# Patient Record
Sex: FEMALE | Race: BLACK OR AFRICAN AMERICAN | Employment: OTHER | URBAN - METROPOLITAN AREA
[De-identification: names, ages, dates, MRNs, and addresses within clinical notes are randomized per-mention and may not be internally consistent; named-entity substitution may affect disease eponyms.]

---

## 2017-11-02 LAB — HBA1C MFR BLD HPLC: 6 %

## 2018-07-31 ENCOUNTER — OFFICE VISIT (OUTPATIENT)
Dept: ORTHOPEDIC SURGERY | Age: 63
End: 2018-07-31

## 2018-07-31 VITALS
WEIGHT: 168.2 LBS | HEIGHT: 66 IN | OXYGEN SATURATION: 96 % | DIASTOLIC BLOOD PRESSURE: 72 MMHG | BODY MASS INDEX: 27.03 KG/M2 | SYSTOLIC BLOOD PRESSURE: 141 MMHG | HEART RATE: 107 BPM

## 2018-07-31 DIAGNOSIS — M53.86 SCIATICA ASSOCIATED WITH DISORDER OF LUMBAR SPINE: ICD-10-CM

## 2018-07-31 DIAGNOSIS — S86.911A KNEE STRAIN, RIGHT, INITIAL ENCOUNTER: ICD-10-CM

## 2018-07-31 DIAGNOSIS — M51.16 LUMBAR DISC DISEASE WITH RADICULOPATHY: ICD-10-CM

## 2018-07-31 DIAGNOSIS — M25.551 PAIN OF RIGHT HIP JOINT: ICD-10-CM

## 2018-07-31 DIAGNOSIS — M16.11 ARTHRITIS OF RIGHT HIP: ICD-10-CM

## 2018-07-31 DIAGNOSIS — M25.561 RIGHT KNEE PAIN, UNSPECIFIED CHRONICITY: Primary | ICD-10-CM

## 2018-07-31 RX ORDER — LOSARTAN POTASSIUM AND HYDROCHLOROTHIAZIDE 25; 100 MG/1; MG/1
TABLET ORAL
Refills: 5 | Status: ON HOLD | COMMUNITY
Start: 2018-07-06 | End: 2020-05-23

## 2018-07-31 RX ORDER — AMLODIPINE BESYLATE 10 MG/1
10 TABLET ORAL DAILY
Refills: 3 | COMMUNITY
Start: 2018-07-06 | End: 2020-09-17

## 2018-07-31 RX ORDER — AZELASTINE 1 MG/ML
2 SPRAY, METERED NASAL
Refills: 5 | COMMUNITY
Start: 2018-07-16 | End: 2021-03-30 | Stop reason: SDUPTHER

## 2018-07-31 RX ORDER — MONTELUKAST SODIUM 10 MG/1
TABLET ORAL
Refills: 5 | COMMUNITY
Start: 2018-06-25 | End: 2021-08-27 | Stop reason: SDUPTHER

## 2018-07-31 RX ORDER — ERGOCALCIFEROL 1.25 MG/1
50000 CAPSULE ORAL
Refills: 5 | COMMUNITY
Start: 2018-07-07 | End: 2020-09-02

## 2018-07-31 RX ORDER — GLYCOPYRROLATE AND FORMOTEROL FUMARATE 9; 4.8 UG/1; UG/1
AEROSOL, METERED RESPIRATORY (INHALATION)
Refills: 4 | Status: ON HOLD | COMMUNITY
Start: 2018-07-14 | End: 2020-05-23

## 2018-07-31 RX ORDER — PREDNISONE 10 MG/1
10 TABLET ORAL DAILY
Refills: 12 | Status: ON HOLD | COMMUNITY
Start: 2018-07-10 | End: 2020-05-29 | Stop reason: SDUPTHER

## 2018-07-31 RX ORDER — ALBUTEROL SULFATE 0.83 MG/ML
SOLUTION RESPIRATORY (INHALATION)
Refills: 4 | Status: ON HOLD | COMMUNITY
Start: 2018-07-07 | End: 2020-05-23

## 2018-07-31 RX ORDER — OMEPRAZOLE 40 MG/1
CAPSULE, DELAYED RELEASE ORAL
Refills: 4 | Status: ON HOLD | COMMUNITY
Start: 2018-07-06 | End: 2020-05-23

## 2018-07-31 RX ORDER — ALBUTEROL SULFATE 90 UG/1
AEROSOL, METERED RESPIRATORY (INHALATION)
Refills: 5 | Status: ON HOLD | COMMUNITY
Start: 2018-07-17 | End: 2020-05-23

## 2018-07-31 RX ORDER — ATORVASTATIN CALCIUM 10 MG/1
TABLET, FILM COATED ORAL
Refills: 4 | COMMUNITY
Start: 2018-07-09 | End: 2020-09-02

## 2018-07-31 NOTE — PROGRESS NOTES
HISTORY OF PRESENT ILLNESS:  Erica Rader is a 72-year-old female who is here for consultation regarding right hip and knee pain. She is a patient of Dr. Yary Cano, and he did give her a cortisone injection to the lateral aspect of her right hip. This helped her only for a couple of days. She points to pain in the right groin. She does not notice a leg length discrepancy. She has had pain for a few years. She does rely on a cane for ambulatory assistance. She also has a history of back problems, but she feels her worse pain is now in the groin and into the right knee. She does not notice swelling of the right knee. Her pain is aggravated by weightbearing activities. She also has some pain at rest.  Her pain is definitely interfering with her daily activities. She has a hard time getting in and out of cars. She has a hard time putting her shoes and socks on now. She does not take any arthritis medicine but is on Prednisone because of severe COPD from a smoking history. She is no longer smoking. PHYSICAL EXAMINATION:   Clinical examination today reveals an overweight, 72-year-old,  female in obvious discomfort. She ambulates with a marked antalgic gait with decreased stance phase on the right leg. She relies on a cane for ambulation assistance. With reference to her left hip, she is able to straight leg raise about 80° today without discomfort. She has good passive rotation of the left hip without pain. Left hip roll test is negative. Delgado's test on the left side is negative. With reference to her left knee, she has no effusion of the left knee, and she has good functional range of motion of her left knee. With reference to her right hip, she has difficulty even initiating a straight leg raise on the right side. With help, she is able to flex to about 60°, and she flexes into external rotation.   She has internal rotation to only about 5° of external rotation and has further external rotation of about 5°. She has passive abduction of about 5° and abduction beyond this results in pain, as well as pelvic tilt. Right hip roll test is markedly positive. Delgado's test on the right side is difficult to assess due to the stiffness of her right hip. She appears to have a leg length discrepancy with the right leg being slightly shorter than the left. She does not feel this, however. With reference to her right knee, she has no effusion of the right knee. She has good functional range of motion of the right knee, and she does not have a flexion contracture of her right knee. She has flexion of about 130°. She has no palpable medial or lateral joint line tenderness. She has mild crepitance to the patellofemoral area of the right knee with flexion and extension. She has good collateral, as well as cruciate ligament stability of the right knee. Lachman's test is negative. Anterior and posterior drawer tests are negative. Neurovascular testing is intact to the lower extremities proximal and distal to motor strength and sensation. RADIOGRAPHS:  X-rays of her pelvis and right knee are reviewed. She has severe arthritis of the right hip. She has complete bone-on-bone-opposition in the articular surface of the right hip with cyst formation on both sides of the joint. She has increased sclerosis of the sourcil. She has osteophytes along the superolateral, as well as inferior medial aspect of the right hip. X-rays of the right knee reveal minimal arthritic changes. She has good joint space remaining in the weightbearing portion of the right knee. She does not have radiographic deformity. Bone quality is pretty good considering she is on 10 mg of Prednisone a day. IMPRESSION:      1. Severe osteoarthritis, right hip. 2. Right knee pain secondary to arthritis of the right hip. 3. 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 RECOMMENDATIONS:  I discussed with the patient I think the majority of her symptoms are originating from her right hip. I do not feel that further conservative treatment will help her with her symptoms of hip pain or knee pain. Clinically and radiographically, she is a candidate for hip arthroplasty surgery. Risks and benefits of the surgery were discussed with the patient that include but are not limited to infection, bleeding, nerve damage, deep venous thrombosis, pulmonary emboli, failure of the prosthesis, as well failure of the procedure. We discussed hospitalization and rehabilitation postoperatively, leg length discrepancy, and dislocation precautions. I also discussed with the patient alternate bearing surfaces and alternate approaches. The patient understands. All questions were answered. She would like to proceed with surgery pending appropriate preoperative clearances. We will definitely need to get pulmonary clearance prior to proceeding with surgery and manage her steroids perioperatively. All her questions were answered today. Vitals:    07/31/18 1319   BP: 141/72   Pulse: (!) 107   SpO2: 96%   Weight: 168 lb 3.2 oz (76.3 kg)   Height: 5' 6\" (1.676 m)   PainSc:   9   PainLoc: Hip       There is no problem list on file for this patient. There are no active problems to display for this patient.     Current Outpatient Prescriptions   Medication Sig Dispense Refill    VENTOLIN HFA 90 mcg/actuation inhaler INHALE 2 PUFFS BY MOUTH EVERY 4 HOURS AS NEEDED FOR WHEEZING  5    albuterol (PROVENTIL VENTOLIN) 2.5 mg /3 mL (0.083 %) nebulizer solution USE 1 UNIT DOSE VIAL IN NEBULIZER EVERY 4 TO 6 HOURS AS NEEDED  4    amLODIPine (NORVASC) 10 mg tablet TAKE 1 TABLET EVERY DAY  3    atorvastatin (LIPITOR) 10 mg tablet TAKE 1 TABLET BY MOUTH EVERY DAY  4    azelastine (ASTELIN) 137 mcg (0.1 %) nasal spray INSTILL 2 SPRAYS TWICE DAILY INTO EACH NOSTRIL  5    ergocalciferol (ERGOCALCIFEROL) 50,000 unit capsule TAKE 1 CAPSULE BY MOUTH EVERY WEEK. 5    BEVESPI AEROSPHERE 9-4.8 mcg HFAA TAKE 2 PUFFS BY MOUTH TWICE A DAY  4    losartan-hydroCHLOROthiazide (HYZAAR) 100-25 mg per tablet TAKE 1 TABLET BY MOUTH EVERY DAY  5    montelukast (SINGULAIR) 10 mg tablet TAKE 1 TABLET BY MOUTH EVERY DAY FOR ALLERGY SYMPTOMS  5    omeprazole (PRILOSEC) 40 mg capsule TAKE ONE CAPSULE BY MOUTH EVERY DAY BEFORE A MEAL  4    predniSONE (DELTASONE) 10 mg tablet TAKE 1 TABLET BY MOUTH EVERY DAY  12     No Known Allergies  History reviewed. No pertinent past medical history. History reviewed. No pertinent surgical history.   Family History   Problem Relation Age of Onset    Hypertension Mother     Cancer Sister      Social History   Substance Use Topics    Smoking status: Never Smoker    Smokeless tobacco: Never Used    Alcohol use 1.8 oz/week     3 Cans of beer per week

## 2018-08-28 DIAGNOSIS — Z01.818 PREOP EXAMINATION: Primary | ICD-10-CM

## 2018-08-31 ENCOUNTER — HOSPITAL ENCOUNTER (OUTPATIENT)
Dept: PREADMISSION TESTING | Age: 63
Discharge: HOME | End: 2018-08-31
Payer: MEDICAID

## 2018-08-31 ENCOUNTER — HOSPITAL ENCOUNTER (OUTPATIENT)
Dept: GENERAL RADIOLOGY | Age: 63
Discharge: HOME | End: 2018-08-31
Payer: MEDICAID

## 2018-08-31 DIAGNOSIS — Z01.818 PREOP EXAMINATION: ICD-10-CM

## 2018-08-31 LAB
ABO + RH BLD: NORMAL
ALBUMIN SERPL-MCNC: 4.7 G/DL (ref 3.4–5)
ALBUMIN/GLOB SERPL: 1.5 {RATIO} (ref 0.8–1.7)
ALP SERPL-CCNC: 69 U/L (ref 45–117)
ALT SERPL-CCNC: 55 U/L (ref 13–56)
ANION GAP SERPL CALC-SCNC: 14 MMOL/L (ref 3–18)
APPEARANCE UR: CLEAR
APTT PPP: 30.7 SEC (ref 23–36.4)
AST SERPL-CCNC: 37 U/L (ref 15–37)
BACTERIA URNS QL MICRO: ABNORMAL /HPF
BASOPHILS # BLD: 0 K/UL (ref 0–0.1)
BASOPHILS NFR BLD: 0 % (ref 0–2)
BILIRUB SERPL-MCNC: 0.8 MG/DL (ref 0.2–1)
BILIRUB UR QL: NEGATIVE
BLOOD GROUP ANTIBODIES SERPL: NORMAL
BUN SERPL-MCNC: 13 MG/DL (ref 7–18)
BUN/CREAT SERPL: 15 (ref 12–20)
CALCIUM SERPL-MCNC: 10.2 MG/DL (ref 8.5–10.1)
CHLORIDE SERPL-SCNC: 91 MMOL/L (ref 100–108)
CO2 SERPL-SCNC: 29 MMOL/L (ref 21–32)
COLOR UR: YELLOW
CREAT SERPL-MCNC: 0.87 MG/DL (ref 0.6–1.3)
DIFFERENTIAL METHOD BLD: ABNORMAL
EOSINOPHIL # BLD: 0 K/UL (ref 0–0.4)
EOSINOPHIL NFR BLD: 0 % (ref 0–5)
EPITH CASTS URNS QL MICRO: ABNORMAL /LPF (ref 0–5)
ERYTHROCYTE [DISTWIDTH] IN BLOOD BY AUTOMATED COUNT: 13.4 % (ref 11.6–14.5)
GLOBULIN SER CALC-MCNC: 3.1 G/DL (ref 2–4)
GLUCOSE SERPL-MCNC: 130 MG/DL (ref 74–99)
GLUCOSE UR STRIP.AUTO-MCNC: NEGATIVE MG/DL
HBA1C MFR BLD: 5 % (ref 4.2–5.6)
HCT VFR BLD AUTO: 37.2 % (ref 35–45)
HGB BLD-MCNC: 12.5 G/DL (ref 12–16)
HGB UR QL STRIP: NEGATIVE
HYALINE CASTS URNS QL MICRO: ABNORMAL /LPF (ref 0–2)
INR PPP: 0.9 (ref 0.8–1.2)
KETONES UR QL STRIP.AUTO: NEGATIVE MG/DL
LEUKOCYTE ESTERASE UR QL STRIP.AUTO: ABNORMAL
LYMPHOCYTES # BLD: 0.8 K/UL (ref 0.9–3.6)
LYMPHOCYTES NFR BLD: 10 % (ref 21–52)
MCH RBC QN AUTO: 31.7 PG (ref 24–34)
MCHC RBC AUTO-ENTMCNC: 33.6 G/DL (ref 31–37)
MCV RBC AUTO: 94.4 FL (ref 74–97)
MONOCYTES # BLD: 0.3 K/UL (ref 0.05–1.2)
MONOCYTES NFR BLD: 5 % (ref 3–10)
NEUTS SEG # BLD: 6.2 K/UL (ref 1.8–8)
NEUTS SEG NFR BLD: 85 % (ref 40–73)
NITRITE UR QL STRIP.AUTO: NEGATIVE
PH UR STRIP: 5.5 [PH] (ref 5–8)
PLATELET # BLD AUTO: 310 K/UL (ref 135–420)
PMV BLD AUTO: 10 FL (ref 9.2–11.8)
POTASSIUM SERPL-SCNC: 4.1 MMOL/L (ref 3.5–5.5)
PROT SERPL-MCNC: 7.8 G/DL (ref 6.4–8.2)
PROT UR STRIP-MCNC: NEGATIVE MG/DL
PROTHROMBIN TIME: 11.8 SEC (ref 11.5–15.2)
RBC # BLD AUTO: 3.94 M/UL (ref 4.2–5.3)
RBC #/AREA URNS HPF: NEGATIVE /HPF (ref 0–5)
SODIUM SERPL-SCNC: 134 MMOL/L (ref 136–145)
SP GR UR REFRACTOMETRY: 1.01 (ref 1–1.03)
SPECIMEN EXP DATE BLD: NORMAL
UROBILINOGEN UR QL STRIP.AUTO: 0.2 EU/DL (ref 0.2–1)
WBC # BLD AUTO: 7.4 K/UL (ref 4.6–13.2)
WBC URNS QL MICRO: ABNORMAL /HPF (ref 0–4)

## 2018-08-31 PROCEDURE — 87077 CULTURE AEROBIC IDENTIFY: CPT | Performed by: ORTHOPAEDIC SURGERY

## 2018-08-31 PROCEDURE — 87186 SC STD MICRODIL/AGAR DIL: CPT | Performed by: ORTHOPAEDIC SURGERY

## 2018-08-31 PROCEDURE — 85610 PROTHROMBIN TIME: CPT | Performed by: ORTHOPAEDIC SURGERY

## 2018-08-31 PROCEDURE — 81001 URINALYSIS AUTO W/SCOPE: CPT | Performed by: ORTHOPAEDIC SURGERY

## 2018-08-31 PROCEDURE — 85730 THROMBOPLASTIN TIME PARTIAL: CPT | Performed by: ORTHOPAEDIC SURGERY

## 2018-08-31 PROCEDURE — 86900 BLOOD TYPING SEROLOGIC ABO: CPT | Performed by: ORTHOPAEDIC SURGERY

## 2018-08-31 PROCEDURE — 36415 COLL VENOUS BLD VENIPUNCTURE: CPT | Performed by: ORTHOPAEDIC SURGERY

## 2018-08-31 PROCEDURE — 71046 X-RAY EXAM CHEST 2 VIEWS: CPT

## 2018-08-31 PROCEDURE — 80053 COMPREHEN METABOLIC PANEL: CPT | Performed by: ORTHOPAEDIC SURGERY

## 2018-08-31 PROCEDURE — 87086 URINE CULTURE/COLONY COUNT: CPT | Performed by: ORTHOPAEDIC SURGERY

## 2018-08-31 PROCEDURE — 93005 ELECTROCARDIOGRAM TRACING: CPT

## 2018-08-31 PROCEDURE — 83036 HEMOGLOBIN GLYCOSYLATED A1C: CPT | Performed by: ORTHOPAEDIC SURGERY

## 2018-08-31 PROCEDURE — 85025 COMPLETE CBC W/AUTO DIFF WBC: CPT | Performed by: ORTHOPAEDIC SURGERY

## 2018-09-01 LAB
ATRIAL RATE: 102 BPM
CALCULATED P AXIS, ECG09: 81 DEGREES
CALCULATED R AXIS, ECG10: 82 DEGREES
CALCULATED T AXIS, ECG11: 77 DEGREES
DIAGNOSIS, 93000: NORMAL
P-R INTERVAL, ECG05: 150 MS
Q-T INTERVAL, ECG07: 330 MS
QRS DURATION, ECG06: 74 MS
QTC CALCULATION (BEZET), ECG08: 430 MS
VENTRICULAR RATE, ECG03: 102 BPM

## 2018-09-02 LAB
BACTERIA SPEC CULT: ABNORMAL
BACTERIA SPEC CULT: ABNORMAL
SERVICE CMNT-IMP: ABNORMAL

## 2018-09-04 RX ORDER — LEVOFLOXACIN 750 MG/1
750 TABLET ORAL DAILY
Qty: 5 TAB | Refills: 0 | Status: SHIPPED | OUTPATIENT
Start: 2018-09-04 | End: 2018-10-15

## 2018-09-07 RX ORDER — SULFAMETHOXAZOLE AND TRIMETHOPRIM 800; 160 MG/1; MG/1
TABLET ORAL
Qty: 20 TAB | Refills: 0 | Status: SHIPPED | OUTPATIENT
Start: 2018-09-07 | End: 2018-10-15

## 2018-10-12 DIAGNOSIS — Z01.818 PREOP EXAMINATION: Primary | ICD-10-CM

## 2018-10-12 DIAGNOSIS — M16.11 PRIMARY OSTEOARTHRITIS OF RIGHT HIP: ICD-10-CM

## 2018-10-15 ENCOUNTER — OFFICE VISIT (OUTPATIENT)
Dept: ORTHOPEDIC SURGERY | Age: 63
End: 2018-10-15

## 2018-10-15 ENCOUNTER — HOSPITAL ENCOUNTER (OUTPATIENT)
Dept: LAB | Age: 63
Discharge: HOME | End: 2018-10-15
Payer: MEDICAID

## 2018-10-15 VITALS
HEART RATE: 104 BPM | DIASTOLIC BLOOD PRESSURE: 78 MMHG | WEIGHT: 186.6 LBS | SYSTOLIC BLOOD PRESSURE: 152 MMHG | RESPIRATION RATE: 16 BRPM | TEMPERATURE: 97.1 F | HEIGHT: 66 IN | OXYGEN SATURATION: 92 % | BODY MASS INDEX: 29.99 KG/M2

## 2018-10-15 DIAGNOSIS — Z01.818 PRE-OP EXAM: Primary | ICD-10-CM

## 2018-10-15 DIAGNOSIS — M16.11 ARTHRITIS OF RIGHT HIP: ICD-10-CM

## 2018-10-15 LAB
ABO + RH BLD: NORMAL
ALBUMIN SERPL-MCNC: 3.9 G/DL (ref 3.4–5)
ALBUMIN/GLOB SERPL: 1.3 {RATIO} (ref 0.8–1.7)
ALP SERPL-CCNC: 71 U/L (ref 45–117)
ALT SERPL-CCNC: 36 U/L (ref 13–56)
ANION GAP SERPL CALC-SCNC: 9 MMOL/L (ref 3–18)
APPEARANCE UR: CLEAR
APTT PPP: 29.6 SEC (ref 23–36.4)
AST SERPL-CCNC: 20 U/L (ref 15–37)
BACTERIA URNS QL MICRO: NEGATIVE /HPF
BASOPHILS # BLD: 0 K/UL (ref 0–0.1)
BASOPHILS NFR BLD: 0 % (ref 0–2)
BILIRUB SERPL-MCNC: 0.2 MG/DL (ref 0.2–1)
BILIRUB UR QL: NEGATIVE
BLOOD GROUP ANTIBODIES SERPL: NORMAL
BUN SERPL-MCNC: 16 MG/DL (ref 7–18)
BUN/CREAT SERPL: 18 (ref 12–20)
CALCIUM SERPL-MCNC: 9.6 MG/DL (ref 8.5–10.1)
CHLORIDE SERPL-SCNC: 99 MMOL/L (ref 100–108)
CO2 SERPL-SCNC: 31 MMOL/L (ref 21–32)
COLOR UR: YELLOW
CREAT SERPL-MCNC: 0.89 MG/DL (ref 0.6–1.3)
DIFFERENTIAL METHOD BLD: ABNORMAL
EOSINOPHIL # BLD: 0 K/UL (ref 0–0.4)
EOSINOPHIL NFR BLD: 0 % (ref 0–5)
EPITH CASTS URNS QL MICRO: NORMAL /LPF (ref 0–5)
ERYTHROCYTE [DISTWIDTH] IN BLOOD BY AUTOMATED COUNT: 13.2 % (ref 11.6–14.5)
EST. AVERAGE GLUCOSE BLD GHB EST-MCNC: 123 MG/DL
GLOBULIN SER CALC-MCNC: 3 G/DL (ref 2–4)
GLUCOSE SERPL-MCNC: 170 MG/DL (ref 74–99)
GLUCOSE UR STRIP.AUTO-MCNC: 250 MG/DL
HBA1C MFR BLD: 5.9 % (ref 4.2–5.6)
HCT VFR BLD AUTO: 38 % (ref 35–45)
HGB BLD-MCNC: 12.6 G/DL (ref 12–16)
HGB UR QL STRIP: NEGATIVE
INR PPP: 0.9 (ref 0.8–1.2)
KETONES UR QL STRIP.AUTO: NEGATIVE MG/DL
LEUKOCYTE ESTERASE UR QL STRIP.AUTO: ABNORMAL
LYMPHOCYTES # BLD: 0.7 K/UL (ref 0.9–3.6)
LYMPHOCYTES NFR BLD: 7 % (ref 21–52)
MCH RBC QN AUTO: 31.7 PG (ref 24–34)
MCHC RBC AUTO-ENTMCNC: 33.2 G/DL (ref 31–37)
MCV RBC AUTO: 95.5 FL (ref 74–97)
MONOCYTES # BLD: 0.1 K/UL (ref 0.05–1.2)
MONOCYTES NFR BLD: 1 % (ref 3–10)
NEUTS SEG # BLD: 8.5 K/UL (ref 1.8–8)
NEUTS SEG NFR BLD: 92 % (ref 40–73)
NITRITE UR QL STRIP.AUTO: NEGATIVE
PH UR STRIP: 6.5 [PH] (ref 5–8)
PLATELET # BLD AUTO: 325 K/UL (ref 135–420)
PMV BLD AUTO: 10.5 FL (ref 9.2–11.8)
POTASSIUM SERPL-SCNC: 3.9 MMOL/L (ref 3.5–5.5)
PROT SERPL-MCNC: 6.9 G/DL (ref 6.4–8.2)
PROT UR STRIP-MCNC: NEGATIVE MG/DL
PROTHROMBIN TIME: 12.1 SEC (ref 11.5–15.2)
RBC # BLD AUTO: 3.98 M/UL (ref 4.2–5.3)
RBC #/AREA URNS HPF: NEGATIVE /HPF (ref 0–5)
SODIUM SERPL-SCNC: 139 MMOL/L (ref 136–145)
SP GR UR REFRACTOMETRY: 1.01 (ref 1–1.03)
SPECIMEN EXP DATE BLD: NORMAL
UROBILINOGEN UR QL STRIP.AUTO: 0.2 EU/DL (ref 0.2–1)
WBC # BLD AUTO: 9.3 K/UL (ref 4.6–13.2)
WBC URNS QL MICRO: NEGATIVE /HPF (ref 0–4)

## 2018-10-15 PROCEDURE — 80053 COMPREHEN METABOLIC PANEL: CPT | Performed by: ORTHOPAEDIC SURGERY

## 2018-10-15 PROCEDURE — 85610 PROTHROMBIN TIME: CPT | Performed by: ORTHOPAEDIC SURGERY

## 2018-10-15 PROCEDURE — 36415 COLL VENOUS BLD VENIPUNCTURE: CPT | Performed by: ORTHOPAEDIC SURGERY

## 2018-10-15 PROCEDURE — 83036 HEMOGLOBIN GLYCOSYLATED A1C: CPT | Performed by: ORTHOPAEDIC SURGERY

## 2018-10-15 PROCEDURE — 81001 URINALYSIS AUTO W/SCOPE: CPT | Performed by: ORTHOPAEDIC SURGERY

## 2018-10-15 PROCEDURE — 85730 THROMBOPLASTIN TIME PARTIAL: CPT | Performed by: ORTHOPAEDIC SURGERY

## 2018-10-15 PROCEDURE — 85025 COMPLETE CBC W/AUTO DIFF WBC: CPT | Performed by: ORTHOPAEDIC SURGERY

## 2018-10-15 PROCEDURE — 87086 URINE CULTURE/COLONY COUNT: CPT | Performed by: ORTHOPAEDIC SURGERY

## 2018-10-15 PROCEDURE — 86900 BLOOD TYPING SEROLOGIC ABO: CPT | Performed by: ORTHOPAEDIC SURGERY

## 2018-10-15 RX ORDER — OXYCODONE AND ACETAMINOPHEN 5; 325 MG/1; MG/1
1-2 TABLET ORAL ONCE
Status: CANCELLED | OUTPATIENT
Start: 2018-10-15 | End: 2018-10-15

## 2018-10-15 RX ORDER — FLUTICASONE PROPIONATE 50 MCG
2 SPRAY, SUSPENSION (ML) NASAL DAILY
COMMUNITY
End: 2020-09-02

## 2018-10-15 RX ORDER — SUCRALFATE 1 G/1
1 TABLET ORAL 4 TIMES DAILY
Status: ON HOLD | COMMUNITY
End: 2020-05-23

## 2018-10-15 NOTE — PROGRESS NOTES
History and Physical Performed today and documented in chart    SADIA Randhawa  10/15/2018.  11:13 AM

## 2018-10-15 NOTE — H&P (VIEW-ONLY)
[address] [place] [address], [address] [facility] [address] [phone] HISTORY & PHYSICAL Patient: Lela Stanton                MRN: [id number]       SSN: [id number] YOB: 1955        AGE: 59 y.o. SEX: female Body mass index is 30.12 kg/(m^2). PCP: Ndia Combs MD 
10/15/18 CC: Right  hip end stage OA Problem List Items Addressed This Visit None Visit Diagnoses Pre-op exam    -  Primary Relevant Orders AMB POC X-RAY RADEX HIP UNI WITH PELVIS 2-3 VIEWS (Completed) Arthritis of right hip HPI:  The patient is a pleasant 59 y.o. whom has end stage OA of their Right hip and has failed conservative treatment including but not limited to NSAIDS, cortisone injections, viscosupplementation, PT, and pain medicine. Due to the current findings and affected activities of daily living, surgical intervention is indicated. The alternatives, risks, complications, as well as expected outcome were discussed. These include but are not limited to infection, blood loss, need for blood transfusion, neurovascular damage, DVT, PE,  post-op stiffness and pain, leg length discrepancy, dislocation, anesthetic complications, prothesis longevity, need for more surgery, MI, stroke, and even death. The patient understands and wishes to proceed with surgery. Past Medical History:  
Diagnosis Date  Arthritis  Chronic obstructive pulmonary disease ([address]  Chronic pain  GERD (gastroesophageal reflux disease)  Hypertension Current Outpatient Prescriptions:  
  fluticasone (FLONASE) 50 mcg/actuation nasal spray, 2 Sprays by Both Nostrils route daily. , Disp: , Rfl:  
  tiotropium (SPIRIVA WITH HANDIHALER) 18 mcg inhalation capsule, Take 1 Cap by inhalation daily. , Disp: , Rfl:  
  sucralfate (CARAFATE) 1 gram tablet, Take 1 g by mouth four (4) times daily. , Disp: , Rfl:  
   VENTOLIN HFA 90 mcg/actuation inhaler, INHALE 2 PUFFS BY MOUTH EVERY 4 HOURS AS NEEDED FOR WHEEZING, Disp: , Rfl: 5 
  albuterol (PROVENTIL VENTOLIN) 2.5 mg /3 mL (0.083 %) nebulizer solution, USE 1 UNIT DOSE VIAL IN NEBULIZER EVERY 4 TO 6 HOURS AS NEEDED, Disp: , Rfl: 4 
  amLODIPine (NORVASC) 10 mg tablet, TAKE 1 TABLET EVERY DAY, Disp: , Rfl: 3 
  atorvastatin (LIPITOR) 10 mg tablet, TAKE 1 TABLET BY MOUTH EVERY DAY, Disp: , Rfl: 4 
  azelastine (ASTELIN) 137 mcg (0.1 %) nasal spray, INSTILL 2 SPRAYS TWICE DAILY INTO EACH NOSTRIL, Disp: , Rfl: 5 
  ergocalciferol (ERGOCALCIFEROL) 50,000 unit capsule, TAKE 1 CAPSULE BY MOUTH EVERY WEEK., Disp: , Rfl: 5 
  losartan-hydroCHLOROthiazide (HYZAAR) 100-25 mg per tablet, TAKE 1 TABLET BY MOUTH EVERY DAY, Disp: , Rfl: 5 
  montelukast (SINGULAIR) 10 mg tablet, TAKE 1 TABLET BY MOUTH EVERY DAY FOR ALLERGY SYMPTOMS, Disp: , Rfl: 5 
  omeprazole (PRILOSEC) 40 mg capsule, TAKE ONE CAPSULE BY MOUTH EVERY DAY BEFORE A MEAL, Disp: , Rfl: 4 
  predniSONE (DELTASONE) 10 mg tablet, TAKE 1 TABLET BY MOUTH EVERY DAY, Disp: , Rfl: 12 
  trimethoprim-sulfamethoxazole (BACTRIM DS) 160-800 mg per tablet, 1 po bid x 10 days, Disp: 20 Tab, Rfl: 0 
  levoFLOXacin (LEVAQUIN) 750 mg tablet, Take 1 Tab by mouth daily. 1 po q day, Disp: 5 Tab, Rfl: 0 
  BEVESPI AEROSPHERE 9-4.8 mcg HFAA, TAKE 2 PUFFS BY MOUTH TWICE A DAY, Disp: , Rfl: 4 No Known Allergies Social History Social History  Marital status: SINGLE Spouse name: N/A  
 Number of children: N/A  
 Years of education: N/A Occupational History  Not on file. Social History Main Topics  Smoking status: Never Smoker  Smokeless tobacco: Never Used  Alcohol use 1.8 oz/week 3 Cans of beer per week  Drug use: No  
 Sexual activity: Not on file Other Topics Concern  Not on file Social History Narrative Past Surgical History:  
Procedure Laterality Date  HX GI    
 colonscopy Family History:  Non-contributory. PE: 
Visit Vitals  /78  Pulse (!) 104  Temp 97.1 °F (36.2 °C) (Oral)  Resp 16  
 Ht 5' 6\" (1.676 m)  Wt 186 lb 9.6 oz (84.6 kg)  SpO2 92%  BMI 30.12 kg/m2 A&O X3, NAD, well develop, well nourished Heart: S1-S2, rrr 
Lungs: CTA bilat Abd: soft, nt, nt, + bs in all quadrants Ext:  Pos distal pulses to DP, PT 
 
X-ray: right hip shows end stage OA Labs: labs pending, PCP clearance pending, pulmonary clearance recieved A:  Right  hip end stage OA 
 
P:  At this point we will move forward with surgery. Again, the alternatives, risks, complications, as well as expected outcome were discussed and the patient wishes to proceed with surgery. Pt has been instructed to stop aspirin, nsaids, rheumatologic medications and blood thinners. They have also been instructed to continue on any heart and bp meds and to take them the morning of surgery with sips of water. Direct Anterior The patient will require in patient admission due to above stated medical conditions as well the the surgical challenges given the anatomy and bone quality. Swapnil Norman

## 2018-10-15 NOTE — H&P
[address] [facility], [address] [facility] [address]           HISTORY & PHYSICAL      Patient: Enoc Harper                MRN: [id number]       SSN: [id number]  YOB: 1955        AGE: 59 y.o. SEX: female  Body mass index is 30.12 kg/(m^2). PCP: Dorothy King MD  10/15/18      CC: Right  hip end stage OA  Problem List Items Addressed This Visit     None      Visit Diagnoses     Pre-op exam    -  Primary    Relevant Orders    AMB POC X-RAY RADEX HIP UNI WITH PELVIS 2-3 VIEWS (Completed)    Arthritis of right hip                HPI:  The patient is a pleasant 59 y.o. whom has end stage OA of their Right hip and has failed conservative treatment including but not limited to NSAIDS, cortisone injections, viscosupplementation, PT, and pain medicine. Due to the current findings and affected activities of daily living, surgical intervention is indicated. The alternatives, risks, complications, as well as expected outcome were discussed. These include but are not limited to infection, blood loss, need for blood transfusion, neurovascular damage, DVT, PE,  post-op stiffness and pain, leg length discrepancy, dislocation, anesthetic complications, prothesis longevity, need for more surgery, MI, stroke, and even death. The patient understands and wishes to proceed with surgery. Past Medical History:   Diagnosis Date    Arthritis     Chronic obstructive pulmonary disease (HCC)     Chronic pain     GERD (gastroesophageal reflux disease)     Hypertension          Current Outpatient Prescriptions:     fluticasone (FLONASE) 50 mcg/actuation nasal spray, 2 Sprays by Both Nostrils route daily. , Disp: , Rfl:     tiotropium (SPIRIVA WITH HANDIHALER) 18 mcg inhalation capsule, Take 1 Cap by inhalation daily. , Disp: , Rfl:     sucralfate (CARAFATE) 1 gram tablet, Take 1 g by mouth four (4) times daily. , Disp: , Rfl:    VENTOLIN HFA 90 mcg/actuation inhaler, INHALE 2 PUFFS BY MOUTH EVERY 4 HOURS AS NEEDED FOR WHEEZING, Disp: , Rfl: 5    albuterol (PROVENTIL VENTOLIN) 2.5 mg /3 mL (0.083 %) nebulizer solution, USE 1 UNIT DOSE VIAL IN NEBULIZER EVERY 4 TO 6 HOURS AS NEEDED, Disp: , Rfl: 4    amLODIPine (NORVASC) 10 mg tablet, TAKE 1 TABLET EVERY DAY, Disp: , Rfl: 3    atorvastatin (LIPITOR) 10 mg tablet, TAKE 1 TABLET BY MOUTH EVERY DAY, Disp: , Rfl: 4    azelastine (ASTELIN) 137 mcg (0.1 %) nasal spray, INSTILL 2 SPRAYS TWICE DAILY INTO EACH NOSTRIL, Disp: , Rfl: 5    ergocalciferol (ERGOCALCIFEROL) 50,000 unit capsule, TAKE 1 CAPSULE BY MOUTH EVERY WEEK., Disp: , Rfl: 5    losartan-hydroCHLOROthiazide (HYZAAR) 100-25 mg per tablet, TAKE 1 TABLET BY MOUTH EVERY DAY, Disp: , Rfl: 5    montelukast (SINGULAIR) 10 mg tablet, TAKE 1 TABLET BY MOUTH EVERY DAY FOR ALLERGY SYMPTOMS, Disp: , Rfl: 5    omeprazole (PRILOSEC) 40 mg capsule, TAKE ONE CAPSULE BY MOUTH EVERY DAY BEFORE A MEAL, Disp: , Rfl: 4    predniSONE (DELTASONE) 10 mg tablet, TAKE 1 TABLET BY MOUTH EVERY DAY, Disp: , Rfl: 12    trimethoprim-sulfamethoxazole (BACTRIM DS) 160-800 mg per tablet, 1 po bid x 10 days, Disp: 20 Tab, Rfl: 0    levoFLOXacin (LEVAQUIN) 750 mg tablet, Take 1 Tab by mouth daily. 1 po q day, Disp: 5 Tab, Rfl: 0    BEVESPI AEROSPHERE 9-4.8 mcg HFAA, TAKE 2 PUFFS BY MOUTH TWICE A DAY, Disp: , Rfl: 4    No Known Allergies    Social History     Social History    Marital status: SINGLE     Spouse name: N/A    Number of children: N/A    Years of education: N/A     Occupational History    Not on file.      Social History Main Topics    Smoking status: Never Smoker    Smokeless tobacco: Never Used    Alcohol use 1.8 oz/week     3 Cans of beer per week    Drug use: No    Sexual activity: Not on file     Other Topics Concern    Not on file     Social History Narrative       Past Surgical History:   Procedure Laterality Date    HX GI colonscopy       Family History:  Non-contributory. PE:  Visit Vitals    /78    Pulse (!) 104    Temp 97.1 °F (36.2 °C) (Oral)    Resp 16    Ht 5' 6\" (1.676 m)    Wt 186 lb 9.6 oz (84.6 kg)    SpO2 92%    BMI 30.12 kg/m2     A&O X3, NAD, well develop, well nourished  Heart: S1-S2, rrr  Lungs: CTA bilat  Abd: soft, nt, nt, + bs in all quadrants  Ext:  Pos distal pulses to DP, PT    X-ray: right hip shows end stage OA    Labs: labs pending, PCP clearance pending, pulmonary clearance recieved    A:  Right  hip end stage OA    P:  At this point we will move forward with surgery. Again, the alternatives, risks, complications, as well as expected outcome were discussed and the patient wishes to proceed with surgery. Pt has been instructed to stop aspirin, nsaids, rheumatologic medications and blood thinners. They have also been instructed to continue on any heart and bp meds and to take them the morning of surgery with sips of water. Direct Anterior  The patient will require in patient admission due to above stated medical conditions as well the the surgical challenges given the anatomy and bone quality.          Helene Weinstein

## 2018-10-16 DIAGNOSIS — Z96.641 STATUS POST RIGHT HIP REPLACEMENT: Primary | ICD-10-CM

## 2018-10-16 LAB
BACTERIA SPEC CULT: NORMAL
SERVICE CMNT-IMP: NORMAL

## 2018-10-23 ENCOUNTER — ANESTHESIA EVENT (OUTPATIENT)
Dept: SURGERY | Age: 63
DRG: 301 | End: 2018-10-23
Payer: MEDICAID

## 2018-10-24 ENCOUNTER — ANESTHESIA (OUTPATIENT)
Dept: SURGERY | Age: 63
DRG: 301 | End: 2018-10-24
Payer: MEDICAID

## 2018-10-24 ENCOUNTER — APPOINTMENT (OUTPATIENT)
Dept: GENERAL RADIOLOGY | Age: 63
DRG: 301 | End: 2018-10-24
Attending: ORTHOPAEDIC SURGERY
Payer: MEDICAID

## 2018-10-24 ENCOUNTER — HOSPITAL ENCOUNTER (INPATIENT)
Age: 63
LOS: 1 days | Discharge: HOME HEALTH | DRG: 301 | End: 2018-10-25
Attending: ORTHOPAEDIC SURGERY | Admitting: ORTHOPAEDIC SURGERY
Payer: MEDICAID

## 2018-10-24 DIAGNOSIS — M16.11 ARTHRITIS OF RIGHT HIP: Primary | ICD-10-CM

## 2018-10-24 PROCEDURE — 77030020782 HC GWN BAIR PAWS FLX 3M -B: Performed by: ORTHOPAEDIC SURGERY

## 2018-10-24 PROCEDURE — 74011250636 HC RX REV CODE- 250/636

## 2018-10-24 PROCEDURE — 77030038020 HC MANFLD NEPTUNE STRY -B: Performed by: ORTHOPAEDIC SURGERY

## 2018-10-24 PROCEDURE — 77030012890: Performed by: ORTHOPAEDIC SURGERY

## 2018-10-24 PROCEDURE — 74011000258 HC RX REV CODE- 258: Performed by: ORTHOPAEDIC SURGERY

## 2018-10-24 PROCEDURE — 74011250636 HC RX REV CODE- 250/636: Performed by: NURSE ANESTHETIST, CERTIFIED REGISTERED

## 2018-10-24 PROCEDURE — 77030013708 HC HNDPC SUC IRR PULS STRY –B: Performed by: ORTHOPAEDIC SURGERY

## 2018-10-24 PROCEDURE — 77030006835 HC BLD SAW SAG STRY -B: Performed by: ORTHOPAEDIC SURGERY

## 2018-10-24 PROCEDURE — 74011250637 HC RX REV CODE- 250/637: Performed by: NURSE ANESTHETIST, CERTIFIED REGISTERED

## 2018-10-24 PROCEDURE — 77030031139 HC SUT VCRL2 J&J -A: Performed by: ORTHOPAEDIC SURGERY

## 2018-10-24 PROCEDURE — 76210000006 HC OR PH I REC 0.5 TO 1 HR: Performed by: ORTHOPAEDIC SURGERY

## 2018-10-24 PROCEDURE — 97161 PT EVAL LOW COMPLEX 20 MIN: CPT

## 2018-10-24 PROCEDURE — 74011250637 HC RX REV CODE- 250/637: Performed by: ORTHOPAEDIC SURGERY

## 2018-10-24 PROCEDURE — 74011000250 HC RX REV CODE- 250: Performed by: ORTHOPAEDIC SURGERY

## 2018-10-24 PROCEDURE — 74011250636 HC RX REV CODE- 250/636: Performed by: ORTHOPAEDIC SURGERY

## 2018-10-24 PROCEDURE — 77030036660: Performed by: ORTHOPAEDIC SURGERY

## 2018-10-24 PROCEDURE — 0SR904A REPLACEMENT OF RIGHT HIP JOINT WITH CERAMIC ON POLYETHYLENE SYNTHETIC SUBSTITUTE, UNCEMENTED, OPEN APPROACH: ICD-10-PCS | Performed by: ORTHOPAEDIC SURGERY

## 2018-10-24 PROCEDURE — 73502 X-RAY EXAM HIP UNI 2-3 VIEWS: CPT

## 2018-10-24 PROCEDURE — C1776 JOINT DEVICE (IMPLANTABLE): HCPCS | Performed by: ORTHOPAEDIC SURGERY

## 2018-10-24 PROCEDURE — 77030018835 HC SOL IRR LR ICUM -A: Performed by: ORTHOPAEDIC SURGERY

## 2018-10-24 PROCEDURE — 76010000131 HC OR TIME 2 TO 2.5 HR: Performed by: ORTHOPAEDIC SURGERY

## 2018-10-24 PROCEDURE — 77030008467 HC STPLR SKN COVD -B: Performed by: ORTHOPAEDIC SURGERY

## 2018-10-24 PROCEDURE — 77030018836 HC SOL IRR NACL ICUM -A: Performed by: ORTHOPAEDIC SURGERY

## 2018-10-24 PROCEDURE — 97530 THERAPEUTIC ACTIVITIES: CPT

## 2018-10-24 PROCEDURE — 76060000035 HC ANESTHESIA 2 TO 2.5 HR: Performed by: ORTHOPAEDIC SURGERY

## 2018-10-24 PROCEDURE — 77030036660

## 2018-10-24 PROCEDURE — 77030012894: Performed by: ORTHOPAEDIC SURGERY

## 2018-10-24 PROCEDURE — 74011000250 HC RX REV CODE- 250

## 2018-10-24 PROCEDURE — 65270000029 HC RM PRIVATE

## 2018-10-24 PROCEDURE — 77030011265 HC ELECTRD BLD HEX COVD -A: Performed by: ORTHOPAEDIC SURGERY

## 2018-10-24 PROCEDURE — 77030027138 HC INCENT SPIROMETER -A

## 2018-10-24 PROCEDURE — 77030014144 HC TY SPN ANES BBMI -B: Performed by: ANESTHESIOLOGY

## 2018-10-24 DEVICE — LINER ACET OD52MM ID36MM HIP ALTRX PINN: Type: IMPLANTABLE DEVICE | Site: HIP | Status: FUNCTIONAL

## 2018-10-24 DEVICE — SCREW BNE L30MM DIA6.5MM CANC HIP S STL GRIPTION FULL THRD: Type: IMPLANTABLE DEVICE | Site: HIP | Status: FUNCTIONAL

## 2018-10-24 DEVICE — SHELL ACET DIA52MM HIP PORCOAT LOK PRI SECT PRESSFIT PINN: Type: IMPLANTABLE DEVICE | Site: HIP | Status: FUNCTIONAL

## 2018-10-24 DEVICE — HEAD FEM DIA36MM +1.5MM OFFSET 12/14 TAPR HIP CERAMIC: Type: IMPLANTABLE DEVICE | Site: HIP | Status: FUNCTIONAL

## 2018-10-24 DEVICE — COMPONENT TOT HIP PRIMARY CERM ALTRX: Type: IMPLANTABLE DEVICE | Site: HIP | Status: FUNCTIONAL

## 2018-10-24 RX ORDER — LIDOCAINE HYDROCHLORIDE 20 MG/ML
INJECTION, SOLUTION EPIDURAL; INFILTRATION; INTRACAUDAL; PERINEURAL AS NEEDED
Status: DISCONTINUED | OUTPATIENT
Start: 2018-10-24 | End: 2018-10-24 | Stop reason: HOSPADM

## 2018-10-24 RX ORDER — SODIUM CHLORIDE 0.9 % (FLUSH) 0.9 %
5-10 SYRINGE (ML) INJECTION AS NEEDED
Status: DISCONTINUED | OUTPATIENT
Start: 2018-10-24 | End: 2018-10-24 | Stop reason: HOSPADM

## 2018-10-24 RX ORDER — FAMOTIDINE 20 MG/1
20 TABLET, FILM COATED ORAL ONCE
Status: COMPLETED | OUTPATIENT
Start: 2018-10-24 | End: 2018-10-24

## 2018-10-24 RX ORDER — ONDANSETRON 2 MG/ML
4 INJECTION INTRAMUSCULAR; INTRAVENOUS ONCE
Status: DISCONTINUED | OUTPATIENT
Start: 2018-10-24 | End: 2018-10-24 | Stop reason: HOSPADM

## 2018-10-24 RX ORDER — CEFAZOLIN SODIUM 2 G/50ML
2 SOLUTION INTRAVENOUS EVERY 8 HOURS
Status: COMPLETED | OUTPATIENT
Start: 2018-10-24 | End: 2018-10-25

## 2018-10-24 RX ORDER — LIDOCAINE HYDROCHLORIDE 10 MG/ML
INJECTION, SOLUTION EPIDURAL; INFILTRATION; INTRACAUDAL; PERINEURAL
Status: COMPLETED | OUTPATIENT
Start: 2018-10-24 | End: 2018-10-24

## 2018-10-24 RX ORDER — SODIUM CHLORIDE 0.9 % (FLUSH) 0.9 %
5-10 SYRINGE (ML) INJECTION EVERY 8 HOURS
Status: DISCONTINUED | OUTPATIENT
Start: 2018-10-24 | End: 2018-10-24 | Stop reason: HOSPADM

## 2018-10-24 RX ORDER — BACITRACIN ZINC 500 UNIT/G
OINTMENT (GRAM) TOPICAL AS NEEDED
Status: DISCONTINUED | OUTPATIENT
Start: 2018-10-24 | End: 2018-10-24 | Stop reason: HOSPADM

## 2018-10-24 RX ORDER — NALOXONE HYDROCHLORIDE 0.4 MG/ML
0.4 INJECTION, SOLUTION INTRAMUSCULAR; INTRAVENOUS; SUBCUTANEOUS AS NEEDED
Status: DISCONTINUED | OUTPATIENT
Start: 2018-10-24 | End: 2018-10-25 | Stop reason: HOSPADM

## 2018-10-24 RX ORDER — OXYCODONE AND ACETAMINOPHEN 5; 325 MG/1; MG/1
1 TABLET ORAL
Qty: 40 TAB | Refills: 0 | Status: ON HOLD | OUTPATIENT
Start: 2018-10-24 | End: 2020-05-23

## 2018-10-24 RX ORDER — ASPIRIN 325 MG
325 TABLET ORAL 2 TIMES DAILY
Qty: 40 TAB | Refills: 0 | Status: ON HOLD | OUTPATIENT
Start: 2018-10-24 | End: 2020-05-23

## 2018-10-24 RX ORDER — ACETAMINOPHEN 325 MG/1
650 TABLET ORAL
Status: DISCONTINUED | OUTPATIENT
Start: 2018-10-24 | End: 2018-10-25 | Stop reason: HOSPADM

## 2018-10-24 RX ORDER — INSULIN LISPRO 100 [IU]/ML
INJECTION, SOLUTION INTRAVENOUS; SUBCUTANEOUS ONCE
Status: DISCONTINUED | OUTPATIENT
Start: 2018-10-24 | End: 2018-10-24 | Stop reason: HOSPADM

## 2018-10-24 RX ORDER — SODIUM CHLORIDE 0.9 % (FLUSH) 0.9 %
5-10 SYRINGE (ML) INJECTION EVERY 8 HOURS
Status: DISCONTINUED | OUTPATIENT
Start: 2018-10-24 | End: 2018-10-25 | Stop reason: HOSPADM

## 2018-10-24 RX ORDER — LIDOCAINE HYDROCHLORIDE 10 MG/ML
0.1 INJECTION, SOLUTION EPIDURAL; INFILTRATION; INTRACAUDAL; PERINEURAL AS NEEDED
Status: DISCONTINUED | OUTPATIENT
Start: 2018-10-24 | End: 2018-10-24 | Stop reason: HOSPADM

## 2018-10-24 RX ORDER — AMLODIPINE BESYLATE 10 MG/1
10 TABLET ORAL DAILY
Status: DISCONTINUED | OUTPATIENT
Start: 2018-10-25 | End: 2018-10-25 | Stop reason: HOSPADM

## 2018-10-24 RX ORDER — SODIUM CHLORIDE, SODIUM LACTATE, POTASSIUM CHLORIDE, CALCIUM CHLORIDE 600; 310; 30; 20 MG/100ML; MG/100ML; MG/100ML; MG/100ML
75 INJECTION, SOLUTION INTRAVENOUS CONTINUOUS
Status: DISCONTINUED | OUTPATIENT
Start: 2018-10-24 | End: 2018-10-24 | Stop reason: HOSPADM

## 2018-10-24 RX ORDER — ONDANSETRON 2 MG/ML
INJECTION INTRAMUSCULAR; INTRAVENOUS AS NEEDED
Status: DISCONTINUED | OUTPATIENT
Start: 2018-10-24 | End: 2018-10-24 | Stop reason: HOSPADM

## 2018-10-24 RX ORDER — ONDANSETRON 2 MG/ML
4 INJECTION INTRAMUSCULAR; INTRAVENOUS
Status: DISCONTINUED | OUTPATIENT
Start: 2018-10-24 | End: 2018-10-25 | Stop reason: HOSPADM

## 2018-10-24 RX ORDER — METHOCARBAMOL 500 MG/1
500 TABLET, FILM COATED ORAL
Status: DISCONTINUED | OUTPATIENT
Start: 2018-10-24 | End: 2018-10-25 | Stop reason: HOSPADM

## 2018-10-24 RX ORDER — CEFAZOLIN SODIUM 2 G/50ML
2 SOLUTION INTRAVENOUS
Status: COMPLETED | OUTPATIENT
Start: 2018-10-24 | End: 2018-10-24

## 2018-10-24 RX ORDER — SODIUM CHLORIDE 9 MG/ML
1000 INJECTION, SOLUTION INTRAVENOUS ONCE
Status: COMPLETED | OUTPATIENT
Start: 2018-10-24 | End: 2018-10-24

## 2018-10-24 RX ORDER — DOCUSATE SODIUM 100 MG/1
100 CAPSULE, LIQUID FILLED ORAL 2 TIMES DAILY
Status: DISCONTINUED | OUTPATIENT
Start: 2018-10-24 | End: 2018-10-25 | Stop reason: HOSPADM

## 2018-10-24 RX ORDER — METHOCARBAMOL 500 MG/1
500 TABLET, FILM COATED ORAL
Qty: 40 TAB | Refills: 0 | Status: ON HOLD | OUTPATIENT
Start: 2018-10-24 | End: 2020-05-23

## 2018-10-24 RX ORDER — PREDNISONE 10 MG/1
10 TABLET ORAL
Status: DISCONTINUED | OUTPATIENT
Start: 2018-10-25 | End: 2018-10-25 | Stop reason: HOSPADM

## 2018-10-24 RX ORDER — SODIUM CHLORIDE 0.9 % (FLUSH) 0.9 %
5-10 SYRINGE (ML) INJECTION AS NEEDED
Status: DISCONTINUED | OUTPATIENT
Start: 2018-10-24 | End: 2018-10-25 | Stop reason: HOSPADM

## 2018-10-24 RX ORDER — ASPIRIN 325 MG/1
325 TABLET, FILM COATED ORAL 2 TIMES DAILY
Status: DISCONTINUED | OUTPATIENT
Start: 2018-10-24 | End: 2018-10-25 | Stop reason: HOSPADM

## 2018-10-24 RX ORDER — BUPIVACAINE HYDROCHLORIDE 7.5 MG/ML
INJECTION, SOLUTION EPIDURAL; RETROBULBAR AS NEEDED
Status: DISCONTINUED | OUTPATIENT
Start: 2018-10-24 | End: 2018-10-24 | Stop reason: HOSPADM

## 2018-10-24 RX ORDER — OXYCODONE AND ACETAMINOPHEN 5; 325 MG/1; MG/1
1-2 TABLET ORAL ONCE
Status: COMPLETED | OUTPATIENT
Start: 2018-10-24 | End: 2018-10-24

## 2018-10-24 RX ORDER — NEOMYCIN AND POLYMYXIN B SULFATES 40; 200000 MG/ML; [USP'U]/ML
SOLUTION IRRIGATION AS NEEDED
Status: DISCONTINUED | OUTPATIENT
Start: 2018-10-24 | End: 2018-10-24 | Stop reason: HOSPADM

## 2018-10-24 RX ORDER — HYDROMORPHONE HYDROCHLORIDE 2 MG/ML
0.5 INJECTION, SOLUTION INTRAMUSCULAR; INTRAVENOUS; SUBCUTANEOUS
Status: DISCONTINUED | OUTPATIENT
Start: 2018-10-24 | End: 2018-10-24 | Stop reason: HOSPADM

## 2018-10-24 RX ORDER — DIPHENHYDRAMINE HYDROCHLORIDE 50 MG/ML
12.5 INJECTION, SOLUTION INTRAMUSCULAR; INTRAVENOUS
Status: DISCONTINUED | OUTPATIENT
Start: 2018-10-24 | End: 2018-10-24 | Stop reason: HOSPADM

## 2018-10-24 RX ORDER — PROPOFOL 10 MG/ML
INJECTION, EMULSION INTRAVENOUS
Status: DISCONTINUED | OUTPATIENT
Start: 2018-10-24 | End: 2018-10-24 | Stop reason: HOSPADM

## 2018-10-24 RX ORDER — BUPIVACAINE HYDROCHLORIDE 5 MG/ML
INJECTION, SOLUTION EPIDURAL; INTRACAUDAL AS NEEDED
Status: DISCONTINUED | OUTPATIENT
Start: 2018-10-24 | End: 2018-10-24 | Stop reason: HOSPADM

## 2018-10-24 RX ORDER — PROPOFOL 10 MG/ML
INJECTION, EMULSION INTRAVENOUS AS NEEDED
Status: DISCONTINUED | OUTPATIENT
Start: 2018-10-24 | End: 2018-10-24 | Stop reason: HOSPADM

## 2018-10-24 RX ORDER — SODIUM CHLORIDE 9 MG/ML
125 INJECTION, SOLUTION INTRAVENOUS CONTINUOUS
Status: DISCONTINUED | OUTPATIENT
Start: 2018-10-24 | End: 2018-10-25 | Stop reason: HOSPADM

## 2018-10-24 RX ORDER — MIDAZOLAM HYDROCHLORIDE 1 MG/ML
INJECTION, SOLUTION INTRAMUSCULAR; INTRAVENOUS AS NEEDED
Status: DISCONTINUED | OUTPATIENT
Start: 2018-10-24 | End: 2018-10-24 | Stop reason: HOSPADM

## 2018-10-24 RX ORDER — ATORVASTATIN CALCIUM 10 MG/1
10 TABLET, FILM COATED ORAL DAILY
Status: DISCONTINUED | OUTPATIENT
Start: 2018-10-25 | End: 2018-10-25 | Stop reason: HOSPADM

## 2018-10-24 RX ORDER — CEPHALEXIN 500 MG/1
500 CAPSULE ORAL 4 TIMES DAILY
Qty: 4 CAP | Refills: 0 | Status: SHIPPED | OUTPATIENT
Start: 2018-10-24 | End: 2018-10-25

## 2018-10-24 RX ORDER — NALBUPHINE HYDROCHLORIDE 10 MG/ML
5 INJECTION, SOLUTION INTRAMUSCULAR; INTRAVENOUS; SUBCUTANEOUS
Status: DISCONTINUED | OUTPATIENT
Start: 2018-10-24 | End: 2018-10-24 | Stop reason: HOSPADM

## 2018-10-24 RX ORDER — PANTOPRAZOLE SODIUM 40 MG/1
40 TABLET, DELAYED RELEASE ORAL
Status: DISCONTINUED | OUTPATIENT
Start: 2018-10-25 | End: 2018-10-25 | Stop reason: HOSPADM

## 2018-10-24 RX ORDER — OXYCODONE AND ACETAMINOPHEN 5; 325 MG/1; MG/1
1 TABLET ORAL
Status: DISCONTINUED | OUTPATIENT
Start: 2018-10-24 | End: 2018-10-25 | Stop reason: HOSPADM

## 2018-10-24 RX ORDER — ALBUTEROL SULFATE 0.83 MG/ML
2.5 SOLUTION RESPIRATORY (INHALATION)
Status: DISCONTINUED | OUTPATIENT
Start: 2018-10-24 | End: 2018-10-25 | Stop reason: HOSPADM

## 2018-10-24 RX ORDER — SUCRALFATE 1 G/1
1 TABLET ORAL 4 TIMES DAILY
Status: DISCONTINUED | OUTPATIENT
Start: 2018-10-24 | End: 2018-10-25 | Stop reason: HOSPADM

## 2018-10-24 RX ADMIN — FAMOTIDINE 20 MG: 20 TABLET, FILM COATED ORAL at 08:53

## 2018-10-24 RX ADMIN — CEFAZOLIN SODIUM 2 G: 2 SOLUTION INTRAVENOUS at 11:06

## 2018-10-24 RX ADMIN — LIDOCAINE HYDROCHLORIDE 100 MG: 20 INJECTION, SOLUTION EPIDURAL; INFILTRATION; INTRACAUDAL; PERINEURAL at 11:30

## 2018-10-24 RX ADMIN — DOCUSATE SODIUM 100 MG: 100 CAPSULE, LIQUID FILLED ORAL at 18:12

## 2018-10-24 RX ADMIN — SUCRALFATE 1 G: 1 TABLET ORAL at 18:12

## 2018-10-24 RX ADMIN — ONDANSETRON 4 MG: 2 INJECTION INTRAMUSCULAR; INTRAVENOUS at 13:07

## 2018-10-24 RX ADMIN — SODIUM CHLORIDE 125 ML/HR: 900 INJECTION, SOLUTION INTRAVENOUS at 18:13

## 2018-10-24 RX ADMIN — METHOCARBAMOL 1000 MG: 100 INJECTION, SOLUTION INTRAMUSCULAR; INTRAVENOUS at 14:08

## 2018-10-24 RX ADMIN — PROPOFOL 50 MG: 10 INJECTION, EMULSION INTRAVENOUS at 11:30

## 2018-10-24 RX ADMIN — OXYCODONE HYDROCHLORIDE AND ACETAMINOPHEN 1 TABLET: 5; 325 TABLET ORAL at 08:54

## 2018-10-24 RX ADMIN — ASPIRIN 325 MG: 325 TABLET, FILM COATED ORAL at 18:12

## 2018-10-24 RX ADMIN — SUCRALFATE 1 G: 1 TABLET ORAL at 21:40

## 2018-10-24 RX ADMIN — Medication 10 ML: at 21:48

## 2018-10-24 RX ADMIN — SODIUM CHLORIDE 1000 ML: 9 INJECTION, SOLUTION INTRAVENOUS at 14:00

## 2018-10-24 RX ADMIN — ACETAMINOPHEN 650 MG: 325 TABLET ORAL at 23:25

## 2018-10-24 RX ADMIN — OXYCODONE AND ACETAMINOPHEN 1 TABLET: 5; 325 TABLET ORAL at 16:58

## 2018-10-24 RX ADMIN — SODIUM CHLORIDE, SODIUM LACTATE, POTASSIUM CHLORIDE, AND CALCIUM CHLORIDE 75 ML/HR: 600; 310; 30; 20 INJECTION, SOLUTION INTRAVENOUS at 08:53

## 2018-10-24 RX ADMIN — LIDOCAINE HYDROCHLORIDE 5 ML: 10 INJECTION, SOLUTION EPIDURAL; INFILTRATION; INTRACAUDAL; PERINEURAL at 11:10

## 2018-10-24 RX ADMIN — OXYCODONE AND ACETAMINOPHEN 1 TABLET: 5; 325 TABLET ORAL at 20:31

## 2018-10-24 RX ADMIN — TRANEXAMIC ACID 1 G: 1 INJECTION, SOLUTION INTRAVENOUS at 11:30

## 2018-10-24 RX ADMIN — MIDAZOLAM HYDROCHLORIDE 2 MG: 1 INJECTION, SOLUTION INTRAMUSCULAR; INTRAVENOUS at 11:06

## 2018-10-24 RX ADMIN — CEFAZOLIN SODIUM 2 G: 2 SOLUTION INTRAVENOUS at 19:05

## 2018-10-24 RX ADMIN — METHOCARBAMOL 500 MG: 500 TABLET, FILM COATED ORAL at 21:40

## 2018-10-24 RX ADMIN — PROPOFOL 50 MG: 10 INJECTION, EMULSION INTRAVENOUS at 11:40

## 2018-10-24 RX ADMIN — VANCOMYCIN HYDROCHLORIDE 1000 MG: 1 INJECTION, POWDER, LYOPHILIZED, FOR SOLUTION INTRAVENOUS at 11:40

## 2018-10-24 RX ADMIN — PROPOFOL 50 MG: 10 INJECTION, EMULSION INTRAVENOUS at 11:50

## 2018-10-24 RX ADMIN — SODIUM CHLORIDE, SODIUM LACTATE, POTASSIUM CHLORIDE, AND CALCIUM CHLORIDE: 600; 310; 30; 20 INJECTION, SOLUTION INTRAVENOUS at 11:06

## 2018-10-24 RX ADMIN — PROPOFOL 75 MCG/KG/MIN: 10 INJECTION, EMULSION INTRAVENOUS at 11:30

## 2018-10-24 RX ADMIN — BUPIVACAINE HYDROCHLORIDE 2 ML: 7.5 INJECTION, SOLUTION EPIDURAL; RETROBULBAR at 11:25

## 2018-10-24 NOTE — PROGRESS NOTES
Problem: Mobility Impaired (Adult and Pediatric)  Goal: *Acute Goals and Plan of Care (Insert Text)  Physical Therapy Goals  Initiated 10/24/2018 and to be accomplished within 3 day(s)  1. Patient will move from supine to sit and sit to supine , scoot up and down and roll side to side in bed with supervision/set-up. 2.  Patient will transfer from bed to chair and chair to bed with supervision/set-up using the least restrictive device. 3.  Patient will perform sit to stand with supervision/set-up. 4.  Patient will ambulate with supervision/set-up for >150 feet with the least restrictive device. 5.  Patient will ascend/descend 4 stairs with 1-2 handrail(s) with contact guard. physical Therapy EVALUATION    Patient: Iggy Olmos (89 y.o. female)  Date: 10/24/2018  Primary Diagnosis: Osteoarthritis of right hip, unspecified osteoarthritis type [M16.11]  Procedure(s) (LRB):  RIGHT TOTAL HIP ARTHROPLASTY/DEPUY ACTIS/HANA TABLE/C-ARM (Right) Day of Surgery   Precautions: WBAT RLE   ASSESSMENT :  Patient is 58 yo F admitted to hospital for JINNY and presents today alert and agreeable to therapy. Patient was educated on weight bearing status and role of therapy. Patient transferred to sitting EOB with instructions to iván on how to assist with RLE when getting to EOB; they acknowledged understnaindg. Patient performed objective assessment & demos bilateral decreased sensation to LT and 4-/5 strength throughout. Patient required additional time EOB due to nausea and was instructed in deep breathing and visual fixation techniques. Patient was given demo with instruction on sit <> stand transfer and gait training. Patient transferred to standing with MaxAx2 and was able to stand apprx 15 sec for two separate trials safely. Patient BLE buckling and she reports she can't feel her feet. Patient assisted back to sitting on EOB and scooted towards HOB.  Patient assisted back to supine with ModA ten rolled in bed to change linens due to incontinence of bladder. At conclusion of session patient was left resting with call bell by the side and SCDs donned. Patient instructed to call for assistance if they needed to get up for any reason and denied need for further assistance. Patient demonstrates decreased strength, mobility, and endurance and will benefit from skilled intervention to address the above impairments. Patient not cleared for D/C home at this time and called to speak with Dr. Argelia De La Garza who is aware. RN also notified. Patients rehabilitation potential is considered to be Good  Factors which may influence rehabilitation potential include:   []         None noted  []         Mental ability/status  [x]         Medical condition  [x]         Home/family situation and support systems  [x]         Safety awareness  [x]         Pain tolerance/management  []         Other:      PLAN :  Recommendations and Planned Interventions:  [x]           Bed Mobility Training             [x]    Neuromuscular Re-Education  [x]           Transfer Training                   []    Orthotic/Prosthetic Training  [x]           Gait Training                          []    Modalities  [x]           Therapeutic Exercises          []    Edema Management/Control  [x]           Therapeutic Activities            [x]    Patient and Family Training/Education  []           Other (comment):    Frequency/Duration: Patient will be followed by physical therapy 1-2 times per day/4-7 days per week to address goals. Discharge Recommendations: Home Health  Further Equipment Recommendations for Discharge: bedside commode, transfer bench and rolling walker     G-CODES     Mobility  Current  CM= 80-99%   Goal  CI= 1-19%.   The severity rating is based on the Level of Assistance required for Functional Mobility and ADLs.        G-CODES     Eval Complexity: History: MEDIUM  Complexity : 1-2 comorbidities / personal factors will impact the outcome/ POC Exam:MEDIUM Complexity : 3 Standardized tests and measures addressing body structure, function, activity limitation and / or participation in recreation  Presentation: LOW Complexity : Stable, uncomplicated  Clinical Decision Making:Low Complexity   Overall Complexity:LOW     SUBJECTIVE:   Patient stated I got some bad news today.  Therapist inquired and patient visibly upset and tearful that she is on contact isolation due to MRSA; explained protocol to patient as did nursing when nurse was in room. Notified nurse that patient upset and may benefit from more education on protocol. OBJECTIVE DATA SUMMARY:     Past Medical History:   Diagnosis Date    Arthritis     Chronic obstructive pulmonary disease ([address]     Chronic pain     GERD (gastroesophageal reflux disease)     Hypertension      Past Surgical History:   Procedure Laterality Date    HX GI      colonscopy     Barriers to Learning/Limitations: None  Compensate with: N/A  Prior Level of Function/Home Situation: Patient lives with  in 1 story home with 3STE with HR and repors she was using cane for mobility PTA. Patient has walker at home and was supposed to have SC and BSC ordered. Home Situation  Home Environment: Private residence  # Steps to Enter: 3  One/Two Story Residence: One story  Living Alone: No  Support Systems: Family member(s)  Patient Expects to be Discharged to[de-identified] Private residence  Current DME Used/Available at Home: [facility], straight, Walker  Critical Behavior:    A&Ox4  Strength:    Strength: Generally decreased, functional(BLE 4-/5 throughout)   Tone & Sensation:   Tone: Normal(BLE)   Sensation: Impaired(BLE)   Range Of Motion:  AROM: Generally decreased, functional(BLE)   Functional Mobility:  Bed Mobility:  Rolling: Minimum assistance  Supine to Sit: Minimum assistance; Adaptive equipment; Additional time(HOB elevated)  Sit to Supine: Moderate assistance  Scooting: Contact guard assistance; Additional time  Transfers:  Sit to Stand: Maximum assistance;Assist x2(x2 trasnfers)  Stand to Sit: Maximum assistance;Assist x2    Balance:   Sitting: Intact; With support  Standing: Impaired; With support  Standing - Static: Poor  Standing - Dynamic : Poor  Ambulation/Gait Training:   Deferred as patient unsafe due to [facility] for standing   Pain:  Pt reports 7/10 pain or discomfort prior to treatment.    Pt reports 7/10 pain or discomfort post treatment. Activity Tolerance:   Patient demonstrated decreased tolerance to therapy and was tearful due to pain and due to increased required assist to stand from nerve block. Please refer to the flowsheet for vital signs taken during this treatment. After treatment:   []         Patient left in no apparent distress sitting up in chair  [x]         Patient left in no apparent distress in bed  [x]         Call bell left within reach  [x]         Nursing notified Estefania Christian)  [x]         Caregiver present  []         Bed alarm activated  []         SCDs in place to B LE     COMMUNICATION/EDUCATION:   [x]         Fall prevention education was provided and the patient/caregiver indicated understanding. [x]         Patient/family have participated as able in goal setting and plan of care. [x]         Patient/family agree to work toward stated goals and plan of care. []         Patient understands intent and goals of therapy, but is neutral about his/her participation. []         Patient is unable to participate in goal setting and plan of care.     Thank you for this referral.  Jori Davila, PT   Time Calculation: 44 mins

## 2018-10-24 NOTE — BRIEF OP NOTE
BRIEF OPERATIVE NOTE    Date of Procedure: 10/24/2018   Preoperative Diagnosis: Osteoarthritis of right hip, unspecified osteoarthritis type [M16.11]  Postoperative Diagnosis: Osteoarthritis of right hip, unspecified osteoarthritis type [M16.11]    Procedure(s):  RIGHT TOTAL HIP ARTHROPLASTY/DEPUY ACTIS/HANA TABLE/C-ARM  Surgeon(s) and Role:     * Amarilis Batres MD - Primary         Surgical Assistant: Cintia Finn    Surgical Staff:  Circ-1: Silva Hu RN; Daija Ingram RN  Radiology Technician: Reyes Garcia  Scrub Tech-1: Katherin Blancas  Scrub Tech-Relief: Nette Exon  Surg Asst-1: Carmencita Sans  Event Time In Time Out   Incision Start 1149    Incision Close       Anesthesia: Spinal   Estimated Blood Loss: 250 mL  Specimens: * No specimens in log *   Findings: Osteoarthritis right hip  Complications: none  Implants:   Implant Name Type Inv.  Item Serial No.  Lot No. LRB No. Used Action   CUP ACET PINN SECT II 52MM -- PINNACLE POROCOAT - [id number]  CUP ACET PINN SECT II 52MM -- PINNACLE POROCOAT  [facility] [employer] [id number] Right 1 Implanted   SCR BNE CANC PINN 6.5X30MM SS --  - [id number]  SCR BNE CANC PINN 6.5X30MM SS --   [facility] [employer] [id number] Right 1 Implanted   LINER ACET PINN NEUT [id number] -- ALTRX - [id number]  LINER ACET PINN NEUT [id number] -- ALTRX  [facility] [employer] [id number] Right 1 Implanted   Femoral Stem    DEPUY ORTHO [id number] Right 1 Implanted   HEAD FEM 36MM +1.5MM NK -- BIOLOX DELTA - [id number]  HEAD FEM 36MM +1.5MM NK -- BIOLOX DELTA  [facility] [employer] [id number] Right 1 Implanted

## 2018-10-24 NOTE — ANESTHESIA PROCEDURE NOTES
Spinal Block    Start time: 10/24/2018 11:09 AM  End time: 10/24/2018 11:25 AM  Performed by: Bethany Thompson CRNA  Authorized by: Osiel Vera MD     Pre-procedure:   Indications: at surgeon's request, post-op pain management, procedure for pain and primary anesthetic  Preanesthetic Checklist: patient identified, risks and benefits discussed, anesthesia consent, site marked, patient being monitored and timeout performed    Timeout Time: 11:08          Spinal Block:   Patient Position:  Seated  Prep Region:  Lumbar  Prep: chlorhexidine      Location:  L3-4  Technique:  Single shot    Local Dose (mL):  5    Needle:   Needle Type:  Pencil-tip (sprotte)  Needle Gauge:  25 G  Attempts:  1      Events: CSF confirmed, no blood with aspiration and no paresthesia        Assessment:  Insertion:  Uncomplicated  Patient tolerance:  Patient tolerated the procedure well with no immediate complications

## 2018-10-24 NOTE — OP NOTES
[facility] REPORT    Name:Yaya BENITEZ  MR#: [id number]  : 1955  ACCOUNT #: [de-identified]   DATE OF SERVICE: 10/24/2018    PREOPERATIVE DIAGNOSIS:  Severe osteoarthritis, right hip. POSTOPERATIVE DIAGNOSIS:  Severe osteoarthritis, right hip. PROCEDURE PERFORMED:  Right total hip arthroplasty with DePuy Actis system and direct anterior approach. SURGEON:  Da Delaney MD    ASSISTANT:  Popeye.    ANESTHESIA:  Spinal anesthesia. COMPLICATIONS:  None. ESTIMATED BLOOD LOSS:  Approximately 250 mL. INTRAOPERATIVE TRANSFUSIONS:  None. DRAINS:  None. SPECIMENS REMOVED:  Right femoral head    IMPLANTS:  DePuy  [place] cup size 52, size 4 Actis femoral stem with std. Neck, 36 ceramic head ball  +1.5 neck length    OPERATIVE COURSE:  Site identification as well as consent confirmation was performed in the preoperative holding area. Patient was given a weighted dose of Ancef IV piggyback in the preoperative holding area. She was taken to the operating room and placed under spinal anesthesia. Following adequate anesthesia as well as site identification and a timeout, leg length measurement was obtained with the patient in supine position. Following this, she was placed in [place] boots and carefully transferred from hospital bed to the [facility] table. She was appropriately positioned on the [place] table for a direct anterior approach to the right hip. The right hip, femur, and lower pelvis were prepped and draped in the usual sterile manner for a direct anterior approach to the right hip. A direct anterior approach to the right hip was made. Incision was made approximately 1 cm distal and 3 cm posterior to the anterior iliac spine. Incision was carried distally in a slightly lateral posterior direction. Incision was carried through skin and subcutaneous tissue. Bleeders were coagulated with electrocautery.   The tensor fascia was incised the length of the incision. The tensor muscle was retracted laterally. A blunt Cobra retractor was placed at the superior neck of the right femur. The lateral femoral circumflex vessels were carefully identified and coagulated. A second blunt Cobra retractor was placed at the inferior aspect of the femoral neck. Soft tissue was elevated from the anterior capsule. The anterior capsule now incised in T fashion with the base distally at the intertrochanteric ridge. The capsular flaps were elevated and the blunt Cobra retractor was placed on the inside of the capsular flaps on the superior as well as the inferior aspect of the femoral neck. Using radiographic templating and C-arm fluoroscopy, the level and direction of the femoral neck cut was determined. The femoral neck was cut at an approximately 45-degree angle and with the femoral shaft about 2 cm above the lesser trochanter. Gentle traction and approximately 60 degrees of external rotation were placed on the right femur. A second napkin ring femoral cut was made proximal to the first and the napkin ring portion of the femoral neck was removed. The femoral head was removed from the acetabulum using a corkscrew. She was found to have very severe arthritis of the right hip. It was actually hard to remove the head from the acetabulum initially. Retractor was placed on the right acetabulum. The remaining portion of labrum was removed. The acetabulum was now reamed with cheese grater-type reamers to a size 52 mm diameter. With this reamer in place, the x-ray revealed satisfactory placement as well as sizing and depth of the reamer. The reamer was removed. The acetabular area was thoroughly irrigated with copious amounts of saline solution with  irrigant. The final size 52 mm Sector DePuy [place] cup was then packed on the acetabulum. It was impacted in approximately 45 degrees of abduction and about 20 degrees of anteversion.   There was good concentric fit of this cup in the acetabulum. Good distal fixation was obtained with one 6.5 mm cancellous bone screw in the posterosuperior acetabular bone stock. The acetabular area was again thoroughly irrigated with copious amounts of saline solution with  irrigant. The final liner was inserted. This consisted of a 36 mm inner diameter with a neutral lip. The liner was impacted in place and checked for stability. Traction was now released on the right femur and the femur was placed in neutral rotation. The right arm was placed under the right proximal femur. The femur was now externally rotated about 110 degrees. The leg was extended and adducted, and the arm was elevated. Retractor was placed on the right proximal femur. The lateral portion of the proximal capsule was released. The conjoined tendon was released. There was very good visualization of the right proximal femur. The femoral canal was opened with initial box osteotome. Care was taken to maintain proper anteversion over the femoral canal.  The femoral canal was now broached with serial broaches to a size 4 Actis stem. With this broach in place, trial reduction was performed with various head and neck segments. She was found to have good reproduction of leg lengths with a +1.5 neck length. The x-ray revealed satisfactory placement as well as sizing of the components. The leg length was symmetrical on AP pelvic x-ray with a 1.5 neck length in place. The right hip was dislocated and trial components were removed. The final size 4 Actis stem, standard neck, was now impacted in the right proximal femur. Care was taken to maintain proper anteversion when impacting the femoral stem. There was good concentric fit of the stem in the right proximal femur. The final size 36 mm ceramic head ball, 1.5 neck length, was now impacted on the femoral stem. Final reduction performed. Traction released on the right femur.   Final x-rays revealed satisfactory placement as well as sizing of the components. The leg length was symmetrical on AP pelvic x-ray. The right hip wound was irrigated with copious amounts of saline solution via jet lavage  irrigant. The anterior capsule was now reapproximated using figure-of-eight #1 Vicryl sutures. Tensor fascia was reapproximated using running 3-0 locking Vicryl suture. Subcutaneous tissue was closed with 3-0 Vicryl suture. Skin was closed with staples. Dry sterile dressing was applied. She was carefully transferred from the [facility] table to the hospital bed. The [place] boots were removed and the leg lengths were symmetrical in the supine position. A small pillow was placed underneath her right knee. Ice packs were placed on the right hip. She was awakened in the operating room and taken to the recovery room in stable condition. Sponge and needle counts correct x2 following the procedure. Estimated blood loss approximately 250 mL. Intraoperative transfusions, none. Neurovascularly intact to the right foot following the procedure after spinal anesthetic had dissipated.       MD TAYO Padron / JOSELUIS  D: 10/24/2018 13:31     T: 10/24/2018 18:10  JOB #: [id number]

## 2018-10-24 NOTE — ROUTINE PROCESS
Patient is alert and oriented times three with no signs or symptoms of distress. Dressing is clean dry and intact and pulses palpable.  Patient is up in the chair

## 2018-10-24 NOTE — PROGRESS NOTES
Rounded on patient post total hip replacement. Activity: Reinforced importance of getting OOB for all meals, going to bathroom to help prevent blood clots. VTE prophylaxis: Instructed patient to use their SCD's when not up and walking. To use while in bed and in the chair. Educated re: ankle pumps to assist with circulation when in hospital and at home. Medications: Reviewed pain medications patient is taking and the importance of keeping pain under control to help with getting OOB and therapy. Reminded the patient to always eat a snack with their pain medication to help to prevent nausea. Encouraged patient to monitor for constipation and to take a stool softner/laxative while recovering and on pain medication. Discussed protein consumption to promote healing  Constipation: Educated patient to take a stool softener and/or mild laxative to prevent constipation while on a narcotic. Patient educated that narcotics and decrease mobility can increase constipation so patient educated to continue to take stool softener and or laxative while on narcotics along with drinking 8 glasses of water a day (unless on fluid restriction). Incentive Spirometry: Reinforced use of incentive spirometer with return demonstration by patient. 1500 ml x 3  Wound Care: Dressing to surgical site dry and intact. Patient instructed not to take dressing off at home. Patient educated about the importance of bathing daily. Suggested that the patient can use dial soap at home. Patient told they can take a shower at home as long as they feel safe and the dressing is intact. Stressed importance of using a clean towel and washcloth daily. Reminded to put on clean clothes and night clothes daily. Ice Protocol: Ice in place per protocol. Patient Safety: Call light and personal belongings in reach. in reach. Patient and spouse reminded to call for help toget OOB or when leaving bathroom for safety.  Phone and other items also within reach per patient's request.     Diet: Educated patient on the importance of eating three well balanced meals a day with protein to promote bone/muscle healing. Reminded patient to drink lots of fluids to protect kidneys from all the medications being taken currently with recovery. Patient given educational material to remind them to continue doing everything at home to prevent complications and have a successful recovery. Patient and spouse verbalized understand of all information and education discussed. Patient and spouse given the opportunity for asking questions.

## 2018-10-24 NOTE — PROGRESS NOTES
Reason for Admission:   Osteoarthritis of right hip,unspecified osteoarthritis type; arthritis of right hip                   RRAT Score:   8                  Plan for utilizing home health:      8                    Likelihood of Readmission:  mod                         Transition of Care Plan:  Discharge plan is for home with home health. Met with pt and friend present with consent. Prior to admission, pt was able to perform ADL's with minimal assistance from live in friend. Pt has rolling walker and will need a bedside commode 3-n-1. No previous use of [facility] services. Used outpatient rehab services some years ago but unable to recall name. Freedom of choice obtained and signed for [address]. Pt is requesting a personal care aide to assist with ADL's after discharged. Pt wants to use [facility] and friends in [facility]. Pt will need transportation home at discharge. Pt uses cab [employer] at [phone]. CM will continue to monitor for transitional needs. ALEIDA Fields, [phone]          Care Management Interventions  PCP Verified by CM: Yes  Last Visit to PCP: 10/22/18  Mode of Transport at Discharge: Other (see comment)(Per pt, uses cab services [employer].)  Transition of Care Consult (CM Consult): Discharge Planning  MyChart Signup: No  Discharge Durable Medical Equipment: Yes  Physical Therapy Consult: Yes  Occupational Therapy Consult: Yes  Speech Therapy Consult: No  Current Support Network:  Other(Friend lives with pt)  Confirm Follow Up Transport: Cab  Plan discussed with Pt/Family/Caregiver: Yes  Freedom of Choice Offered: Yes   Resource Information Provided?: No  Discharge Location  Discharge Placement: Home with home health

## 2018-10-24 NOTE — ROUTINE PROCESS
Received report from Barbara Dominguez. Patient arrived on unit via bed alert and oriented, no apparent distress.

## 2018-10-24 NOTE — ROUTINE PROCESS
Mobility Intervention:       [] Pt dangled at edge of bed    [] Pt assisted OOB to bedside commode    [x] Pt assisted OOB to chair    [] Pt ambulated to bathroom    [] Patient was ambulated in room/hallway    Assistive Device Utilized:       [x] Rolling walker   [] Crutches   [] Straight Cane   [] Knee immobilizer   [] IV pole    After Mobilization:     [x] Patient left in no apparent distress sitting up in chair  [] Patient left in no apparent distress in bed  [x] Call bell left within reach  [x] SCDs on & machine turned on  [] Ice applied  [] RN notified  [] Caregiver present  [] Bed alarm activated    Reason patient not mobilized:      [] Patient refused   [] Nausea/vomiting   [] Low blood pressure   [] Drowsy/lethargic    Pain Rating:     [] 0  [] 1  Assistive Device:        [] 2  [] 3  [] 4  [] 5  [] 6  Assistive Device:        [] 7  [] 8  [] 9  [] 10    Comments:

## 2018-10-24 NOTE — ANESTHESIA PREPROCEDURE EVALUATION
Anesthetic History   No history of anesthetic complications            Review of Systems / Medical History  Patient summary reviewed and pertinent labs reviewed    Pulmonary    COPD: moderate               Neuro/Psych   Within defined limits           Cardiovascular    Hypertension: well controlled                   GI/Hepatic/Renal     GERD: well controlled           Endo/Other        Arthritis     Other Findings              Physical Exam    Airway  Mallampati: II  TM Distance: 4 - 6 cm  Neck ROM: normal range of motion   Mouth opening: Normal     Cardiovascular               Dental  No notable dental hx       Pulmonary                 Abdominal  GI exam deferred       Other Findings            Anesthetic Plan    ASA: 3  Anesthesia type: MAC and spinal            Anesthetic plan and risks discussed with: Patient

## 2018-10-24 NOTE — INTERVAL H&P NOTE
H&P Update:  Valentino Rosier was seen and examined. History and physical has been reviewed. The patient has been examined.  There have been no significant clinical changes since the completion of the originally dated History and Physical.    Signed By: Blanche Smart MD     October 24, 2018 10:36 AM

## 2018-10-24 NOTE — ANESTHESIA POSTPROCEDURE EVALUATION
Procedure(s):  RIGHT TOTAL HIP ARTHROPLASTY/DEPUY ACTIS/HANA TABLE/C-ARM. <BSHSIANPOST>    Visit Vitals  /68 (BP 1 Location: Right arm, BP Patient Position: At rest)   Pulse 87   Temp 36.5 °C (97.7 °F)   Resp 18   Ht 5' 6\" (1.676 m)   Wt 78.5 kg (173 lb)   SpO2 97%   Breastfeeding?  No   BMI 27.92 kg/m²

## 2018-10-24 NOTE — PROGRESS NOTES
Pt reported having significant pain and pleasantly declined to participate in the OT eval process. She asked for OT to check back in the a.m.

## 2018-10-25 VITALS
TEMPERATURE: 97.6 F | BODY MASS INDEX: 27.8 KG/M2 | HEART RATE: 100 BPM | DIASTOLIC BLOOD PRESSURE: 85 MMHG | WEIGHT: 173 LBS | HEIGHT: 66 IN | SYSTOLIC BLOOD PRESSURE: 145 MMHG | OXYGEN SATURATION: 99 % | RESPIRATION RATE: 16 BRPM

## 2018-10-25 PROCEDURE — 97116 GAIT TRAINING THERAPY: CPT

## 2018-10-25 PROCEDURE — 74011636637 HC RX REV CODE- 636/637: Performed by: ORTHOPAEDIC SURGERY

## 2018-10-25 PROCEDURE — 74011250637 HC RX REV CODE- 250/637: Performed by: ORTHOPAEDIC SURGERY

## 2018-10-25 PROCEDURE — 97535 SELF CARE MNGMENT TRAINING: CPT

## 2018-10-25 PROCEDURE — 74011250636 HC RX REV CODE- 250/636: Performed by: ORTHOPAEDIC SURGERY

## 2018-10-25 PROCEDURE — 97530 THERAPEUTIC ACTIVITIES: CPT

## 2018-10-25 PROCEDURE — 97166 OT EVAL MOD COMPLEX 45 MIN: CPT

## 2018-10-25 RX ADMIN — Medication 10 ML: at 14:00

## 2018-10-25 RX ADMIN — METHOCARBAMOL 500 MG: 500 TABLET, FILM COATED ORAL at 05:16

## 2018-10-25 RX ADMIN — SUCRALFATE 1 G: 1 TABLET ORAL at 08:49

## 2018-10-25 RX ADMIN — OXYCODONE AND ACETAMINOPHEN 1 TABLET: 5; 325 TABLET ORAL at 04:40

## 2018-10-25 RX ADMIN — PREDNISONE 10 MG: 10 TABLET ORAL at 08:49

## 2018-10-25 RX ADMIN — PANTOPRAZOLE SODIUM 40 MG: 40 TABLET, DELAYED RELEASE ORAL at 06:39

## 2018-10-25 RX ADMIN — OXYCODONE AND ACETAMINOPHEN 1 TABLET: 5; 325 TABLET ORAL at 16:56

## 2018-10-25 RX ADMIN — OXYCODONE AND ACETAMINOPHEN 1 TABLET: 5; 325 TABLET ORAL at 08:47

## 2018-10-25 RX ADMIN — SUCRALFATE 1 G: 1 TABLET ORAL at 13:37

## 2018-10-25 RX ADMIN — AMLODIPINE BESYLATE 10 MG: 10 TABLET ORAL at 08:48

## 2018-10-25 RX ADMIN — OXYCODONE AND ACETAMINOPHEN 1 TABLET: 5; 325 TABLET ORAL at 13:37

## 2018-10-25 RX ADMIN — Medication 10 ML: at 05:24

## 2018-10-25 RX ADMIN — DOCUSATE SODIUM 100 MG: 100 CAPSULE, LIQUID FILLED ORAL at 08:49

## 2018-10-25 RX ADMIN — ASPIRIN 325 MG: 325 TABLET, FILM COATED ORAL at 08:49

## 2018-10-25 RX ADMIN — CEFAZOLIN SODIUM 2 G: 2 SOLUTION INTRAVENOUS at 03:27

## 2018-10-25 RX ADMIN — ATORVASTATIN CALCIUM 10 MG: 10 TABLET, FILM COATED ORAL at 08:49

## 2018-10-25 RX ADMIN — OXYCODONE AND ACETAMINOPHEN 1 TABLET: 5; 325 TABLET ORAL at 01:12

## 2018-10-25 NOTE — PROGRESS NOTES
Rounded on patient post total hip replacement. Activity: Reinforced importance of getting OOB for all meals, going to bathroom to help prevent blood clots. VTE prophylaxis: Instructed patient to use their SCD's when not up and walking. To use while in bed and in the chair. Educated re: ankle pumps to assist with circulation when in hospital and at home. Medications: Reviewed pain medications patient is taking and the importance of keeping pain under control to help with getting OOB and therapy. Reminded the patient to always eat a snack with their pain medication to help to prevent nausea. Encouraged patient to monitor for constipation and to take a stool softner/laxative while recovering and on pain medication. Discussed protein consumption to promote healing  Constipation: Educated patient to take a stool softener and/or mild laxative to prevent constipation while on a narcotic. Patient educated that narcotics and decrease mobility can increase constipation so patient educated to continue to take stool softener and or laxative while on narcotics along with drinking 8 glasses of water a day (unless on fluid restriction). Incentive Spirometry: Reinforced use of incentive spirometer with return demonstration by patient. 1500 ml x 3  Wound Care: Dressing to surgical site intact opsite. Patient instructed not to take dressing off at home. Patient educated about the importance of bathing daily. Suggested that the patient can use dial soap at home. Patient told they can take a shower at home as long as they feel safe and the dressing is intact. Stressed importance of using a clean towel and washcloth daily. Reminded to put on clean clothes and night clothes daily. Ice Protocol: Ice in place per protocol. Patient Safety: Call light and personal belongings in reach. in reach. Patient  reminded to call for help toget OOB or when leaving bathroom for safety.  Phone and other items also within reach per patient's request.     Diet: Educated patient on the importance of eating three well balanced meals a day with protein to promote bone/muscle healing. Reminded patient to drink lots of fluids to protect kidneys from all the medications being taken currently with recovery. Patient given educational material to remind them to continue doing everything at home to prevent complications and have a successful recovery. Patient  verbalized understand of all information and education discussed. Patient  given the opportunity for asking questions.

## 2018-10-25 NOTE — PROGRESS NOTES
Patient is not available to be assessed at this time. Patient was asleep.       [address] [facility] [address] [facility]   [phone]

## 2018-10-25 NOTE — PROGRESS NOTES
Pt has been accepted to [facility].    Face sheet, PT notes, notes and HH orders have been forwarded to [phone], [phone]

## 2018-10-25 NOTE — PROGRESS NOTES
Per Jonathan Roy (CM) called [facility] at [phone] to schedule stretcher transport and received confirmation number [id number] from [place]. Called [facility] scheduled 2:00 pm transport to patient's home ([address] [place], [address], [address]) with Ivett Age. Gave [facility] confirmation number [id number]. Per Ivett Age with [facility] trip is on hold until they receive a call from [facility] [place], but he stated we can have the patient ready for transport at 2 pm.  Informed Jonathan Roy of transportation arrangements.

## 2018-10-25 NOTE — PROGRESS NOTES
Discharge instructions and RX medications given to patients, patients verbalized understanding and patients stated that she is claustrophobic and unable to ride the [place] requested for anti anxiety, page Dr. Heath Baum no order given. Made the  aware of this problem. Dressings to [place] changed at this time.

## 2018-10-25 NOTE — PROGRESS NOTES
Spoke with Danilo Benavidez with DME. 3:1 and shower chair to be delivered to pt's home. Pt is aware and agreeable.  Gloria Barnett, [phone]

## 2018-10-25 NOTE — PROGRESS NOTES
Problem: Mobility Impaired (Adult and Pediatric)  Goal: *Acute Goals and Plan of Care (Insert Text)  Physical Therapy Goals  Initiated 10/24/2018 and to be accomplished within 3 day(s)  1. Patient will move from supine to sit and sit to supine , scoot up and down and roll side to side in bed with supervision/set-up. 2.  Patient will transfer from bed to chair and chair to bed with supervision/set-up using the least restrictive device. 3.  Patient will perform sit to stand with supervision/set-up. 4.  Patient will ambulate with supervision/set-up for >150 feet with the least restrictive device. 5.  Patient will ascend/descend 4 stairs with 1-2 handrail(s) with contact guard. Outcome: Progressing Towards Goal  physical Therapy TREATMENT    Patient: Aure Martínez (53 y.o. female)  Date: 10/25/2018  Diagnosis: Osteoarthritis of right hip, unspecified osteoarthritis type [M16.11] <principal problem not specified>  Procedure(s) (LRB):  RIGHT TOTAL HIP ARTHROPLASTY/DEPUY ACTIS/HANA TABLE/C-ARM (Right) 1 Day Post-Op  Precautions:     Chart, physical therapy assessment, plan of care and goals were reviewed. OBJECTIVE/ ASSESSMENT:  Patient found seated in recliner willing to work with PT. Upon arrival, pt voiced increased pain in right hip requesting pain medication, however cont to be agreeable to therapy. Pt able to improve greatly in functional mobility today compared to previous visit w/ full function of LLE voicing \"no problems with that leg today\". Pt req min A for scooting edge of chair initially 2/2 to elevated pain levels, however improved to CGA. Pt able to obtain proper alignment for sit to stand progressing from min A on 1st attempt to [facility] for safety for remainder of visit from mult surfaces. Prior to tx, pt gained permission via infection control to amb and perform stair training outside of room, pad placed in brief for safety 2/2 to possible urinary incontinence.  Pt able to amb w/ RW CGA initially displaying early heel rise displaying a TTWB on right LE, w/ cueing and WSing, pt able to obtain full weight through right LE and safely amb w/ RW for a total of 25' req mult standing rest breaks all 2/2 to elevated pain levels. Pt also displayed difficulty w/ active hip flexion during swing phase of gait, instead pt req to utilize hip hike and trunk rotation to advance LE. Pt able to perform stair training this morning safely req CGA through out req cueing for sequencing. Pt returned to room via [facility] as stairs utilized on other unit and returned to chair CGA req cont cueing for sequencing and walker negotiation. Pt safely and comfortably positioned in recliner w/ all needs in reach and SCDs donned. Pt would benefit from ice pack change soon as ice was beginning to warm. During tx, pt's main barrier to cont mobility was elevated pain levels w/ mobility, AROM at hip > WBing. Although pt complained on elevated pain levels t/o tx, pt was able to safely perform all tasks asked, including stairs. Per performance, pt is safe to return home w/ assistance from family and recommended cont therapy services. Dr. Filipe Mandujano notified via phone and nursing notified post tx. Will cont to follow up w/ pt if pt remains in hospital care. Education:safety, importance of alignment w/ transfers, importance of cont mobility   Progression toward goals:  [x]      Improving appropriately and progressing toward goals  []      Improving slowly and progressing toward goals  []      Not making progress toward goals and plan of care will be adjusted     PLAN:  Patient continues to benefit from skilled intervention to address the above impairments. Continue treatment per established plan of care. Discharge Recommendations:  Home Health  Further Equipment Recommendations for Discharge:  rolling walker     SUBJECTIVE:   Patient stated This pain is worse than labor pains.     OBJECTIVE DATA SUMMARY:   Functional Mobility Training:  Transfers:  Sit to Stand: Minimum assistance;Contact guard assistance  Stand to Sit: Contact guard assistance  Balance:  Sitting: Intact; With support  Standing: Impaired; With support  Standing - Static: Good  Standing - Dynamic : Fair  Ambulation/Gait Training:  Distance (ft): 25 Feet (ft)  Assistive Device: Walker, rolling  Ambulation - Level of Assistance: Contact guard assistance  Gait Abnormalities: Antalgic;Decreased step clearance; Step to gait; Early heel rise  Base of Support: Center of gravity altered;Shift to left  Speed/Nancy: Slow  Step Length: Right shortened;Left shortened  Stairs:  Number of Stairs Trained: 4  Stairs - Level of Assistance: Contact guard assistance  Rail Use: Both  Therapeutic Exercises:       EXERCISE   Sets   Reps   Active Active Assist   Passive Self- assited ROM   Comments   Ankle Pumps 1 10  [x] [] [] []    Quad Sets   [] [] [] []    Glut Sets   [] [] [] []    Short Arc Quads   [] [] [] []    Heel Slides   [] [] [] []    Straight Leg Raises   [] [] [] []    Hip Abd   [] [] [] []    Long Arc Quads   [] [] [] []    Seated Marching   [] [] [] []    Seated Knee Flexion   [] [] [] []    Standing Marching   [] [] [] []      Pain: increased w/ mobility, improved w/ rest, pain meds administered at start of tx   Pre tx pain: 10/10   During: 10/10   Post tx pain: 8/10   Pain Scale 1: Numeric (0 - 10)  Pain Intensity 1: 9  Pain Location 1: Hip  Pain Orientation 1: Anterior  Pain Description 1: Aching;Burning;Constant  Pain Intervention(s) 1: Medication (see MAR); Cold pack  Activity Tolerance: Tolerated tx well w/ elevated pain levels   Please refer to the flowsheet for vital signs taken during this treatment.   After treatment:   [x] Patient left in no apparent distress sitting up in chair  [] Patient left in no apparent distress in bed  [x] Call bell left within reach  [x] Nursing notified  [] Caregiver present  [] Bed alarm activated  [x] SCDs applied  [] Ice applied (needs new pack)      Hannah Mckeon, PTA   Time Calculation: 55 mins    Mobility  Current  CJ= 20-39%. The severity rating is based on the Level of Assistance required for Functional Mobility and ADLs. Mobility   Goal  CI= 1-19%. The severity rating is based on the Level of Assistance required for Functional Mobility and ADLs.

## 2018-10-25 NOTE — PROGRESS NOTES
Pt c/o claustrophobia and unable to ride in transport box [place] [place].  delayed. [facility] will  at 5pm with ambulance per [facility].

## 2018-10-25 NOTE — DISCHARGE INSTRUCTIONS
Truckily Activation    Thank you for requesting access to Truckily. Please follow the instructions below to securely access and download your online medical record. Truckily allows you to send messages to your doctor, view your test results, renew your prescriptions, schedule appointments, and more. How Do I Sign Up? 1. In your internet browser, go to [email or web address].[email or web address]  2. Click on the First Time User? Click Here link in the Sign In box. You will be redirect to the New Member Sign Up page. 3. Enter your Truckily Access Code exactly as it appears below. You will not need to use this code after youve completed the sign-up process. If you do not sign up before the expiration date, you must request a new code. Truckily Access Code: [email or web address]  Expires: 10/29/2018 12:58 PM (This is the date your Truckily access code will )    4. Enter the last four digits of your Social Security Number (xxxx) and Date of Birth (mm/dd/yyyy) as indicated and click Submit. You will be taken to the next sign-up page. 5. Create a Truckily ID. This will be your Truckily login ID and cannot be changed, so think of one that is secure and easy to remember. 6. Create a Truckily password. You can change your password at any time. 7. Enter your Password Reset Question and Answer. This can be used at a later time if you forget your password. 8. Enter your e-mail address. You will receive e-mail notification when new information is available in [address] 9. Click Sign Up. You can now view and download portions of your medical record. 10. Click the Download Summary menu link to download a portable copy of your medical information. Additional Information    If you have questions, please visit the Frequently Asked Questions section of the Truckily website at [email or web address]. [employer] Remember, Truckily is NOT to be used for urgent needs. For medical emergencies, dial 911.       Patient armband removed and shredded    DISCHARGE SUMMARY from Nurse    PATIENT INSTRUCTIONS:    After general anesthesia or intravenous sedation, for 24 hours or while taking prescription Narcotics:  · Limit your activities  · Do not drive and operate hazardous machinery  · Do not make important personal or business decisions  · Do  not drink alcoholic beverages  · If you have not urinated within 8 hours after discharge, please contact your surgeon on call. Report the following to your surgeon:  · Excessive pain, swelling, redness or odor of or around the surgical area  · Temperature over 100.5  · Nausea and vomiting lasting longer than 4 hours or if unable to take medications  · Any signs of decreased circulation or nerve impairment to extremity: change in color, persistent  numbness, tingling, coldness or increase pain  · Any questions    What to do at Home:  Recommended activity: Activity as tolerated, No heavy lifting, pushing, pulling with the surgical side and No driving while on analgesics. Please follow up with dr. Kayli Tovar as specify. *  Please give a list of your current medications to your Primary Care Provider. *  Please update this list whenever your medications are discontinued, doses are      changed, or new medications (including over-the-counter products) are added. *  Please carry medication information at all times in case of emergency situations. These are general instructions for a healthy lifestyle:    No smoking/ No tobacco products/ Avoid exposure to second hand smoke  Surgeon General's Warning:  Quitting smoking now greatly reduces serious risk to your health.     Obesity, smoking, and sedentary lifestyle greatly increases your risk for illness    A healthy diet, regular physical exercise & weight monitoring are important for maintaining a healthy lifestyle    You may be retaining fluid if you have a history of heart failure or if you experience any of the following symptoms:  Weight gain of 3 pounds or more overnight or 5 pounds in a week, increased swelling in our hands or feet or shortness of breath while lying flat in bed. Please call your doctor as soon as you notice any of these symptoms; do not wait until your next office visit. Recognize signs and symptoms of STROKE:    F-face looks uneven    A-arms unable to move or move unevenly    S-speech slurred or non-existent    T-time-call 911 as soon as signs and symptoms begin-DO NOT go       Back to bed or wait to see if you get better-TIME IS BRAIN. Warning Signs of HEART ATTACK     Call 911 if you have these symptoms:   Chest discomfort. Most heart attacks involve discomfort in the center of the chest that lasts more than a few minutes, or that goes away and comes back. It can feel like uncomfortable pressure, squeezing, fullness, or pain.  Discomfort in other areas of the upper body. Symptoms can include pain or discomfort in one or both arms, the back, neck, jaw, or stomach.  Shortness of breath with or without chest discomfort.  Other signs may include breaking out in a cold sweat, nausea, or lightheadedness. Don't wait more than five minutes to call 911 - MINUTES MATTER! Fast action can save your life. Calling 911 is almost always the fastest way to get lifesaving treatment. Emergency Medical Services staff can begin treatment when they arrive -- up to an hour sooner than if someone gets to the hospital by car. The discharge information has been reviewed with the patient. The patient verbalized understanding. Discharge medications reviewed with the patient and appropriate educational materials and side effects teaching were provided.   ___________________________________________________________________________________________________________________________________

## 2018-10-25 NOTE — PROGRESS NOTES
conducted an initial consultation and Spiritual Assessment for Rich Fregoso, who is a 59 y.o.,female. Patients Primary Language is: [place]. According to the patients EMR Voodoo Affiliation is: [facility].     The reason the Patient came to the hospital is:   Patient Active Problem List    Diagnosis Date Noted    Arthritis of right hip 10/24/2018        The  provided the following Interventions:  Initiated a relationship of care and support. Explored issues of hua, belief, spirituality and Advent/ritual needs while hospitalized. Listened empathically. Provided chaplaincy education. Provided information about Spiritual Care Services. Offered prayer and assurance of continued prayers on patient's behalf. Chart reviewed. The following outcomes where achieved:  Patient shared limited information about both their medical narrative and spiritual journey/beliefs.  confirmed Patient's Voodoo Affiliation. Patient processed feeling about current hospitalization. Patient expressed gratitude for 's visit. Assessment:  Patient does not have any Advent/cultural needs that will affect patients preferences in health care. There are no spiritual or Advent issues which require intervention at this time. Plan:  Chaplains will continue to follow and will provide pastoral care on an as needed/requested basis.  recommends bedside caregivers page  on duty if patient shows signs of acute spiritual or emotional distress.     [address]   [phone]

## 2018-10-25 NOTE — PROGRESS NOTES
Problem: Self Care Deficits Care Plan (Adult)  Goal: *Acute Goals and Plan of Care (Insert Text)  Outcome: Resolved/Met Date Met: 10/25/18  Occupational Therapy EVALUATION/discharge    Patient: Jose Hoover (82 y.o. female)  Date: 10/25/2018  Primary Diagnosis: Osteoarthritis of right hip, unspecified osteoarthritis type [M16.11]  Procedure(s) (LRB):  RIGHT TOTAL HIP ARTHROPLASTY/DEPUY ACTIS/HANA TABLE/C-ARM (Right) 1 Day Post-Op   Precautions:   (RLE WBAT)    ASSESSMENT AND RECOMMENDATIONS:  Ms. Dilcia Landeros is a pleasant 58 y/o female admitted to the hospital 10/24/18 with end stage OA of the R hip and is status post a R JINNY on 10/24/18. During the OT evaluation today, the pt completed sit-to-stand with a rolling walker, lower body dressing with adaptive equipment, toileting at raised toilet level, and grooming in standing at sink level with stand-by assist. OT educated the pt on the use of a reacher for doffing socks, sock aid for donning socks, reacher for donning lower body clothing, toilet transfer, and long shoe horn for donning shoes, as the pt presented with difficulty completing LB dressing without the use of lower body adaptive equipment, secondary to reports of significant R hip pain. The pt presented with good understanding, teach back abilities, and recall of education and skills taught. The pt does not require further OT services in the hospital setting, therefore OT will sign off and recommend the pt return home at discharge. Skilled occupational therapy is not indicated at this time. Discharge Recommendations: Home Health  Further Equipment Recommendations for Discharge: Reacher, sock aid, long shoe horn, and long handled sponge (issued to pt)     Barriers to Learning/Limitations: None  Compensate with: visual, verbal, tactile, kinesthetic cues/model     COMPLEXITY     Eval Complexity: History: LOW Complexity : Brief history review ;  Examination: LOW Complexity : 1-3 performance deficits relating to physical, cognitive , or psychosocial skils that result in activity limitations and / or participation restrictions ; Decision Making:LOW Complexity : No comorbidities that affect functional and no verbal or physical assistance needed to complete eval tasks  Assessment: LOW Complexity        G-CODES:     Self Care  Current  CJ= 20-39%   Goal  CJ= 20-39%   D/C  CJ= 20-39%. The severity rating is based on the Level of Assistance required for Functional Mobility and ADLs. SUBJECTIVE:   Patient stated This pain is unbearable!     OBJECTIVE DATA SUMMARY:     Past Medical History:   Diagnosis Date    Arthritis     Chronic obstructive pulmonary disease ([facility] [address]     Chronic pain     GERD (gastroesophageal reflux disease)     Hypertension      Past Surgical History:   Procedure Laterality Date    HX GI      colonscopy     Prior Level of Function/Home Situation: The pt was independent with ADLs and household IADLs. The pt does not drive, and she used a rollator vs cane for ambulation. Home Situation  Home Environment: Private residence  # Steps to Enter: 3  Rails to Enter: No(bilateral posts)  One/Two Story Residence: One story  Living Alone: No(Lives with her \"friend. \")  Support Systems: Family member(s)  Patient Expects to be Discharged to[de-identified] Private residence  Current DME Used/Available at Home: Corazon Dana, straight, Walker, rollator, Walker, rolling  Tub or Shower Type: Tub/Shower combination  [x]     Right hand dominant   []     Left hand dominant  Cognitive/Behavioral Status:  Neurologic State: Alert  Orientation Level: Oriented X4  Cognition: Appropriate decision making; Appropriate for age attention/concentration; Appropriate safety awareness; Follows commands       Skin: Visible skin appeared intact. Vision/Perceptual:  WFL        Coordination:  Coordination: Within functional limits(BUE)    Balance:  Sitting: Intact; With support  Sitting - Static: (good)  Sitting - Dynamic: (fair+)  Standing: Impaired; With support  Standing - Static: (good with RW)  Standing - Dynamic : (fair+ with RW)    Strength:  Strength: Within functional limits(BUE)    Tone & Sensation:  Tone: Normal(BUE and BLE)  Sensation: (Pt reported partial numbness near R hip incision site.)     Range of Motion:  AROM: (R hip generally decreased post-surgically, otherwise BUE and BLE AROM WFL)     Functional Mobility and Transfers for ADLs:  Transfers:  Sit to Stand: Stand-by assistance   Toilet Transfer : Stand-by assistance    ADL Assessment:  Feeding: Independent    Oral Facial Hygiene/Grooming: Stand-by assistance(standing at sink level)    Bathing: Stand-by assistance    Upper Body Dressing: Independent    Lower Body Dressing: Stand-by assistance; Adaptive equipment; Additional time    Toileting: Stand by assistance    Pain:  Pt reports 9/10 pain or discomfort prior to treatment.    Pt reports 9/10 pain or discomfort post treatment. Activity Tolerance:   Good    Please refer to the flowsheet for vital signs taken during this treatment. After treatment:   [x]  Patient left in no apparent distress sitting up in chair  []  Patient left in no apparent distress in bed  [x]  Call bell left within reach  []  Nursing notified  []  Caregiver present  []  Bed alarm activated    COMMUNICATION/EDUCATION:   Communication/Collaboration:  [x]      Home safety education was provided and the patient/caregiver indicated understanding. [x]      Patient participated as able and agree with findings and recommendations. []      Patient is unable to participate in plan of care at this time.     Alyx Josue OT  Time Calculation: 45 mins

## 2018-10-25 NOTE — ROUTINE PROCESS
Bedside and Verbal shift change report given to MAUDE RN (oncoming nurse) by Vitaliy Curry RN (offgoing nurse). Report included the following information SBAR, Kardex, MAR and Recent Results.     SITUATION:    Code Status: Full Code   Reason for Admission: Osteoarthritis of right hip, unspecified osteoarthritis type [address] [facility] day: 1   Problem List:       Hospital Problems  Date Reviewed: 10/24/2018          Codes Class Noted POA    Arthritis of right hip ICD-10-CM: M16.11  ICD-9-CM: 716.95  10/24/2018 Unknown              BACKGROUND:    Past Medical History:   Past Medical History:   Diagnosis Date    Arthritis     Chronic obstructive pulmonary disease ([facility] [address]     Chronic pain     GERD (gastroesophageal reflux disease)     Hypertension          Patient taking anticoagulants yes     ASSESSMENT:    Changes in Assessment Throughout Shift: none pain is better controlled this morning     Patient has Central Line: no Reasons if yes: none   Patient has Mendoza Cath: no Reasons if yes: none      Last Vitals:     Vitals:    10/24/18 1624 10/24/18 1928 10/24/18 2330 10/25/18 0331   BP: 104/68 128/87 129/76 133/75   Pulse: 87 81 100 99   Resp: 18 16 20 18   Temp: 97.7 °F (36.5 °C) 98.1 °F (36.7 °C) 100 °F (37.8 °C) 100.1 °F (37.8 °C)   SpO2: 97% 97% 94% 94%   Weight:       Height:            IV and DRAINS (will only show if present)   Peripheral IV 10/24/18 Right Hand-Site Assessment: Clean, dry, & intact     WOUND (if present)   Wound Type:  none, incision   Dressing present     Wound Concerns/Notes:  none     PAIN    Pain Assessment    Pain Intensity 1: 0 (10/25/18 0127)    Pain Location 1: Hip, Incisional    Pain Intervention(s) 1: Medication (see MAR), Ice    Patient Stated Pain Goal: 0  o Interventions for Pain:  none, medication and muscle relaxer  o Intervention effective: yes  o Time of last intervention: 0500   o Reassessment Completed: yes      Last 3 Weights:  Last 3 Recorded Weights in this Encounter    10/15/18 1440 10/24/18 0851   Weight: 84.4 kg (186 lb) 78.5 kg (173 lb)     Weight change:      INTAKE/OUPUT    Current Shift: 10/24 1901 - 10/25 0700  In: 2373.2 [P.O.:1419; I.V.:954.2]  Out: 700 [Urine:700]    Last three shifts: 10/23 0701 - 10/24 1900  In: 600 [I.V.:600]  Out: 250      LAB RESULTS   No results for input(s): WBC, HGB, HCT, PLT, HGBEXT, HCTEXT, PLTEXT in the last 72 hours. No results for input(s): NA, K, GLU, BUN, CREA, CA, MG, INR in the last 72 hours. No lab exists for component: PT, PTT, INREXT    RECOMMENDATIONS AND DISCHARGE PLANNING     1. Pending tests/procedures/ Plan of Care or Other Needs: none     2. Discharge plan for patient and Needs/Barriers: none    3. Estimated Discharge Date: 10/25/18 Posted on Whiteboard in [address] [place] [address] Room: yes      4. The patient's care plan was reviewed with the oncoming nurse. \"HEALS\" SAFETY CHECK      Fall Risk    Total Score: 2    Safety Measures: Safety Measures: Bed/Chair-Wheels locked, Bed in low position, Call light within reach    A safety check occurred in the patient's room between off going nurse and oncoming nurse listed above. The safety check included the below items  Area Items   H  High Alert Medications - Verify all high alert medication drips (heparin, PCA, etc.)   E  Equipment - Suction is set up for ALL patients (with yanker)  - Red plugs utilized for all equipment (IV pumps, etc.)  - WOWs wiped down at end of shift.  - Room stocked with oxygen, suction, and other unit-specific supplies   A  Alarms - Bed alarm is set for fall risk patients  - Ensure chair alarm is in place and activated if patient is up in a chair   L  Lines - Check IV for any infiltration  - Mendoza bag is empty if patient has a Mendoza   - Tubing and IV bags are labeled   S  Safety   - Room is clean, patient is clean, and equipment is clean. - Hallways are clear from equipment besides carts.    - Fall bracelet on for fall risk patients  - Ensure room is clear and free of clutter  - Suction is set up for ALL patients (with nyla)  - Hallways are clear from equipment besides carts.    - Isolation precautions followed, supplies available outside room, sign posted     Nikki Todd RN

## 2018-10-25 NOTE — PROGRESS NOTES
Patients is sitting up in chair alert awake and oriented, area to Rknee dry and intact with small dry blood noted on the dressings. CMS+ no s/s of distress or sob.

## 2018-10-25 NOTE — PROGRESS NOTES
[facility]    Progress Note      Patient: Jillian Denis               Sex: female          DOA: 10/24/2018         YOB: 1955      Age:  59 y.o.        LOS:  LOS: 1 day         S/P  Procedure(s):  RIGHT TOTAL HIP ARTHROPLASTY/DEPUY ACTIS/HANA TABLE/C-ARM               Subjective: Tolerating diet Ambulating Moderate pain Voiding q shift. She is reporting increased pain this morning but pain medication does help. She is participating in PT and has verbalized readiness if safe to go home today. She did not have questions today. Denies cp, sob, abd pain   Objective:      Blood pressure 133/75, pulse 99, temperature 100.1 °F (37.8 °C), resp. rate 18, height 5' 6\" (1.676 m), weight 173 lb (78.5 kg), SpO2 94 %, not currently breastfeeding. Well developed, Well Nourished alert, cooperative, no distress  Incision clean, dry, no drainage, dressing in place  swelling and tenderness noted in right hip  Sensory is intact   Motor is intact  nv intact  2+distal pulses  Neg calf tenderness  Neg for facial asymmetry       Medications Reviewed      Assessment/Plan     Active Problems:    Arthritis of right hip (10/24/2018)        Procedure(s):  RIGHT TOTAL HIP ARTHROPLASTY/DEPUY ACTIS/HANA TABLE/C-ARM :     1. PT and/or OT Consults: YES continue PT/OT: oob to chair, gentle rom, WBAT, total hip protocol                                                 2. Incision Care:  Routine Incision Care and Dressing Changes as necessary: dressing changes POD#2 and as needed  3. Pain control  4. DVT Prophylaxis - SCD in place, Aspirin 325 mg    DISCHARGE PLANNING     Intervention : Home with H/H today.         [facility]  [phone]

## 2018-10-25 NOTE — ROUTINE PROCESS
Patient remains alert and oriented times three with no signs or symptoms of distress.  Dressing is clean dry and intact and pulses palpable pain is more manageable now

## 2018-10-30 NOTE — DISCHARGE SUMMARY
[address] [facility] [address] Isaura Lyles  MR#: [id number]  : 1955  ACCOUNT #: [de-identified]   ADMIT DATE: 10/24/2018  DISCHARGE DATE: 10/25/2018    HISTORY OF PRESENT ILLNESS:  The patient is a 27-year-old female with a long history of progressive right hip pain. The patient had progressive pain despite conservative treatment. Her clinical examination and radiographs reveal severe osteoarthritis of her right hip. The patient was admitted at this time for elective right total hip arthroplasty. HOSPITAL COURSE:  [facility] She was taken to the operating room and underwent uncomplicated right total hip arthroplasty with DePuy Actis system and direct anterior approach. There were No perioperative complications. Perioperatively she maintained weight-adjusted Ancef IV piggyback q.8 hours. DVT prophylaxis, pain, bilateral thigh-high SANJUANA stockings, as well as sequential compression stockings and early mobilization. In addition, she was on aspirin therapy b.i.d. postoperatively. She had no evidence of deep vein thrombosis or hospitalization. She was started on a routine course of physical therapy, ambulation, weightbearing to the right leg. She did well with therapy and she independently ambulated with a walker prior to discharge home on the morning after surgery. She is discharged home in stable condition on 10/25/2018. She will receive home physical therapist, home health care nursing. She is on analgesics as well as aspirin for DVT prophylaxis. She will follow up in my office in approximately 2 weeks postoperatively, remove staples from the right hip. If problems in the meantime, she is to notify. DISPOSITION:  Discharge from [facility].       MD TAYO Gamino/RENZO  D: 10/29/2018 14:52     T: 10/30/2018 10:02  JOB #: [id number]

## 2018-11-08 ENCOUNTER — TELEPHONE (OUTPATIENT)
Dept: ORTHOPEDIC SURGERY | Age: 63
End: 2018-11-08

## 2018-11-08 ENCOUNTER — OFFICE VISIT (OUTPATIENT)
Dept: ORTHOPEDIC SURGERY | Age: 63
End: 2018-11-08

## 2018-11-08 VITALS
HEIGHT: 66 IN | OXYGEN SATURATION: 96 % | SYSTOLIC BLOOD PRESSURE: 131 MMHG | BODY MASS INDEX: 27.97 KG/M2 | DIASTOLIC BLOOD PRESSURE: 72 MMHG | WEIGHT: 174 LBS | HEART RATE: 114 BPM

## 2018-11-08 DIAGNOSIS — M16.11 ARTHRITIS OF RIGHT HIP: Primary | ICD-10-CM

## 2018-11-08 DIAGNOSIS — Z96.641 STATUS POST TOTAL HIP REPLACEMENT, RIGHT: ICD-10-CM

## 2018-11-08 RX ORDER — FLUTICASONE FUROATE AND VILANTEROL 100; 25 UG/1; UG/1
1 POWDER RESPIRATORY (INHALATION) DAILY
COMMUNITY
End: 2020-09-13

## 2018-11-08 NOTE — PROGRESS NOTES
HISTORY OF PRESENT ILLNESS:  Flip Campos is here for followup with reference to her right hip. She is status post right total hip arthroplasty via direct anterior approach a little over two weeks ago yesterday. She is doing very well. She does not notice significant pain around the right hip. She does complain of some right knee pain. This was present also before surgery. This pain, she states, is present all the time, day and night, and she has a history of lumbar disc disease. PHYSICAL EXAMINATION:   Clinical examination today reveals she is using a walker for ambulation assistance but ambulates without an antalgic gait. Leg lengths are symmetrical.  Her incision appears completely benign and the staples are removed, and the incision is Steri-Stripped. There is no evidence of infection. She has minimal soft tissue swelling present. Neurovascular testing is intact to the right lower extremity proximal and distal to motor strength and sensation. Leg lengths are symmetrical in the supine position. RECOMMENDATIONS:  She may now get the incision wet. I have recommended that she progress to outpatient physical therapy to work on her gait and strengthening of the lower extremities. I will keep an eye on this knee pain, but I think some of this may be coming from her back in addition, as she has pain that is constant. I did review her previous x-rays of her knee that were done before surgery, and she had minimal arthritic changes in the right knee radiographically. She is just on Tylenol for pain now. She will begin outpatient physical therapy. She will progress to the cane, and we have given her a cane today. She will return to see me in a few weeks for repeat clinical examination. All her questions were answered today.                     Vitals:    11/08/18 0916   BP: 131/72   Pulse: (!) 114   SpO2: 96%   Weight: 174 lb (78.9 kg)   Height: 5' 6\" (1.676 m)   PainSc:   8       Patient Active Problem List   Diagnosis Code    Arthritis of right hip M16.11     Patient Active Problem List    Diagnosis Date Noted    Arthritis of right hip 10/24/2018     Current Outpatient Medications   Medication Sig Dispense Refill    fluticasone-vilanterol (BREO ELLIPTA) 200-25 mcg/dose inhaler Take 1 Puff by inhalation daily.  oxyCODONE-acetaminophen (PERCOCET) 5-325 mg per tablet Take 1 Tab by mouth every four (4) hours as needed for Pain. Max Daily Amount: 6 Tabs. 40 Tab 0    docusate sodium (COLACE) 50 mg capsule Take 1 Cap by mouth two (2) times a day for 30 days. 60 Cap 0    methocarbamol (ROBAXIN) 500 mg tablet Take 1 Tab by mouth three (3) times daily as needed. 40 Tab 0    aspirin (ASPIRIN) 325 mg tablet Take 1 Tab by mouth two (2) times a day. 40 Tab 0    fluticasone (FLONASE) 50 mcg/actuation nasal spray 2 Sprays by Both Nostrils route daily.  tiotropium (SPIRIVA WITH HANDIHALER) 18 mcg inhalation capsule Take 1 Cap by inhalation daily.  sucralfate (CARAFATE) 1 gram tablet Take 1 g by mouth four (4) times daily.  VENTOLIN HFA 90 mcg/actuation inhaler INHALE 2 PUFFS BY MOUTH EVERY 4 HOURS AS NEEDED FOR WHEEZING  5    albuterol (PROVENTIL VENTOLIN) 2.5 mg /3 mL (0.083 %) nebulizer solution USE 1 UNIT DOSE VIAL IN NEBULIZER EVERY 4 TO 6 HOURS AS NEEDED  4    amLODIPine (NORVASC) 10 mg tablet TAKE 1 TABLET EVERY DAY  3    atorvastatin (LIPITOR) 10 mg tablet TAKE 1 TABLET BY MOUTH EVERY DAY  4    azelastine (ASTELIN) 137 mcg (0.1 %) nasal spray INSTILL 2 SPRAYS TWICE DAILY INTO EACH NOSTRIL  5    ergocalciferol (ERGOCALCIFEROL) 50,000 unit capsule TAKE 1 CAPSULE BY MOUTH EVERY WEEK.   5    BEVESPI AEROSPHERE 9-4.8 mcg HFAA TAKE 2 PUFFS BY MOUTH TWICE A DAY  4    losartan-hydroCHLOROthiazide (HYZAAR) 100-25 mg per tablet TAKE 1 TABLET BY MOUTH EVERY DAY  5    montelukast (SINGULAIR) 10 mg tablet TAKE 1 TABLET BY MOUTH EVERY DAY FOR ALLERGY SYMPTOMS  5    omeprazole (PRILOSEC) 40 mg capsule TAKE ONE CAPSULE BY MOUTH EVERY DAY BEFORE A MEAL  4    predniSONE (DELTASONE) 10 mg tablet TAKE 1 TABLET BY MOUTH EVERY DAY  12     No Known Allergies  Past Medical History:   Diagnosis Date    Arthritis     Chronic obstructive pulmonary disease (HCC)     Chronic pain     GERD (gastroesophageal reflux disease)     Hypertension      Past Surgical History:   Procedure Laterality Date    HX GI      colonscopy     Family History   Problem Relation Age of Onset    Hypertension Mother     Cancer Sister      Social History     Tobacco Use    Smoking status: Never Smoker    Smokeless tobacco: Never Used   Substance Use Topics    Alcohol use:  Yes     Alcohol/week: 1.8 oz     Types: 3 Cans of beer per week

## 2018-11-08 NOTE — TELEPHONE ENCOUNTER
Patient called to report that a staple was left after she was seen in office today for staple removal.  She's asking if her home health nurse can remove the staple when she comes in the morning. Patient says Richard Ceja visits her, their phone # is [phone]. She's hoping she doesn't have to come into office as transportation is difficult for her.   Please advise patient at [phone]

## 2018-11-09 NOTE — TELEPHONE ENCOUNTER
Jonathan Roy From [facility] called for [facility] nurse. Jonathan Roy said that she needs some questions answered. If the patient is to use a heating pad? If the patient needs to have her leg elevated? Jonathan Roy is requesting a call back at tel. [phone].

## 2018-11-09 NOTE — TELEPHONE ENCOUNTER
Patient called again regarding this. She states she spoke to [facility] and they told her they will be able to remove her staple, but they need an order from Dr. Magy Blank first. The home health nurse will be coming to her home today between 12 and 12:30 pm and won't be returning to her house again until 11/12/18, so she would like the order to be put in as soon as possible so she doesn't have to come from [facility] just to get one staple removed. Patient is requesting a call back when this is completed at [phone].

## 2018-12-04 ENCOUNTER — OFFICE VISIT (OUTPATIENT)
Dept: ORTHOPEDIC SURGERY | Age: 63
End: 2018-12-04

## 2018-12-04 VITALS
RESPIRATION RATE: 18 BRPM | WEIGHT: 174 LBS | BODY MASS INDEX: 27.97 KG/M2 | DIASTOLIC BLOOD PRESSURE: 75 MMHG | HEART RATE: 106 BPM | OXYGEN SATURATION: 94 % | TEMPERATURE: 98.2 F | SYSTOLIC BLOOD PRESSURE: 141 MMHG | HEIGHT: 66 IN

## 2018-12-04 DIAGNOSIS — M16.11 ARTHRITIS OF RIGHT HIP: Primary | ICD-10-CM

## 2018-12-04 DIAGNOSIS — Z96.641 STATUS POST TOTAL HIP REPLACEMENT, RIGHT: ICD-10-CM

## 2018-12-04 DIAGNOSIS — M16.12 ARTHRITIS OF LEFT HIP: ICD-10-CM

## 2018-12-04 DIAGNOSIS — M51.16 LUMBAR DISC DISEASE WITH RADICULOPATHY: ICD-10-CM

## 2018-12-04 RX ORDER — GABAPENTIN 300 MG/1
300 CAPSULE ORAL 2 TIMES DAILY
Qty: 60 CAP | Refills: 1 | Status: ON HOLD | OUTPATIENT
Start: 2018-12-04 | End: 2020-05-23

## 2018-12-04 NOTE — PROGRESS NOTES
HISTORY OF PRESENT ILLNESS:  Otelia Boeck is here for followup with reference to her right hip. She is status post right total hip arthroplasty surgery via direct anterior approach October 24, 2018. She is doing very well with reference to her right hip. She does note some pain radiating down below her knee down the posterior aspect of the right calf. She does have some history of degenerative disc disease of the lumbar spine. Her main complaint, though is of some progressive pain of her left hip and groin. She has some known arthritis of her left hip, but it is not nearly as severe as her right hip. PHYSICAL EXAMINATION:   Clinical examination reveals very good passive range of motion of the right hip without discomfort. Her right hip incision appears completely benign. Right hip roll test is negative. When she ambulates, she ambulates without an antalgic gait but with a slight limp on the right side, and she ambulates with forward flexion of the lumbar spine. Sitting straight leg test results in pain in the posterior aspect of the right calf down to the right foot. Sitting straight leg test on the left is negative. The left hip reveals slight pain with passive rotation of her left hip but not severe. IMPRESSION:      1. Patient doing fairly well approximately six weeks status post right total hip arthroplasty via direct anterior approach. 2. Osteoarthritis left hip. 3. Degenerative disc disease lumbar spine with radiculopathy by history. RECOMMENDATIONS:  I think a lot of her disability at this point is due to her lumbar spine more than her hips, although she does complain of significant left groin pain. I reviewed the x-rays that were done the week before her surgery on October 15, 2018, and she does have some progression of the arthritis of her left hip compared to x-rays taken July 30, 2018.   She still has joint space remaining in the weightbearing portion of her left hip, but her hip is getting arthritis more medially. At this point, I am going to start her on some Gabapentin, 300 mg po b.i.d.  I will have her follow up with The [address] [place] [address] for further workup with reference to her back as necessary. The importance of weight reduction was discussed with the patient. I think she is doing well with reference to her right hip. She needs to continue on her home exercise program.  I have discussed with her that ultimately, she may be a candidate for left hip arthroplasty surgery as her symptoms and radiographs progress. All her questions were answered today. Vitals:    12/04/18 1036   BP: 141/75   Pulse: (!) 106   Resp: 18   Temp: 98.2 °F (36.8 °C)   TempSrc: Oral   SpO2: 94%   Weight: 174 lb (78.9 kg)   Height: 5' 6\" (1.676 m)   PainSc:   9   PainLoc: Back       Patient Active Problem List   Diagnosis Code    Arthritis of right hip M16.11     Patient Active Problem List    Diagnosis Date Noted    Arthritis of right hip 10/24/2018     Current Outpatient Medications   Medication Sig Dispense Refill    fluticasone-vilanterol (BREO ELLIPTA) 200-25 mcg/dose inhaler Take 1 Puff by inhalation daily.  oxyCODONE-acetaminophen (PERCOCET) 5-325 mg per tablet Take 1 Tab by mouth every four (4) hours as needed for Pain. Max Daily Amount: 6 Tabs. 40 Tab 0    methocarbamol (ROBAXIN) 500 mg tablet Take 1 Tab by mouth three (3) times daily as needed. 40 Tab 0    aspirin (ASPIRIN) 325 mg tablet Take 1 Tab by mouth two (2) times a day. 40 Tab 0    fluticasone (FLONASE) 50 mcg/actuation nasal spray 2 Sprays by Both Nostrils route daily.  tiotropium (SPIRIVA WITH HANDIHALER) 18 mcg inhalation capsule Take 1 Cap by inhalation daily.  sucralfate (CARAFATE) 1 gram tablet Take 1 g by mouth four (4) times daily.       VENTOLIN HFA 90 mcg/actuation inhaler INHALE 2 PUFFS BY MOUTH EVERY 4 HOURS AS NEEDED FOR WHEEZING  5    albuterol (PROVENTIL VENTOLIN) 2.5 mg /3 mL (0.083 %) nebulizer solution USE 1 UNIT DOSE VIAL IN NEBULIZER EVERY 4 TO 6 HOURS AS NEEDED  4    amLODIPine (NORVASC) 10 mg tablet TAKE 1 TABLET EVERY DAY  3    atorvastatin (LIPITOR) 10 mg tablet TAKE 1 TABLET BY MOUTH EVERY DAY  4    azelastine (ASTELIN) 137 mcg (0.1 %) nasal spray INSTILL 2 SPRAYS TWICE DAILY INTO EACH NOSTRIL  5    ergocalciferol (ERGOCALCIFEROL) 50,000 unit capsule TAKE 1 CAPSULE BY MOUTH EVERY WEEK. 5    BEVESPI AEROSPHERE 9-4.8 mcg HFAA TAKE 2 PUFFS BY MOUTH TWICE A DAY  4    losartan-hydroCHLOROthiazide (HYZAAR) 100-25 mg per tablet TAKE 1 TABLET BY MOUTH EVERY DAY  5    montelukast (SINGULAIR) 10 mg tablet TAKE 1 TABLET BY MOUTH EVERY DAY FOR ALLERGY SYMPTOMS  5    omeprazole (PRILOSEC) 40 mg capsule TAKE ONE CAPSULE BY MOUTH EVERY DAY BEFORE A MEAL  4    predniSONE (DELTASONE) 10 mg tablet TAKE 1 TABLET BY MOUTH EVERY DAY  12     No Known Allergies  Past Medical History:   Diagnosis Date    Arthritis     Chronic obstructive pulmonary disease (HCC)     Chronic pain     GERD (gastroesophageal reflux disease)     Hypertension      Past Surgical History:   Procedure Laterality Date    HX GI      colonscopy     Family History   Problem Relation Age of Onset    Hypertension Mother     Cancer Sister      Social History     Tobacco Use    Smoking status: Never Smoker    Smokeless tobacco: Never Used   Substance Use Topics    Alcohol use:  Yes     Alcohol/week: 1.8 oz     Types: 3 Cans of beer per week

## 2018-12-31 DIAGNOSIS — M16.11 PRIMARY OSTEOARTHRITIS OF RIGHT HIP: Primary | ICD-10-CM

## 2019-09-26 ENCOUNTER — OP HISTORICAL/CONVERTED ENCOUNTER (OUTPATIENT)
Dept: OTHER | Age: 64
End: 2019-09-26

## 2019-09-26 LAB — MAMMOGRAPHY, EXTERNAL: NORMAL

## 2020-05-23 ENCOUNTER — HOSPITAL ENCOUNTER (INPATIENT)
Age: 65
LOS: 6 days | Discharge: HOME | DRG: 245 | End: 2020-05-29
Attending: INTERNAL MEDICINE | Admitting: INTERNAL MEDICINE
Payer: MEDICAID

## 2020-05-23 PROBLEM — K63.89 CECUM MASS: Status: ACTIVE | Noted: 2020-05-23

## 2020-05-23 PROBLEM — J44.1 COPD EXACERBATION (HCC): Status: ACTIVE | Noted: 2020-05-23

## 2020-05-23 LAB
ALBUMIN SERPL-MCNC: 3.9 G/DL (ref 3.5–5)
ALBUMIN/GLOB SERPL: 1.1 {RATIO} (ref 1.1–2.2)
ALP SERPL-CCNC: 84 U/L (ref 45–117)
ALT SERPL-CCNC: 86 U/L (ref 12–78)
ANION GAP SERPL CALC-SCNC: 6 MMOL/L (ref 5–15)
AST SERPL-CCNC: 32 U/L (ref 15–37)
B PERT DNA SPEC QL NAA+PROBE: NOT DETECTED
BASOPHILS # BLD: 0 K/UL (ref 0–0.1)
BASOPHILS NFR BLD: 0 % (ref 0–1)
BILIRUB SERPL-MCNC: 0.6 MG/DL (ref 0.2–1)
BORDETELLA PARAPERTUSSIS PCR, BORPAR: NOT DETECTED
BUN SERPL-MCNC: 13 MG/DL (ref 6–20)
BUN/CREAT SERPL: 12 (ref 12–20)
C PNEUM DNA SPEC QL NAA+PROBE: NOT DETECTED
CALCIUM SERPL-MCNC: 9.3 MG/DL (ref 8.5–10.1)
CHLORIDE SERPL-SCNC: 95 MMOL/L (ref 97–108)
CO2 SERPL-SCNC: 26 MMOL/L (ref 21–32)
CREAT SERPL-MCNC: 1.11 MG/DL (ref 0.55–1.02)
DIFFERENTIAL METHOD BLD: ABNORMAL
EOSINOPHIL # BLD: 0 K/UL (ref 0–0.4)
EOSINOPHIL NFR BLD: 0 % (ref 0–7)
ERYTHROCYTE [DISTWIDTH] IN BLOOD BY AUTOMATED COUNT: 13.2 % (ref 11.5–14.5)
EST. AVERAGE GLUCOSE BLD GHB EST-MCNC: 114 MG/DL
FLUAV H1 2009 PAND RNA SPEC QL NAA+PROBE: NOT DETECTED
FLUAV H1 RNA SPEC QL NAA+PROBE: NOT DETECTED
FLUAV H3 RNA SPEC QL NAA+PROBE: NOT DETECTED
FLUAV SUBTYP SPEC NAA+PROBE: NOT DETECTED
FLUBV RNA SPEC QL NAA+PROBE: NOT DETECTED
GLOBULIN SER CALC-MCNC: 3.5 G/DL (ref 2–4)
GLUCOSE SERPL-MCNC: 169 MG/DL (ref 65–100)
HADV DNA SPEC QL NAA+PROBE: NOT DETECTED
HBA1C MFR BLD: 5.6 % (ref 4–5.6)
HCOV 229E RNA SPEC QL NAA+PROBE: NOT DETECTED
HCOV HKU1 RNA SPEC QL NAA+PROBE: NOT DETECTED
HCOV NL63 RNA SPEC QL NAA+PROBE: NOT DETECTED
HCOV OC43 RNA SPEC QL NAA+PROBE: NOT DETECTED
HCT VFR BLD AUTO: 32.9 % (ref 35–47)
HGB BLD-MCNC: 11 G/DL (ref 11.5–16)
HMPV RNA SPEC QL NAA+PROBE: NOT DETECTED
HPIV1 RNA SPEC QL NAA+PROBE: NOT DETECTED
HPIV2 RNA SPEC QL NAA+PROBE: NOT DETECTED
HPIV3 RNA SPEC QL NAA+PROBE: NOT DETECTED
HPIV4 RNA SPEC QL NAA+PROBE: NOT DETECTED
IMM GRANULOCYTES # BLD AUTO: 0.1 K/UL (ref 0–0.04)
IMM GRANULOCYTES NFR BLD AUTO: 1 % (ref 0–0.5)
LYMPHOCYTES # BLD: 0.3 K/UL (ref 0.8–3.5)
LYMPHOCYTES NFR BLD: 2 % (ref 12–49)
M PNEUMO DNA SPEC QL NAA+PROBE: NOT DETECTED
MCH RBC QN AUTO: 31.3 PG (ref 26–34)
MCHC RBC AUTO-ENTMCNC: 33.4 G/DL (ref 30–36.5)
MCV RBC AUTO: 93.7 FL (ref 80–99)
MONOCYTES # BLD: 0.3 K/UL (ref 0–1)
MONOCYTES NFR BLD: 2 % (ref 5–13)
NEUTS SEG # BLD: 12.1 K/UL (ref 1.8–8)
NEUTS SEG NFR BLD: 95 % (ref 32–75)
NRBC # BLD: 0 K/UL (ref 0–0.01)
NRBC BLD-RTO: 0 PER 100 WBC
OSMOLALITY SERPL: 271 MOSM/KG H2O
OSMOLALITY UR: 398 MOSM/KG H2O
PLATELET # BLD AUTO: 233 K/UL (ref 150–400)
PMV BLD AUTO: 9.2 FL (ref 8.9–12.9)
POTASSIUM SERPL-SCNC: 4.3 MMOL/L (ref 3.5–5.1)
PROCALCITONIN SERPL-MCNC: 0.25 NG/ML
PROT SERPL-MCNC: 7.4 G/DL (ref 6.4–8.2)
RBC # BLD AUTO: 3.51 M/UL (ref 3.8–5.2)
RBC MORPH BLD: ABNORMAL
RSV RNA SPEC QL NAA+PROBE: NOT DETECTED
RV+EV RNA SPEC QL NAA+PROBE: NOT DETECTED
SARS-COV-2, COV2: NOT DETECTED
SODIUM SERPL-SCNC: 126 MMOL/L (ref 136–145)
SODIUM SERPL-SCNC: 127 MMOL/L (ref 136–145)
SODIUM UR-SCNC: 14 MMOL/L
SPECIMEN SOURCE, FCOV2M: NORMAL
WBC # BLD AUTO: 12.8 K/UL (ref 3.6–11)

## 2020-05-23 PROCEDURE — 94640 AIRWAY INHALATION TREATMENT: CPT

## 2020-05-23 PROCEDURE — 83036 HEMOGLOBIN GLYCOSYLATED A1C: CPT

## 2020-05-23 PROCEDURE — 84300 ASSAY OF URINE SODIUM: CPT

## 2020-05-23 PROCEDURE — 74011636637 HC RX REV CODE- 636/637: Performed by: HOSPITALIST

## 2020-05-23 PROCEDURE — 74011250637 HC RX REV CODE- 250/637: Performed by: INTERNAL MEDICINE

## 2020-05-23 PROCEDURE — 0100U RESPIRATORY PANEL,PCR,NASOPHARYNGEAL: CPT

## 2020-05-23 PROCEDURE — 74011250636 HC RX REV CODE- 250/636: Performed by: INTERNAL MEDICINE

## 2020-05-23 PROCEDURE — 65660000000 HC RM CCU STEPDOWN

## 2020-05-23 PROCEDURE — 83930 ASSAY OF BLOOD OSMOLALITY: CPT

## 2020-05-23 PROCEDURE — 85025 COMPLETE CBC W/AUTO DIFF WBC: CPT

## 2020-05-23 PROCEDURE — 74011250637 HC RX REV CODE- 250/637: Performed by: NURSE PRACTITIONER

## 2020-05-23 PROCEDURE — 87635 SARS-COV-2 COVID-19 AMP PRB: CPT

## 2020-05-23 PROCEDURE — 83935 ASSAY OF URINE OSMOLALITY: CPT

## 2020-05-23 PROCEDURE — 36415 COLL VENOUS BLD VENIPUNCTURE: CPT

## 2020-05-23 PROCEDURE — 74011250637 HC RX REV CODE- 250/637: Performed by: HOSPITALIST

## 2020-05-23 PROCEDURE — 80053 COMPREHEN METABOLIC PANEL: CPT

## 2020-05-23 PROCEDURE — 77030038269 HC DRN EXT URIN PURWCK BARD -A

## 2020-05-23 PROCEDURE — 74011000250 HC RX REV CODE- 250: Performed by: HOSPITALIST

## 2020-05-23 PROCEDURE — 84295 ASSAY OF SERUM SODIUM: CPT

## 2020-05-23 PROCEDURE — 84145 PROCALCITONIN (PCT): CPT

## 2020-05-23 PROCEDURE — 74011250636 HC RX REV CODE- 250/636: Performed by: HOSPITALIST

## 2020-05-23 RX ORDER — IPRATROPIUM BROMIDE AND ALBUTEROL SULFATE 2.5; .5 MG/3ML; MG/3ML
3 SOLUTION RESPIRATORY (INHALATION)
COMMUNITY
End: 2020-09-17

## 2020-05-23 RX ORDER — ALBUTEROL SULFATE 90 UG/1
2 AEROSOL, METERED RESPIRATORY (INHALATION)
Status: DISCONTINUED | OUTPATIENT
Start: 2020-05-23 | End: 2020-05-23

## 2020-05-23 RX ORDER — PREDNISONE 20 MG/1
20 TABLET ORAL
Status: DISCONTINUED | OUTPATIENT
Start: 2020-05-23 | End: 2020-05-23

## 2020-05-23 RX ORDER — PREDNISONE 20 MG/1
40 TABLET ORAL
Status: DISCONTINUED | OUTPATIENT
Start: 2020-05-23 | End: 2020-05-23

## 2020-05-23 RX ORDER — FLUTICASONE FUROATE AND VILANTEROL 100; 25 UG/1; UG/1
1 POWDER RESPIRATORY (INHALATION) DAILY
Status: DISCONTINUED | OUTPATIENT
Start: 2020-05-23 | End: 2020-05-29 | Stop reason: HOSPADM

## 2020-05-23 RX ORDER — PANTOPRAZOLE SODIUM 40 MG/1
40 TABLET, DELAYED RELEASE ORAL
Status: DISCONTINUED | OUTPATIENT
Start: 2020-05-23 | End: 2020-05-29 | Stop reason: HOSPADM

## 2020-05-23 RX ORDER — ONDANSETRON 4 MG/1
4 TABLET, ORALLY DISINTEGRATING ORAL
Status: DISCONTINUED | OUTPATIENT
Start: 2020-05-23 | End: 2020-05-29 | Stop reason: HOSPADM

## 2020-05-23 RX ORDER — LORATADINE 10 MG/1
10 TABLET ORAL DAILY
Status: DISCONTINUED | OUTPATIENT
Start: 2020-05-24 | End: 2020-05-29 | Stop reason: HOSPADM

## 2020-05-23 RX ORDER — ACETAMINOPHEN 325 MG/1
650 TABLET ORAL
Status: DISCONTINUED | OUTPATIENT
Start: 2020-05-23 | End: 2020-05-29 | Stop reason: HOSPADM

## 2020-05-23 RX ORDER — SODIUM CHLORIDE 0.9 % (FLUSH) 0.9 %
5-40 SYRINGE (ML) INJECTION EVERY 8 HOURS
Status: DISCONTINUED | OUTPATIENT
Start: 2020-05-23 | End: 2020-05-29 | Stop reason: HOSPADM

## 2020-05-23 RX ORDER — FLUTICASONE PROPIONATE 50 MCG
2 SPRAY, SUSPENSION (ML) NASAL DAILY
Status: DISCONTINUED | OUTPATIENT
Start: 2020-05-24 | End: 2020-05-29 | Stop reason: HOSPADM

## 2020-05-23 RX ORDER — ASPIRIN 325 MG
325 TABLET ORAL 2 TIMES DAILY
Status: DISCONTINUED | OUTPATIENT
Start: 2020-05-23 | End: 2020-05-23 | Stop reason: ALTCHOICE

## 2020-05-23 RX ORDER — LISINOPRIL AND HYDROCHLOROTHIAZIDE 12.5; 2 MG/1; MG/1
1 TABLET ORAL DAILY
COMMUNITY
End: 2020-05-29

## 2020-05-23 RX ORDER — AMLODIPINE BESYLATE 5 MG/1
10 TABLET ORAL DAILY
Status: DISCONTINUED | OUTPATIENT
Start: 2020-05-23 | End: 2020-05-29 | Stop reason: HOSPADM

## 2020-05-23 RX ORDER — PANTOPRAZOLE SODIUM 40 MG/1
40 TABLET, DELAYED RELEASE ORAL
COMMUNITY
End: 2021-09-17 | Stop reason: SDUPTHER

## 2020-05-23 RX ORDER — IPRATROPIUM BROMIDE AND ALBUTEROL SULFATE 2.5; .5 MG/3ML; MG/3ML
3 SOLUTION RESPIRATORY (INHALATION)
Status: DISCONTINUED | OUTPATIENT
Start: 2020-05-23 | End: 2020-05-26

## 2020-05-23 RX ORDER — ATORVASTATIN CALCIUM 10 MG/1
10 TABLET, FILM COATED ORAL DAILY
Status: DISCONTINUED | OUTPATIENT
Start: 2020-05-23 | End: 2020-05-29 | Stop reason: HOSPADM

## 2020-05-23 RX ORDER — SODIUM CHLORIDE 9 MG/ML
75 INJECTION, SOLUTION INTRAVENOUS ONCE
Status: COMPLETED | OUTPATIENT
Start: 2020-05-23 | End: 2020-05-23

## 2020-05-23 RX ORDER — OXYCODONE AND ACETAMINOPHEN 5; 325 MG/1; MG/1
1 TABLET ORAL
Status: DISCONTINUED | OUTPATIENT
Start: 2020-05-23 | End: 2020-05-29 | Stop reason: HOSPADM

## 2020-05-23 RX ORDER — LORATADINE 10 MG/1
10 TABLET ORAL DAILY
COMMUNITY
End: 2020-08-14

## 2020-05-23 RX ORDER — ALBUTEROL SULFATE 90 UG/1
2 AEROSOL, METERED RESPIRATORY (INHALATION)
COMMUNITY
End: 2021-06-25

## 2020-05-23 RX ORDER — MONTELUKAST SODIUM 10 MG/1
10 TABLET ORAL
Status: DISCONTINUED | OUTPATIENT
Start: 2020-05-23 | End: 2020-05-23 | Stop reason: ALTCHOICE

## 2020-05-23 RX ORDER — SODIUM CHLORIDE 9 MG/ML
100 INJECTION, SOLUTION INTRAVENOUS CONTINUOUS
Status: DISCONTINUED | OUTPATIENT
Start: 2020-05-23 | End: 2020-05-25

## 2020-05-23 RX ORDER — AZELASTINE 1 MG/ML
2 SPRAY, METERED NASAL
Status: DISCONTINUED | OUTPATIENT
Start: 2020-05-23 | End: 2020-05-29 | Stop reason: HOSPADM

## 2020-05-23 RX ORDER — HYDRALAZINE HYDROCHLORIDE 20 MG/ML
10 INJECTION INTRAMUSCULAR; INTRAVENOUS
Status: DISCONTINUED | OUTPATIENT
Start: 2020-05-23 | End: 2020-05-29 | Stop reason: HOSPADM

## 2020-05-23 RX ORDER — ACETAMINOPHEN 650 MG/1
650 SUPPOSITORY RECTAL
Status: DISCONTINUED | OUTPATIENT
Start: 2020-05-23 | End: 2020-05-29 | Stop reason: HOSPADM

## 2020-05-23 RX ORDER — ENOXAPARIN SODIUM 100 MG/ML
40 INJECTION SUBCUTANEOUS EVERY 12 HOURS
Status: DISCONTINUED | OUTPATIENT
Start: 2020-05-23 | End: 2020-05-26

## 2020-05-23 RX ORDER — SODIUM CHLORIDE 0.9 % (FLUSH) 0.9 %
5-40 SYRINGE (ML) INJECTION AS NEEDED
Status: DISCONTINUED | OUTPATIENT
Start: 2020-05-23 | End: 2020-05-29 | Stop reason: HOSPADM

## 2020-05-23 RX ORDER — PREDNISONE 20 MG/1
40 TABLET ORAL
Status: DISCONTINUED | OUTPATIENT
Start: 2020-05-23 | End: 2020-05-26

## 2020-05-23 RX ORDER — GABAPENTIN 300 MG/1
300 CAPSULE ORAL 2 TIMES DAILY
Status: DISCONTINUED | OUTPATIENT
Start: 2020-05-23 | End: 2020-05-23 | Stop reason: ALTCHOICE

## 2020-05-23 RX ADMIN — SODIUM CHLORIDE 75 ML/HR: 900 INJECTION, SOLUTION INTRAVENOUS at 04:24

## 2020-05-23 RX ADMIN — ENOXAPARIN SODIUM 40 MG: 40 INJECTION SUBCUTANEOUS at 17:27

## 2020-05-23 RX ADMIN — ACETAMINOPHEN 650 MG: 325 TABLET, FILM COATED ORAL at 13:53

## 2020-05-23 RX ADMIN — SODIUM CHLORIDE 100 ML/HR: 900 INJECTION, SOLUTION INTRAVENOUS at 16:05

## 2020-05-23 RX ADMIN — AMLODIPINE BESYLATE 10 MG: 5 TABLET ORAL at 12:27

## 2020-05-23 RX ADMIN — PANTOPRAZOLE SODIUM 40 MG: 40 TABLET, DELAYED RELEASE ORAL at 19:24

## 2020-05-23 RX ADMIN — ATORVASTATIN CALCIUM 10 MG: 10 TABLET, FILM COATED ORAL at 19:24

## 2020-05-23 RX ADMIN — ARFORMOTEROL TARTRATE: 15 SOLUTION RESPIRATORY (INHALATION) at 20:24

## 2020-05-23 RX ADMIN — SODIUM CHLORIDE 100 ML/HR: 900 INJECTION, SOLUTION INTRAVENOUS at 12:05

## 2020-05-23 RX ADMIN — AZELASTINE HYDROCHLORIDE 2 SPRAY: 137 SPRAY, METERED NASAL at 20:38

## 2020-05-23 RX ADMIN — Medication 10 ML: at 20:38

## 2020-05-23 RX ADMIN — Medication 10 ML: at 02:24

## 2020-05-23 RX ADMIN — OXYCODONE HYDROCHLORIDE AND ACETAMINOPHEN 1 TABLET: 5; 325 TABLET ORAL at 20:28

## 2020-05-23 RX ADMIN — PREDNISONE 40 MG: 20 TABLET ORAL at 12:27

## 2020-05-23 RX ADMIN — Medication 10 ML: at 07:14

## 2020-05-23 RX ADMIN — ENOXAPARIN SODIUM 40 MG: 40 INJECTION SUBCUTANEOUS at 04:24

## 2020-05-23 NOTE — H&P
[address] [facility]  History and Physical    Primary Care Provider: Sanya Treadwell MD  Date of Service:  5/23/2020    Subjective:     Yogi Cervantes is a 60 y.o. female past medical history significant for COPD, hypertension, GERD presented emergency room at [facility] complaining of no urine output for almost 24 hours. Also she has been complaining of abdominal pain and diarrhea. She states that she is suffering of constipation and has been having worsening constipation for the last several month days ago she took prune juice and after 1 she developed significant diarrhea associated with crampy abdominal pain. She denies nausea, vomiting, blood in stool, fever, decreased appetite, unintentional weight loss. In outside emergency room she had a CAT scan of the abdomen done that showed a cecal mass. She was transferred to [facility] for evaluation of cecum mass and possible copd exacerbation. While she was in the emergency room she got very anxious and started experiencing shortness of breath. She states that shortness of breath is worse because of her anxiety. She reports having baseline shortness of breath which is chronic for her and is caused by her COPD. Her shortness of breath is not worse. She denies any cough, chest pain or palpitations. She has gastroenterologist t Dr. Carli Cage at [facility]. Last colonoscopy was 2 years ago. She was told it was normal and was recommended to have another colonoscopy in 5 years. Review of Systems:    Review of Systems   Constitutional: Negative for chills, fever, malaise/fatigue and weight loss. HENT: Negative for congestion, ear discharge and hearing loss. Eyes: Negative for blurred vision and double vision. Respiratory: Negative for cough, sputum production, shortness of breath and wheezing. Cardiovascular: Negative for chest pain, palpitations, orthopnea, leg swelling and PND. Gastrointestinal: Positive for abdominal pain, diarrhea and nausea. Negative for constipation, melena and vomiting. Genitourinary: Positive for frequency (decreased UOP). Negative for dysuria and urgency. Musculoskeletal: Negative for back pain, joint pain and myalgias. Skin: Negative for itching and rash. Neurological: Negative for dizziness, sensory change, speech change, focal weakness, weakness and headaches. Endo/Heme/Allergies: Negative for polydipsia. Does not bruise/bleed easily. Past Medical History:   Diagnosis Date    Arthritis     Chronic obstructive pulmonary disease (HCC)     Chronic pain     GERD (gastroesophageal reflux disease)     Hypertension       Past Surgical History:   Procedure Laterality Date    HX GI      colonscopy     Prior to Admission medications    Medication Sig Start Date End Date Taking? Authorizing Provider   gabapentin (NEURONTIN) 300 mg capsule Take 1 Cap by mouth two (2) times a day. 12/4/18   Darra Meckel, MD   fluticasone-vilanterol (BREO ELLIPTA) 364-01 mcg/dose inhaler Take 1 Puff by inhalation daily. Provider, Historical   oxyCODONE-acetaminophen (PERCOCET) 5-325 mg per tablet Take 1 Tab by mouth every four (4) hours as needed for Pain. Max Daily Amount: 6 Tabs. 10/24/18   Darra Meckel, MD   methocarbamol (ROBAXIN) 500 mg tablet Take 1 Tab by mouth three (3) times daily as needed. 10/24/18   Darra Meckel, MD   aspirin (ASPIRIN) 325 mg tablet Take 1 Tab by mouth two (2) times a day. 10/24/18   Darra Meckel, MD   fluticasone (FLONASE) 50 mcg/actuation nasal spray 2 Sprays by Both Nostrils route daily. Provider, Historical   tiotropium (SPIRIVA WITH HANDIHALER) 18 mcg inhalation capsule Take 1 Cap by inhalation daily. Provider, Historical   sucralfate (CARAFATE) 1 gram tablet Take 1 g by mouth four (4) times daily.     Provider, Historical   VENTOLIN HFA 90 mcg/actuation inhaler INHALE 2 PUFFS BY MOUTH EVERY 4 HOURS AS NEEDED FOR WHEEZING 7/17/18   Provider, Historical   albuterol (PROVENTIL VENTOLIN) 2.5 mg /3 mL (0.083 %) nebulizer solution USE 1 UNIT DOSE VIAL IN NEBULIZER EVERY 4 TO 6 HOURS AS NEEDED 7/7/18   Provider, Historical   amLODIPine (NORVASC) 10 mg tablet TAKE 1 TABLET EVERY DAY 7/6/18   Provider, Historical   atorvastatin (LIPITOR) 10 mg tablet TAKE 1 TABLET BY MOUTH EVERY DAY 7/9/18   Provider, Historical   azelastine (ASTELIN) 137 mcg (0.1 %) nasal spray INSTILL 2 SPRAYS TWICE DAILY INTO EACH NOSTRIL 7/16/18   Provider, Historical   ergocalciferol (ERGOCALCIFEROL) 50,000 unit capsule TAKE 1 CAPSULE BY MOUTH EVERY WEEK. 7/7/18   Provider, Historical   BEVESPI AEROSPHERE 9-4.8 mcg HFAA TAKE 2 PUFFS BY MOUTH TWICE A DAY 7/14/18   Provider, Historical   losartan-hydroCHLOROthiazide (HYZAAR) 100-25 mg per tablet TAKE 1 TABLET BY MOUTH EVERY DAY 7/6/18   Provider, Historical   montelukast (SINGULAIR) 10 mg tablet TAKE 1 TABLET BY MOUTH EVERY DAY FOR ALLERGY SYMPTOMS 6/25/18   Provider, Historical   omeprazole (PRILOSEC) 40 mg capsule TAKE ONE CAPSULE BY MOUTH EVERY DAY BEFORE A MEAL 7/6/18   Provider, Historical   predniSONE (DELTASONE) 10 mg tablet TAKE 1 TABLET BY MOUTH EVERY DAY 7/10/18   Provider, Historical     No Known Allergies     Family History   Problem Relation Age of Onset    Hypertension Mother     Cancer Sister         SOCIAL HISTORY:  Patient resides at home  Patient ambulates independently   Smoking history: former smoker  Alcohol history: None        Objective:       Physical Exam:   Patient Vitals for the past 12 hrs:   Temp Pulse Resp BP SpO2   05/23/20 0039 97.7 °F (36.5 °C) (!) 110 20 148/83 99 %     GEN APPEARANCE: Patient resting in bed in NAD  HEENT: Conjunctiva Clear  CVS: RRR, S1, S2; No M/G/R  LUNGS: CTAB; No Wheezes;  No Rhonchi: No rales  ABD: Soft; No TTP; + Normoactive BS  EXT: no edema   Skin exam: No gross lesions noted on exposed skin surfaces  MENTAL STATUS: Answers questions appropriately, responds to commands. NEURO: No gross motor or sensory deficits      Data Review:   CBC: WBC 13.6 hemoglobin 12 hematocrit 35 platelet 486  CMP: Sodium 135, potassium 4.0, chloride 89, CO2 26, BUN 14, creatinine 1.3, glucose 163, calcium 10, AST 46, ALT 99, alkaline phosphatase 94, INR 0.9, CPK 89, troponin <0.017, proBNP 62    CT abd/pelv:    \"1. Thre is heterogeneous density to the liver with hepatic steatosis. 2. Thre is 2 smaller low density lesion within the left hepatic lobe that too small to  Fully characterize but statistically are most compatible with smaller pattern cysts  3. There is a region of concentric wall thickening involving the cecum just below the terminal ileum. The colon was not opacified with the oral contrast material. These findings are suspect for potential colonic neoplasm with infiltration disease of the cecal wall see imaging #58 from th coronal recustritive imagine sequence  4. there is diffuse calcified concentric plaque formation along the wall of th abdominal aorta and involving the common iliac artery. \"     Assessment:     Active Problems:    COPD exacerbation ([facility] [address] (5/23/2020)      Cecum mass (5/23/2020)        Plan:     1. Cecum mass- concerning for malignancy. - GI consult to assist with evaluation  - Clear liquid diet for now    2. Hyponatremia- likely triggered by diarrhea. Received one liter of IV fluid in outside hospital.  -Repeat sodium level now before restarting IV fluids.  -Check plasma osmolality, urine osmolality, urine sodium. 3. COPD - respiratory status at baseline. Does not appear to be on acute exacerbation at this time and she attributed he short episode of SOB in OSH to anxiety.  - On chronic steroids. Will continue with home dose steroid. - Placed on MDI prn  -   4. Hypertension- on Losartan/HCTZ at home. Will hold hctz now due to hyponatremia  - Start patient on losartan     5.  Leukocytosis- could be reactive vs infectious but pt currently afebrile and asymptomatic.  - Will hold antibiotic therapy for now. - Monitor CBC in am.     6.  Hyperglycemia: No history of diabetes. Will check A1c.     7.  Transaminitis: Mild. Could be related to fatty liver.  -Abnormal appearance on CT scan. Will check hepatitis  LFTs. With past medical history significant for COPD, hypertension,  DVT prophylaxis: SCDs.   -CODE STATUS: Full code    Surrogate decision maker: Son.    FUNCTIONAL STATUS PRIOR TO HOSPITALIZATION Ambulates Independently     Signed By: Jossie Elise MD     May 23, 2020

## 2020-05-23 NOTE — CONSULTS
Colon and Rectal Surgery History and Physical    Subjective:      Unknown Romy is a 60 y.o. female who was found to have a cecal mass on ct scan. She has been having abdominal pain and difficulty urinating. She took a laxative and started having black stools recently. She had a colonoscopy a couple of years ago which was negative. She has no family history of colorectal cancer. Patient Active Problem List    Diagnosis Date Noted    COPD exacerbation ([facility] 05/23/2020    Cecum mass 05/23/2020    Arthritis of right hip 10/24/2018     Past Medical History:   Diagnosis Date    Arthritis     Chronic obstructive pulmonary disease ([facility]     Chronic pain     GERD (gastroesophageal reflux disease)     Hypertension       Past Surgical History:   Procedure Laterality Date    HX GI      colonscopy      Social History     Tobacco Use    Smoking status: Never Smoker    Smokeless tobacco: Never Used   Substance Use Topics    Alcohol use: Yes     Alcohol/week: 3.0 standard drinks     Types: 3 Cans of beer per week      Family History   Problem Relation Age of Onset    Hypertension Mother     Cancer Sister       Prior to Admission medications    Medication Sig Start Date End Date Taking? Authorizing Provider   albuterol-ipratropium (DUO-NEB) 2.5 mg-0.5 mg/3 ml nebu 3 mL by Nebulization route every four (4) hours as needed for Wheezing. Yes Provider, Historical   albuterol (PROVENTIL HFA, VENTOLIN HFA, PROAIR HFA) 90 mcg/actuation inhaler Take 2 Puffs by inhalation every four (4) hours as needed for Wheezing. Yes Provider, Historical   lisinopril-hydroCHLOROthiazide (PRINZIDE, ZESTORETIC) 20-12.5 mg per tablet Take 1 Tab by mouth daily. Yes Provider, Historical   pantoprazole (PROTONIX) 40 mg tablet Take 40 mg by mouth Before breakfast and dinner. Yes Provider, Historical   loratadine (CLARITIN) 10 mg tablet Take 10 mg by mouth daily.    Yes Provider, Historical   fluticasone furoate-vilanteroL (Breo Ellipta) 100-25 mcg/dose inhaler Take 1 Puff by inhalation daily. Yes Provider, Historical   fluticasone (FLONASE) 50 mcg/actuation nasal spray 2 Sprays by Both Nostrils route daily. Yes Provider, Historical   amLODIPine (NORVASC) 10 mg tablet Take 10 mg by mouth daily. 7/6/18  Yes Provider, Historical   atorvastatin (LIPITOR) 10 mg tablet TAKE 1 TABLET BY MOUTH EVERY DAY 7/9/18  Yes Provider, Historical   azelastine (ASTELIN) 137 mcg (0.1 %) nasal spray 2 Sprays by Both Nostrils route nightly. 7/16/18  Yes Provider, Historical   ergocalciferol (ERGOCALCIFEROL) 50,000 unit capsule Take 50,000 Units by mouth every Monday. 7/7/18  Yes Provider, Historical   montelukast (SINGULAIR) 10 mg tablet TAKE 1 TABLET BY MOUTH EVERY DAY FOR ALLERGY SYMPTOMS 6/25/18  Yes Provider, Historical   predniSONE (DELTASONE) 10 mg tablet Take 10 mg by mouth daily. 7/10/18  Yes Provider, Historical     No Known Allergies     Review of Systems:    A comprehensive review of systems was negative except for that written in the History of Present Illness. Objective:     Visit Vitals  /82   Pulse (!) 109   Temp 99.2 °F (37.3 °C)   Resp 17   Ht 5' 6\" (1.676 m)   Wt 84.9 kg (187 lb 2.7 oz)   SpO2 95%   BMI 30.21 kg/m²        Physical Exam:   Limited by coronavirus outbreak.      Imaging:  images and reports reviewed    Lab Review:    Recent Results (from the past 24 hour(s))   CBC WITH AUTOMATED DIFF    Collection Time: 05/23/20  1:45 AM   Result Value Ref Range    WBC 12.8 (H) 3.6 - 11.0 K/uL    RBC 3.51 (L) 3.80 - 5.20 M/uL    HGB 11.0 (L) 11.5 - 16.0 g/dL    HCT 32.9 (L) 35.0 - 47.0 %    MCV 93.7 80.0 - 99.0 FL    MCH 31.3 26.0 - 34.0 PG    MCHC 33.4 30.0 - 36.5 g/dL    RDW 13.2 11.5 - 14.5 %    PLATELET 150 110 - 827 K/uL    MPV 9.2 8.9 - 12.9 FL    NRBC 0.0 0  WBC    ABSOLUTE NRBC 0.00 0.00 - 0.01 K/uL    NEUTROPHILS 95 (H) 32 - 75 %    LYMPHOCYTES 2 (L) 12 - 49 %    MONOCYTES 2 (L) 5 - 13 %    EOSINOPHILS 0 0 - 7 % BASOPHILS 0 0 - 1 %    IMMATURE GRANULOCYTES 1 (H) 0.0 - 0.5 %    ABS. NEUTROPHILS 12.1 (H) 1.8 - 8.0 K/UL    ABS. LYMPHOCYTES 0.3 (L) 0.8 - 3.5 K/UL    ABS. MONOCYTES 0.3 0.0 - 1.0 K/UL    ABS. EOSINOPHILS 0.0 0.0 - 0.4 K/UL    ABS. BASOPHILS 0.0 0.0 - 0.1 K/UL    ABS. IMM. GRANS. 0.1 (H) 0.00 - 0.04 K/UL    DF SMEAR SCANNED      RBC COMMENTS NORMOCYTIC, NORMOCHROMIC     METABOLIC PANEL, COMPREHENSIVE    Collection Time: 05/23/20  1:45 AM   Result Value Ref Range    Sodium 127 (L) 136 - 145 mmol/L    Potassium 4.3 3.5 - 5.1 mmol/L    Chloride 95 (L) 97 - 108 mmol/L    CO2 26 21 - 32 mmol/L    Anion gap 6 5 - 15 mmol/L    Glucose 169 (H) 65 - 100 mg/dL    BUN 13 6 - 20 MG/DL    Creatinine 1.11 (H) 0.55 - 1.02 MG/DL    BUN/Creatinine ratio 12 12 - 20      GFR est AA 60 (L) >60 ml/min/1.73m2    GFR est non-AA 49 (L) >60 ml/min/1.73m2    Calcium 9.3 8.5 - 10.1 MG/DL    Bilirubin, total 0.6 0.2 - 1.0 MG/DL    ALT (SGPT) 86 (H) 12 - 78 U/L    AST (SGOT) 32 15 - 37 U/L    Alk.  phosphatase 84 45 - 117 U/L    Protein, total 7.4 6.4 - 8.2 g/dL    Albumin 3.9 3.5 - 5.0 g/dL    Globulin 3.5 2.0 - 4.0 g/dL    A-G Ratio 1.1 1.1 - 2.2     PROCALCITONIN    Collection Time: 05/23/20  1:45 AM   Result Value Ref Range    Procalcitonin 0.25 ng/mL   SARS-COV-2    Collection Time: 05/23/20  1:45 AM   Result Value Ref Range    Specimen source Nasopharyngeal      SARS-CoV-2 Not detected NOTD     OSMOLALITY, SERUM/PLASMA    Collection Time: 05/23/20  1:45 AM   Result Value Ref Range    Osmolality, serum/plasma 271 mOsm/kg H2O   HEMOGLOBIN A1C WITH EAG    Collection Time: 05/23/20  1:45 AM   Result Value Ref Range    Hemoglobin A1c 5.6 4.0 - 5.6 %    Est. average glucose 114 mg/dL   RESPIRATORY PANEL,PCR,NASOPHARYNGEAL    Collection Time: 05/23/20  1:48 AM   Result Value Ref Range    Adenovirus Not detected NOTD      Coronavirus 229E Not detected NOTD      Coronavirus HKU1 Not detected NOTD      Coronavirus CVNL63 Not detected NOTD Coronavirus OC43 Not detected NOTD      Metapneumovirus Not detected NOTD      Rhinovirus and Enterovirus Not detected NOTD      Influenza A Not detected NOTD      Influenza A, subtype H1 Not detected NOTD      Influenza A, subtype H3 Not detected NOTD      INFLUENZA A H1N1 PCR Not detected NOTD      Influenza B Not detected NOTD      Parainfluenza 1 Not detected NOTD      Parainfluenza 2 Not detected NOTD      Parainfluenza 3 Not detected NOTD      Parainfluenza virus 4 Not detected NOTD      RSV by PCR Not detected NOTD      B. parapertussis, PCR Not detected NOTD      Bordetella pertussis - PCR Not detected NOTD      Chlamydophila pneumoniae DNA, QL, PCR Not detected NOTD      Mycoplasma pneumoniae DNA, QL, PCR Not detected NOTD         Labs and radiology: images and reports reviewed      Assessment:     Patient with a cecal mass on ct scan. Will defer to GI for colonoscopy and biopsy. It is reasonable to check a cea level at this time. Will follow along with you. She will ultimately need a right hemicolectomy based on pathology after colonoscopy. Discussed with Dr. Nils Perez.     Plan:     As above    Signed By: Nicolas Colvin MD     May 23, 2020

## 2020-05-23 NOTE — PROGRESS NOTES
TRANSFER - IN REPORT:    Verbal report received from [address] [place] [address] Road (name) on Nghia Still  being received from [facility] (unit) for routine progression of care      Report consisted of patients Situation, Background, Assessment and   Recommendations(SBAR). Information from the following report(s) SBAR, ED Summary, MAR, Recent Results and Cardiac Rhythm ST was reviewed with the receiving nurse. Opportunity for questions and clarification was provided. Assessment completed upon patients arrival to unit and care assumed.

## 2020-05-23 NOTE — PROGRESS NOTES
Bedside shift change report given to [facility] by Darlene Burgos. Report included the following information SBAR, Kardex, ED Summary, Intake/Output, MAR, Recent Results and Cardiac Rhythm ST.       TRANSFER - IN REPORT:    Verbal report received from [employer] RN on Precious Franco  being received from [facility] for routine progression of care      Report consisted of patients Situation, Background, Assessment and   Recommendations(SBAR). Information from the following report(s) ED Summary, Intake/Output, MAR, Recent Results and Cardiac Rhythm ST was reviewed with the receiving nurse. Opportunity for questions and clarification was provided. Assessment completed upon patients arrival to unit and care assumed. Primary Nurse Coretta Hill and Andie Mahmood RN performed a dual skin assessment on this patient No impairment noted  Gilmar score is 20. Problem: Chronic Obstructive Pulmonary Disease (COPD)  Goal: *Absence of hypoxia  Outcome: Progressing Towards Goal  Note: Pt O2 sats within defined limits on RA. Problem: Falls - Risk of  Goal: *Absence of Falls  Description: Document Vaughn Faye Fall Risk and appropriate interventions in the flowsheet. Outcome: Progressing Towards Goal  Note: Pt remains free of falls during admission. Call bell and frequently used items within reach. Bedside table within reach. Patient provided non skid socks and instructed to call out for nurse when in need of assistance.

## 2020-05-23 NOTE — PROGRESS NOTES
IAdmission Medication Reconciliation:    Information obtained from: This medication history was obtained from the patient by phone. She appears to be a reasonable historian and brought her medication bottles to the hospital.    RxQuery data available¹:  NO    Summary:     Medications added: pantoprazole, loratadine, lisinopril-HCTZ  Medications deleted: losartan-HCTZ, omeprazole, gabapentin, montelukast, Bevespi    Inpatient orders were reviewed and updated per Dr. Nasima Crocker. ¹RxQuery pharmacy benefit data reflects medications processed through the patient's insurance, however this data does NOT capture whether the medication is currently being taken by the patient. Allergies:  Patient has no known allergies. Significant PMH/Disease States:   Past Medical History:   Diagnosis Date    Arthritis     Chronic obstructive pulmonary disease ([address]     Chronic pain     GERD (gastroesophageal reflux disease)     Hypertension      Chief Complaint for this Admission:  No chief complaint on file. Prior to Admission Medications:   Prior to Admission Medications   Prescriptions Last Dose Informant Patient Reported? Taking? albuterol (PROVENTIL HFA, VENTOLIN HFA, PROAIR HFA) 90 mcg/actuation inhaler   Yes Yes   Sig: Take 2 Puffs by inhalation every four (4) hours as needed for Wheezing. albuterol-ipratropium (DUO-NEB) 2.5 mg-0.5 mg/3 ml nebu   Yes Yes   Sig: 3 mL by Nebulization route every four (4) hours as needed for Wheezing. amLODIPine (NORVASC) 10 mg tablet 2020 at 1000  Yes Yes   Sig: Take 10 mg by mouth daily. atorvastatin (LIPITOR) 10 mg tablet 2020 at 1000  Yes Yes   Sig: TAKE 1 TABLET BY MOUTH EVERY DAY   azelastine (ASTELIN) 137 mcg (0.1 %) nasal spray 2020 at pm  Yes Yes   Si Sprays by Both Nostrils route nightly.   ergocalciferol (ERGOCALCIFEROL) 50,000 unit capsule 2020  Yes Yes   Sig: Take 50,000 Units by mouth every Monday.    fluticasone (FLONASE) 50 mcg/actuation nasal spray 2020 at am  Yes Yes   Si Sprays by Both Nostrils route daily. fluticasone furoate-vilanteroL (Breo Ellipta) 100-25 mcg/dose inhaler 2020 at 1000am  Yes Yes   Sig: Take 1 Puff by inhalation daily. lisinopril-hydroCHLOROthiazide (PRINZIDE, ZESTORETIC) 20-12.5 mg per tablet 2020 at 1000  Yes Yes   Sig: Take 1 Tab by mouth daily. loratadine (CLARITIN) 10 mg tablet 2020 at am  Yes Yes   Sig: Take 10 mg by mouth daily. montelukast (SINGULAIR) 10 mg tablet 2020 at 100am  Yes Yes   Sig: TAKE 1 TABLET BY MOUTH EVERY DAY FOR ALLERGY SYMPTOMS   pantoprazole (PROTONIX) 40 mg tablet 2020 at pm  Yes Yes   Sig: Take 40 mg by mouth Before breakfast and dinner. predniSONE (DELTASONE) 10 mg tablet 2020 at am  Yes Yes   Sig: Take 10 mg by mouth daily. Facility-Administered Medications: None         Thank you for allowing me to participate in the care of this patient. Please contact the pharmacy () or the medication reconciliation pharmacist () with any questions. Stella Nicole, Pharm. D., BCPS, BCPPS

## 2020-05-23 NOTE — PROGRESS NOTES
Problem: Falls - Risk of  Goal: *Absence of Falls  Description: Document Justin Sol Fall Risk and appropriate interventions in the flowsheet. Outcome: Progressing Towards Goal  Note: Fall Risk Interventions:  Mobility Interventions: Bed/chair exit alarm, Patient to call before getting OOB         Medication Interventions: Bed/chair exit alarm, Patient to call before getting OOB, Teach patient to arise slowly    Elimination Interventions: Bed/chair exit alarm, Call light in reach, Patient to call for help with toileting needs       Problem: Patient Education: Go to Patient Education Activity  Goal: Patient/Family Education  Outcome: Progressing Towards Goal     Problem: Chronic Obstructive Pulmonary Disease (COPD)  Goal: *Oxygen saturation during activity within specified parameters  Outcome: Progressing Towards Goal  Goal: *Able to remain out of bed as prescribed  Outcome: Progressing Towards Goal  Goal: *Absence of hypoxia  Outcome: Progressing Towards Goal  Goal: *Optimize nutritional status  Outcome: Progressing Towards Goal     Problem: Patient Education: Go to Patient Education Activity  Goal: Patient/Family Education  Outcome: Progressing Towards Goal  1521: attempted to call report, RN busy, will call back. 1800: TRANSFER - OUT REPORT:    Verbal report given to Mikayla(name) on Nghia Still  being transferred to (unit) for routine progression of care       Report consisted of patients Situation, Background, Assessment and   Recommendations(SBAR). Information from the following report(s) SBAR, Kardex, ED Summary, Procedure Summary, MAR, Recent Results and Cardiac Rhythm ST was reviewed with the receiving nurse. Lines:   Peripheral IV 05/22/20 Anterior; Left Forearm (Active)   Site Assessment Clean, dry, & intact 5/23/2020  8:55 AM   Phlebitis Assessment 0 5/23/2020  8:55 AM   Infiltration Assessment 0 5/23/2020  8:55 AM   Dressing Status Clean, dry, & intact 5/23/2020  8:55 AM   Dressing Type Transparent 5/23/2020  8:55 AM   Hub Color/Line Status Pink; Infusing 5/23/2020  4:30 AM   Action Taken Open ports on tubing capped 5/23/2020  4:30 AM   Alcohol Cap Used Yes 5/23/2020  4:30 AM        Opportunity for questions and clarification was provided.       Patient transported with:   Monitor  Patient's medications from home  Registered Nurse  Tech

## 2020-05-24 LAB
ALBUMIN SERPL-MCNC: 3.5 G/DL (ref 3.5–5)
ALBUMIN/GLOB SERPL: 0.9 {RATIO} (ref 1.1–2.2)
ALP SERPL-CCNC: 71 U/L (ref 45–117)
ALT SERPL-CCNC: 65 U/L (ref 12–78)
ANION GAP SERPL CALC-SCNC: 6 MMOL/L (ref 5–15)
AST SERPL-CCNC: 22 U/L (ref 15–37)
BASOPHILS # BLD: 0 K/UL (ref 0–0.1)
BASOPHILS NFR BLD: 0 % (ref 0–1)
BILIRUB SERPL-MCNC: 0.4 MG/DL (ref 0.2–1)
BUN SERPL-MCNC: 12 MG/DL (ref 6–20)
BUN/CREAT SERPL: 16 (ref 12–20)
CALCIUM SERPL-MCNC: 9 MG/DL (ref 8.5–10.1)
CEA SERPL-MCNC: 1.5 NG/ML
CHLORIDE SERPL-SCNC: 97 MMOL/L (ref 97–108)
CO2 SERPL-SCNC: 25 MMOL/L (ref 21–32)
CREAT SERPL-MCNC: 0.75 MG/DL (ref 0.55–1.02)
DIFFERENTIAL METHOD BLD: ABNORMAL
EOSINOPHIL # BLD: 0 K/UL (ref 0–0.4)
EOSINOPHIL NFR BLD: 0 % (ref 0–7)
ERYTHROCYTE [DISTWIDTH] IN BLOOD BY AUTOMATED COUNT: 13.2 % (ref 11.5–14.5)
GLOBULIN SER CALC-MCNC: 3.7 G/DL (ref 2–4)
GLUCOSE BLD STRIP.AUTO-MCNC: 108 MG/DL (ref 65–100)
GLUCOSE BLD STRIP.AUTO-MCNC: 213 MG/DL (ref 65–100)
GLUCOSE SERPL-MCNC: 119 MG/DL (ref 65–100)
HCT VFR BLD AUTO: 31.8 % (ref 35–47)
HGB BLD-MCNC: 10.3 G/DL (ref 11.5–16)
IMM GRANULOCYTES # BLD AUTO: 0.1 K/UL (ref 0–0.04)
IMM GRANULOCYTES NFR BLD AUTO: 1 % (ref 0–0.5)
LYMPHOCYTES # BLD: 0.7 K/UL (ref 0.8–3.5)
LYMPHOCYTES NFR BLD: 6 % (ref 12–49)
MCH RBC QN AUTO: 31 PG (ref 26–34)
MCHC RBC AUTO-ENTMCNC: 32.4 G/DL (ref 30–36.5)
MCV RBC AUTO: 95.8 FL (ref 80–99)
MONOCYTES # BLD: 0.8 K/UL (ref 0–1)
MONOCYTES NFR BLD: 7 % (ref 5–13)
NEUTS SEG # BLD: 11 K/UL (ref 1.8–8)
NEUTS SEG NFR BLD: 87 % (ref 32–75)
NRBC # BLD: 0 K/UL (ref 0–0.01)
NRBC BLD-RTO: 0 PER 100 WBC
PLATELET # BLD AUTO: 242 K/UL (ref 150–400)
PMV BLD AUTO: 9.2 FL (ref 8.9–12.9)
POTASSIUM SERPL-SCNC: 4.4 MMOL/L (ref 3.5–5.1)
PROT SERPL-MCNC: 7.2 G/DL (ref 6.4–8.2)
RBC # BLD AUTO: 3.32 M/UL (ref 3.8–5.2)
SERVICE CMNT-IMP: ABNORMAL
SERVICE CMNT-IMP: ABNORMAL
SODIUM SERPL-SCNC: 128 MMOL/L (ref 136–145)
WBC # BLD AUTO: 12.7 K/UL (ref 3.6–11)

## 2020-05-24 PROCEDURE — 74011000250 HC RX REV CODE- 250: Performed by: NURSE PRACTITIONER

## 2020-05-24 PROCEDURE — 65660000000 HC RM CCU STEPDOWN

## 2020-05-24 PROCEDURE — 74011000250 HC RX REV CODE- 250: Performed by: SPECIALIST

## 2020-05-24 PROCEDURE — 74011636637 HC RX REV CODE- 636/637: Performed by: HOSPITALIST

## 2020-05-24 PROCEDURE — 94640 AIRWAY INHALATION TREATMENT: CPT

## 2020-05-24 PROCEDURE — 74011250637 HC RX REV CODE- 250/637: Performed by: NURSE PRACTITIONER

## 2020-05-24 PROCEDURE — 80053 COMPREHEN METABOLIC PANEL: CPT

## 2020-05-24 PROCEDURE — 36415 COLL VENOUS BLD VENIPUNCTURE: CPT

## 2020-05-24 PROCEDURE — 74011250636 HC RX REV CODE- 250/636: Performed by: INTERNAL MEDICINE

## 2020-05-24 PROCEDURE — 77030029684 HC NEB SM VOL KT MONA -A

## 2020-05-24 PROCEDURE — 82962 GLUCOSE BLOOD TEST: CPT

## 2020-05-24 PROCEDURE — 74011250636 HC RX REV CODE- 250/636: Performed by: HOSPITALIST

## 2020-05-24 PROCEDURE — 85025 COMPLETE CBC W/AUTO DIFF WBC: CPT

## 2020-05-24 PROCEDURE — 82378 CARCINOEMBRYONIC ANTIGEN: CPT

## 2020-05-24 PROCEDURE — 74011250637 HC RX REV CODE- 250/637: Performed by: HOSPITALIST

## 2020-05-24 PROCEDURE — 94760 N-INVAS EAR/PLS OXIMETRY 1: CPT

## 2020-05-24 RX ORDER — MORPHINE SULFATE 2 MG/ML
2 INJECTION, SOLUTION INTRAMUSCULAR; INTRAVENOUS
Status: DISCONTINUED | OUTPATIENT
Start: 2020-05-24 | End: 2020-05-28

## 2020-05-24 RX ADMIN — POLYETHYLENE GLYCOL-3350 AND ELECTROLYTES 4000 ML: 236; 6.74; 5.86; 2.97; 22.74 POWDER, FOR SOLUTION ORAL at 21:17

## 2020-05-24 RX ADMIN — SODIUM CHLORIDE 100 ML/HR: 900 INJECTION, SOLUTION INTRAVENOUS at 01:56

## 2020-05-24 RX ADMIN — IPRATROPIUM BROMIDE AND ALBUTEROL SULFATE 3 ML: .5; 3 SOLUTION RESPIRATORY (INHALATION) at 15:14

## 2020-05-24 RX ADMIN — AZELASTINE HYDROCHLORIDE 2 SPRAY: 137 SPRAY, METERED NASAL at 23:09

## 2020-05-24 RX ADMIN — Medication 10 ML: at 17:27

## 2020-05-24 RX ADMIN — FLUTICASONE FUROATE AND VILANTEROL 1 PUFF: 100; 25 POWDER RESPIRATORY (INHALATION) at 08:12

## 2020-05-24 RX ADMIN — PANTOPRAZOLE SODIUM 40 MG: 40 TABLET, DELAYED RELEASE ORAL at 09:01

## 2020-05-24 RX ADMIN — OXYCODONE HYDROCHLORIDE AND ACETAMINOPHEN 1 TABLET: 5; 325 TABLET ORAL at 05:36

## 2020-05-24 RX ADMIN — PANTOPRAZOLE SODIUM 40 MG: 40 TABLET, DELAYED RELEASE ORAL at 17:27

## 2020-05-24 RX ADMIN — PREDNISONE 40 MG: 20 TABLET ORAL at 09:01

## 2020-05-24 RX ADMIN — OXYCODONE HYDROCHLORIDE AND ACETAMINOPHEN 1 TABLET: 5; 325 TABLET ORAL at 01:51

## 2020-05-24 RX ADMIN — HYDRALAZINE HYDROCHLORIDE 10 MG: 20 INJECTION INTRAMUSCULAR; INTRAVENOUS at 20:28

## 2020-05-24 RX ADMIN — ENOXAPARIN SODIUM 40 MG: 40 INJECTION SUBCUTANEOUS at 17:27

## 2020-05-24 RX ADMIN — SODIUM CHLORIDE 100 ML/HR: 900 INJECTION, SOLUTION INTRAVENOUS at 23:08

## 2020-05-24 RX ADMIN — SODIUM CHLORIDE 100 ML/HR: 900 INJECTION, SOLUTION INTRAVENOUS at 12:20

## 2020-05-24 RX ADMIN — IPRATROPIUM BROMIDE AND ALBUTEROL SULFATE 3 ML: .5; 3 SOLUTION RESPIRATORY (INHALATION) at 05:39

## 2020-05-24 RX ADMIN — Medication 10 ML: at 05:24

## 2020-05-24 RX ADMIN — IPRATROPIUM BROMIDE AND ALBUTEROL SULFATE 3 ML: .5; 3 SOLUTION RESPIRATORY (INHALATION) at 23:34

## 2020-05-24 RX ADMIN — OXYCODONE HYDROCHLORIDE AND ACETAMINOPHEN 1 TABLET: 5; 325 TABLET ORAL at 12:20

## 2020-05-24 RX ADMIN — Medication 10 ML: at 23:09

## 2020-05-24 RX ADMIN — LORATADINE 10 MG: 10 TABLET ORAL at 09:01

## 2020-05-24 RX ADMIN — ATORVASTATIN CALCIUM 10 MG: 10 TABLET, FILM COATED ORAL at 09:01

## 2020-05-24 RX ADMIN — ENOXAPARIN SODIUM 40 MG: 40 INJECTION SUBCUTANEOUS at 05:24

## 2020-05-24 RX ADMIN — FLUTICASONE PROPIONATE 2 SPRAY: 50 SPRAY, METERED NASAL at 12:21

## 2020-05-24 RX ADMIN — AMLODIPINE BESYLATE 10 MG: 5 TABLET ORAL at 09:01

## 2020-05-24 NOTE — PROGRESS NOTES
GI PROGRESS NOTE    NAME:             Юлия Crystal   :              1955   MRN:              [id number]   Admit Date:     2020  Todays Date:  2020      Subjective:          No significant abdominal pain today no significant diarrhea    Review of Systems - Respiratory ROS: no cough, shortness of breath, or wheezing  Cardiovascular ROS: no chest pain or dyspnea on exertion  Medications-reviewed     Current Facility-Administered Medications   Medication Dose Route Frequency    arformoterol 15 mcg/budesonide 0.5 mg neb solution   Nebulization BID RT    morphine injection 2 mg  2 mg IntraVENous Q4H PRN    sodium chloride (NS) flush 5-40 mL  5-40 mL IntraVENous Q8H    sodium chloride (NS) flush 5-40 mL  5-40 mL IntraVENous PRN    enoxaparin (LOVENOX) injection 40 mg  40 mg SubCUTAneous Q12H    acetaminophen (TYLENOL) tablet 650 mg  650 mg Oral Q6H PRN    Or    acetaminophen (TYLENOL) suppository 650 mg  650 mg Rectal Q6H PRN    0.9% sodium chloride infusion  100 mL/hr IntraVENous CONTINUOUS    amLODIPine (NORVASC) tablet 10 mg  10 mg Oral DAILY    hydrALAZINE (APRESOLINE) 20 mg/mL injection 10 mg  10 mg IntraVENous Q6H PRN    predniSONE (DELTASONE) tablet 40 mg  40 mg Oral DAILY WITH BREAKFAST    azelastine (ASTELIN) 137mcg/spray nasal spray  2 Spray Both Nostrils QHS    atorvastatin (LIPITOR) tablet 10 mg  10 mg Oral DAILY    fluticasone propionate (FLONASE) 50 mcg/actuation nasal spray 2 Spray  2 Spray Both Nostrils DAILY    loratadine (CLARITIN) tablet 10 mg  10 mg Oral DAILY    pantoprazole (PROTONIX) tablet 40 mg  40 mg Oral ACB&D    fluticasone-vilanterol (BREO ELLIPTA) 100mcg-25mcg/puff (Patient Supplied)  1 Puff Inhalation DAILY    peg 3350-electrolytes (COLYTE) 4000 mL  4,000 mL Oral ONCE    oxyCODONE-acetaminophen (PERCOCET) 5-325 mg per tablet 1 Tab  1 Tab Oral Q4H PRN    ondansetron (ZOFRAN ODT) tablet 4 mg  4 mg Oral Q8H PRN    albuterol-ipratropium (DUO-NEB) 2.5 MG-0.5 MG/3 ML  3 mL Nebulization Q4H PRN        Objective:     Patient Vitals for the past 8 hrs:   BP Temp Pulse Resp SpO2   05/24/20 1533 146/89 97.5 °F (36.4 °C) (!) 108 18 100 %   05/24/20 1514 -- -- -- -- 98 %     No intake/output data recorded. 05/23 0701 - 05/24 1900  In: 1690.7 [I.V.:1690.7]  Out: 1300 [Urine:1300]    EXAM:    Visit Vitals  /89 (BP 1 Location: Left arm, BP Patient Position: Sitting)   Pulse (!) 108   Temp 97.5 °F (36.4 °C)   Resp 18   Ht 5' 6\" (1.676 m)   Wt 85 kg (187 lb 6.3 oz)   SpO2 100%   BMI 30.25 kg/m²     General appearance: alert, cooperative, no distress, appears stated age  Lungs: clear to auscultation bilaterally  Heart: regular rate and rhythm, S1, S2 normal, no murmur, click, rub or gallop  Abdomen: soft, non-tender. Bowel sounds normal. No masses,  no organomegaly      Data Review     Recent Labs     05/24/20  0536 05/23/20  0145   WBC 12.7* 12.8*   HGB 10.3* 11.0*   HCT 31.8* 32.9*    233     Recent Labs     05/24/20  0536 05/23/20  1243 05/23/20  0145   * 126* 127*   K 4.4  --  4.3   CL 97  --  95*   CO2 25  --  26   BUN 12  --  13   CREA 0.75  --  1.11*   *  --  169*   CA 9.0  --  9.3     Recent Labs     05/24/20  0536 05/23/20  0145   SGOT 22 32   AP 71 84   TP 7.2 7.4   ALB 3.5 3.9   GLOB 3.7 3.5                              Assessment:   1. Cecal mass. We will proceed with colonoscopy for further evaluation. 2.  Chronic obstructive pulmonary disease. 3.  Hypertension. 4.  Mild leukocytosis and anemia         Active Problems:    COPD exacerbation ([facility] [address] (5/23/2020)      Cecum mass (5/23/2020)        Plan:   1. Colonoscopy in chio Marx MD

## 2020-05-24 NOTE — PROGRESS NOTES
Bedside shift change report given to Johnathan Richter (oncoming nurse) by Didi Melendez (offgoing nurse).  Report included the following information SBAR, MAR, Recent Results and Cardiac Rhythm ST.

## 2020-05-24 NOTE — CONSULTS
[address]    Name:  Ember Estrada  MR#:  [id number]  :  1955  ACCOUNT #:  [de-identified]  DATE OF SERVICE:  2020    CHIEF COMPLAINT:  Cecal mass and abdominal pain. HISTORY OF PRESENT ILLNESS:  A 67-year-old woman complaining of abdominal pain and diarrhea, which is black, who was found to have heme-positive stools, associated with crampy abdominal pain. There was no nausea or vomiting, no blood in the stool or decreased appetite. She denied any weight loss. She had a CT scan done, which is reporting a cecal mass. The patient also has history of COPD and developed some shortness of breath. Recently, she had a COVID-19 test, which was negative. She denied any chest pain, cough, or palpitations. Last colonoscopy was two years ago, when it was normal.    REVIEW OF SYSTEMS:  As noted above, other than the described GI symptoms of abdominal pain, diarrhea, and nausea. PAST MEDICAL HISTORY:  Positive for,  1. Arthritis. 2.  COPD. 3.  Chronic pain. 4.  GERD. 5.  Hypertension. MEDICATIONS PRIOR TO ADMISSION:  Include,  1. Neurontin. 2.  Fluticasone. 3.  Oxycodone. 4.  Robaxin. 5.  Aspirin. 6.  Flonase. 7.  Spiriva. 8.  Carafate. 9.  Ventolin. 10.  Norvasc. 11.  Ergocalciferol. 12.  Prilosec. 13.  Deltasone. FAMILY HISTORY:  Positive for hypertension and cancer. SOCIAL HISTORY:  She is a former smoker. Denies any alcohol. PHYSICAL EXAMINATION:  GENERAL:  The patient is an obese woman, in no acute distress, oriented x3. VITAL SIGNS:  Temperature is 98.1, pulse is 108, blood pressure is 136/70. HEAD, EYES, EARS, NOSE, AND THROAT:  Sclerae are anicteric. Conjunctivae are somewhat pale. Moist mucous membranes. NECK:  Supple. CHEST:  Clear to auscultation and percussion. HEART:  Reveals a regular rate and rhythm. ABDOMEN:  Soft with active bowel sounds. There is no significant tenderness.   EXTREMITIES:  Without cyanosis, clubbing or edema. LABORATORY DATA:  A CT scan had demonstrated hepatic steatosis, two small low-density lesions of the liver, concentric wall thickening of the cecum and terminal ileum, but this was without contrast.  WBC is 12.8, hemoglobin is 11.0, MCV is 93.7, has a shift of differential to the left. Urinalysis:  Trace leukocyte esterase, no wbc's in the urine. PT/INR is normal.  The chemistry reveals a low sodium of 127, chloride is 95, BUN 13, creatinine 1. 11. Mild elevation of the ALT of 86, otherwise liver function tests are normal including alkaline phosphatase. IMPRESSION AND PLAN:  1. Cecal mass. We will proceed with colonoscopy for further evaluation. 2.  Chronic obstructive pulmonary disease. 3.  Hypertension.       Razia Rosa MD      [id number]  D:  05/23/2020 17:37  T:  05/23/2020 21:49  JOB #:  [id number]  CC:  MD Mary Marquez MD

## 2020-05-24 NOTE — PROGRESS NOTES
Hospitalist Progress Note  Tri Guthrie MD  Answering service: [phone] from in house phone      Date of Service:  2020  NAME:  Roma Harris  :  1955  MRN:  [id number]      Admission Summary:   Roma Harris is a 60 y.o. female past medical history significant for COPD, hypertension, GERD presented emergency room at [facility] complaining of no urine output for almost 24 hours. Also she has been complaining of abdominal pain and diarrhea. She states that she is suffering of constipation and has been having worsening constipation for the last several month days ago she took prune juice and after 1 she developed significant diarrhea associated with crampy abdominal pain    Interval history / Subjective:      Patient seen and examined this morning. Patient feeling better. She is urinating but no BM so far. 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 - GI and Colorectal surgery input appt   - Clear liquid diet for now  - plan for Colonoscopy and biopsy of the mass-- plan for , NPO tonight and bowel prep  - check CEA level   - pain control with IV pain meds( explained patient can cause constipation)      # Hyponatremia  - likely triggered by diarrhea. Received one liter of IV fluid in outside hospital.  - plasma osmolality, urine osmolality, urine sodium  - on IVF NS     # Mild renal insufficiency   -      # Hx of COPD   - respiratory status at baseline. Does not appear to be on acute exacerbation at this time and she attributed he short episode of SOB in OSH to anxiety.  - On chronic steroids: continue with home dose steroid.    - Nebs PRN       # Hypertension- on Losartan/HCTZ at home  - hold hctz now due to hyponatremia  - Start patient on losartan      # Leukocytosis- could be reactive vs underlying possible malignancy vs infectious but pt currently afebrile and asymptomatic.  - Will hold antibiotic therapy for now.       # Hyperglycemia: No history of diabetes  - A1c: 5.6     # Transaminitis: Mild  -Abnormal appearance on CT scan      DVT prophylaxis: SCDs. -ODE STATUS: Full code     Surrogate decision maker: Son.     FUNCTIONAL STATUS PRIOR TO HOSPITALIZATION Ambulates Independently      Hospital Problems  Date Reviewed: 12/4/2018          Codes Class Noted POA    COPD exacerbation ([facility] [address] ICD-10-CM: J44.1  ICD-9-CM: 491.21  5/23/2020 Unknown        Cecum mass ICD-10-CM: Z55.06  ICD-9-CM: 569.89  5/23/2020 Unknown                Review of Systems:   Pertinent positive mentioned in interval hx/HPI. Other systems reviewed and negative     Vital Signs:    Last 24hrs VS reviewed since prior progress note. Most recent are:  Visit Vitals  BP (!) 143/91 (BP 1 Location: Right arm, BP Patient Position: Sitting)   Pulse (!) 101   Temp 97.5 °F (36.4 °C)   Resp 24   Ht 5' 6\" (1.676 m)   Wt 85 kg (187 lb 6.3 oz)   SpO2 99%   BMI 30.25 kg/m²         Intake/Output Summary (Last 24 hours) at 5/24/2020 0835  Last data filed at 5/24/2020 0430  Gross per 24 hour   Intake 1690.66 ml   Output 1300 ml   Net 390.66 ml        Physical Examination:             Constitutional:  No acute distress, cooperative, pleasant    ENT:  Oral mucous moist, oropharynx benign. Neck supple,    Resp:  CTA bilaterally. No wheezing/rhonchi/rales. No accessory muscle use   CV:  Regular rhythm, normal rate, no murmurs, gallops, rubs    GI:  Soft, non distended, non tender. normoactive bowel sounds, no hepatosplenomegaly     Musculoskeletal:  No edema, warm, 2+ pulses throughout    Neurologic:  Moves all extremities.   AAOx3, CN II-XII reviewed            Data Review:   I personally reviewed labs and imaging     Labs:     Recent Labs     05/24/20  0536 05/23/20  0145   WBC 12.7* 12.8*   HGB 10.3* 11.0*   HCT 31.8* 32.9*    233     Recent Labs     05/24/20 0536 05/23/20  1243 05/23/20  0145   * 126* 127*   K 4.4  --  4.3   CL 97  --  95*   CO2 25  --  26   BUN 12  --  13   CREA 0.75  --  1.11*   *  --  169*   CA 9.0  --  9.3     Recent Labs     05/24/20  0536 05/23/20  0145   SGOT 22 32   ALT 65 86*   AP 71 84   TBILI 0.4 0.6   TP 7.2 7.4   ALB 3.5 3.9   GLOB 3.7 3.5     No results for input(s): INR, PTP, APTT, INREXT in the last 72 hours. No results for input(s): FE, TIBC, PSAT, FERR in the last 72 hours. No results found for: FOL, RBCF   No results for input(s): PH, PCO2, PO2 in the last 72 hours. No results for input(s): CPK, CKNDX, TROIQ in the last 72 hours.     No lab exists for component: CPKMB  No results found for: CHOL, CHOLX, CHLST, CHOLV, HDL, HDLP, LDL, LDLC, DLDLP, TGLX, TRIGL, TRIGP, CHHD, CHHDX  No results found for: [facility]  Lab Results   Component Value Date/Time    Color YELLOW 10/15/2018 11:44 AM    Appearance CLEAR 10/15/2018 11:44 AM    Specific gravity 1.009 10/15/2018 11:44 AM    pH (UA) 6.5 10/15/2018 11:44 AM    Protein NEGATIVE  10/15/2018 11:44 AM    Glucose 250 (A) 10/15/2018 11:44 AM    Ketone NEGATIVE  10/15/2018 11:44 AM    Bilirubin NEGATIVE  10/15/2018 11:44 AM    Urobilinogen 0.2 10/15/2018 11:44 AM    Nitrites NEGATIVE  10/15/2018 11:44 AM    Leukocyte Esterase TRACE (A) 10/15/2018 11:44 AM    Epithelial cells FEW 10/15/2018 11:44 AM    Bacteria NEGATIVE  10/15/2018 11:44 AM    WBC NEGATIVE  10/15/2018 11:44 AM    RBC NEGATIVE  10/15/2018 11:44 AM         Medications Reviewed:     Current Facility-Administered Medications   Medication Dose Route Frequency    arformoterol 15 mcg/budesonide 0.5 mg neb solution   Nebulization BID RT    sodium chloride (NS) flush 5-40 mL  5-40 mL IntraVENous Q8H    sodium chloride (NS) flush 5-40 mL  5-40 mL IntraVENous PRN    enoxaparin (LOVENOX) injection 40 mg  40 mg SubCUTAneous Q12H    acetaminophen (TYLENOL) tablet 650 mg  650 mg Oral Q6H PRN    Or    acetaminophen (TYLENOL) suppository 650 mg  650 mg Rectal Q6H PRN    0.9% sodium chloride infusion  100 mL/hr IntraVENous CONTINUOUS    amLODIPine (NORVASC) tablet 10 mg  10 mg Oral DAILY    hydrALAZINE (APRESOLINE) 20 mg/mL injection 10 mg  10 mg IntraVENous Q6H PRN    predniSONE (DELTASONE) tablet 40 mg  40 mg Oral DAILY WITH BREAKFAST    azelastine (ASTELIN) 137mcg/spray nasal spray  2 Spray Both Nostrils QHS    atorvastatin (LIPITOR) tablet 10 mg  10 mg Oral DAILY    fluticasone propionate (FLONASE) 50 mcg/actuation nasal spray 2 Spray  2 Spray Both Nostrils DAILY    loratadine (CLARITIN) tablet 10 mg  10 mg Oral DAILY    pantoprazole (PROTONIX) tablet 40 mg  40 mg Oral ACB&D    fluticasone-vilanterol (BREO ELLIPTA) 100mcg-25mcg/puff (Patient Supplied)  1 Puff Inhalation DAILY    peg 3350-electrolytes (COLYTE) 4000 mL  4,000 mL Oral ONCE    oxyCODONE-acetaminophen (PERCOCET) 5-325 mg per tablet 1 Tab  1 Tab Oral Q4H PRN    ondansetron (ZOFRAN ODT) tablet 4 mg  4 mg Oral Q8H PRN    albuterol-ipratropium (DUO-NEB) 2.5 MG-0.5 MG/3 ML  3 mL Nebulization Q4H PRN     ______________________________________________________________________  EXPECTED LENGTH OF STAY: - - -  ACTUAL LENGTH OF STAY:          1                 Susan Roy MD   Patient has given Verbal permission to discuss medical care with   persons present in the room and and also with contact as listed on face sheet.

## 2020-05-24 NOTE — PROGRESS NOTES
Doing well  Visit Vitals  BP (!) 154/98   Pulse (!) 101   Temp 97.6 °F (36.4 °C)   Resp 24   Ht 5' 6\" (1.676 m)   Wt 85 kg (187 lb 6.3 oz)   SpO2 97%   BMI 30.25 kg/m²     Date 05/23/20 0700 - 05/24/20 0659 05/24/20 0700 - 05/25/20 0659   Shift [id number] 1900-0659 24 Hour Total 7925-6563 0566-7261 24 Hour Total   INTAKE   I.V.(mL/kg/hr) 587.3(0.6) 1103.3(1.1) 1690.7(0.8)        Volume (0.9% sodium chloride infusion) 587.3 1103.3 1690.7      Shift Total(mL/kg) 587. 3(6.9) 1103.3(13) 1690.7(19.9)      OUTPUT   Urine(mL/kg/hr) 600(0.6) 700(0.7) 1300(0.6)        Urine Voided         Urine Output (mL) ([REMOVED] External Female Catheter 05/23/20) 300  300      Shift Total(mL/kg) 600(7.1) 700(8.2) 1300(15.3)      NET -12.7 403.3 390.7      Weight (kg) 84.9 85 85 85 85 85     Abd soft    A/P colonoscopy tomorrow  Plan pending those results

## 2020-05-25 ENCOUNTER — APPOINTMENT (OUTPATIENT)
Dept: CT IMAGING | Age: 65
DRG: 245 | End: 2020-05-25
Attending: SPECIALIST
Payer: MEDICAID

## 2020-05-25 LAB
ANION GAP SERPL CALC-SCNC: 9 MMOL/L (ref 5–15)
BUN SERPL-MCNC: 7 MG/DL (ref 6–20)
BUN/CREAT SERPL: 9 (ref 12–20)
CALCIUM SERPL-MCNC: 9.6 MG/DL (ref 8.5–10.1)
CHLORIDE SERPL-SCNC: 99 MMOL/L (ref 97–108)
CO2 SERPL-SCNC: 25 MMOL/L (ref 21–32)
CREAT SERPL-MCNC: 0.75 MG/DL (ref 0.55–1.02)
ERYTHROCYTE [DISTWIDTH] IN BLOOD BY AUTOMATED COUNT: 13.2 % (ref 11.5–14.5)
GLUCOSE BLD STRIP.AUTO-MCNC: 123 MG/DL (ref 65–100)
GLUCOSE SERPL-MCNC: 106 MG/DL (ref 65–100)
HCT VFR BLD AUTO: 33.4 % (ref 35–47)
HGB BLD-MCNC: 11 G/DL (ref 11.5–16)
MCH RBC QN AUTO: 31.9 PG (ref 26–34)
MCHC RBC AUTO-ENTMCNC: 32.9 G/DL (ref 30–36.5)
MCV RBC AUTO: 96.8 FL (ref 80–99)
NRBC # BLD: 0 K/UL (ref 0–0.01)
NRBC BLD-RTO: 0 PER 100 WBC
PLATELET # BLD AUTO: 258 K/UL (ref 150–400)
PMV BLD AUTO: 9.3 FL (ref 8.9–12.9)
POTASSIUM SERPL-SCNC: 3.8 MMOL/L (ref 3.5–5.1)
RBC # BLD AUTO: 3.45 M/UL (ref 3.8–5.2)
SERVICE CMNT-IMP: ABNORMAL
SODIUM SERPL-SCNC: 133 MMOL/L (ref 136–145)
WBC # BLD AUTO: 9.7 K/UL (ref 3.6–11)

## 2020-05-25 PROCEDURE — 74011000258 HC RX REV CODE- 258: Performed by: RADIOLOGY

## 2020-05-25 PROCEDURE — 74011636320 HC RX REV CODE- 636/320: Performed by: RADIOLOGY

## 2020-05-25 PROCEDURE — 0DBK8ZZ EXCISION OF ASCENDING COLON, VIA NATURAL OR ARTIFICIAL OPENING ENDOSCOPIC: ICD-10-PCS | Performed by: SPECIALIST

## 2020-05-25 PROCEDURE — 74011250637 HC RX REV CODE- 250/637: Performed by: HOSPITALIST

## 2020-05-25 PROCEDURE — 85027 COMPLETE CBC AUTOMATED: CPT

## 2020-05-25 PROCEDURE — 82962 GLUCOSE BLOOD TEST: CPT

## 2020-05-25 PROCEDURE — 94762 N-INVAS EAR/PLS OXIMTRY CONT: CPT

## 2020-05-25 PROCEDURE — 94640 AIRWAY INHALATION TREATMENT: CPT

## 2020-05-25 PROCEDURE — 76040000009: Performed by: SPECIALIST

## 2020-05-25 PROCEDURE — 99152 MOD SED SAME PHYS/QHP 5/>YRS: CPT

## 2020-05-25 PROCEDURE — 74011250637 HC RX REV CODE- 250/637: Performed by: NURSE PRACTITIONER

## 2020-05-25 PROCEDURE — 74011250636 HC RX REV CODE- 250/636: Performed by: NURSE PRACTITIONER

## 2020-05-25 PROCEDURE — 80048 BASIC METABOLIC PNL TOTAL CA: CPT

## 2020-05-25 PROCEDURE — 36415 COLL VENOUS BLD VENIPUNCTURE: CPT

## 2020-05-25 PROCEDURE — 74011250636 HC RX REV CODE- 250/636: Performed by: INTERNAL MEDICINE

## 2020-05-25 PROCEDURE — 88305 TISSUE EXAM BY PATHOLOGIST: CPT

## 2020-05-25 PROCEDURE — 94760 N-INVAS EAR/PLS OXIMETRY 1: CPT

## 2020-05-25 PROCEDURE — 77030029384 HC SNR POLYP CAPTVR II BSC -B: Performed by: SPECIALIST

## 2020-05-25 PROCEDURE — 74011250636 HC RX REV CODE- 250/636: Performed by: SPECIALIST

## 2020-05-25 PROCEDURE — 77030029684 HC NEB SM VOL KT MONA -A

## 2020-05-25 PROCEDURE — 74011250637 HC RX REV CODE- 250/637: Performed by: SPECIALIST

## 2020-05-25 PROCEDURE — 74011000250 HC RX REV CODE- 250: Performed by: NURSE PRACTITIONER

## 2020-05-25 PROCEDURE — 0DBL8ZZ EXCISION OF TRANSVERSE COLON, VIA NATURAL OR ARTIFICIAL OPENING ENDOSCOPIC: ICD-10-PCS | Performed by: SPECIALIST

## 2020-05-25 PROCEDURE — 65660000000 HC RM CCU STEPDOWN

## 2020-05-25 PROCEDURE — 0DBM8ZX EXCISION OF DESCENDING COLON, VIA NATURAL OR ARTIFICIAL OPENING ENDOSCOPIC, DIAGNOSTIC: ICD-10-PCS | Performed by: SPECIALIST

## 2020-05-25 PROCEDURE — 77030021593 HC FCPS BIOP ENDOSC BSC -A: Performed by: SPECIALIST

## 2020-05-25 PROCEDURE — 74011636637 HC RX REV CODE- 636/637: Performed by: HOSPITALIST

## 2020-05-25 PROCEDURE — 74011000250 HC RX REV CODE- 250: Performed by: INTERNAL MEDICINE

## 2020-05-25 PROCEDURE — 77030010936 HC CLP LIG BSC -C: Performed by: SPECIALIST

## 2020-05-25 PROCEDURE — 99153 MOD SED SAME PHYS/QHP EA: CPT

## 2020-05-25 RX ORDER — DEXTROMETHORPHAN/PSEUDOEPHED 2.5-7.5/.8
1.2 DROPS ORAL
Status: DISCONTINUED | OUTPATIENT
Start: 2020-05-25 | End: 2020-05-25 | Stop reason: SDUPTHER

## 2020-05-25 RX ORDER — METRONIDAZOLE 500 MG/100ML
500 INJECTION, SOLUTION INTRAVENOUS EVERY 12 HOURS
Status: DISCONTINUED | OUTPATIENT
Start: 2020-05-25 | End: 2020-05-29 | Stop reason: HOSPADM

## 2020-05-25 RX ORDER — LEVOFLOXACIN 5 MG/ML
500 INJECTION, SOLUTION INTRAVENOUS EVERY 24 HOURS
Status: DISCONTINUED | OUTPATIENT
Start: 2020-05-25 | End: 2020-05-29 | Stop reason: HOSPADM

## 2020-05-25 RX ORDER — SODIUM CHLORIDE 0.9 % (FLUSH) 0.9 %
5-40 SYRINGE (ML) INJECTION EVERY 8 HOURS
Status: ACTIVE | OUTPATIENT
Start: 2020-05-25 | End: 2020-05-25

## 2020-05-25 RX ORDER — MIDAZOLAM HYDROCHLORIDE 1 MG/ML
.25-5 INJECTION, SOLUTION INTRAMUSCULAR; INTRAVENOUS
Status: DISPENSED | OUTPATIENT
Start: 2020-05-25 | End: 2020-05-25

## 2020-05-25 RX ORDER — DEXTROMETHORPHAN/PSEUDOEPHED 2.5-7.5/.8
1.2 DROPS ORAL
Status: DISCONTINUED | OUTPATIENT
Start: 2020-05-25 | End: 2020-05-29 | Stop reason: HOSPADM

## 2020-05-25 RX ORDER — SODIUM CHLORIDE 0.9 % (FLUSH) 0.9 %
5-40 SYRINGE (ML) INJECTION AS NEEDED
Status: ACTIVE | OUTPATIENT
Start: 2020-05-25 | End: 2020-05-25

## 2020-05-25 RX ORDER — SODIUM CHLORIDE 0.9 % (FLUSH) 0.9 %
5-40 SYRINGE (ML) INJECTION AS NEEDED
Status: DISCONTINUED | OUTPATIENT
Start: 2020-05-25 | End: 2020-05-29 | Stop reason: HOSPADM

## 2020-05-25 RX ORDER — SODIUM CHLORIDE 9 MG/ML
50 INJECTION, SOLUTION INTRAVENOUS CONTINUOUS
Status: DISPENSED | OUTPATIENT
Start: 2020-05-25 | End: 2020-05-25

## 2020-05-25 RX ORDER — MIDAZOLAM HYDROCHLORIDE 1 MG/ML
.25-5 INJECTION, SOLUTION INTRAMUSCULAR; INTRAVENOUS
Status: DISCONTINUED | OUTPATIENT
Start: 2020-05-25 | End: 2020-05-25 | Stop reason: SDUPTHER

## 2020-05-25 RX ORDER — ATROPINE SULFATE 0.1 MG/ML
0.5 INJECTION INTRAVENOUS
Status: DISCONTINUED | OUTPATIENT
Start: 2020-05-25 | End: 2020-05-25 | Stop reason: SDUPTHER

## 2020-05-25 RX ORDER — EPINEPHRINE 0.1 MG/ML
1 INJECTION INTRACARDIAC; INTRAVENOUS
Status: DISCONTINUED | OUTPATIENT
Start: 2020-05-25 | End: 2020-05-25 | Stop reason: SDUPTHER

## 2020-05-25 RX ORDER — FLUMAZENIL 0.1 MG/ML
0.2 INJECTION INTRAVENOUS
Status: DISCONTINUED | OUTPATIENT
Start: 2020-05-25 | End: 2020-05-25 | Stop reason: SDUPTHER

## 2020-05-25 RX ORDER — LORAZEPAM 2 MG/ML
1 INJECTION INTRAMUSCULAR ONCE
Status: COMPLETED | OUTPATIENT
Start: 2020-05-25 | End: 2020-05-25

## 2020-05-25 RX ORDER — NALOXONE HYDROCHLORIDE 0.4 MG/ML
0.4 INJECTION, SOLUTION INTRAMUSCULAR; INTRAVENOUS; SUBCUTANEOUS
Status: DISCONTINUED | OUTPATIENT
Start: 2020-05-25 | End: 2020-05-25 | Stop reason: SDUPTHER

## 2020-05-25 RX ORDER — FENTANYL CITRATE 50 UG/ML
12.5-5 INJECTION, SOLUTION INTRAMUSCULAR; INTRAVENOUS
Status: DISPENSED | OUTPATIENT
Start: 2020-05-25 | End: 2020-05-25

## 2020-05-25 RX ORDER — SODIUM CHLORIDE 0.9 % (FLUSH) 0.9 %
10 SYRINGE (ML) INJECTION
Status: DISPENSED | OUTPATIENT
Start: 2020-05-25 | End: 2020-05-26

## 2020-05-25 RX ORDER — EPINEPHRINE 0.1 MG/ML
1 INJECTION INTRACARDIAC; INTRAVENOUS
Status: ACTIVE | OUTPATIENT
Start: 2020-05-25 | End: 2020-05-26

## 2020-05-25 RX ORDER — ATROPINE SULFATE 0.1 MG/ML
0.5 INJECTION INTRAVENOUS
Status: ACTIVE | OUTPATIENT
Start: 2020-05-25 | End: 2020-05-26

## 2020-05-25 RX ORDER — FLUMAZENIL 0.1 MG/ML
0.2 INJECTION INTRAVENOUS
Status: ACTIVE | OUTPATIENT
Start: 2020-05-25 | End: 2020-05-25

## 2020-05-25 RX ORDER — SODIUM CHLORIDE 0.9 % (FLUSH) 0.9 %
5-40 SYRINGE (ML) INJECTION EVERY 8 HOURS
Status: DISCONTINUED | OUTPATIENT
Start: 2020-05-25 | End: 2020-05-29 | Stop reason: HOSPADM

## 2020-05-25 RX ORDER — NALOXONE HYDROCHLORIDE 0.4 MG/ML
0.4 INJECTION, SOLUTION INTRAMUSCULAR; INTRAVENOUS; SUBCUTANEOUS
Status: ACTIVE | OUTPATIENT
Start: 2020-05-25 | End: 2020-05-25

## 2020-05-25 RX ORDER — FENTANYL CITRATE 50 UG/ML
12.5-5 INJECTION, SOLUTION INTRAMUSCULAR; INTRAVENOUS
Status: ACTIVE | OUTPATIENT
Start: 2020-05-25 | End: 2020-05-25

## 2020-05-25 RX ADMIN — Medication 80 MG: at 18:05

## 2020-05-25 RX ADMIN — SODIUM CHLORIDE 100 ML: 900 INJECTION, SOLUTION INTRAVENOUS at 22:18

## 2020-05-25 RX ADMIN — Medication 10 ML: at 23:08

## 2020-05-25 RX ADMIN — AZELASTINE HYDROCHLORIDE 2 SPRAY: 137 SPRAY, METERED NASAL at 23:08

## 2020-05-25 RX ADMIN — MIDAZOLAM HYDROCHLORIDE 0.5 MG: 1 INJECTION, SOLUTION INTRAMUSCULAR; INTRAVENOUS at 17:52

## 2020-05-25 RX ADMIN — IPRATROPIUM BROMIDE AND ALBUTEROL SULFATE 3 ML: .5; 3 SOLUTION RESPIRATORY (INHALATION) at 23:05

## 2020-05-25 RX ADMIN — FLUTICASONE PROPIONATE 2 SPRAY: 50 SPRAY, METERED NASAL at 08:20

## 2020-05-25 RX ADMIN — IPRATROPIUM BROMIDE AND ALBUTEROL SULFATE 3 ML: .5; 3 SOLUTION RESPIRATORY (INHALATION) at 08:45

## 2020-05-25 RX ADMIN — IPRATROPIUM BROMIDE AND ALBUTEROL SULFATE 3 ML: .5; 3 SOLUTION RESPIRATORY (INHALATION) at 15:25

## 2020-05-25 RX ADMIN — METRONIDAZOLE 500 MG: 500 INJECTION, SOLUTION INTRAVENOUS at 20:24

## 2020-05-25 RX ADMIN — MIDAZOLAM HYDROCHLORIDE 0.5 MG: 1 INJECTION, SOLUTION INTRAMUSCULAR; INTRAVENOUS at 17:36

## 2020-05-25 RX ADMIN — FENTANYL CITRATE 50 MCG: 50 INJECTION, SOLUTION INTRAMUSCULAR; INTRAVENOUS at 17:28

## 2020-05-25 RX ADMIN — LEVOFLOXACIN 500 MG: 5 INJECTION, SOLUTION INTRAVENOUS at 20:24

## 2020-05-25 RX ADMIN — ARFORMOTEROL TARTRATE: 15 SOLUTION RESPIRATORY (INHALATION) at 19:30

## 2020-05-25 RX ADMIN — Medication 10 ML: at 07:07

## 2020-05-25 RX ADMIN — LORAZEPAM 1 MG: 2 INJECTION INTRAMUSCULAR; INTRAVENOUS at 21:02

## 2020-05-25 RX ADMIN — Medication 10 ML: at 14:26

## 2020-05-25 RX ADMIN — FLUTICASONE FUROATE AND VILANTEROL 1 PUFF: 100; 25 POWDER RESPIRATORY (INHALATION) at 09:00

## 2020-05-25 RX ADMIN — AMLODIPINE BESYLATE 10 MG: 5 TABLET ORAL at 08:22

## 2020-05-25 RX ADMIN — LORATADINE 10 MG: 10 TABLET ORAL at 09:32

## 2020-05-25 RX ADMIN — OXYCODONE HYDROCHLORIDE AND ACETAMINOPHEN 1 TABLET: 5; 325 TABLET ORAL at 11:22

## 2020-05-25 RX ADMIN — FENTANYL CITRATE 25 MCG: 50 INJECTION, SOLUTION INTRAMUSCULAR; INTRAVENOUS at 17:34

## 2020-05-25 RX ADMIN — IOPAMIDOL 100 ML: 755 INJECTION, SOLUTION INTRAVENOUS at 22:18

## 2020-05-25 RX ADMIN — ENOXAPARIN SODIUM 40 MG: 40 INJECTION SUBCUTANEOUS at 07:06

## 2020-05-25 RX ADMIN — MIDAZOLAM HYDROCHLORIDE 1 MG: 1 INJECTION, SOLUTION INTRAMUSCULAR; INTRAVENOUS at 17:28

## 2020-05-25 RX ADMIN — MIDAZOLAM HYDROCHLORIDE 1 MG: 1 INJECTION, SOLUTION INTRAMUSCULAR; INTRAVENOUS at 17:32

## 2020-05-25 RX ADMIN — ATORVASTATIN CALCIUM 10 MG: 10 TABLET, FILM COATED ORAL at 09:33

## 2020-05-25 RX ADMIN — PREDNISONE 40 MG: 20 TABLET ORAL at 09:32

## 2020-05-25 RX ADMIN — MIDAZOLAM HYDROCHLORIDE 1 MG: 1 INJECTION, SOLUTION INTRAMUSCULAR; INTRAVENOUS at 17:30

## 2020-05-25 RX ADMIN — MIDAZOLAM HYDROCHLORIDE 0.5 MG: 1 INJECTION, SOLUTION INTRAMUSCULAR; INTRAVENOUS at 17:41

## 2020-05-25 NOTE — PROGRESS NOTES
Bedside and Verbal shift change report given to Anamika (oncoming nurse) by Marilin Mejía (offgoing nurse).  Report included the following information SBAR, Kardex, ED Summary, MAR, Recent Results and Cardiac Rhythm ST.

## 2020-05-25 NOTE — PROGRESS NOTES
TRANSFER - OUT REPORT:    Verbal report given to Chris(name) on Nghia Still  being transferred to [facility](unit) for routine progression of care       Report consisted of patients Situation, Background, Assessment and   Recommendations(SBAR). Information from the following report(s) SBAR, Kardex and MAR was reviewed with the receiving nurse. Lines:   Peripheral IV 05/22/20 Anterior; Left Forearm (Active)   Site Assessment Clean, dry, & intact 5/25/2020  8:00 AM   Phlebitis Assessment 0 5/25/2020  8:00 AM   Infiltration Assessment 0 5/25/2020  8:00 AM   Dressing Status Clean, dry, & intact 5/25/2020  8:00 AM   Dressing Type Transparent 5/25/2020  8:00 AM   Hub Color/Line Status Pink;Capped 5/25/2020  8:00 AM   Action Taken Open ports on tubing capped 5/25/2020  8:00 AM   Alcohol Cap Used Yes 5/25/2020  8:00 AM        Opportunity for questions and clarification was provided.       Patient transported with:

## 2020-05-25 NOTE — PROGRESS NOTES
Hospitalist Progress Note  David Bautista MD  Answering service: [phone] from in house phone      Date of Service:  2020  NAME:  Roberto Chiu  :  1955  MRN:  [id number]      Admission Summary:   Roberto Chiu is a 60 y.o. female past medical history significant for COPD, hypertension, GERD presented emergency room at [facility] complaining of no urine output for almost 24 hours. Also she has been complaining of abdominal pain and diarrhea. She states that she is suffering of constipation and has been having worsening constipation for the last several month days ago she took prune juice and after 1 she developed significant diarrhea associated with crampy abdominal pain    Interval history / Subjective:      Patient seen and examined this morning. \" I didn't sleep last night\". My feet are swollen. Had bowel prep and NPO last night for Colonoscopy this morning. 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 - GI and Colorectal surgery input appt   - plan for Colonoscopy and biopsy of the mass-- plan this am ,   - s/p NPO tonight and bowel prep  - CEA level at 1.5  - pain control with IV pain meds( explained to patient can cause constipation) with add bowel regimen      # Hyponatremia-- improving   - likely triggered by diarrhea. Received one liter of IV fluid in outside hospital.  - plasma osmolality, urine osmolality, urine sodium  - off IVF    # Mild renal insufficiency- improved   - improved with IVF, d/c due to leg swelling      # Hx of COPD  - respiratory status at baseline. Does not appear to be on acute exacerbation at this time and she attributed he short episode of SOB in OSH to anxiety.  - On chronic steroids, taper to home dose steroid.    - Nebs PRN       # Hypertension- on Losartan/HCTZ at home  - hold hctz and losartan now due to hyponatremia/renal insuff  - ct with amlodipine   - re-start once stable      # Leukocytosis-- improved   - could be reactive vs underlying possible malignancy vs infectious but pt currently afebrile and asymptomatic.  - Will hold antibiotic therapy for now.       # Hyperglycemia: No history of diabetes  - A1c: 5.6     # Transaminitis: Mild  - Abnormal appearance on CT scan      DVT prophylaxis: SCDs. -ODE STATUS: Full code     Surrogate decision maker: Son.     FUNCTIONAL STATUS PRIOR TO HOSPITALIZATION Ambulates Independently      Hospital Problems  Date Reviewed: 12/4/2018          Codes Class Noted POA    COPD exacerbation ([facility] [address] ICD-10-CM: J44.1  ICD-9-CM: 491.21  5/23/2020 Unknown        Cecum mass ICD-10-CM: H72.93  ICD-9-CM: 569.89  5/23/2020 Unknown                Review of Systems:   Pertinent positive mentioned in interval hx/HPI. Other systems reviewed and negative     Vital Signs:    Last 24hrs VS reviewed since prior progress note. Most recent are:  Visit Vitals  /83 (BP 1 Location: Right arm, BP Patient Position: At rest)   Pulse (!) 113   Temp 97.6 °F (36.4 °C)   Resp 18   Ht 5' 6\" (1.676 m)   Wt 85 kg (187 lb 6.3 oz)   SpO2 99%   BMI 30.25 kg/m²         Intake/Output Summary (Last 24 hours) at 5/25/2020 0749  Last data filed at 5/24/2020 2318  Gross per 24 hour   Intake --   Output 1000 ml   Net -1000 ml        Physical Examination:             Constitutional:  No acute distress, cooperative, pleasant    ENT:  Oral mucous moist, oropharynx benign. Neck supple,    Resp:  CTA bilaterally. No wheezing/rhonchi/rales. No accessory muscle use   CV:  Regular rhythm, normal rate, no murmurs, gallops, rubs    GI:  Soft, non distended, non tender. normoactive bowel sounds, no hepatosplenomegaly     Musculoskeletal:  No edema, warm, 2+ pulses throughout    Neurologic:  Moves all extremities.   AAOx3, CN II-XII reviewed            Data Review:   I personally reviewed labs and imaging     Labs:     Recent Labs     05/25/20  0439 05/24/20  0536   WBC 9.7 12.7*   HGB 11.0* 10.3*   HCT 33.4* 31.8*    242     Recent Labs     05/25/20  0439 05/24/20  0536 05/23/20  1243 05/23/20  0145   * 128* 126* 127*   K 3.8 4.4  --  4.3   CL 99 97  --  95*   CO2 25 25  --  26   BUN 7 12  --  13   CREA 0.75 0.75  --  1.11*   * 119*  --  169*   CA 9.6 9.0  --  9.3     Recent Labs     05/24/20  0536 05/23/20  0145   SGOT 22 32   ALT 65 86*   AP 71 84   TBILI 0.4 0.6   TP 7.2 7.4   ALB 3.5 3.9   GLOB 3.7 3.5     No results for input(s): INR, PTP, APTT, INREXT, INREXT in the last 72 hours. No results for input(s): FE, TIBC, PSAT, FERR in the last 72 hours. No results found for: FOL, RBCF   No results for input(s): PH, PCO2, PO2 in the last 72 hours. No results for input(s): CPK, CKNDX, TROIQ in the last 72 hours.     No lab exists for component: CPKMB  No results found for: CHOL, CHOLX, CHLST, CHOLV, HDL, HDLP, LDL, LDLC, DLDLP, TGLX, TRIGL, TRIGP, CHHD, CHHDX  Lab Results   Component Value Date/Time    Glucose (POC) 213 (H) 05/24/2020 04:13 PM    Glucose (POC) 108 (H) 05/24/2020 11:31 AM     Lab Results   Component Value Date/Time    Color YELLOW 10/15/2018 11:44 AM    Appearance CLEAR 10/15/2018 11:44 AM    Specific gravity 1.009 10/15/2018 11:44 AM    pH (UA) 6.5 10/15/2018 11:44 AM    Protein NEGATIVE  10/15/2018 11:44 AM    Glucose 250 (A) 10/15/2018 11:44 AM    Ketone NEGATIVE  10/15/2018 11:44 AM    Bilirubin NEGATIVE  10/15/2018 11:44 AM    Urobilinogen 0.2 10/15/2018 11:44 AM    Nitrites NEGATIVE  10/15/2018 11:44 AM    Leukocyte Esterase TRACE (A) 10/15/2018 11:44 AM    Epithelial cells FEW 10/15/2018 11:44 AM    Bacteria NEGATIVE  10/15/2018 11:44 AM    WBC NEGATIVE  10/15/2018 11:44 AM    RBC NEGATIVE  10/15/2018 11:44 AM         Medications Reviewed:     Current Facility-Administered Medications   Medication Dose Route Frequency    arformoterol 15 mcg/budesonide 0.5 mg neb solution   Nebulization BID RT    morphine injection 2 mg  2 mg IntraVENous Q4H PRN    sodium chloride (NS) flush 5-40 mL  5-40 mL IntraVENous Q8H    sodium chloride (NS) flush 5-40 mL  5-40 mL IntraVENous PRN    enoxaparin (LOVENOX) injection 40 mg  40 mg SubCUTAneous Q12H    acetaminophen (TYLENOL) tablet 650 mg  650 mg Oral Q6H PRN    Or    acetaminophen (TYLENOL) suppository 650 mg  650 mg Rectal Q6H PRN    amLODIPine (NORVASC) tablet 10 mg  10 mg Oral DAILY    hydrALAZINE (APRESOLINE) 20 mg/mL injection 10 mg  10 mg IntraVENous Q6H PRN    predniSONE (DELTASONE) tablet 40 mg  40 mg Oral DAILY WITH BREAKFAST    azelastine (ASTELIN) 137mcg/spray nasal spray  2 Spray Both Nostrils QHS    atorvastatin (LIPITOR) tablet 10 mg  10 mg Oral DAILY    fluticasone propionate (FLONASE) 50 mcg/actuation nasal spray 2 Spray  2 Spray Both Nostrils DAILY    loratadine (CLARITIN) tablet 10 mg  10 mg Oral DAILY    pantoprazole (PROTONIX) tablet 40 mg  40 mg Oral ACB&D    fluticasone-vilanterol (BREO ELLIPTA) 100mcg-25mcg/puff (Patient Supplied)  1 Puff Inhalation DAILY    oxyCODONE-acetaminophen (PERCOCET) 5-325 mg per tablet 1 Tab  1 Tab Oral Q4H PRN    ondansetron (ZOFRAN ODT) tablet 4 mg  4 mg Oral Q8H PRN    albuterol-ipratropium (DUO-NEB) 2.5 MG-0.5 MG/3 ML  3 mL Nebulization Q4H PRN     ______________________________________________________________________  EXPECTED LENGTH OF STAY: - - -  ACTUAL LENGTH OF STAY:          2                 Susan Roy MD   Patient has given Verbal permission to discuss medical care with   persons present in the room and and also with contact as listed on face sheet.

## 2020-05-25 NOTE — PROGRESS NOTES
Clinical Pharmacy Note: Metronidazole Dosing    Please note that the metronidazole dose for Ojse Presume has been changed to 500 mg  q12h per [facility]-approved protocol. Please contact the pharmacy with any questions.     Earline Quispe, [facility]

## 2020-05-25 NOTE — PROGRESS NOTES
MONCHO:    RUR: 9%    Patient lives in [facility] with a friend. She is expecting to return there at discharge. Patient plans to use Medicaid transportation to get home. Care Management Interventions  PCP Verified by CM: Yes  Mode of Transport at Discharge: Other (see comment)(Patient will use Medicaid transportation to get home.)  Transition of Care Consult (CM Consult): Discharge Planning  MyChart Signup: No  Physical Therapy Consult: No  Occupational Therapy Consult: No  Speech Therapy Consult: No  Current Support Network: Other(Patient lives with her friend.)  Confirm Follow Up Transport: Family  Discharge Location  Discharge Placement: Home with family assistance     Reason for Admission:   Constipation                   RUR Score:   9%                  Plan for utilizing home health:  No Orders        PCP: First and Last name:  Andrzej Royal   Name of Practice:    Are you a current patient: Yes Yes/No:    Approximate date of last visit:                     Current Advanced Directive/Advance Care Plan: On File. Roblandy Brooksdaron is MPOA  Transition of Care Plan:      CM met with patient to introduce CM role, verify demographics and begin discharge planning. Patient lives in a house with her friend in [facility]. Patient gets her prescriptions filled at [facility].  Patient performs all ADLs independently at home. She owns a walker and a cane. Patient plans to use Medicaid transportation to go home at discharge.     Makeda Aguilar RN/CRM

## 2020-05-25 NOTE — PROCEDURES
Colonoscopy Procedure Note    Indications:   Left-sided abdominal pain with CT in outside hospital (+) for cecal mass  Referring Physician: Dianne William MD   Anesthesia/Sedation:Versed 4.5 mg and Fentanyl 75 mcg  Endoscopist:  Dr. Lizbeth Goodman  Assistant:  Endoscopy Technician-1: Adiel FERNANDEZ  Endoscopy RN-1: Ashu Ellis RN  Surgical Assistant: None  Implants: None    Preoperative diagnosis: Cecal Mass    Postoperative diagnosis: 1. - Ischemic Colitis  2. - Rule Out IBD  3. - Moderate Diverticulosis  4. Tender abdominal distension r/o epigastric mass - pancreatic or AAA      Procedure in Detail:  Informed consent was obtained for the procedure, including sedation. Risks of perforation, hemorrhage, adverse drug reaction, and aspiration were discussed. The patient was placed in the left lateral decubitus position. Based on the pre-procedure assessment, including review of the patient's medical history, medications, allergies, and review of systems, she had been deemed to be an appropriate candidate for moderate sedation; she was therefore sedated with the medications listed above. The patient was monitored continuously with ECG tracing, pulse oximetry, blood pressure monitoring, and direct observations. A rectal examination was performed. The [id number] was inserted into the rectum and advanced under direct vision to the terminal ileum. The quality of the colonic preparation was excellent. A careful inspection was made as the colonoscope was withdrawn, including a retroflexed view of the rectum; findings and interventions are described below. Findings:   Rectum: normal  Sigmoid: moderate diverticulosis;   Descending Colon:mild patchy erythema and mucous exudate - Bx r/o nearly resolved ischemia  Transverse Colon: 3 mm polyp removed with cold snare and clipped x 1 because of oozing; 2 mm polyp removed with cold snare  Ascending Colon: scattered diverticula; 3 mm polyp removed with cold snare and clipped x 1 for oozing  Cecum: markedly edematous superficially ulcerated mass like most likely resolving viable severe colitis. Briefly seen was what appeared to be copper wire (no op) - foreign body then lost in folds and reluctant to do more insufflation   Terminal Ileum: normal proximally x 7 cm    Specimens:     see above    EBL: None    Complications: None; patient tolerated the procedure well. Recommendations:     - Await pathology.      - If < 10 years, reason: above average risk patient     - CT scan tonight to assess epigastric findings and cecum      - GI Lite diet if CT negative     - Levaquin and Flagyl     Signed By: Jane Vizcaino MD                        May 25, 2020

## 2020-05-25 NOTE — ROUTINE PROCESS
Bedside and Verbal shift change report given to Stella (oncoming nurse) by Portillo Villegas (offgoing nurse).  Report included the following information SBAR, Kardex, ED Summary, Intake/Output, MAR, Recent Results and Cardiac Rhythm ST.

## 2020-05-26 ENCOUNTER — APPOINTMENT (OUTPATIENT)
Dept: CT IMAGING | Age: 65
DRG: 245 | End: 2020-05-26
Attending: SPECIALIST
Payer: MEDICAID

## 2020-05-26 LAB
GLUCOSE BLD STRIP.AUTO-MCNC: 145 MG/DL (ref 65–100)
GLUCOSE BLD STRIP.AUTO-MCNC: 164 MG/DL (ref 65–100)
GLUCOSE BLD STRIP.AUTO-MCNC: 82 MG/DL (ref 65–100)
GLUCOSE BLD STRIP.AUTO-MCNC: 90 MG/DL (ref 65–100)
SERVICE CMNT-IMP: ABNORMAL
SERVICE CMNT-IMP: ABNORMAL
SERVICE CMNT-IMP: NORMAL
SERVICE CMNT-IMP: NORMAL

## 2020-05-26 PROCEDURE — 74011250636 HC RX REV CODE- 250/636: Performed by: HOSPITALIST

## 2020-05-26 PROCEDURE — 74178 CT ABD&PLV WO CNTR FLWD CNTR: CPT

## 2020-05-26 PROCEDURE — 74011250636 HC RX REV CODE- 250/636: Performed by: INTERNAL MEDICINE

## 2020-05-26 PROCEDURE — 74011636637 HC RX REV CODE- 636/637: Performed by: HOSPITALIST

## 2020-05-26 PROCEDURE — 74011636320 HC RX REV CODE- 636/320: Performed by: INTERNAL MEDICINE

## 2020-05-26 PROCEDURE — 82962 GLUCOSE BLOOD TEST: CPT

## 2020-05-26 PROCEDURE — 94640 AIRWAY INHALATION TREATMENT: CPT

## 2020-05-26 PROCEDURE — 74011250637 HC RX REV CODE- 250/637: Performed by: NURSE PRACTITIONER

## 2020-05-26 PROCEDURE — 74011250636 HC RX REV CODE- 250/636: Performed by: SPECIALIST

## 2020-05-26 PROCEDURE — 74011000250 HC RX REV CODE- 250: Performed by: INTERNAL MEDICINE

## 2020-05-26 PROCEDURE — 74011000258 HC RX REV CODE- 258: Performed by: INTERNAL MEDICINE

## 2020-05-26 PROCEDURE — 65660000000 HC RM CCU STEPDOWN

## 2020-05-26 PROCEDURE — 74011250637 HC RX REV CODE- 250/637: Performed by: HOSPITALIST

## 2020-05-26 RX ORDER — IPRATROPIUM BROMIDE AND ALBUTEROL SULFATE 2.5; .5 MG/3ML; MG/3ML
3 SOLUTION RESPIRATORY (INHALATION)
Status: DISCONTINUED | OUTPATIENT
Start: 2020-05-26 | End: 2020-05-28

## 2020-05-26 RX ORDER — ENOXAPARIN SODIUM 100 MG/ML
40 INJECTION SUBCUTANEOUS EVERY 24 HOURS
Status: DISCONTINUED | OUTPATIENT
Start: 2020-05-27 | End: 2020-05-29 | Stop reason: HOSPADM

## 2020-05-26 RX ORDER — LORAZEPAM 2 MG/ML
0.5 INJECTION INTRAMUSCULAR ONCE
Status: COMPLETED | OUTPATIENT
Start: 2020-05-26 | End: 2020-05-26

## 2020-05-26 RX ORDER — SODIUM CHLORIDE 0.9 % (FLUSH) 0.9 %
10 SYRINGE (ML) INJECTION
Status: COMPLETED | OUTPATIENT
Start: 2020-05-26 | End: 2020-05-26

## 2020-05-26 RX ADMIN — ARFORMOTEROL TARTRATE: 15 SOLUTION RESPIRATORY (INHALATION) at 21:00

## 2020-05-26 RX ADMIN — IPRATROPIUM BROMIDE AND ALBUTEROL SULFATE 3 ML: .5; 3 SOLUTION RESPIRATORY (INHALATION) at 20:42

## 2020-05-26 RX ADMIN — AMLODIPINE BESYLATE 10 MG: 5 TABLET ORAL at 10:07

## 2020-05-26 RX ADMIN — LEVOFLOXACIN 500 MG: 5 INJECTION, SOLUTION INTRAVENOUS at 18:51

## 2020-05-26 RX ADMIN — Medication 10 ML: at 22:06

## 2020-05-26 RX ADMIN — ENOXAPARIN SODIUM 40 MG: 40 INJECTION SUBCUTANEOUS at 06:29

## 2020-05-26 RX ADMIN — Medication 10 ML: at 12:42

## 2020-05-26 RX ADMIN — PREDNISONE 40 MG: 20 TABLET ORAL at 10:07

## 2020-05-26 RX ADMIN — PANTOPRAZOLE SODIUM 40 MG: 40 TABLET, DELAYED RELEASE ORAL at 06:30

## 2020-05-26 RX ADMIN — METRONIDAZOLE 500 MG: 500 INJECTION, SOLUTION INTRAVENOUS at 18:46

## 2020-05-26 RX ADMIN — PANTOPRAZOLE SODIUM 40 MG: 40 TABLET, DELAYED RELEASE ORAL at 16:11

## 2020-05-26 RX ADMIN — AZELASTINE HYDROCHLORIDE 2 SPRAY: 137 SPRAY, METERED NASAL at 22:05

## 2020-05-26 RX ADMIN — HYDRALAZINE HYDROCHLORIDE 10 MG: 20 INJECTION INTRAMUSCULAR; INTRAVENOUS at 11:59

## 2020-05-26 RX ADMIN — METRONIDAZOLE 500 MG: 500 INJECTION, SOLUTION INTRAVENOUS at 06:30

## 2020-05-26 RX ADMIN — LORATADINE 10 MG: 10 TABLET ORAL at 10:08

## 2020-05-26 RX ADMIN — LORAZEPAM 0.5 MG: 2 INJECTION INTRAMUSCULAR; INTRAVENOUS at 12:41

## 2020-05-26 RX ADMIN — IOPAMIDOL 100 ML: 755 INJECTION, SOLUTION INTRAVENOUS at 13:44

## 2020-05-26 RX ADMIN — METHYLPREDNISOLONE SODIUM SUCCINATE 40 MG: 40 INJECTION, POWDER, FOR SOLUTION INTRAMUSCULAR; INTRAVENOUS at 20:24

## 2020-05-26 RX ADMIN — SODIUM CHLORIDE 100 ML: 900 INJECTION, SOLUTION INTRAVENOUS at 13:44

## 2020-05-26 RX ADMIN — Medication 10 ML: at 13:44

## 2020-05-26 RX ADMIN — OXYCODONE HYDROCHLORIDE AND ACETAMINOPHEN 1 TABLET: 5; 325 TABLET ORAL at 20:37

## 2020-05-26 RX ADMIN — IPRATROPIUM BROMIDE AND ALBUTEROL SULFATE 3 ML: .5; 3 SOLUTION RESPIRATORY (INHALATION) at 11:32

## 2020-05-26 RX ADMIN — FLUTICASONE PROPIONATE 2 SPRAY: 50 SPRAY, METERED NASAL at 10:08

## 2020-05-26 RX ADMIN — ATORVASTATIN CALCIUM 10 MG: 10 TABLET, FILM COATED ORAL at 10:08

## 2020-05-26 NOTE — PROGRESS NOTES
Problem: Falls - Risk of  Goal: *Absence of Falls  Description: Document Rodriguez Reason Fall Risk and appropriate interventions in the flowsheet. Outcome: Progressing Towards Goal  Note: Fall Risk Interventions:  Mobility Interventions: Communicate number of staff needed for ambulation/transfer, Patient to call before getting OOB, PT Consult for mobility concerns, PT Consult for assist device competence, Utilize walker, cane, or other assistive device         Medication Interventions: Evaluate medications/consider consulting pharmacy, Patient to call before getting OOB, Teach patient to arise slowly    Elimination Interventions: Patient to call for help with toileting needs, Call light in reach, Toileting schedule/hourly rounds              Problem: Chronic Obstructive Pulmonary Disease (COPD)  Goal: *Oxygen saturation during activity within specified parameters  Outcome: Progressing Towards Goal  Goal: *Able to remain out of bed as prescribed  Outcome: Progressing Towards Goal  Goal: *Absence of hypoxia  Outcome: Progressing Towards Goal     Problem: Risk for Spread of Infection  Goal: Prevent transmission of infectious organism to others  Description: Prevent the transmission of infectious organisms to other patients, staff members, and visitors.   Outcome: Progressing Towards Goal

## 2020-05-26 NOTE — PROGRESS NOTES
Multiple nurses and multiple IVs have been tried. A nurse from CCU placed a 20g in the right [facility], patient was sent down to CT, CT nurse called and stated it infiltrated. Then a 22g diffuser IV was placed in the right [facility], patient sent down to CT and the CT nurse called and said it was leaking around the site, sent patient back up to the unit. This nurse flushed 10cc into that IV and it did NOT leak. 2315: Another 22g diffuser IV was placed in the left [facility] by the CCU nurse. Will try again for the 3rd time to get the CT scan. 2350: Dr. El Lynn with GI was made aware of the above. He stated to give her a GI lite meal now then make NPO again, place a PICC tomorrow morning, then get the CT scan with contrast. Patient made aware.

## 2020-05-26 NOTE — PROGRESS NOTES
She denies any abdominal pain. Visit Vitals  /77 (BP 1 Location: Left arm, BP Patient Position: At rest)   Pulse (!) 125   Temp 97.3 °F (36.3 °C)   Resp 18   Ht 5' 6\" (1.676 m)   Wt 81.7 kg (180 lb 1.9 oz)   SpO2 98%   BMI 29.07 kg/m²     Date 05/25/20 0700 - 05/26/20 0659 05/26/20 0700 - 05/27/20 0659   Shift 0700-1859 1900-0659 24 Hour Total 4731-3639 0038-7227 24 Hour Total   INTAKE   P.O. 480  480        P. O. 480  480      I. V.(mL/kg/hr) 800(0.8)  800(0.4)        Volume (0.9% sodium chloride infusion) 800  800      Shift Total(mL/kg) 1280(15.1)  1280(15.7)      OUTPUT   Urine(mL/kg/hr) 300(0.3)  300(0.2)        Urine Voided 300  300        Urine Occurrence(s) 2 x  2 x      Stool           Stool Occurrence(s) 1 x  1 x      Shift Total(mL/kg) 300(3.5)  300(3.7)        980      Weight (kg) 85 81.7 81.7 81.7 81.7 81.7     abd soft    A/P ct scan reviewed  Foreign body in the cecum. Since she is better clinically, I think we can advance her diet  Would keep her on abx for the next week  If she tolerates a diet, then I think she can be discharged in the next couple of days with follow up with me in a week or two. There is a chance she could perforate the cecum, although I would have suspected this to have occurred by now if it were going to happen. Will continue to follow.

## 2020-05-26 NOTE — PROGRESS NOTES
Hospitalist Progress Note  Trisha Cockayne, MD  Answering service: [phone] from in house phone      Date of Service:  2020  NAME:  Dae Minor  :  1955  MRN:  [id number]      Admission Summary:   Dae Minor is a 60 y.o. female past medical history significant for COPD, hypertension, GERD presented emergency room at [facility] complaining of no urine output for almost 24 hours. Also she has been complaining of abdominal pain and diarrhea. She states that she is suffering of constipation and has been having worsening constipation for the last several month days ago she took prune juice and after 1 she developed significant diarrhea associated with crampy abdominal pain    Interval history / Subjective:      Patient seen and examined this morning. Patient has tolerated the procedure yesterday afternoon but start to have severe abdominal pain post colonoscopy     Patient wheezing and tachycardic. Abdomen distended but pain better than last night.  No fever, no N/V, no BM or passing flatus     Assessment & Plan:     # Abdominal pain/ Cecum mass likely ulcerative mass- severe colitis   - s/p Colonoscopy polyp removed from transverse colon and cecum showing markedly edematous superficially ulcerated mass like most likely resolving viable severe colitis  - CEA level at 1.5  - pain control with IV pain meds( explained to patient can cause constipation) will add bowel regimen   - will obtain CT abdomen   - started on Levaquin and Flagyl --present   - GI and Colorectal surgery input appt   - Keep NPO for now     # COPD exacerbation   Patient more wheeze today   - switched to IV steroids and scheduled Nebs   - supplemental oxygen as needed   - obtain echo and BNP( given bilateral lower leg swelling and wheezing and pt on a fluid r/o cardiac causes)      # Hyponatremia-- improving   - likely Subjective


Subjective Remarks


appears comfortable in nad





Objective


Medications





Current Medications








 Medications


  (Trade)  Dose


 Ordered  Sig/Ilia


 Route  Start Time


 Stop Time Status Last Admin


 


  (NS Flush)  2 ml  UNSCH  PRN


 IVF  5/24/18 10:00


    6/3/18 19:53


 


 


  (D50w (Vial) Inj)  50 ml  UNSCH  PRN


 IV PUSH  5/24/18 13:30


     


 


 


  (Glucagon Inj)  1 mg  UNSCH  PRN


 OTHER  5/24/18 13:30


     


 


 


  (NovoLOG


 SUPPLEMENTAL


 SCALE)  1  ACHS SLIDING  SCALE


 SQ  5/24/18 17:00


    6/5/18 12:00


 


 


  (Pravachol)  40 mg  DAILY


 PO  5/25/18 09:00


    6/5/18 08:13


 


 


  (Duoneb Neb)  1 ampule  Q4HR NEB  PRN


 NEB  5/24/18 13:45


    5/24/18 15:38


 


 


  (Tylenol)  650 mg  Q4H  PRN


 PO  5/24/18 14:30


    5/27/18 11:45


 


 


  (Norco  5-325 Mg)  1 tab  Q4H  PRN


 PO  5/24/18 14:30


    6/5/18 12:12


 


 


  (Reglan Inj)  5 mg  Q6H  PRN


 IV PUSH  5/25/18 02:30


    5/25/18 20:10


 


 


  (Zofran  Odt)  4 mg  Q6H  PRN


 PO  5/25/18 02:30


    5/28/18 11:50


 


 


  (Lopressor Inj)  5 mg  Q5M  PRN


 IV PUSH  5/25/18 02:45


    5/25/18 03:06


 


 


  (Tessalon)  100 mg  TID  PRN


 PO  5/26/18 03:00


    5/29/18 02:00


 


 


  (Robitussin Ac


 200-20 Mg/10 ml


 Liq)  10 ml  Q6H  PRN


 PO  5/26/18 03:00


    6/3/18 19:52


 


 


  (Coreg)  6.25 mg  Q12HR


 PO  5/26/18 11:30


    6/5/18 08:13


 


 


  (Entresto 49-51


 Mg)  1 tab  BID


 PO  5/26/18 21:00


    6/5/18 08:13


 


 


 Cefazolin Sodium/


 Dextrose  50 ml @ 


 100 mls/hr  Q8H


 IV  5/27/18 09:00


    6/5/18 08:14


 


 


  (Aspirin Chew)  81 mg  DAILY


 CHEW  5/28/18 12:10


    6/5/18 08:14


 


 


 Sodium Chloride  1,000 ml @ 


 100 mls/hr  Q10H


 IV  5/29/18 11:15


   Future Hold 5/30/18 10:17


 


 


  (Protonix)  40 mg  DAILY


 PO  5/30/18 09:00


    6/5/18 08:13


 


 


  (Levemir Inj)  15 units  Q12HR


 SQ  6/3/18 21:00


    6/5/18 08:12


 








Vital Signs / I&O





Vital Signs








  Date Time  Temp Pulse Resp B/P (MAP) Pulse Ox O2 Delivery O2 Flow Rate FiO2


 


6/5/18 14:00  71      


 


6/5/18 13:00  65      


 


6/5/18 12:47 98.2 70 18 120/66 (84) 97   


 


6/5/18 12:00  61      


 


6/5/18 11:00  68      


 


6/5/18 10:00  80      


 


6/5/18 09:00  73      


 


6/5/18 09:00     97   21


 


6/5/18 08:35     97 Room Air  


 


6/5/18 08:35 98.5 74 16 107/72 (84) 97   


 


6/5/18 08:00  70      


 


6/5/18 07:00  68      


 


6/5/18 06:00  80      


 


6/5/18 05:00  72      


 


6/5/18 04:00  68      


 


6/5/18 04:00 97.8 77 20 103/60 (74) 99   


 


6/5/18 03:00  74      


 


6/5/18 02:00  82      


 


6/5/18 01:00  78      


 


6/5/18 00:00  87      


 


6/5/18 00:00 97.8 77 20 121/73 (89) 97   


 


6/4/18 23:00  80      


 


6/4/18 22:00  82      


 


6/4/18 21:40        21


 


6/4/18 21:00  82      


 


6/4/18 20:00 98.0 83 20 101/57 (72) 99   


 


6/4/18 20:00  81      


 


6/4/18 20:00      Room Air  


 


6/4/18 18:00  68      


 


6/4/18 17:00  66      


 


6/4/18 16:55   20     


 


6/4/18 16:00  61      


 


6/4/18 16:00 97.8 65 18 128/73 (91) 98   














I/O      


 


 6/4/18 6/4/18 6/4/18 6/5/18 6/5/18 6/5/18





 07:00 15:00 23:00 07:00 15:00 23:00


 


Intake Total 470 ml  1170 ml 526 ml 50 ml 


 


Output Total 1300 ml  1550 ml 1325 ml  


 


Balance -830 ml  -380 ml -799 ml 50 ml 


 


      


 


Intake Oral 240 ml  820 ml 476 ml  


 


IV Total 230 ml  350 ml 50 ml 50 ml 


 


Output Urine Total 1300 ml  1550 ml 1325 ml  


 


# Bowel Movements 0  0   








Physical Exam


GENERAL: 


SKIN: Warm and dry.


HEAD: Normocephalic.


EYES: No scleral icterus. No injection or drainage. 


NECK: Supple, trachea midline. No JVD or lymphadenopathy.


CARDIOVASCULAR: Regular rate and rhythm without murmurs, gallops, or rubs. 


RESPIRATORY: Breath sounds equal bilaterally. No accessory muscle use.


GASTROINTESTINAL: Abdomen soft, non-tender, nondistended. 


MUSCULOSKELETAL: No cyanosis, or edema. 


BACK: Nontender without obvious deformity. No CVA tenderness.





Laboratory





Laboratory Tests








Test


  6/5/18


06:10


 


White Blood Count 8.8 TH/MM3 


 


Red Blood Count 4.64 MIL/MM3 


 


Hemoglobin 12.8 GM/DL 


 


Hematocrit 37.9 % 


 


Mean Corpuscular Volume 81.7 FL 


 


Mean Corpuscular Hemoglobin 27.6 PG 


 


Mean Corpuscular Hemoglobin


Concent 33.8 % 


 


 


Red Cell Distribution Width 17.1 % 


 


Platelet Count 308 TH/MM3 


 


Mean Platelet Volume 7.8 FL 


 


Activated Partial


Thromboplast Time 25.1 SEC 


 


 


Blood Urea Nitrogen 25 MG/DL 


 


Creatinine 0.84 MG/DL 


 


Random Glucose 215 MG/DL 


 


Calcium Level 9.0 MG/DL 


 


Magnesium Level 2.3 MG/DL 


 


Sodium Level 132 MEQ/L 


 


Potassium Level 4.7 MEQ/L 


 


Chloride Level 97 MEQ/L 


 


Carbon Dioxide Level 23.7 MEQ/L 


 


Anion Gap 11 MEQ/L 


 


Estimat Glomerular Filtration


Rate 94 ML/MIN 


 


 


B-Type Natriuretic Peptide 162 PG/ML 











Assessment and Plan


Problem List:  


(1) ARF (acute renal failure)


ICD Codes:  N17.9 - Acute kidney failure, unspecified


(2) NSTEMI (non-ST elevated myocardial infarction)


ICD Codes:  I21.4 - Non-ST elevation (NSTEMI) myocardial infarction


(3) CAD (coronary artery disease)


ICD Codes:  I25.10 - Atherosclerotic heart disease of native coronary artery 

without angina pectoris


(4) Decreased cardiac ejection fraction


ICD Codes:  R93.1 - Abnormal findings on diagnostic imaging of heart and 

coronary circulation


(5) Elevated troponin


ICD Codes:  R74.8 - Abnormal levels of other serum enzymes


(6) Severe sepsis


ICD Codes:  A41.9 - Sepsis, unspecified organism; R65.20 - Severe sepsis 

without septic shock


(7) Diabetes mellitus


ICD Codes:  E11.9 - Type 2 diabetes mellitus without complications


(8) Acute renal failure


ICD Codes:  N17.9 - Acute kidney failure, unspecified


(9) Cardiomyopathy


ICD Codes:  I42.9 - Cardiomyopathy, unspecified


(10) gangrene of the right second toe


(11) Dyspnea and respiratory abnormalities


ICD Codes:  R06.00 - Dyspnea, unspecified; R06.89 - Other abnormalities of 

breathing


(12) Multi-vessel coronary artery stenosis


ICD Codes:  I25.10 - Atherosclerotic heart disease of native coronary artery 

without angina pectoris


(13) Hx of CABG


ICD Codes:  Z95.1 - Presence of aortocoronary bypass graft


(14) Thrombocytopenia


ICD Codes:  D69.6 - Thrombocytopenia, unspecified


Assessment and Plan


1.) Ischemic cardiomyopathy - nstemi, chf excacerbation and arf due to severe 

unrevascularizable cad, cardiomyopathy, on aspirin and heparin per hematology, 

on antibiotics, pod # 5,  2nd toe amputation, stable; entresto added, ok to dc 

from cv standpoint, f/u with me in office next day d/w nurse and patient


2.) VT - replete electrolytes, on coreg; d/w case management further attempts 

to insure and obtain eligibility for referral to heart transplant center


3.) PVD - off heparin











Abdiel Purvis MD Jun 5, 2018 15:14 triggered by diarrhea. Received one liter of IV fluid in outside hospital.  - plasma osmolality, urine osmolality, urine sodium  - off IVF    # Mild renal insufficiency- improved   - improved with IVF, d/c due to leg swelling       # Hypertension- on Losartan/HCTZ at home  - hold hctz and losartan now due to hyponatremia/renal insuff  - ct with amlodipine   - re-start once stable      # Leukocytosis-- improved   - could be reactive vs underlying possible malignancy vs infectious but pt currently afebrile and asymptomatic.  - Will hold antibiotic therapy for now.     # Hyperglycemia: No history of diabetes  - A1c: 5.6     # Transaminitis: Mild  - Abnormal appearance on CT scan    # Mild chronic anemia   - stable     DVT prophylaxis: SCDs. -ODE STATUS: Full code     Surrogate decision maker: Son.     FUNCTIONAL STATUS PRIOR TO HOSPITALIZATION Ambulates Independently      Hospital Problems  Date Reviewed: 12/4/2018          Codes Class Noted POA    COPD exacerbation ([facility] [address] ICD-10-CM: J44.1  ICD-9-CM: 491.21  5/23/2020 Unknown        Cecum mass ICD-10-CM: L59.44  ICD-9-CM: 569.89  5/23/2020 Unknown                Review of Systems:   Pertinent positive mentioned in interval hx/HPI. Other systems reviewed and negative     Vital Signs:    Last 24hrs VS reviewed since prior progress note. Most recent are:  Visit Vitals  BP (!) 164/92 (BP 1 Location: Left arm, BP Patient Position: Sitting)   Pulse (!) 119   Temp 98.6 °F (37 °C)   Resp 20   Ht 5' 6\" (1.676 m)   Wt 81.7 kg (180 lb 1.9 oz)   SpO2 98%   BMI 29.07 kg/m²         Intake/Output Summary (Last 24 hours) at 5/26/2020 1138  Last data filed at 5/25/2020 1400  Gross per 24 hour   Intake 120 ml   Output 300 ml   Net -180 ml        Physical Examination:             Constitutional:  No acute distress, cooperative, pleasant    ENT:  Oral mucous moist, oropharynx benign. Neck supple,    Resp:  CTA bilaterally. No wheezing/rhonchi/rales.  No accessory muscle use   CV:  Regular rhythm, normal rate, no murmurs, gallops, rubs    GI:  distended, mild tenderness     Musculoskeletal:  No edema, warm, 2+ pulses throughout    Neurologic:  Moves all extremities. AAOx3, CN II-XII reviewed            Data Review:   I personally reviewed labs and imaging     Labs:     Recent Labs     05/25/20 0439 05/24/20 0536   WBC 9.7 12.7*   HGB 11.0* 10.3*   HCT 33.4* 31.8*    242     Recent Labs     05/25/20 0439 05/24/20  0536 05/23/20  1243   * 128* 126*   K 3.8 4.4  --    CL 99 97  --    CO2 25 25  --    BUN 7 12  --    CREA 0.75 0.75  --    * 119*  --    CA 9.6 9.0  --      Recent Labs     05/24/20 0536   SGOT 22   ALT 65   AP 71   TBILI 0.4   TP 7.2   ALB 3.5   GLOB 3.7     No results for input(s): INR, PTP, APTT, INREXT, INREXT in the last 72 hours. No results for input(s): FE, TIBC, PSAT, FERR in the last 72 hours. No results found for: FOL, RBCF   No results for input(s): PH, PCO2, PO2 in the last 72 hours. No results for input(s): CPK, CKNDX, TROIQ in the last 72 hours.     No lab exists for component: CPKMB  No results found for: CHOL, CHOLX, CHLST, CHOLV, HDL, HDLP, LDL, LDLC, DLDLP, TGLX, TRIGL, TRIGP, CHHD, CHHDX  Lab Results   Component Value Date/Time    Glucose (POC) 82 05/26/2020 11:16 AM    Glucose (POC) 90 05/26/2020 07:02 AM    Glucose (POC) 123 (H) 05/25/2020 10:37 PM    Glucose (POC) 213 (H) 05/24/2020 04:13 PM    Glucose (POC) 108 (H) 05/24/2020 11:31 AM     Lab Results   Component Value Date/Time    Color YELLOW 10/15/2018 11:44 AM    Appearance CLEAR 10/15/2018 11:44 AM    Specific gravity 1.009 10/15/2018 11:44 AM    pH (UA) 6.5 10/15/2018 11:44 AM    Protein NEGATIVE  10/15/2018 11:44 AM    Glucose 250 (A) 10/15/2018 11:44 AM    Ketone NEGATIVE  10/15/2018 11:44 AM    Bilirubin NEGATIVE  10/15/2018 11:44 AM    Urobilinogen 0.2 10/15/2018 11:44 AM    Nitrites NEGATIVE  10/15/2018 11:44 AM    Leukocyte Esterase TRACE (A) 10/15/2018 11:44 AM    Epithelial cells FEW 10/15/2018 11:44 AM    Bacteria NEGATIVE  10/15/2018 11:44 AM    WBC NEGATIVE  10/15/2018 11:44 AM    RBC NEGATIVE  10/15/2018 11:44 AM         Medications Reviewed:     Current Facility-Administered Medications   Medication Dose Route Frequency    albuterol-ipratropium (DUO-NEB) 2.5 MG-0.5 MG/3 ML  3 mL Nebulization Q4H RT    methylPREDNISolone (PF) (SOLU-MEDROL) injection 40 mg  40 mg IntraVENous Q12H    LORazepam (ATIVAN) injection 0.5 mg  0.5 mg IntraVENous ONCE    simethicone (MYLICON) 44JG/2.8GF oral drops 80 mg  1.2 mL Oral Multiple    atropine injection 0.5 mg  0.5 mg IntraVENous ONCE PRN    EPINEPHrine (ADRENALIN) 0.1 mg/mL syringe 1 mg  1 mg Endoscopically ONCE PRN    sodium chloride (NS) flush 5-40 mL  5-40 mL IntraVENous Q8H    sodium chloride (NS) flush 5-40 mL  5-40 mL IntraVENous PRN    metroNIDAZOLE (FLAGYL) IVPB premix 500 mg  500 mg IntraVENous Q12H    levoFLOXacin (LEVAQUIN) 500 mg in D5W IVPB  500 mg IntraVENous Q24H    arformoterol 15 mcg/budesonide 0.5 mg neb solution   Nebulization BID RT    morphine injection 2 mg  2 mg IntraVENous Q4H PRN    sodium chloride (NS) flush 5-40 mL  5-40 mL IntraVENous Q8H    sodium chloride (NS) flush 5-40 mL  5-40 mL IntraVENous PRN    enoxaparin (LOVENOX) injection 40 mg  40 mg SubCUTAneous Q12H    acetaminophen (TYLENOL) tablet 650 mg  650 mg Oral Q6H PRN    Or    acetaminophen (TYLENOL) suppository 650 mg  650 mg Rectal Q6H PRN    amLODIPine (NORVASC) tablet 10 mg  10 mg Oral DAILY    hydrALAZINE (APRESOLINE) 20 mg/mL injection 10 mg  10 mg IntraVENous Q6H PRN    azelastine (ASTELIN) 137mcg/spray nasal spray  2 Spray Both Nostrils QHS    atorvastatin (LIPITOR) tablet 10 mg  10 mg Oral DAILY    fluticasone propionate (FLONASE) 50 mcg/actuation nasal spray 2 Spray  2 Spray Both Nostrils DAILY    loratadine (CLARITIN) tablet 10 mg  10 mg Oral DAILY    pantoprazole (PROTONIX) tablet 40 mg  40 mg Oral ACB&D    fluticasone-vilanterol (BREO ELLIPTA) 100mcg-25mcg/puff (Patient Supplied)  1 Puff Inhalation DAILY    oxyCODONE-acetaminophen (PERCOCET) 5-325 mg per tablet 1 Tab  1 Tab Oral Q4H PRN    ondansetron (ZOFRAN ODT) tablet 4 mg  4 mg Oral Q8H PRN     ______________________________________________________________________  EXPECTED LENGTH OF STAY: 3d 0h  ACTUAL LENGTH OF STAY:          3                 Susan Roy MD   Patient has given Verbal permission to discuss medical care with   persons present in the room and and also with contact as listed on face sheet.

## 2020-05-26 NOTE — PROGRESS NOTES
[address]  [address] [facility] 140 [facility], [address]  [phone]                GI PROGRESS NOTE      NAME:   Nghia Still   :    1955   MRN:    [id number]     Assessment/Plan   1. Abdominal pain/ Cecal mass.  Abdominal pain- resolving  -Colonoscopy 2020 findings:.Rectum: normal Sigmoid: moderate diverticulosis; Descending Colon:mild patchy erythema and mucous exudate - Bx r/o nearly resolved ischemia Transverse Colon: 3 mm polyp removed with cold snare and clipped x 1 because of oozing; 2 mm polyp removed with cold snareAscending Colon: scattered diverticula; 3 mm polyp removed with cold snare and clipped x 1 for oozing Cecum: markedly edematous superficially ulcerated mass like most likely resolving viable severe colitis. Briefly seen was what appeared to be copper wire (no op) - foreign body then lost in folds and reluctant to do more insufflation Terminal Ileum: normal proximally x 7 cm - pathology pending  -CT scan ordered today to r/o pancreatic mass and/or AAA      2. Chronic obstructive pulmonary disease- management by hospitalist     3.  Hypertension- management by hospitalist    4. Mild leukocytosis and anemia- Hemoglobin 11.0 today, continue to monitor, WBC normal today  -Currently on Levaquin and Flagyl    Will discuss with Dr. Moe Sy     Patient Active Problem List   Diagnosis Code    Arthritis of right hip M16.11    COPD exacerbation ([facility] [address] J44.1    Cecum mass K63.89       Subjective:     Nghia Still is a 60 y.o.  female. Patient stated feeling better. Left abdominal pain improved. No nausea, no vomiting, no reflux/heartburn.  +flatus, no bowel movement    Review of Systems    Constitutional: negative fever, negative chills, negative weight loss  Eyes:   negative visual changes  ENT:   negative sore throat, tongue or lip swelling  Respiratory:  negative cough, negative dyspnea  Cards:  negative for chest pain, palpitations, lower extremity edema  GI: See HPI  :  negative for frequency, dysuria  Integument:  negative for rash and pruritus  Heme:  negative for easy bruising and gum/nose bleeding  Musculoskel: negative for myalgias,  back pain and muscle weakness  Neuro: negative for headaches, dizziness, vertigo  Psych:  negative for feelings of anxiety, depression           Objective:     VITALS:   Last 24hrs VS reviewed since prior hospitalist progress note. Most recent are:  Visit Vitals  /78 (BP 1 Location: Right arm, BP Patient Position: Sitting)   Pulse (!) 111   Temp 98.9 °F (37.2 °C)   Resp 20   Ht 5' 6\" (1.676 m)   Wt 81.7 kg (180 lb 1.9 oz)   SpO2 97%   BMI 29.07 kg/m²       Intake/Output Summary (Last 24 hours) at 5/26/2020 1052  Last data filed at 5/25/2020 1400  Gross per 24 hour   Intake 240 ml   Output 300 ml   Net -60 ml        PHYSICAL EXAM:  General   well developed, well nourished, appears stated age, in no acute distress  EENT  Normocephalic, Atraumatic, PERRLA, EOMI, sclera clear,   Respiratory   Clear To Auscultation bilaterally - no wheezes, rales, rhonchi, or crackles  Cardiology  Regular Rate and Rythmn  - no murmurs, rubs or gallops  Abdominal  Soft, mild tenderness with deep palpation to left lower quadrant, non-distended, positive bowel sounds, no hepatosplenomegaly, no palpable mass  Extremities  No clubbing, cyanosis, or edema. Pulses intact. Skin  Normal skin turgor. No rashes or skin ulcers noted  Neurological  No focal neurological deficits noted  Psychological  Oriented x 3. Normal affect.        Lab Data   Recent Results (from the past 12 hour(s))   GLUCOSE, POC    Collection Time: 05/26/20  7:02 AM   Result Value Ref Range    Glucose (POC) 90 65 - 100 mg/dL    Performed by Jo Burkett (CON)          Medications: Reviewed  Date/Time:  5/26/2020

## 2020-05-27 LAB
ANION GAP SERPL CALC-SCNC: 8 MMOL/L (ref 5–15)
BACTERIA SPEC CULT: NORMAL
BACTERIA SPEC CULT: NORMAL
BASOPHILS # BLD: 0 K/UL (ref 0–0.1)
BASOPHILS NFR BLD: 0 % (ref 0–1)
BNP SERPL-MCNC: 382 PG/ML
BUN SERPL-MCNC: 13 MG/DL (ref 6–20)
BUN/CREAT SERPL: 13 (ref 12–20)
CALCIUM SERPL-MCNC: 10 MG/DL (ref 8.5–10.1)
CHLORIDE SERPL-SCNC: 94 MMOL/L (ref 97–108)
CO2 SERPL-SCNC: 30 MMOL/L (ref 21–32)
CREAT SERPL-MCNC: 0.98 MG/DL (ref 0.55–1.02)
DIFFERENTIAL METHOD BLD: ABNORMAL
EOSINOPHIL # BLD: 0 K/UL (ref 0–0.4)
EOSINOPHIL NFR BLD: 0 % (ref 0–7)
ERYTHROCYTE [DISTWIDTH] IN BLOOD BY AUTOMATED COUNT: 13.3 % (ref 11.5–14.5)
GLUCOSE SERPL-MCNC: 123 MG/DL (ref 65–100)
HCT VFR BLD AUTO: 34.6 % (ref 35–47)
HGB BLD-MCNC: 11.2 G/DL (ref 11.5–16)
IMM GRANULOCYTES # BLD AUTO: 0.2 K/UL (ref 0–0.04)
IMM GRANULOCYTES NFR BLD AUTO: 2 % (ref 0–0.5)
LYMPHOCYTES # BLD: 0.5 K/UL (ref 0.8–3.5)
LYMPHOCYTES NFR BLD: 5 % (ref 12–49)
MCH RBC QN AUTO: 31 PG (ref 26–34)
MCHC RBC AUTO-ENTMCNC: 32.4 G/DL (ref 30–36.5)
MCV RBC AUTO: 95.8 FL (ref 80–99)
MONOCYTES # BLD: 0.5 K/UL (ref 0–1)
MONOCYTES NFR BLD: 5 % (ref 5–13)
NEUTS SEG # BLD: 9.6 K/UL (ref 1.8–8)
NEUTS SEG NFR BLD: 88 % (ref 32–75)
NRBC # BLD: 0 K/UL (ref 0–0.01)
NRBC BLD-RTO: 0 PER 100 WBC
PLATELET # BLD AUTO: 274 K/UL (ref 150–400)
PMV BLD AUTO: 9.3 FL (ref 8.9–12.9)
POTASSIUM SERPL-SCNC: 3.9 MMOL/L (ref 3.5–5.1)
RBC # BLD AUTO: 3.61 M/UL (ref 3.8–5.2)
RBC MORPH BLD: ABNORMAL
SERVICE CMNT-IMP: NORMAL
SODIUM SERPL-SCNC: 132 MMOL/L (ref 136–145)
WBC # BLD AUTO: 10.8 K/UL (ref 3.6–11)

## 2020-05-27 PROCEDURE — 65660000000 HC RM CCU STEPDOWN

## 2020-05-27 PROCEDURE — 74011250637 HC RX REV CODE- 250/637: Performed by: NURSE PRACTITIONER

## 2020-05-27 PROCEDURE — 74011250637 HC RX REV CODE- 250/637: Performed by: HOSPITALIST

## 2020-05-27 PROCEDURE — 85025 COMPLETE CBC W/AUTO DIFF WBC: CPT

## 2020-05-27 PROCEDURE — 74011250637 HC RX REV CODE- 250/637: Performed by: INTERNAL MEDICINE

## 2020-05-27 PROCEDURE — 74011000250 HC RX REV CODE- 250: Performed by: INTERNAL MEDICINE

## 2020-05-27 PROCEDURE — 80048 BASIC METABOLIC PNL TOTAL CA: CPT

## 2020-05-27 PROCEDURE — 94640 AIRWAY INHALATION TREATMENT: CPT

## 2020-05-27 PROCEDURE — 36415 COLL VENOUS BLD VENIPUNCTURE: CPT

## 2020-05-27 PROCEDURE — 83880 ASSAY OF NATRIURETIC PEPTIDE: CPT

## 2020-05-27 PROCEDURE — 74011250636 HC RX REV CODE- 250/636: Performed by: INTERNAL MEDICINE

## 2020-05-27 PROCEDURE — 74011250636 HC RX REV CODE- 250/636: Performed by: SPECIALIST

## 2020-05-27 RX ORDER — ADHESIVE BANDAGE
30 BANDAGE TOPICAL
Status: COMPLETED | OUTPATIENT
Start: 2020-05-27 | End: 2020-05-27

## 2020-05-27 RX ORDER — FACIAL-BODY WIPES
10 EACH TOPICAL DAILY PRN
Status: DISCONTINUED | OUTPATIENT
Start: 2020-05-27 | End: 2020-05-29 | Stop reason: HOSPADM

## 2020-05-27 RX ADMIN — Medication 10 ML: at 13:19

## 2020-05-27 RX ADMIN — IPRATROPIUM BROMIDE AND ALBUTEROL SULFATE 3 ML: .5; 3 SOLUTION RESPIRATORY (INHALATION) at 18:33

## 2020-05-27 RX ADMIN — MAGNESIUM HYDROXIDE 30 ML: 400 SUSPENSION ORAL at 13:00

## 2020-05-27 RX ADMIN — IPRATROPIUM BROMIDE AND ALBUTEROL SULFATE 3 ML: .5; 3 SOLUTION RESPIRATORY (INHALATION) at 00:20

## 2020-05-27 RX ADMIN — IPRATROPIUM BROMIDE AND ALBUTEROL SULFATE 3 ML: .5; 3 SOLUTION RESPIRATORY (INHALATION) at 07:56

## 2020-05-27 RX ADMIN — METHYLPREDNISOLONE SODIUM SUCCINATE 40 MG: 40 INJECTION, POWDER, FOR SOLUTION INTRAMUSCULAR; INTRAVENOUS at 10:41

## 2020-05-27 RX ADMIN — Medication 10 ML: at 21:18

## 2020-05-27 RX ADMIN — METRONIDAZOLE 500 MG: 500 INJECTION, SOLUTION INTRAVENOUS at 18:28

## 2020-05-27 RX ADMIN — ARFORMOTEROL TARTRATE: 15 SOLUTION RESPIRATORY (INHALATION) at 23:50

## 2020-05-27 RX ADMIN — FLUTICASONE PROPIONATE 2 SPRAY: 50 SPRAY, METERED NASAL at 10:42

## 2020-05-27 RX ADMIN — AZELASTINE HYDROCHLORIDE 2 SPRAY: 137 SPRAY, METERED NASAL at 21:19

## 2020-05-27 RX ADMIN — METRONIDAZOLE 500 MG: 500 INJECTION, SOLUTION INTRAVENOUS at 06:47

## 2020-05-27 RX ADMIN — METHYLPREDNISOLONE SODIUM SUCCINATE 40 MG: 40 INJECTION, POWDER, FOR SOLUTION INTRAMUSCULAR; INTRAVENOUS at 21:18

## 2020-05-27 RX ADMIN — HYDROCHLOROTHIAZIDE: 25 TABLET ORAL at 10:41

## 2020-05-27 RX ADMIN — ENOXAPARIN SODIUM 40 MG: 40 INJECTION SUBCUTANEOUS at 06:58

## 2020-05-27 RX ADMIN — Medication 10 ML: at 06:48

## 2020-05-27 RX ADMIN — AMLODIPINE BESYLATE 10 MG: 5 TABLET ORAL at 10:41

## 2020-05-27 RX ADMIN — LEVOFLOXACIN 500 MG: 5 INJECTION, SOLUTION INTRAVENOUS at 18:29

## 2020-05-27 RX ADMIN — IPRATROPIUM BROMIDE AND ALBUTEROL SULFATE 3 ML: .5; 3 SOLUTION RESPIRATORY (INHALATION) at 11:46

## 2020-05-27 RX ADMIN — LORATADINE 10 MG: 10 TABLET ORAL at 10:41

## 2020-05-27 RX ADMIN — PANTOPRAZOLE SODIUM 40 MG: 40 TABLET, DELAYED RELEASE ORAL at 06:47

## 2020-05-27 RX ADMIN — PANTOPRAZOLE SODIUM 40 MG: 40 TABLET, DELAYED RELEASE ORAL at 17:37

## 2020-05-27 RX ADMIN — ATORVASTATIN CALCIUM 10 MG: 10 TABLET, FILM COATED ORAL at 10:41

## 2020-05-27 NOTE — PROGRESS NOTES
Problem: Falls - Risk of  Goal: *Absence of Falls  Description: Document Suzette Lux Fall Risk and appropriate interventions in the flowsheet.   Outcome: Progressing Towards Goal  Note: Fall Risk Interventions:  Mobility Interventions: Patient to call before getting OOB, Utilize walker, cane, or other assistive device         Medication Interventions: Evaluate medications/consider consulting pharmacy, Patient to call before getting OOB, Teach patient to arise slowly    Elimination Interventions: Call light in reach, Patient to call for help with toileting needs

## 2020-05-27 NOTE — PROGRESS NOTES
Hospitalist Progress Note  Laurie Taylor MD  Answering service: [phone] from in house phone      Date of Service:  2020  NAME:  Юлия Crystal  :  1955  MRN:  [id number]      Admission Summary:   Юлия Crystal is a 60 y.o. female past medical history significant for COPD, hypertension, GERD presented emergency room at [facility] complaining of no urine output for almost 24 hours. Also she has been complaining of abdominal pain and diarrhea. She states that she is suffering of constipation and has been having worsening constipation for the last several month days ago she took prune juice and after 1 she developed significant diarrhea associated with crampy abdominal pain    Interval history / Subjective:      Patient seen and examined this morning. Patient feels better than yesterday. She is less wheezing and breathing better. \" I feel my belly distended but I had no BM yet\".  No abdominal pain, no N/V  Still have the leg swelling     Assessment & Plan:     # Abdominal pain/ Cecum mass likely ulcerative mass- severe colitis and foreign body impaction   - s/p Colonoscopy polyp removed from transverse colon and cecum showing markedly edematous superficially ulcerated mass like most likely resolving viable severe colitis + foreign body ( piece of metal)   - CT abdomen-- metallic foreign body at the cecum   - CEA level at 1.5  - pain control with IV pain meds( explained to patient can cause constipation) will add bowel regimen   - on Levaquin and Flagyl --present   - GI and Colorectal surgery input appt   - tolerating diet     # COPD exacerbation   - on IV steroids and scheduled Nebs   - supplemental oxygen as needed   - obtain echo and BNP( given bilateral lower leg swelling and wheezing and pt on a fluid r/o cardiac causes)      # Hyponatremia-- improving   - likely triggered by diarrhea. Received one liter of IV fluid in outside hospital.  - plasma osmolality, urine osmolality, urine sodium  - off IVF    # Mild renal insufficiency- improved   - improved with IVF, d/c due to leg swelling       # Hypertension- on Losartan/HCTZ at home  - hold hctz and losartan now due to hyponatremia/renal insuff  - ct with amlodipine   - re-start once stable      # Hyperglycemia: No history of diabetes  - A1c: 5.6     # Transaminitis: Mild  - Abnormal appearance on CT scan    # Mild chronic anemia   - stable     DVT prophylaxis: SCDs. Code: Full code     Surrogate decision maker: Son.     FUNCTIONAL STATUS PRIOR TO HOSPITALIZATION Ambulates Independently      Hospital Problems  Date Reviewed: 12/4/2018          Codes Class Noted POA    [facility] ICD-10-CM: J44.1  ICD-9-CM: 491.21  5/23/2020 Unknown        Cecum mass ICD-10-CM: R23.97  ICD-9-CM: 569.89  5/23/2020 Unknown                Review of Systems:   Pertinent positive mentioned in interval hx/HPI. Other systems reviewed and negative     Vital Signs:    Last 24hrs VS reviewed since prior progress note. Most recent are:  Visit Vitals  /88 (BP 1 Location: Left arm, BP Patient Position: Sitting)   Pulse (!) 118   Temp 98.3 °F (36.8 °C)   Resp 20   Ht 5' 6\" (1.676 m)   Wt 84.5 kg (186 lb 4.6 oz)   SpO2 98%   BMI 30.07 kg/m²         Intake/Output Summary (Last 24 hours) at 5/27/2020 1851  Last data filed at 5/26/2020 2024  Gross per 24 hour   Intake 500 ml   Output --   Net 500 ml        Physical Examination:             Constitutional:  No acute distress, cooperative, pleasant    ENT:  Oral mucous moist, oropharynx benign. Neck supple,    Resp:  decreased breath entry on the right    CV:  Regular rhythm, normal rate, no murmurs, gallops, rubs    GI:  distended, non tenderness     Musculoskeletal:  No edema, warm, 2+ pulses throughout    Neurologic:  Moves all extremities.   AAOx3, CN II-XII reviewed            Data Review:   I personally reviewed labs and imaging     Labs:     Recent Labs     05/25/20  0439   WBC 9.7   HGB 11.0*   HCT 33.4*        Recent Labs     05/25/20 0439   *   K 3.8   CL 99   CO2 25   BUN 7   CREA 0.75   *   CA 9.6     No results for input(s): ALT, AP, TBIL, TBILI, TP, ALB, GLOB, GGT, AML, LPSE in the last 72 hours. No lab exists for component: SGOT, GPT, AMYP, HLPSE  No results for input(s): INR, PTP, APTT, INREXT, INREXT in the last 72 hours. No results for input(s): FE, TIBC, PSAT, FERR in the last 72 hours. No results found for: FOL, RBCF   No results for input(s): PH, PCO2, PO2 in the last 72 hours. No results for input(s): CPK, CKNDX, TROIQ in the last 72 hours.     No lab exists for component: CPKMB  No results found for: CHOL, CHOLX, CHLST, CHOLV, HDL, HDLP, LDL, LDLC, DLDLP, TGLX, TRIGL, TRIGP, CHHD, CHHDX  Lab Results   Component Value Date/Time    Glucose (POC) 164 (H) 05/26/2020 09:38 PM    Glucose (POC) 145 (H) 05/26/2020 04:28 PM    Glucose (POC) 82 05/26/2020 11:16 AM    Glucose (POC) 90 05/26/2020 07:02 AM    Glucose (POC) 123 (H) 05/25/2020 10:37 PM     Lab Results   Component Value Date/Time    Color YELLOW 10/15/2018 11:44 AM    Appearance CLEAR 10/15/2018 11:44 AM    Specific gravity 1.009 10/15/2018 11:44 AM    pH (UA) 6.5 10/15/2018 11:44 AM    Protein NEGATIVE  10/15/2018 11:44 AM    Glucose 250 (A) 10/15/2018 11:44 AM    Ketone NEGATIVE  10/15/2018 11:44 AM    Bilirubin NEGATIVE  10/15/2018 11:44 AM    Urobilinogen 0.2 10/15/2018 11:44 AM    Nitrites NEGATIVE  10/15/2018 11:44 AM    Leukocyte Esterase TRACE (A) 10/15/2018 11:44 AM    Epithelial cells FEW 10/15/2018 11:44 AM    Bacteria NEGATIVE  10/15/2018 11:44 AM    WBC NEGATIVE  10/15/2018 11:44 AM    RBC NEGATIVE  10/15/2018 11:44 AM         Medications Reviewed:     Current Facility-Administered Medications   Medication Dose Route Frequency    albuterol-ipratropium (DUO-NEB) 2.5 MG-0.5 MG/3 ML  3 mL Nebulization Q4H RT    methylPREDNISolone (PF) (SOLU-MEDROL) injection 40 mg  40 mg IntraVENous Q12H    enoxaparin (LOVENOX) injection 40 mg  40 mg SubCUTAneous Q24H    lisinopril/hydroCHLOROthiazide(PRINZIDE/ZESTORETIC) 20/12.5 mg   Oral DAILY    simethicone (MYLICON) 78FV/1.5TP oral drops 80 mg  1.2 mL Oral Multiple    sodium chloride (NS) flush 5-40 mL  5-40 mL IntraVENous Q8H    sodium chloride (NS) flush 5-40 mL  5-40 mL IntraVENous PRN    metroNIDAZOLE (FLAGYL) IVPB premix 500 mg  500 mg IntraVENous Q12H    levoFLOXacin (LEVAQUIN) 500 mg in D5W IVPB  500 mg IntraVENous Q24H    arformoterol 15 mcg/budesonide 0.5 mg neb solution   Nebulization BID RT    morphine injection 2 mg  2 mg IntraVENous Q4H PRN    sodium chloride (NS) flush 5-40 mL  5-40 mL IntraVENous Q8H    sodium chloride (NS) flush 5-40 mL  5-40 mL IntraVENous PRN    acetaminophen (TYLENOL) tablet 650 mg  650 mg Oral Q6H PRN    Or    acetaminophen (TYLENOL) suppository 650 mg  650 mg Rectal Q6H PRN    amLODIPine (NORVASC) tablet 10 mg  10 mg Oral DAILY    hydrALAZINE (APRESOLINE) 20 mg/mL injection 10 mg  10 mg IntraVENous Q6H PRN    azelastine (ASTELIN) 137mcg/spray nasal spray  2 Spray Both Nostrils QHS    atorvastatin (LIPITOR) tablet 10 mg  10 mg Oral DAILY    fluticasone propionate (FLONASE) 50 mcg/actuation nasal spray 2 Spray  2 Spray Both Nostrils DAILY    loratadine (CLARITIN) tablet 10 mg  10 mg Oral DAILY    pantoprazole (PROTONIX) tablet 40 mg  40 mg Oral ACB&D    fluticasone-vilanterol (BREO ELLIPTA) 100mcg-25mcg/puff (Patient Supplied)  1 Puff Inhalation DAILY    oxyCODONE-acetaminophen (PERCOCET) 5-325 mg per tablet 1 Tab  1 Tab Oral Q4H PRN    ondansetron (ZOFRAN ODT) tablet 4 mg  4 mg Oral Q8H PRN     ______________________________________________________________________  EXPECTED LENGTH OF STAY: 3d 0h  ACTUAL LENGTH OF STAY:          4                 Susan Roy MD   Patient has given Verbal permission to discuss medical care with   persons present in the room and and also with contact as listed on face sheet.

## 2020-05-27 NOTE — PROGRESS NOTES
Physical Therapy Screening:    An [facility] screening referral was triggered for physical therapy based on results obtained during the nursing admission assessment. The patients chart was reviewed and the patient is appropriate for a skilled therapy evaluation if there is a decline in functional mobility from baseline. Please order a consult for physical therapy if you are in agreement and would like an evaluation to be completed. Thank you. Natalie Valero, PT    10/24/18  Prior Level of Function/Home Situation: Patient lives with  in 1 story home with 3STE with HR and repors she was using cane for mobility PTA. Patient has walker at home and was supposed to have SC and BSC ordered.    Home Situation  Home Environment: Private residence  # Steps to Enter: 3  One/Two Story Residence: One story  Living Alone: No  Support Systems: Family member(s)  Patient Expects to be Discharged to[de-identified] Private residence  Current DME Used/Available at Home: [employer]

## 2020-05-27 NOTE — PROGRESS NOTES
NUTRITION COMPLETE ASSESSMENT    RECOMMENDATIONS:   1. Regular diet; Notify kitchen of food intolerances: No beef, corn, onions, cabbage, sausage   2. Send Lactaid milk   3. Offer yogurt for snack     Interventions/Plan:   Food/Nutrient Delivery:  Modify diet/texture/consistency/nutrients(Avoid food intolerances)          Nutrition Education:     Coordination of Care:    Nutrition Counseling:        Assessment:   Reason for Assessment: MST score 2    Diet: Regular  Supplements: N/a; sending yogurt for snack  Nutritionally Significant Medications: [x] Reviewed & Includes: Lipitor, Solumedrol 40 mg; Protoni  Meal Intake:   Patient Vitals for the past 100 hrs:   % Diet Eaten   05/25/20 0900 10 %       Subjective: Pt visited in room  #, guess I lost a few pounds. No BM since colonoscopy. Started eating last night and had bad bloating. I ate a piece of meat (roast pork), sweet potato and broccoli then stopped when bloating started, but did eat my lemon parfait later. Had bloating w/breakfast too, but still ate ~90% (scrambled eggs and potatoes). Have some missing teeth upper and lower, but can chew ok. (pt with mask, RD could not see teeth). Appetite usually good. Have gas with milk, so don't drink. Love yogurt. Objective:  Past Medical History:   Diagnosis Date    Arthritis     Chronic obstructive pulmonary disease (HCC)     Chronic pain     GERD (gastroesophageal reflux disease)     Hypertension      Admit COPD, no urine output for almost 24 hrs., abdominal pain with diarrhea. Cecal mass noted; CEA 1.5. Colonoscopy with severe colitis, moderate diverticulosis, 2 polyps removed and visual foreign body (copper wire) also seen. GI surgery noted with plan to advance diet, continue ABX for next week and follow-up at discharge. No surgical intervention. Per GI ulcerated mass likely resolving viable severe colitis. BMI 30 Obesity. #, admit 180# with B-LE +1 edema.   Appears slight wt loss ~5#.  One remote encounter wt 174# 12/4/2018. No recent prior encounters 2019 or 2202 for further wt trends. Appears wt fairly stable, no concerns wt loss. Usual appetite good and is hungry, but c/o bloating last night with dinner and again with breakfast this am.  Hx GERD and lactose intolerance. Rx Protonix. PRN Mylicon and Zofran. No BM since colonoscopy. Food intolerances noted this morning and communicated to kitchen. Did not have any of these foods last night or this am however, did have pork and broccoli last night. Asking for a yogurt. No hx DM, POC , 145, 82 and 90 yesterday with 40 mg Solumedrol. No plan for ONS now, but does like yogurt and would like to try Lactaid milk. Hx ergocalciferol PTA. No malnutrition concerns. Estimated Nutrition Needs:   Kcals/day: 7299 Kcals/day  Protein: 90 g(~1-1.2 gm/kg)  Fluid: 1800 ml(~1 mL/kcal)  Based On: [place] [address] [place]la(MSJ x 1.3)  Weight Used: Actual wt(84.5 kg)    Pt expected to meet estimated nutrient needs:  [x]   Yes  If bloating resolves  Nutrition Diagnosis:   1. Altered GI function related to foreign body; analgesics as evidenced by CT scan, colonoscopy, GI; constipation with pain Rx; pt c/o bloating with meals    Goals:     Consume 75% or more all meals with no bloating by discharge     Monitoring & Evaluation:    -  weight, GI, protein, fluid intake     Previous Nutrition Goals Met:  N/A, initial assessment  Previous Recommendations:  N/A    Education & Discharge Needs:   [x] None Identified   [x] Participated in care plan, discharge planning, and/or interdisciplinary rounds        Cultural, Lutheran and ethnic food preferences identified:  None    Skin Integrity: [x]Intact    Edema: [x] +1 B-LE  Last BM: 5/25 watery (colonoscopy prep); Hx constipation-diarrhea  Food Allergies: [x]NKA;  Food Intolerances: Milk (lactose); tolerates yogurt    Anthropometrics:    Weight Loss Metrics 5/27/2020 5/23/2020 12/4/2018 11/8/2018 10/24/2018 10/15/2018 9/12/2018   Today's Wt 186 lb 4.6 oz - 174 lb 174 lb 173 lb 186 lb 9.6 oz -   BMI - 30.07 kg/m2 28.08 kg/m2 28.08 kg/m2 27.92 kg/m2 30.12 kg/m2 30.02 kg/m2      Last 3 Recorded Weights in this Encounter    05/24/20 0641 05/26/20 0608 05/27/20 0605   Weight: 85 kg (187 lb 6.3 oz) 81.7 kg (180 lb 1.9 oz) 84.5 kg (186 lb 4.6 oz)      Weight Source: Standing scale (comment)  Height: 5' 6\" (167.6 cm),    Body mass index is 30.07 kg/m². IBW : 59 kg (130 lb),    Usual Body Weight: 83.9 kg (185 lb),      Labs:    Lab Results   Component Value Date/Time    Sodium 133 (L) 05/25/2020 04:39 AM    Potassium 3.8 05/25/2020 04:39 AM    Chloride 99 05/25/2020 04:39 AM    CO2 25 05/25/2020 04:39 AM    Glucose 106 (H) 05/25/2020 04:39 AM    BUN 7 05/25/2020 04:39 AM    Creatinine 0.75 05/25/2020 04:39 AM    Calcium 9.6 05/25/2020 04:39 AM    Albumin 3.5 05/24/2020 05:36 AM     Lab Results   Component Value Date/Time    Hemoglobin A1c 5.6 05/23/2020 01:45 AM     Lab Results   Component Value Date/Time    Glucose 106 (H) 05/25/2020 04:39 AM    Glucose (POC) 164 (H) 05/26/2020 09:38 PM      Lab Results   Component Value Date/Time    ALT (SGPT) 65 05/24/2020 05:36 AM    Alk.  phosphatase 71 05/24/2020 05:36 AM    Bilirubin, total 0.4 05/24/2020 05:36 AM        Susan Maddox RD

## 2020-05-27 NOTE — PROGRESS NOTES
Primary Nurse Cresencio Salgado and Wilfredo Aquino RN performed a dual skin assessment on this patient No impairment noted  Gilmar score is 20

## 2020-05-27 NOTE — PROGRESS NOTES
Problem: Falls - Risk of  Goal: *Absence of Falls  Description: Document Ysabel George Fall Risk and appropriate interventions in the flowsheet. Outcome: Progressing Towards Goal  Note: Fall Risk Interventions:  Mobility Interventions: Patient to call before getting OOB, Utilize walker, cane, or other assistive device    Medication Interventions: Patient to call before getting OOB, Teach patient to arise slowly    Elimination Interventions: Call light in reach, Toileting schedule/hourly rounds    Problem: Chronic Obstructive Pulmonary Disease (COPD)  Goal: *Absence of hypoxia  Outcome: Progressing Towards Goal   Pts O2 sats remained stable on room air. Pt has some shortness of breath with exertion. Pt educated on use of solu-medrol, pt verbalized understanding. Bedside shift change report given to Olvin Jackson RN (oncoming nurse) by Horace Coleman RN (offgoing nurse). Report included the following information SBAR, Kardex, ED Summary, Procedure Summary, Intake/Output, MAR, Accordion, Recent Results, Med Rec Status, Cardiac Rhythm Sinus Tach and Alarm Parameters .

## 2020-05-27 NOTE — PROGRESS NOTES
[address]  [address] [facility], [address]  [phone]                GI PROGRESS NOTE      NAME:   Brigido Lawrence   :    1955   MRN:    [id number]     Assessment/Plan   1. Abdominal pain/ Cecal mass.  Abdominal pain- resolving  -Colonoscopy 2020 findings:.Rectum: normal Sigmoid: moderate diverticulosis; Descending Colon:mild patchy erythema and mucous exudate - Bx r/o nearly resolved ischemia Transverse Colon: 3 mm polyp removed with cold snare and clipped x 1 because of oozing; 2 mm polyp removed with cold snareAscending Colon: scattered diverticula; 3 mm polyp removed with cold snare and clipped x 1 for oozing Cecum: markedly edematous superficially ulcerated mass like most likely resolving viable severe colitis. Briefly seen was what appeared to be copper wire (no op) - foreign body then lost in folds and reluctant to do more insufflation Terminal Ileum: normal proximally x 7 cm - pathology pending  -CT scan on 2020No CT evidence of ischemic colitis or other acute bowel abnormality with right and transverse colon endoscopy clips noted. Metallic foreign body density suspected in posterior wall of cecum with probable radiodense debris within the cecum near the appendiceal origin noted as well. Incidentals as above  including cholecystolithiasis with no acute findings otherwise  -Patient seen by Dr. Ghassan Barnes, recommendation given along with follow up         2. Chronic obstructive pulmonary disease- management by hospitalist      3.  Hypertension- management by hospitalist     4.  Mild leukocytosis and anemia- Hemoglobin 11.0 today, continue to monitor, WBC normal today  -Currently on Levaquin and Flagyl, may switch to PO per hospitalist    Will sign off on patient .  Patient to have follow up with Dr. Chrissy Negrete    Will discuss with Dr. Rupal Vilella     Patient Active Problem List   Diagnosis Code    Arthritis of right hip M16.11    COPD exacerbation ([address] J44.1  Cecum mass K63.89       Subjective:     Radha Bourgeois is a 60 y.o.  female Patient stated doing well and ready to go home. Denies nausea, no vomiting, no chest pain, no abdominal pain. Tolerating diet, + flatus, no bowel movement yet. Ambulating, voiding     Review of Systems    Constitutional: negative fever, negative chills, negative weight loss  Eyes:   negative visual changes  ENT:   negative sore throat, tongue or lip swelling  Respiratory:  negative cough, negative dyspnea  Cards:  negative for chest pain, palpitations, lower extremity edema  GI:   See HPI  :  negative for frequency, dysuria  Integument:  negative for rash and pruritus  Heme:  negative for easy bruising and gum/nose bleeding  Musculoskel: negative for myalgias,  back pain and muscle weakness  Neuro: negative for headaches, dizziness, vertigo  Psych:  negative for feelings of anxiety, depression           Objective:     VITALS:   Last 24hrs VS reviewed since prior hospitalist progress note. Most recent are:  Visit Vitals  /81 (BP 1 Location: Right arm, BP Patient Position: Sitting)   Pulse (!) 118   Temp 97.7 °F (36.5 °C)   Resp 20   Ht 5' 6\" (1.676 m)   Wt 84.5 kg (186 lb 4.6 oz)   SpO2 100%   BMI 30.07 kg/m²       Intake/Output Summary (Last 24 hours) at 5/27/2020 1201  Last data filed at 5/26/2020 2024  Gross per 24 hour   Intake 500 ml   Output --   Net 500 ml           PHYSICAL EXAM:  General   well developed, well nourished, appears stated age, in no acute distress  EENT  Normocephalic, Atraumatic, PERRLA, EOMI, sclera clear,   Respiratory   Clear To Auscultation bilaterally - no wheezes, rales, rhonchi, or crackles  Cardiology  Regular Rate and Rythmn  - no murmurs, rubs or gallops  Abdominal  Soft, non-tender, non-distended, positive bowel sounds  Extremities  No clubbing, cyanosis, or edema. Pulses intact. Skin  Normal skin turgor.   No rashes or skin ulcers noted  Neurological  No focal neurological deficits noted  Psychological  Oriented x 3. Normal affect.          Lab Data No results found for this or any previous visit (from the past 12 hour(s)). Medications: Reviewed  Date/Time:  5/27/2020  I have personally reviewed the history and independently examined the patient. I have reviewed the chart and agree with the documentation recorded by the Mid Level Provider, including the assessment, treatment plan, and disposition.     Osmar Fulton MD

## 2020-05-28 ENCOUNTER — APPOINTMENT (OUTPATIENT)
Dept: NON INVASIVE DIAGNOSTICS | Age: 65
DRG: 245 | End: 2020-05-28
Attending: INTERNAL MEDICINE
Payer: MEDICAID

## 2020-05-28 LAB
ANION GAP SERPL CALC-SCNC: 9 MMOL/L (ref 5–15)
BASOPHILS # BLD: 0 K/UL (ref 0–0.1)
BASOPHILS NFR BLD: 0 % (ref 0–1)
BUN SERPL-MCNC: 12 MG/DL (ref 6–20)
BUN/CREAT SERPL: 13 (ref 12–20)
CALCIUM SERPL-MCNC: 10.1 MG/DL (ref 8.5–10.1)
CHLORIDE SERPL-SCNC: 97 MMOL/L (ref 97–108)
CO2 SERPL-SCNC: 29 MMOL/L (ref 21–32)
CREAT SERPL-MCNC: 0.93 MG/DL (ref 0.55–1.02)
DIFFERENTIAL METHOD BLD: ABNORMAL
EOSINOPHIL # BLD: 0 K/UL (ref 0–0.4)
EOSINOPHIL NFR BLD: 0 % (ref 0–7)
ERYTHROCYTE [DISTWIDTH] IN BLOOD BY AUTOMATED COUNT: 13.3 % (ref 11.5–14.5)
GLUCOSE SERPL-MCNC: 150 MG/DL (ref 65–100)
HCT VFR BLD AUTO: 33.2 % (ref 35–47)
HGB BLD-MCNC: 10.8 G/DL (ref 11.5–16)
IMM GRANULOCYTES # BLD AUTO: 0.2 K/UL (ref 0–0.04)
IMM GRANULOCYTES NFR BLD AUTO: 2 % (ref 0–0.5)
LYMPHOCYTES # BLD: 0.9 K/UL (ref 0.8–3.5)
LYMPHOCYTES NFR BLD: 7 % (ref 12–49)
MCH RBC QN AUTO: 31 PG (ref 26–34)
MCHC RBC AUTO-ENTMCNC: 32.5 G/DL (ref 30–36.5)
MCV RBC AUTO: 95.4 FL (ref 80–99)
MONOCYTES # BLD: 0.7 K/UL (ref 0–1)
MONOCYTES NFR BLD: 6 % (ref 5–13)
NEUTS SEG # BLD: 10.4 K/UL (ref 1.8–8)
NEUTS SEG NFR BLD: 85 % (ref 32–75)
NRBC # BLD: 0 K/UL (ref 0–0.01)
NRBC BLD-RTO: 0 PER 100 WBC
PLATELET # BLD AUTO: 296 K/UL (ref 150–400)
PMV BLD AUTO: 9.4 FL (ref 8.9–12.9)
POTASSIUM SERPL-SCNC: 3.4 MMOL/L (ref 3.5–5.1)
RBC # BLD AUTO: 3.48 M/UL (ref 3.8–5.2)
RBC MORPH BLD: ABNORMAL
SODIUM SERPL-SCNC: 135 MMOL/L (ref 136–145)
WBC # BLD AUTO: 12.2 K/UL (ref 3.6–11)

## 2020-05-28 PROCEDURE — 93306 TTE W/DOPPLER COMPLETE: CPT

## 2020-05-28 PROCEDURE — 65660000000 HC RM CCU STEPDOWN

## 2020-05-28 PROCEDURE — 77010033678 HC OXYGEN DAILY

## 2020-05-28 PROCEDURE — 97116 GAIT TRAINING THERAPY: CPT | Performed by: PHYSICAL THERAPIST

## 2020-05-28 PROCEDURE — 94664 DEMO&/EVAL PT USE INHALER: CPT

## 2020-05-28 PROCEDURE — 74011250636 HC RX REV CODE- 250/636: Performed by: INTERNAL MEDICINE

## 2020-05-28 PROCEDURE — 74011250637 HC RX REV CODE- 250/637: Performed by: HOSPITALIST

## 2020-05-28 PROCEDURE — 74011000250 HC RX REV CODE- 250: Performed by: INTERNAL MEDICINE

## 2020-05-28 PROCEDURE — 80048 BASIC METABOLIC PNL TOTAL CA: CPT

## 2020-05-28 PROCEDURE — 85025 COMPLETE CBC W/AUTO DIFF WBC: CPT

## 2020-05-28 PROCEDURE — 74011250637 HC RX REV CODE- 250/637: Performed by: INTERNAL MEDICINE

## 2020-05-28 PROCEDURE — 97161 PT EVAL LOW COMPLEX 20 MIN: CPT | Performed by: PHYSICAL THERAPIST

## 2020-05-28 PROCEDURE — 36415 COLL VENOUS BLD VENIPUNCTURE: CPT

## 2020-05-28 PROCEDURE — 94640 AIRWAY INHALATION TREATMENT: CPT

## 2020-05-28 PROCEDURE — 74011250636 HC RX REV CODE- 250/636: Performed by: SPECIALIST

## 2020-05-28 PROCEDURE — 74011250637 HC RX REV CODE- 250/637: Performed by: NURSE PRACTITIONER

## 2020-05-28 RX ORDER — IPRATROPIUM BROMIDE AND ALBUTEROL SULFATE 2.5; .5 MG/3ML; MG/3ML
3 SOLUTION RESPIRATORY (INHALATION)
Status: DISCONTINUED | OUTPATIENT
Start: 2020-05-29 | End: 2020-05-29 | Stop reason: HOSPADM

## 2020-05-28 RX ORDER — LISINOPRIL 20 MG/1
20 TABLET ORAL DAILY
Status: DISCONTINUED | OUTPATIENT
Start: 2020-05-29 | End: 2020-05-29 | Stop reason: HOSPADM

## 2020-05-28 RX ADMIN — PANTOPRAZOLE SODIUM 40 MG: 40 TABLET, DELAYED RELEASE ORAL at 17:09

## 2020-05-28 RX ADMIN — PANTOPRAZOLE SODIUM 40 MG: 40 TABLET, DELAYED RELEASE ORAL at 06:48

## 2020-05-28 RX ADMIN — ARFORMOTEROL TARTRATE: 15 SOLUTION RESPIRATORY (INHALATION) at 22:45

## 2020-05-28 RX ADMIN — AMLODIPINE BESYLATE 10 MG: 5 TABLET ORAL at 09:04

## 2020-05-28 RX ADMIN — ONDANSETRON 4 MG: 4 TABLET, ORALLY DISINTEGRATING ORAL at 17:12

## 2020-05-28 RX ADMIN — ENOXAPARIN SODIUM 40 MG: 40 INJECTION SUBCUTANEOUS at 06:48

## 2020-05-28 RX ADMIN — IPRATROPIUM BROMIDE AND ALBUTEROL SULFATE 3 ML: .5; 3 SOLUTION RESPIRATORY (INHALATION) at 00:00

## 2020-05-28 RX ADMIN — ATORVASTATIN CALCIUM 10 MG: 10 TABLET, FILM COATED ORAL at 09:04

## 2020-05-28 RX ADMIN — Medication 10 ML: at 06:48

## 2020-05-28 RX ADMIN — HYDROCHLOROTHIAZIDE: 25 TABLET ORAL at 09:04

## 2020-05-28 RX ADMIN — IPRATROPIUM BROMIDE AND ALBUTEROL SULFATE 3 ML: .5; 3 SOLUTION RESPIRATORY (INHALATION) at 09:01

## 2020-05-28 RX ADMIN — IPRATROPIUM BROMIDE AND ALBUTEROL SULFATE 3 ML: .5; 3 SOLUTION RESPIRATORY (INHALATION) at 19:05

## 2020-05-28 RX ADMIN — LEVOFLOXACIN 500 MG: 5 INJECTION, SOLUTION INTRAVENOUS at 19:35

## 2020-05-28 RX ADMIN — LORATADINE 10 MG: 10 TABLET ORAL at 09:04

## 2020-05-28 RX ADMIN — METRONIDAZOLE 500 MG: 500 INJECTION, SOLUTION INTRAVENOUS at 19:36

## 2020-05-28 RX ADMIN — AZELASTINE HYDROCHLORIDE 2 SPRAY: 137 SPRAY, METERED NASAL at 21:13

## 2020-05-28 RX ADMIN — METHYLPREDNISOLONE SODIUM SUCCINATE 40 MG: 40 INJECTION, POWDER, FOR SOLUTION INTRAMUSCULAR; INTRAVENOUS at 09:04

## 2020-05-28 RX ADMIN — Medication 10 ML: at 15:15

## 2020-05-28 RX ADMIN — IPRATROPIUM BROMIDE AND ALBUTEROL SULFATE 3 ML: .5; 3 SOLUTION RESPIRATORY (INHALATION) at 12:39

## 2020-05-28 RX ADMIN — METRONIDAZOLE 500 MG: 500 INJECTION, SOLUTION INTRAVENOUS at 06:48

## 2020-05-28 RX ADMIN — FLUTICASONE PROPIONATE 2 SPRAY: 50 SPRAY, METERED NASAL at 09:05

## 2020-05-28 RX ADMIN — ARFORMOTEROL TARTRATE: 15 SOLUTION RESPIRATORY (INHALATION) at 09:01

## 2020-05-28 NOTE — PROGRESS NOTES
Problem: Falls - Risk of  Goal: *Absence of Falls  Description: Document Rodriguez Reason Fall Risk and appropriate interventions in the flowsheet.   Outcome: Progressing Towards Goal  Note: Fall Risk Interventions:  Mobility Interventions: Patient to call before getting OOB, Utilize walker, cane, or other assistive device         Medication Interventions: Patient to call before getting OOB    Elimination Interventions: Call light in reach, Patient to call for help with toileting needs

## 2020-05-28 NOTE — PROGRESS NOTES
Hospitalist Progress Note  Riya Still MD  Answering service: [phone] from in house phone      Date of Service:  2020  NAME:  Eunice Cervantes  :  1955  MRN:  [id number]      Admission Summary:   Eunice Cervantes is a 60 y.o. female past medical history significant for COPD, hypertension, GERD presented emergency room at [place] [address] complaining of no urine output for almost 24 hours. Also she has been complaining of abdominal pain and diarrhea. She states that she is suffering of constipation and has been having worsening constipation for the last several month days ago she took prune juice and after 1 she developed significant diarrhea associated with crampy abdominal pain    Interval history / Subjective:      Patient seen and examined     She was sitting in the chair. States that breathing is improving today. Feels bloated  Lives in [facility]. Assessment & Plan:     # Abdominal pain/ Cecum mass likely ulcerative mass- severe colitis and foreign body impaction   - s/p Colonoscopy polyp removed from transverse colon and cecum showing markedly edematous superficially ulcerated mass like most likely resolving viable severe colitis + foreign body ( piece of metal)   - CT abdomen-- metallic foreign body at the cecum   -pathology- no malignancy,ulceration +, tubular adenoma  - CEA level at 1.5  - on Levaquin and Flagyl --present   - GI and Colorectal surgery input appt   - tolerating diet     # COPD exacerbation improving  - wean steroids  -decrease frquency of duonebs due to tachycardia      Echo was ordered two days ago due to LE swelling-pending, discussed with RN to obtain it     # Hyponatremia-- resolved      # Mild renal insufficiency- improved   - improved with IVF    # Hypertension- on amlodipine,lisinopril.  Hold HCTZ as she is recovering from hyponatremia     # Hyperglycemia due to steroids: No history of diabetes  - A1c: 5.6     cholecystolithiasis with no acute findings on Ct scn    # Mild chronic anemia   - stable     DVT prophylaxis: SCDs. Code: Full code     Surrogate decision maker: Son.     FUNCTIONAL STATUS PRIOR TO HOSPITALIZATION Ambulates Independently      Hospital Problems  Date Reviewed: 12/4/2018          Codes Class Noted POA    COPD exacerbation ([facility] [address] ICD-10-CM: J44.1  ICD-9-CM: 491.21  5/23/2020 Unknown        Cecum mass ICD-10-CM: A33.06  ICD-9-CM: 569.89  5/23/2020 Unknown                Review of Systems:   Pertinent positive mentioned in interval hx/HPI. Other systems reviewed and negative     Vital Signs:    Last 24hrs VS reviewed since prior progress note. Most recent are:  Visit Vitals  /75 (BP 1 Location: Left arm, BP Patient Position: At rest)   Pulse (!) 112   Temp 98.4 °F (36.9 °C)   Resp 18   Ht 5' 6\" (1.676 m)   Wt 84.5 kg (186 lb 4.6 oz)   SpO2 96%   BMI 30.07 kg/m²       No intake or output data in the 24 hours ending 05/28/20 1919     Physical Examination:             Constitutional:  No acute distress, cooperative, pleasant    ENT:  Oral mucous moist, oropharynx benign. Neck supple,    Resp:  decreased breath sounds b/l   CV:  Regular rhythm, normal rate    GI:  obese,non tender     Musculoskeletal:  minimal LE edema    Neurologic:  Moves all extremities. AAOx3, CN II-XII reviewed            Data Review:   I personally reviewed labs and imaging results    Labs:     Recent Labs     05/28/20  0900 05/27/20  1230   WBC 12.2* 10.8   HGB 10.8* 11.2*   HCT 33.2* 34.6*    274     Recent Labs     05/28/20  0900 05/27/20  1230   * 132*   K 3.4* 3.9   CL 97 94*   CO2 29 30   BUN 12 13   CREA 0.93 0.98   * 123*   CA 10.1 10.0     No results for input(s): ALT, AP, TBIL, TBILI, TP, ALB, GLOB, GGT, AML, LPSE in the last 72 hours.     No lab exists for component: SGOT, GPT, AMYP, HLPSE  No results for input(s): INR, PTP, APTT, INREXT, INREXT in the last 72 hours. No results for input(s): FE, TIBC, PSAT, FERR in the last 72 hours. No results found for: FOL, RBCF   No results for input(s): PH, PCO2, PO2 in the last 72 hours. No results for input(s): CPK, CKNDX, TROIQ in the last 72 hours.     No lab exists for component: CPKMB  No results found for: CHOL, CHOLX, CHLST, CHOLV, HDL, HDLP, LDL, LDLC, DLDLP, TGLX, TRIGL, TRIGP, CHHD, CHHDX  Lab Results   Component Value Date/Time    Glucose (POC) 164 (H) 05/26/2020 09:38 PM    Glucose (POC) 145 (H) 05/26/2020 04:28 PM    Glucose (POC) 82 05/26/2020 11:16 AM    Glucose (POC) 90 05/26/2020 07:02 AM    Glucose (POC) 123 (H) 05/25/2020 10:37 PM     Lab Results   Component Value Date/Time    Color YELLOW 10/15/2018 11:44 AM    Appearance CLEAR 10/15/2018 11:44 AM    Specific gravity 1.009 10/15/2018 11:44 AM    pH (UA) 6.5 10/15/2018 11:44 AM    Protein NEGATIVE  10/15/2018 11:44 AM    Glucose 250 (A) 10/15/2018 11:44 AM    Ketone NEGATIVE  10/15/2018 11:44 AM    Bilirubin NEGATIVE  10/15/2018 11:44 AM    Urobilinogen 0.2 10/15/2018 11:44 AM    Nitrites NEGATIVE  10/15/2018 11:44 AM    Leukocyte Esterase TRACE (A) 10/15/2018 11:44 AM    Epithelial cells FEW 10/15/2018 11:44 AM    Bacteria NEGATIVE  10/15/2018 11:44 AM    WBC NEGATIVE  10/15/2018 11:44 AM    RBC NEGATIVE  10/15/2018 11:44 AM         Medications Reviewed:     Current Facility-Administered Medications   Medication Dose Route Frequency    [START ON 5/29/2020] albuterol-ipratropium (DUO-NEB) 2.5 MG-0.5 MG/3 ML  3 mL Nebulization BID    [START ON 5/29/2020] methylPREDNISolone (PF) (SOLU-MEDROL) injection 40 mg  40 mg IntraVENous Q12H    bisacodyL (DULCOLAX) suppository 10 mg  10 mg Rectal DAILY PRN    enoxaparin (LOVENOX) injection 40 mg  40 mg SubCUTAneous Q24H    lisinopril/hydroCHLOROthiazide(PRINZIDE/ZESTORETIC) 20/12.5 mg   Oral DAILY    simethicone (MYLICON) 65IJ/0.0PL oral drops 80 mg  1.2 mL Oral Multiple    sodium chloride (NS) flush 5-40 mL  5-40 mL IntraVENous Q8H    sodium chloride (NS) flush 5-40 mL  5-40 mL IntraVENous PRN    metroNIDAZOLE (FLAGYL) IVPB premix 500 mg  500 mg IntraVENous Q12H    levoFLOXacin (LEVAQUIN) 500 mg in D5W IVPB  500 mg IntraVENous Q24H    arformoterol 15 mcg/budesonide 0.5 mg neb solution   Nebulization BID RT    morphine injection 2 mg  2 mg IntraVENous Q4H PRN    sodium chloride (NS) flush 5-40 mL  5-40 mL IntraVENous Q8H    sodium chloride (NS) flush 5-40 mL  5-40 mL IntraVENous PRN    acetaminophen (TYLENOL) tablet 650 mg  650 mg Oral Q6H PRN    Or    acetaminophen (TYLENOL) suppository 650 mg  650 mg Rectal Q6H PRN    amLODIPine (NORVASC) tablet 10 mg  10 mg Oral DAILY    hydrALAZINE (APRESOLINE) 20 mg/mL injection 10 mg  10 mg IntraVENous Q6H PRN    azelastine (ASTELIN) 137mcg/spray nasal spray  2 Spray Both Nostrils QHS    atorvastatin (LIPITOR) tablet 10 mg  10 mg Oral DAILY    fluticasone propionate (FLONASE) 50 mcg/actuation nasal spray 2 Spray  2 Spray Both Nostrils DAILY    loratadine (CLARITIN) tablet 10 mg  10 mg Oral DAILY    pantoprazole (PROTONIX) tablet 40 mg  40 mg Oral ACB&D    fluticasone-vilanterol (BREO ELLIPTA) 100mcg-25mcg/puff (Patient Supplied)  1 Puff Inhalation DAILY    oxyCODONE-acetaminophen (PERCOCET) 5-325 mg per tablet 1 Tab  1 Tab Oral Q4H PRN    ondansetron (ZOFRAN ODT) tablet 4 mg  4 mg Oral Q8H PRN     ______________________________________________________________________  EXPECTED LENGTH OF STAY: 3d 0h  ACTUAL LENGTH OF STAY:          5                 Riaz Landry MD   Patient has given Verbal permission to discuss medical care with   persons present in the room and and also with contact as listed on face sheet.

## 2020-05-28 NOTE — PROGRESS NOTES
Problem: Mobility Impaired (Adult and Pediatric)  Goal: *Acute Goals and Plan of Care (Insert Text)  Description: FUNCTIONAL STATUS PRIOR TO ADMISSION: Patient was modified independent with use of rollator walker. At times used a SPC if was going outdoors. HOME SUPPORT PRIOR TO ADMISSION: The patient lived with family but did not require assist.    Physical Therapy Goals  Initiated 5/28/2020  1. Patient will move from supine to sit and sit to supine  in bed with modified independence within 7 day(s). 2.  Patient will transfer from bed to chair and chair to bed with modified independence using the least restrictive device within 7 day(s). 3.  Patient will perform sit to stand with modified independence within 7 day(s). 4.  Patient will ambulate with modified independence for 200 feet with the least restrictive device within 7 day(s). 5.  Patient will ascend/descend 4 stairs with 0 handrail(s) with modified independence within 7 day(s). Outcome: Progressing Towards Goal   PHYSICAL THERAPY EVALUATION  Patient: Irina Suarze (15 y.o. female)  Date: 5/28/2020  Primary Diagnosis: COPD exacerbation ([facility] [address] [J44.1]  Cecum mass [K63.89]  Procedure(s) (LRB):  COLONOSCOPY (N/A)  COLON BIOPSY (N/A)  RESOLUTION CLIP (N/A)  ENDOSCOPIC POLYPECTOMY (N/A) 3 Days Post-Op   Precautions:   Fall      ASSESSMENT  Based on the objective data described below, the patient presents with decreased functional mobility from baseline level of function. Patient limited mainly by decreased activity tolerance and SOB with activity. Overall needing CGA for transfers and gait with RW and CGA. SpO2 drops to 86% (127 bpm) on room air during gait (95% at rest on room air). Anticipate patient would benefit from [facility] PT follow up for mobility progression. Current Level of Function Impacting Discharge (mobility/balance):  CGA for transfers and gait with RW      Other factors to consider for discharge: at risk for falls, desats with activity on RA     Patient will benefit from skilled therapy intervention to address the above noted impairments. PLAN :  Recommendations and Planned Interventions: bed mobility training, transfer training, gait training, therapeutic exercises, patient and family training/education, and therapeutic activities      Frequency/Duration: Patient will be followed by physical therapy:  5 times a week to address goals. Recommendation for discharge: (in order for the patient to meet his/her long term goals)  Physical therapy at least 2 days/week in the home       IF patient discharges home will need the following DME: owns RW for home use         SUBJECTIVE:   Patient stated I'm just short of breath but I am used to that.     OBJECTIVE DATA SUMMARY:   HISTORY:    Past Medical History:   Diagnosis Date    Arthritis     Chronic obstructive pulmonary disease (HCC)     Chronic pain     GERD (gastroesophageal reflux disease)     Hypertension      Past Surgical History:   Procedure Laterality Date    COLONOSCOPY N/A 5/25/2020    COLONOSCOPY performed by Truman Shelby MD at [facility] ENDOSCOPY    HX GI      colonscopy       Personal factors and/or comorbidities impacting plan of care:     Home Situation  Home Environment: Private residence  # Steps to Enter: 4  One/Two Story Residence: One story  Living Alone: No  Support Systems: Friends \ neighbors  Patient Expects to be Discharged to[de-identified] Private residence  Current DME Used/Available at Home: erin Corcoran, Mario Zavaleta, rollator    EXAMINATION/PRESENTATION/DECISION MAKING:   Critical Behavior:  Neurologic State: Alert  Orientation Level: Oriented X4  Cognition: Appropriate decision making, Appropriate for age attention/concentration, Appropriate safety awareness     Hearing:   Auditory  Auditory Impairment: None    Range Of Motion:  AROM: Generally decreased, functional                       Strength:    Strength: Generally decreased, functional          Functional Mobility:  Bed Mobility:   Not tested           Transfers:  Sit to Stand: Contact guard assistance  Stand to Sit: Contact guard assistance                       Balance:   Sitting: Intact  Standing: Impaired  Standing - Static: Constant support;Good  Standing - Dynamic : Constant support; Fair  Ambulation/Gait Training:  Distance (ft): 120 Feet (ft)  Assistive Device: Gait belt;Walker, rolling  Ambulation - Level of Assistance: Contact guard assistance     Gait Description (WDL): Exceptions to WDL  Gait Abnormalities: Decreased step clearance        Base of Support: Widened     Speed/Nancy: Pace decreased (<100 feet/min); Slow  Step Length: Left shortened;Right shortened       Pain Rating:  No c/o pain    Activity Tolerance:   Fair, desaturates with exertion and requires oxygen, requires rest breaks, and observed SOB with activity  Please refer to the flowsheet for vital signs taken during this treatment. After treatment patient left in no apparent distress:   Sitting in chair and Call bell within reach    COMMUNICATION/EDUCATION:   The patients plan of care was discussed with: Physical therapist, Occupational therapist, and Registered nurse. Fall prevention education was provided and the patient/caregiver indicated understanding., Patient/family have participated as able in goal setting and plan of care. , and Patient/family agree to work toward stated goals and plan of care.     Thank you for this referral.  Stephenie Buenrostro, PT, DPT   Time Calculation: 15 mins

## 2020-05-29 VITALS
WEIGHT: 186.29 LBS | OXYGEN SATURATION: 95 % | RESPIRATION RATE: 18 BRPM | TEMPERATURE: 98.5 F | HEART RATE: 99 BPM | HEIGHT: 66 IN | DIASTOLIC BLOOD PRESSURE: 81 MMHG | BODY MASS INDEX: 29.94 KG/M2 | SYSTOLIC BLOOD PRESSURE: 136 MMHG

## 2020-05-29 LAB
AV VELOCITY RATIO: 0.76
ECHO AO ROOT DIAM: 2.57 CM
ECHO AV AREA PEAK VELOCITY: 2.5 CM2
ECHO AV PEAK GRADIENT: 7.1 MMHG
ECHO AV PEAK VELOCITY: 133.04 CM/S
ECHO LA AREA 4C: 15.7 CM2
ECHO LA MAJOR AXIS: 3.69 CM
ECHO LA TO AORTIC ROOT RATIO: 1.44
ECHO LA VOL 2C: 57.79 ML (ref 22–52)
ECHO LA VOL 4C: 36 ML (ref 22–52)
ECHO LA VOL BP: 48.05 ML (ref 22–52)
ECHO LA VOL/BSA BIPLANE: 24.76 ML/M2 (ref 16–28)
ECHO LA VOLUME INDEX A2C: 29.78 ML/M2 (ref 16–28)
ECHO LA VOLUME INDEX A4C: 18.55 ML/M2 (ref 16–28)
ECHO LV INTERNAL DIMENSION DIASTOLIC: 3.83 CM (ref 3.9–5.3)
ECHO LV INTERNAL DIMENSION SYSTOLIC: 2.56 CM
ECHO LV IVSD: 0.7 CM (ref 0.6–0.9)
ECHO LV MASS 2D: 76.6 G (ref 67–162)
ECHO LV MASS INDEX 2D: 39.5 G/M2 (ref 43–95)
ECHO LV POSTERIOR WALL DIASTOLIC: 0.7 CM (ref 0.6–0.9)
ECHO LVOT DIAM: 2.03 CM
ECHO LVOT PEAK GRADIENT: 4 MMHG
ECHO LVOT PEAK VELOCITY: 100.52 CM/S
ECHO MV A VELOCITY: 123.2 CM/S
ECHO MV AREA PHT: 3.9 CM2
ECHO MV E DECELERATION TIME (DT): 197 MS
ECHO MV E VELOCITY: 73.75 CM/S
ECHO MV E/A RATIO: 0.6
ECHO MV PRESSURE HALF TIME (PHT): 57.1 MS
ECHO PV MAX VELOCITY: 70.91 CM/S
ECHO PV PEAK GRADIENT: 2 MMHG
ECHO RV TAPSE: 2.01 CM (ref 1.5–2)
ECHO TV REGURGITANT MAX VELOCITY: 271.54 CM/S
ECHO TV REGURGITANT PEAK GRADIENT: 29.5 MMHG
LVFS 2D: 33.05 %
MV DEC SLOPE: 3.74

## 2020-05-29 PROCEDURE — 74011250636 HC RX REV CODE- 250/636: Performed by: HOSPITALIST

## 2020-05-29 PROCEDURE — 74011250636 HC RX REV CODE- 250/636: Performed by: SPECIALIST

## 2020-05-29 PROCEDURE — 74011250637 HC RX REV CODE- 250/637: Performed by: HOSPITALIST

## 2020-05-29 PROCEDURE — 74011250637 HC RX REV CODE- 250/637: Performed by: INTERNAL MEDICINE

## 2020-05-29 PROCEDURE — 74011250636 HC RX REV CODE- 250/636: Performed by: INTERNAL MEDICINE

## 2020-05-29 PROCEDURE — 94640 AIRWAY INHALATION TREATMENT: CPT

## 2020-05-29 PROCEDURE — 74011000250 HC RX REV CODE- 250: Performed by: INTERNAL MEDICINE

## 2020-05-29 RX ORDER — METRONIDAZOLE 500 MG/1
500 TABLET ORAL 3 TIMES DAILY
Qty: 15 TAB | Refills: 0 | Status: SHIPPED | OUTPATIENT
Start: 2020-05-29 | End: 2020-09-25 | Stop reason: SDUPTHER

## 2020-05-29 RX ORDER — LEVOFLOXACIN 500 MG/1
500 TABLET, FILM COATED ORAL DAILY
Qty: 5 TAB | Refills: 0 | Status: SHIPPED | OUTPATIENT
Start: 2020-05-29 | End: 2020-06-03

## 2020-05-29 RX ORDER — PREDNISONE 20 MG/1
40 TABLET ORAL
Qty: 6 TAB | Refills: 0 | Status: SHIPPED | OUTPATIENT
Start: 2020-05-29 | End: 2020-06-01

## 2020-05-29 RX ORDER — LISINOPRIL 20 MG/1
20 TABLET ORAL DAILY
Qty: 30 TAB | Refills: 0 | Status: SHIPPED | OUTPATIENT
Start: 2020-05-29 | End: 2020-11-12 | Stop reason: SDUPTHER

## 2020-05-29 RX ORDER — PREDNISONE 10 MG/1
10 TABLET ORAL DAILY
Qty: 1 TAB | Refills: 0 | Status: SHIPPED
Start: 2020-06-02 | End: 2020-08-16

## 2020-05-29 RX ADMIN — METHYLPREDNISOLONE SODIUM SUCCINATE 40 MG: 40 INJECTION, POWDER, FOR SOLUTION INTRAMUSCULAR; INTRAVENOUS at 09:07

## 2020-05-29 RX ADMIN — ACETAMINOPHEN 650 MG: 325 TABLET, FILM COATED ORAL at 12:10

## 2020-05-29 RX ADMIN — ENOXAPARIN SODIUM 40 MG: 40 INJECTION SUBCUTANEOUS at 06:37

## 2020-05-29 RX ADMIN — PANTOPRAZOLE SODIUM 40 MG: 40 TABLET, DELAYED RELEASE ORAL at 06:37

## 2020-05-29 RX ADMIN — METRONIDAZOLE 500 MG: 500 INJECTION, SOLUTION INTRAVENOUS at 06:37

## 2020-05-29 RX ADMIN — ARFORMOTEROL TARTRATE: 15 SOLUTION RESPIRATORY (INHALATION) at 09:08

## 2020-05-29 RX ADMIN — ATORVASTATIN CALCIUM 10 MG: 10 TABLET, FILM COATED ORAL at 09:06

## 2020-05-29 RX ADMIN — FLUTICASONE PROPIONATE 2 SPRAY: 50 SPRAY, METERED NASAL at 09:07

## 2020-05-29 RX ADMIN — AMLODIPINE BESYLATE 10 MG: 5 TABLET ORAL at 09:06

## 2020-05-29 RX ADMIN — LISINOPRIL 20 MG: 20 TABLET ORAL at 09:06

## 2020-05-29 RX ADMIN — LORATADINE 10 MG: 10 TABLET ORAL at 09:06

## 2020-05-29 RX ADMIN — Medication 10 ML: at 06:37

## 2020-05-29 NOTE — PROGRESS NOTES
Problem: Falls - Risk of  Goal: *Absence of Falls  Description: Document Silvia Briceno Fall Risk and appropriate interventions in the flowsheet.   Outcome: Progressing Towards Goal  Note: Fall Risk Interventions:  Mobility Interventions: Communicate number of staff needed for ambulation/transfer, Utilize walker, cane, or other assistive device         Medication Interventions: Evaluate medications/consider consulting pharmacy, Patient to call before getting OOB, Teach patient to arise slowly    Elimination Interventions: Call light in reach, Patient to call for help with toileting needs

## 2020-05-29 NOTE — PROGRESS NOTES
[place] Plan  -RUR 11%  -Patient has discharge orders  -PT/OT recommended [address] Medicaid Emilee Sydni authorized #[id number]- 3:30 Transport    CM will follow       Janice Gallegos, MHA/CRM

## 2020-05-29 NOTE — PROGRESS NOTES
Patient complains of abdominal pain. Rating pain 8/10. MD notified. Prn pain medication administered. Will continue to monitor. Call bell within reach.

## 2020-05-29 NOTE — ROUTINE PROCESS
Bedside shift change report given to Ria Lanier (oncoming nurse) by Mei Christie RN (offgoing nurse). Report included the following information SBAR and Kardex.

## 2020-05-29 NOTE — PROGRESS NOTES
Hospital follow-up virtual PCP transitional care appointment has been scheduled with Dr. Thakur for Monday, 6/1/20 at 1:00 p.m. Pending patient discharge.   Glo Delaney, Care Management Specialist.

## 2020-05-29 NOTE — PROGRESS NOTES
Patient discharged to home. Patient alert and oriented x 4. Vital signs remain stable and patient is afebrile. Discharge instructions nd prescriptions reviewed given to patient. Patient verbalizes understanding. Time allotted for questions. Patient and belongings transported to [place] via wheel chair. PIV removed. NAD noted.

## 2020-05-30 ENCOUNTER — PATIENT OUTREACH (OUTPATIENT)
Dept: INTERNAL MEDICINE CLINIC | Age: 65
End: 2020-05-30

## 2020-05-31 NOTE — PROGRESS NOTES
[facility] -2020:  [place] Discharge Follow-Up      Date/Time:  2020 9:00 PM    Patient was admitted to [facility] on  and discharged on 2020 for COPD Exac. The physician discharge summary was available at the time of outreach. Patient was contacted within 2 business days of discharge. Top Challenges reviewed with the provider   none         Method of communication with provider :face to face, chart routing, staff message, phone, none    Inpatient RRAT score:   Was this a readmission? no   Patient stated reason for the readmission: n/a    Nurse Navigator (NN) contacted the patient by telephone to perform post hospital discharge assessment. Verified name and  with patient as identifiers. Provided introduction to self, and explanation of the Nurse Navigator role. Reviewed discharge instructions and red flags with patient who verbalized understanding. Patient given an opportunity to ask questions and does not have any further questions or concerns at this time. The patient agrees to contact the PCP office for questions related to their healthcare. NN provided contact information for future reference. Disease Specific:   Diabetes    Summary of patient's top problems:  1. Physical location from [facility]:  [place], [place]   2. Diet due to stomach bloating    Home Health orders at discharge: [address]: n/a  Date of initial visit: [address] [facility] ordered/company: none  Durable Medical Equipment received: n/a:  Already has nebulizer    Barriers to care? Transportation:  Lives in [address] [place] [address] Ave:   Does patient have an Advance Directive:  reviewed and current -remains the same.       Medication(s):   New Medications at Discharge: levaquin po  Changed Medications at Discharge: none  Discontinued Medications at Discharge: n/a    Medication reconciliation was performed with patient, who verbalizes understanding of administration of home medications. There were no barriers to obtaining medications identified at this time. Referral to Pharm D needed: no     Current Outpatient Medications   Medication Sig    lisinopriL (PRINIVIL, ZESTRIL) 20 mg tablet Take 1 Tab by mouth daily.  [START ON 6/2/2020] predniSONE (DELTASONE) 10 mg tablet Take 10 mg by mouth daily.  levoFLOXacin (Levaquin) 500 mg tablet Take 1 Tab by mouth daily for 5 days.  metroNIDAZOLE (FlagyL) 500 mg tablet Take 1 Tab by mouth three (3) times daily.  predniSONE (DELTASONE) 20 mg tablet Take 40 mg by mouth daily (with breakfast) for 3 days. After completion of 3 days, switch back to your previous dose of prednisone and follow up with PCP/pulmonologist    albuterol-ipratropium (DUO-NEB) 2.5 mg-0.5 mg/3 ml nebu 3 mL by Nebulization route every four (4) hours as needed for Wheezing.  albuterol (PROVENTIL HFA, VENTOLIN HFA, PROAIR HFA) 90 mcg/actuation inhaler Take 2 Puffs by inhalation every four (4) hours as needed for Wheezing.  pantoprazole (PROTONIX) 40 mg tablet Take 40 mg by mouth Before breakfast and dinner.  loratadine (CLARITIN) 10 mg tablet Take 10 mg by mouth daily.  fluticasone furoate-vilanteroL (Breo Ellipta) 100-25 mcg/dose inhaler Take 1 Puff by inhalation daily.  fluticasone (FLONASE) 50 mcg/actuation nasal spray 2 Sprays by Both Nostrils route daily.  amLODIPine (NORVASC) 10 mg tablet Take 10 mg by mouth daily.  atorvastatin (LIPITOR) 10 mg tablet TAKE 1 TABLET BY MOUTH EVERY DAY    azelastine (ASTELIN) 137 mcg (0.1 %) nasal spray 2 Sprays by Both Nostrils route nightly.  ergocalciferol (ERGOCALCIFEROL) 50,000 unit capsule Take 50,000 Units by mouth every Monday.  montelukast (SINGULAIR) 10 mg tablet TAKE 1 TABLET BY MOUTH EVERY DAY FOR ALLERGY SYMPTOMS     No current facility-administered medications for this visit. There are no discontinued medications.     [facility] follow up appointment(s):   Future Appointments   Date Time Provider Laurent Aranda   6/1/2020  1:00 PM Danna Olmos MD [facility]      Non-[facility] follow up appointment(s): none  [facility]:  n/a

## 2020-05-31 NOTE — DISCHARGE SUMMARY
Discharge Summary       PATIENT ID: Irina Suarez  MRN: [id number]   YOB: 1955    DATE OF ADMISSION: 2020 12:31 AM    DATE OF DISCHARGE: 2020   PRIMARY CARE PROVIDER: Orion Hinson MD     ATTENDING PHYSICIAN: Kera Olsen MD    DISCHARGING PROVIDER: Viky Riddle MD    To contact this individual call [phone] and ask the  to page. If unavailable ask to be transferred the Adult Hospitalist Department. CONSULTATIONS: IP CONSULT TO COLORECTAL SURGERY    PROCEDURES/SURGERIES: Procedure(s) with comments:  COLONOSCOPY  COLON BIOPSY  RESOLUTION CLIP - 2 Clips  both have Lot [id number] and  2/10/2023  ENDOSCOPIC POLYPECTOMY    ADMITTING [address] [place] [address] COURSE:     DISCHARGE DIAGNOSES / PLAN:    60 y. o. female past medical history significant for COPD, hypertension, GERD presented emergency room at [facility] complaining of no urine output for almost 24 hours. Pillo Ochoa she has been complaining of abdominal pain and diarrhea.  She states that she is suffering of constipation and has been having worsening constipation for the last several month days ago she took prune juice and after 1 she developed significant diarrhea associated with crampy abdominal pain      # Abdominal pain/ Cecum mass - ulcerative mass- severe colitis and foreign body impaction   - s/p Colonoscopy polyp removed from transverse colon and cecum showing markedly edematous superficially ulcerated mass like most likely resolving viable severe colitis + foreign body ( piece of metal)   - CT abdomen-- metallic foreign body at the cecum   -pathology- no malignancy,ulceration +, tubular adenoma  - CEA level at 1.5  - on Levaquin and Flagyl --complete 5 more days after days  - GI and Colorectal surgery input appt   - tolerating diet   -Patient was instructed to follow up with GI and colorectal surgery     # COPD exacerbation improving  - wean steroids- discharged with 40 mg prednsione 40 mg 3 days and then go back to her standard   -continue inhalers        -Echo was normal EF, no valvular abnormalities     # Hyponatremia-- resolved      # Mild renal insufficiency- improved   - improved with IVF     # Hypertension- on amlodipine,lisinopril. Hold HCTZ as she is recovering from hyponatremia     # Hyperglycemia due to steroids: No history of diabetes  - A1c: 5.6     cholecystolithiasis with no acute findings on Ct scn     # Mild chronic anemia   - stable     Ct Abdomen W Wo Cont And Pelvis W Cont    Result Date: 5/26/2020  EXAM:  CT ABDOMEN W WO CONT AND PELVIS W CONT INDICATION: abdominal pain - epigastric distension and tenderness s/p colonoscopy. Presumed ischemic cecum and descending colon r/o pancreatic mass or AAA COMPARISON: None. TECHNIQUE: With uneventful intravenous administration of 100 cc [id number], 2.5 mm helically acquired axial images were obtained through the abdomen prior to and after contrast administration in arterial, portal venous, and equilibrium phases and through the pelvis after contrast administration in portal venous phase. Coronal and sagittal reconstructions were generated. Oral contrast was not administered. CT dose reduction was achieved through use of a standardized protocol tailored for this examination and automatic exposure control for dose modulation. FINDINGS: LUNG BASES: Clear. INCIDENTALLY IMAGED HEART AND MEDIASTINUM: The visualized heart is normal in size without pericardial effusion. LIVER: Subcentimeter left lobe hypodensities are too small fully characterize. Otherwise unremarkable with normal hepatic vascular opacification. GALLBLADDER: Intraluminal radiodensities likely reflect gallstones. Otherwise unremarkable. SPLEEN: Unremarkable. PANCREAS: No mass or duct dilation. ADRENALS: Unremarkable. KIDNEYS: No mass, calculus, or hydronephrosis. STOMACH: Unremarkable. SMALL BOWEL: No dilatation or wall thickening.  COLON: Endoscopy clip noted in the transverse colon and another in the right colon. A tiny linear metallic foreign body density is noted at the posterior margin of the cecum (series 5, image 113) with a smaller punctate rounded focus of metallic density along the serosal margin of the posterior cecum (series 5, image 115). Small radiodensities are noted near the appendiceal ampulla (series 5, images 121-124) No dilation or wall thickening. APPENDIX: Unremarkable. PERITONEUM: No ascites or pneumoperitoneum. RETROPERITONEUM: Atherosclerotic calcification without aneurysm or dissection. No enlarged lymphadenopathy. REPRODUCTIVE ORGANS: Uterus and ovaries appear unremarkable with partial obscuration by streak artifact. URINARY BLADDER: No abnormality identified with portions obscured by streak artifact. BONES: Right hip arthroplasty prosthesis generates streak artifact which obscures adjacent tissues. Degenerative spine change in left hip osteoarthritis. No acute fracture or aggressive lesion. ADDITIONAL COMMENTS: N/A     IMPRESSION: No CT evidence of ischemic colitis or other acute bowel abnormality with right and transverse colon endoscopy clips noted. Metallic foreign body density suspected in posterior wall of cecum with probable radiodense debris within the cecum near the appendiceal origin noted as well. Incidentals as above including cholecystolithiasis with no acute findings otherwise. FINAL PATHOLOGIC DIAGNOSIS   1. Descending colon, biopsy:   Mild ischemic colitis, chronic, with regenerative-type mucosa   2. Colon, site not specified, biopsy:   Extensive ulceration and regenerative-type mucosa   Negative for dysplasia or malignancy   See comment   3. Ascending colon, polyp, polypectomy:   Tubular adenoma   4. Transverse colon, polyps, polypectomy:   Tubular adenoma (×2)        Comment   The histology of specimen #2 demonstrates fragments of regenerative type colonic mucosa and extensive ulcer debris.  This could be related to ischemia or ulceration caused by a foreign body. The histology is not suggestive of inflammatory bowel disease or infection. Clinical and endoscopic correlation is recommended. Echo:  Left Ventricle Normal cavity size, wall thickness and systolic function (ejection fraction normal). Left ventricle not well visualized. The estimated ejection fraction is 60 - 65%. There is inconclusive left ventricular diastolic function. Left Atrium Normal cavity size. Right Ventricle Not well visualized. Right Atrium Right atrium not well visualized. Aortic Valve Aortic valve not well visualized. No stenosis and no regurgitation. Mitral Valve Mitral valve not well visualized. No stenosis and no regurgitation. Tricuspid Valve Tricuspid valve not well visualized. No stenosis. Trace regurgitation. Pulmonic Valve Pulmonic valve not well visualized, but normal doppler findings. No stenosis and no regurgitation. Aorta Normal aortic root, ascending aortic, and aortic arch. IVC/Hepatic Veins Normal structure. Normal central venous pressure (0-5 mmHg); IVC diameter is less than 21 mm and collapses more than 50% with respiration. Pericardium Normal pericardium and no evidence of pericardial effusion.              PENDING TEST RESULTS:   At the time of discharge the following test results are still pending: none    FOLLOW UP APPOINTMENTS:    Follow-up Information     Follow up With Specialties Details Why Contact Info    Jimmy Huber MD [facility] On 6/1/2020 Hospital follow up virtual PCP appointment Monday, 6/1/20 @ 1:00 p.m.  [address] [facility] [place]  [phone]      Hoang Fernandez MD Colon and Rectal Surgery In 1 week  [address]  [phone]      Jo Ann Mercedes MD Gastroenterology, Gastroenterology   [place] [address]  [phone]             ADDITIONAL CARE RECOMMENDATIONS:   -complete the pressribed antibiotic course,follow up with colorectal surgeon  -take 40 mg prednisone for 3 days and then switch to your daily dose of prednisone  -You need follow up with gastroenterologist  -because of low sodium-we discontinued hydrochlorothiazide. Repeat labs - BMP in 1 week and follow up with PCP         DIET: Regular Diet    ACTIVITY: Activity as tolerated    WOUND CARE: na    EQUIPMENT needed: na          DISCHARGE MEDICATIONS:  Discharge Medication List as of 5/29/2020  1:16 PM      START taking these medications    Details   lisinopriL (PRINIVIL, ZESTRIL) 20 mg tablet Take 1 Tab by mouth daily. , Print, Disp-30 Tab, R-0      levoFLOXacin (Levaquin) 500 mg tablet Take 1 Tab by mouth daily for 5 days. , Print, Disp-5 Tab, R-0      metroNIDAZOLE (FlagyL) 500 mg tablet Take 1 Tab by mouth three (3) times daily. , Print, Disp-15 Tab, R-0         CONTINUE these medications which have CHANGED    Details   ! ! predniSONE (DELTASONE) 10 mg tablet Take 10 mg by mouth daily. , No Print, Disp-1 Tab, R-0      !! predniSONE (DELTASONE) 20 mg tablet Take 40 mg by mouth daily (with breakfast) for 3 days. After completion of 3 days, switch back to your previous dose of prednisone and follow up with PCP/pulmonologist, Print, Disp-6 Tab, R-0       !! - Potential duplicate medications found. Please discuss with provider. CONTINUE these medications which have NOT CHANGED    Details   albuterol-ipratropium (DUO-NEB) 2.5 mg-0.5 mg/3 ml nebu 3 mL by Nebulization route every four (4) hours as needed for Wheezing., Historical Med      albuterol (PROVENTIL HFA, VENTOLIN HFA, PROAIR HFA) 90 mcg/actuation inhaler Take 2 Puffs by inhalation every four (4) hours as needed for Wheezing., Historical Med      pantoprazole (PROTONIX) 40 mg tablet Take 40 mg by mouth Before breakfast and dinner., Historical Med      loratadine (CLARITIN) 10 mg tablet Take 10 mg by mouth daily. , Historical Med      fluticasone furoate-vilanteroL (Breo Ellipta) 100-25 mcg/dose inhaler Take 1 Puff by inhalation daily. , Historical Med      fluticasone (FLONASE) 50 mcg/actuation nasal spray 2 Sprays by Both Nostrils route daily. , Historical Med      amLODIPine (NORVASC) 10 mg tablet Take 10 mg by mouth daily. , Historical Med, R-3      atorvastatin (LIPITOR) 10 mg tablet TAKE 1 TABLET BY MOUTH EVERY DAY, Historical Med, R-4      azelastine (ASTELIN) 137 mcg (0.1 %) nasal spray 2 Sprays by Both Nostrils route nightly., Historical Med, R-5      ergocalciferol (ERGOCALCIFEROL) 50,000 unit capsule Take 50,000 Units by mouth every Monday., Historical Med, R-5      montelukast (SINGULAIR) 10 mg tablet TAKE 1 TABLET BY MOUTH EVERY DAY FOR ALLERGY SYMPTOMS, Historical Med, R-5         STOP taking these medications       lisinopril-hydroCHLOROthiazide (PRINZIDE, ZESTORETIC) 20-12.5 mg per tablet Comments:   Reason for Stopping:         aspirin (ASPIRIN) 325 mg tablet Comments:   Reason for Stopping:                 NOTIFY YOUR PHYSICIAN FOR ANY OF THE FOLLOWING:   Fever over 101 degrees for 24 hours. Chest pain, shortness of breath, fever, chills, nausea, vomiting, diarrhea, change in mentation, falling, weakness, bleeding. Severe pain or pain not relieved by medications. Or, any other signs or symptoms that you may have questions about. DISPOSITION:  X  Home With:   OT  PT  HH  RN       Long term SNF/Inpatient Rehab    Independent/assisted living    Hospice    Other:       PATIENT CONDITION AT DISCHARGE:     Functional status    Poor     Deconditioned    X Independent      Cognition    X Lucid     Forgetful     Dementia      Catheters/lines (plus indication)    Mendoza     PICC     PEG    X None      Code status    X Full code     DNR      PHYSICAL EXAMINATION AT DISCHARGE:  Constitutional:  No acute distress, cooperative, pleasant    ENT:  Oral mucous moist, oropharynx benign.  Neck supple,    Resp:  decreased breath sounds b/l, no wheezing   CV:  Regular rhythm, normal rate    GI: obese,non tender     Musculoskeletal:  minimal LE edema    Neurologic:  Moves all extremities.   AAOx3, CN II-XII reviewed           CHRONIC MEDICAL DIAGNOSES:  Problem List as of 5/29/2020 Date Reviewed: 12/4/2018          Codes Class Noted - Resolved    [facility] ICD-10-CM: J44.1  ICD-9-CM: 491.21  5/23/2020 - Present        Cecum mass ICD-10-CM: Z83.47  ICD-9-CM: 569.89  5/23/2020 - Present        Arthritis of right hip ICD-10-CM: M16.11  ICD-9-CM: 716.95  10/24/2018 - Present              Greater than  30 minutes were spent with the patient on counseling and coordination of care    Signed:   Joel Baum MD  5/30/2020  10:50 PM

## 2020-06-01 NOTE — DISCHARGE INSTRUCTIONS
Discharge Instructions       PATIENT ID: Юлия Crystal  MRN: [id number]   YOB: 1955    DATE OF ADMISSION: 2020 12:31 AM    DATE OF DISCHARGE: 2020    PRIMARY CARE PROVIDER: Akbar Orozco MD     ATTENDING PHYSICIAN: Lavinia Kruger MD  DISCHARGING PROVIDER: Shana Bowling MD    To contact this individual call [phone] and ask the  to page. If unavailable ask to be transferred the Adult Hospitalist Department. DISCHARGE DIAGNOSES -Ischemic colitis,ceacal ulcer. foreign body in the caecum    CONSULTATIONS: IP CONSULT TO GASTROENTEROLOGY  IP CONSULT TO COLORECTAL SURGERY    PROCEDURES/SURGERIES: Procedure(s) with comments:  COLONOSCOPY  COLON BIOPSY  RESOLUTION CLIP - 2 Clips  both have Lot [id number] and  2/10/2023  ENDOSCOPIC POLYPECTOMY    PENDING TEST RESULTS:   At the time of discharge the following test results are still pending: none    FOLLOW UP APPOINTMENTS:   Follow-up Information     Follow up With Specialties Details Why Contact Info    Akbar Orozco MD [facility] In 1 week  [address]      Noah Varner MD Colon and Rectal Surgery In 1 week  [address] [address]  [phone]      Jose Fatima MD Gastroenterology, Gastroenterology   [place] [address]  [phone]             ADDITIONAL CARE RECOMMENDATIONS:   -complete the pressribed antibiotic course,follow up with colorectal surgeon  -take 40 mg prednisone for 3 days and then switch to your daily dose of prednisone  -You need follow up with gastroenterologist  -because of low sodium-we discontinued hydrochlorothiazide.  Repeat labs - BMP in 1 week and follow up with PCP         DIET: Regular Diet    ACTIVITY: Activity as tolerated    WOUND CARE: na    EQUIPMENT needed: na      DISCHARGE MEDICATIONS:   See Medication Reconciliation Form    · It is important that you take the medication exactly as they are prescribed. · Keep your medication in the bottles provided by the pharmacist and keep a list of the medication names, dosages, and times to be taken in your wallet. · Do not take other medications without consulting your doctor. NOTIFY YOUR PHYSICIAN FOR ANY OF THE FOLLOWING:   Fever over 101 degrees for 24 hours. Chest pain, shortness of breath, fever, chills, nausea, vomiting, diarrhea, change in mentation, falling, weakness, bleeding. Severe pain or pain not relieved by medications. Or, any other signs or symptoms that you may have questions about.       DISPOSITION:  X  Home With:   OT  PT  HH  RN       SNF/Inpatient Rehab/LTAC    Independent/assisted living    Hospice    Other:         Signed:   Augie Boyle MD  5/29/2020  8:41 AM

## 2020-06-06 ENCOUNTER — IP HISTORICAL/CONVERTED ENCOUNTER (OUTPATIENT)
Dept: OTHER | Age: 65
End: 2020-06-06

## 2020-07-07 ENCOUNTER — PATIENT OUTREACH (OUTPATIENT)
Dept: INTERNAL MEDICINE CLINIC | Age: 65
End: 2020-07-07

## 2020-07-07 NOTE — PROGRESS NOTES
[facility] 5/23-5/29/2020:  COPD Exac  Patient resolved from Transition of Care episode on 7/7/2020  Discussed COVID-19 related testing which was not done at this time. Test results were not done. Patient informed of results, if available? no     Patient/family has been provided the following resources and education related to COVID-19: cough                        Signs, symptoms and red flags related to COVID-19            CDC exposure and quarantine guidelines            [employer] exposure contact - [phone]            Contact for their local Department of Health                 Patient currently reports that the following symptoms have improved:  cough/sob. No further outreach scheduled with this CTN/ACM/LPN/HC/ MA. Episode of Care resolved. Patient has this CTN/ACM/LPN/HC/MA contact information if future needs arise.

## 2020-08-05 DIAGNOSIS — J44.9 CHRONIC OBSTRUCTIVE PULMONARY DISEASE, UNSPECIFIED (HCC): ICD-10-CM

## 2020-08-16 DIAGNOSIS — M54.40 ACUTE BILATERAL LOW BACK PAIN WITH SCIATICA, SCIATICA LATERALITY UNSPECIFIED: Primary | ICD-10-CM

## 2020-08-16 RX ORDER — PREDNISONE 10 MG/1
TABLET ORAL
Qty: 40 TAB | Refills: 1 | Status: SHIPPED | OUTPATIENT
Start: 2020-08-16 | End: 2020-11-03 | Stop reason: SDUPTHER

## 2020-08-17 ENCOUNTER — TELEPHONE (OUTPATIENT)
Dept: GASTROENTEROLOGY | Age: 65
End: 2020-08-17

## 2020-08-17 DIAGNOSIS — K57.30 DIVERTICULAR DISEASE OF COLON: Primary | ICD-10-CM

## 2020-08-17 NOTE — PROGRESS NOTES
Informed pt. Of Referral but she insists on seeing Dr. Denisse Galloway first. Transferred to front to schedule gisselle't. States pain is in lower abd now.

## 2020-08-18 NOTE — TELEPHONE ENCOUNTER
I called patient, she said she was taking her miralax every other day, I explained that Dr Wade Snow said to take 2 doses bid every day. She said ok, but also to let Dr Wade Snow know that she was having llq abd pain, and when bowels do move , they are in small round formed pieces, she did eat some corn recently.

## 2020-08-19 RX ORDER — CIPROFLOXACIN 500 MG/1
500 TABLET ORAL 2 TIMES DAILY
Qty: 30 TAB | Refills: 0 | Status: SHIPPED | OUTPATIENT
Start: 2020-08-19 | End: 2020-09-25 | Stop reason: SDUPTHER

## 2020-08-21 NOTE — TELEPHONE ENCOUNTER
I called patient, explained that Dr Latanya Weldon sent in [facility] for her to pharmacy. She said ok.

## 2020-08-31 ENCOUNTER — TELEPHONE (OUTPATIENT)
Dept: PRIMARY CARE CLINIC | Age: 65
End: 2020-08-31

## 2020-09-01 ENCOUNTER — NURSE TRIAGE (OUTPATIENT)
Dept: OBGYN CLINIC | Age: 65
End: 2020-09-01

## 2020-09-01 VITALS
DIASTOLIC BLOOD PRESSURE: 74 MMHG | RESPIRATION RATE: 22 BRPM | HEART RATE: 105 BPM | SYSTOLIC BLOOD PRESSURE: 122 MMHG | WEIGHT: 194 LBS | OXYGEN SATURATION: 94 % | TEMPERATURE: 98.9 F | BODY MASS INDEX: 32.32 KG/M2 | HEIGHT: 65 IN

## 2020-09-01 DIAGNOSIS — Z12.39 SCREENING FOR MALIGNANT NEOPLASM OF BREAST: Primary | ICD-10-CM

## 2020-09-01 PROBLEM — E55.9 VITAMIN D DEFICIENCY: Status: ACTIVE | Noted: 2020-09-01

## 2020-09-01 PROBLEM — J40 BRONCHITIS: Status: ACTIVE | Noted: 2020-09-01

## 2020-09-01 PROBLEM — K21.9 GASTROESOPHAGEAL REFLUX DISEASE WITHOUT ESOPHAGITIS: Status: ACTIVE | Noted: 2020-09-01

## 2020-09-01 PROBLEM — I10 HTN (HYPERTENSION): Status: ACTIVE | Noted: 2020-09-01

## 2020-09-01 PROBLEM — J30.2 SEASONAL ALLERGIC RHINITIS: Status: ACTIVE | Noted: 2020-09-01

## 2020-09-01 PROBLEM — R10.10 PAIN OF UPPER ABDOMEN: Status: ACTIVE | Noted: 2020-09-01

## 2020-09-01 PROBLEM — J45.20 INTERMITTENT ASTHMA: Status: ACTIVE | Noted: 2020-09-01

## 2020-09-01 RX ORDER — BUDESONIDE AND FORMOTEROL FUMARATE DIHYDRATE 80; 4.5 UG/1; UG/1
2 AEROSOL RESPIRATORY (INHALATION) 2 TIMES DAILY
COMMUNITY
End: 2020-09-13

## 2020-09-01 RX ORDER — METFORMIN HYDROCHLORIDE 500 MG/1
500 TABLET ORAL 2 TIMES DAILY WITH MEALS
COMMUNITY
End: 2020-11-30

## 2020-09-01 NOTE — TELEPHONE ENCOUNTER
Pt. Is on Metformin 500mg. BID. How often would you like for her to check her blood sugar? Thank you.

## 2020-09-01 NOTE — TELEPHONE ENCOUNTER
Patient contacted office reports is in need of order for her mammogram.  Advised patient that she needs to have annual exam completed. Scheduled annual exam for 10/14/2020 at [facility] and will send order for mammogram to [address] to be completed.

## 2020-09-01 NOTE — TELEPHONE ENCOUNTER
Spoke with Pharmacist at [facility] re: type of Glucometer that [employer] will cover and he told me to have pt. Call her insurance company and they will tell her the ones that they will approve of. Once she does this and we send the order to them, he will order the meter if not in stock along with the strips. I LVM  For pt. Re: this. She is to call office for any questions or concerns.

## 2020-09-02 ENCOUNTER — OFFICE VISIT (OUTPATIENT)
Dept: ORTHOPEDIC SURGERY | Age: 65
End: 2020-09-02
Payer: MEDICAID

## 2020-09-02 ENCOUNTER — TELEPHONE (OUTPATIENT)
Dept: OBGYN CLINIC | Age: 65
End: 2020-09-02

## 2020-09-02 VITALS — BODY MASS INDEX: 28.12 KG/M2 | WEIGHT: 175 LBS | HEIGHT: 66 IN

## 2020-09-02 DIAGNOSIS — M25.521 RIGHT ELBOW PAIN: Primary | ICD-10-CM

## 2020-09-02 DIAGNOSIS — M70.21 OLECRANON BURSITIS OF RIGHT ELBOW: ICD-10-CM

## 2020-09-02 PROCEDURE — 99213 OFFICE O/P EST LOW 20 MIN: CPT | Performed by: ORTHOPAEDIC SURGERY

## 2020-09-02 RX ORDER — FLUTICASONE PROPIONATE 50 MCG
SPRAY, SUSPENSION (ML) NASAL
Qty: 1 BOTTLE | Refills: 2 | Status: SHIPPED | OUTPATIENT
Start: 2020-09-02 | End: 2020-12-06

## 2020-09-02 RX ORDER — ERGOCALCIFEROL 1.25 MG/1
CAPSULE ORAL
Qty: 4 CAP | Refills: 2 | Status: SHIPPED | OUTPATIENT
Start: 2020-09-02 | End: 2020-12-15 | Stop reason: SDUPTHER

## 2020-09-02 RX ORDER — ATORVASTATIN CALCIUM 10 MG/1
TABLET, FILM COATED ORAL
Qty: 30 TAB | Refills: 1 | Status: SHIPPED | OUTPATIENT
Start: 2020-09-02 | End: 2020-10-06 | Stop reason: SDUPTHER

## 2020-09-02 NOTE — PATIENT INSTRUCTIONS
Arm Pain: Care Instructions  Your Care Instructions     You can hurt your arm by using it too much or by injuring it. Biking, wrestling, and home repair projects are examples of activities that can lead to arm pain. Everyday wear and tear, especially as you get older, can cause arm pain. Your forearms, wrists, hands, and fingers are the parts of your arm that are most likely to become painful. A minor arm injury usually will heal on its own with home treatment to relieve swelling and pain. If you have a more serious injury, you may need tests and treatment. Follow-up care is a key part of your treatment and safety. Be sure to make and go to all appointments, and call your doctor if you are having problems. It's also a good idea to know your test results and keep a list of the medicines you take. How can you care for yourself at home? · Take pain medicines exactly as directed. ? If the doctor gave you a prescription medicine for pain, take it as prescribed. ? If you are not taking a prescription pain medicine, ask your doctor if you can take an over-the-counter medicine. · Rest and protect your arm. Take a break from any activity that may cause pain. · Put ice or a cold pack on your arm for 10 to 20 minutes at a time. Put a thin cloth between the ice and your skin. · Prop up the sore arm on a pillow when you ice it or anytime you sit or lie down during the next 3 days. Try to keep it above the level of your heart. This will help reduce swelling. · If your doctor recommends a sling to support your arm, wear it as directed. When should you call for help? [id number] anytime you think you may need emergency care. For example, call if:  · Your arm or hand is cool or pale or changes color. Call your doctor now or seek immediate medical care if:  · You cannot use your arm. · You have signs of infection, such as:  ? Increased pain, swelling, warmth, or redness.   ? Red streaks running up or down your arm.  ? Pus draining from an area of your arm. ? A fever. · You have tingling, weakness, or numbness in your arm. Watch closely for changes in your health, and be sure to contact your doctor if:  · You do not get better as expected. Where can you learn more? Go to http://corrie-kimi.info/  Enter B641 in the search box to learn more about \"Arm Pain: Care Instructions. \"  Current as of: June 26, 2019               Content Version: 12.5  © 5464-3993 [employer]. Care instructions adapted under license by [employer] (which disclaims liability or warranty for this information). If you have questions about a medical condition or this instruction, always ask your healthcare professional. [address] any warranty or liability for your use of this information.

## 2020-09-02 NOTE — TELEPHONE ENCOUNTER
Rolanda esteves in [facility] needs an updated order needs to take out 3D part and just be screening  .   Per Emanuel Garcia at [phone]

## 2020-09-03 ENCOUNTER — TELEPHONE (OUTPATIENT)
Dept: PRIMARY CARE CLINIC | Age: 65
End: 2020-09-03

## 2020-09-07 ENCOUNTER — HOSPITAL ENCOUNTER (OUTPATIENT)
Age: 65
Setting detail: OBSERVATION
Discharge: HOME | End: 2020-09-13
Attending: FAMILY MEDICINE | Admitting: HOSPITALIST
Payer: MEDICAID

## 2020-09-07 PROCEDURE — 96372 THER/PROPH/DIAG INJ SC/IM: CPT

## 2020-09-07 PROCEDURE — 93005 ELECTROCARDIOGRAM TRACING: CPT

## 2020-09-07 PROCEDURE — 99285 EMERGENCY DEPT VISIT HI MDM: CPT

## 2020-09-07 PROCEDURE — 85025 COMPLETE CBC W/AUTO DIFF WBC: CPT

## 2020-09-07 PROCEDURE — 83690 ASSAY OF LIPASE: CPT

## 2020-09-07 PROCEDURE — 99999999999 HC CONV PATIENT CONVENIENCE-OTHER

## 2020-09-07 PROCEDURE — 82150 ASSAY OF AMYLASE: CPT

## 2020-09-07 PROCEDURE — 74176 CT ABD & PELVIS W/O CONTRAST: CPT

## 2020-09-07 PROCEDURE — 76497 UNLISTED CT PROCEDURE: CPT

## 2020-09-07 PROCEDURE — 81001 URINALYSIS AUTO W/SCOPE: CPT

## 2020-09-07 PROCEDURE — 36415 COLL VENOUS BLD VENIPUNCTURE: CPT

## 2020-09-07 PROCEDURE — 80053 COMPREHEN METABOLIC PANEL: CPT

## 2020-09-07 PROCEDURE — 99218 HC RM OBSERVATION: CPT

## 2020-09-07 PROCEDURE — G0378 HOSPITAL OBSERVATION PER HR: HCPCS

## 2020-09-08 DIAGNOSIS — J44.9 CHRONIC OBSTRUCTIVE PULMONARY DISEASE, UNSPECIFIED (HCC): ICD-10-CM

## 2020-09-08 PROCEDURE — 83550 IRON BINDING TEST: CPT

## 2020-09-08 PROCEDURE — 83735 ASSAY OF MAGNESIUM: CPT

## 2020-09-08 PROCEDURE — 94640 AIRWAY INHALATION TREATMENT: CPT

## 2020-09-08 PROCEDURE — 84295 ASSAY OF SERUM SODIUM: CPT

## 2020-09-08 PROCEDURE — 82570 ASSAY OF URINE CREATININE: CPT

## 2020-09-08 PROCEDURE — 83036 HEMOGLOBIN GLYCOSYLATED A1C: CPT

## 2020-09-08 PROCEDURE — 85025 COMPLETE CBC W/AUTO DIFF WBC: CPT

## 2020-09-08 PROCEDURE — 94760 N-INVAS EAR/PLS OXIMETRY 1: CPT

## 2020-09-08 PROCEDURE — 99218 HC RM OBSERVATION: CPT

## 2020-09-08 PROCEDURE — 76770 US EXAM ABDO BACK WALL COMP: CPT

## 2020-09-08 PROCEDURE — 93005 ELECTROCARDIOGRAM TRACING: CPT

## 2020-09-08 PROCEDURE — 84156 ASSAY OF PROTEIN URINE: CPT

## 2020-09-08 PROCEDURE — 80053 COMPREHEN METABOLIC PANEL: CPT

## 2020-09-08 PROCEDURE — 83540 ASSAY OF IRON: CPT

## 2020-09-08 PROCEDURE — 36415 COLL VENOUS BLD VENIPUNCTURE: CPT

## 2020-09-08 PROCEDURE — 99999999999 HC CONV PATIENT CONVENIENCE-OTHER

## 2020-09-08 PROCEDURE — 84300 ASSAY OF URINE SODIUM: CPT

## 2020-09-09 PROCEDURE — 85025 COMPLETE CBC W/AUTO DIFF WBC: CPT

## 2020-09-09 PROCEDURE — 99218 HC RM OBSERVATION: CPT

## 2020-09-09 PROCEDURE — C9113 INJ PANTOPRAZOLE SODIUM, VIA: HCPCS

## 2020-09-09 PROCEDURE — 80069 RENAL FUNCTION PANEL: CPT

## 2020-09-09 PROCEDURE — 36415 COLL VENOUS BLD VENIPUNCTURE: CPT

## 2020-09-09 PROCEDURE — 94640 AIRWAY INHALATION TREATMENT: CPT

## 2020-09-09 PROCEDURE — 93005 ELECTROCARDIOGRAM TRACING: CPT

## 2020-09-09 PROCEDURE — 94760 N-INVAS EAR/PLS OXIMETRY 1: CPT

## 2020-09-09 PROCEDURE — 99999999999 HC CONV PATIENT CONVENIENCE-OTHER

## 2020-09-09 PROCEDURE — 99285 EMERGENCY DEPT VISIT HI MDM: CPT

## 2020-09-09 PROCEDURE — 99218 HC CONV PATIENT CONVENIENCE-OTHER: CPT

## 2020-09-09 PROCEDURE — 96372 THER/PROPH/DIAG INJ SC/IM: CPT

## 2020-09-09 PROCEDURE — 83735 ASSAY OF MAGNESIUM: CPT

## 2020-09-09 PROCEDURE — G0378 HOSPITAL OBSERVATION PER HR: HCPCS

## 2020-09-10 PROCEDURE — 99218 HC RM OBSERVATION: CPT

## 2020-09-10 PROCEDURE — 94760 N-INVAS EAR/PLS OXIMETRY 1: CPT

## 2020-09-10 PROCEDURE — 99999999999 HC CONV PATIENT CONVENIENCE-OTHER

## 2020-09-10 PROCEDURE — 80069 RENAL FUNCTION PANEL: CPT

## 2020-09-10 PROCEDURE — 36415 COLL VENOUS BLD VENIPUNCTURE: CPT

## 2020-09-10 PROCEDURE — C9113 INJ PANTOPRAZOLE SODIUM, VIA: HCPCS

## 2020-09-10 PROCEDURE — 85025 COMPLETE CBC W/AUTO DIFF WBC: CPT

## 2020-09-10 PROCEDURE — 94640 AIRWAY INHALATION TREATMENT: CPT

## 2020-09-11 DIAGNOSIS — M54.9 CHRONIC BILATERAL BACK PAIN, UNSPECIFIED BACK LOCATION: Primary | ICD-10-CM

## 2020-09-11 DIAGNOSIS — G89.29 CHRONIC BILATERAL BACK PAIN, UNSPECIFIED BACK LOCATION: Primary | ICD-10-CM

## 2020-09-11 PROBLEM — E11.65 HYPERGLYCEMIA DUE TO TYPE 2 DIABETES MELLITUS (HCC): Status: ACTIVE | Noted: 2020-09-11

## 2020-09-11 PROBLEM — E87.1 ACUTE HYPONATREMIA: Status: ACTIVE | Noted: 2020-09-11

## 2020-09-11 PROBLEM — J96.10 CHRONIC RESPIRATORY FAILURE (HCC): Status: ACTIVE | Noted: 2020-09-11

## 2020-09-11 PROBLEM — M19.90 OSTEOARTHRITIS: Status: ACTIVE | Noted: 2020-09-11

## 2020-09-11 PROBLEM — E11.9 TYPE 2 DIABETES MELLITUS (HCC): Status: ACTIVE | Noted: 2020-09-11

## 2020-09-11 PROBLEM — K21.00 GASTRO-ESOPHAGEAL REFLUX DISEASE WITH ESOPHAGITIS: Status: ACTIVE | Noted: 2020-09-11

## 2020-09-11 PROBLEM — M51.36 DEGENERATION OF LUMBAR INTERVERTEBRAL DISC: Status: ACTIVE | Noted: 2020-09-11

## 2020-09-11 PROBLEM — N17.9 ACUTE KIDNEY INJURY (HCC): Status: ACTIVE | Noted: 2020-09-11

## 2020-09-11 PROCEDURE — 94640 AIRWAY INHALATION TREATMENT: CPT

## 2020-09-11 PROCEDURE — 97161 PT EVAL LOW COMPLEX 20 MIN: CPT

## 2020-09-11 PROCEDURE — 74000 HC CONV PATIENT CONVENIENCE-OTHER: CPT

## 2020-09-11 PROCEDURE — 99999999999 HC CONV PATIENT CONVENIENCE-OTHER

## 2020-09-11 PROCEDURE — C9113 INJ PANTOPRAZOLE SODIUM, VIA: HCPCS

## 2020-09-11 PROCEDURE — 94760 N-INVAS EAR/PLS OXIMETRY 1: CPT

## 2020-09-11 PROCEDURE — 97116 GAIT TRAINING THERAPY: CPT

## 2020-09-11 PROCEDURE — 74018 RADEX ABDOMEN 1 VIEW: CPT

## 2020-09-11 PROCEDURE — 36415 COLL VENOUS BLD VENIPUNCTURE: CPT

## 2020-09-11 PROCEDURE — 99218 HC RM OBSERVATION: CPT

## 2020-09-11 PROCEDURE — 80048 BASIC METABOLIC PNL TOTAL CA: CPT

## 2020-09-11 RX ORDER — POLYETHYLENE GLYCOL 3350 17 G/17G
17 POWDER, FOR SOLUTION ORAL 2 TIMES DAILY
Status: DISCONTINUED | OUTPATIENT
Start: 2020-09-11 | End: 2020-09-13 | Stop reason: HOSPADM

## 2020-09-11 RX ORDER — METOPROLOL TARTRATE 25 MG/1
25 TABLET, FILM COATED ORAL 2 TIMES DAILY
Status: DISCONTINUED | OUTPATIENT
Start: 2020-09-11 | End: 2020-09-13 | Stop reason: HOSPADM

## 2020-09-11 RX ORDER — INSULIN LISPRO 100 [IU]/ML
2-6 INJECTION, SOLUTION INTRAVENOUS; SUBCUTANEOUS
Status: DISCONTINUED | OUTPATIENT
Start: 2020-09-12 | End: 2020-09-13 | Stop reason: HOSPADM

## 2020-09-11 RX ORDER — ONDANSETRON 2 MG/ML
4 INJECTION INTRAMUSCULAR; INTRAVENOUS
Status: DISCONTINUED | OUTPATIENT
Start: 2020-09-11 | End: 2020-09-13 | Stop reason: HOSPADM

## 2020-09-11 RX ORDER — OXYCODONE HYDROCHLORIDE 5 MG/1
5 TABLET ORAL
Status: DISCONTINUED | OUTPATIENT
Start: 2020-09-11 | End: 2020-09-13 | Stop reason: HOSPADM

## 2020-09-11 RX ORDER — DIPHENHYDRAMINE HYDROCHLORIDE 50 MG/ML
12.5 INJECTION, SOLUTION INTRAMUSCULAR; INTRAVENOUS
Status: DISCONTINUED | OUTPATIENT
Start: 2020-09-11 | End: 2020-09-13 | Stop reason: HOSPADM

## 2020-09-11 RX ORDER — ATORVASTATIN CALCIUM 10 MG/1
10 TABLET, FILM COATED ORAL DAILY
Status: DISCONTINUED | OUTPATIENT
Start: 2020-09-12 | End: 2020-09-13 | Stop reason: HOSPADM

## 2020-09-11 RX ORDER — PANTOPRAZOLE SODIUM 40 MG/1
40 TABLET, DELAYED RELEASE ORAL
Status: DISCONTINUED | OUTPATIENT
Start: 2020-09-12 | End: 2020-09-13 | Stop reason: HOSPADM

## 2020-09-11 RX ORDER — MAG HYDROX/ALUMINUM HYD/SIMETH 200-200-20
30 SUSPENSION, ORAL (FINAL DOSE FORM) ORAL
Status: DISCONTINUED | OUTPATIENT
Start: 2020-09-11 | End: 2020-09-13 | Stop reason: HOSPADM

## 2020-09-11 RX ORDER — SENNOSIDES 8.6 MG/1
1 TABLET ORAL 2 TIMES DAILY
Status: DISCONTINUED | OUTPATIENT
Start: 2020-09-11 | End: 2020-09-13 | Stop reason: HOSPADM

## 2020-09-11 RX ORDER — DOCUSATE SODIUM 100 MG/1
100 CAPSULE, LIQUID FILLED ORAL 2 TIMES DAILY
Status: DISCONTINUED | OUTPATIENT
Start: 2020-09-11 | End: 2020-09-13 | Stop reason: HOSPADM

## 2020-09-11 RX ORDER — FLUTICASONE PROPIONATE 50 MCG
2 SPRAY, SUSPENSION (ML) NASAL DAILY
Status: DISCONTINUED | OUTPATIENT
Start: 2020-09-12 | End: 2020-09-13 | Stop reason: HOSPADM

## 2020-09-11 RX ORDER — MOMETASONE FUROATE AND FORMOTEROL FUMARATE DIHYDRATE 100; 5 UG/1; UG/1
2 AEROSOL RESPIRATORY (INHALATION) 2 TIMES DAILY
COMMUNITY
End: 2020-09-25 | Stop reason: SDUPTHER

## 2020-09-11 RX ORDER — ALBUTEROL SULFATE 90 UG/1
2 AEROSOL, METERED RESPIRATORY (INHALATION)
Status: DISCONTINUED | OUTPATIENT
Start: 2020-09-11 | End: 2020-09-12

## 2020-09-11 RX ORDER — CETIRIZINE HCL 10 MG
10 TABLET ORAL DAILY
Status: DISCONTINUED | OUTPATIENT
Start: 2020-09-12 | End: 2020-09-13 | Stop reason: HOSPADM

## 2020-09-11 RX ORDER — FLUTICASONE FUROATE AND VILANTEROL 100; 25 UG/1; UG/1
1 POWDER RESPIRATORY (INHALATION) DAILY
Status: DISCONTINUED | OUTPATIENT
Start: 2020-09-12 | End: 2020-09-13 | Stop reason: HOSPADM

## 2020-09-11 RX ORDER — LANOLIN ALCOHOL/MO/W.PET/CERES
1 CREAM (GRAM) TOPICAL 2 TIMES DAILY WITH MEALS
Status: DISCONTINUED | OUTPATIENT
Start: 2020-09-12 | End: 2020-09-13 | Stop reason: HOSPADM

## 2020-09-11 RX ORDER — FUROSEMIDE 40 MG/1
40 TABLET ORAL DAILY
COMMUNITY
End: 2021-01-10

## 2020-09-11 RX ORDER — ACETAMINOPHEN 325 MG/1
650 TABLET ORAL
Status: DISCONTINUED | OUTPATIENT
Start: 2020-09-11 | End: 2020-09-13 | Stop reason: HOSPADM

## 2020-09-11 RX ORDER — IPRATROPIUM BROMIDE AND ALBUTEROL SULFATE 2.5; .5 MG/3ML; MG/3ML
3 SOLUTION RESPIRATORY (INHALATION)
Status: DISCONTINUED | OUTPATIENT
Start: 2020-09-11 | End: 2020-09-13 | Stop reason: HOSPADM

## 2020-09-12 LAB
GLUCOSE BLD STRIP.AUTO-MCNC: 101 MG/DL (ref 65–100)
GLUCOSE BLD STRIP.AUTO-MCNC: 122 MG/DL (ref 65–100)
GLUCOSE BLD STRIP.AUTO-MCNC: 87 MG/DL (ref 65–100)
PERFORMED BY, TECHID: ABNORMAL
PERFORMED BY, TECHID: ABNORMAL
PERFORMED BY, TECHID: NORMAL

## 2020-09-12 PROCEDURE — 74011000250 HC RX REV CODE- 250: Performed by: FAMILY MEDICINE

## 2020-09-12 PROCEDURE — 94760 N-INVAS EAR/PLS OXIMETRY 1: CPT

## 2020-09-12 PROCEDURE — 74011250637 HC RX REV CODE- 250/637: Performed by: FAMILY MEDICINE

## 2020-09-12 PROCEDURE — 94640 AIRWAY INHALATION TREATMENT: CPT

## 2020-09-12 PROCEDURE — 82962 GLUCOSE BLOOD TEST: CPT

## 2020-09-12 PROCEDURE — 99218 HC RM OBSERVATION: CPT

## 2020-09-12 PROCEDURE — 77010033678 HC OXYGEN DAILY

## 2020-09-12 PROCEDURE — 65660000001 HC RM ICU INTERMED STEPDOWN

## 2020-09-12 RX ADMIN — IPRATROPIUM BROMIDE AND ALBUTEROL SULFATE 3 ML: .5; 3 SOLUTION RESPIRATORY (INHALATION) at 08:00

## 2020-09-12 RX ADMIN — DOCUSATE SODIUM 100 MG: 100 CAPSULE, LIQUID FILLED ORAL at 17:29

## 2020-09-12 RX ADMIN — FLUTICASONE PROPIONATE 2 SPRAY: 50 SPRAY, METERED NASAL at 09:53

## 2020-09-12 RX ADMIN — POLYETHYLENE GLYCOL 3350 17 G: 17 POWDER, FOR SOLUTION ORAL at 09:53

## 2020-09-12 RX ADMIN — METOPROLOL TARTRATE 25 MG: 25 TABLET, FILM COATED ORAL at 09:54

## 2020-09-12 RX ADMIN — ATORVASTATIN CALCIUM 10 MG: 10 TABLET, FILM COATED ORAL at 09:55

## 2020-09-12 RX ADMIN — SENNOSIDES 8.6 MG: 8.6 TABLET, FILM COATED ORAL at 17:27

## 2020-09-12 RX ADMIN — OXYCODONE HYDROCHLORIDE 5 MG: 5 TABLET ORAL at 17:26

## 2020-09-12 RX ADMIN — FERROUS SULFATE TAB 325 MG (65 MG ELEMENTAL FE) 325 MG: 325 (65 FE) TAB at 17:26

## 2020-09-12 RX ADMIN — OXYCODONE HYDROCHLORIDE 5 MG: 5 TABLET ORAL at 07:59

## 2020-09-12 RX ADMIN — IPRATROPIUM BROMIDE AND ALBUTEROL SULFATE 3 ML: .5; 3 SOLUTION RESPIRATORY (INHALATION) at 11:32

## 2020-09-12 RX ADMIN — FERROUS SULFATE TAB 325 MG (65 MG ELEMENTAL FE) 325 MG: 325 (65 FE) TAB at 10:00

## 2020-09-12 RX ADMIN — PANTOPRAZOLE SODIUM 40 MG: 40 TABLET, DELAYED RELEASE ORAL at 07:59

## 2020-09-12 RX ADMIN — FLUTICASONE FUROATE AND VILANTEROL 1 PUFF: 100; 25 POWDER RESPIRATORY (INHALATION) at 09:00

## 2020-09-12 RX ADMIN — METOPROLOL TARTRATE 25 MG: 25 TABLET, FILM COATED ORAL at 17:25

## 2020-09-12 RX ADMIN — SENNOSIDES 8.6 MG: 8.6 TABLET, FILM COATED ORAL at 09:54

## 2020-09-12 RX ADMIN — CETIRIZINE HYDROCHLORIDE 10 MG: 10 TABLET, FILM COATED ORAL at 09:54

## 2020-09-12 RX ADMIN — DOCUSATE SODIUM 100 MG: 100 CAPSULE, LIQUID FILLED ORAL at 09:55

## 2020-09-12 RX ADMIN — IPRATROPIUM BROMIDE AND ALBUTEROL SULFATE 3 ML: .5; 3 SOLUTION RESPIRATORY (INHALATION) at 15:03

## 2020-09-12 RX ADMIN — IPRATROPIUM BROMIDE AND ALBUTEROL SULFATE 3 ML: .5; 3 SOLUTION RESPIRATORY (INHALATION) at 19:23

## 2020-09-12 RX ADMIN — POLYETHYLENE GLYCOL 3350 17 G: 17 POWDER, FOR SOLUTION ORAL at 17:25

## 2020-09-12 NOTE — ASSESSMENT & PLAN NOTE
Patient is currently on fluid restriction for hyponatremia we will continue to monitor sodium level.

## 2020-09-12 NOTE — PROGRESS NOTES
Progress Note  Date:9/13/2020       Room:[facility]  Patient Name:Rashmi Kiran     YOB: 1955     Age:65 y.o. Subjective    36-year-old woman with history of coronary artery disease, hypertension, anxiety, COPD on chronic O2 was admitted for right lower quadrant abdominal pain along with lower back pain. Patient has CT of the abdomen that did not show any findings in the abdomen pelvis but did show multiple compression deformities in the lower thoracic and lumbar spine. This morning patient seen in bed with complaints of continued back pain. She was able to ambulate with physical therapy yesterday without much problems. Review of Systems   Gastrointestinal: Positive for abdominal pain (improved). Musculoskeletal: Positive for back pain. All other systems reviewed and are negative. Objective           Vitals Last 24 Hours:  Patient Vitals for the past 24 hrs:   Temp Pulse Resp BP SpO2   09/13/20 0826 -- -- -- -- 94 %   09/13/20 0804 -- -- -- -- 96 %   09/13/20 0759 97.8 °F (36.6 °C) 98 22 125/61 98 %   09/13/20 0454 97.9 °F (36.6 °C) 95 -- 114/65 100 %   09/12/20 2357 98 °F (36.7 °C) 95 -- 116/63 100 %   09/12/20 2015 98.4 °F (36.9 °C) 96 -- -- 100 %   09/12/20 1923 -- -- -- -- 100 %   09/12/20 1711 97.3 °F (36.3 °C) (!) 102 20 121/67 97 %   09/12/20 1503 -- -- -- -- 97 %   09/12/20 1213 98 °F (36.7 °C) 91 20 128/67 100 %        I/O (24Hr):     Intake/Output Summary (Last 24 hours) at 9/13/2020 1151  Last data filed at 9/12/2020 1400  Gross per 24 hour   Intake 480 ml   Output 0 ml   Net 480 ml       Physical Exam:  Gen:  AAO X 4, NAD  HEENT   Eyes: extraocular muscles intact  Heart:  RRR  Lungs: CTA, neg wheezes/crackles  Abd:  Soft, nontender, nondistended, positive BS  Ext: no cyanosis, clubbing or edema  Neuro: awake and alert, answering questions      Medications           Current Facility-Administered Medications   Medication Dose Route Frequency    pantoprazole (PROTONIX) tablet 40 mg  40 mg Oral ACB    metoprolol tartrate (LOPRESSOR) tablet 25 mg  25 mg Oral BID    polyethylene glycol (MIRALAX) packet 17 g  17 g Oral BID    senna (SENOKOT) tablet 8.6 mg  1 Tab Oral BID    fluticasone-vilanterol (BREO ELLIPTA) 100mcg-25mcg/puff (Patient Supplied)  1 Puff Inhalation DAILY    atorvastatin (LIPITOR) tablet 10 mg  10 mg Oral DAILY    fluticasone propionate (FLONASE) 50 mcg/actuation nasal spray 2 Spray  2 Spray Both Nostrils DAILY    cetirizine (ZYRTEC) tablet 10 mg  10 mg Oral DAILY    docusate sodium (COLACE) capsule 100 mg  100 mg Oral BID    ferrous sulfate tablet 325 mg  1 Tab Oral BID WITH MEALS    albuterol-ipratropium (DUO-NEB) 2.5 MG-0.5 MG/3 ML  3 mL Nebulization QID RT    diphenhydrAMINE (BENADRYL) injection 12.5 mg  12.5 mg IntraVENous TID PRN    alum-mag hydroxide-simeth (MYLANTA) oral suspension 30 mL  30 mL Oral Q6H PRN    acetaminophen (TYLENOL) tablet 650 mg  650 mg Oral Q4H PRN    oxyCODONE IR (ROXICODONE) tablet 5 mg  5 mg Oral Q6H PRN    ondansetron (ZOFRAN) injection 4 mg  4 mg IntraVENous Q4H PRN    insulin lispro (HUMALOG) injection 2-6 Units  2-6 Units SubCUTAneous TIDAC         Allergies         Patient has no known allergies.        Labs/Imaging/Diagnostics      Labs:  Recent Results (from the past 24 hour(s))   GLUCOSE, POC    Collection Time: 09/12/20  4:22 PM   Result Value Ref Range    Glucose (POC) 122 (H) 65 - 100 mg/dL    Performed by Coreen Cheadle    RENAL FUNCTION PANEL    Collection Time: 09/13/20  5:25 AM   Result Value Ref Range    Sodium 133 (L) 136 - 145 mmol/L    Potassium 4.0 3.5 - 5.1 mmol/L    Chloride 96 (L) 97 - 108 mmol/L    CO2 33 (H) 21 - 32 mmol/L    Anion gap 4 (L) 5 - 15 mmol/L    Glucose 91 65 - 100 mg/dL    BUN 11 6 - 20 mg/dL    Creatinine 1.16 (H) 0.55 - 1.02 mg/dL    BUN/Creatinine ratio 9 (L) 12 - 20      GFR est AA 57 (L) >60 ml/min/1.73m2    GFR est non-AA 47 (L) >60 ml/min/1.73m2    Calcium 9.7 8.5 - 10.1 mg/dL Phosphorus 4.2 2.6 - 4.7 mg/dL    Albumin 3.2 (L) 3.5 - 5.0 g/dL   CBC W/O DIFF    Collection Time: 09/13/20  5:25 AM   Result Value Ref Range    WBC 4.7 4.4 - 11.3 K/uL    RBC 3.01 (L) 4.50 - 5.90 M/uL    HGB 10.0 (L) 13.5 - 17.5 %    HCT 28.7 (L) 36 - 46 %    MCV 95.2 80 - 100 FL    MCH 33.0 31 - 34 PG    MCHC 34.7 31.0 - 36.0 g/dL    RDW 13.7 11.5 - 14.5 %    PLATELET 807 K/uL    MPV 7.8 6.5 - 11.5 FL    NRBC 10.0  WBC    ABSOLUTE NRBC 0.00 K/uL   GLUCOSE, POC    Collection Time: 09/13/20  7:06 AM   Result Value Ref Range    Glucose (POC) 103 (H) 65 - 100 mg/dL    Performed by Brenda DECKER         Imaging Last 24 Hours:  No results found. Assessment//Plan           Pain of upper abdomen  No acute pathology noted on the CT of the abdomen and pelvis and it is felt that the abdominal pain may be from constipation so patient is being treated with Colace and MiraLAX and will continue same. Acute hyponatremia  Patient is currently on fluid restriction for hyponatremia we will continue to monitor sodium level. Degeneration of lumbar intervertebral disc  With back pain secondary to compression fractures of thoracic and lumbar spine and is to follow-up with orthopedic in [place] in a few days. Continue with as needed pain meds    COPD exacerbation (HCC)  Stable on duo nebs, Symbicort, montelukast, steroids and oxygen. Continue same    HTN (hypertension)  Blood pressure stable on amlodipine, lisinopril.   We will continue to follow         Electronically signed by Digna Josue MD on 9/13/2020 at 1:58 PM

## 2020-09-12 NOTE — ASSESSMENT & PLAN NOTE
With back pain secondary to compression fractures of thoracic and lumbar spine and is to follow-up with orthopedic in [facility] in a few days.   Continue with as needed pain meds

## 2020-09-12 NOTE — ASSESSMENT & PLAN NOTE
No acute pathology noted on the CT of the abdomen and pelvis and it is felt that the abdominal pain may be from constipation so patient is being treated with Colace and MiraLAX and will continue same.

## 2020-09-13 VITALS
TEMPERATURE: 97.8 F | HEIGHT: 67 IN | HEART RATE: 90 BPM | OXYGEN SATURATION: 97 % | DIASTOLIC BLOOD PRESSURE: 59 MMHG | SYSTOLIC BLOOD PRESSURE: 105 MMHG | WEIGHT: 179.45 LBS | BODY MASS INDEX: 28.17 KG/M2 | RESPIRATION RATE: 20 BRPM

## 2020-09-13 PROBLEM — E87.1 ACUTE HYPONATREMIA: Status: RESOLVED | Noted: 2020-09-11 | Resolved: 2020-09-13

## 2020-09-13 LAB
ALBUMIN SERPL-MCNC: 3.2 G/DL (ref 3.5–5)
ANION GAP SERPL CALC-SCNC: 4 MMOL/L (ref 5–15)
BUN SERPL-MCNC: 11 MG/DL (ref 6–20)
BUN/CREAT SERPL: 9 (ref 12–20)
CA-I BLD-MCNC: 9.7 MG/DL (ref 8.5–10.1)
CHLORIDE SERPL-SCNC: 96 MMOL/L (ref 97–108)
CO2 SERPL-SCNC: 33 MMOL/L (ref 21–32)
CREAT SERPL-MCNC: 1.16 MG/DL (ref 0.55–1.02)
ERYTHROCYTE [DISTWIDTH] IN BLOOD BY AUTOMATED COUNT: 13.7 % (ref 11.5–14.5)
GLUCOSE BLD STRIP.AUTO-MCNC: 101 MG/DL (ref 65–100)
GLUCOSE BLD STRIP.AUTO-MCNC: 103 MG/DL (ref 65–100)
GLUCOSE SERPL-MCNC: 91 MG/DL (ref 65–100)
HCT VFR BLD AUTO: 28.7 % (ref 36–46)
HGB BLD-MCNC: 10 % (ref 13.5–17.5)
MCH RBC QN AUTO: 33 PG (ref 31–34)
MCHC RBC AUTO-ENTMCNC: 34.7 G/DL (ref 31–36)
MCV RBC AUTO: 95.2 FL (ref 80–100)
NRBC # BLD: 0 K/UL
NRBC BLD-RTO: 10 PER 100 WBC
PERFORMED BY, TECHID: ABNORMAL
PERFORMED BY, TECHID: ABNORMAL
PHOSPHATE SERPL-MCNC: 4.2 MG/DL (ref 2.6–4.7)
PLATELET # BLD AUTO: 347 K/UL
PMV BLD AUTO: 7.8 FL (ref 6.5–11.5)
POTASSIUM SERPL-SCNC: 4 MMOL/L (ref 3.5–5.1)
RBC # BLD AUTO: 3.01 M/UL (ref 4.5–5.9)
SODIUM SERPL-SCNC: 133 MMOL/L (ref 136–145)
WBC # BLD AUTO: 4.7 K/UL (ref 4.4–11.3)

## 2020-09-13 PROCEDURE — 74011000250 HC RX REV CODE- 250: Performed by: FAMILY MEDICINE

## 2020-09-13 PROCEDURE — 99218 HC RM OBSERVATION: CPT

## 2020-09-13 PROCEDURE — 82962 GLUCOSE BLOOD TEST: CPT

## 2020-09-13 PROCEDURE — 85027 COMPLETE CBC AUTOMATED: CPT

## 2020-09-13 PROCEDURE — 80069 RENAL FUNCTION PANEL: CPT

## 2020-09-13 PROCEDURE — 74011250637 HC RX REV CODE- 250/637: Performed by: FAMILY MEDICINE

## 2020-09-13 PROCEDURE — 94640 AIRWAY INHALATION TREATMENT: CPT

## 2020-09-13 PROCEDURE — 77010033678 HC OXYGEN DAILY

## 2020-09-13 RX ORDER — DOCUSATE SODIUM 100 MG/1
100 CAPSULE, LIQUID FILLED ORAL 2 TIMES DAILY
Qty: 60 CAP | Refills: 0 | Status: SHIPPED | OUTPATIENT
Start: 2020-09-13 | End: 2020-11-12 | Stop reason: SDUPTHER

## 2020-09-13 RX ORDER — OXYCODONE HYDROCHLORIDE 5 MG/1
5 TABLET ORAL
Qty: 15 TAB | Refills: 0 | Status: SHIPPED | OUTPATIENT
Start: 2020-09-13 | End: 2020-09-16

## 2020-09-13 RX ORDER — SENNOSIDES 8.6 MG/1
1 TABLET ORAL 2 TIMES DAILY
Qty: 60 TAB | Refills: 0 | Status: SHIPPED | OUTPATIENT
Start: 2020-09-13 | End: 2020-09-25 | Stop reason: SDUPTHER

## 2020-09-13 RX ORDER — POLYETHYLENE GLYCOL 3350 17 G/17G
17 POWDER, FOR SOLUTION ORAL 2 TIMES DAILY
Qty: 30 PACKET | Refills: 0 | Status: SHIPPED | OUTPATIENT
Start: 2020-09-13 | End: 2020-09-28

## 2020-09-13 RX ORDER — METOPROLOL TARTRATE 25 MG/1
25 TABLET, FILM COATED ORAL 2 TIMES DAILY
Qty: 60 TAB | Refills: 0 | Status: SHIPPED | OUTPATIENT
Start: 2020-09-13 | End: 2020-09-25 | Stop reason: SDUPTHER

## 2020-09-13 RX ADMIN — SENNOSIDES 8.6 MG: 8.6 TABLET, FILM COATED ORAL at 09:31

## 2020-09-13 RX ADMIN — FLUTICASONE PROPIONATE 2 SPRAY: 50 SPRAY, METERED NASAL at 09:32

## 2020-09-13 RX ADMIN — OXYCODONE HYDROCHLORIDE 5 MG: 5 TABLET ORAL at 09:31

## 2020-09-13 RX ADMIN — POLYETHYLENE GLYCOL 3350 17 G: 17 POWDER, FOR SOLUTION ORAL at 09:35

## 2020-09-13 RX ADMIN — PANTOPRAZOLE SODIUM 40 MG: 40 TABLET, DELAYED RELEASE ORAL at 09:31

## 2020-09-13 RX ADMIN — IPRATROPIUM BROMIDE AND ALBUTEROL SULFATE 3 ML: .5; 3 SOLUTION RESPIRATORY (INHALATION) at 11:52

## 2020-09-13 RX ADMIN — IPRATROPIUM BROMIDE AND ALBUTEROL SULFATE 3 ML: .5; 3 SOLUTION RESPIRATORY (INHALATION) at 08:04

## 2020-09-13 RX ADMIN — FLUTICASONE FUROATE AND VILANTEROL 1 PUFF: 100; 25 POWDER RESPIRATORY (INHALATION) at 08:24

## 2020-09-13 RX ADMIN — DOCUSATE SODIUM 100 MG: 100 CAPSULE, LIQUID FILLED ORAL at 09:31

## 2020-09-13 RX ADMIN — CETIRIZINE HYDROCHLORIDE 10 MG: 10 TABLET, FILM COATED ORAL at 09:32

## 2020-09-13 RX ADMIN — FERROUS SULFATE TAB 325 MG (65 MG ELEMENTAL FE) 325 MG: 325 (65 FE) TAB at 09:31

## 2020-09-13 RX ADMIN — METOPROLOL TARTRATE 25 MG: 25 TABLET, FILM COATED ORAL at 09:30

## 2020-09-13 RX ADMIN — ATORVASTATIN CALCIUM 10 MG: 10 TABLET, FILM COATED ORAL at 09:32

## 2020-09-13 NOTE — ROUTINE PROCESS
PT. TRANSFER VIA W/C TO [facility] FOR TRANSPORT HOME BY FAMILY MEMBER

## 2020-09-13 NOTE — DISCHARGE SUMMARY
Physician Discharge Summary       Patient: Tiara Shelton MRN: [id number]     YOB: 1955  Age: 60 y.o. Sex: female    PCP: Anamaria Owen MD    Allergies: Patient has no known allergies. Admit date: 9/8/2020  Admitting Provider: Stormy Mendez MD    Discharge date: 9/13/2020  Discharging Provider: Christin Hinojosa MD    * Admission Diagnoses: Acute kidney injury [facility]) [N17.9]    * Discharge Diagnoses:    Hospital Problems as of 9/13/2020 Date Reviewed: 9/3/2020          Codes Class Noted - Resolved POA    Chronic respiratory failure ([facility] [address] ICD-10-CM: J96.10  ICD-9-CM: 518.83  9/11/2020 - Present Clinically Undetermined        Degeneration of lumbar intervertebral disc ICD-10-CM: M51.36  ICD-9-CM: 722.52  9/11/2020 - Present Clinically Undetermined        Chronic back pain ICD-10-CM: M54.9, G89.29  ICD-9-CM: 724.5, 338.29  9/11/2020 - Present Clinically Undetermined        Type 2 diabetes mellitus ([facility] ICD-10-CM: E11.9  ICD-9-CM: 250.00  9/11/2020 - Present         Acute kidney injury ([facility] [address] ICD-10-CM: N17.9  ICD-9-CM: 584.9  9/11/2020 - Present Clinically Undetermined        Pain of upper abdomen ICD-10-CM: R10.10  ICD-9-CM: 789.09  9/1/2020 - Present Yes        HTN (hypertension) ICD-10-CM: I10  ICD-9-CM: 401.9  9/1/2020 - Present Yes        COPD exacerbation ([facility] [address] ICD-10-CM: J44.1  ICD-9-CM: 491.21  5/23/2020 - Present Yes        RESOLVED: Acute hyponatremia ICD-10-CM: E87.1  ICD-9-CM: 276.1  9/11/2020 - 9/13/2020 Clinically Undetermined              * Hospital Course: 52-year-old woman with history of coronary artery disease, hypertension, anxiety, COPD on chronic O2 was admitted for right lower quadrant abdominal pain along with lower back pain. Patient has CT of the abdomen that did not show any findings in the abdomen pelvis but did show multiple compression deformities in the lower thoracic and lumbar spine.   It was determined that the abdominal pain was possibly secondary to constipation so patient was started on MiraLAX along with Colace and docusate. Patient was tolerating food without any problems though from time to time with report of abdominal pain though proved from the complaint of admission. Patient was made aware that the CT was normal and that she should continue to take the medications for constipation. Of note is that she did have a repeat x-ray of the abdomen that showed few small air-fluid levels in the upper abdomen possibly colonic there is a paucity of small bowel gas without distention. No obvious large stool load is identified the images. Patient was also seen by gastroenterology who agreed with the plan. During this admission patient had a sodium that went down as low as 124. This was treated with fluid restriction of 42 cc/day. Patient sodium did improve with diet discharge was noted sodium 133. Had initially a hemoglobin of 12.5 and it did go down during the hospitalization to the lowest of 10.5 with vaginal discharge it was at 10 and plan was to continue her on iron that was started while she was in the hospital she would need the hemoglobin monitored as an outpatient. In regards to the spinal fracture noted patient already has appointment with orthopedic in 4 days which she was told to keep and she will be discharged home on oxycodone as needed until further evaluation by Ortho. Patient was evaluated by physical therapy and ambulated around her room without much problems.     * Procedures: None  * No surgery found *      Consults:  Cardiology, gastroenterology, nephrology    Discharge Exam:  Gen:  AAO X 4, NAD  HEENT   Eyes: extraocular muscles intact  Heart:  RRR  Lungs: CTA, neg wheezes/crackles  Abd:  Soft, nontender, nondistended, positive BS  Ext: no cyanosis, clubbing or edema  Neuro: awake and alert, answering questions    * Discharge Condition: improved  * Disposition: Home    Discharge Medications:  Current Discharge Medication List      START taking these medications    Details   polyethylene glycol (MIRALAX) 17 gram packet Take 1 Packet by mouth two (2) times a day for 15 days. Qty: 30 Packet, Refills: 0      senna (SENOKOT) 8.6 mg tablet Take 1 Tab by mouth two (2) times a day for 30 days. Qty: 60 Tab, Refills: 0      docusate sodium (COLACE) 100 mg capsule Take 1 Cap by mouth two (2) times a day for 90 days. Qty: 60 Cap, Refills: 0      metoprolol tartrate (LOPRESSOR) 25 mg tablet Take 1 Tab by mouth two (2) times a day. Qty: 60 Tab, Refills: 0      oxyCODONE IR (ROXICODONE) 5 mg immediate release tablet Take 1 Tab by mouth every six (6) hours as needed for Pain for up to 3 days. Max Daily Amount: 20 mg.  Qty: 15 Tab, Refills: 0    Associated Diagnoses: Chronic bilateral back pain, unspecified back location         CONTINUE these medications which have NOT CHANGED    Details   mometasone-formoterol (Dulera) 100-5 mcg/actuation HFA inhaler Take 2 Puffs by inhalation two (2) times a day. Indications: controller medication for asthma      furosemide (LASIX) 40 mg tablet Take 40 mg by mouth daily. Indications: visible water retention      fluticasone propionate (FLONASE) 50 mcg/actuation nasal spray SPRAY 2 SPRAYS INTO EACH NOSTRIL EVERY DAY  Qty: 1 Bottle, Refills: 2      ergocalciferol (ERGOCALCIFEROL) 1,250 mcg (50,000 unit) capsule TAKE ONE CAPSULE BY MOUTH ONCE A WEEK  Qty: 4 Cap, Refills: 2      atorvastatin (LIPITOR) 10 mg tablet TAKE 1 TABLET BY MOUTH EVERY DAY  Qty: 30 Tab, Refills: 1      metFORMIN (GLUCOPHAGE) 500 mg tablet Take 500 mg by mouth two (2) times daily (with meals). Indications: type 2 diabetes mellitus      ciprofloxacin HCl (CIPRO) 500 mg tablet Take 1 Tab by mouth two (2) times a day.  Indications: diverticulitis  Qty: 30 Tab, Refills: 0    Associated Diagnoses: Diverticular disease of colon      predniSONE (DELTASONE) 10 mg tablet USE AS DIRECTED IN PRIOR RX  Qty: 40 Tab, Refills: 1    Associated Diagnoses: Chronic obstructive pulmonary disease, unspecified (HCC)      loratadine (CLARITIN) 10 mg tablet TAKE 1 TABLET BY MOUTH EVERY DAY  Qty: 30 Tab, Refills: 2      lisinopriL (PRINIVIL, ZESTRIL) 20 mg tablet Take 1 Tab by mouth daily. Qty: 30 Tab, Refills: 0      metroNIDAZOLE (FlagyL) 500 mg tablet Take 1 Tab by mouth three (3) times daily. Qty: 15 Tab, Refills: 0      albuterol-ipratropium (DUO-NEB) 2.5 mg-0.5 mg/3 ml nebu 3 mL by Nebulization route every four (4) hours as needed for Wheezing. albuterol (PROVENTIL HFA, VENTOLIN HFA, PROAIR HFA) 90 mcg/actuation inhaler Take 2 Puffs by inhalation every four (4) hours as needed for Wheezing. pantoprazole (PROTONIX) 40 mg tablet Take 40 mg by mouth Before breakfast and dinner. amLODIPine (NORVASC) 10 mg tablet Take 10 mg by mouth daily. Refills: 3      azelastine (ASTELIN) 137 mcg (0.1 %) nasal spray 2 Sprays by Both Nostrils route nightly. Refills: 5      montelukast (SINGULAIR) 10 mg tablet TAKE 1 TABLET BY MOUTH EVERY DAY FOR ALLERGY SYMPTOMS  Refills: 5         STOP taking these medications       budesonide-formoteroL (Symbicort) 80-4.5 mcg/actuation HFAA Comments:   Reason for Stopping:         fluticasone furoate-vilanteroL (Breo Ellipta) 100-25 mcg/dose inhaler Comments:   Reason for Stopping:               * Follow-up Care/Patient Instructions:   Activity: Activity as tolerated  Diet: Cardiac Diet  Follow-up with orthopedic for back pain as already scheduled  Take MiraLAX, senna, Colace for abdominal pain secondary to constipation    Follow-up Information     Follow up With Specialties Details Why Rickie Montoya MD Family Medicine Schedule an appointment as soon as possible for a visit in 3 days  [address] [place] [address]  [address]  [phone]            Signed:  Gilberto Santana MD  9/13/2020  11:26 AM

## 2020-09-13 NOTE — ROUTINE PROCESS
Bedside shift change report given to (oncoming nurse) by Leidy SILVERMAN(offgoing nurse)ILA TAYLOR Report included the following information SBAR.

## 2020-09-13 NOTE — ROUTINE PROCESS
Bedside shift change report given to Liam Roberts LPN (oncoming nurse) by Leela Ruth LPN (offgoing nurse). Report included the following information SBAR.

## 2020-09-14 DIAGNOSIS — E11.9 CONTROLLED TYPE 2 DIABETES MELLITUS WITHOUT COMPLICATION, WITHOUT LONG-TERM CURRENT USE OF INSULIN (HCC): Primary | ICD-10-CM

## 2020-09-14 RX ORDER — BLOOD-GLUCOSE METER
1 EACH MISCELLANEOUS DAILY
Qty: 1 EACH | Refills: 0 | Status: SHIPPED | OUTPATIENT
Start: 2020-09-14 | End: 2020-09-15 | Stop reason: SDUPTHER

## 2020-09-14 RX ORDER — CALCIUM CITRATE/VITAMIN D3 200MG-6.25
TABLET ORAL
Qty: 100 STRIP | Refills: 1 | Status: SHIPPED | OUTPATIENT
Start: 2020-09-14 | End: 2021-04-19

## 2020-09-15 ENCOUNTER — TELEPHONE (OUTPATIENT)
Dept: PRIMARY CARE CLINIC | Age: 65
End: 2020-09-15

## 2020-09-15 DIAGNOSIS — E11.9 CONTROLLED TYPE 2 DIABETES MELLITUS WITHOUT COMPLICATION, WITHOUT LONG-TERM CURRENT USE OF INSULIN (HCC): Primary | ICD-10-CM

## 2020-09-15 RX ORDER — LANCETS
EACH MISCELLANEOUS
Qty: 50 EACH | Refills: 3 | Status: SHIPPED | OUTPATIENT
Start: 2020-09-15 | End: 2020-11-24 | Stop reason: SDUPTHER

## 2020-09-15 RX ORDER — BLOOD-GLUCOSE METER
EACH MISCELLANEOUS
Qty: 1 EACH | Refills: 0 | Status: SHIPPED | OUTPATIENT
Start: 2020-09-15 | End: 2021-05-05 | Stop reason: ALTCHOICE

## 2020-09-17 RX ORDER — IPRATROPIUM BROMIDE AND ALBUTEROL SULFATE 2.5; .5 MG/3ML; MG/3ML
SOLUTION RESPIRATORY (INHALATION)
Qty: 360 ML | Refills: 1 | Status: SHIPPED | OUTPATIENT
Start: 2020-09-17 | End: 2020-09-25 | Stop reason: SDUPTHER

## 2020-09-17 RX ORDER — AMLODIPINE BESYLATE 10 MG/1
TABLET ORAL
Qty: 30 TAB | Refills: 2 | Status: SHIPPED | OUTPATIENT
Start: 2020-09-17 | End: 2020-12-28 | Stop reason: SDUPTHER

## 2020-09-24 ENCOUNTER — TELEPHONE (OUTPATIENT)
Dept: PRIMARY CARE CLINIC | Age: 65
End: 2020-09-24

## 2020-09-25 ENCOUNTER — VIRTUAL VISIT (OUTPATIENT)
Dept: PRIMARY CARE CLINIC | Age: 65
End: 2020-09-25
Payer: MEDICAID

## 2020-09-25 DIAGNOSIS — I10 ESSENTIAL HYPERTENSION: ICD-10-CM

## 2020-09-25 DIAGNOSIS — K21.9 GASTROESOPHAGEAL REFLUX DISEASE WITHOUT ESOPHAGITIS: ICD-10-CM

## 2020-09-25 DIAGNOSIS — J30.2 SEASONAL ALLERGIC RHINITIS, UNSPECIFIED TRIGGER: Primary | ICD-10-CM

## 2020-09-25 DIAGNOSIS — I10 ESSENTIAL HYPERTENSION: Primary | ICD-10-CM

## 2020-09-25 DIAGNOSIS — J44.1 COPD EXACERBATION (HCC): ICD-10-CM

## 2020-09-25 PROBLEM — N87.9 CERVICAL INTRAEPITHELIAL NEOPLASIA: Status: ACTIVE | Noted: 2020-09-25

## 2020-09-25 PROBLEM — Z78.0 MENOPAUSE PRESENT: Status: ACTIVE | Noted: 2020-09-25

## 2020-09-25 PROCEDURE — 99214 OFFICE O/P EST MOD 30 MIN: CPT | Performed by: FAMILY MEDICINE

## 2020-09-25 RX ORDER — ALBUTEROL SULFATE 0.83 MG/ML
SOLUTION RESPIRATORY (INHALATION)
COMMUNITY
End: 2021-06-21 | Stop reason: SDUPTHER

## 2020-09-25 RX ORDER — LIDOCAINE 50 MG/G
PATCH TOPICAL
COMMUNITY
Start: 2020-09-17 | End: 2022-10-01

## 2020-09-25 RX ORDER — ZOLPIDEM TARTRATE 5 MG/1
TABLET ORAL
COMMUNITY
Start: 2020-09-22 | End: 2021-11-04 | Stop reason: ALTCHOICE

## 2020-09-25 RX ORDER — IPRATROPIUM BROMIDE 42 UG/1
2 SPRAY, METERED NASAL
Qty: 15 ML | Refills: 3 | Status: SHIPPED | OUTPATIENT
Start: 2020-09-25 | End: 2021-02-13

## 2020-09-25 RX ORDER — FLUTICASONE FUROATE AND VILANTEROL 100; 25 UG/1; UG/1
POWDER RESPIRATORY (INHALATION)
COMMUNITY
End: 2021-06-11 | Stop reason: SDUPTHER

## 2020-09-25 NOTE — PROGRESS NOTES
Oswaldo Puckett is a 60 y.o. female who was seen by synchronous (real-time) audio-video technology on 9/25/2020 for GERD; Asthma; and Allergic Rhinitis        Assessment & Plan:   Diagnoses and all orders for this visit:    1. Seasonal allergic rhinitis, unspecified trigger  -     REFERRAL TO ENT-OTOLARYNGOLOGY  -     ipratropium (ATROVENT) 42 mcg (0.06 %) nasal spray; 2 Sprays by Both Nostrils route four (4) times daily as needed for Rhinitis (post nasal drainage). 2. COPD exacerbation ([facility])  -     REFERRAL TO ENT-OTOLARYNGOLOGY    3. Gastroesophageal reflux disease without esophagitis    4. Essential hypertension        I spent at least 40 minutes on this visit with this established patient. Subjective: This patient requested a video virtual visit for difficulty with postnasal drainage that triggers her asthma attacks. She is on 2 different nasal sprays as well as using her inhalers. She is starting to have more postnasal drainage causing a choking spell and then she will get into coughing fits. She feels this is likely related to the increased pollen in the area. She was in the hospital recently and was restarted on metoprolol but she did not take it. Currently her blood pressure is controlled and I was concerned about the relationship between a beta-blocker and asthma in the past and I recommended she not continue to take it. She had been seen Dr. Audrey Dorsey in the past for this problem and I will refer her to Dr. Soraida Jackson.  We did extensive medication reconciliation and I believe we now have all her current medications and Epic. Prior to Admission medications    Medication Sig Start Date End Date Taking? Authorizing Provider   ipratropium (ATROVENT) 42 mcg (0.06 %) nasal spray 2 Sprays by Both Nostrils route four (4) times daily as needed for Rhinitis (post nasal drainage).  9/25/20  Yes Maura Eaton MD   fluticasone furoate-vilanteroL (BREO ELLIPTA) 100-25 mcg/dose inhaler Breo Ellipta 100 mcg-25 mcg/dose powder for inhalation    Provider, Historical   lidocaine (LIDODERM) 5 % APPLY 1 PATCH EVERY DAY. REMOVE AFTER 12 HOURS 9/17/20   Provider, Historical   zolpidem (AMBIEN) 5 mg tablet  9/22/20   Provider, Historical   albuterol (PROVENTIL VENTOLIN) 2.5 mg /3 mL (0.083 %) nebu albuterol sulfate 2.5 mg/3 mL (0.083 %) solution for nebulization   USE ONE VIAL VIA NEBULIZER EVERY EIGHT HOURS AS NEEDED    Provider, Historical   amLODIPine (NORVASC) 10 mg tablet TAKE 1 TABLET BY MOUTH EVERY DAY 9/17/20   Stephanie Mccrary MD   albuterol-ipratropium (DUO-NEB) 2.5 mg-0.5 mg/3 ml nebu INHALE 3 ML 4 TIMES A DAY BY NEBULIZATION ROUTE. 9/17/20 9/25/20  Stephanie Mccrary MD   Blood-Glucose Meter (True Metrix Glucose Meter) misc TO USE DAILY TO CHECK BLOOD SUGAR. 9/15/20   Stephanie Mccrary MD   lancets [facility] USE ONCE LANCET PER DAY TO CHECK BLOOD SUGAR. 9/15/20   Stephanie Mccrary MD   glucose blood VI test strips (True Metrix Glucose Test Strip) strip Use one strip daily to test blood glucose subcutaneously. E11.9 9/14/20   Stephanie Mccrary MD   polyethylene glycol (MIRALAX) 17 gram packet Take 1 Packet by mouth two (2) times a day for 15 days. 9/13/20 9/28/20  Campos Linder MD   docusate sodium (COLACE) 100 mg capsule Take 1 Cap by mouth two (2) times a day for 90 days. 9/13/20 12/12/20  Campos Linder MD   senna (SENOKOT) 8.6 mg tablet Take 1 Tab by mouth two (2) times a day for 30 days. 9/13/20 9/25/20  Campos Linder MD   metoprolol tartrate (LOPRESSOR) 25 mg tablet Take 1 Tab by mouth two (2) times a day. 9/13/20 9/25/20  Campos Linder MD   furosemide (LASIX) 40 mg tablet Take 40 mg by mouth daily. Indications: visible water retention    Provider, Historical   mometasone-formoterol (Dulera) 100-5 mcg/actuation HFA inhaler Take 2 Puffs by inhalation two (2) times a day.  Indications: controller medication for asthma  9/25/20  Provider, Historical   fluticasone propionate (FLONASE) 50 mcg/actuation nasal spray SPRAY 2 SPRAYS INTO EACH NOSTRIL EVERY DAY 9/2/20   Kiran Shculz MD   ergocalciferol (ERGOCALCIFEROL) 1,250 mcg (50,000 unit) capsule TAKE ONE CAPSULE BY MOUTH ONCE A WEEK 9/2/20   Kiran Schulz MD   atorvastatin (LIPITOR) 10 mg tablet TAKE 1 TABLET BY MOUTH EVERY DAY 9/2/20   Kiran Schulz MD   metFORMIN (GLUCOPHAGE) 500 mg tablet Take 500 mg by mouth two (2) times daily (with meals). Indications: type 2 diabetes mellitus    Provider, Historical   ciprofloxacin HCl (CIPRO) 500 mg tablet Take 1 Tab by mouth two (2) times a day. Indications: diverticulitis 8/19/20 9/25/20  Isela Altamirano MD   predniSONE (DELTASONE) 10 mg tablet USE AS DIRECTED IN PRIOR RX 8/16/20   Kiran Schulz MD   loratadine (CLARITIN) 10 mg tablet TAKE 1 TABLET BY MOUTH EVERY DAY 8/14/20   Kiran Schulz MD   lisinopriL (PRINIVIL, ZESTRIL) 20 mg tablet Take 1 Tab by mouth daily. 5/29/20   Hilliard Cogan, MD   metroNIDAZOLE (FlagyL) 500 mg tablet Take 1 Tab by mouth three (3) times daily. 5/29/20 9/25/20  Hilliard Cogan, MD   albuterol (PROVENTIL HFA, VENTOLIN HFA, PROAIR HFA) 90 mcg/actuation inhaler Take 2 Puffs by inhalation every four (4) hours as needed for Wheezing. Provider, Historical   pantoprazole (PROTONIX) 40 mg tablet Take 40 mg by mouth Before breakfast and dinner. Provider, Historical   azelastine (ASTELIN) 137 mcg (0.1 %) nasal spray 2 Sprays by Both Nostrils route nightly.  7/16/18   Provider, Historical   montelukast (SINGULAIR) 10 mg tablet TAKE 1 TABLET BY MOUTH EVERY DAY FOR ALLERGY SYMPTOMS 6/25/18   Provider, Historical     Patient Active Problem List   Diagnosis Code    Arthritis of right hip M16.11    COPD exacerbation (HCC) J44.1    Cecum mass K63.89    Seasonal allergic rhinitis J30.2    Intermittent asthma J45.20    Vitamin D deficiency E55.9    Gastroesophageal reflux disease without esophagitis K21.9    HTN (hypertension) I10    Bronchitis J40    Degeneration of lumbar intervertebral disc M51.36    Type 2 diabetes mellitus (HCC) E11.9    Acute kidney injury (HCC) N17.9    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    Menopause present Z78.0     Patient Active Problem List    Diagnosis Date Noted    Cervical intraepithelial neoplasia 09/25/2020    Menopause present 09/25/2020    Degeneration of lumbar intervertebral disc 09/11/2020    Type 2 diabetes mellitus ([facility] 09/11/2020    Acute kidney injury ([facility] 09/11/2020    Seasonal allergic rhinitis 09/01/2020    Intermittent asthma 09/01/2020    Vitamin D deficiency 09/01/2020    Gastroesophageal reflux disease without esophagitis 09/01/2020    HTN (hypertension) 09/01/2020    Bronchitis 09/01/2020    COPD exacerbation ([facility] 05/23/2020    Cecum mass 05/23/2020    Arthritis of right hip 10/24/2018     Current Outpatient Medications   Medication Sig Dispense Refill    ipratropium (ATROVENT) 42 mcg (0.06 %) nasal spray 2 Sprays by Both Nostrils route four (4) times daily as needed for Rhinitis (post nasal drainage). 15 mL 3    fluticasone furoate-vilanteroL (BREO ELLIPTA) 100-25 mcg/dose inhaler Breo Ellipta 100 mcg-25 mcg/dose powder for inhalation      lidocaine (LIDODERM) 5 % APPLY 1 PATCH EVERY DAY. REMOVE AFTER 12 HOURS      zolpidem (AMBIEN) 5 mg tablet       albuterol (PROVENTIL VENTOLIN) 2.5 mg /3 mL (0.083 %) nebu albuterol sulfate 2.5 mg/3 mL (0.083 %) solution for nebulization   USE ONE VIAL VIA NEBULIZER EVERY EIGHT HOURS AS NEEDED      amLODIPine (NORVASC) 10 mg tablet TAKE 1 TABLET BY MOUTH EVERY DAY 30 Tab 2    Blood-Glucose Meter (True Metrix Glucose Meter) misc TO USE DAILY TO CHECK BLOOD SUGAR. 1 Each 0    lancets misc USE ONCE LANCET PER DAY TO CHECK BLOOD SUGAR. 50 Each 3    glucose blood VI test strips (True Metrix Glucose Test Strip) strip Use one strip daily to test blood glucose subcutaneously.  E11.9 100 Strip 1    polyethylene glycol (MIRALAX) 17 gram packet Take 1 Packet by mouth two (2) times a day for 15 days. 30 Packet 0    docusate sodium (COLACE) 100 mg capsule Take 1 Cap by mouth two (2) times a day for 90 days. 60 Cap 0    furosemide (LASIX) 40 mg tablet Take 40 mg by mouth daily. Indications: visible water retention      fluticasone propionate (FLONASE) 50 mcg/actuation nasal spray SPRAY 2 SPRAYS INTO EACH NOSTRIL EVERY DAY 1 Bottle 2    ergocalciferol (ERGOCALCIFEROL) 1,250 mcg (50,000 unit) capsule TAKE ONE CAPSULE BY MOUTH ONCE A WEEK 4 Cap 2    atorvastatin (LIPITOR) 10 mg tablet TAKE 1 TABLET BY MOUTH EVERY DAY 30 Tab 1    metFORMIN (GLUCOPHAGE) 500 mg tablet Take 500 mg by mouth two (2) times daily (with meals). Indications: type 2 diabetes mellitus      predniSONE (DELTASONE) 10 mg tablet USE AS DIRECTED IN PRIOR RX 40 Tab 1    loratadine (CLARITIN) 10 mg tablet TAKE 1 TABLET BY MOUTH EVERY DAY 30 Tab 2    lisinopriL (PRINIVIL, ZESTRIL) 20 mg tablet Take 1 Tab by mouth daily. 30 Tab 0    albuterol (PROVENTIL HFA, VENTOLIN HFA, PROAIR HFA) 90 mcg/actuation inhaler Take 2 Puffs by inhalation every four (4) hours as needed for Wheezing.  pantoprazole (PROTONIX) 40 mg tablet Take 40 mg by mouth Before breakfast and dinner.  azelastine (ASTELIN) 137 mcg (0.1 %) nasal spray 2 Sprays by Both Nostrils route nightly.   5    montelukast (SINGULAIR) 10 mg tablet TAKE 1 TABLET BY MOUTH EVERY DAY FOR ALLERGY SYMPTOMS  5     No Known Allergies  Past Medical History:   Diagnosis Date    Arthritis     Bronchitis 9/1/2020    Chronic obstructive pulmonary disease (HCC)     Chronic pain     Diabetes ([facility]     Gastroesophageal reflux disease without esophagitis 9/1/2020    GERD (gastroesophageal reflux disease)     HTN (hypertension) 9/1/2020    Hypertension     Intermittent asthma 9/1/2020    Pain of upper abdomen 9/1/2020    Seasonal allergic rhinitis 9/1/2020    Vitamin D deficiency 9/1/2020     Past Surgical History: Procedure Laterality Date    COLONOSCOPY N/A 5/25/2020    COLONOSCOPY performed by [facility]ries MD at [facility]    HX COLONOSCOPY      HX GI      colonscopy    HX ORTHOPAEDIC  10/24/2018    Total Right Hip Arthroplasty Dr. Nasrin Viera. Family History   Problem Relation Age of Onset    Hypertension Mother     Cancer Sister      Social History     Tobacco Use    Smoking status: Former Smoker    Smokeless tobacco: Never Used   Substance Use Topics    Alcohol use: Yes     Alcohol/week: 3.0 standard drinks     Types: 3 Cans of beer per week       Review of Systems   Constitutional: Negative. HENT: Positive for congestion. Negative for sinus pain. Respiratory: Positive for cough. Cardiovascular: Negative. Gastrointestinal: Positive for heartburn. Genitourinary: Negative. Neurological: Negative. Psychiatric/Behavioral: Negative. All other systems reviewed and are negative.       Objective:     Patient-Reported Vitals 9/25/2020   Patient-Reported Pulse 90   Patient-Reported Temperature 98.6   Patient-Reported SpO2 96%   Patient-Reported Systolic  647   Patient-Reported Diastolic 71        [INSTRUCTIONS:  \"[x]\" Indicates a positive item  \"[]\" Indicates a negative item  -- DELETE ALL ITEMS NOT EXAMINED]    Constitutional: [x] Appears well-developed and well-nourished [x] No apparent distress      [] Abnormal -     Mental status: [x] Alert and awake  [x] Oriented to person/place/time [x] Able to follow commands    [] Abnormal -     Eyes:   EOM    [x]  Normal    [] Abnormal -   Sclera  [x]  Normal    [] Abnormal -          Discharge [x]  None visible   [] Abnormal -     HENT: [x] Normocephalic, atraumatic  [] Abnormal -   [] Mouth/Throat: Mucous membranes are moist    External Ears [x] Normal  [] Abnormal -    Neck: [x] No visualized mass [] Abnormal -     Pulmonary/Chest: [x] Respiratory effort normal   [x] No visualized signs of difficulty breathing or respiratory distress        [] Abnormal -      Musculoskeletal:   [] Normal gait with no signs of ataxia         [] Normal range of motion of neck        [] Abnormal -     Neurological:        [x] No Facial Asymmetry (Cranial nerve 7 motor function) (limited exam due to video visit)          [x] No gaze palsy        [] Abnormal -          Skin:        [] No significant exanthematous lesions or discoloration noted on facial skin         [] Abnormal -            Psychiatric:       [x] Normal Affect [] Abnormal -        [x] No Hallucinations    Other pertinent observable physical exam findings:-        We discussed the expected course, resolution and complications of the diagnosis(es) in detail. Medication risks, benefits, costs, interactions, and alternatives were discussed as indicated. I advised her to contact the office if her condition worsens, changes or fails to improve as anticipated. She expressed understanding with the diagnosis(es) and plan. Raffi Rico, who was evaluated through a patient-initiated, synchronous (real-time) audio-video encounter, and/or her healthcare decision maker, is aware that it is a billable service, with coverage as determined by her insurance carrier. She provided verbal consent to proceed: Yes, and patient identification was verified. It was conducted pursuant to the emergency declaration under the [address] [place] [address] authority and the [employer] General Act. A caregiver was present when appropriate. Ability to conduct physical exam was limited. I was at home. The patient was at home.       Vel Gonsalez MD

## 2020-09-25 NOTE — PATIENT INSTRUCTIONS
Managing Your Allergies: Care Instructions Your Care Instructions Managing your allergies is an important part of staying healthy. Your doctor will help you find out what may be the cause of the allergies. Common causes of symptoms are house dust and dust mites, animal dander, mold, and pollen. As soon as you know what triggers your symptoms, try to avoid those things. This can help prevent allergy symptoms, asthma, and other health problems. Ask your doctor about allergy medicine or immunotherapy. These treatments may help reduce or prevent allergy symptoms. Follow-up care is a key part of your treatment and safety. Be sure to make and go to all appointments, and call your doctor if you are having problems. It's also a good idea to know your test results and keep a list of the medicines you take. How can you care for yourself at home? · If you have been told by your doctor that dust or dust mites are causing your allergy, decrease the dust around your bed: 
? Wash sheets, pillowcases, and other bedding in hot water every week. ? Use dust-proof covers for pillows, duvets, and mattresses. Avoid plastic covers because they tear easily and do not \"breathe. \" Wash as instructed on the label. ? Do not use any blankets and pillows that you do not need. ? Use blankets that you can wash in your washing machine. ? Consider removing drapes and carpets, which attract and hold dust, from your bedroom. · If you are allergic to house dust and mites, do not use home humidifiers. Your doctor can suggest ways you can control dust and mites. · Look for signs of cockroaches. Cockroaches cause allergic reactions. Use cockroach baits to get rid of them. Then, clean your home well. Cockroaches like areas where grocery bags, newspapers, empty bottles, or cardboard boxes are stored. Do not keep these inside your home, and keep trash and food containers sealed. Seal off any spots where cockroaches might enter your home. · If you are allergic to mold, get rid of furniture, rugs, and drapes that smell musty. Check for mold in the bathroom. · If you are allergic to outdoor pollen or mold spores, use air-conditioning. Change or clean all filters every month. Keep windows closed. · If you are allergic to pollen, stay inside when pollen counts are high. Use a vacuum  with a HEPA filter or a double-thickness filter at least two times each week. · Stay inside when air pollution is bad. Avoid paint fumes, perfumes, and other strong odors. · Avoid conditions that make your allergies worse. Stay away from smoke. Do not smoke or let anyone else smoke in your house. Do not use fireplaces or wood-burning stoves. · If you are allergic to your pets, change the air filter in your furnace every month. Use high-efficiency filters. · If you are allergic to pet dander, keep pets outside or out of your bedroom. Old carpet and cloth furniture can hold a lot of animal dander. You may need to replace them. When should you call for help? Give an epinephrine shot if: 
  · You think you are having a severe allergic reaction. After giving an epinephrine shot call 911, even if you feel better. Call 911 if: 
  · You have symptoms of a severe allergic reaction. These may include: 
? Sudden raised, red areas (hives) all over your body. ? Swelling of the throat, mouth, lips, or tongue. ? Trouble breathing. ? Passing out (losing consciousness). Or you may feel very lightheaded or suddenly feel weak, confused, or restless.  
  · You have been given an epinephrine shot, even if you feel better. Call your doctor now or seek immediate medical care if: 
  · You have symptoms of an allergic reaction, such as: ? A rash or hives (raised, red areas on the skin). ? Itching. ? Swelling. ? Belly pain, nausea, or vomiting. Watch closely for changes in your health, and be sure to contact your doctor if: 
  · Your allergies get worse.   · You need help controlling your allergies.  
  · You have questions about allergy testing.  
  · You do not get better as expected. Where can you learn more? Go to http://corrie-kimi.info/ Enter L249 in the search box to learn more about \"Managing Your Allergies: Care Instructions. \" Current as of: June 29, 2020               Content Version: 12.6 © 2615-5918 [email or web address]. Care instructions adapted under license by [employer] (which disclaims liability or warranty for this information). If you have questions about a medical condition or this instruction, always ask your healthcare professional. [place] [address] any warranty or liability for your use of this information.

## 2020-09-25 NOTE — TELEPHONE ENCOUNTER
I checked with [facility] and was told they will cover a blood pressure kit for pt. We just need to send in an order for one. Thanks.

## 2020-09-29 ENCOUNTER — HOSPITAL ENCOUNTER (OUTPATIENT)
Dept: MAMMOGRAPHY | Age: 65
Discharge: HOME | End: 2020-09-29
Attending: OBSTETRICS & GYNECOLOGY
Payer: MEDICAID

## 2020-09-29 DIAGNOSIS — Z12.39 BREAST CANCER SCREENING: ICD-10-CM

## 2020-09-29 PROCEDURE — 77067 SCR MAMMO BI INCL CAD: CPT

## 2020-09-30 ENCOUNTER — TELEPHONE (OUTPATIENT)
Dept: PRIMARY CARE CLINIC | Age: 65
End: 2020-09-30

## 2020-10-01 ENCOUNTER — TELEPHONE (OUTPATIENT)
Dept: PRIMARY CARE CLINIC | Age: 65
End: 2020-10-01

## 2020-10-01 NOTE — TELEPHONE ENCOUNTER
OK to order the lancets  I reordered the Blood pressure Monitor to be sent to [address] [place] [address] last week.

## 2020-10-01 NOTE — TELEPHONE ENCOUNTER
Informed pt. That [facility] was filling RX. As we speak for the lancets and the RX for BP monitor was sent to [address] [place] [address] last week.

## 2020-10-06 ENCOUNTER — TELEPHONE (OUTPATIENT)
Dept: PRIMARY CARE CLINIC | Age: 65
End: 2020-10-06

## 2020-10-06 DIAGNOSIS — E78.5 HYPERLIPIDEMIA, UNSPECIFIED HYPERLIPIDEMIA TYPE: Primary | ICD-10-CM

## 2020-10-06 RX ORDER — ATORVASTATIN CALCIUM 10 MG/1
TABLET, FILM COATED ORAL
Qty: 30 TAB | Refills: 1 | Status: SHIPPED | OUTPATIENT
Start: 2020-10-06 | End: 2021-01-10

## 2020-10-29 ENCOUNTER — TELEPHONE (OUTPATIENT)
Dept: PRIMARY CARE CLINIC | Age: 65
End: 2020-10-29

## 2020-11-02 DIAGNOSIS — J44.9 CHRONIC OBSTRUCTIVE PULMONARY DISEASE, UNSPECIFIED (HCC): ICD-10-CM

## 2020-11-02 DIAGNOSIS — J44.9 CHRONIC OBSTRUCTIVE PULMONARY DISEASE, UNSPECIFIED COPD TYPE (HCC): Primary | ICD-10-CM

## 2020-11-02 NOTE — TELEPHONE ENCOUNTER
Spoke with pt. She stated she was prescribed a Prednisone Dose keila but took them 1 tablet every day and then each time she needed a refill, she would get 10mg tablets and take one every day. She is also requesting a RX for Aspirin 81mg to take one tablet by mouth every day. She has an appointment on 11/11/12/2020. I told her to make sure and bring all of her meds with her.

## 2020-11-03 DIAGNOSIS — J44.9 CHRONIC OBSTRUCTIVE PULMONARY DISEASE, UNSPECIFIED COPD TYPE (HCC): Primary | ICD-10-CM

## 2020-11-03 DIAGNOSIS — R07.89 OTHER CHEST PAIN: ICD-10-CM

## 2020-11-03 DIAGNOSIS — J44.9 CHRONIC OBSTRUCTIVE PULMONARY DISEASE, UNSPECIFIED (HCC): ICD-10-CM

## 2020-11-03 RX ORDER — ASPIRIN 81 MG/1
81 TABLET ORAL DAILY
Qty: 90 TAB | Refills: 1 | Status: SHIPPED | OUTPATIENT
Start: 2020-11-03 | End: 2021-04-29

## 2020-11-03 RX ORDER — PREDNISONE 10 MG/1
10 TABLET ORAL DAILY
Qty: 90 TAB | Refills: 1 | Status: SHIPPED | OUTPATIENT
Start: 2020-11-03 | End: 2021-05-11

## 2020-11-04 ENCOUNTER — TELEPHONE (OUTPATIENT)
Dept: PRIMARY CARE CLINIC | Age: 65
End: 2020-11-04

## 2020-11-12 DIAGNOSIS — K59.00 CONSTIPATION, UNSPECIFIED CONSTIPATION TYPE: Primary | ICD-10-CM

## 2020-11-12 DIAGNOSIS — I10 ESSENTIAL HYPERTENSION: Primary | ICD-10-CM

## 2020-11-12 RX ORDER — LISINOPRIL AND HYDROCHLOROTHIAZIDE 12.5; 2 MG/1; MG/1
TABLET ORAL
Qty: 30 TAB | Refills: 0 | Status: SHIPPED | OUTPATIENT
Start: 2020-11-12 | End: 2021-01-13 | Stop reason: ALTCHOICE

## 2020-11-12 RX ORDER — DOCUSATE SODIUM 100 MG/1
100 CAPSULE, LIQUID FILLED ORAL 2 TIMES DAILY
Qty: 180 CAP | Refills: 0 | Status: SHIPPED | OUTPATIENT
Start: 2020-11-12 | End: 2021-02-10

## 2020-11-12 RX ORDER — LISINOPRIL AND HYDROCHLOROTHIAZIDE 12.5; 2 MG/1; MG/1
TABLET ORAL
COMMUNITY
Start: 2020-09-24 | End: 2020-11-12 | Stop reason: SDUPTHER

## 2020-11-16 ENCOUNTER — APPOINTMENT (OUTPATIENT)
Dept: CT IMAGING | Age: 65
End: 2020-11-16
Attending: EMERGENCY MEDICINE
Payer: MEDICAID

## 2020-11-16 ENCOUNTER — HOSPITAL ENCOUNTER (OUTPATIENT)
Age: 65
Setting detail: OBSERVATION
Discharge: HOME HEALTH | End: 2020-11-17
Attending: EMERGENCY MEDICINE | Admitting: HOSPITALIST
Payer: MEDICAID

## 2020-11-16 ENCOUNTER — APPOINTMENT (OUTPATIENT)
Dept: GENERAL RADIOLOGY | Age: 65
End: 2020-11-16
Attending: EMERGENCY MEDICINE
Payer: MEDICAID

## 2020-11-16 DIAGNOSIS — R07.9 CHEST PAIN, UNSPECIFIED TYPE: Primary | ICD-10-CM

## 2020-11-16 LAB
ALBUMIN SERPL-MCNC: 4.2 G/DL (ref 3.5–5)
ALBUMIN/GLOB SERPL: 1.3 {RATIO} (ref 1.1–2.2)
ALP SERPL-CCNC: 79 U/L (ref 45–117)
ALT SERPL-CCNC: 44 U/L (ref 12–78)
ANION GAP SERPL CALC-SCNC: 7 MMOL/L (ref 5–15)
AST SERPL W P-5'-P-CCNC: 44 U/L (ref 15–37)
ATRIAL RATE: 109 BPM
BASOPHILS # BLD: 0.1 K/UL (ref 0–0.2)
BASOPHILS NFR BLD: 1 % (ref 0–2.5)
BILIRUB SERPL-MCNC: 0.7 MG/DL (ref 0.2–1)
BUN SERPL-MCNC: 13 MG/DL (ref 6–20)
BUN/CREAT SERPL: 14 (ref 12–20)
CA-I BLD-MCNC: 9.7 MG/DL (ref 8.5–10.1)
CALCULATED P AXIS, ECG09: 59 DEGREES
CALCULATED R AXIS, ECG10: 61 DEGREES
CALCULATED T AXIS, ECG11: 62 DEGREES
CHLORIDE SERPL-SCNC: 89 MMOL/L (ref 97–108)
CO2 SERPL-SCNC: 32 MMOL/L (ref 21–32)
CREAT SERPL-MCNC: 0.94 MG/DL (ref 0.55–1.02)
D DIMER PPP FEU-MCNC: 1.38 MG/L FEU (ref 0.19–0.5)
DIAGNOSIS, 93000: NORMAL
EOSINOPHIL # BLD: 0.1 K/UL (ref 0–0.7)
EOSINOPHIL NFR BLD: 1 % (ref 0.9–2.9)
ERYTHROCYTE [DISTWIDTH] IN BLOOD BY AUTOMATED COUNT: 16 % (ref 11.5–14.5)
GLOBULIN SER CALC-MCNC: 3.3 G/DL (ref 2–4)
GLUCOSE SERPL-MCNC: 99 MG/DL (ref 65–100)
HCT VFR BLD AUTO: 31.1 % (ref 36–46)
HGB BLD-MCNC: 10.6 G/DL (ref 13.5–17.5)
LYMPHOCYTES # BLD: 0.9 K/UL (ref 1–4.8)
LYMPHOCYTES NFR BLD: 15 % (ref 20.5–51.1)
MCH RBC QN AUTO: 32.3 PG (ref 31–34)
MCHC RBC AUTO-ENTMCNC: 34 G/DL (ref 31–36)
MCV RBC AUTO: 94.9 FL (ref 80–100)
MONOCYTES # BLD: 0.4 K/UL (ref 0.2–2.4)
MONOCYTES NFR BLD: 7 % (ref 1.7–9.3)
NEUTS SEG # BLD: 4.7 K/UL (ref 1.8–7.7)
NEUTS SEG NFR BLD: 76 % (ref 42–75)
NRBC # BLD: 0 K/UL
NRBC BLD-RTO: 0 PER 100 WBC
P-R INTERVAL, ECG05: 144 MS
PLATELET # BLD AUTO: 288 K/UL
PMV BLD AUTO: 8 FL (ref 6.5–11.5)
POTASSIUM SERPL-SCNC: 4.1 MMOL/L (ref 3.5–5.1)
PROT SERPL-MCNC: 7.5 G/DL (ref 6.4–8.2)
Q-T INTERVAL, ECG07: 318 MS
QRS DURATION, ECG06: 80 MS
QTC CALCULATION (BEZET), ECG08: 432 MS
RBC # BLD AUTO: 3.27 M/UL (ref 4.5–5.9)
SODIUM SERPL-SCNC: 128 MMOL/L (ref 136–145)
TROPONIN I SERPL-MCNC: <0.05 NG/ML
VENTRICULAR RATE, ECG03: 111 BPM
WBC # BLD AUTO: 6.2 K/UL (ref 4.4–11.3)

## 2020-11-16 PROCEDURE — 36415 COLL VENOUS BLD VENIPUNCTURE: CPT

## 2020-11-16 PROCEDURE — 74011250637 HC RX REV CODE- 250/637: Performed by: EMERGENCY MEDICINE

## 2020-11-16 PROCEDURE — 99218 HC RM OBSERVATION: CPT

## 2020-11-16 PROCEDURE — 85379 FIBRIN DEGRADATION QUANT: CPT

## 2020-11-16 PROCEDURE — 99291 CRITICAL CARE FIRST HOUR: CPT

## 2020-11-16 PROCEDURE — 93005 ELECTROCARDIOGRAM TRACING: CPT

## 2020-11-16 PROCEDURE — 96372 THER/PROPH/DIAG INJ SC/IM: CPT

## 2020-11-16 PROCEDURE — 71275 CT ANGIOGRAPHY CHEST: CPT

## 2020-11-16 PROCEDURE — 71045 X-RAY EXAM CHEST 1 VIEW: CPT

## 2020-11-16 PROCEDURE — 74011000636 HC RX REV CODE- 636: Performed by: EMERGENCY MEDICINE

## 2020-11-16 PROCEDURE — 85025 COMPLETE CBC W/AUTO DIFF WBC: CPT

## 2020-11-16 PROCEDURE — 84484 ASSAY OF TROPONIN QUANT: CPT

## 2020-11-16 PROCEDURE — 99285 EMERGENCY DEPT VISIT HI MDM: CPT

## 2020-11-16 PROCEDURE — 80053 COMPREHEN METABOLIC PANEL: CPT

## 2020-11-16 PROCEDURE — 74011250636 HC RX REV CODE- 250/636: Performed by: NURSE PRACTITIONER

## 2020-11-16 RX ORDER — MORPHINE SULFATE 2 MG/ML
2 INJECTION, SOLUTION INTRAMUSCULAR; INTRAVENOUS
Status: DISCONTINUED | OUTPATIENT
Start: 2020-11-16 | End: 2020-11-17 | Stop reason: HOSPADM

## 2020-11-16 RX ORDER — AZELASTINE 1 MG/ML
2 SPRAY, METERED NASAL
Status: DISCONTINUED | OUTPATIENT
Start: 2020-11-17 | End: 2020-11-17

## 2020-11-16 RX ORDER — PREDNISONE 10 MG/1
10 TABLET ORAL DAILY
Status: DISCONTINUED | OUTPATIENT
Start: 2020-11-17 | End: 2020-11-17 | Stop reason: HOSPADM

## 2020-11-16 RX ORDER — GUAIFENESIN 100 MG/5ML
162 LIQUID (ML) ORAL
Status: COMPLETED | OUTPATIENT
Start: 2020-11-16 | End: 2020-11-16

## 2020-11-16 RX ORDER — AMLODIPINE BESYLATE 10 MG/1
10 TABLET ORAL DAILY
Status: DISCONTINUED | OUTPATIENT
Start: 2020-11-17 | End: 2020-11-17 | Stop reason: HOSPADM

## 2020-11-16 RX ORDER — ACETAMINOPHEN 325 MG/1
650 TABLET ORAL
Status: DISCONTINUED | OUTPATIENT
Start: 2020-11-16 | End: 2020-11-17 | Stop reason: HOSPADM

## 2020-11-16 RX ORDER — ENOXAPARIN SODIUM 100 MG/ML
40 INJECTION SUBCUTANEOUS EVERY 24 HOURS
Status: DISCONTINUED | OUTPATIENT
Start: 2020-11-16 | End: 2020-11-17 | Stop reason: HOSPADM

## 2020-11-16 RX ORDER — ASPIRIN 81 MG/1
81 TABLET ORAL DAILY
Status: DISCONTINUED | OUTPATIENT
Start: 2020-11-17 | End: 2020-11-17 | Stop reason: HOSPADM

## 2020-11-16 RX ORDER — LIDOCAINE 4 G/100G
1 PATCH TOPICAL EVERY 24 HOURS
Status: DISCONTINUED | OUTPATIENT
Start: 2020-11-17 | End: 2020-11-17 | Stop reason: HOSPADM

## 2020-11-16 RX ORDER — PANTOPRAZOLE SODIUM 40 MG/1
40 TABLET, DELAYED RELEASE ORAL
Status: DISCONTINUED | OUTPATIENT
Start: 2020-11-17 | End: 2020-11-17 | Stop reason: HOSPADM

## 2020-11-16 RX ORDER — INSULIN LISPRO 100 [IU]/ML
INJECTION, SOLUTION INTRAVENOUS; SUBCUTANEOUS
Status: DISCONTINUED | OUTPATIENT
Start: 2020-11-17 | End: 2020-11-17 | Stop reason: HOSPADM

## 2020-11-16 RX ORDER — ATORVASTATIN CALCIUM 10 MG/1
10 TABLET, FILM COATED ORAL DAILY
Status: DISCONTINUED | OUTPATIENT
Start: 2020-11-17 | End: 2020-11-17 | Stop reason: HOSPADM

## 2020-11-16 RX ORDER — ZOLPIDEM TARTRATE 5 MG/1
5 TABLET ORAL
Status: DISCONTINUED | OUTPATIENT
Start: 2020-11-16 | End: 2020-11-17 | Stop reason: HOSPADM

## 2020-11-16 RX ORDER — ONDANSETRON 2 MG/ML
4 INJECTION INTRAMUSCULAR; INTRAVENOUS
Status: DISCONTINUED | OUTPATIENT
Start: 2020-11-16 | End: 2020-11-17 | Stop reason: HOSPADM

## 2020-11-16 RX ORDER — SODIUM CHLORIDE 0.9 % (FLUSH) 0.9 %
5-40 SYRINGE (ML) INJECTION EVERY 8 HOURS
Status: DISCONTINUED | OUTPATIENT
Start: 2020-11-16 | End: 2020-11-17 | Stop reason: HOSPADM

## 2020-11-16 RX ORDER — BUDESONIDE AND FORMOTEROL FUMARATE DIHYDRATE 160; 4.5 UG/1; UG/1
1 AEROSOL RESPIRATORY (INHALATION)
Status: DISCONTINUED | OUTPATIENT
Start: 2020-11-17 | End: 2020-11-17 | Stop reason: HOSPADM

## 2020-11-16 RX ORDER — MONTELUKAST SODIUM 10 MG/1
10 TABLET ORAL
Status: DISCONTINUED | OUTPATIENT
Start: 2020-11-17 | End: 2020-11-17 | Stop reason: HOSPADM

## 2020-11-16 RX ORDER — DOCUSATE SODIUM 100 MG/1
100 CAPSULE, LIQUID FILLED ORAL 2 TIMES DAILY
Status: DISCONTINUED | OUTPATIENT
Start: 2020-11-17 | End: 2020-11-17 | Stop reason: HOSPADM

## 2020-11-16 RX ORDER — FLUTICASONE PROPIONATE 50 MCG
2 SPRAY, SUSPENSION (ML) NASAL DAILY
Status: DISCONTINUED | OUTPATIENT
Start: 2020-11-17 | End: 2020-11-17 | Stop reason: HOSPADM

## 2020-11-16 RX ORDER — NITROGLYCERIN 0.4 MG/1
0.4 TABLET SUBLINGUAL
Status: DISCONTINUED | OUTPATIENT
Start: 2020-11-16 | End: 2020-11-17 | Stop reason: HOSPADM

## 2020-11-16 RX ORDER — LISINOPRIL 20 MG/1
20 TABLET ORAL DAILY
Status: DISCONTINUED | OUTPATIENT
Start: 2020-11-17 | End: 2020-11-17 | Stop reason: HOSPADM

## 2020-11-16 RX ORDER — ALBUTEROL SULFATE 90 UG/1
2 AEROSOL, METERED RESPIRATORY (INHALATION)
Status: DISCONTINUED | OUTPATIENT
Start: 2020-11-16 | End: 2020-11-17

## 2020-11-16 RX ORDER — FUROSEMIDE 40 MG/1
40 TABLET ORAL DAILY
Status: DISCONTINUED | OUTPATIENT
Start: 2020-11-17 | End: 2020-11-17 | Stop reason: HOSPADM

## 2020-11-16 RX ORDER — LORATADINE 10 MG/1
10 TABLET ORAL DAILY
Status: DISCONTINUED | OUTPATIENT
Start: 2020-11-17 | End: 2020-11-17

## 2020-11-16 RX ADMIN — ASPIRIN 162 MG: 81 TABLET, CHEWABLE ORAL at 12:53

## 2020-11-16 RX ADMIN — IOPAMIDOL 100 ML: 755 INJECTION, SOLUTION INTRAVENOUS at 15:53

## 2020-11-16 RX ADMIN — ENOXAPARIN SODIUM 40 MG: 40 INJECTION SUBCUTANEOUS at 21:13

## 2020-11-16 RX ADMIN — Medication 10 ML: at 22:00

## 2020-11-16 NOTE — ED PROVIDER NOTES
EMERGENCY DEPARTMENT HISTORY AND PHYSICAL EXAM      Date: 11/16/2020  Patient Name: Olinda Allen      History of Presenting Illness     Chief Complaint   Patient presents with    Chest Pain     pt c/o midsternal CP onset yesterday intermittently with elevated HR and SOB; pt AOx4; no distress noted       History Provided By: Patient    HPI: Olinda Allen, 60 y.o. female with a past medical history significant hypertension, obesity, COPD and All other medical problems listed below presents to the ED with cc of intermittent chest pain beginning yesterday described as sharp substernal occasionally radiating to left shoulder the lasting 1 minute or less. Chronic dyspnea from COPD though no worsening and no association with chest pain nausea vomiting diaphoresis denies lower extremity pain or swelling. No recent illness fever cough sputum production. No previous history of thromboembolic disease. Patient is unsure of her cardiac history and thinks she may have had an MI at some point in the past.    There are no other complaints, changes, or physical findings at this time. PCP: Ghassan Cabrera MD    Current Outpatient Medications   Medication Sig Dispense Refill    docusate sodium (COLACE) 100 mg capsule Take 1 Cap by mouth two (2) times a day for 90 days. 180 Cap 0    lisinopril-hydroCHLOROthiazide (PRINZIDE, ZESTORETIC) 20-12.5 mg per tablet TAKE 1 TABLET BY MOUTH EVERY DAY  Indications: high blood pressure 30 Tab 0    predniSONE (DELTASONE) 10 mg tablet Take 10 mg by mouth daily. 90 Tab 1    aspirin delayed-release 81 mg tablet Take 1 Tab by mouth daily. 90 Tab 1    atorvastatin (LIPITOR) 10 mg tablet TAKE 1 TABLET BY MOUTH EVERY DAY 30 Tab 1    fluticasone furoate-vilanteroL (BREO ELLIPTA) 100-25 mcg/dose inhaler Breo Ellipta 100 mcg-25 mcg/dose powder for inhalation      lidocaine (LIDODERM) 5 % APPLY 1 PATCH EVERY DAY.  REMOVE AFTER 12 HOURS      zolpidem (AMBIEN) 5 mg tablet       albuterol (PROVENTIL VENTOLIN) 2.5 mg /3 mL (0.083 %) nebu albuterol sulfate 2.5 mg/3 mL (0.083 %) solution for nebulization   USE ONE VIAL VIA NEBULIZER EVERY EIGHT HOURS AS NEEDED      ipratropium (ATROVENT) 42 mcg (0.06 %) nasal spray 2 Sprays by Both Nostrils route four (4) times daily as needed for Rhinitis (post nasal drainage). 15 mL 3    amLODIPine (NORVASC) 10 mg tablet TAKE 1 TABLET BY MOUTH EVERY DAY 30 Tab 2    Blood-Glucose Meter (True Metrix Glucose Meter) misc TO USE DAILY TO CHECK BLOOD SUGAR. 1 Each 0    lancets misc USE ONCE LANCET PER DAY TO CHECK BLOOD SUGAR. 50 Each 3    glucose blood VI test strips (True Metrix Glucose Test Strip) strip Use one strip daily to test blood glucose subcutaneously. E11.9 100 Strip 1    furosemide (LASIX) 40 mg tablet Take 40 mg by mouth daily. Indications: visible water retention      fluticasone propionate (FLONASE) 50 mcg/actuation nasal spray SPRAY 2 SPRAYS INTO EACH NOSTRIL EVERY DAY 1 Bottle 2    ergocalciferol (ERGOCALCIFEROL) 1,250 mcg (50,000 unit) capsule TAKE ONE CAPSULE BY MOUTH ONCE A WEEK 4 Cap 2    metFORMIN (GLUCOPHAGE) 500 mg tablet Take 500 mg by mouth two (2) times daily (with meals). Indications: type 2 diabetes mellitus      loratadine (CLARITIN) 10 mg tablet TAKE 1 TABLET BY MOUTH EVERY DAY 30 Tab 2    albuterol (PROVENTIL HFA, VENTOLIN HFA, PROAIR HFA) 90 mcg/actuation inhaler Take 2 Puffs by inhalation every four (4) hours as needed for Wheezing.  pantoprazole (PROTONIX) 40 mg tablet Take 40 mg by mouth Before breakfast and dinner.  azelastine (ASTELIN) 137 mcg (0.1 %) nasal spray 2 Sprays by Both Nostrils route nightly.   5    montelukast (SINGULAIR) 10 mg tablet TAKE 1 TABLET BY MOUTH EVERY DAY FOR ALLERGY SYMPTOMS  5       Past History     Past Medical History:  Past Medical History:   Diagnosis Date    Arthritis     Bronchitis 9/1/2020    Chronic obstructive pulmonary disease (HCC)     Chronic pain     Diabetes ([address]     Gastroesophageal reflux disease without esophagitis 9/1/2020    GERD (gastroesophageal reflux disease)     History of multiple allergies     HTN (hypertension) 9/1/2020    Hypertension     Intermittent asthma 9/1/2020    Menopause     Pain of upper abdomen 9/1/2020    Seasonal allergic rhinitis 9/1/2020    Sinus problem     Vitamin D deficiency 9/1/2020       Past Surgical History:  Past Surgical History:   Procedure Laterality Date    COLONOSCOPY N/A 5/25/2020    COLONOSCOPY performed by Rusty Mathis MD at [facility]    HX COLONOSCOPY      HX GI      colonscopy    HX ORTHOPAEDIC  10/24/2018    Total Right Hip Arthroplasty Dr. Hearn Daily. Family History:  Family History   Problem Relation Age of Onset    Hypertension Mother     Cancer Sister        Social History:  Social History     Tobacco Use    Smoking status: Former Smoker    Smokeless tobacco: Never Used   Substance Use Topics    Alcohol use: Yes     Alcohol/week: 3.0 standard drinks     Types: 3 Cans of beer per week    Drug use: No       Allergies:  No Known Allergies      Review of Systems   Review of all other systems is negative  Review of Systems    Physical Exam   Obese -American female no acute distress habitus of COPD  Physical Exam  Vitals signs and nursing note reviewed. Constitutional:       General: She is not in acute distress. Appearance: Normal appearance. She is well-developed. She is obese. She is not ill-appearing, toxic-appearing or diaphoretic. HENT:      Head: Normocephalic and atraumatic. Right Ear: Tympanic membrane normal.      Left Ear: Tympanic membrane normal.      Nose: Nose normal.      Mouth/Throat:      Mouth: Mucous membranes are moist.   Eyes:      Extraocular Movements: Extraocular movements intact. Conjunctiva/sclera: Conjunctivae normal.      Pupils: Pupils are equal, round, and reactive to light.    Neck:      Musculoskeletal: Normal range of motion and neck supple. No neck rigidity or muscular tenderness. Cardiovascular:      Rate and Rhythm: Regular rhythm. Tachycardia present. Pulses: Normal pulses. Carotid pulses are 2+ on the right side and 2+ on the left side. Radial pulses are 2+ on the right side and 2+ on the left side. Dorsalis pedis pulses are 2+ on the right side and 2+ on the left side. Posterior tibial pulses are 2+ on the right side and 2+ on the left side. Heart sounds: Normal heart sounds. No murmur. Pulmonary:      Effort: Pulmonary effort is normal. No respiratory distress. Breath sounds: Normal breath sounds. No wheezing, rhonchi or rales. Chest:      Chest wall: No tenderness. Abdominal:      General: Abdomen is flat. Bowel sounds are normal.      Palpations: Abdomen is soft. There is no mass. Tenderness: There is no abdominal tenderness. There is no right CVA tenderness, left CVA tenderness, guarding or rebound. Hernia: No hernia is present. Musculoskeletal: Normal range of motion. General: No tenderness, deformity or signs of injury. Right lower leg: She exhibits no tenderness. No edema. Left lower leg: She exhibits no tenderness. No edema. Comments: Lower extremity exam is negative for DVT   Skin:     General: Skin is warm. Findings: No erythema or rash. Neurological:      General: No focal deficit present. Mental Status: She is alert and oriented to person, place, and time. Cranial Nerves: No cranial nerve deficit. Sensory: No sensory deficit. Motor: No weakness. Psychiatric:         Mood and Affect: Mood normal.         Behavior: Behavior normal.         Thought Content:  Thought content normal.         Lab and Diagnostic Study Results     Labs -     Recent Results (from the past 12 hour(s))   EKG, 12 LEAD, INITIAL    Collection Time: 11/16/20 12:39 PM   Result Value Ref Range    Ventricular Rate 111 BPM    Atrial Rate 109 BPM    P-R Interval 144 ms    QRS Duration 80 ms    Q-T Interval 318 ms    QTC Calculation (Bezet) 432 ms    Calculated P Axis 59 degrees    Calculated R Axis 61 degrees    Calculated T Axis 62 degrees    Diagnosis       Sinus tachycardia    Confirmed by Eber MOONEY ([id number]) on 11/16/2020 6:73:66 PM     METABOLIC PANEL, COMPREHENSIVE    Collection Time: 11/16/20  1:01 PM   Result Value Ref Range    Sodium 128 (L) 136 - 145 mmol/L    Potassium 4.1 3.5 - 5.1 mmol/L    Chloride 89 (L) 97 - 108 mmol/L    CO2 32 21 - 32 mmol/L    Anion gap 7 5 - 15 mmol/L    Glucose 99 65 - 100 mg/dL    BUN 13 6 - 20 mg/dL    Creatinine 0.94 0.55 - 1.02 mg/dL    BUN/Creatinine ratio 14 12 - 20      GFR est AA >60 >60 ml/min/1.73m2    GFR est non-AA 60 (L) >60 ml/min/1.73m2    Calcium 9.7 8.5 - 10.1 mg/dL    Bilirubin, total 0.7 0.2 - 1.0 mg/dL    AST (SGOT) 44 (H) 15 - 37 U/L    ALT (SGPT) 44 12 - 78 U/L    Alk. phosphatase 79 45 - 117 U/L    Protein, total 7.5 6.4 - 8.2 g/dL    Albumin 4.2 3.5 - 5.0 g/dL    Globulin 3.3 2.0 - 4.0 g/dL    A-G Ratio 1.3 1.1 - 2.2     CBC WITH AUTOMATED DIFF    Collection Time: 11/16/20  1:01 PM   Result Value Ref Range    WBC 6.2 4.4 - 11.3 K/uL    RBC 3.27 (L) 4.50 - 5.90 M/uL    HGB 10.6 (L) 13.5 - 17.5 g/dL    HCT 31.1 (L) 36 - 46 %    MCV 94.9 80 - 100 FL    MCH 32.3 31 - 34 PG    MCHC 34.0 31.0 - 36.0 g/dL    RDW 16.0 (H) 11.5 - 14.5 %    PLATELET 380 K/uL    MPV 8.0 6.5 - 11.5 FL    NRBC 0.0  WBC    ABSOLUTE NRBC 0.00 K/uL    NEUTROPHILS 76 (H) 42 - 75 %    LYMPHOCYTES 15 (L) 20.5 - 51.1 %    MONOCYTES 7 1.7 - 9.3 %    EOSINOPHILS 1 0.9 - 2.9 %    BASOPHILS 1 0.0 - 2.5 %    ABS. NEUTROPHILS 4.7 1.8 - 7.7 K/UL    ABS. LYMPHOCYTES 0.9 (L) 1.0 - 4.8 K/UL    ABS. MONOCYTES 0.4 0.2 - 2.4 K/UL    ABS. EOSINOPHILS 0.1 0.0 - 0.7 K/UL    ABS.  BASOPHILS 0.1 0.0 - 0.2 K/UL   TROPONIN I    Collection Time: 11/16/20  1:01 PM   Result Value Ref Range    Troponin-I, Qt. <0.05 <0.05 ng/mL       Radiologic Studies -   [unfilled]  CT Results  (Last 48 hours)    None        CXR Results  (Last 48 hours)               11/16/20 1308  XR CHEST PORT Final result    Impression:  Impression: No acute pulmonary process. Narrative:  Chest, frontal view, 11/16/2020       History: Chest pain. Comparison: Including chest 6/11/2020. Findings: The cardiac silhouette is within normal limits. The lungs are   adequately expanded. No hydrostatic edema is present. No focal consolidation,   pleural effusions or pneumothorax is identified. Degenerative changes are   present in the thoracic spine. Medical Decision Making and ED Course   - I am the first and primary provider for this patient. - I reviewed the vital signs, available nursing notes, past medical history, past surgical history, family history and social history. - Initial assessment performed. The patients presenting problems have been discussed, and the staff are in agreement with the care plan formulated and outlined with them. I have encouraged them to ask questions as they arise throughout their visit. Vital Signs-Reviewed the patient's vital signs. Patient Vitals for the past 12 hrs:   Temp Pulse Resp BP SpO2   11/16/20 1250 -- -- -- -- 100 %   11/16/20 1245 98.4 °F (36.9 °C) (!) 115 20 (!) 159/86 100 %       EKG interpretation: (Preliminary): Performed at 1239, and read at 1243  Rhythm: sinus tachycardia; and regular . Rate (approx.): 111; Axis: normal; MN interval: normal; QRS interval: normal ; ST/T wave: normal; Other findings: Sinus tachycardia no acute ST-T changes there is baseline wander which interferes with interpretation.       Records Reviewed: Nursing Notes and Old Medical Records    The patient presents with chest pain with a differential diagnosis of  ACS, arrhythmia, acute MI, angina, bronchitis, chest wall pain, dyspnea, pnuemothorax and pnuemonia    ED Course: Provider Notes (Medical Decision Making):   71-year-old female with intermittent chest pain over the last 24 hours. Chronic dyspnea from COPD and resting tachycardia somewhat complicate interpretation. EKG shows no acute injury pattern or awaiting labs  MDM           Consultations:       Consultations: -  Hospitalist Consultant: Dr. Sheldon Ozzie: We have asked for emergent assistance with regard to this patient. We have discussed the patients HPI, ROS, PE and results this far. They will come and evaluate the patient for admission. Procedures and Critical Care       Performed by: Cristian Ott MD  PROCEDURES  Procedures               CRITICAL CARE NOTE :  2:10 PM  Amount of Critical Care Time: 45(minutes)    IMPENDING DETERIORATION -Respiratory and Cardiovascular  ASSOCIATED RISK FACTORS - Hypotension, Shock, Dysrhythmia and Vascular Compromise  MANAGEMENT- Bedside Assessment  INTERPRETATION -  CT Scan and ECG  INTERVENTIONS - hemodynamic mngmt  CASE REVIEW - Hospitalist  TREATMENT RESPONSE -Stable  PERFORMED BY - Self    NOTES   :  I have spent critical care time involved in lab review, consultations with specialist, family decision- making, bedside attention and documentation. This time excludes time spent in any separate billed procedures. During this entire length of time I was immediately available to the patient . Cristian Ott MD        Disposition     Disposition: Admitted to Observation Unit the case was discussed with the admitting physician Oba        Remove if not discharged  DISCHARGE PLAN:  1. Current Discharge Medication List      CONTINUE these medications which have NOT CHANGED    Details   docusate sodium (COLACE) 100 mg capsule Take 1 Cap by mouth two (2) times a day for 90 days.   Qty: 180 Cap, Refills: 0    Associated Diagnoses: Constipation, unspecified constipation type      lisinopril-hydroCHLOROthiazide (PRINZIDE, ZESTORETIC) 20-12.5 mg per tablet TAKE 1 TABLET BY MOUTH EVERY DAY Indications: high blood pressure  Qty: 30 Tab, Refills: 0    Associated Diagnoses: Essential hypertension      predniSONE (DELTASONE) 10 mg tablet Take 10 mg by mouth daily. Qty: 90 Tab, Refills: 1    Associated Diagnoses: Chronic obstructive pulmonary disease, unspecified (HCC)      aspirin delayed-release 81 mg tablet Take 1 Tab by mouth daily. Qty: 90 Tab, Refills: 1    Associated Diagnoses: Other chest pain      atorvastatin (LIPITOR) 10 mg tablet TAKE 1 TABLET BY MOUTH EVERY DAY  Qty: 30 Tab, Refills: 1    Associated Diagnoses: Hyperlipidemia, unspecified hyperlipidemia type      fluticasone furoate-vilanteroL (BREO ELLIPTA) 100-25 mcg/dose inhaler Breo Ellipta 100 mcg-25 mcg/dose powder for inhalation      lidocaine (LIDODERM) 5 % APPLY 1 PATCH EVERY DAY. REMOVE AFTER 12 HOURS      zolpidem (AMBIEN) 5 mg tablet       albuterol (PROVENTIL VENTOLIN) 2.5 mg /3 mL (0.083 %) nebu albuterol sulfate 2.5 mg/3 mL (0.083 %) solution for nebulization   USE ONE VIAL VIA NEBULIZER EVERY EIGHT HOURS AS NEEDED      ipratropium (ATROVENT) 42 mcg (0.06 %) nasal spray 2 Sprays by Both Nostrils route four (4) times daily as needed for Rhinitis (post nasal drainage). Qty: 15 mL, Refills: 3    Associated Diagnoses: Seasonal allergic rhinitis, unspecified trigger      amLODIPine (NORVASC) 10 mg tablet TAKE 1 TABLET BY MOUTH EVERY DAY  Qty: 30 Tab, Refills: 2      Blood-Glucose Meter (True Metrix Glucose Meter) misc TO USE DAILY TO CHECK BLOOD SUGAR. Qty: 1 Each, Refills: 0    Comments: Dx E11.9  Associated Diagnoses: Controlled type 2 diabetes mellitus without complication, without long-term current use of insulin ([facility])      lancets misc USE ONCE LANCET PER DAY TO CHECK BLOOD SUGAR.   Qty: 50 Each, Refills: 3    Associated Diagnoses: Controlled type 2 diabetes mellitus without complication, without long-term current use of insulin ([facility])      glucose blood VI test strips (True Metrix Glucose Test Strip) strip Use one strip daily to test blood glucose subcutaneously. E11.9  Qty: 100 Strip, Refills: 1    Associated Diagnoses: Controlled type 2 diabetes mellitus without complication, without long-term current use of insulin (HCC)      furosemide (LASIX) 40 mg tablet Take 40 mg by mouth daily. Indications: visible water retention      fluticasone propionate (FLONASE) 50 mcg/actuation nasal spray SPRAY 2 SPRAYS INTO EACH NOSTRIL EVERY DAY  Qty: 1 Bottle, Refills: 2      ergocalciferol (ERGOCALCIFEROL) 1,250 mcg (50,000 unit) capsule TAKE ONE CAPSULE BY MOUTH ONCE A WEEK  Qty: 4 Cap, Refills: 2      metFORMIN (GLUCOPHAGE) 500 mg tablet Take 500 mg by mouth two (2) times daily (with meals). Indications: type 2 diabetes mellitus      loratadine (CLARITIN) 10 mg tablet TAKE 1 TABLET BY MOUTH EVERY DAY  Qty: 30 Tab, Refills: 2      albuterol (PROVENTIL HFA, VENTOLIN HFA, PROAIR HFA) 90 mcg/actuation inhaler Take 2 Puffs by inhalation every four (4) hours as needed for Wheezing. pantoprazole (PROTONIX) 40 mg tablet Take 40 mg by mouth Before breakfast and dinner. azelastine (ASTELIN) 137 mcg (0.1 %) nasal spray 2 Sprays by Both Nostrils route nightly. Refills: 5      montelukast (SINGULAIR) 10 mg tablet TAKE 1 TABLET BY MOUTH EVERY DAY FOR ALLERGY SYMPTOMS  Refills: 5           2. Follow-up Information    None       3. Return to ED if worse   4. Current Discharge Medication List          Diagnosis     Clinical Impression: Chest pain attestations:    Mesha Cazares MD    Please note that this dictation was completed with [employer], the [employer] voice recognition software. Quite often unanticipated grammatical, syntax, homophones, and other interpretive errors are inadvertently transcribed by the computer software. Please disregard these errors. Please excuse any errors that have escaped final proofreading. Thank you.

## 2020-11-17 ENCOUNTER — APPOINTMENT (OUTPATIENT)
Dept: ULTRASOUND IMAGING | Age: 65
End: 2020-11-17
Attending: HOSPITALIST
Payer: MEDICAID

## 2020-11-17 VITALS
HEIGHT: 67 IN | OXYGEN SATURATION: 100 % | BODY MASS INDEX: 27.47 KG/M2 | SYSTOLIC BLOOD PRESSURE: 127 MMHG | HEART RATE: 110 BPM | WEIGHT: 175 LBS | RESPIRATION RATE: 20 BRPM | TEMPERATURE: 97.5 F | DIASTOLIC BLOOD PRESSURE: 71 MMHG

## 2020-11-17 LAB
ALBUMIN SERPL-MCNC: 3.6 G/DL (ref 3.5–5)
ALBUMIN/GLOB SERPL: 1.4 {RATIO} (ref 1.1–2.2)
ALP SERPL-CCNC: 67 U/L (ref 45–117)
ALT SERPL-CCNC: 36 U/L (ref 12–78)
ANION GAP SERPL CALC-SCNC: 4 MMOL/L (ref 5–15)
AST SERPL W P-5'-P-CCNC: 26 U/L (ref 15–37)
BASOPHILS # BLD: 0 K/UL (ref 0–0.2)
BASOPHILS NFR BLD: 1 % (ref 0–2.5)
BILIRUB SERPL-MCNC: 0.5 MG/DL (ref 0.2–1)
BUN SERPL-MCNC: 15 MG/DL (ref 6–20)
BUN/CREAT SERPL: 15 (ref 12–20)
CA-I BLD-MCNC: 9.4 MG/DL (ref 8.5–10.1)
CHLORIDE SERPL-SCNC: 94 MMOL/L (ref 97–108)
CHOLEST SERPL-MCNC: 253 MG/DL
CO2 SERPL-SCNC: 35 MMOL/L (ref 21–32)
CREAT SERPL-MCNC: 1.01 MG/DL (ref 0.55–1.02)
EOSINOPHIL # BLD: 0.1 K/UL (ref 0–0.7)
EOSINOPHIL NFR BLD: 1 % (ref 0.9–2.9)
ERYTHROCYTE [DISTWIDTH] IN BLOOD BY AUTOMATED COUNT: 16.6 % (ref 11.5–14.5)
GLOBULIN SER CALC-MCNC: 2.6 G/DL (ref 2–4)
GLUCOSE BLD STRIP.AUTO-MCNC: 118 MG/DL (ref 65–100)
GLUCOSE BLD STRIP.AUTO-MCNC: 86 MG/DL (ref 65–100)
GLUCOSE SERPL-MCNC: 79 MG/DL (ref 65–100)
HCT VFR BLD AUTO: 29.2 % (ref 36–46)
HDLC SERPL-MCNC: 143 MG/DL
HDLC SERPL: 1.8 {RATIO} (ref 0–5)
HGB BLD-MCNC: 9.9 G/DL (ref 13.5–17.5)
LDLC SERPL CALC-MCNC: 96.4 MG/DL (ref 0–100)
LIPID PROFILE,FLP: ABNORMAL
LYMPHOCYTES # BLD: 1.1 K/UL (ref 1–4.8)
LYMPHOCYTES NFR BLD: 20 % (ref 20.5–51.1)
MCH RBC QN AUTO: 32.5 PG (ref 31–34)
MCHC RBC AUTO-ENTMCNC: 33.9 G/DL (ref 31–36)
MCV RBC AUTO: 96.1 FL (ref 80–100)
MONOCYTES # BLD: 0.6 K/UL (ref 0.2–2.4)
MONOCYTES NFR BLD: 10 % (ref 1.7–9.3)
NEUTS SEG # BLD: 3.9 K/UL (ref 1.8–7.7)
NEUTS SEG NFR BLD: 68 % (ref 42–75)
NRBC # BLD: 0 K/UL
NRBC BLD-RTO: 10 PER 100 WBC
PERFORMED BY, TECHID: ABNORMAL
PERFORMED BY, TECHID: NORMAL
PLATELET # BLD AUTO: 243 K/UL
PMV BLD AUTO: 7.6 FL (ref 6.5–11.5)
POTASSIUM SERPL-SCNC: 4.1 MMOL/L (ref 3.5–5.1)
PROT SERPL-MCNC: 6.2 G/DL (ref 6.4–8.2)
RBC # BLD AUTO: 3.04 M/UL (ref 4.5–5.9)
SODIUM SERPL-SCNC: 133 MMOL/L (ref 136–145)
TRIGL SERPL-MCNC: 68 MG/DL (ref ?–150)
TROPONIN I SERPL-MCNC: <0.05 NG/ML
TROPONIN I SERPL-MCNC: <0.05 NG/ML
TSH SERPL DL<=0.05 MIU/L-ACNC: 1.21 UIU/ML (ref 0.36–3.74)
VLDLC SERPL CALC-MCNC: 13.6 MG/DL
WBC # BLD AUTO: 5.7 K/UL (ref 4.4–11.3)

## 2020-11-17 PROCEDURE — 94760 N-INVAS EAR/PLS OXIMETRY 1: CPT

## 2020-11-17 PROCEDURE — 74011000250 HC RX REV CODE- 250: Performed by: HOSPITALIST

## 2020-11-17 PROCEDURE — 74011250637 HC RX REV CODE- 250/637: Performed by: NURSE PRACTITIONER

## 2020-11-17 PROCEDURE — 99218 HC RM OBSERVATION: CPT

## 2020-11-17 PROCEDURE — 77010033678 HC OXYGEN DAILY

## 2020-11-17 PROCEDURE — 80053 COMPREHEN METABOLIC PANEL: CPT

## 2020-11-17 PROCEDURE — 74011250637 HC RX REV CODE- 250/637: Performed by: HOSPITALIST

## 2020-11-17 PROCEDURE — 74011636637 HC RX REV CODE- 636/637: Performed by: NURSE PRACTITIONER

## 2020-11-17 PROCEDURE — 94640 AIRWAY INHALATION TREATMENT: CPT

## 2020-11-17 PROCEDURE — 74011000250 HC RX REV CODE- 250: Performed by: NURSE PRACTITIONER

## 2020-11-17 PROCEDURE — 36415 COLL VENOUS BLD VENIPUNCTURE: CPT

## 2020-11-17 PROCEDURE — 84484 ASSAY OF TROPONIN QUANT: CPT

## 2020-11-17 PROCEDURE — 85025 COMPLETE CBC W/AUTO DIFF WBC: CPT

## 2020-11-17 PROCEDURE — 76536 US EXAM OF HEAD AND NECK: CPT

## 2020-11-17 PROCEDURE — 82962 GLUCOSE BLOOD TEST: CPT

## 2020-11-17 PROCEDURE — 84443 ASSAY THYROID STIM HORMONE: CPT

## 2020-11-17 PROCEDURE — 80061 LIPID PANEL: CPT

## 2020-11-17 RX ORDER — CETIRIZINE HCL 10 MG
10 TABLET ORAL DAILY
Status: DISCONTINUED | OUTPATIENT
Start: 2020-11-17 | End: 2020-11-17 | Stop reason: HOSPADM

## 2020-11-17 RX ORDER — AMOXICILLIN AND CLAVULANATE POTASSIUM 875; 125 MG/1; MG/1
1 TABLET, FILM COATED ORAL EVERY 12 HOURS
Qty: 14 TAB | Refills: 0 | Status: SHIPPED | OUTPATIENT
Start: 2020-11-17 | End: 2020-12-07 | Stop reason: ALTCHOICE

## 2020-11-17 RX ORDER — OXYMETAZOLINE HCL 0.05 %
2 SPRAY, NON-AEROSOL (ML) NASAL
Status: DISCONTINUED | OUTPATIENT
Start: 2020-11-17 | End: 2020-11-17

## 2020-11-17 RX ORDER — AMOXICILLIN AND CLAVULANATE POTASSIUM 875; 125 MG/1; MG/1
1 TABLET, FILM COATED ORAL EVERY 12 HOURS
Status: DISCONTINUED | OUTPATIENT
Start: 2020-11-17 | End: 2020-11-17 | Stop reason: HOSPADM

## 2020-11-17 RX ORDER — IPRATROPIUM BROMIDE AND ALBUTEROL SULFATE 2.5; .5 MG/3ML; MG/3ML
3 SOLUTION RESPIRATORY (INHALATION)
Status: DISCONTINUED | OUTPATIENT
Start: 2020-11-17 | End: 2020-11-17 | Stop reason: HOSPADM

## 2020-11-17 RX ORDER — ALBUTEROL SULFATE 0.83 MG/ML
2.5 SOLUTION RESPIRATORY (INHALATION)
Status: DISCONTINUED | OUTPATIENT
Start: 2020-11-17 | End: 2020-11-17 | Stop reason: HOSPADM

## 2020-11-17 RX ORDER — OXYMETAZOLINE HCL 0.05 %
2 SPRAY, NON-AEROSOL (ML) NASAL 2 TIMES DAILY
Status: DISCONTINUED | OUTPATIENT
Start: 2020-11-17 | End: 2020-11-17 | Stop reason: HOSPADM

## 2020-11-17 RX ADMIN — ALBUTEROL SULFATE 2.5 MG: 2.5 SOLUTION RESPIRATORY (INHALATION) at 11:42

## 2020-11-17 RX ADMIN — ATORVASTATIN CALCIUM 10 MG: 10 TABLET, FILM COATED ORAL at 09:52

## 2020-11-17 RX ADMIN — AMOXICILLIN AND CLAVULANATE POTASSIUM 1 TABLET: 875; 125 TABLET, FILM COATED ORAL at 13:39

## 2020-11-17 RX ADMIN — PANTOPRAZOLE SODIUM 40 MG: 40 TABLET, DELAYED RELEASE ORAL at 07:38

## 2020-11-17 RX ADMIN — NASAL DECONGESTANT 2 SPRAY: 0.05 SPRAY NASAL at 13:40

## 2020-11-17 RX ADMIN — PREDNISONE 10 MG: 10 TABLET ORAL at 09:52

## 2020-11-17 RX ADMIN — Medication 10 ML: at 05:21

## 2020-11-17 RX ADMIN — CETIRIZINE HYDROCHLORIDE 10 MG: 10 TABLET, FILM COATED ORAL at 09:53

## 2020-11-17 RX ADMIN — LISINOPRIL 20 MG: 20 TABLET ORAL at 09:52

## 2020-11-17 RX ADMIN — ASPIRIN 81 MG: 81 TABLET, COATED ORAL at 09:52

## 2020-11-17 RX ADMIN — AMLODIPINE BESYLATE 10 MG: 10 TABLET ORAL at 09:52

## 2020-11-17 RX ADMIN — IPRATROPIUM BROMIDE AND ALBUTEROL SULFATE 3 ML: .5; 3 SOLUTION RESPIRATORY (INHALATION) at 15:27

## 2020-11-17 RX ADMIN — Medication 10 ML: at 13:41

## 2020-11-17 RX ADMIN — FUROSEMIDE 40 MG: 40 TABLET ORAL at 09:53

## 2020-11-17 RX ADMIN — DOCUSATE SODIUM 100 MG: 100 CAPSULE, LIQUID FILLED ORAL at 09:52

## 2020-11-17 NOTE — H&P
History and Physical    Subjective:     Savanah Jin is a 60 y.o. female with a PMHx significant for COPD (O2 dependent), HTN, HLP, DM-II, and GERD who presented to the ED with complaints of chest pain, onset since last night. Chest pain was described as midsternal, non-radiating, intermittent, and rated 4/10 at its worst. Associated symptoms include shortness of breath, \"just slightly than her baseline\", She denies, nausea, vomiting, diaphoresis, fever or chills. She reports a previous stress test and a cardiac cath in July 2020, with results suggesting \"nothing wrong with my heart\". She denies any history of cardiac stents. She reports a previous history of cardiac vs respiratory arrest, where she was found with low oxygen. No other acute complaints voiced. She stated she follows with Grisel Kasper NP cardiologist as outpatient. In the ED, her CXR negative, ECG with sinus tachycardia, no ischemic changes, trop <0.05 x 2 sets. Hospitalist was consulted for further evaluation. Past Medical History:   Diagnosis Date    Arthritis     Bronchitis 9/1/2020    Chronic obstructive pulmonary disease (HCC)     Chronic pain     Diabetes ([facility] [address]     Gastroesophageal reflux disease without esophagitis 9/1/2020    GERD (gastroesophageal reflux disease)     History of multiple allergies     HTN (hypertension) 9/1/2020    Hypertension     Intermittent asthma 9/1/2020    Menopause     Pain of upper abdomen 9/1/2020    Seasonal allergic rhinitis 9/1/2020    Sinus problem     Vitamin D deficiency 9/1/2020      Past Surgical History:   Procedure Laterality Date    COLONOSCOPY N/A 5/25/2020    COLONOSCOPY performed by Alberto Fox MD at [facility] ENDOSCOPY    HX COLONOSCOPY      HX GI      colonscopy    HX ORTHOPAEDIC  10/24/2018    Total Right Hip Arthroplasty Dr. Marissa Thomas.      Family History   Problem Relation Age of Onset    Hypertension Mother     Cancer Sister Social History     Tobacco Use    Smoking status: Former Smoker    Smokeless tobacco: Never Used   Substance Use Topics    Alcohol use: Yes     Alcohol/week: 3.0 standard drinks     Types: 3 Cans of beer per week       Prior to Admission medications    Medication Sig Start Date End Date Taking? Authorizing Provider   docusate sodium (COLACE) 100 mg capsule Take 1 Cap by mouth two (2) times a day for 90 days. 11/12/20 2/10/21  Dorothy Silver MD   lisinopril-hydroCHLOROthiazide (PRINZIDE, ZESTORETIC) 20-12.5 mg per tablet TAKE 1 TABLET BY MOUTH EVERY DAY  Indications: high blood pressure 11/12/20   Dorothy Silver MD   predniSONE (DELTASONE) 10 mg tablet Take 10 mg by mouth daily. 11/3/20   Dorothy Silver MD   aspirin delayed-release 81 mg tablet Take 1 Tab by mouth daily. 11/3/20   Dorothy Silver MD   atorvastatin (LIPITOR) 10 mg tablet TAKE 1 TABLET BY MOUTH EVERY DAY 10/6/20   Doorthy Silver MD   fluticasone furoate-vilanteroL (BREO ELLIPTA) 100-25 mcg/dose inhaler Breo Ellipta 100 mcg-25 mcg/dose powder for inhalation    Provider, Historical   lidocaine (LIDODERM) 5 % APPLY 1 PATCH EVERY DAY. REMOVE AFTER 12 HOURS 9/17/20   Provider, Historical   zolpidem (AMBIEN) 5 mg tablet  9/22/20   Provider, Historical   albuterol (PROVENTIL VENTOLIN) 2.5 mg /3 mL (0.083 %) nebu albuterol sulfate 2.5 mg/3 mL (0.083 %) solution for nebulization   USE ONE VIAL VIA NEBULIZER EVERY EIGHT HOURS AS NEEDED    Provider, Historical   ipratropium (ATROVENT) 42 mcg (0.06 %) nasal spray 2 Sprays by Both Nostrils route four (4) times daily as needed for Rhinitis (post nasal drainage).  9/25/20   Dorothy Silver MD   amLODIPine (NORVASC) 10 mg tablet TAKE 1 TABLET BY MOUTH EVERY DAY 9/17/20   Dorothy Silver MD   Blood-Glucose Meter (True Metrix Glucose Meter) misc TO USE DAILY TO CHECK BLOOD SUGAR. 9/15/20   Dorothy Silver MD   lancets misc USE ONCE LANCET PER DAY TO CHECK BLOOD SUGAR. 9/15/20   Lili Comer Marilia Menchaca MD   glucose blood VI test strips (True Metrix Glucose Test Strip) strip Use one strip daily to test blood glucose subcutaneously. E11.9 9/14/20   Vanessa Luna MD   furosemide (LASIX) 40 mg tablet Take 40 mg by mouth daily. Indications: visible water retention    Provider, Historical   fluticasone propionate (FLONASE) 50 mcg/actuation nasal spray SPRAY 2 SPRAYS INTO EACH NOSTRIL EVERY DAY 9/2/20   Vanessa Luna MD   ergocalciferol (ERGOCALCIFEROL) 1,250 mcg (50,000 unit) capsule TAKE ONE CAPSULE BY MOUTH ONCE A WEEK 9/2/20   Vanessa Luna MD   metFORMIN (GLUCOPHAGE) 500 mg tablet Take 500 mg by mouth two (2) times daily (with meals). Indications: type 2 diabetes mellitus    Provider, Historical   loratadine (CLARITIN) 10 mg tablet TAKE 1 TABLET BY MOUTH EVERY DAY 8/14/20   Vanessa Luna MD   albuterol (PROVENTIL HFA, VENTOLIN HFA, PROAIR HFA) 90 mcg/actuation inhaler Take 2 Puffs by inhalation every four (4) hours as needed for Wheezing. Provider, Historical   pantoprazole (PROTONIX) 40 mg tablet Take 40 mg by mouth Before breakfast and dinner. Provider, Historical   azelastine (ASTELIN) 137 mcg (0.1 %) nasal spray 2 Sprays by Both Nostrils route nightly. 7/16/18   Provider, Historical   montelukast (SINGULAIR) 10 mg tablet TAKE 1 TABLET BY MOUTH EVERY DAY FOR ALLERGY SYMPTOMS 6/25/18   Provider, Historical     No Known Allergies     Review of Systems:  14 point ROS negative except as mentioned in HPI. Objective:     Vitals:  Visit Vitals  /75 (BP 1 Location: Right arm, BP Patient Position: At rest)   Pulse (!) 105   Temp 98.4 °F (36.9 °C)   Resp 18   Ht 5' 7\" (1.702 m)   Wt 79.4 kg (175 lb)   SpO2 99%   BMI 27.41 kg/m²       Physical Exam:  General: Well developed for stated age, alert, awake, oriented x 4, cooperative, no distress. Head:  Normocephalic, without obvious abnormality, atraumatic. Eyes:  Conjunctivae/corneas clear.  Pupils equal, round, reactive to light. Extraocular movements intact. Lungs:  Clear to auscultation bilaterally, no wheezes, no crackles. Chest wall: No tenderness or deformity. Heart: Tachycardic, normal rhythm, S1, S2 normal, no murmur, click, rub, or gallop. Abdomen:  Soft, non-tender. Bowel sounds normal. No masses. No organomegaly. Back:  No spine tenderness to palpation  Extremities: Extremities normal, atraumatic, no cyanosis or peripheral edema. Pulses: Symmetric all extremities. Skin: Skin color, texture, turgor normal.   Lymph nodes: Cervical nodes normal.  Neurologic: Awake and oriented, x4, CN II-XII intact. No focal neuro deficits. Labs:  Recent Results (from the past 24 hour(s))   EKG, 12 LEAD, INITIAL    Collection Time: 11/16/20 12:39 PM   Result Value Ref Range    Ventricular Rate 111 BPM    Atrial Rate 109 BPM    P-R Interval 144 ms    QRS Duration 80 ms    Q-T Interval 318 ms    QTC Calculation (Bezet) 432 ms    Calculated P Axis 59 degrees    Calculated R Axis 61 degrees    Calculated T Axis 62 degrees    Diagnosis       Sinus tachycardia    Confirmed by Eber MOONEY ([id number]) on 11/16/2020 1:20:66 PM     METABOLIC PANEL, COMPREHENSIVE    Collection Time: 11/16/20  1:01 PM   Result Value Ref Range    Sodium 128 (L) 136 - 145 mmol/L    Potassium 4.1 3.5 - 5.1 mmol/L    Chloride 89 (L) 97 - 108 mmol/L    CO2 32 21 - 32 mmol/L    Anion gap 7 5 - 15 mmol/L    Glucose 99 65 - 100 mg/dL    BUN 13 6 - 20 mg/dL    Creatinine 0.94 0.55 - 1.02 mg/dL    BUN/Creatinine ratio 14 12 - 20      GFR est AA >60 >60 ml/min/1.73m2    GFR est non-AA 60 (L) >60 ml/min/1.73m2    Calcium 9.7 8.5 - 10.1 mg/dL    Bilirubin, total 0.7 0.2 - 1.0 mg/dL    AST (SGOT) 44 (H) 15 - 37 U/L    ALT (SGPT) 44 12 - 78 U/L    Alk.  phosphatase 79 45 - 117 U/L    Protein, total 7.5 6.4 - 8.2 g/dL    Albumin 4.2 3.5 - 5.0 g/dL    Globulin 3.3 2.0 - 4.0 g/dL    A-G Ratio 1.3 1.1 - 2.2     CBC WITH AUTOMATED DIFF    Collection Time: 11/16/20  1:01 PM   Result Value Ref Range    WBC 6.2 4.4 - 11.3 K/uL    RBC 3.27 (L) 4.50 - 5.90 M/uL    HGB 10.6 (L) 13.5 - 17.5 g/dL    HCT 31.1 (L) 36 - 46 %    MCV 94.9 80 - 100 FL    MCH 32.3 31 - 34 PG    MCHC 34.0 31.0 - 36.0 g/dL    RDW 16.0 (H) 11.5 - 14.5 %    PLATELET 717 K/uL    MPV 8.0 6.5 - 11.5 FL    NRBC 0.0  WBC    ABSOLUTE NRBC 0.00 K/uL    NEUTROPHILS 76 (H) 42 - 75 %    LYMPHOCYTES 15 (L) 20.5 - 51.1 %    MONOCYTES 7 1.7 - 9.3 %    EOSINOPHILS 1 0.9 - 2.9 %    BASOPHILS 1 0.0 - 2.5 %    ABS. NEUTROPHILS 4.7 1.8 - 7.7 K/UL    ABS. LYMPHOCYTES 0.9 (L) 1.0 - 4.8 K/UL    ABS. MONOCYTES 0.4 0.2 - 2.4 K/UL    ABS. EOSINOPHILS 0.1 0.0 - 0.7 K/UL    ABS. BASOPHILS 0.1 0.0 - 0.2 K/UL   TROPONIN I    Collection Time: 11/16/20  1:01 PM   Result Value Ref Range    Troponin-I, Qt. <0.05 <0.05 ng/mL   D DIMER    Collection Time: 11/16/20  1:01 PM   Result Value Ref Range    D-dimer 1.38 (H) 0.19 - 0.50 mg/L FEU   TROPONIN I    Collection Time: 11/16/20  5:05 PM   Result Value Ref Range    Troponin-I, Qt. <0.05 <0.05 ng/mL       Imaging:  Cta Chest W Or W Wo Cont    Result Date: 11/16/2020  Chest CTA with contrast. Comparison: 6/5/2020. Technique: CT angiography images of the chest are performed with 100 mL Isovue-370 intravenous contrast. Multiplanar reformatted images and multiplanar MIP reformatted images are reviewed. Dose Reduction: All CT scans at this facility are performed using dose reduction optimization techniques as appropriate to a performed exam including the following: Automated exposure control, adjustments of the mA and/or kV according to patient size, or use of iterative reconstruction technique. Findings: There is a 10 mm hypodense right thyroid nodule. There is an 18 mm hypodense left thyroid nodule. No axillary, mediastinal or hilar lymphadenopathy is identified. The heart is normal in size. There are mild coronary artery calcifications. The main pulmonary artery is normal in caliber measuring 22 mm.  There is dilatation of the right main pulmonary artery to 27 mm and of the left main pulmonary artery to 24 mm. There is mild calcified atherosclerotic disease within the thoracic aorta and There is no evidence of aneurysm or dissection. No pulmonary arterial filling defect is identified. There is moderate centrilobular emphysema. There is mild linear scarring within the lingula. No focal consolidation, pleural effusion or pneumothorax is identified. There is a 5 mm indeterminate pulmonary nodule within the left lower lobe. There is a compression fracture of T12 with 35-40% height loss in the mid and anterior aspect of the vertebral body, new from the prior exam. There is no acute process within the included upper abdomen. IMPRESSION: 1. No acute cardiopulmonary process. 2. No evidence of dissection or pulmonary embolism. 3. Moderate centrilobular emphysema. 4. Dilated right and left main pulmonary arteries suggesting pulmonary artery hypertension. 5. Mild linear scarring in the lingula. 6. 5 mm indeterminate pulmonary nodule in the left lower lobe, new from the prior exam. Follow up via the [address] [place] [address] guidelines is recommended. 7. Bilateral thyroid nodules measuring up to 18 mm. Further evaluation with ultrasound is recommended. 8. Age indeterminate T12 compression fracture, however it is new from the 6/5/2020 exam. RECOMMENDATIONS FOR FOLLOWUP AND MANAGEMENT OF NODULES SMALLER THAN 8 MM DETECTED INCIDENTALLY: Low-risk patient (minimal or absent history of smoking and other known risk factors): ~Less than 6 mm: no followup needed ~6 - 8mm: followup CT 6 - 12 months; then consider further followup at 18-24 months. ~Greater than 8 mm: followup CT at  3 months, and/ or  PET/ CT , and/or biopsy. High risk patient: ~Less than 6 mm: followup CT at 6 - 12 months; then consider further followup at 18-24 months. ~6-8 mm: initial followup at 6-12 months, then at 18 - 24 if no change.  ~Greater than 8 mm: same as for low risk patient. Xr Chest Port    Result Date: 11/16/2020  Chest, frontal view, 11/16/2020 History: Chest pain. Comparison: Including chest 6/11/2020. Findings: The cardiac silhouette is within normal limits. The lungs are adequately expanded. No hydrostatic edema is present. No focal consolidation, pleural effusions or pneumothorax is identified. Degenerative changes are present in the thoracic spine. Impression: No acute pulmonary process. Assessment & Plan:      #1: Chest pain:  -ECG shows sinus tachycardia, rate 111, no S-T segment changes.   -trop <0.05 x 2. CXR negative. D-dimer 1.38, CTA chest negative for PE.  -Received aspirin 162mg in ED  -Admit to observation on telemetry  -Trend troponin.  -Lipid panel in a.m.  -Nitro SL PRN, morphine PRN  -Her Echo in May 2020 is 60-65%, normal wall thickness, and without valvular disease.  -Cardiology consult in a.m. #2: Hyponatremia  -presents with Na level 128. Likely chronic.   -Her Na level was as low as 126 in May 2020.  -Check urine na, and osmolality.  -Fluid restriction 1500ml daily. -BMP in a.m. #3: Hypertension:  -Chronic condition  -currently normotensive.  -Continue lisinopril    #4: Diabetes Mellitus:  -Chronic condition, on metformin.  -Hold metformin due to recent contrast use. -Accucheck AC with SSI. #5: COPD, Oxygen dependent  -Chronic condition  -Currently no acute exacerbation  -continue Breo-ellipta, Duonebs, albuterol Prn, montelukast, loratadine. #6: GERD:  -Chronic, on protonix. VTE Prophylaxis: Lovenox  Code Status: Full code.

## 2020-11-17 NOTE — CONSULTS
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 Cardiac Arrest 06/05/2020, and Nonobstructive CAD (Cath @ [facility] 6/12/2020 [facility])  who presented for evaluation of Chest Pain. Pain started approx 24 hours PTA substernal, nonradiating, intermittent, mild to moderate in intensity with associated SOB. Workup in ED showed no ischemic changes on EKG, Trop negative, Hgb 9/9, and CTA negative for PE but + for T12 Compression fracture. She denies any further sx. This am she is sx free and thinks it is likely related to GI and thyroid nodules. She has no new complaints. Records from hospital admission course thus far reviewed. Telemetry Review: SR/ST no arrhythmias noted      PAST MEDICAL HISTORY:    Past Medical History:   Diagnosis Date    Arthritis     Bronchitis 9/1/2020    Chronic obstructive pulmonary disease (HCC)     Chronic pain     Diabetes ([address]     Gastroesophageal reflux disease without esophagitis 9/1/2020    GERD (gastroesophageal reflux disease)     History of multiple allergies     HTN (hypertension) 9/1/2020    Hypertension     Intermittent asthma 9/1/2020    Menopause     Pain of upper abdomen 9/1/2020    Seasonal allergic rhinitis 9/1/2020    Sinus problem     Vitamin D deficiency 9/1/2020         HOME MEDICATIONS:    Prior to Admission Medications   Prescriptions Last Dose Informant Patient Reported? Taking? Blood-Glucose Meter (True Metrix Glucose Meter) misc Unknown at Unknown time  No No   Sig: TO USE DAILY TO CHECK BLOOD SUGAR. albuterol (PROVENTIL HFA, VENTOLIN HFA, PROAIR HFA) 90 mcg/actuation inhaler Unknown at Unknown time  Yes No   Sig: Take 2 Puffs by inhalation every four (4) hours as needed for Wheezing. albuterol (PROVENTIL VENTOLIN) 2.5 mg /3 mL (0.083 %) nebu Unknown at Unknown time  Yes No   Sig: albuterol sulfate 2.5 mg/3 mL (0.083 %) solution for nebulization   USE ONE VIAL VIA NEBULIZER EVERY EIGHT HOURS AS NEEDED   amLODIPine (NORVASC) 10 mg tablet Unknown at Unknown time  No No   Sig: TAKE 1 TABLET BY MOUTH EVERY DAY   aspirin delayed-release 81 mg tablet Unknown at Unknown time  No No   Sig: Take 1 Tab by mouth daily. atorvastatin (LIPITOR) 10 mg tablet Unknown at Unknown time  No No   Sig: TAKE 1 TABLET BY MOUTH EVERY DAY   azelastine (ASTELIN) 137 mcg (0.1 %) nasal spray Unknown at Unknown time  Yes No   Si Sprays by Both Nostrils route nightly. docusate sodium (COLACE) 100 mg capsule Unknown at Unknown time  No No   Sig: Take 1 Cap by mouth two (2) times a day for 90 days. ergocalciferol (ERGOCALCIFEROL) 1,250 mcg (50,000 unit) capsule Unknown at Unknown time  No No   Sig: TAKE ONE CAPSULE BY MOUTH ONCE A WEEK   fluticasone furoate-vilanteroL (BREO ELLIPTA) 100-25 mcg/dose inhaler Unknown at Unknown time  Yes No   Sig: Breo Ellipta 100 mcg-25 mcg/dose powder for inhalation   fluticasone propionate (FLONASE) 50 mcg/actuation nasal spray Unknown at Unknown time  No No   Sig: SPRAY 2 SPRAYS INTO EACH NOSTRIL EVERY DAY   furosemide (LASIX) 40 mg tablet Unknown at Unknown time  Yes No   Sig: Take 40 mg by mouth daily. Indications: visible water retention   glucose blood VI test strips (True Metrix Glucose Test Strip) strip Unknown at Unknown time  No No   Sig: Use one strip daily to test blood glucose subcutaneously. E11.9   ipratropium (ATROVENT) 42 mcg (0.06 %) nasal spray Unknown at Unknown time  No No   Si Sprays by Both Nostrils route four (4) times daily as needed for Rhinitis (post nasal drainage). lancets misc Unknown at Unknown time  No No   Sig: USE ONCE LANCET PER DAY TO CHECK BLOOD SUGAR.   lidocaine (LIDODERM) 5 % Unknown at Unknown time  Yes No   Sig: APPLY 1 PATCH EVERY DAY. REMOVE AFTER 12 HOURS   lisinopril-hydroCHLOROthiazide (PRINZIDE, ZESTORETIC) 20-12.5 mg per tablet Unknown at Unknown time  No No   Sig: TAKE 1 TABLET BY MOUTH EVERY DAY  Indications: high blood pressure   loratadine (CLARITIN) 10 mg tablet Unknown at Unknown time  No No   Sig: TAKE 1 TABLET BY MOUTH EVERY DAY   metFORMIN (GLUCOPHAGE) 500 mg tablet Unknown at Unknown time  Yes No   Sig: Take 500 mg by mouth two (2) times daily (with meals). Indications: type 2 diabetes mellitus   montelukast (SINGULAIR) 10 mg tablet Unknown at Unknown time  Yes No   Sig: TAKE 1 TABLET BY MOUTH EVERY DAY FOR ALLERGY SYMPTOMS   pantoprazole (PROTONIX) 40 mg tablet Unknown at Unknown time  Yes No   Sig: Take 40 mg by mouth Before breakfast and dinner. predniSONE (DELTASONE) 10 mg tablet Unknown at Unknown time  No No   Sig: Take 10 mg by mouth daily. zolpidem (AMBIEN) 5 mg tablet Unknown at Unknown time  Yes No      Facility-Administered Medications: None         ALLERGIES:  No Known Allergies      FAMILY HISTORY:    Family History   Problem Relation Age of Onset    Hypertension Mother     Cancer Sister          SOCIAL HISTORY:    Tobacco: Former  Drugs: denies  ETOH: 3 beers/week      REVIEW OF SYSTEMS:  Complete review of systems performed, pertinents noted above, all other systems are negative.     Patient Vitals for the past 24 hrs:   Temp Pulse Resp BP SpO2   11/17/20 0741 (!) 96.7 °F (35.9 °C) 97 20 115/63 99 %   11/17/20 0400 -- 90 -- -- --   11/16/20 2357 97.5 °F (36.4 °C) (!) 110 19 138/71 100 %   11/16/20 2351 -- 98 -- -- --   11/16/20 2100 -- (!) 106 21 115/67 100 %   11/16/20 2000 -- (!) 107 16 136/80 93 %   11/16/20 1930 -- (!) 118 19 123/89 100 %   11/16/20 1900 -- (!) 104 24 126/80 100 %   11/16/20 1800 -- (!) 105 18 113/75 -- 11/16/20 1702 -- (!) 109 18 112/65 99 %   11/16/20 1600 -- (!) 118 20 119/84 98 %   11/16/20 1500 -- (!) 111 20 139/88 98 %   11/16/20 1400 -- (!) 104 20 130/85 100 %   11/16/20 1330 -- (!) 108 20 (!) 140/80 100 %   11/16/20 1250 -- -- -- -- 100 %   11/16/20 1245 98.4 °F (36.9 °C) (!) 115 20 (!) 159/86 100 %       PHYSICAL EXAMINATION:    General: Well nourished chronically ill appearing female sitting in recliner, NAD, A&O  HEENT: Normocephalic, PERRL, no drainage  Neck: Supple, Trachea midline, No JVD  RESP: CTA bilaterally but diminished with symmetrical chest movement. No SOB or distress. On O2 via NC  Cardiovascular: RRR no MRG  PVS: No rubor, cyanosis, no edema, Radial, DP, PT pulses equal bilaterally  ABD: obese , soft NT, Normoactive BS  Derm: Warm/Dry/Intact with no lesions, normal turgor  Neuro: A&O PPTS, cranial nerves II- XII grossly intact via interaction with patient. No focal deficits  PSYCH: No anxiety or agitation      Recent labs results and imaging reviewed.       Recent Results (from the past 24 hour(s))   EKG, 12 LEAD, INITIAL    Collection Time: 11/16/20 12:39 PM   Result Value Ref Range    Ventricular Rate 111 BPM    Atrial Rate 109 BPM    P-R Interval 144 ms    QRS Duration 80 ms    Q-T Interval 318 ms    QTC Calculation (Bezet) 432 ms    Calculated P Axis 59 degrees    Calculated R Axis 61 degrees    Calculated T Axis 62 degrees    Diagnosis       Sinus tachycardia    Confirmed by Eber MOONEY ([id number]) on 11/16/2020 3:45:56 PM     METABOLIC PANEL, COMPREHENSIVE    Collection Time: 11/16/20  1:01 PM   Result Value Ref Range    Sodium 128 (L) 136 - 145 mmol/L    Potassium 4.1 3.5 - 5.1 mmol/L    Chloride 89 (L) 97 - 108 mmol/L    CO2 32 21 - 32 mmol/L    Anion gap 7 5 - 15 mmol/L    Glucose 99 65 - 100 mg/dL    BUN 13 6 - 20 mg/dL    Creatinine 0.94 0.55 - 1.02 mg/dL    BUN/Creatinine ratio 14 12 - 20      GFR est AA >60 >60 ml/min/1.73m2    GFR est non-AA 60 (L) >60 ml/min/1.73m2    Calcium 9.7 8.5 - 10.1 mg/dL    Bilirubin, total 0.7 0.2 - 1.0 mg/dL    AST (SGOT) 44 (H) 15 - 37 U/L    ALT (SGPT) 44 12 - 78 U/L    Alk. phosphatase 79 45 - 117 U/L    Protein, total 7.5 6.4 - 8.2 g/dL    Albumin 4.2 3.5 - 5.0 g/dL    Globulin 3.3 2.0 - 4.0 g/dL    A-G Ratio 1.3 1.1 - 2.2     CBC WITH AUTOMATED DIFF    Collection Time: 11/16/20  1:01 PM   Result Value Ref Range    WBC 6.2 4.4 - 11.3 K/uL    RBC 3.27 (L) 4.50 - 5.90 M/uL    HGB 10.6 (L) 13.5 - 17.5 g/dL    HCT 31.1 (L) 36 - 46 %    MCV 94.9 80 - 100 FL    MCH 32.3 31 - 34 PG    MCHC 34.0 31.0 - 36.0 g/dL    RDW 16.0 (H) 11.5 - 14.5 %    PLATELET 764 K/uL    MPV 8.0 6.5 - 11.5 FL    NRBC 0.0  WBC    ABSOLUTE NRBC 0.00 K/uL    NEUTROPHILS 76 (H) 42 - 75 %    LYMPHOCYTES 15 (L) 20.5 - 51.1 %    MONOCYTES 7 1.7 - 9.3 %    EOSINOPHILS 1 0.9 - 2.9 %    BASOPHILS 1 0.0 - 2.5 %    ABS. NEUTROPHILS 4.7 1.8 - 7.7 K/UL    ABS. LYMPHOCYTES 0.9 (L) 1.0 - 4.8 K/UL    ABS. MONOCYTES 0.4 0.2 - 2.4 K/UL    ABS. EOSINOPHILS 0.1 0.0 - 0.7 K/UL    ABS.  BASOPHILS 0.1 0.0 - 0.2 K/UL   TROPONIN I    Collection Time: 11/16/20  1:01 PM   Result Value Ref Range    Troponin-I, Qt. <0.05 <0.05 ng/mL   D DIMER    Collection Time: 11/16/20  1:01 PM   Result Value Ref Range    D-dimer 1.38 (H) 0.19 - 0.50 mg/L FEU   TROPONIN I    Collection Time: 11/16/20  5:05 PM   Result Value Ref Range    Troponin-I, Qt. <0.05 <0.05 ng/mL   TROPONIN I    Collection Time: 11/16/20 10:20 PM   Result Value Ref Range    Troponin-I, Qt. <0.05 <0.05 ng/mL   TROPONIN I    Collection Time: 11/17/20  6:18 AM   Result Value Ref Range    Troponin-I, Qt. <0.05 <6.80 ng/mL   METABOLIC PANEL, COMPREHENSIVE    Collection Time: 11/17/20  6:18 AM   Result Value Ref Range    Sodium 133 (L) 136 - 145 mmol/L    Potassium 4.1 3.5 - 5.1 mmol/L    Chloride 94 (L) 97 - 108 mmol/L    CO2 35 (H) 21 - 32 mmol/L    Anion gap 4 (L) 5 - 15 mmol/L    Glucose 79 65 - 100 mg/dL    BUN 15 6 - 20 mg/dL    Creatinine 1.01 0.55 - 1.02 mg/dL    BUN/Creatinine ratio 15 12 - 20      GFR est AA >60 >60 ml/min/1.73m2    GFR est non-AA 55 (L) >60 ml/min/1.73m2    Calcium 9.4 8.5 - 10.1 mg/dL    Bilirubin, total 0.5 0.2 - 1.0 mg/dL    AST (SGOT) 26 15 - 37 U/L    ALT (SGPT) 36 12 - 78 U/L    Alk. phosphatase 67 45 - 117 U/L    Protein, total 6.2 (L) 6.4 - 8.2 g/dL    Albumin 3.6 3.5 - 5.0 g/dL    Globulin 2.6 2.0 - 4.0 g/dL    A-G Ratio 1.4 1.1 - 2.2     CBC WITH AUTOMATED DIFF    Collection Time: 11/17/20  6:18 AM   Result Value Ref Range    WBC 5.7 4.4 - 11.3 K/uL    RBC 3.04 (L) 4.50 - 5.90 M/uL    HGB 9.9 (L) 13.5 - 17.5 g/dL    HCT 29.2 (L) 36 - 46 %    MCV 96.1 80 - 100 FL    MCH 32.5 31 - 34 PG    MCHC 33.9 31.0 - 36.0 g/dL    RDW 16.6 (H) 11.5 - 14.5 %    PLATELET 912 K/uL    MPV 7.6 6.5 - 11.5 FL    NRBC 10.0  WBC    ABSOLUTE NRBC 0.00 K/uL    NEUTROPHILS 68 42 - 75 %    LYMPHOCYTES 20 (L) 20.5 - 51.1 %    MONOCYTES 10 (H) 1.7 - 9.3 %    EOSINOPHILS 1 0.9 - 2.9 %    BASOPHILS 1 0.0 - 2.5 %    ABS. NEUTROPHILS 3.9 1.8 - 7.7 K/UL    ABS. LYMPHOCYTES 1.1 1.0 - 4.8 K/UL    ABS. MONOCYTES 0.6 0.2 - 2.4 K/UL    ABS. EOSINOPHILS 0.1 0.0 - 0.7 K/UL    ABS. BASOPHILS 0.0 0.0 - 0.2 K/UL   TSH 3RD GENERATION    Collection Time: 11/17/20  6:18 AM   Result Value Ref Range    TSH 1.21 0.36 - 3.74 uIU/mL   GLUCOSE, POC    Collection Time: 11/17/20  7:45 AM   Result Value Ref Range    Glucose (POC) 86 65 - 100 mg/dL    Performed by HERMINIO DECKER        XR Results (maximum last 3): Results from [place] encounter on 11/16/20   XR CHEST PORT    Impression Impression: No acute pulmonary process. Results from [place] encounter on 10/24/18   XR HIP RT W OR WO PELV 2-3 VWS    Impression IMPRESSION: Appropriate appearance of the right hip prosthesis. NC XR TECHNOLOGIST SERVICE    Impression IMPRESSION:    Please see above. FLUORO TIME: 00.35    AJB         CT Results (maximum last 3):   Results from [facility] encounter on 11/16/20   CTA CHEST W OR W WO CONT    Impression IMPRESSION:  1. No acute cardiopulmonary process. 2. No evidence of dissection or pulmonary embolism. 3. Moderate centrilobular emphysema. 4. Dilated right and left main pulmonary arteries suggesting pulmonary artery  hypertension. 5. Mild linear scarring in the lingula. 6. 5 mm indeterminate pulmonary nodule in the left lower lobe, new from the  prior exam. Follow up via the [address] [place] [address] guidelines is recommended. 7. Bilateral thyroid nodules measuring up to 18 mm. Further evaluation with  ultrasound is recommended. 8. Age indeterminate T12 compression fracture, however it is new from the  6/5/2020 exam.      RECOMMENDATIONS FOR FOLLOWUP AND MANAGEMENT OF NODULES SMALLER THAN 8 MM  DETECTED INCIDENTALLY:    Low-risk patient (minimal or absent history of smoking and other known risk  factors):    ~Less than 6 mm: no followup needed  ~6 - 8mm: followup CT 6 - 12 months; then consider further followup at 18-24  months. ~Greater than 8 mm: followup CT at  3 months, and/ or  PET/ CT , and/or  biopsy. High risk patient:    ~Less than 6 mm: followup CT at 6 - 12 months; then consider further followup  at 18-24 months.  ~6-8 mm: initial followup at 6-12 months, then at 18 - 24 if no change. ~Greater than 8 mm: same as for low risk patient. Results from [place] encounter on 05/23/20   CT ABDOMEN W WO CONT AND PELVIS W CONT    Impression IMPRESSION: No CT evidence of ischemic colitis or other acute bowel abnormality  with right and transverse colon endoscopy clips noted. Metallic foreign body  density suspected in posterior wall of cecum with probable radiodense debris  within the cecum near the appendiceal origin noted as well. Incidentals as above  including cholecystolithiasis with no acute findings otherwise.          Current Facility-Administered Medications:     cetirizine (ZYRTEC) tablet 10 mg, 10 mg, Oral, DAILY, Maye Amira Dimas MD    albuterol (PROVENTIL VENTOLIN) nebulizer solution 2.5 mg, 2.5 mg, Nebulization, Q4H PRN, Rodrick Villavicencio MD    albuterol-ipratropium (DUO-NEB) 2.5 MG-0.5 MG/3 ML, 3 mL, Nebulization, TID RT, Rodrick Villavicencio MD    sodium chloride (NS) flush 5-40 mL, 5-40 mL, IntraVENous, Q8H, Oba, Oluwabunmi S, NP, 10 mL at 11/17/20 0521    nitroglycerin (NITROSTAT) tablet 0.4 mg, 0.4 mg, SubLINGual, Q5MIN PRN, Chetan Madrigal NP    acetaminophen (TYLENOL) tablet 650 mg, 650 mg, Oral, Q4H PRN, Oba, Oluwabunmi S, NP    morphine injection 2 mg, 2 mg, IntraVENous, Q4H PRN, Oba, Oluwabunmi S, NP    ondansetron (ZOFRAN) injection 4 mg, 4 mg, IntraVENous, Q6H PRN, Oba, Oluwabunmi S, NP    enoxaparin (LOVENOX) injection 40 mg, 40 mg, SubCUTAneous, Q24H, Oba, Oluwabunmi S, NP, 40 mg at 11/16/20 2113    amLODIPine (NORVASC) tablet 10 mg, 10 mg, Oral, DAILY, Oba, Oluwabunmi S, NP    aspirin delayed-release tablet 81 mg, 81 mg, Oral, DAILY, Oba, Oluwabunmi S, NP    atorvastatin (LIPITOR) tablet 10 mg, 10 mg, Oral, DAILY, Oba, Oluwabunmi S, NP    azelastine (ASTELIN) 137mcg/spray nasal spray, 2 Spray, Both Nostrils, QHS, Oba, Oluwabunmi S, NP    docusate sodium (COLACE) capsule 100 mg, 100 mg, Oral, BID, Oba, Oluwabunmi S, NP    budesonide-formoteroL (SYMBICORT) 160-4.5 mcg/actuation HFA inhaler 1 Puff, 1 Puff, Inhalation, BID RT, Oba, Oluwabunmi S, NP    fluticasone propionate (FLONASE) 50 mcg/actuation nasal spray 2 Spray, 2 Spray, Both Nostrils, DAILY, Oba, Oluwabunmi S, NP    furosemide (LASIX) tablet 40 mg, 40 mg, Oral, DAILY, Oba, Oluwabunmi S, NP    lidocaine 4 % patch 1 Patch, 1 Patch, TransDERmal, Q24H, Oba, Oluwabunmi S, NP, 1 Patch at 11/17/20 0000    montelukast (SINGULAIR) tablet 10 mg, 10 mg, Oral, QHS, Oba, Oluwabunmi S, NP, Stopped at 11/17/20 0000    pantoprazole (PROTONIX) tablet 40 mg, 40 mg, Oral, ACB&D, Oba, Oluwabunmi S, NP, 40 mg at 11/17/20 0738    predniSONE (Angela Rowisaac) tablet 10 mg, 10 mg, Oral, DAILY, Oba, Oluwabunmi S, NP    zolpidem (AMBIEN) tablet 5 mg, 5 mg, Oral, QHS PRN, Oba, Oluwabunmi S, NP    lisinopriL (PRINIVIL, ZESTRIL) tablet 20 mg, 20 mg, Oral, DAILY, Oba, Oluwabunmi S, NP    insulin lispro (HUMALOG) injection, , SubCUTAneous, TIDAC, Oba, Oluwabunmi S, NP          Case discussed with collaborating physician Dr. Tenisha Dobson and our impression and recommendations are as follows:   1. Chest Pain - ACS ruled out per EKG and Troponin criteria. Patient had Cardiac Cath 06/12/2020 @ [facility] showing nonobstructive CAD, so no further workup indicated at this times in regards to cardiology. May consider GI or ENT. 2. Hypertension - BP at goal. Continue current regimen. 3. Anemia  - per  team.       Thank you for involving us in the care of this patient. Please do not hesitate to call if additional questions arise.  If after hours please call [phone]

## 2020-11-17 NOTE — ROUTINE PROCESS
TRANSFER - OUT REPORT:    Verbal report given to May on Emaline Dural  being transferred to [address] [facility], Room 210 for routine progression of care       Report consisted of patients Situation, Background, Assessment and   Recommendations(SBAR). Information from the following report(s) SBAR was reviewed with the receiving nurse. Lines:   Peripheral IV 11/16/20 Right Antecubital (Active)   Site Assessment Clean, dry, & intact 11/16/20 1304   Phlebitis Assessment 0 11/16/20 1304   Infiltration Assessment 0 11/16/20 1304   Dressing Status Clean, dry, & intact 11/16/20 1304       Peripheral IV 11/16/20 Left Forearm (Active)   Site Assessment Clean, dry, & intact 11/16/20 1533   Phlebitis Assessment 0 11/16/20 1533   Infiltration Assessment 0 11/16/20 1533   Dressing Status Clean, dry, & intact 11/16/20 1533   Dressing Type Transparent 11/16/20 1533   Hub Color/Line Status Pink 11/16/20 1533   Alcohol Cap Used No 11/16/20 1533        Opportunity for questions and clarification was provided.       Patient transported with:   Registered Nurse, Oxygen @ [facility]

## 2020-11-17 NOTE — ED NOTES
Pt to be admitted to [address] [place] [address], Room 210. This RN called to give report, receiving RN unavailable at this time, RN will call back in 5 min.

## 2020-11-17 NOTE — PROGRESS NOTES
Care Management Interventions  Mode of Transport at Discharge: Self  Transition of Care Consult (CM Consult): Discharge Planning  Physical Therapy Consult: No  Occupational Therapy Consult: No  Speech Therapy Consult: No  Current Support Network: Own Home  Confirm Follow Up Transport: Friends  Discharge Location  Discharge Placement: Home

## 2020-11-17 NOTE — ED NOTES
Pt transported via w/c accompanied by RN on [email or web address] via [place]. All belongings w/ patient including; black purse, white phone , cell phone, portable oxygen tank, and clothing (gray shirt, black sweat pants, black shoes, and black jacket).

## 2020-11-17 NOTE — PROGRESS NOTES
Attempted visit during IDT rounds in the [facility] acute care [facility]. Patient appeared to be resting. I provided silent prayer and support as well as staff support. Chaplains will follow as able and/or needed. Chaplain Lisa Aguirre M.Div.    can be reached by calling the  at General acute hospital  [phone]

## 2020-11-17 NOTE — DISCHARGE SUMMARY
Physician Discharge Summary       Patient: Bobetta Seip MRN: [id number]     YOB: 1955  Age: 60 y.o. Sex: female    PCP: Isaac Martins MD    Allergies: Patient has no known allergies. Admit date: 11/16/2020  Admitting Provider: Otto Mireles MD    Discharge date: 11/17/2020  Discharging Provider: Otto Mireles MD    * Admission Diagnoses:   Chest pain [R07.9]      * Discharge Diagnoses:    Hospital Problems as of 11/17/2020 Date Reviewed: 9/25/2020          Codes Class Noted - Resolved POA    Chest pain ICD-10-CM: R07.9  ICD-9-CM: 786.50  11/16/2020 - Present Unknown                * Hospital Course:   Bobetta Seip is a 60 y.o. female with a PMHx significant for COPD (O2 dependent), HTN, HLP, DM-II, and GERD who presented to the ED with complaints of chest pain, onset since last night. Chest pain was described as midsternal, non-radiating, intermittent, and rated 4/10 at its worst. Associated symptoms include shortness of breath, \"just slightly than her baseline\", She denies, nausea, vomiting, diaphoresis, fever or chills. She reports a previous stress test and a cardiac cath in July 2020, with results suggesting \"nothing wrong with my heart\". She denies any history of cardiac stents. She reports a previous history of cardiac vs respiratory arrest, where she was found with low oxygen. No other acute complaints voiced. She stated she follows with Kathy Winston NP cardiologist as outpatient. In the ED, her CXR negative, ECG with sinus tachycardia, no ischemic changes, trop <0.05 x 2 sets. Hospitalist was consulted for further evaluation.      Chest pain:  -ECG shows sinus tachycardia, rate 111, no S-T segment changes.   -trop <0.05 x 4 and. CXR negative.  D-dimer 1.38, CTA chest negative for PE but does show thyroid nodules which patient says previously Dr. Radha Story was following her for.   -Received aspirin 162mg in ED    -Her Echo in May 2020 is 60-65%, normal wall thickness, and without valvular disease.  -Cardiology consult appreciated and patient had complete cardiac workup this summer with cath showing nonobstructive disease. Patient chest pain most likely secondary to her chronic GERD/heartburn as well as cough from post nasal drip that she is suffering from currently. She has been continued on loratadine and also on prednisone and flonase. Will given short course of augmentin bid x 7 days. Patient will need to follow up closely with ENT for sinusitis issues as well as thyroid nodules.      Hyponatremia  -presents with Na level 128. Likely chronic.   -Her Na level was as low as 126 in May 2020.  - repeat is at 80      Hypertension:  -Chronic condition  -currently normotensive.  -Continue lisinopril / hctz      Diabetes Mellitus:  -Chronic condition, on metformin.  -Hold metformin due to recent contrast use but will be restarted on discharge  -Accucheck AC with SSI.      COPD, Oxygen dependent  -Chronic condition  -Currently no acute exacerbation  -continue Breo-ellipta, Duonebs, albuterol Prn, montelukast, loratadine.     #6: GERD:  -Chronic, on protonix bid and carafate     * Procedures:   * No surgery found *        Consults Cardiology:   Case discussed with collaborating physician Dr. Anil Delong and our impression and recommendations are as follows:   1. Chest Pain - ACS ruled out per EKG and Troponin criteria. Patient had Cardiac Cath 06/12/2020 @ [facility] showing nonobstructive CAD, so no further workup indicated at this times in regards to cardiology. May consider GI or ENT. 2. Hypertension - BP at goal. Continue current regimen. 3. Anemia  - per  team.         Thank you for involving us in the care of this patient. Please do not hesitate to call if additional questions arise.  If after hours please call [phone]    Vitals Last 24 Hours:  Patient Vitals for the past 24 hrs:   Temp Pulse Resp BP SpO2   11/17/20 1150 (!) 96.7 °F (35.9 °C) 97 (!) 200 115/63 -- 11/17/20 0741 (!) 96.7 °F (35.9 °C) 97 20 115/63 99 %   11/17/20 0400 -- 90 -- -- --   11/16/20 2357 97.5 °F (36.4 °C) (!) 110 19 138/71 100 %   11/16/20 2351 -- 98 -- -- --   11/16/20 2100 -- (!) 106 21 115/67 100 %   11/16/20 2000 -- (!) 107 16 136/80 93 %   11/16/20 1930 -- (!) 118 19 123/89 100 %   11/16/20 1900 -- (!) 104 24 126/80 100 %   11/16/20 1800 -- (!) 105 18 113/75 --   11/16/20 1702 -- (!) 109 18 112/65 99 %   11/16/20 1600 -- (!) 118 20 119/84 98 %   11/16/20 1500 -- (!) 111 20 139/88 98 %   11/16/20 1400 -- (!) 104 20 130/85 100 %   11/16/20 1330 -- (!) 108 20 (!) 140/80 100 %   11/16/20 1250 -- -- -- -- 100 %   11/16/20 1245 98.4 °F (36.9 °C) (!) 115 20 (!) 159/86 100 %        Discharge Exam:  General: Alert, cooperative, no distress, appears stated age. Head:  Normocephalic, without obvious abnormality, atraumatic. Eyes:  Conjunctivae/corneas clear. Pupils equal, round, reactive to light. Extraocular movements intact. Lungs:  Clear to auscultation bilaterally. no wheeze, rales, crackles, rhonchi   Chest wall: No tenderness or deformity. Heart:  Regular rate and rhythm, S1, S2 normal, no murmur, click, rub or gallop. Abdomen:  Soft, non-tender. Bowel sounds normal. No masses,  No organomegaly. Extremities: Extremities normal, atraumatic, no cyanosis or edema. Pulses: 2+ and symmetric all extremities. Skin: Skin color, texture, turgor normal. No rashes or lesions  Neurologic: Awake, Alert, oriented.  No obvious gross sensory or motor deficits    Labs:  Recent Results (from the past 24 hour(s))   EKG, 12 LEAD, INITIAL    Collection Time: 11/16/20 12:39 PM   Result Value Ref Range    Ventricular Rate 111 BPM    Atrial Rate 109 BPM    P-R Interval 144 ms    QRS Duration 80 ms    Q-T Interval 318 ms    QTC Calculation (Bezet) 432 ms    Calculated P Axis 59 degrees    Calculated R Axis 61 degrees    Calculated T Axis 62 degrees    Diagnosis       Sinus tachycardia    Confirmed by Lavelle Mariscal ([phone]) on 11/16/2020 9:01:16 PM     METABOLIC PANEL, COMPREHENSIVE    Collection Time: 11/16/20  1:01 PM   Result Value Ref Range    Sodium 128 (L) 136 - 145 mmol/L    Potassium 4.1 3.5 - 5.1 mmol/L    Chloride 89 (L) 97 - 108 mmol/L    CO2 32 21 - 32 mmol/L    Anion gap 7 5 - 15 mmol/L    Glucose 99 65 - 100 mg/dL    BUN 13 6 - 20 mg/dL    Creatinine 0.94 0.55 - 1.02 mg/dL    BUN/Creatinine ratio 14 12 - 20      GFR est AA >60 >60 ml/min/1.73m2    GFR est non-AA 60 (L) >60 ml/min/1.73m2    Calcium 9.7 8.5 - 10.1 mg/dL    Bilirubin, total 0.7 0.2 - 1.0 mg/dL    AST (SGOT) 44 (H) 15 - 37 U/L    ALT (SGPT) 44 12 - 78 U/L    Alk. phosphatase 79 45 - 117 U/L    Protein, total 7.5 6.4 - 8.2 g/dL    Albumin 4.2 3.5 - 5.0 g/dL    Globulin 3.3 2.0 - 4.0 g/dL    A-G Ratio 1.3 1.1 - 2.2     CBC WITH AUTOMATED DIFF    Collection Time: 11/16/20  1:01 PM   Result Value Ref Range    WBC 6.2 4.4 - 11.3 K/uL    RBC 3.27 (L) 4.50 - 5.90 M/uL    HGB 10.6 (L) 13.5 - 17.5 g/dL    HCT 31.1 (L) 36 - 46 %    MCV 94.9 80 - 100 FL    MCH 32.3 31 - 34 PG    MCHC 34.0 31.0 - 36.0 g/dL    RDW 16.0 (H) 11.5 - 14.5 %    PLATELET 063 K/uL    MPV 8.0 6.5 - 11.5 FL    NRBC 0.0  WBC    ABSOLUTE NRBC 0.00 K/uL    NEUTROPHILS 76 (H) 42 - 75 %    LYMPHOCYTES 15 (L) 20.5 - 51.1 %    MONOCYTES 7 1.7 - 9.3 %    EOSINOPHILS 1 0.9 - 2.9 %    BASOPHILS 1 0.0 - 2.5 %    ABS. NEUTROPHILS 4.7 1.8 - 7.7 K/UL    ABS. LYMPHOCYTES 0.9 (L) 1.0 - 4.8 K/UL    ABS. MONOCYTES 0.4 0.2 - 2.4 K/UL    ABS. EOSINOPHILS 0.1 0.0 - 0.7 K/UL    ABS.  BASOPHILS 0.1 0.0 - 0.2 K/UL   TROPONIN I    Collection Time: 11/16/20  1:01 PM   Result Value Ref Range    Troponin-I, Qt. <0.05 <0.05 ng/mL   D DIMER    Collection Time: 11/16/20  1:01 PM   Result Value Ref Range    D-dimer 1.38 (H) 0.19 - 0.50 mg/L FEU   TROPONIN I    Collection Time: 11/16/20  5:05 PM   Result Value Ref Range    Troponin-I, Qt. <0.05 <0.05 ng/mL   TROPONIN I    Collection Time: 11/16/20 10:20 PM   Result Value Ref Range    Troponin-I, Qt. <0.05 <0.05 ng/mL   TROPONIN I    Collection Time: 11/17/20  6:18 AM   Result Value Ref Range    Troponin-I, Qt. <0.05 <0.62 ng/mL   METABOLIC PANEL, COMPREHENSIVE    Collection Time: 11/17/20  6:18 AM   Result Value Ref Range    Sodium 133 (L) 136 - 145 mmol/L    Potassium 4.1 3.5 - 5.1 mmol/L    Chloride 94 (L) 97 - 108 mmol/L    CO2 35 (H) 21 - 32 mmol/L    Anion gap 4 (L) 5 - 15 mmol/L    Glucose 79 65 - 100 mg/dL    BUN 15 6 - 20 mg/dL    Creatinine 1.01 0.55 - 1.02 mg/dL    BUN/Creatinine ratio 15 12 - 20      GFR est AA >60 >60 ml/min/1.73m2    GFR est non-AA 55 (L) >60 ml/min/1.73m2    Calcium 9.4 8.5 - 10.1 mg/dL    Bilirubin, total 0.5 0.2 - 1.0 mg/dL    AST (SGOT) 26 15 - 37 U/L    ALT (SGPT) 36 12 - 78 U/L    Alk. phosphatase 67 45 - 117 U/L    Protein, total 6.2 (L) 6.4 - 8.2 g/dL    Albumin 3.6 3.5 - 5.0 g/dL    Globulin 2.6 2.0 - 4.0 g/dL    A-G Ratio 1.4 1.1 - 2.2     CBC WITH AUTOMATED DIFF    Collection Time: 11/17/20  6:18 AM   Result Value Ref Range    WBC 5.7 4.4 - 11.3 K/uL    RBC 3.04 (L) 4.50 - 5.90 M/uL    HGB 9.9 (L) 13.5 - 17.5 g/dL    HCT 29.2 (L) 36 - 46 %    MCV 96.1 80 - 100 FL    MCH 32.5 31 - 34 PG    MCHC 33.9 31.0 - 36.0 g/dL    RDW 16.6 (H) 11.5 - 14.5 %    PLATELET 984 K/uL    MPV 7.6 6.5 - 11.5 FL    NRBC 10.0  WBC    ABSOLUTE NRBC 0.00 K/uL    NEUTROPHILS 68 42 - 75 %    LYMPHOCYTES 20 (L) 20.5 - 51.1 %    MONOCYTES 10 (H) 1.7 - 9.3 %    EOSINOPHILS 1 0.9 - 2.9 %    BASOPHILS 1 0.0 - 2.5 %    ABS. NEUTROPHILS 3.9 1.8 - 7.7 K/UL    ABS. LYMPHOCYTES 1.1 1.0 - 4.8 K/UL    ABS. MONOCYTES 0.6 0.2 - 2.4 K/UL    ABS. EOSINOPHILS 0.1 0.0 - 0.7 K/UL    ABS.  BASOPHILS 0.0 0.0 - 0.2 K/UL   TSH 3RD GENERATION    Collection Time: 11/17/20  6:18 AM   Result Value Ref Range    TSH 1.21 0.36 - 3.74 uIU/mL   GLUCOSE, POC    Collection Time: 11/17/20  7:45 AM   Result Value Ref Range    Glucose (POC) 86 65 - 100 mg/dL    Performed by Johnathan Daly TROPONIN I    Collection Time: 11/17/20  9:53 AM   Result Value Ref Range    Troponin-I, Qt. <0.05 <0.05 ng/mL         Imaging:  Cta Chest W Or W Wo Cont    Result Date: 11/16/2020  IMPRESSION: 1. No acute cardiopulmonary process. 2. No evidence of dissection or pulmonary embolism. 3. Moderate centrilobular emphysema. 4. Dilated right and left main pulmonary arteries suggesting pulmonary artery hypertension. 5. Mild linear scarring in the lingula. 6. 5 mm indeterminate pulmonary nodule in the left lower lobe, new from the prior exam. Follow up via the [address] [place] [address] guidelines is recommended. 7. Bilateral thyroid nodules measuring up to 18 mm. Further evaluation with ultrasound is recommended. 8. Age indeterminate T12 compression fracture, however it is new from the 6/5/2020 exam. RECOMMENDATIONS FOR FOLLOWUP AND MANAGEMENT OF NODULES SMALLER THAN 8 MM DETECTED INCIDENTALLY: Low-risk patient (minimal or absent history of smoking and other known risk factors): ~Less than 6 mm: no followup needed ~6 - 8mm: followup CT 6 - 12 months; then consider further followup at 18-24 months. ~Greater than 8 mm: followup CT at  3 months, and/ or  PET/ CT , and/or biopsy. High risk patient: ~Less than 6 mm: followup CT at 6 - 12 months; then consider further followup at 18-24 months. ~6-8 mm: initial followup at 6-12 months, then at 18 - 24 if no change. ~Greater than 8 mm: same as for low risk patient. Xr Chest Port    Result Date: 11/16/2020  Impression: No acute pulmonary process. * Discharge Condition: improved  * Disposition: Home w/Family and HH PT, OT, RN      Discharge Medications:  Current Discharge Medication List      START taking these medications    Details   amoxicillin-clavulanate (AUGMENTIN) 875-125 mg per tablet Take 1 Tab by mouth every twelve (12) hours.   Qty: 14 Tab, Refills: 0         CONTINUE these medications which have NOT CHANGED    Details   docusate sodium (COLACE) 100 mg capsule Take 1 Cap by mouth two (2) times a day for 90 days. Qty: 180 Cap, Refills: 0    Associated Diagnoses: Constipation, unspecified constipation type      lisinopril-hydroCHLOROthiazide (PRINZIDE, ZESTORETIC) 20-12.5 mg per tablet TAKE 1 TABLET BY MOUTH EVERY DAY  Indications: high blood pressure  Qty: 30 Tab, Refills: 0    Associated Diagnoses: Essential hypertension      predniSONE (DELTASONE) 10 mg tablet Take 10 mg by mouth daily. Qty: 90 Tab, Refills: 1    Associated Diagnoses: Chronic obstructive pulmonary disease, unspecified (HCC)      aspirin delayed-release 81 mg tablet Take 1 Tab by mouth daily. Qty: 90 Tab, Refills: 1    Associated Diagnoses: Other chest pain      atorvastatin (LIPITOR) 10 mg tablet TAKE 1 TABLET BY MOUTH EVERY DAY  Qty: 30 Tab, Refills: 1    Associated Diagnoses: Hyperlipidemia, unspecified hyperlipidemia type      fluticasone furoate-vilanteroL (BREO ELLIPTA) 100-25 mcg/dose inhaler Breo Ellipta 100 mcg-25 mcg/dose powder for inhalation      lidocaine (LIDODERM) 5 % APPLY 1 PATCH EVERY DAY. REMOVE AFTER 12 HOURS      zolpidem (AMBIEN) 5 mg tablet       albuterol (PROVENTIL VENTOLIN) 2.5 mg /3 mL (0.083 %) nebu albuterol sulfate 2.5 mg/3 mL (0.083 %) solution for nebulization   USE ONE VIAL VIA NEBULIZER EVERY EIGHT HOURS AS NEEDED      ipratropium (ATROVENT) 42 mcg (0.06 %) nasal spray 2 Sprays by Both Nostrils route four (4) times daily as needed for Rhinitis (post nasal drainage). Qty: 15 mL, Refills: 3    Associated Diagnoses: Seasonal allergic rhinitis, unspecified trigger      amLODIPine (NORVASC) 10 mg tablet TAKE 1 TABLET BY MOUTH EVERY DAY  Qty: 30 Tab, Refills: 2      Blood-Glucose Meter (True Metrix Glucose Meter) misc TO USE DAILY TO CHECK BLOOD SUGAR.   Qty: 1 Each, Refills: 0    Comments: Dx E11.9  Associated Diagnoses: Controlled type 2 diabetes mellitus without complication, without long-term current use of insulin (HCC)      lancets misc USE ONCE LANCET PER DAY TO CHECK BLOOD SUGAR.  Qty: 50 Each, Refills: 3    Associated Diagnoses: Controlled type 2 diabetes mellitus without complication, without long-term current use of insulin ([facility])      glucose blood VI test strips (True Metrix Glucose Test Strip) strip Use one strip daily to test blood glucose subcutaneously. E11.9  Qty: 100 Strip, Refills: 1    Associated Diagnoses: Controlled type 2 diabetes mellitus without complication, without long-term current use of insulin ([facility])      furosemide (LASIX) 40 mg tablet Take 40 mg by mouth daily. Indications: visible water retention      fluticasone propionate (FLONASE) 50 mcg/actuation nasal spray SPRAY 2 SPRAYS INTO EACH NOSTRIL EVERY DAY  Qty: 1 Bottle, Refills: 2      ergocalciferol (ERGOCALCIFEROL) 1,250 mcg (50,000 unit) capsule TAKE ONE CAPSULE BY MOUTH ONCE A WEEK  Qty: 4 Cap, Refills: 2      metFORMIN (GLUCOPHAGE) 500 mg tablet Take 500 mg by mouth two (2) times daily (with meals). Indications: type 2 diabetes mellitus      loratadine (CLARITIN) 10 mg tablet TAKE 1 TABLET BY MOUTH EVERY DAY  Qty: 30 Tab, Refills: 2      albuterol (PROVENTIL HFA, VENTOLIN HFA, PROAIR HFA) 90 mcg/actuation inhaler Take 2 Puffs by inhalation every four (4) hours as needed for Wheezing. pantoprazole (PROTONIX) 40 mg tablet Take 40 mg by mouth Before breakfast and dinner. azelastine (ASTELIN) 137 mcg (0.1 %) nasal spray 2 Sprays by Both Nostrils route nightly. Refills: 5      montelukast (SINGULAIR) 10 mg tablet TAKE 1 TABLET BY MOUTH EVERY DAY FOR ALLERGY SYMPTOMS  Refills: 5               * Follow-up Care/Patient Instructions:   Activity: Activity as tolerated  Diet: Cardiac Diet and Diabetic Diet      Follow-up Information     Follow up With Specialties Details Why Contact Info    Karly Riley MD Family Medicine On 11/24/2020 @ 3:30 [address], N[facility] Otolaryngology On 12/29/2020 @ 3:15 241 [id number] [address] [facility]  [phone]        Spent 25 minutes evaluting and coordinating patient care of which >50% was spent coordinating and counseling.      Signed:  Otto Mireles MD  11/17/2020  12:08 PM

## 2020-11-17 NOTE — ROUTINE PROCESS
Pt transported to the lobby with staff and family. Tele removed. Discharge orders given to family. Offers no c/o pain or discomfort. Discharged home with family.

## 2020-11-20 ENCOUNTER — TELEPHONE (OUTPATIENT)
Dept: PRIMARY CARE CLINIC | Age: 65
End: 2020-11-20

## 2020-11-20 NOTE — TELEPHONE ENCOUNTER
----- Message from Paco Pizano MD sent at 11/16/2020 10:39 AM EST -----  Inform the patient that her lab results were normal except for the following:  Inform the patient that her bone density test was normal.  Recommend repeat bone density test in 2 years. Continue vitamin D and calcium medication.

## 2020-11-22 DIAGNOSIS — R10.13 EPIGASTRIC PAIN: ICD-10-CM

## 2020-11-23 RX ORDER — SUCRALFATE 1 G/1
TABLET ORAL
Qty: 120 TAB | Refills: 3 | Status: SHIPPED | OUTPATIENT
Start: 2020-11-23 | End: 2021-06-07

## 2020-11-24 ENCOUNTER — OFFICE VISIT (OUTPATIENT)
Dept: PRIMARY CARE CLINIC | Age: 65
End: 2020-11-24
Payer: MEDICAID

## 2020-11-24 VITALS
RESPIRATION RATE: 18 BRPM | DIASTOLIC BLOOD PRESSURE: 65 MMHG | TEMPERATURE: 98.6 F | WEIGHT: 166.13 LBS | SYSTOLIC BLOOD PRESSURE: 117 MMHG | HEIGHT: 67 IN | HEART RATE: 104 BPM | OXYGEN SATURATION: 100 % | BODY MASS INDEX: 26.08 KG/M2

## 2020-11-24 DIAGNOSIS — Z09 HOSPITAL DISCHARGE FOLLOW-UP: ICD-10-CM

## 2020-11-24 DIAGNOSIS — Z23 ENCOUNTER FOR IMMUNIZATION: Primary | ICD-10-CM

## 2020-11-24 DIAGNOSIS — E11.69 TYPE 2 DIABETES MELLITUS WITH OTHER SPECIFIED COMPLICATION, WITHOUT LONG-TERM CURRENT USE OF INSULIN (HCC): ICD-10-CM

## 2020-11-24 DIAGNOSIS — J45.20 MILD INTERMITTENT ASTHMA WITHOUT COMPLICATION: ICD-10-CM

## 2020-11-24 DIAGNOSIS — J44.9 CHRONIC OBSTRUCTIVE PULMONARY DISEASE, UNSPECIFIED COPD TYPE (HCC): ICD-10-CM

## 2020-11-24 DIAGNOSIS — Z11.59 ENCOUNTER FOR HEPATITIS C SCREENING TEST FOR LOW RISK PATIENT: ICD-10-CM

## 2020-11-24 DIAGNOSIS — K21.9 GASTROESOPHAGEAL REFLUX DISEASE WITHOUT ESOPHAGITIS: ICD-10-CM

## 2020-11-24 PROCEDURE — 90686 IIV4 VACC NO PRSV 0.5 ML IM: CPT | Performed by: FAMILY MEDICINE

## 2020-11-24 PROCEDURE — 90472 IMMUNIZATION ADMIN EACH ADD: CPT | Performed by: FAMILY MEDICINE

## 2020-11-24 PROCEDURE — 99495 TRANSJ CARE MGMT MOD F2F 14D: CPT | Performed by: FAMILY MEDICINE

## 2020-11-24 PROCEDURE — 90732 PPSV23 VACC 2 YRS+ SUBQ/IM: CPT | Performed by: FAMILY MEDICINE

## 2020-11-24 PROCEDURE — 1111F DSCHRG MED/CURRENT MED MERGE: CPT | Performed by: FAMILY MEDICINE

## 2020-11-24 PROCEDURE — 90471 IMMUNIZATION ADMIN: CPT | Performed by: FAMILY MEDICINE

## 2020-11-24 RX ORDER — LORATADINE 10 MG/1
10 TABLET ORAL DAILY
Qty: 90 TAB | Refills: 1 | Status: SHIPPED | OUTPATIENT
Start: 2020-11-24 | End: 2021-06-06

## 2020-11-24 RX ORDER — IPRATROPIUM BROMIDE AND ALBUTEROL SULFATE 2.5; .5 MG/3ML; MG/3ML
SOLUTION RESPIRATORY (INHALATION)
COMMUNITY
Start: 2020-10-16 | End: 2020-12-07 | Stop reason: SDUPTHER

## 2020-11-24 RX ORDER — LANCETS 30 GAUGE
EACH MISCELLANEOUS
COMMUNITY
Start: 2020-10-01 | End: 2021-10-14

## 2020-11-29 DIAGNOSIS — E11.9 TYPE 2 DIABETES MELLITUS WITHOUT COMPLICATIONS (HCC): ICD-10-CM

## 2020-11-30 RX ORDER — METFORMIN HYDROCHLORIDE 500 MG/1
TABLET ORAL
Qty: 180 TAB | Refills: 1 | Status: SHIPPED | OUTPATIENT
Start: 2020-11-30 | End: 2021-06-11 | Stop reason: SDUPTHER

## 2020-11-30 NOTE — PROGRESS NOTES
Transitional Care Management Progress Note    Patient: Elizabeth Robles  : 1955  PCP: Stephanie Barahona MD    Date of admission: 20  Date of discharge: 20    Patient was contacted by Transitional Care Management services within two days after her discharge: No. This encounter and supporting documentation was reviewed if available. Medication reconciliation was performed today (2020). Assessment/Plan:   Diagnoses and all orders for this visit:    1. Encounter for immunization  -     INFLUENZA VACCINE (CCIIV4 VACCINE ANTIBIO FREE 0.5 ML)  -     PNEUMOCOCCAL POLYSACCHARIDE VACCINE, 23-VALENT, ADULT OR IMMUNOSUPPRESSED PT DOSE,    2. Gastroesophageal reflux disease without esophagitis    3. Type 2 diabetes mellitus with other specified complication, without long-term current use of insulin ([facility])  -     MICROALBUMIN, UR, RAND W/ MICROALB/CREAT RATIO  -     HEMOGLOBIN A1C WITH EAG    4. Chronic obstructive pulmonary disease, unspecified COPD type ([facility] [address]    5. Mild intermittent asthma without complication  -     loratadine (CLARITIN) 10 mg tablet; Take 1 Tab by mouth daily. 6. Encounter for hepatitis C screening test for low risk patient  -     CBC WITH AUTOMATED DIFF  -     METABOLIC PANEL, COMPREHENSIVE  -     LIPID PANEL  -     URINALYSIS W/ RFLX MICROSCOPIC  -     HEPATITIS C AB    7. Hospital discharge follow-up  -     CA DISCHARGE MEDS RECONCILED W/ CURRENT OUTPATIENT MED LIST         Subjective:   Elizabeth Robles is a 60 y.o. female presenting today for follow-up after being discharged from [facility]. The discharge summary was reviewed. The main problem requiring admission was chest pain and shortness of breath. .   Complications during admission: none    Interval history/Current status: She is somewhat better.   She has had a catheterization in her arteries are normal.  Her symptoms are basically related to severe GERD and she is seeing a gastroenterologist for this. Admitting symptoms have: improved      Medications marked \"taking\" at this time:  Home Medications    Medication Sig Start Date End Date Taking? Authorizing Provider   loratadine (CLARITIN) 10 mg tablet Take 1 Tab by mouth daily. 11/24/20  Yes Blayne Newell MD   albuterol-ipratropium (DUO-NEB) 2.5 mg-0.5 mg/3 ml nebu INHALE 3 ML 4 TIMES A DAY BY NEBULIZATION ROUTE. 10/16/20   Provider, Historical   Ultra Thin Lancets 30 gauge misc USE ONCE LANCET PER DAY TO CHECK BLOOD SUGAR. 10/1/20   Provider, Historical   sucralfate (CARAFATE) 1 gram tablet TAKE 1 TABLET BY MOUTH FOUR TIMES A DAY 11/23/20   Harini Gibson MD   amoxicillin-clavulanate (AUGMENTIN) 875-125 mg per tablet Take 1 Tab by mouth every twelve (12) hours. 11/17/20   Mandadi, Corinne Hurst, MD   docusate sodium (COLACE) 100 mg capsule Take 1 Cap by mouth two (2) times a day for 90 days. 11/12/20 2/10/21  Blayne Newell MD   lisinopril-hydroCHLOROthiazide (PRINZIDE, ZESTORETIC) 20-12.5 mg per tablet TAKE 1 TABLET BY MOUTH EVERY DAY  Indications: high blood pressure 11/12/20   Blayne Newell MD   predniSONE (DELTASONE) 10 mg tablet Take 10 mg by mouth daily. 11/3/20   Blayne Newell MD   aspirin delayed-release 81 mg tablet Take 1 Tab by mouth daily. 11/3/20   Blayne eNwell MD   atorvastatin (LIPITOR) 10 mg tablet TAKE 1 TABLET BY MOUTH EVERY DAY 10/6/20   Blayne Newell MD   fluticasone furoate-vilanteroL (BREO ELLIPTA) 100-25 mcg/dose inhaler Breo Ellipta 100 mcg-25 mcg/dose powder for inhalation    Provider, Historical   lidocaine (LIDODERM) 5 % APPLY 1 PATCH EVERY DAY.  REMOVE AFTER 12 HOURS 9/17/20   Provider, Historical   zolpidem (AMBIEN) 5 mg tablet  9/22/20   Provider, Historical   albuterol (PROVENTIL VENTOLIN) 2.5 mg /3 mL (0.083 %) nebu albuterol sulfate 2.5 mg/3 mL (0.083 %) solution for nebulization   USE ONE VIAL VIA NEBULIZER EVERY EIGHT HOURS AS NEEDED    Provider, Historical ipratropium (ATROVENT) 42 mcg (0.06 %) nasal spray 2 Sprays by Both Nostrils route four (4) times daily as needed for Rhinitis (post nasal drainage). 9/25/20   Cm Barrientos MD   amLODIPine (NORVASC) 10 mg tablet TAKE 1 TABLET BY MOUTH EVERY DAY 9/17/20   Cm Barrientos MD   Blood-Glucose Meter (True Metrix Glucose Meter) misc TO USE DAILY TO CHECK BLOOD SUGAR. 9/15/20   Cm Barrientos MD   glucose blood VI test strips (True Metrix Glucose Test Strip) strip Use one strip daily to test blood glucose subcutaneously. E11.9 9/14/20   Cm Barrientos MD   furosemide (LASIX) 40 mg tablet Take 40 mg by mouth daily. Indications: visible water retention    Provider, Historical   fluticasone propionate (FLONASE) 50 mcg/actuation nasal spray SPRAY 2 SPRAYS INTO EACH NOSTRIL EVERY DAY 9/2/20   Cm Barrientos MD   ergocalciferol (ERGOCALCIFEROL) 1,250 mcg (50,000 unit) capsule TAKE ONE CAPSULE BY MOUTH ONCE A WEEK 9/2/20   Cm Barrientos MD   metFORMIN (GLUCOPHAGE) 500 mg tablet Take 500 mg by mouth two (2) times daily (with meals). Indications: type 2 diabetes mellitus    Provider, Historical   albuterol (PROVENTIL HFA, VENTOLIN HFA, PROAIR HFA) 90 mcg/actuation inhaler Take 2 Puffs by inhalation every four (4) hours as needed for Wheezing. Provider, Historical   pantoprazole (PROTONIX) 40 mg tablet Take 40 mg by mouth Before breakfast and dinner. Provider, Historical   azelastine (ASTELIN) 137 mcg (0.1 %) nasal spray 2 Sprays by Both Nostrils route nightly. 7/16/18   Provider, Historical   montelukast (SINGULAIR) 10 mg tablet TAKE 1 TABLET BY MOUTH EVERY DAY FOR ALLERGY SYMPTOMS 6/25/18   Provider, Historical        Review of Systems   Constitutional: Negative. Respiratory: Negative. Cardiovascular: Negative. Gastrointestinal: Positive for abdominal pain and heartburn. Genitourinary: Negative. Musculoskeletal: Negative. Neurological: Negative.     Psychiatric/Behavioral: Negative. All other systems reviewed and are negative. 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    Menopause present Z78.0    Chest pain R07.9    Chronic obstructive pulmonary disease (HCC) J44.9    History of multiple allergies Z91.89     Patient Active Problem List    Diagnosis Date Noted    Chronic obstructive pulmonary disease ([facility] [address]     History of multiple allergies     Chest pain 11/16/2020    Cervical intraepithelial neoplasia 09/25/2020    Menopause present 09/25/2020    Degeneration of lumbar intervertebral disc 09/11/2020    Type 2 diabetes mellitus ([facility] [address] 09/11/2020    Acute kidney injury ([facility] [address] 09/11/2020    Seasonal allergic rhinitis 09/01/2020    Intermittent asthma 09/01/2020    Vitamin D deficiency 09/01/2020    Gastroesophageal reflux disease without esophagitis 09/01/2020    HTN (hypertension) 09/01/2020    Bronchitis 09/01/2020    COPD exacerbation ([facility] [address] 05/23/2020    Cecum mass 05/23/2020    Arthritis of right hip 10/24/2018     Current Outpatient Medications   Medication Sig Dispense Refill    loratadine (CLARITIN) 10 mg tablet Take 1 Tab by mouth daily. 90 Tab 1    albuterol-ipratropium (DUO-NEB) 2.5 mg-0.5 mg/3 ml nebu INHALE 3 ML 4 TIMES A DAY BY NEBULIZATION ROUTE.  Ultra Thin Lancets 30 gauge misc USE ONCE LANCET PER DAY TO CHECK BLOOD SUGAR.       sucralfate (CARAFATE) 1 gram tablet TAKE 1 TABLET BY MOUTH FOUR TIMES A  Tab 3    amoxicillin-clavulanate (AUGMENTIN) 875-125 mg per tablet Take 1 Tab by mouth every twelve (12) hours. 14 Tab 0    docusate sodium (COLACE) 100 mg capsule Take 1 Cap by mouth two (2) times a day for 90 days. 180 Cap 0    lisinopril-hydroCHLOROthiazide (PRINZIDE, ZESTORETIC) 20-12.5 mg per tablet TAKE 1 TABLET BY MOUTH EVERY DAY  Indications: high blood pressure 30 Tab 0    predniSONE (DELTASONE) 10 mg tablet Take 10 mg by mouth daily. 90 Tab 1    aspirin delayed-release 81 mg tablet Take 1 Tab by mouth daily. 90 Tab 1    atorvastatin (LIPITOR) 10 mg tablet TAKE 1 TABLET BY MOUTH EVERY DAY 30 Tab 1    fluticasone furoate-vilanteroL (BREO ELLIPTA) 100-25 mcg/dose inhaler Breo Ellipta 100 mcg-25 mcg/dose powder for inhalation      lidocaine (LIDODERM) 5 % APPLY 1 PATCH EVERY DAY. REMOVE AFTER 12 HOURS      zolpidem (AMBIEN) 5 mg tablet       albuterol (PROVENTIL VENTOLIN) 2.5 mg /3 mL (0.083 %) nebu albuterol sulfate 2.5 mg/3 mL (0.083 %) solution for nebulization   USE ONE VIAL VIA NEBULIZER EVERY EIGHT HOURS AS NEEDED      ipratropium (ATROVENT) 42 mcg (0.06 %) nasal spray 2 Sprays by Both Nostrils route four (4) times daily as needed for Rhinitis (post nasal drainage). 15 mL 3    amLODIPine (NORVASC) 10 mg tablet TAKE 1 TABLET BY MOUTH EVERY DAY 30 Tab 2    Blood-Glucose Meter (True Metrix Glucose Meter) misc TO USE DAILY TO CHECK BLOOD SUGAR. 1 Each 0    glucose blood VI test strips (True Metrix Glucose Test Strip) strip Use one strip daily to test blood glucose subcutaneously. E11.9 100 Strip 1    furosemide (LASIX) 40 mg tablet Take 40 mg by mouth daily. Indications: visible water retention      fluticasone propionate (FLONASE) 50 mcg/actuation nasal spray SPRAY 2 SPRAYS INTO EACH NOSTRIL EVERY DAY 1 Bottle 2    ergocalciferol (ERGOCALCIFEROL) 1,250 mcg (50,000 unit) capsule TAKE ONE CAPSULE BY MOUTH ONCE A WEEK 4 Cap 2    metFORMIN (GLUCOPHAGE) 500 mg tablet Take 500 mg by mouth two (2) times daily (with meals).  Indications: type 2 diabetes mellitus      albuterol (PROVENTIL HFA, VENTOLIN HFA, PROAIR HFA) 90 mcg/actuation inhaler Take 2 Puffs by inhalation every four (4) hours as needed for Wheezing.  pantoprazole (PROTONIX) 40 mg tablet Take 40 mg by mouth Before breakfast and dinner.  azelastine (ASTELIN) 137 mcg (0.1 %) nasal spray 2 Sprays by Both Nostrils route nightly. 5    montelukast (SINGULAIR) 10 mg tablet TAKE 1 TABLET BY MOUTH EVERY DAY FOR ALLERGY SYMPTOMS  5     No Known Allergies  Past Medical History:   Diagnosis Date    Arthritis     Bronchitis 9/1/2020    Chronic obstructive pulmonary disease (HCC)     Chronic pain     Diabetes (HCC)     Gastroesophageal reflux disease without esophagitis 9/1/2020    GERD (gastroesophageal reflux disease)     History of multiple allergies     HTN (hypertension) 9/1/2020    Hypertension     Intermittent asthma 9/1/2020    Menopause     Pain of upper abdomen 9/1/2020    Seasonal allergic rhinitis 9/1/2020    Sinus problem     Vitamin D deficiency 9/1/2020     Past Surgical History:   Procedure Laterality Date    COLONOSCOPY N/A 5/25/2020    [facility]illan [facility]COPY    HX COLONOSCOPY      HX GI      colonscopy    HX ORTHOPAEDIC  10/24/2018    Total Right Hip Arthroplasty Dr. Mary Masters. Family History   Problem Relation Age of Onset    Hypertension Mother     Cancer Sister      Social History     Tobacco Use    Smoking status: Former Smoker    Smokeless tobacco: Never Used   Substance Use Topics    Alcohol use: Yes     Alcohol/week: 3.0 standard drinks     Types: 3 Cans of beer per week          Objective:   /65 (BP 1 Location: Right arm, BP Patient Position: Sitting)   Pulse (!) 104   Temp 98.6 °F (37 °C) (Skin) Comment: States took temperature at home before coming here.   Resp 18   Ht 5' 7\" (1.702 m)   Wt 166 lb 2 oz (75.4 kg)   SpO2 100% Comment: Pt on 2 l/per nasal cannula  BMI 26.02 kg/m²      Physical Exam  Vitals signs and nursing note reviewed. Constitutional:       Appearance: Normal appearance. She is obese. Cardiovascular:      Rate and Rhythm: Normal rate and regular rhythm. Heart sounds: Normal heart sounds. Pulmonary:      Effort: Pulmonary effort is normal.      Breath sounds: Normal breath sounds. Abdominal:      General: Abdomen is flat. Bowel sounds are normal.      Palpations: Abdomen is soft. Musculoskeletal: Normal range of motion. Neurological:      General: No focal deficit present. Mental Status: She is alert. Psychiatric:         Mood and Affect: Mood normal.         Behavior: Behavior normal.              We discussed the expected course, resolution and complications of the diagnosis(es) in detail. Medication risks, benefits, costs, interactions, and alternatives were discussed as indicated. I advised her to contact the office if her condition worsens, changes or fails to improve as anticipated. She expressed understanding with the diagnosis(es) and plan. Suggested she add liquid antacid to her other GERD medication to see if this would happen. Suggested a 1/2 ounce dose of Gaviscon or Mylanta prior to each meal.  She will let me know if this helps and plans to see Dr. Giovanni Leonard in the next few weeks.     Vicente Eller MD

## 2020-12-06 RX ORDER — FLUTICASONE PROPIONATE 50 MCG
SPRAY, SUSPENSION (ML) NASAL
Qty: 1 BOTTLE | Refills: 2 | Status: SHIPPED | OUTPATIENT
Start: 2020-12-06 | End: 2021-06-25

## 2020-12-07 ENCOUNTER — OFFICE VISIT (OUTPATIENT)
Dept: PRIMARY CARE CLINIC | Age: 65
End: 2020-12-07
Payer: MEDICAID

## 2020-12-07 VITALS
RESPIRATION RATE: 18 BRPM | OXYGEN SATURATION: 98 % | HEART RATE: 120 BPM | BODY MASS INDEX: 26.78 KG/M2 | DIASTOLIC BLOOD PRESSURE: 67 MMHG | SYSTOLIC BLOOD PRESSURE: 131 MMHG | TEMPERATURE: 96.1 F | WEIGHT: 171 LBS

## 2020-12-07 DIAGNOSIS — J32.9 RECURRENT SINUSITIS: Primary | ICD-10-CM

## 2020-12-07 DIAGNOSIS — J44.9 CHRONIC OBSTRUCTIVE PULMONARY DISEASE, UNSPECIFIED COPD TYPE (HCC): ICD-10-CM

## 2020-12-07 DIAGNOSIS — J30.89 ENVIRONMENTAL AND SEASONAL ALLERGIES: ICD-10-CM

## 2020-12-07 PROCEDURE — 99213 OFFICE O/P EST LOW 20 MIN: CPT | Performed by: NURSE PRACTITIONER

## 2020-12-07 RX ORDER — LEVOFLOXACIN 500 MG/1
500 TABLET, FILM COATED ORAL DAILY
Qty: 10 TAB | Refills: 0 | Status: SHIPPED | OUTPATIENT
Start: 2020-12-07 | End: 2020-12-17

## 2020-12-07 RX ORDER — GUAIFENESIN 600 MG/1
600 TABLET, EXTENDED RELEASE ORAL
Qty: 60 TAB | Refills: 0 | Status: SHIPPED | OUTPATIENT
Start: 2020-12-07 | End: 2021-01-13 | Stop reason: ALTCHOICE

## 2020-12-07 NOTE — PROGRESS NOTES
Johnnie Keyes is a 60 y.o. female who presents to the office today for the following:    Chief Complaint   Patient presents with    Nasal Congestion       Past Medical History:   Diagnosis Date    Arthritis     Bronchitis 9/1/2020    Chronic obstructive pulmonary disease ([address]     Chronic pain     Diabetes ([facility] [address]     Gastroesophageal reflux disease without esophagitis 9/1/2020    GERD (gastroesophageal reflux disease)     History of multiple allergies     HTN (hypertension) 9/1/2020    Hypertension     Intermittent asthma 9/1/2020    Menopause     Pain of upper abdomen 9/1/2020    Seasonal allergic rhinitis 9/1/2020    Sinus problem     Vitamin D deficiency 9/1/2020       Past Surgical History:   Procedure Laterality Date    COLONOSCOPY N/A 5/25/2020    [facility]upam [facility]iz [facility]COPY    HX COLONOSCOPY      HX GI      colonscopy    HX ORTHOPAEDIC  10/24/2018    Total Right Hip Arthroplasty Dr. Murl Romberg. Family History   Problem Relation Age of Onset    Hypertension Mother     Cancer Sister         Social History     Tobacco Use    Smoking status: Former Smoker    Smokeless tobacco: Never Used   Substance Use Topics    Alcohol use: Yes     Alcohol/week: 3.0 standard drinks     Types: 3 Cans of beer per week    Drug use: No        HPI  Patient here with c/o continue sinus pressure and postnasal drip ongoing for nearly one month. Was put on Augmentin when symptoms initially began but states this did not help much with the congestion. Has h/o chronic sinus problems and follows with ENT. Currently taking claritin, flonase and Singulair for allergies. Is supposed to see ENT at end of this month. Does have copd but states this has been stable on  oxygen, prednisone and maintenance inhalers. Has been tested for covid 19 which was negative since sx began.      Current Outpatient Medications on File Prior to Visit   Medication Sig  fluticasone propionate (FLONASE) 50 mcg/actuation nasal spray SPRAY 2 SPRAYS INTO EACH NOSTRIL EVERY DAY    metFORMIN (GLUCOPHAGE) 500 mg tablet TAKE 1 TABLET BY MOUTH TWICE A DAY    Ultra Thin Lancets 30 gauge misc USE ONCE LANCET PER DAY TO CHECK BLOOD SUGAR.  loratadine (CLARITIN) 10 mg tablet Take 1 Tab by mouth daily.  sucralfate (CARAFATE) 1 gram tablet TAKE 1 TABLET BY MOUTH FOUR TIMES A DAY    docusate sodium (COLACE) 100 mg capsule Take 1 Cap by mouth two (2) times a day for 90 days.  lisinopril-hydroCHLOROthiazide (PRINZIDE, ZESTORETIC) 20-12.5 mg per tablet TAKE 1 TABLET BY MOUTH EVERY DAY  Indications: high blood pressure    predniSONE (DELTASONE) 10 mg tablet Take 10 mg by mouth daily.  aspirin delayed-release 81 mg tablet Take 1 Tab by mouth daily.  atorvastatin (LIPITOR) 10 mg tablet TAKE 1 TABLET BY MOUTH EVERY DAY    fluticasone furoate-vilanteroL (BREO ELLIPTA) 100-25 mcg/dose inhaler Breo Ellipta 100 mcg-25 mcg/dose powder for inhalation    lidocaine (LIDODERM) 5 % APPLY 1 PATCH EVERY DAY. REMOVE AFTER 12 HOURS    zolpidem (AMBIEN) 5 mg tablet     albuterol (PROVENTIL VENTOLIN) 2.5 mg /3 mL (0.083 %) nebu albuterol sulfate 2.5 mg/3 mL (0.083 %) solution for nebulization   USE ONE VIAL VIA NEBULIZER EVERY EIGHT HOURS AS NEEDED    ipratropium (ATROVENT) 42 mcg (0.06 %) nasal spray 2 Sprays by Both Nostrils route four (4) times daily as needed for Rhinitis (post nasal drainage).  amLODIPine (NORVASC) 10 mg tablet TAKE 1 TABLET BY MOUTH EVERY DAY    Blood-Glucose Meter (True Metrix Glucose Meter) misc TO USE DAILY TO CHECK BLOOD SUGAR.  glucose blood VI test strips (True Metrix Glucose Test Strip) strip Use one strip daily to test blood glucose subcutaneously. E11.9    furosemide (LASIX) 40 mg tablet Take 40 mg by mouth daily.  Indications: visible water retention    ergocalciferol (ERGOCALCIFEROL) 1,250 mcg (50,000 unit) capsule TAKE ONE CAPSULE BY MOUTH ONCE A WEEK    albuterol (PROVENTIL HFA, VENTOLIN HFA, PROAIR HFA) 90 mcg/actuation inhaler Take 2 Puffs by inhalation every four (4) hours as needed for Wheezing.  pantoprazole (PROTONIX) 40 mg tablet Take 40 mg by mouth Before breakfast and dinner.  azelastine (ASTELIN) 137 mcg (0.1 %) nasal spray 2 Sprays by Both Nostrils route nightly.  montelukast (SINGULAIR) 10 mg tablet TAKE 1 TABLET BY MOUTH EVERY DAY FOR ALLERGY SYMPTOMS    [DISCONTINUED] albuterol-ipratropium (DUO-NEB) 2.5 mg-0.5 mg/3 ml nebu INHALE 3 ML 4 TIMES A DAY BY NEBULIZATION ROUTE.    [DISCONTINUED] amoxicillin-clavulanate (AUGMENTIN) 875-125 mg per tablet Take 1 Tab by mouth every twelve (12) hours. No current facility-administered medications on file prior to visit. Medications Ordered Today   Medications    levoFLOXacin (LEVAQUIN) 500 mg tablet     Sig: Take 1 Tab by mouth daily for 10 days. Dispense:  10 Tab     Refill:  0    guaiFENesin ER (MUCINEX) 600 mg ER tablet     Sig: Take 1 Tab by mouth two (2) times daily as needed for Congestion. Dispense:  60 Tab     Refill:  0        Review of Systems   Constitutional: Negative for chills, diaphoresis, fever, malaise/fatigue and weight loss. HENT: Positive for congestion and sinus pain. Negative for ear discharge, ear pain, hearing loss, nosebleeds and sore throat. Respiratory: Positive for cough, sputum production, shortness of breath and wheezing. Cardiovascular: Negative. Gastrointestinal: Negative. Genitourinary: Negative. Musculoskeletal: Positive for myalgias. Neurological: Positive for headaches (frontal). Negative for dizziness, tingling, tremors, sensory change, speech change and focal weakness.           Visit Vitals  /67 (BP 1 Location: Left arm, BP Patient Position: Sitting)   Pulse (!) 120   Temp (!) 96.1 °F (35.6 °C) (Tympanic)   Resp 18   Wt 171 lb (77.6 kg)   SpO2 98%   BMI 26.78 kg/m²       Physical Exam  Vitals signs and nursing note reviewed. Constitutional:       Appearance: Normal appearance. HENT:      Right Ear: Tympanic membrane normal.      Left Ear: Tympanic membrane normal.      Nose: Congestion present. Mouth/Throat:      Pharynx: No oropharyngeal exudate or posterior oropharyngeal erythema. Eyes:      Pupils: Pupils are equal, round, and reactive to light. Cardiovascular:      Rate and Rhythm: Regular rhythm. Tachycardia present. Pulses: Normal pulses. Heart sounds: Normal heart sounds. Pulmonary:      Effort: Pulmonary effort is normal.      Breath sounds: Wheezing (occasional expiratory) present. Abdominal:      General: Bowel sounds are normal.   Musculoskeletal: Normal range of motion. Skin:     General: Skin is warm and dry. Neurological:      Mental Status: She is alert. Mental status is at baseline. 1. Recurrent sinusitis  Completed Augmentin without improvement or resolution of symptoms  H/o chronic allergies and recurrent sinsuitis  On optimal medication management for chronic allergies and continuing as directed  Will treat with Levaquin as directed  Also start on mucinex as directed  Increased water and cool mist humidifier encouraged  Has follow up appointment with ENT this month  - levoFLOXacin (LEVAQUIN) 500 mg tablet; Take 1 Tab by mouth daily for 10 days. Dispense: 10 Tab; Refill: 0  - guaiFENesin ER (MUCINEX) 600 mg ER tablet; Take 1 Tab by mouth two (2) times daily as needed for Congestion. Dispense: 60 Tab; Refill: 0    2. Chronic obstructive pulmonary disease, unspecified COPD type (HCC)  Symptoms stable   Continue maintenance inhalers, prednisone  and albuterol inhaler prn  On 02 2lpm nc continuous and sats 98% today    3.  Environmental and seasonal allergies  She is on singulair, claritin and flonase daily  Recommended for allergy testing earlier this year but declines due to pandemic concerns  Has follow up appointment with ENT this month      Patient verbalizes understanding of plan of care as discussed above

## 2020-12-10 ENCOUNTER — TELEPHONE (OUTPATIENT)
Dept: PRIMARY CARE CLINIC | Age: 65
End: 2020-12-10

## 2020-12-14 DIAGNOSIS — E11.9 TYPE 2 DIABETES MELLITUS WITHOUT COMPLICATION, WITHOUT LONG-TERM CURRENT USE OF INSULIN (HCC): Primary | ICD-10-CM

## 2020-12-14 RX ORDER — ISOPROPYL ALCOHOL 70 ML/100ML
1 SWAB TOPICAL DAILY
COMMUNITY
End: 2020-12-14 | Stop reason: SDUPTHER

## 2020-12-14 RX ORDER — ISOPROPYL ALCOHOL 70 ML/100ML
1 SWAB TOPICAL DAILY
Qty: 100 PAD | Refills: 1 | Status: SHIPPED | OUTPATIENT
Start: 2020-12-14 | End: 2021-04-26

## 2020-12-15 RX ORDER — ERGOCALCIFEROL 1.25 MG/1
CAPSULE ORAL
Qty: 4 CAP | Refills: 2 | Status: SHIPPED | OUTPATIENT
Start: 2020-12-15 | End: 2021-04-14

## 2020-12-28 ENCOUNTER — TELEPHONE (OUTPATIENT)
Dept: PRIMARY CARE CLINIC | Age: 65
End: 2020-12-28

## 2020-12-28 DIAGNOSIS — I10 ESSENTIAL HYPERTENSION: Primary | ICD-10-CM

## 2020-12-28 RX ORDER — AMLODIPINE BESYLATE 10 MG/1
10 TABLET ORAL DAILY
Qty: 30 TAB | Refills: 2 | Status: SHIPPED | OUTPATIENT
Start: 2020-12-28 | End: 2021-03-28

## 2021-01-03 DIAGNOSIS — E78.5 HYPERLIPIDEMIA, UNSPECIFIED HYPERLIPIDEMIA TYPE: ICD-10-CM

## 2021-01-03 DIAGNOSIS — I10 ESSENTIAL (PRIMARY) HYPERTENSION: ICD-10-CM

## 2021-01-04 DIAGNOSIS — I10 ESSENTIAL (PRIMARY) HYPERTENSION: ICD-10-CM

## 2021-01-05 ENCOUNTER — TELEPHONE (OUTPATIENT)
Dept: PRIMARY CARE CLINIC | Age: 66
End: 2021-01-05

## 2021-01-10 RX ORDER — FUROSEMIDE 40 MG/1
TABLET ORAL
Qty: 90 TAB | Refills: 1 | Status: SHIPPED | OUTPATIENT
Start: 2021-01-10 | End: 2021-07-06 | Stop reason: SDUPTHER

## 2021-01-10 RX ORDER — ATORVASTATIN CALCIUM 10 MG/1
TABLET, FILM COATED ORAL
Qty: 30 TAB | Refills: 1 | Status: SHIPPED | OUTPATIENT
Start: 2021-01-10 | End: 2021-03-08 | Stop reason: DRUGHIGH

## 2021-01-10 RX ORDER — LISINOPRIL 20 MG/1
TABLET ORAL
Qty: 90 TAB | Refills: 1 | Status: SHIPPED | OUTPATIENT
Start: 2021-01-10 | End: 2021-07-16 | Stop reason: SDUPTHER

## 2021-01-13 ENCOUNTER — OFFICE VISIT (OUTPATIENT)
Dept: GASTROENTEROLOGY | Age: 66
End: 2021-01-13
Payer: MEDICAID

## 2021-01-13 VITALS
DIASTOLIC BLOOD PRESSURE: 70 MMHG | SYSTOLIC BLOOD PRESSURE: 150 MMHG | OXYGEN SATURATION: 98 % | HEIGHT: 67 IN | BODY MASS INDEX: 26.78 KG/M2 | RESPIRATION RATE: 16 BRPM | HEART RATE: 108 BPM | TEMPERATURE: 97.8 F

## 2021-01-13 DIAGNOSIS — K57.30 DIVERTICULAR DISEASE OF COLON: ICD-10-CM

## 2021-01-13 DIAGNOSIS — K59.04 CHRONIC IDIOPATHIC CONSTIPATION: ICD-10-CM

## 2021-01-13 DIAGNOSIS — K21.9 GASTROESOPHAGEAL REFLUX DISEASE WITHOUT ESOPHAGITIS: Primary | ICD-10-CM

## 2021-01-13 PROCEDURE — 99214 OFFICE O/P EST MOD 30 MIN: CPT | Performed by: INTERNAL MEDICINE

## 2021-01-13 RX ORDER — METRONIDAZOLE 500 MG/1
500 TABLET ORAL 3 TIMES DAILY
Qty: 42 TAB | Refills: 0 | Status: SHIPPED | OUTPATIENT
Start: 2021-01-13 | End: 2021-05-05 | Stop reason: ALTCHOICE

## 2021-01-13 NOTE — PROGRESS NOTES
Brigido Lawrence is a 60 y.o. female who presents today for the following:  Chief Complaint   Patient presents with    GERD     with esophagitis--follow up 7-    Constipation    Abdominal Pain     epigastric pain         No Known Allergies    Current Outpatient Medications   Medication Sig    metroNIDAZOLE (FLAGYL) 500 mg tablet Take 1 Tab by mouth three (3) times daily. Indications: diverticulitis    atorvastatin (LIPITOR) 10 mg tablet TAKE 1 TABLET BY MOUTH EVERY DAY    furosemide (LASIX) 40 mg tablet TAKE 1 TABLET BY MOUTH EVERY DAY    lisinopriL (PRINIVIL, ZESTRIL) 20 mg tablet TAKE 1 TABLET BY MOUTH EVERY DAY    amLODIPine (NORVASC) 10 mg tablet Take 1 Tab by mouth daily.  ergocalciferol (ERGOCALCIFEROL) 1,250 mcg (50,000 unit) capsule TAKE ONE CAPSULE BY MOUTH ONCE A WEEK  Indications: vitamin D deficiency (high dose therapy) (Patient taking differently: TAKE ONE CAPSULE BY MOUTH ONCE A WEEK  Vitamin D  Indications: vitamin D deficiency (high dose therapy))    alcohol swabs padm 1 Pad by Apply Externally route daily. Use to check blood sugar daily.  fluticasone propionate (FLONASE) 50 mcg/actuation nasal spray SPRAY 2 SPRAYS INTO EACH NOSTRIL EVERY DAY    metFORMIN (GLUCOPHAGE) 500 mg tablet TAKE 1 TABLET BY MOUTH TWICE A DAY    Ultra Thin Lancets 30 gauge misc USE ONCE LANCET PER DAY TO CHECK BLOOD SUGAR.  loratadine (CLARITIN) 10 mg tablet Take 1 Tab by mouth daily.  sucralfate (CARAFATE) 1 gram tablet TAKE 1 TABLET BY MOUTH FOUR TIMES A DAY    docusate sodium (COLACE) 100 mg capsule Take 1 Cap by mouth two (2) times a day for 90 days.  predniSONE (DELTASONE) 10 mg tablet Take 10 mg by mouth daily.  aspirin delayed-release 81 mg tablet Take 1 Tab by mouth daily.  fluticasone furoate-vilanteroL (BREO ELLIPTA) 100-25 mcg/dose inhaler Breo Ellipta 100 mcg-25 mcg/dose powder for inhalation    lidocaine (LIDODERM) 5 % APPLY 1 PATCH EVERY DAY.  REMOVE AFTER 12 HOURS    zolpidem (AMBIEN) 5 mg tablet nightly as needed. Only as needed    albuterol (PROVENTIL VENTOLIN) 2.5 mg /3 mL (0.083 %) nebu albuterol sulfate 2.5 mg/3 mL (0.083 %) solution for nebulization   USE ONE VIAL VIA NEBULIZER EVERY EIGHT HOURS AS NEEDED    ipratropium (ATROVENT) 42 mcg (0.06 %) nasal spray 2 Sprays by Both Nostrils route four (4) times daily as needed for Rhinitis (post nasal drainage).  Blood-Glucose Meter (True Metrix Glucose Meter) misc TO USE DAILY TO CHECK BLOOD SUGAR.  glucose blood VI test strips (True Metrix Glucose Test Strip) strip Use one strip daily to test blood glucose subcutaneously. E11.9    albuterol (PROVENTIL HFA, VENTOLIN HFA, PROAIR HFA) 90 mcg/actuation inhaler Take 2 Puffs by inhalation every four (4) hours as needed for Wheezing.  pantoprazole (PROTONIX) 40 mg tablet Take 40 mg by mouth Before breakfast and dinner.  montelukast (SINGULAIR) 10 mg tablet TAKE 1 TABLET BY MOUTH EVERY DAY FOR ALLERGY SYMPTOMS    azelastine (ASTELIN) 137 mcg (0.1 %) nasal spray 2 Sprays by Both Nostrils route nightly. No current facility-administered medications for this visit.         Past Medical History:   Diagnosis Date    Arthritis     Bronchitis 9/1/2020    Chronic obstructive pulmonary disease (HCC)     Chronic pain     Diabetes ([facility] [address]     Gastroesophageal reflux disease without esophagitis 9/1/2020    GERD (gastroesophageal reflux disease)     History of multiple allergies     HTN (hypertension) 9/1/2020    Hypertension     Intermittent asthma 9/1/2020    Lung disorder     PATIENT IS ON OXYGEN    Menopause     Pain of upper abdomen 9/1/2020    Seasonal allergic rhinitis 9/1/2020    Sinus problem     Vitamin D deficiency 9/1/2020       Past Surgical History:   Procedure Laterality Date    COLONOSCOPY N/A 5/25/2020    COL[facility]te [facility] ENDOSCOPY    HX COLONOSCOPY      HX GI      colonscopy    HX ORTHOPAEDIC  10/24/2018 Total Right Hip Arthroplasty Dr. Francois Farris. Family History   Problem Relation Age of Onset    Hypertension Mother     Cancer Sister        Social History     Socioeconomic History    Marital status: SINGLE     Spouse name: Not on file    Number of children: Not on file    Years of education: Not on file    Highest education level: Not on file   Occupational History    Not on file   Social Needs    Financial resource strain: Not on file    Food insecurity     Worry: Not on file     Inability: Not on file    Transportation needs     Medical: Not on file     Non-medical: Not on file   Tobacco Use    Smoking status: Former Smoker    Smokeless tobacco: Never Used   Substance and Sexual Activity    Alcohol use: Yes     Alcohol/week: 3.0 standard drinks     Types: 3 Cans of beer per week    Drug use: No    Sexual activity: Not on file   Lifestyle    Physical activity     Days per week: Not on file     Minutes per session: Not on file    Stress: Not on file   Relationships    Social connections     Talks on phone: Not on file     Gets together: Not on file     Attends Mosque service: Not on file     Active member of club or organization: Not on file     Attends meetings of clubs or organizations: Not on file     Relationship status: Not on file    Intimate partner violence     Fear of current or ex partner: Not on file     Emotionally abused: Not on file     Physically abused: Not on file     Forced sexual activity: Not on file   Other Topics Concern    Not on file   Social History Narrative    Not on file         HPI  78-year-old female with history of COPD, osteoarthritis, diverticular disease, hiatal hernia, gastroesophageal reflux disease, anxiety disorder, and chronic gastritis who comes in for follow-up visit. Patient states she has been doing fairly well.   She states that her bowel movements main constipated and she must take PEG solution which does help but may lead to diarrhea if she takes it as prescribed. Patient had been placed on MiraLAX in the past and patient was not aware of the PEG solution and MiraLAX were the same product. She does have pain in her abdomen which is usually located in the epigastric region and the right lower quadrant. The pain is worse in the lower abdomen prior to bowel movement. Gross GI bleeding. He states that her GERD is doing fairly well with use of the pantoprazole and sucralfate. Review of Systems   Constitutional: Negative. Negative for chills and fever. HENT: Negative. Negative for nosebleeds. Eyes: Negative. Respiratory: Positive for shortness of breath. Cardiovascular: Negative. Gastrointestinal: Positive for abdominal pain, constipation and heartburn. Negative for blood in stool, diarrhea, melena, nausea and vomiting. Genitourinary: Negative. Musculoskeletal: Positive for back pain and joint pain. Skin: Negative. Neurological: Negative. Endo/Heme/Allergies: Negative. Psychiatric/Behavioral: Negative. All other systems reviewed and are negative. Visit Vitals  BP (!) 150/70 (BP 1 Location: Left arm, BP Patient Position: Sitting)   Pulse (!) 108   Temp 97.8 °F (36.6 °C)   Resp 16   Ht 5' 7\" (1.702 m)   SpO2 98% Comment: 02 @ 2 l/m   BMI 26.78 kg/m²     Physical Exam  Vitals signs and nursing note reviewed. Constitutional:       General: She is not in acute distress. Appearance: Normal appearance. She is not ill-appearing. HENT:      Head: Normocephalic and atraumatic. Nose: Nose normal.      Mouth/Throat:      Mouth: Mucous membranes are moist.      Pharynx: Oropharynx is clear. Eyes:      General: No scleral icterus. Conjunctiva/sclera: Conjunctivae normal.      Pupils: Pupils are equal, round, and reactive to light. Neck:      Musculoskeletal: Normal range of motion and neck supple. Cardiovascular:      Rate and Rhythm: Normal rate and regular rhythm. Pulses: Normal pulses. Heart sounds: Normal heart sounds. Pulmonary:      Effort: Pulmonary effort is normal.      Breath sounds: Normal breath sounds. Abdominal:      General: Bowel sounds are normal. There is no distension. Palpations: Abdomen is soft. There is no mass. Tenderness: There is abdominal tenderness. There is guarding. There is no right CVA tenderness, left CVA tenderness or rebound. Hernia: No hernia is present. Musculoskeletal: Normal range of motion. Skin:     General: Skin is warm and dry. Coloration: Skin is not jaundiced. Neurological:      General: No focal deficit present. Mental Status: She is alert and oriented to person, place, and time. Psychiatric:         Mood and Affect: Mood normal.         Behavior: Behavior normal.         Thought Content: Thought content normal.         Judgment: Judgment normal.            1. Gastroesophageal reflux disease without esophagitis  Continue the pantoprazole 40 mg daily and the sucralfate after meals and nightly. 2. Diverticular disease of colon  Possibly the cause of the pain in the lower abdomen. Therefore treat for possible diverticular infection. - metroNIDAZOLE (FLAGYL) 500 mg tablet; Take 1 Tab by mouth three (3) times daily. Indications: diverticulitis  Dispense: 43 Tab; Refill: 0    3. Chronic idiopathic constipation  Patient is to take the MiraLAX 2 doses daily.

## 2021-01-13 NOTE — PROGRESS NOTES
1. Have you been to the ER, urgent care clinic since your last visit? Hospitalized since your last visit? Yes NOV 16 TH, BOWELS WOUDL NOT MOVE, CHEST PAIN    2. Have you seen or consulted any other health care providers outside of the [address] [place] [address] since your last visit? Include any pap smears or colon screening.  NO.

## 2021-01-19 ENCOUNTER — TELEPHONE (OUTPATIENT)
Dept: PRIMARY CARE CLINIC | Age: 66
End: 2021-01-19

## 2021-01-20 LAB
ALBUMIN SERPL-MCNC: 4.7 G/DL (ref 3.8–4.8)
ALBUMIN/CREAT UR: 8 MG/G CREAT (ref 0–29)
ALBUMIN/GLOB SERPL: 2 {RATIO} (ref 1.2–2.2)
ALP SERPL-CCNC: 80 IU/L (ref 39–117)
ALT SERPL-CCNC: 22 IU/L (ref 0–32)
APPEARANCE UR: CLEAR
AST SERPL-CCNC: 29 IU/L (ref 0–40)
BACTERIA #/AREA URNS HPF: ABNORMAL /[HPF]
BASOPHILS # BLD AUTO: 0 X10E3/UL (ref 0–0.2)
BASOPHILS NFR BLD AUTO: 1 %
BILIRUB SERPL-MCNC: 0.5 MG/DL (ref 0–1.2)
BILIRUB UR QL STRIP: NEGATIVE
BUN SERPL-MCNC: 15 MG/DL (ref 8–27)
BUN/CREAT SERPL: 18 (ref 12–28)
CALCIUM SERPL-MCNC: 10.3 MG/DL (ref 8.7–10.3)
CASTS URNS QL MICRO: ABNORMAL /LPF
CHLORIDE SERPL-SCNC: 93 MMOL/L (ref 96–106)
CHOLEST SERPL-MCNC: 257 MG/DL (ref 100–199)
CO2 SERPL-SCNC: 32 MMOL/L (ref 20–29)
COLOR UR: YELLOW
CREAT SERPL-MCNC: 0.83 MG/DL (ref 0.57–1)
CREAT UR-MCNC: 123.5 MG/DL
EOSINOPHIL # BLD AUTO: 0.1 X10E3/UL (ref 0–0.4)
EOSINOPHIL NFR BLD AUTO: 2 %
EPI CELLS #/AREA URNS HPF: ABNORMAL /HPF (ref 0–10)
ERYTHROCYTE [DISTWIDTH] IN BLOOD BY AUTOMATED COUNT: 12.3 % (ref 11.7–15.4)
EST. AVERAGE GLUCOSE BLD GHB EST-MCNC: 94 MG/DL
GLOBULIN SER CALC-MCNC: 2.3 G/DL (ref 1.5–4.5)
GLUCOSE SERPL-MCNC: 89 MG/DL (ref 65–99)
GLUCOSE UR QL: NEGATIVE
HBA1C MFR BLD: 4.9 % (ref 4.8–5.6)
HCT VFR BLD AUTO: 32.6 % (ref 34–46.6)
HCV AB S/CO SERPL IA: <0.1 S/CO RATIO (ref 0–0.9)
HDLC SERPL-MCNC: 144 MG/DL
HGB BLD-MCNC: 11.1 G/DL (ref 11.1–15.9)
HGB UR QL STRIP: NEGATIVE
IMM GRANULOCYTES # BLD AUTO: 0 X10E3/UL (ref 0–0.1)
IMM GRANULOCYTES NFR BLD AUTO: 1 %
KETONES UR QL STRIP: NEGATIVE
LDLC SERPL CALC-MCNC: 103 MG/DL (ref 0–99)
LEUKOCYTE ESTERASE UR QL STRIP: ABNORMAL
LYMPHOCYTES # BLD AUTO: 1.7 X10E3/UL (ref 0.7–3.1)
LYMPHOCYTES NFR BLD AUTO: 28 %
MCH RBC QN AUTO: 31.5 PG (ref 26.6–33)
MCHC RBC AUTO-ENTMCNC: 34 G/DL (ref 31.5–35.7)
MCV RBC AUTO: 93 FL (ref 79–97)
MICRO URNS: ABNORMAL
MICROALBUMIN UR-MCNC: 9.4 UG/ML
MONOCYTES # BLD AUTO: 0.6 X10E3/UL (ref 0.1–0.9)
MONOCYTES NFR BLD AUTO: 9 %
MUCOUS THREADS URNS QL MICRO: PRESENT
NEUTROPHILS # BLD AUTO: 3.7 X10E3/UL (ref 1.4–7)
NEUTROPHILS NFR BLD AUTO: 59 %
NITRITE UR QL STRIP: NEGATIVE
PH UR STRIP: 5.5 [PH] (ref 5–7.5)
PLATELET # BLD AUTO: 316 X10E3/UL (ref 150–450)
POTASSIUM SERPL-SCNC: 4.5 MMOL/L (ref 3.5–5.2)
PROT SERPL-MCNC: 7 G/DL (ref 6–8.5)
PROT UR QL STRIP: NEGATIVE
RBC # BLD AUTO: 3.52 X10E6/UL (ref 3.77–5.28)
RBC #/AREA URNS HPF: ABNORMAL /HPF (ref 0–2)
SODIUM SERPL-SCNC: 139 MMOL/L (ref 134–144)
SP GR UR: 1.01 (ref 1–1.03)
TRIGL SERPL-MCNC: 63 MG/DL (ref 0–149)
UROBILINOGEN UR STRIP-MCNC: 0.2 MG/DL (ref 0.2–1)
VLDLC SERPL CALC-MCNC: 10 MG/DL (ref 5–40)
WBC # BLD AUTO: 6.2 X10E3/UL (ref 3.4–10.8)
WBC #/AREA URNS HPF: ABNORMAL /HPF (ref 0–5)

## 2021-01-21 ENCOUNTER — TELEPHONE (OUTPATIENT)
Dept: PRIMARY CARE CLINIC | Age: 66
End: 2021-01-21

## 2021-01-26 ENCOUNTER — OFFICE VISIT (OUTPATIENT)
Dept: ENT CLINIC | Age: 66
End: 2021-01-26
Payer: MEDICAID

## 2021-01-26 VITALS
TEMPERATURE: 97.9 F | BODY MASS INDEX: 26.84 KG/M2 | WEIGHT: 171 LBS | HEART RATE: 122 BPM | DIASTOLIC BLOOD PRESSURE: 80 MMHG | HEIGHT: 67 IN | OXYGEN SATURATION: 96 % | SYSTOLIC BLOOD PRESSURE: 140 MMHG | RESPIRATION RATE: 18 BRPM

## 2021-01-26 DIAGNOSIS — K21.9 LARYNGOPHARYNGEAL REFLUX (LPR): ICD-10-CM

## 2021-01-26 DIAGNOSIS — J44.9 CHRONIC OBSTRUCTIVE PULMONARY DISEASE, UNSPECIFIED COPD TYPE (HCC): Primary | ICD-10-CM

## 2021-01-26 DIAGNOSIS — J31.0 CHRONIC RHINITIS: ICD-10-CM

## 2021-01-26 DIAGNOSIS — R09.82 PND (POST-NASAL DRIP): ICD-10-CM

## 2021-01-26 PROCEDURE — 99213 OFFICE O/P EST LOW 20 MIN: CPT | Performed by: OTOLARYNGOLOGY

## 2021-01-26 NOTE — LETTER
1/26/2021 Patient: Shan King YOB: 1955 Date of Visit: 1/26/2021 Jacqueline Martinez MD 
[place] [facility] [place]. [address] [place] [address],[id number] [address] [employer] Dear Jacqueline Martinez MD, Thank you for referring Ms. Hans Kiran to [facility] for evaluation. My notes for this consultation are attached. If you have questions, please do not hesitate to call me. I look forward to following your patient along with you. Sincerely, Aundrea Johnson MD

## 2021-01-26 NOTE — PROGRESS NOTES
Subjective:    Юлия Crystal   60 y.o.   1955     Followup Visit    Location - nose, sinus    Quality - PND, throat    Severity -  moderate    Duration - years    Timing - chronic    Context - pt last seen 2018, dx for LPR, PND; on omeprazole; PND worse @ night; intermittent sinus infections; on flonase and claritin, also on nasal cannula O2 at home    Modifying Features - worse at night    Associated symptoms/signs - GERD/LPR      Review of Systems  Review of Systems   Constitutional: Negative for chills and fever. HENT: Positive for congestion and sinus pain. Negative for ear pain, hearing loss, nosebleeds and tinnitus. Eyes: Negative for blurred vision and double vision. Respiratory: Positive for shortness of breath. Negative for cough and sputum production. Cardiovascular: Negative for chest pain and palpitations. Gastrointestinal: Negative for heartburn, nausea and vomiting. Musculoskeletal: Negative for joint pain and neck pain. Skin: Negative. Neurological: Negative for dizziness, speech change, weakness and headaches. Endo/Heme/Allergies: Negative for environmental allergies. Does not bruise/bleed easily. Psychiatric/Behavioral: Negative for memory loss. The patient does not have insomnia. Past Medical History:   Diagnosis Date    Arthritis     Bronchitis 9/1/2020    Chronic obstructive pulmonary disease (HCC)     Chronic pain     Diabetes ([facility] [address]     Gastroesophageal reflux disease without esophagitis 9/1/2020    GERD (gastroesophageal reflux disease)     History of multiple allergies     HTN (hypertension) 9/1/2020    Hypertension     Intermittent asthma 9/1/2020    Lung disorder     PATIENT IS ON OXYGEN    Menopause     Pain of upper abdomen 9/1/2020    Seasonal allergic rhinitis 9/1/2020    Sinus problem     Vitamin D deficiency 9/1/2020     Prior to Admission medications    Medication Sig Start Date End Date Taking?  Authorizing Provider metroNIDAZOLE (FLAGYL) 500 mg tablet Take 1 Tab by mouth three (3) times daily. Indications: diverticulitis 1/13/21   Tamara Resendez MD   atorvastatin (LIPITOR) 10 mg tablet TAKE 1 TABLET BY MOUTH EVERY DAY 1/10/21   Peter Goode MD   furosemide (LASIX) 40 mg tablet TAKE 1 TABLET BY MOUTH EVERY DAY 1/10/21   Peter Goode MD   lisinopriL (PRINIVIL, ZESTRIL) 20 mg tablet TAKE 1 TABLET BY MOUTH EVERY DAY 1/10/21   Peter Goode MD   amLODIPine (NORVASC) 10 mg tablet Take 1 Tab by mouth daily. 12/28/20   Peter Goode MD   ergocalciferol (ERGOCALCIFEROL) 1,250 mcg (50,000 unit) capsule TAKE ONE CAPSULE BY MOUTH ONCE A WEEK  Indications: vitamin D deficiency (high dose therapy)  Patient taking differently: TAKE ONE CAPSULE BY MOUTH ONCE A WEEK  Vitamin D  Indications: vitamin D deficiency (high dose therapy) 12/15/20   Peter Goode MD   alcohol swabs padm 1 Pad by Apply Externally route daily. Use to check blood sugar daily. 12/14/20   Peter Goode MD   fluticasone propionate [facility]) 50 mcg/actuation nasal spray SPRAY 2 SPRAYS INTO EACH NOSTRIL EVERY DAY 12/6/20   Peter Goode MD   metFORMIN (GLUCOPHAGE) 500 mg tablet TAKE 1 TABLET BY MOUTH TWICE A DAY 11/30/20   Peter Goode MD   Ultra Thin Lancets 30 gauge misc USE ONCE LANCET PER DAY TO CHECK BLOOD SUGAR. 10/1/20   Provider, Historical   loratadine (CLARITIN) 10 mg tablet Take 1 Tab by mouth daily. 11/24/20   Peter Goode MD   sucralfate (CARAFATE) 1 gram tablet TAKE 1 TABLET BY MOUTH FOUR TIMES A DAY 11/23/20   Tamara Resendez MD   docusate sodium (COLACE) 100 mg capsule Take 1 Cap by mouth two (2) times a day for 90 days. 11/12/20 2/10/21  Peter Goode MD   predniSONE (DELTASONE) 10 mg tablet Take 10 mg by mouth daily. 11/3/20   Peter Goode MD   aspirin delayed-release 81 mg tablet Take 1 Tab by mouth daily.  11/3/20   Peter Goode MD   fluticasone furoate-vilanteroL (BREO ELLIPTA) 100-25 mcg/dose inhaler Breo Ellipta 100 mcg-25 mcg/dose powder for inhalation    Provider, Historical   lidocaine (LIDODERM) 5 % APPLY 1 PATCH EVERY DAY. REMOVE AFTER 12 HOURS 9/17/20   Provider, Historical   zolpidem (AMBIEN) 5 mg tablet nightly as needed. Only as needed 9/22/20   Provider, Historical   albuterol (PROVENTIL VENTOLIN) 2.5 mg /3 mL (0.083 %) nebu albuterol sulfate 2.5 mg/3 mL (0.083 %) solution for nebulization   USE ONE VIAL VIA NEBULIZER EVERY EIGHT HOURS AS NEEDED    Provider, Historical   ipratropium (ATROVENT) 42 mcg (0.06 %) nasal spray 2 Sprays by Both Nostrils route four (4) times daily as needed for Rhinitis (post nasal drainage). 9/25/20   Ruben Read MD   Blood-Glucose Meter (True Metrix Glucose Meter) misc TO USE DAILY TO CHECK BLOOD SUGAR. 9/15/20   Ruben Read MD   glucose blood VI test strips (True Metrix Glucose Test Strip) strip Use one strip daily to test blood glucose subcutaneously. E11.9 9/14/20   Ruben Read MD   albuterol (PROVENTIL HFA, VENTOLIN HFA, PROAIR HFA) 90 mcg/actuation inhaler Take 2 Puffs by inhalation every four (4) hours as needed for Wheezing. Provider, Historical   pantoprazole (PROTONIX) 40 mg tablet Take 40 mg by mouth Before breakfast and dinner. Provider, Historical   azelastine (ASTELIN) 137 mcg (0.1 %) nasal spray 2 Sprays by Both Nostrils route nightly. 7/16/18   Provider, Historical   montelukast (SINGULAIR) 10 mg tablet TAKE 1 TABLET BY MOUTH EVERY DAY FOR ALLERGY SYMPTOMS 6/25/18   Provider, Historical            Objective:     Visit Vitals  BP (!) 140/80 (BP 1 Location: Left arm, BP Patient Position: Sitting)   Pulse (!) 122   Temp 97.9 °F (36.6 °C) (Oral)   Resp 18   Ht 5' 7\" (1.702 m)   Wt 171 lb (77.6 kg)   SpO2 96%   BMI 26.78 kg/m²        Physical Exam  Vitals signs reviewed. Constitutional:       General: She is awake. She is not in acute distress. Appearance: Normal appearance.  She is well-groomed and normal weight. Interventions: Nasal cannula in place. HENT:      Head: Normocephalic and atraumatic. Jaw: There is normal jaw occlusion. No trismus, tenderness or malocclusion. Salivary Glands: Right salivary gland is not diffusely enlarged or tender. Left salivary gland is not diffusely enlarged or tender. Right Ear: Hearing, tympanic membrane, ear canal and external ear normal.      Left Ear: Hearing, tympanic membrane, ear canal and external ear normal.      Ears:      Sheikh exam findings: does not lateralize. Right Rinne: AC > BC. Left Rinne: AC > BC. Nose: Mucosal edema present. No nasal deformity or septal deviation. Right Nostril: No epistaxis. Left Nostril: No epistaxis. Right Turbinates: Not enlarged, swollen or pale. Left Turbinates: Not enlarged, swollen or pale. Right Sinus: No maxillary sinus tenderness or frontal sinus tenderness. Left Sinus: No maxillary sinus tenderness or frontal sinus tenderness. Comments: Crusted mucus left side     Mouth/Throat:      Lips: Pink. No lesions. Mouth: Mucous membranes are moist. No oral lesions. Dentition: Normal dentition. No dental caries. Tongue: No lesions. Palate: No mass and lesions. Pharynx: Oropharynx is clear. Uvula midline. No oropharyngeal exudate or posterior oropharyngeal erythema. Tonsils: No tonsillar exudate. 0 on the right. 0 on the left. Eyes:      General: Vision grossly intact. Extraocular Movements: Extraocular movements intact. Right eye: No nystagmus. Left eye: No nystagmus. Conjunctiva/sclera: Conjunctivae normal.      Pupils: Pupils are equal, round, and reactive to light. Neck:      Musculoskeletal: No edema or erythema. Thyroid: No thyroid mass, thyromegaly or thyroid tenderness. Trachea: Trachea and phonation normal. No tracheal tenderness or tracheal deviation.    Cardiovascular:      Rate and Rhythm: Normal rate and regular rhythm. Pulmonary:      Effort: Pulmonary effort is normal. No respiratory distress. Breath sounds: No stridor. Musculoskeletal: Normal range of motion. General: No swelling or tenderness. Lymphadenopathy:      Cervical: No cervical adenopathy. Skin:     General: Skin is warm and dry. Findings: No lesion or rash. Neurological:      General: No focal deficit present. Mental Status: She is alert and oriented to person, place, and time. Mental status is at baseline. Cranial Nerves: Cranial nerves are intact. Coordination: Romberg sign negative. Gait: Gait is intact. Comments: Negative Hallpike   Psychiatric:         Mood and Affect: Mood normal.         Behavior: Behavior normal. Behavior is cooperative. Assessment/Plan:     Encounter Diagnoses   Name Primary?  Chronic obstructive pulmonary disease, unspecified COPD type (HCC) Yes    Chronic rhinitis     Laryngopharyngeal reflux (LPR)     PND (post-nasal drip)      Chronic rhinitis  Overall doing well  Cont meds, do the Navage few times weekly  fu 6 mos    No orders of the defined types were placed in this encounter. Follow-up and Dispositions    · Return in about 6 months (around 7/26/2021).

## 2021-02-09 ENCOUNTER — TELEPHONE (OUTPATIENT)
Dept: PRIMARY CARE CLINIC | Age: 66
End: 2021-02-09

## 2021-02-09 DIAGNOSIS — J30.2 SEASONAL ALLERGIC RHINITIS, UNSPECIFIED TRIGGER: ICD-10-CM

## 2021-02-13 RX ORDER — IPRATROPIUM BROMIDE 42 UG/1
2 SPRAY, METERED NASAL
Qty: 15 ML | Refills: 3 | Status: SHIPPED | OUTPATIENT
Start: 2021-02-13 | End: 2021-08-27 | Stop reason: ALTCHOICE

## 2021-02-15 NOTE — PROGRESS NOTES
Informed pt of lab results and recommendations per Dr. Krystyna Gore. Pt states she stopped the Metformin about 2 weeks ago and her sugar has been running in the 90's. She will let us know when she needs more Atorvastatin (increased to 20mg every day) and she stated she has urgency of urination and will come by office to do urine culture. For 6 month fasting OV.

## 2021-02-25 ENCOUNTER — TELEPHONE (OUTPATIENT)
Dept: PRIMARY CARE CLINIC | Age: 66
End: 2021-02-25

## 2021-02-25 NOTE — TELEPHONE ENCOUNTER
Pt would like a referral a dermatologist-she is having severe night sweats and would like to see a dermatologist. Please call pt and let her know if she needs a virtual first.

## 2021-02-26 NOTE — TELEPHONE ENCOUNTER
Spoke with patient and informed her that she may want to see Dr. Albertina Mitchell re her night sweats because it may be from several reasons and he may be able to do some lab work that may help find out what is going on . She stated she wanted a virtual visit.

## 2021-03-01 ENCOUNTER — VIRTUAL VISIT (OUTPATIENT)
Dept: PRIMARY CARE CLINIC | Age: 66
End: 2021-03-01
Payer: MEDICAID

## 2021-03-01 DIAGNOSIS — R61 UNEXPLAINED NIGHT SWEATS: Primary | ICD-10-CM

## 2021-03-01 DIAGNOSIS — I10 ESSENTIAL (PRIMARY) HYPERTENSION: ICD-10-CM

## 2021-03-01 DIAGNOSIS — E78.5 HYPERLIPIDEMIA, UNSPECIFIED HYPERLIPIDEMIA TYPE: ICD-10-CM

## 2021-03-01 DIAGNOSIS — J44.9 CHRONIC OBSTRUCTIVE PULMONARY DISEASE, UNSPECIFIED COPD TYPE (HCC): ICD-10-CM

## 2021-03-01 PROCEDURE — 99213 OFFICE O/P EST LOW 20 MIN: CPT | Performed by: FAMILY MEDICINE

## 2021-03-01 NOTE — PROGRESS NOTES
Itzel Diaz (: 1955) is a 60 y.o. female, established patient, here for evaluation of the following chief complaint(s):   Sweats, Hyperlipidemia, Hypertension, and COPD       ASSESSMENT/PLAN:  1. Unexplained night sweats  -     XR CHEST PA LAT; Future  -     CBC WITH AUTOMATED DIFF  -     METABOLIC PANEL, COMPREHENSIVE  -     ESTROGENS, TOTAL  -     FSH AND LH  -     TSH 3RD GENERATION    2. Chronic obstructive pulmonary disease, unspecified COPD type ([address]  -     XR CHEST PA LAT; Future    3. Hyperlipidemia, unspecified hyperlipidemia type  -     LIPID PANEL    4. Essential (primary) hypertension  -     CBC WITH AUTOMATED DIFF  -     METABOLIC PANEL, COMPREHENSIVE  -     URINALYSIS W/ RFLX MICROSCOPIC        Return in about 6 months (around 2021) for Follow up of chronic medical conditions, Fasting Lab Appointment. SUBJECTIVE/OBJECTIVE:  This patient requested a virtual video visit in order to discuss worsening of her chronic night sweats. Apparently she has had night sweats for many years but recently she has noticed that they are more severe and more frequent. She does not feel short of breath and is using her oxygen at 2 L nasal cannula and her O2 sats are maintaining in the high 90s. She has had no fever or chills but does take chronic steroids. Currently her reflux symptoms are stable. She denies any recent significant weight loss. Review of Systems   Constitutional: Negative. HENT: Negative. Respiratory: Negative. She is on oxygen at 2 L nasal cannula   Cardiovascular: Negative. Gastrointestinal: Negative. Endocrine: Negative. Neurological: Negative. Psychiatric/Behavioral: Negative.          Patient-Reported Vitals 3/1/2021   Patient-Reported Pulse 110   Patient-Reported Temperature 98.2   Patient-Reported SpO2 98% on 2 Liters O2 nasal cannula   Patient-Reported Systolic  544   Patient-Reported Diastolic 71       Physical Exam  Constitutional: Appearance: Normal appearance. She is normal weight. Neurological:      Mental Status: She is alert. On this date 03/01/2021 I have spent 20 minutes reviewing previous notes, test results and face to face (virtual) with the patient discussing the diagnosis and importance of compliance with the treatment plan as well as documenting on the day of the visit. Her night sweats are chronic but seem to be getting worse. This could be related to her daily dose of steroids as well as her reflux disease although this is stable. She has not had a chest x-ray in over a year and I feel we can do some lab work including hormonal test as well as a chest x-ray to make sure there is no sign of TB. This would be unlikely due to the chronic nature of her symptoms. Jose Presume, was evaluated through a synchronous (real-time) audio-video encounter. The patient (or guardian if applicable) is aware that this is a billable service. Verbal consent to proceed has been obtained within the past 12 months. The visit was conducted pursuant to the emergency declaration under the [address] [place] [address], [address] [place] [address] authority and the [employer] and [employer] General Act. Patient identification was verified, and a caregiver was present when appropriate. The patient was located in a state where the provider was credentialed to provide care. An electronic signature was used to authenticate this note.   -- Rupal Angeles MD

## 2021-03-05 DIAGNOSIS — E78.5 HYPERLIPIDEMIA, UNSPECIFIED HYPERLIPIDEMIA TYPE: ICD-10-CM

## 2021-03-05 RX ORDER — ATORVASTATIN CALCIUM 10 MG/1
TABLET, FILM COATED ORAL
Qty: 30 TAB | Refills: 1 | Status: CANCELLED | OUTPATIENT
Start: 2021-03-05

## 2021-03-05 NOTE — TELEPHONE ENCOUNTER
Requested Prescriptions     Pending Prescriptions Disp Refills    atorvastatin (LIPITOR) 10 mg tablet 30 Tab 1     Patient requesting refill. States that she has run out as Dr. Alia Martinez changed dosage to 20 mg. Has not pills left.

## 2021-03-08 RX ORDER — ATORVASTATIN CALCIUM 20 MG/1
20 TABLET, FILM COATED ORAL DAILY
Qty: 90 TAB | Refills: 1 | Status: SHIPPED | OUTPATIENT
Start: 2021-03-08 | End: 2021-09-07

## 2021-03-08 RX ORDER — ATORVASTATIN CALCIUM 20 MG/1
20 TABLET, FILM COATED ORAL DAILY
COMMUNITY
End: 2021-03-08 | Stop reason: SDUPTHER

## 2021-03-08 NOTE — TELEPHONE ENCOUNTER
Patient last seen by Virtual Visit on 03/01/2021. Labs ordered that day. Atorvastatin dose increased on 02/15/2021 from 10mg every day to 20mg every day.

## 2021-03-12 ENCOUNTER — VIRTUAL VISIT (OUTPATIENT)
Dept: PRIMARY CARE CLINIC | Age: 66
End: 2021-03-12
Payer: MEDICAID

## 2021-03-12 DIAGNOSIS — M20.40 HAMMER TOE, ACQUIRED: Primary | ICD-10-CM

## 2021-03-12 DIAGNOSIS — L29.9 GENERALIZED PRURITUS: ICD-10-CM

## 2021-03-12 PROCEDURE — 99213 OFFICE O/P EST LOW 20 MIN: CPT | Performed by: FAMILY MEDICINE

## 2021-03-12 RX ORDER — HYDROXYZINE 25 MG/1
25 TABLET, FILM COATED ORAL
Qty: 60 TAB | Refills: 1 | Status: SHIPPED | OUTPATIENT
Start: 2021-03-12 | End: 2021-11-04 | Stop reason: ALTCHOICE

## 2021-03-12 RX ORDER — TRIAMCINOLONE ACETONIDE 5 MG/G
CREAM TOPICAL
Qty: 30 G | Refills: 2 | Status: SHIPPED | OUTPATIENT
Start: 2021-03-12 | End: 2021-06-11 | Stop reason: SDUPTHER

## 2021-03-12 RX ORDER — DOCUSATE SODIUM 100 MG/1
CAPSULE, LIQUID FILLED ORAL
COMMUNITY
Start: 2021-03-11 | End: 2021-05-11

## 2021-03-12 NOTE — PROGRESS NOTES
Luz Gonzalez (: 1955) is a 60 y.o. female, established patient, here for evaluation of the following chief complaint(s):   Diabetes, Rash, and Hammer Toe       ASSESSMENT/PLAN:  1. Hammer toe, acquired  2. Generalized pruritus  -     hydrOXYzine HCL (ATARAX) 25 mg tablet; Take 1 Tab by mouth three (3) times daily as needed for Itching., Normal, Disp-60 Tab, R-1  -     triamcinolone (ARISTOCORT) 0.5 % topical cream; Apply thin layer to area of skin that itches twice a day as needed for itching, Normal, Disp-30 g, R-2      Return in about 3 months (around 2021) for Follow up of chronic medical conditions, Fasting Lab Appointment. SUBJECTIVE/OBJECTIVE:  This patient requested a virtual video visit in order to discuss diffuse itching that occurs mainly on her back and top of her shoulders. She is getting some scarring from scratching so much but does not see any hives or other type of skin lesions. She is not used any new medication nor has she used any new detergents or fabric softeners. She has tried calamine lotion with no improvement. She was seen by Dr. Ana Maria Miller for up a diabetic foot exam. She was found to have hammertoes and calluses of the fifth toes and it is recommended that she have diabetic shoes to decrease worsening of this condition. We needed to do a video confirmation of this diagnosis. Review of Systems   Constitutional: Positive for diaphoresis (At nighttime). Skin: Positive for rash (Callus formation on both feet). Itching with likely excoriation   Psychiatric/Behavioral: Negative. Patient-Reported Vitals 3/12/2021   Patient-Reported Pulse 109   Patient-Reported Temperature 98.4   Patient-Reported SpO2 94% on 2 liters nc   Patient-Reported Systolic  015   Patient-Reported Diastolic 70       Physical Exam  Constitutional:       Appearance: Normal appearance. She is normal weight.    Musculoskeletal:         General: Deformity (Hammertoe deformity of bilateral fifth toes confirmed) present. Skin:     General: Skin is warm and dry. Findings: Lesion (Hyperkeratotic lesions on the right and left fifth toe confirmed) present. Neurological:      Mental Status: She is alert. This patient has pruritus of her upper and shoulder area. No obvious rashes noted. This may be a neurodermatitis type situation. She already is on oral steroids. I do not see any evidence of candidiasis. We will treat with hydroxyzine to lessen her scratching and if she has no improvement will refer her to a dermatologist. The patient is in agreement with this. I reviewed Dr. Asim Costello evaluation of her feet. I was able to visually see her feet on this video call. She clearly has hammertoe of bilateral fifth toe and there is also visible callus formation bilaterally. We do treat her for her diabetes and a concur with the recommendation that she would benefit from diabetic shoes. I will complete the evaluation for Dr. Chrissy Keane [facility], was evaluated through a synchronous (real-time) audio-video encounter. The patient (or guardian if applicable) is aware that this is a billable service. Verbal consent to proceed has been obtained within the past 12 months. The visit was conducted pursuant to the emergency declaration under the [facility], [address] [place] [address] authority and the [employer] Act. Patient identification was verified, and a caregiver was present when appropriate. The patient was located in a state where the provider was credentialed to provide care. An electronic signature was used to authenticate this note.   -- Yvette Schultz MD

## 2021-03-12 NOTE — PATIENT INSTRUCTIONS
Hammer Toe: Care Instructions Your Care Instructions A hammer toe is a toe that bends up at the middle joint, while the end of the toe points down. The problem usually happens to the second toe. A hammer toe can hurt a lot, especially as the toe rubs against your shoe when you walk. Shoes that are too tight can cause hammer toes. If a shoe forces a toe to stay bent for a long time, the muscles in your toe get tight and the tendons that connect the muscles to the bone get shorter. Over time, the muscles cannot straighten your toe. Sometimes, diseases such as rheumatoid arthritis also can cause hammer toes. Early treatment can help your toe straighten before it gets badly bent. You can wear roomy shoes and use pads to keep the toe from rubbing against your shoes. If your toe is badly bent, you may need surgery to straighten it. Follow-up care is a key part of your treatment and safety. Be sure to make and go to all appointments, and call your doctor if you are having problems. It's also a good idea to know your test results and keep a list of the medicines you take. How can you care for yourself at home? · Wear shoes that have lots of room in the toes. Do not wear narrow, high-heeled shoes. · Follow your doctor's directions for wearing a splint on your toe, if you are given one. · Gently stretch your toe with your fingers. · Use toe pads or corn cushions to keep the toe from rubbing against your shoes. This may keep a corn from forming on the top of the toe. · Wear a shoe insert, or orthotic, to cushion the bottom of the bent toe. When should you call for help? Watch closely for changes in your health, and be sure to contact your doctor if: 
  · Your pain gets worse.  
  · Your toe still bothers you even after you wear proper shoes and pads or cushions.  
  · You want to know more about surgery to straighten your toe. Where can you learn more?  
Go to http://www.gray.com/ Enter G413 in the search box to learn more about \"Hammer Toe: Care Instructions. \" Current as of: March 2, 2020               Content Version: 12.6 © 8882-7198 [employer]. Care instructions adapted under license by [employer] (which disclaims liability or warranty for this information). If you have questions about a medical condition or this instruction, always ask your healthcare professional. [address] any warranty or liability for your use of this information. Dermatitis: Care Instructions Your Care Instructions Dermatitis is the general name used for any rash or inflammation of the skin. Different kinds of dermatitis cause different kinds of rashes. Common causes of a rash include new medicines, plants (such as poison oak or poison ivy), heat, and stress. Certain illnesses can also cause a rash. An allergic reaction to something that touches your skin, such as latex, nickel, or poison ivy, is called contact dermatitis. Contact dermatitis may also be caused by something that irritates the skin, such as bleach, a chemical, or soap. These types of rashes cannot be spread from person to person. How long your rash will last depends on what caused it. Rashes may last a few days or months. Follow-up care is a key part of your treatment and safety. Be sure to make and go to all appointments, and call your doctor if you are having problems. It's also a good idea to know your test results and keep a list of the medicines you take. How can you care for yourself at home? · Do not scratch the rash. Cut your nails short, and file them smooth. Or wear gloves if this helps keep you from scratching. · Wash the area with water only. Pat dry. · Put cold, wet cloths on the rash to reduce itching. · Keep cool, and stay out of the sun. · Leave the rash open to the air as much as possible.  
· If the rash itches, use hydrocortisone cream. Follow the directions on the label. Calamine lotion may help for plant rashes. · Take an over-the-counter antihistamine, such as diphenhydramine (Benadryl) or loratadine (Claritin), to help calm the itching. Read and follow all instructions on the label. · If your doctor prescribed a cream, use it as directed. If your doctor prescribed medicine, take it exactly as directed. When should you call for help? Call your doctor now or seek immediate medical care if: 
  · You have symptoms of infection, such as: 
? Increased pain, swelling, warmth, or redness. ? Red streaks leading from the area. ? Pus draining from the area. ? A fever.  
  · You have joint pain along with the rash. Watch closely for changes in your health, and be sure to contact your doctor if: 
  · Your rash is changing or getting worse.  
  · You are not getting better as expected. Where can you learn more? Go to http://www.gray.com/ Enter [phone] in the search box to learn more about \"Dermatitis: Care Instructions. \" Current as of: July 2, 2020               Content Version: 12.6 © 1842-7366 [employer]. Care instructions adapted under license by [employer] (which disclaims liability or warranty for this information). If you have questions about a medical condition or this instruction, always ask your healthcare professional. [address] any warranty or liability for your use of this information.

## 2021-03-16 ENCOUNTER — HOSPITAL ENCOUNTER (OUTPATIENT)
Dept: GENERAL RADIOLOGY | Age: 66
Discharge: HOME | End: 2021-03-16
Payer: MEDICAID

## 2021-03-16 DIAGNOSIS — R61 UNEXPLAINED NIGHT SWEATS: ICD-10-CM

## 2021-03-16 DIAGNOSIS — J44.9 CHRONIC OBSTRUCTIVE PULMONARY DISEASE, UNSPECIFIED COPD TYPE (HCC): ICD-10-CM

## 2021-03-16 PROCEDURE — 71046 X-RAY EXAM CHEST 2 VIEWS: CPT

## 2021-03-20 LAB
ALBUMIN SERPL-MCNC: 4.9 G/DL (ref 3.8–4.8)
ALBUMIN/GLOB SERPL: 1.9 {RATIO} (ref 1.2–2.2)
ALP SERPL-CCNC: 88 IU/L (ref 39–117)
ALT SERPL-CCNC: 34 IU/L (ref 0–32)
APPEARANCE UR: CLEAR
AST SERPL-CCNC: 43 IU/L (ref 0–40)
BASOPHILS # BLD AUTO: 0 X10E3/UL (ref 0–0.2)
BASOPHILS NFR BLD AUTO: 0 %
BILIRUB SERPL-MCNC: 0.4 MG/DL (ref 0–1.2)
BILIRUB UR QL STRIP: NEGATIVE
BUN SERPL-MCNC: 12 MG/DL (ref 8–27)
BUN/CREAT SERPL: 14 (ref 12–28)
CALCIUM SERPL-MCNC: 10 MG/DL (ref 8.7–10.3)
CHLORIDE SERPL-SCNC: 89 MMOL/L (ref 96–106)
CHOLEST SERPL-MCNC: 275 MG/DL (ref 100–199)
CO2 SERPL-SCNC: 28 MMOL/L (ref 20–29)
COLOR UR: YELLOW
CREAT SERPL-MCNC: 0.83 MG/DL (ref 0.57–1)
EOSINOPHIL # BLD AUTO: 0.1 X10E3/UL (ref 0–0.4)
EOSINOPHIL NFR BLD AUTO: 1 %
ERYTHROCYTE [DISTWIDTH] IN BLOOD BY AUTOMATED COUNT: 12.8 % (ref 11.7–15.4)
ESTROGEN SERPL-MCNC: 59 PG/ML
FSH SERPL-ACNC: 80.1 MIU/ML
GLOBULIN SER CALC-MCNC: 2.6 G/DL (ref 1.5–4.5)
GLUCOSE SERPL-MCNC: 88 MG/DL (ref 65–99)
GLUCOSE UR QL: NEGATIVE
HCT VFR BLD AUTO: 33.1 % (ref 34–46.6)
HDLC SERPL-MCNC: 137 MG/DL
HGB BLD-MCNC: 11.4 G/DL (ref 11.1–15.9)
HGB UR QL STRIP: NEGATIVE
IMM GRANULOCYTES # BLD AUTO: 0 X10E3/UL (ref 0–0.1)
IMM GRANULOCYTES NFR BLD AUTO: 1 %
KETONES UR QL STRIP: NEGATIVE
LDLC SERPL CALC-MCNC: 123 MG/DL (ref 0–99)
LEUKOCYTE ESTERASE UR QL STRIP: NEGATIVE
LH SERPL-ACNC: 36.1 MIU/ML
LYMPHOCYTES # BLD AUTO: 1.1 X10E3/UL (ref 0.7–3.1)
LYMPHOCYTES NFR BLD AUTO: 14 %
MCH RBC QN AUTO: 32.2 PG (ref 26.6–33)
MCHC RBC AUTO-ENTMCNC: 34.4 G/DL (ref 31.5–35.7)
MCV RBC AUTO: 94 FL (ref 79–97)
MICRO URNS: NORMAL
MONOCYTES # BLD AUTO: 0.5 X10E3/UL (ref 0.1–0.9)
MONOCYTES NFR BLD AUTO: 6 %
NEUTROPHILS # BLD AUTO: 6 X10E3/UL (ref 1.4–7)
NEUTROPHILS NFR BLD AUTO: 78 %
NITRITE UR QL STRIP: NEGATIVE
PH UR STRIP: 6.5 [PH] (ref 5–7.5)
PLATELET # BLD AUTO: 303 X10E3/UL (ref 150–450)
POTASSIUM SERPL-SCNC: 5.1 MMOL/L (ref 3.5–5.2)
PROT SERPL-MCNC: 7.5 G/DL (ref 6–8.5)
PROT UR QL STRIP: NEGATIVE
RBC # BLD AUTO: 3.54 X10E6/UL (ref 3.77–5.28)
SODIUM SERPL-SCNC: 133 MMOL/L (ref 134–144)
SP GR UR: 1.01 (ref 1–1.03)
TRIGL SERPL-MCNC: 92 MG/DL (ref 0–149)
TSH SERPL DL<=0.005 MIU/L-ACNC: 0.7 UIU/ML (ref 0.45–4.5)
UROBILINOGEN UR STRIP-MCNC: 0.2 MG/DL (ref 0.2–1)
VLDLC SERPL CALC-MCNC: 15 MG/DL (ref 5–40)
WBC # BLD AUTO: 7.7 X10E3/UL (ref 3.4–10.8)

## 2021-03-26 DIAGNOSIS — I10 ESSENTIAL HYPERTENSION: ICD-10-CM

## 2021-03-28 RX ORDER — AMLODIPINE BESYLATE 10 MG/1
TABLET ORAL
Qty: 30 TAB | Refills: 2 | Status: SHIPPED | OUTPATIENT
Start: 2021-03-28 | End: 2021-06-22 | Stop reason: SDUPTHER

## 2021-03-29 NOTE — PROGRESS NOTES
Informed patient of lab results and recommendations per Dr. Kellie Knott. Patient stated she continues with this itching especially at night when she starts sweating. She states once the sweating stops, the itching stops. She would like a referral to a dermatologist in [place] or [place]. About the night sweats and menopause, she asked what could she do about them. I informed her that since she sees Dr. Angely Garces as her GYN, she may consider getting in touch with his office and scheduling an appointment to see what he suggest.  She stated she had been thinking about that and she will call his office.

## 2021-03-30 DIAGNOSIS — J30.2 SEASONAL ALLERGIC RHINITIS, UNSPECIFIED TRIGGER: Primary | ICD-10-CM

## 2021-04-03 DIAGNOSIS — L29.9 PRURITIC CONDITION: Primary | ICD-10-CM

## 2021-04-03 RX ORDER — AZELASTINE 1 MG/ML
2 SPRAY, METERED NASAL
Qty: 1 BOTTLE | Refills: 5 | Status: SHIPPED | OUTPATIENT
Start: 2021-04-03 | End: 2022-03-01 | Stop reason: SDUPTHER

## 2021-04-18 DIAGNOSIS — E11.9 CONTROLLED TYPE 2 DIABETES MELLITUS WITHOUT COMPLICATION, WITHOUT LONG-TERM CURRENT USE OF INSULIN (HCC): ICD-10-CM

## 2021-04-19 RX ORDER — CALCIUM CITRATE/VITAMIN D3 200MG-6.25
TABLET ORAL
Qty: 100 STRIP | Refills: 1 | Status: SHIPPED | OUTPATIENT
Start: 2021-04-19 | End: 2021-11-18 | Stop reason: SDUPTHER

## 2021-04-23 ENCOUNTER — TELEPHONE (OUTPATIENT)
Dept: PRIMARY CARE CLINIC | Age: 66
End: 2021-04-23

## 2021-04-23 NOTE — TELEPHONE ENCOUNTER
Pt called and stated that Dr. Ayesha Moeller referred her to a dermatologist in [place] but the first available appt is not until August.  Pt would like to know if she can be referred to someone else.

## 2021-04-26 DIAGNOSIS — E11.9 TYPE 2 DIABETES MELLITUS WITHOUT COMPLICATION, WITHOUT LONG-TERM CURRENT USE OF INSULIN (HCC): ICD-10-CM

## 2021-04-26 RX ORDER — MULTIVITAMIN
TABLET,CHEWABLE ORAL
Qty: 100 PAD | Refills: 1 | Status: SHIPPED | OUTPATIENT
Start: 2021-04-26 | End: 2022-06-10 | Stop reason: SDUPTHER

## 2021-04-28 DIAGNOSIS — R07.89 OTHER CHEST PAIN: ICD-10-CM

## 2021-04-29 RX ORDER — ASPIRIN 81 MG/1
TABLET ORAL
Qty: 90 TAB | Refills: 1 | Status: SHIPPED | OUTPATIENT
Start: 2021-04-29 | End: 2022-05-31 | Stop reason: ALTCHOICE

## 2021-05-05 ENCOUNTER — OFFICE VISIT (OUTPATIENT)
Dept: PRIMARY CARE CLINIC | Age: 66
End: 2021-05-05
Payer: MEDICAID

## 2021-05-05 VITALS
SYSTOLIC BLOOD PRESSURE: 130 MMHG | OXYGEN SATURATION: 98 % | DIASTOLIC BLOOD PRESSURE: 64 MMHG | WEIGHT: 174 LBS | RESPIRATION RATE: 18 BRPM | HEART RATE: 101 BPM | BODY MASS INDEX: 27.25 KG/M2 | TEMPERATURE: 98.3 F

## 2021-05-05 DIAGNOSIS — J44.9 CHRONIC OBSTRUCTIVE PULMONARY DISEASE, UNSPECIFIED COPD TYPE (HCC): ICD-10-CM

## 2021-05-05 DIAGNOSIS — Z99.81 DEPENDENCE ON CONTINUOUS SUPPLEMENTAL OXYGEN: ICD-10-CM

## 2021-05-05 DIAGNOSIS — J30.2 SEASONAL ALLERGIC RHINITIS, UNSPECIFIED TRIGGER: Primary | ICD-10-CM

## 2021-05-05 PROCEDURE — 99214 OFFICE O/P EST MOD 30 MIN: CPT | Performed by: NURSE PRACTITIONER

## 2021-05-05 RX ORDER — GUAIFENESIN 600 MG/1
600 TABLET, EXTENDED RELEASE ORAL 2 TIMES DAILY
Qty: 60 TAB | Refills: 2 | Status: SHIPPED | OUTPATIENT
Start: 2021-05-05 | End: 2021-08-27 | Stop reason: ALTCHOICE

## 2021-05-05 NOTE — PROGRESS NOTES
Vishnu Guerrero is a 60 y.o. female who presents to the office today for the following:    Chief Complaint   Patient presents with    Nasal Congestion       Past Medical History:   Diagnosis Date    Arthritis     Bronchitis 9/1/2020    Chronic obstructive pulmonary disease ([address]     Chronic pain     Diabetes [facility]     Gastroesophageal reflux disease without esophagitis 9/1/2020    GERD (gastroesophageal reflux disease)     History of multiple allergies     HTN (hypertension) 9/1/2020    Hypertension     Intermittent asthma 9/1/2020    Lung disorder     PATIENT IS ON OXYGEN    Menopause     Pain of upper abdomen 9/1/2020    Seasonal allergic rhinitis 9/1/2020    Sinus problem     Vitamin D deficiency 9/1/2020       Past Surgical History:   Procedure Laterality Date    COLONOSCOPY N/A 5/25/2020    COL[facility] performed [facility]uyler [facility]arrubias [facility] at [facility] ENDOSCOPY    HX COLONOSCOPY      HX GI      colonscopy    HX ORTHOPAEDIC  10/24/2018    Total Right Hip Arthroplasty Dr. Robson Hodge. Family History   Problem Relation Age of Onset    Hypertension Mother     Cancer Sister         Social History     Tobacco Use    Smoking status: Former Smoker    Smokeless tobacco: Never Used   Substance Use Topics    Alcohol use: Yes     Alcohol/week: 3.0 standard drinks     Types: 3 Cans of beer per week    Drug use: No        HPI  Patient here with PMH of copd, chronic sinus, allergies, hypertension, hyperlipidemia, GERD, type 2 diabetes, insomnia , oxygen dependence and vitamin d deficiency who presents with complaint of postnasal drip and sinus pressure on left side. States that symptom have been chronic in nature and recently saw ENT. Was told to continue her topical flonase and azelestine along with doing saline rinses every other day.  Feels that her nose always stays \"wet\" but is on humidified oxygen as nose was bleeding due to being too dry in past. Mucus when blowing nose is clear. Denies any cough, fever or change in mucus. Appetite and ok at baseline. Current Outpatient Medications on File Prior to Visit   Medication Sig    aspirin delayed-release 81 mg tablet TAKE 1 TABLET BY MOUTH EVERY DAY    Alcohol Prep Pads padm 1 PAD BY APPLY EXTERNALLY ROUTE DAILY. USE TO CHECK BLOOD SUGAR DAILY.  True Metrix Glucose Test Strip strip USE ONE STRIP DAILY TO TEST BLOOD GLUCOSE SUBCUTANEOUSLY. E11.9    ergocalciferol (ERGOCALCIFEROL) 1,250 mcg (50,000 unit) capsule TAKE ONE CAPSULE BY MOUTH ONCE A WEEK INDICATIONS: VITAMIN D DEFICIENCY (HIGH DOSE THERAPY)    azelastine (ASTELIN) 137 mcg (0.1 %) nasal spray 2 Sprays by Both Nostrils route nightly.  amLODIPine (NORVASC) 10 mg tablet TAKE 1 TABLET BY MOUTH EVERY DAY    docusate sodium (COLACE) 100 mg capsule     hydrOXYzine HCL (ATARAX) 25 mg tablet Take 1 Tab by mouth three (3) times daily as needed for Itching.  triamcinolone (ARISTOCORT) 0.5 % topical cream Apply thin layer to area of skin that itches twice a day as needed for itching    atorvastatin (LIPITOR) 20 mg tablet Take 1 Tab by mouth daily.  ipratropium (ATROVENT) 42 mcg (0.06 %) nasal spray 2 SPRAYS BY BOTH NOSTRILS ROUTE FOUR (4) TIMES DAILY AS NEEDED FOR RHINITIS (POST NASAL DRAINAGE).  [DISCONTINUED] metroNIDAZOLE (FLAGYL) 500 mg tablet Take 1 Tab by mouth three (3) times daily. Indications: diverticulitis    furosemide (LASIX) 40 mg tablet TAKE 1 TABLET BY MOUTH EVERY DAY    lisinopriL (PRINIVIL, ZESTRIL) 20 mg tablet TAKE 1 TABLET BY MOUTH EVERY DAY    fluticasone propionate (FLONASE) 50 mcg/actuation nasal spray SPRAY 2 SPRAYS INTO EACH NOSTRIL EVERY DAY    metFORMIN (GLUCOPHAGE) 500 mg tablet TAKE 1 TABLET BY MOUTH TWICE A DAY    Ultra Thin Lancets 30 gauge misc USE ONCE LANCET PER DAY TO CHECK BLOOD SUGAR.  loratadine (CLARITIN) 10 mg tablet Take 1 Tab by mouth daily.     sucralfate (CARAFATE) 1 gram tablet TAKE 1 TABLET BY MOUTH FOUR TIMES A DAY    predniSONE (DELTASONE) 10 mg tablet Take 10 mg by mouth daily.  fluticasone furoate-vilanteroL (BREO ELLIPTA) 100-25 mcg/dose inhaler Breo Ellipta 100 mcg-25 mcg/dose powder for inhalation    lidocaine (LIDODERM) 5 % APPLY 1 PATCH EVERY DAY. REMOVE AFTER 12 HOURS    zolpidem (AMBIEN) 5 mg tablet nightly as needed. Only as needed    albuterol (PROVENTIL VENTOLIN) 2.5 mg /3 mL (0.083 %) nebu albuterol sulfate 2.5 mg/3 mL (0.083 %) solution for nebulization   USE ONE VIAL VIA NEBULIZER EVERY EIGHT HOURS AS NEEDED    [DISCONTINUED] Blood-Glucose Meter (True Metrix Glucose Meter) misc TO USE DAILY TO CHECK BLOOD SUGAR.  albuterol (PROVENTIL HFA, VENTOLIN HFA, PROAIR HFA) 90 mcg/actuation inhaler Take 2 Puffs by inhalation every four (4) hours as needed for Wheezing.  pantoprazole (PROTONIX) 40 mg tablet Take 40 mg by mouth Before breakfast and dinner.  montelukast (SINGULAIR) 10 mg tablet TAKE 1 TABLET BY MOUTH EVERY DAY FOR ALLERGY SYMPTOMS     No current facility-administered medications on file prior to visit. Medications Ordered Today   Medications    guaiFENesin ER (MUCINEX) 600 mg ER tablet     Sig: Take 1 Tab by mouth two (2) times a day. Dispense:  60 Tab     Refill:  2        Review of Systems   Constitutional: Negative for chills, diaphoresis, fever, malaise/fatigue and weight loss. HENT: Positive for congestion. Negative for ear discharge, ear pain, hearing loss and sore throat. Respiratory: Positive for cough (occasional ), sputum production, shortness of breath and wheezing. Negative for stridor. Cardiovascular: Negative. Gastrointestinal: Negative. Genitourinary: Negative. Musculoskeletal: Positive for myalgias. Neurological: Negative.            Visit Vitals  /64 (BP 1 Location: Left upper arm, BP Patient Position: Sitting, BP Cuff Size: Adult)   Pulse (!) 101   Temp 98.3 °F (36.8 °C) (Oral)   Resp 18   Wt 174 lb (78.9 kg)   SpO2 98% Comment: O2 2L   BMI 27.25 kg/m²       Physical Exam  Vitals signs and nursing note reviewed. Constitutional:       General: She is not in acute distress. Appearance: Normal appearance. She is not toxic-appearing. HENT:      Right Ear: Tympanic membrane normal.      Left Ear: Tympanic membrane normal.      Nose: Rhinorrhea present. Mouth/Throat:      Pharynx: No oropharyngeal exudate or posterior oropharyngeal erythema. Eyes:      Pupils: Pupils are equal, round, and reactive to light. Cardiovascular:      Rate and Rhythm: Normal rate. Pulses: Normal pulses. Heart sounds: Normal heart sounds. Pulmonary:      Effort: Pulmonary effort is normal.      Comments: diminished breath sounds bilaterally   Abdominal:      General: Bowel sounds are normal.      Palpations: Abdomen is soft. Tenderness: There is no abdominal tenderness. Musculoskeletal: Normal range of motion. Lymphadenopathy:      Cervical: No cervical adenopathy. Skin:     General: Skin is warm and dry. Neurological:      Mental Status: She is alert. Gait: Gait abnormal.      Comments: Using walker   Psychiatric:         Mood and Affect: Mood normal.         Behavior: Behavior normal.            1. Seasonal allergic rhinitis, unspecified trigger      2. Chronic obstructive pulmonary disease, unspecified COPD type ([address]      3. Dependence on continuous supplemental oxygen      Symptoms chronic in nature and reports no worsening or change   Has recently seen ENT who is continuing on flonase, azelestine and saline nasal rinses.   Given no worsening symptoms or change, will add mucinex to use prn and continue aforementioned as directed  She uses humidfied oxygen  Encouraged to keep hydrated to thin mucus  She will notify provider if any change or worsening symptoms  Continue routine follow up with Dr. Elbert Montiel and ENT for chronic conditions    Patient verbalizes understanding of plan of care as discussed above      Follow-up and Dispositions    · Return in about 2 months (around 7/5/2021), or if symptoms worsen or fail to improve.

## 2021-05-05 NOTE — PROGRESS NOTES
1. Have you been to the ER, urgent care clinic since your last visit? Hospitalized since your last visit? No    2. Have you seen or consulted any other health care providers outside of the [address] [place] [address] since your last visit? Include any pap smears or colon screening.  No

## 2021-05-11 DIAGNOSIS — J44.9 CHRONIC OBSTRUCTIVE PULMONARY DISEASE, UNSPECIFIED (HCC): ICD-10-CM

## 2021-05-11 DIAGNOSIS — K59.00 CONSTIPATION, UNSPECIFIED: ICD-10-CM

## 2021-05-11 RX ORDER — PREDNISONE 10 MG/1
10 TABLET ORAL DAILY
Qty: 90 TAB | Refills: 1 | Status: SHIPPED | OUTPATIENT
Start: 2021-05-11 | End: 2021-08-27 | Stop reason: ALTCHOICE

## 2021-05-11 RX ORDER — DOCUSATE SODIUM 100 MG/1
CAPSULE, LIQUID FILLED ORAL
Qty: 60 CAP | Refills: 2 | Status: SHIPPED | OUTPATIENT
Start: 2021-05-11 | End: 2021-12-01 | Stop reason: SDUPTHER

## 2021-05-20 ENCOUNTER — TELEPHONE (OUTPATIENT)
Dept: PRIMARY CARE CLINIC | Age: 66
End: 2021-05-20

## 2021-05-20 ENCOUNTER — HOSPITAL ENCOUNTER (EMERGENCY)
Age: 66
Discharge: HOME | End: 2021-05-20
Attending: EMERGENCY MEDICINE
Payer: MEDICAID

## 2021-05-20 ENCOUNTER — APPOINTMENT (OUTPATIENT)
Dept: GENERAL RADIOLOGY | Age: 66
End: 2021-05-20
Attending: EMERGENCY MEDICINE
Payer: MEDICAID

## 2021-05-20 VITALS
SYSTOLIC BLOOD PRESSURE: 146 MMHG | BODY MASS INDEX: 26.68 KG/M2 | HEIGHT: 67 IN | WEIGHT: 170 LBS | HEART RATE: 104 BPM | RESPIRATION RATE: 18 BRPM | TEMPERATURE: 98.1 F | OXYGEN SATURATION: 99 % | DIASTOLIC BLOOD PRESSURE: 78 MMHG

## 2021-05-20 DIAGNOSIS — R07.9 ACUTE CHEST PAIN: Primary | ICD-10-CM

## 2021-05-20 DIAGNOSIS — K20.90 ESOPHAGITIS: ICD-10-CM

## 2021-05-20 DIAGNOSIS — E86.0 DEHYDRATION: ICD-10-CM

## 2021-05-20 LAB
ALBUMIN SERPL-MCNC: 4.3 G/DL (ref 3.5–5)
ALBUMIN/GLOB SERPL: 1.2 {RATIO} (ref 1.1–2.2)
ALP SERPL-CCNC: 91 U/L (ref 45–117)
ALT SERPL-CCNC: 43 U/L (ref 12–78)
ANION GAP SERPL CALC-SCNC: 8 MMOL/L (ref 5–15)
AST SERPL W P-5'-P-CCNC: 35 U/L (ref 15–37)
BASOPHILS # BLD: 0 K/UL (ref 0–0.2)
BASOPHILS NFR BLD: 0 % (ref 0–2.5)
BILIRUB SERPL-MCNC: 0.4 MG/DL (ref 0.2–1)
BUN SERPL-MCNC: 15 MG/DL (ref 6–20)
BUN/CREAT SERPL: 15 (ref 12–20)
CA-I BLD-MCNC: 9.3 MG/DL (ref 8.5–10.1)
CHLORIDE SERPL-SCNC: 90 MMOL/L (ref 97–108)
CO2 SERPL-SCNC: 31 MMOL/L (ref 21–32)
CREAT SERPL-MCNC: 0.99 MG/DL (ref 0.55–1.02)
EOSINOPHIL # BLD: 0 K/UL (ref 0–0.7)
EOSINOPHIL NFR BLD: 0 % (ref 0.9–2.9)
ERYTHROCYTE [DISTWIDTH] IN BLOOD BY AUTOMATED COUNT: 14.3 % (ref 11.5–14.5)
GLOBULIN SER CALC-MCNC: 3.6 G/DL (ref 2–4)
GLUCOSE SERPL-MCNC: 99 MG/DL (ref 65–100)
HCT VFR BLD AUTO: 31.9 % (ref 36–46)
HGB BLD-MCNC: 10.9 G/DL (ref 13.5–17.5)
INR PPP: 1 (ref 0.9–1.1)
LYMPHOCYTES # BLD: 0.7 K/UL (ref 1–4.8)
LYMPHOCYTES NFR BLD: 8 % (ref 20.5–51.1)
MAGNESIUM SERPL-MCNC: 1.9 MG/DL (ref 1.6–2.4)
MCH RBC QN AUTO: 33.1 PG (ref 31–34)
MCHC RBC AUTO-ENTMCNC: 34.2 G/DL (ref 31–36)
MCV RBC AUTO: 96.8 FL (ref 80–100)
MONOCYTES # BLD: 0.4 K/UL (ref 0.2–2.4)
MONOCYTES NFR BLD: 5 % (ref 1.7–9.3)
NEUTS SEG # BLD: 7.4 K/UL (ref 1.8–7.7)
NEUTS SEG NFR BLD: 87 % (ref 42–75)
NRBC # BLD: 0 K/UL
NRBC BLD-RTO: 0 PER 100 WBC
PLATELET # BLD AUTO: 244 K/UL (ref 150–400)
PMV BLD AUTO: 7.8 FL (ref 6.5–11.5)
POTASSIUM SERPL-SCNC: 5 MMOL/L (ref 3.5–5.1)
PROT SERPL-MCNC: 7.9 G/DL (ref 6.4–8.2)
PROTHROMBIN TIME: 9.9 SEC (ref 9–11.1)
RBC # BLD AUTO: 3.29 M/UL (ref 4.5–5.9)
SODIUM SERPL-SCNC: 129 MMOL/L (ref 136–145)
TROPONIN I SERPL-MCNC: <0.05 NG/ML
WBC # BLD AUTO: 8.6 K/UL (ref 4.4–11.3)

## 2021-05-20 PROCEDURE — C9113 INJ PANTOPRAZOLE SODIUM, VIA: HCPCS | Performed by: EMERGENCY MEDICINE

## 2021-05-20 PROCEDURE — 96374 THER/PROPH/DIAG INJ IV PUSH: CPT

## 2021-05-20 PROCEDURE — 71045 X-RAY EXAM CHEST 1 VIEW: CPT

## 2021-05-20 PROCEDURE — 74011250636 HC RX REV CODE- 250/636: Performed by: EMERGENCY MEDICINE

## 2021-05-20 PROCEDURE — 36415 COLL VENOUS BLD VENIPUNCTURE: CPT

## 2021-05-20 PROCEDURE — 99284 EMERGENCY DEPT VISIT MOD MDM: CPT

## 2021-05-20 PROCEDURE — 93005 ELECTROCARDIOGRAM TRACING: CPT

## 2021-05-20 PROCEDURE — 74011000250 HC RX REV CODE- 250: Performed by: EMERGENCY MEDICINE

## 2021-05-20 PROCEDURE — 80053 COMPREHEN METABOLIC PANEL: CPT

## 2021-05-20 PROCEDURE — 84484 ASSAY OF TROPONIN QUANT: CPT

## 2021-05-20 PROCEDURE — 83735 ASSAY OF MAGNESIUM: CPT

## 2021-05-20 PROCEDURE — 85025 COMPLETE CBC W/AUTO DIFF WBC: CPT

## 2021-05-20 PROCEDURE — 74011250637 HC RX REV CODE- 250/637: Performed by: EMERGENCY MEDICINE

## 2021-05-20 PROCEDURE — 85610 PROTHROMBIN TIME: CPT

## 2021-05-20 RX ORDER — MAG HYDROX/ALUMINUM HYD/SIMETH 200-200-20
30 SUSPENSION, ORAL (FINAL DOSE FORM) ORAL
Status: DISCONTINUED | OUTPATIENT
Start: 2021-05-20 | End: 2021-05-20 | Stop reason: HOSPADM

## 2021-05-20 RX ORDER — PANTOPRAZOLE SODIUM 40 MG/1
40 TABLET, DELAYED RELEASE ORAL DAILY
Qty: 10 TABLET | Refills: 0 | Status: SHIPPED | OUTPATIENT
Start: 2021-05-20 | End: 2021-05-30

## 2021-05-20 RX ADMIN — SODIUM CHLORIDE 40 MG: 9 INJECTION INTRAMUSCULAR; INTRAVENOUS; SUBCUTANEOUS at 17:24

## 2021-05-20 RX ADMIN — MAGNESIUM HYDROXIDE/ALUMINUM HYDROXICE/SIMETHICONE 30 ML: 120; 1200; 1200 SUSPENSION ORAL at 17:24

## 2021-05-20 RX ADMIN — SODIUM CHLORIDE 1000 ML: 9 INJECTION, SOLUTION INTRAVENOUS at 17:59

## 2021-05-20 NOTE — TELEPHONE ENCOUNTER
Pt stated pharmacy sent a refill for her acid reflux medication and it has not been approved. Patient is out of medication. Patient would like a call back.

## 2021-05-20 NOTE — ED PROVIDER NOTES
EMERGENCY DEPARTMENT HISTORY AND PHYSICAL EXAM      Date: (Not on file)  Patient Name: Chalo Michelle      History of Presenting Illness     Chief Complaint   Patient presents with    Chest Pain       History Provided By: Patient    HPI: Chalo Michelle, 73 y.o. female with a past medical history significant diabetes, hypertension and COPD presents to the ED with cc of chest pain, described as sharp started 4 days ago with no associated shortness of breath; pain worsened with deep breaths and with swallowing solids and liquids, no nausea no vomiting; patient has been without her Protonix for 1 week now    There are no other complaints, changes, or physical findings at this time. PCP: Mal Scott MD    Current Outpatient Medications   Medication Sig Dispense Refill    predniSONE (DELTASONE) 10 mg tablet TAKE 10 MG BY MOUTH DAILY. 90 Tab 1    docusate sodium (COLACE) 100 mg capsule TAKE 1 CAP BY MOUTH 2 TIMES A DAY FOR 90 DAYS. 60 Cap 2    guaiFENesin ER (MUCINEX) 600 mg ER tablet Take 1 Tab by mouth two (2) times a day. 60 Tab 2    aspirin delayed-release 81 mg tablet TAKE 1 TABLET BY MOUTH EVERY DAY 90 Tab 1    ergocalciferol (ERGOCALCIFEROL) 1,250 mcg (50,000 unit) capsule TAKE ONE CAPSULE BY MOUTH ONCE A WEEK INDICATIONS: VITAMIN D DEFICIENCY (HIGH DOSE THERAPY) 12 Cap 1    azelastine (ASTELIN) 137 mcg (0.1 %) nasal spray 2 Sprays by Both Nostrils route nightly. 1 Bottle 5    amLODIPine (NORVASC) 10 mg tablet TAKE 1 TABLET BY MOUTH EVERY DAY 30 Tab 2    hydrOXYzine HCL (ATARAX) 25 mg tablet Take 1 Tab by mouth three (3) times daily as needed for Itching. 60 Tab 1    triamcinolone (ARISTOCORT) 0.5 % topical cream Apply thin layer to area of skin that itches twice a day as needed for itching 30 g 2    atorvastatin (LIPITOR) 20 mg tablet Take 1 Tab by mouth daily.  90 Tab 1    ipratropium (ATROVENT) 42 mcg (0.06 %) nasal spray 2 SPRAYS BY BOTH NOSTRILS ROUTE FOUR (4) TIMES DAILY AS NEEDED FOR RHINITIS (POST NASAL DRAINAGE). 15 mL 3    furosemide (LASIX) 40 mg tablet TAKE 1 TABLET BY MOUTH EVERY DAY 90 Tab 1    lisinopriL (PRINIVIL, ZESTRIL) 20 mg tablet TAKE 1 TABLET BY MOUTH EVERY DAY 90 Tab 1    fluticasone propionate (FLONASE) 50 mcg/actuation nasal spray SPRAY 2 SPRAYS INTO EACH NOSTRIL EVERY DAY 1 Bottle 2    metFORMIN (GLUCOPHAGE) 500 mg tablet TAKE 1 TABLET BY MOUTH TWICE A  Tab 1    loratadine (CLARITIN) 10 mg tablet Take 1 Tab by mouth daily. 90 Tab 1    sucralfate (CARAFATE) 1 gram tablet TAKE 1 TABLET BY MOUTH FOUR TIMES A  Tab 3    Alcohol Prep Pads padm 1 PAD BY APPLY EXTERNALLY ROUTE DAILY. USE TO CHECK BLOOD SUGAR DAILY. 100 Pad 1    True Metrix Glucose Test Strip strip USE ONE STRIP DAILY TO TEST BLOOD GLUCOSE SUBCUTANEOUSLY. E11.9 100 Strip 1    Ultra Thin Lancets 30 gauge misc USE ONCE LANCET PER DAY TO CHECK BLOOD SUGAR.  fluticasone furoate-vilanteroL (BREO ELLIPTA) 100-25 mcg/dose inhaler Breo Ellipta 100 mcg-25 mcg/dose powder for inhalation      lidocaine (LIDODERM) 5 % APPLY 1 PATCH EVERY DAY. REMOVE AFTER 12 HOURS      zolpidem (AMBIEN) 5 mg tablet nightly as needed. Only as needed      albuterol (PROVENTIL VENTOLIN) 2.5 mg /3 mL (0.083 %) nebu albuterol sulfate 2.5 mg/3 mL (0.083 %) solution for nebulization   USE ONE VIAL VIA NEBULIZER EVERY EIGHT HOURS AS NEEDED      albuterol (PROVENTIL HFA, VENTOLIN HFA, PROAIR HFA) 90 mcg/actuation inhaler Take 2 Puffs by inhalation every four (4) hours as needed for Wheezing.  pantoprazole (PROTONIX) 40 mg tablet Take 40 mg by mouth Before breakfast and dinner.       montelukast (SINGULAIR) 10 mg tablet TAKE 1 TABLET BY MOUTH EVERY DAY FOR ALLERGY SYMPTOMS  5       Past History     Past Medical History:  Past Medical History:   Diagnosis Date    Arthritis     Bronchitis 9/1/2020    Chronic obstructive pulmonary disease (HCC)     Chronic pain     Diabetes (HCC)     Gastroesophageal reflux disease without esophagitis 9/1/2020    GERD (gastroesophageal reflux disease)     History of multiple allergies     HTN (hypertension) 9/1/2020    Hypertension     Intermittent asthma 9/1/2020    Lung disorder     PATIENT IS ON OXYGEN    Menopause     Pain of upper abdomen 9/1/2020    Seasonal allergic rhinitis 9/1/2020    Sinus problem     Vitamin D deficiency 9/1/2020       Past Surgical History:  Past Surgical History:   Procedure Laterality Date    COLONOSCOPY N/A 5/25/2020    COL[facility]COPY performed by Roselyn Lyon MD at [facility]    HX COLONOSCOPY      HX GI      colonscopy    HX ORTHOPAEDIC  10/24/2018    Total Right Hip Arthroplasty Dr. Tomy Felix. Family History:  Family History   Problem Relation Age of Onset    Hypertension Mother     Cancer Sister        Social History:  Social History     Tobacco Use    Smoking status: Former Smoker    Smokeless tobacco: Never Used   Substance Use Topics    Alcohol use: Yes     Alcohol/week: 3.0 standard drinks     Types: 3 Cans of beer per week    Drug use: No       Allergies:  No Known Allergies      Review of Systems     Review of Systems   Constitutional: Negative for chills and fever. HENT: Negative for rhinorrhea and sore throat. Eyes: Negative for pain and visual disturbance. Respiratory: Negative for cough, chest tightness and shortness of breath. Cardiovascular: Positive for chest pain. Negative for palpitations and leg swelling. Gastrointestinal: Negative for abdominal pain and vomiting. Endocrine: Negative for polydipsia and polyuria. Genitourinary: Negative for dysuria and urgency. Musculoskeletal: Negative for back pain and neck pain. Skin: Negative for color change and pallor. Neurological: Negative for weakness and numbness. Psychiatric/Behavioral: Negative. Physical Exam     Physical Exam  Vitals and nursing note reviewed.    Constitutional:       Appearance: She is well-developed. HENT:      Head: Normocephalic and atraumatic. Cardiovascular:      Rate and Rhythm: Normal rate and regular rhythm. Heart sounds: Normal heart sounds. No murmur heard. Pulmonary:      Effort: Pulmonary effort is normal. No tachypnea. Breath sounds: Normal breath sounds. Chest:      Chest wall: No crepitus. Abdominal:      Palpations: Abdomen is soft. Tenderness: There is no abdominal tenderness. Musculoskeletal:         General: Normal range of motion. Cervical back: Normal range of motion and neck supple. Right lower leg: No tenderness. No edema. Left lower leg: No tenderness. No edema. Skin:     General: Skin is warm and dry. Capillary Refill: Capillary refill takes less than 2 seconds. Neurological:      General: No focal deficit present. Mental Status: She is alert and oriented to person, place, and time. Psychiatric:         Mood and Affect: Mood normal.         Behavior: Behavior normal.         Lab and Diagnostic Study Results     Labs -   No results found for this or any previous visit (from the past 12 hour(s)). Radiologic Studies -   [unfilled]  CT Results  (Last 48 hours)    None        CXR Results  (Last 48 hours)    None          Medical Decision Making and ED Course   - I am the first and primary provider for this patient AND AM THE PRIMARY PROVIDER OF RECORD. - I reviewed the vital signs, available nursing notes, past medical history, past surgical history, family history and social history. - Initial assessment performed. The patients presenting problems have been discussed, and the staff are in agreement with the care plan formulated and outlined with them. I have encouraged them to ask questions as they arise throughout their visit. Vital Signs-Reviewed the patient's vital signs.     Patient Vitals for the past 12 hrs:   Temp Pulse Resp BP SpO2   05/20/21 1648 98.1 °F (36.7 °C) (!) 113 19 (!) 161/104 99 %       Records Reviewed: Nursing Notes    The patient presents with chest pain with a differential diagnosis of  ACS, pulmonary edema/CHF, pnuemonia and GI    ED Course:       ED Course as of May 20 1841   Thu May 20, 2021   1733 EKG sinus tach rate 110 WI interval 178 QT interval 436 no ST elevation or depression    [SB]   1803 Patient's final dispo pending finishing IV fluids    [SB]      ED Course User Index  [SB] Radha Sinclair MD         Provider Notes (Medical Decision Making): MDM           Consultations:       Consultations: - NONE        Procedures and Critical Care       Performed by: Angeles Young MD  PROCEDURES:  Procedures         HYPERTENSION COUNSELING: Education was provided to the patient today regarding their hypertension. Patient is made aware of their elevated blood pressure and is instructed to follow up this week with their Primary Care for a recheck. Patient is counseled regarding consequences of chronic, uncontrolled hypertension including kidney disease, heart disease, stroke or even death. Patient states their understanding and agrees to follow up this week. Additionally, during their visit, I discussed sodium restriction, maintaining ideal body weight and regular exercise program as physiologic means to achieve blood pressure control. The patient will strive towards this. Angeles Young MD        Disposition     Disposition: Condition stable and improved  DC- Adult Discharges: All of the diagnostic tests were reviewed and questions answered. Diagnosis, care plan and treatment options were discussed. The patient understands the instructions and will follow up as directed. The patients results have been reviewed with them. They have been counseled regarding their diagnosis.   The patient verbally convey understanding and agreement of the signs, symptoms, diagnosis, treatment and prognosis and additionally agrees to follow up as recommended with their PCP in 24 - 48 hours. They also agree with the care-plan and convey that all of their questions have been answered. I have also put together some discharge instructions for them that include: 1) educational information regarding their diagnosis, 2) how to care for their diagnosis at home, as well a 3) list of reasons why they would want to return to the ED prior to their follow-up appointment, should their condition change. Remove if not discharged  DISCHARGE PLAN:  1. Current Discharge Medication List      CONTINUE these medications which have NOT CHANGED    Details   predniSONE (DELTASONE) 10 mg tablet TAKE 10 MG BY MOUTH DAILY. Qty: 90 Tab, Refills: 1    Associated Diagnoses: Chronic obstructive pulmonary disease, unspecified (HCC)      docusate sodium (COLACE) 100 mg capsule TAKE 1 CAP BY MOUTH 2 TIMES A DAY FOR 90 DAYS. Qty: 60 Cap, Refills: 2    Associated Diagnoses: Constipation, unspecified      guaiFENesin ER (MUCINEX) 600 mg ER tablet Take 1 Tab by mouth two (2) times a day. Qty: 60 Tab, Refills: 2      aspirin delayed-release 81 mg tablet TAKE 1 TABLET BY MOUTH EVERY DAY  Qty: 90 Tab, Refills: 1    Associated Diagnoses: Other chest pain      ergocalciferol (ERGOCALCIFEROL) 1,250 mcg (50,000 unit) capsule TAKE ONE CAPSULE BY MOUTH ONCE A WEEK INDICATIONS: VITAMIN D DEFICIENCY (HIGH DOSE THERAPY)  Qty: 12 Cap, Refills: 1      azelastine (ASTELIN) 137 mcg (0.1 %) nasal spray 2 Sprays by Both Nostrils route nightly. Qty: 1 Bottle, Refills: 5    Associated Diagnoses: Seasonal allergic rhinitis, unspecified trigger      amLODIPine (NORVASC) 10 mg tablet TAKE 1 TABLET BY MOUTH EVERY DAY  Qty: 30 Tab, Refills: 2    Comments: DX Code Needed  REFILL REQUEST PLEASE. Vira Libman Associated Diagnoses: Essential hypertension      hydrOXYzine HCL (ATARAX) 25 mg tablet Take 1 Tab by mouth three (3) times daily as needed for Itching.   Qty: 60 Tab, Refills: 1    Associated Diagnoses: Generalized pruritus triamcinolone (ARISTOCORT) 0.5 % topical cream Apply thin layer to area of skin that itches twice a day as needed for itching  Qty: 30 g, Refills: 2    Associated Diagnoses: Generalized pruritus      atorvastatin (LIPITOR) 20 mg tablet Take 1 Tab by mouth daily. Qty: 90 Tab, Refills: 1    Associated Diagnoses: Hyperlipidemia, unspecified hyperlipidemia type      ipratropium (ATROVENT) 42 mcg (0.06 %) nasal spray 2 SPRAYS BY BOTH NOSTRILS ROUTE FOUR (4) TIMES DAILY AS NEEDED FOR RHINITIS (POST NASAL DRAINAGE). Qty: 15 mL, Refills: 3    Associated Diagnoses: Seasonal allergic rhinitis, unspecified trigger      furosemide (LASIX) 40 mg tablet TAKE 1 TABLET BY MOUTH EVERY DAY  Qty: 90 Tab, Refills: 1    Associated Diagnoses: Essential (primary) hypertension      lisinopriL (PRINIVIL, ZESTRIL) 20 mg tablet TAKE 1 TABLET BY MOUTH EVERY DAY  Qty: 90 Tab, Refills: 1    Associated Diagnoses: Essential (primary) hypertension      fluticasone propionate (FLONASE) 50 mcg/actuation nasal spray SPRAY 2 SPRAYS INTO EACH NOSTRIL EVERY DAY  Qty: 1 Bottle, Refills: 2      metFORMIN (GLUCOPHAGE) 500 mg tablet TAKE 1 TABLET BY MOUTH TWICE A DAY  Qty: 180 Tab, Refills: 1    Associated Diagnoses: Type 2 diabetes mellitus without complications (HCC)      loratadine (CLARITIN) 10 mg tablet Take 1 Tab by mouth daily. Qty: 90 Tab, Refills: 1    Associated Diagnoses: Mild intermittent asthma without complication      sucralfate (CARAFATE) 1 gram tablet TAKE 1 TABLET BY MOUTH FOUR TIMES A DAY  Qty: 120 Tab, Refills: 3    Associated Diagnoses: Epigastric pain      Alcohol Prep Pads padm 1 PAD BY APPLY EXTERNALLY ROUTE DAILY. USE TO CHECK BLOOD SUGAR DAILY.   Qty: 100 Pad, Refills: 1    Associated Diagnoses: Type 2 diabetes mellitus without complication, without long-term current use of insulin (HCC)      True Metrix Glucose Test Strip strip USE ONE STRIP DAILY TO TEST BLOOD GLUCOSE SUBCUTANEOUSLY. E11.9  Qty: 100 Strip, Refills: 1 Comments: DX Code Needed  . Associated Diagnoses: Controlled type 2 diabetes mellitus without complication, without long-term current use of insulin (HCC)      Ultra Thin Lancets 30 gauge misc USE ONCE LANCET PER DAY TO CHECK BLOOD SUGAR. fluticasone furoate-vilanteroL (BREO ELLIPTA) 100-25 mcg/dose inhaler Breo Ellipta 100 mcg-25 mcg/dose powder for inhalation      lidocaine (LIDODERM) 5 % APPLY 1 PATCH EVERY DAY. REMOVE AFTER 12 HOURS      zolpidem (AMBIEN) 5 mg tablet nightly as needed. Only as needed      albuterol (PROVENTIL VENTOLIN) 2.5 mg /3 mL (0.083 %) nebu albuterol sulfate 2.5 mg/3 mL (0.083 %) solution for nebulization   USE ONE VIAL VIA NEBULIZER EVERY EIGHT HOURS AS NEEDED      albuterol (PROVENTIL HFA, VENTOLIN HFA, PROAIR HFA) 90 mcg/actuation inhaler Take 2 Puffs by inhalation every four (4) hours as needed for Wheezing. pantoprazole (PROTONIX) 40 mg tablet Take 40 mg by mouth Before breakfast and dinner. montelukast (SINGULAIR) 10 mg tablet TAKE 1 TABLET BY MOUTH EVERY DAY FOR ALLERGY SYMPTOMS  Refills: 5           2. Follow-up Information    None       3. Return to ED if worse   4. Current Discharge Medication List          Diagnosis     Clinical Impression: No diagnosis found. Attestations:    Bria Chauhan MD    Please note that this dictation was completed with [employer], the computer voice recognition software. Quite often unanticipated grammatical, syntax, homophones, and other interpretive errors are inadvertently transcribed by the computer software. Please disregard these errors. Please excuse any errors that have escaped final proofreading. Thank you.

## 2021-05-20 NOTE — ED NOTES
Pt reports right sided CP that started 4 days ago. Pt denies pain radiation. Pt on chronic NC 2 L O2. Pt ambulatory with steady gait. Pt reports \"I'm out of my acid reflux medication. \"

## 2021-05-21 LAB
ATRIAL RATE: 110 BPM
CALCULATED P AXIS, ECG09: 79 DEGREES
CALCULATED R AXIS, ECG10: 78 DEGREES
CALCULATED T AXIS, ECG11: 72 DEGREES
DIAGNOSIS, 93000: NORMAL
P-R INTERVAL, ECG05: 178 MS
Q-T INTERVAL, ECG07: 322 MS
QRS DURATION, ECG06: 85 MS
QTC CALCULATION (BEZET), ECG08: 436 MS
VENTRICULAR RATE, ECG03: 110 BPM

## 2021-05-24 NOTE — TELEPHONE ENCOUNTER
Spoke with patient. She stated she needed her Pantoprazole refilled. I informed her that I did not see it on her current list of medications. . She said she went to the ER over the weekend because she thought she was having a heart attack but it was only GERD. The ER doctor gave her #10 Pantoprazole. She stated she remembered that Dr. Adiel Mtaa had prescribed them for her and she would call his office.

## 2021-06-03 ENCOUNTER — TELEPHONE (OUTPATIENT)
Dept: GASTROENTEROLOGY | Age: 66
End: 2021-06-03

## 2021-06-03 DIAGNOSIS — J45.20 MILD INTERMITTENT ASTHMA WITHOUT COMPLICATION: ICD-10-CM

## 2021-06-03 DIAGNOSIS — K21.00 GASTROESOPHAGEAL REFLUX DISEASE WITH ESOPHAGITIS WITHOUT HEMORRHAGE: Primary | ICD-10-CM

## 2021-06-04 RX ORDER — PANTOPRAZOLE SODIUM 40 MG/1
40 TABLET, DELAYED RELEASE ORAL
Qty: 60 TABLET | Refills: 5 | Status: SHIPPED | OUTPATIENT
Start: 2021-06-04 | End: 2021-11-04 | Stop reason: ALTCHOICE

## 2021-06-06 DIAGNOSIS — R10.13 EPIGASTRIC PAIN: ICD-10-CM

## 2021-06-06 RX ORDER — LORATADINE 10 MG/1
TABLET ORAL
Qty: 90 TABLET | Refills: 1 | Status: SHIPPED | OUTPATIENT
Start: 2021-06-06 | End: 2021-06-11 | Stop reason: SDUPTHER

## 2021-06-07 RX ORDER — SUCRALFATE 1 G/1
TABLET ORAL
Qty: 120 TABLET | Refills: 3 | Status: SHIPPED | OUTPATIENT
Start: 2021-06-07 | End: 2021-11-04 | Stop reason: ALTCHOICE

## 2021-06-08 ENCOUNTER — APPOINTMENT (OUTPATIENT)
Dept: GENERAL RADIOLOGY | Age: 66
End: 2021-06-08
Attending: EMERGENCY MEDICINE
Payer: MEDICARE

## 2021-06-08 ENCOUNTER — HOSPITAL ENCOUNTER (EMERGENCY)
Age: 66
Discharge: HOME | End: 2021-06-08
Attending: EMERGENCY MEDICINE | Admitting: EMERGENCY MEDICINE
Payer: MEDICARE

## 2021-06-08 VITALS
HEART RATE: 117 BPM | TEMPERATURE: 99.4 F | HEIGHT: 64 IN | WEIGHT: 175 LBS | RESPIRATION RATE: 19 BRPM | OXYGEN SATURATION: 100 % | BODY MASS INDEX: 29.88 KG/M2 | DIASTOLIC BLOOD PRESSURE: 65 MMHG | SYSTOLIC BLOOD PRESSURE: 121 MMHG

## 2021-06-08 DIAGNOSIS — J01.01 ACUTE RECURRENT MAXILLARY SINUSITIS: ICD-10-CM

## 2021-06-08 DIAGNOSIS — J44.1 COPD EXACERBATION (HCC): Primary | ICD-10-CM

## 2021-06-08 LAB — SARS-COV-2, COV2: NORMAL

## 2021-06-08 PROCEDURE — 99284 EMERGENCY DEPT VISIT MOD MDM: CPT

## 2021-06-08 PROCEDURE — 94640 AIRWAY INHALATION TREATMENT: CPT

## 2021-06-08 PROCEDURE — U0005 INFEC AGEN DETEC AMPLI PROBE: HCPCS

## 2021-06-08 PROCEDURE — 74011250636 HC RX REV CODE- 250/636: Performed by: EMERGENCY MEDICINE

## 2021-06-08 PROCEDURE — 96372 THER/PROPH/DIAG INJ SC/IM: CPT

## 2021-06-08 PROCEDURE — 74011000250 HC RX REV CODE- 250: Performed by: EMERGENCY MEDICINE

## 2021-06-08 PROCEDURE — 71045 X-RAY EXAM CHEST 1 VIEW: CPT

## 2021-06-08 RX ORDER — OXYMETAZOLINE HCL 0.05 %
2 SPRAY, NON-AEROSOL (ML) NASAL 2 TIMES DAILY
Qty: 1 EACH | Refills: 0 | Status: SHIPPED | OUTPATIENT
Start: 2021-06-08 | End: 2021-06-11

## 2021-06-08 RX ORDER — AZITHROMYCIN 500 MG/1
TABLET, FILM COATED ORAL
Qty: 3 TABLET | Refills: 0 | Status: SHIPPED | OUTPATIENT
Start: 2021-06-08 | End: 2021-08-27 | Stop reason: ALTCHOICE

## 2021-06-08 RX ORDER — PREDNISONE 20 MG/1
40 TABLET ORAL DAILY
Qty: 10 TABLET | Refills: 0 | Status: SHIPPED | OUTPATIENT
Start: 2021-06-08 | End: 2021-06-13

## 2021-06-08 RX ORDER — IPRATROPIUM BROMIDE AND ALBUTEROL SULFATE 2.5; .5 MG/3ML; MG/3ML
3 SOLUTION RESPIRATORY (INHALATION)
Status: COMPLETED | OUTPATIENT
Start: 2021-06-08 | End: 2021-06-08

## 2021-06-08 RX ADMIN — METHYLPREDNISOLONE SODIUM SUCCINATE 125 MG: 125 INJECTION, POWDER, FOR SOLUTION INTRAMUSCULAR; INTRAVENOUS at 12:39

## 2021-06-08 RX ADMIN — IPRATROPIUM BROMIDE AND ALBUTEROL SULFATE 3 ML: .5; 3 SOLUTION RESPIRATORY (INHALATION) at 12:50

## 2021-06-08 NOTE — ED NOTES
Pt arrived to ED via POV with c/o \"sinus drainage and shortness of breath. \"     Pt speaking in clear, full sentences and reports \"been coughing up and blowing out phloem for days. \"     Pt reports she has \"sinsus problems once a year. \"

## 2021-06-08 NOTE — ED NOTES
Pt discharged with verbal understanding of discharge instructions. Pt ambulatory and feels comfortable being discharged.  Pt denies any questions or complaints.'

## 2021-06-08 NOTE — ED PROVIDER NOTES
EMERGENCY DEPARTMENT HISTORY AND PHYSICAL EXAM      Date: 6/8/2021  Patient Name: Vanesa Hsieh    History of Presenting Illness     Chief Complaint   Patient presents with    Shortness of Breath       History Provided By: Patient    HPI: Vanesa Hsieh, 73 y.o. female with a past medical history significant diabetes, hypertension and COPD presents to the ED with cc of cough productive of brown sputum for 3 days and associated shortness of breath; patient also complains of nasal congestion no sore throat no chest pain no nausea no vomiting diarrhea    There are no other complaints, changes, or physical findings at this time. PCP: Kolleen Simmonds, MD    No current facility-administered medications on file prior to encounter. Current Outpatient Medications on File Prior to Encounter   Medication Sig Dispense Refill    sucralfate (CARAFATE) 1 gram tablet TAKE 1 TABLET BY MOUTH FOUR TIMES A  Tablet 3    loratadine (CLARITIN) 10 mg tablet TAKE 1 TABLET BY MOUTH EVERY DAY 90 Tablet 1    pantoprazole (PROTONIX) 40 mg tablet Take 1 Tablet by mouth Before breakfast and dinner. Indications: inflammation of the esophagus with erosion, gastroesophageal reflux disease 60 Tablet 5    predniSONE (DELTASONE) 10 mg tablet TAKE 10 MG BY MOUTH DAILY. 90 Tab 1    docusate sodium (COLACE) 100 mg capsule TAKE 1 CAP BY MOUTH 2 TIMES A DAY FOR 90 DAYS. 60 Cap 2    guaiFENesin ER (MUCINEX) 600 mg ER tablet Take 1 Tab by mouth two (2) times a day. 60 Tab 2    aspirin delayed-release 81 mg tablet TAKE 1 TABLET BY MOUTH EVERY DAY 90 Tab 1    True Metrix Glucose Test Strip strip USE ONE STRIP DAILY TO TEST BLOOD GLUCOSE SUBCUTANEOUSLY. E11.9 100 Strip 1    ergocalciferol (ERGOCALCIFEROL) 1,250 mcg (50,000 unit) capsule TAKE ONE CAPSULE BY MOUTH ONCE A WEEK INDICATIONS: VITAMIN D DEFICIENCY (HIGH DOSE THERAPY) 12 Cap 1    azelastine (ASTELIN) 137 mcg (0.1 %) nasal spray 2 Sprays by Both Nostrils route nightly.  1 Bottle 5    amLODIPine (NORVASC) 10 mg tablet TAKE 1 TABLET BY MOUTH EVERY DAY 30 Tab 2    hydrOXYzine HCL (ATARAX) 25 mg tablet Take 1 Tab by mouth three (3) times daily as needed for Itching. 60 Tab 1    triamcinolone (ARISTOCORT) 0.5 % topical cream Apply thin layer to area of skin that itches twice a day as needed for itching 30 g 2    atorvastatin (LIPITOR) 20 mg tablet Take 1 Tab by mouth daily. 90 Tab 1    ipratropium (ATROVENT) 42 mcg (0.06 %) nasal spray 2 SPRAYS BY BOTH NOSTRILS ROUTE FOUR (4) TIMES DAILY AS NEEDED FOR RHINITIS (POST NASAL DRAINAGE). 15 mL 3    furosemide (LASIX) 40 mg tablet TAKE 1 TABLET BY MOUTH EVERY DAY 90 Tab 1    lisinopriL (PRINIVIL, ZESTRIL) 20 mg tablet TAKE 1 TABLET BY MOUTH EVERY DAY 90 Tab 1    fluticasone propionate (FLONASE) 50 mcg/actuation nasal spray SPRAY 2 SPRAYS INTO EACH NOSTRIL EVERY DAY 1 Bottle 2    metFORMIN (GLUCOPHAGE) 500 mg tablet TAKE 1 TABLET BY MOUTH TWICE A  Tab 1    Alcohol Prep Pads padm 1 PAD BY APPLY EXTERNALLY ROUTE DAILY. USE TO CHECK BLOOD SUGAR DAILY. 100 Pad 1    Ultra Thin Lancets 30 gauge misc USE ONCE LANCET PER DAY TO CHECK BLOOD SUGAR.  fluticasone furoate-vilanteroL (BREO ELLIPTA) 100-25 mcg/dose inhaler Breo Ellipta 100 mcg-25 mcg/dose powder for inhalation      lidocaine (LIDODERM) 5 % APPLY 1 PATCH EVERY DAY. REMOVE AFTER 12 HOURS      zolpidem (AMBIEN) 5 mg tablet nightly as needed. Only as needed      albuterol (PROVENTIL VENTOLIN) 2.5 mg /3 mL (0.083 %) nebu albuterol sulfate 2.5 mg/3 mL (0.083 %) solution for nebulization   USE ONE VIAL VIA NEBULIZER EVERY EIGHT HOURS AS NEEDED      albuterol (PROVENTIL HFA, VENTOLIN HFA, PROAIR HFA) 90 mcg/actuation inhaler Take 2 Puffs by inhalation every four (4) hours as needed for Wheezing.  pantoprazole (PROTONIX) 40 mg tablet Take 40 mg by mouth Before breakfast and dinner.       montelukast (SINGULAIR) 10 mg tablet TAKE 1 TABLET BY MOUTH EVERY DAY FOR ALLERGY SYMPTOMS  5       Past History     Past Medical History:  Past Medical History:   Diagnosis Date    Arthritis     Bronchitis 9/1/2020    Chronic obstructive pulmonary disease (HCC)     Chronic pain     Diabetes ([address]     Gastroesophageal reflux disease without esophagitis 9/1/2020    GERD (gastroesophageal reflux disease)     History of multiple allergies     HTN (hypertension) 9/1/2020    Hypertension     Intermittent asthma 9/1/2020    Lung disorder     PATIENT IS ON OXYGEN    Menopause     Pain of upper abdomen 9/1/2020    Seasonal allergic rhinitis 9/1/2020    Sinus problem     Vitamin D deficiency 9/1/2020       Past Surgical History:  Past Surgical History:   Procedure Laterality Date    COLONOSCOPY N/A 5/25/2020    COLONOSCOPY performed by Latasha Duarte MD at [facility] ENDOSCOPY    HX COLONOSCOPY      HX GI      colonscopy    HX ORTHOPAEDIC  10/24/2018    Total Right Hip Arthroplasty Dr. Elyse Marroquin. Family History:  Family History   Problem Relation Age of Onset    Hypertension Mother     Cancer Sister        Social History:  Social History     Tobacco Use    Smoking status: Former Smoker    Smokeless tobacco: Never Used   Substance Use Topics    Alcohol use: Yes     Alcohol/week: 3.0 standard drinks     Types: 3 Cans of beer per week    Drug use: No       Allergies:  No Known Allergies      Review of Systems     Review of Systems   Constitutional: Negative for chills and fever. HENT: Negative for rhinorrhea and sore throat. Eyes: Negative for pain and visual disturbance. Respiratory: Positive for cough and shortness of breath. Cardiovascular: Negative for chest pain and leg swelling. Gastrointestinal: Negative for abdominal pain and vomiting. Endocrine: Negative for polydipsia and polyuria. Genitourinary: Negative for dysuria and urgency. Musculoskeletal: Negative for back pain and myalgias.    Skin: Negative for color change and pallor. Neurological: Negative for weakness and numbness. Psychiatric/Behavioral: Negative. Physical Exam     Physical Exam  Vitals and nursing note reviewed. Constitutional:       General: She is not in acute distress. Appearance: She is well-developed. She is not ill-appearing, toxic-appearing or diaphoretic. HENT:      Head: Normocephalic and atraumatic. Eyes:      Extraocular Movements: Extraocular movements intact. Pupils: Pupils are equal, round, and reactive to light. Cardiovascular:      Rate and Rhythm: Normal rate and regular rhythm. Pulses: Normal pulses. Heart sounds: Normal heart sounds. Pulmonary:      Effort: Pulmonary effort is normal.      Breath sounds: Wheezing present. Abdominal:      General: Bowel sounds are normal.      Palpations: Abdomen is soft. Musculoskeletal:         General: Normal range of motion. Cervical back: Normal range of motion and neck supple. Right lower leg: No tenderness. No edema. Left lower leg: No tenderness. No edema. Skin:     General: Skin is warm and dry. Capillary Refill: Capillary refill takes less than 2 seconds. Neurological:      General: No focal deficit present. Mental Status: She is alert and oriented to person, place, and time. Psychiatric:         Mood and Affect: Mood normal.         Behavior: Behavior normal.         Lab and Diagnostic Study Results     Labs -   No results found for this or any previous visit (from the past 12 hour(s)). Radiologic Studies -   @lastxrresult@  CT Results  (Last 48 hours)    None        CXR Results  (Last 48 hours)    None            Medical Decision Making   - I am the first provider for this patient. - I reviewed the vital signs, available nursing notes, past medical history, past surgical history, family history and social history. - Initial assessment performed.  The patients presenting problems have been discussed, and they are in agreement with the care plan formulated and outlined with them. I have encouraged them to ask questions as they arise throughout their visit. Vital Signs-Reviewed the patient's vital signs. Patient Vitals for the past 12 hrs:   Temp Pulse Resp BP SpO2   06/08/21 1205 -- -- -- -- 100 %   06/08/21 1204 99.4 °F (37.4 °C) (!) 117 19 121/65 100 %       Records Reviewed: Nursing Notes    The patient presents with short of breath with a differential diagnosis of pneumonia, COPD exacerbation, ACS      ED Course:     ED Course as of Jun 08 1547   Tue Jun 08, 2021   1317 Patient's wheezing resolved patient declined a second neb treatments that she will take when she gets home    [SB]      ED Course User Index  [SB] Dm Tyson MD       Provider Notes (Medical Decision Making): MDM       Procedures   Medical Decision Makingedical Decision Making  Performed by: Beronica Garcia MD  PROCEDURES:  Procedures       Disposition   Disposition: Condition stable and improved  DC- Adult Discharges: All of the diagnostic tests were reviewed and questions answered. Diagnosis, care plan and treatment options were discussed. The patient understands the instructions and will follow up as directed. The patients results have been reviewed with them. They have been counseled regarding their diagnosis. The patient verbally convey understanding and agreement of the signs, symptoms, diagnosis, treatment and prognosis and additionally agrees to follow up as recommended with their PCP in 24 - 48 hours. They also agree with the care-plan and convey that all of their questions have been answered. I have also put together some discharge instructions for them that include: 1) educational information regarding their diagnosis, 2) how to care for their diagnosis at home, as well a 3) list of reasons why they would want to return to the ED prior to their follow-up appointment, should their condition change.         DISCHARGE PLAN:  1. Current Discharge Medication List      CONTINUE these medications which have NOT CHANGED    Details   sucralfate (CARAFATE) 1 gram tablet TAKE 1 TABLET BY MOUTH FOUR TIMES A DAY  Qty: 120 Tablet, Refills: 3    Associated Diagnoses: Epigastric pain      loratadine (CLARITIN) 10 mg tablet TAKE 1 TABLET BY MOUTH EVERY DAY  Qty: 90 Tablet, Refills: 1    Comments: DX Code Needed  REFILL REQUEST. Associated Diagnoses: Mild intermittent asthma without complication      !! pantoprazole (PROTONIX) 40 mg tablet Take 1 Tablet by mouth Before breakfast and dinner. Indications: inflammation of the esophagus with erosion, gastroesophageal reflux disease  Qty: 60 Tablet, Refills: 5    Associated Diagnoses: Gastroesophageal reflux disease with esophagitis without hemorrhage      predniSONE (DELTASONE) 10 mg tablet TAKE 10 MG BY MOUTH DAILY. Qty: 90 Tab, Refills: 1    Associated Diagnoses: Chronic obstructive pulmonary disease, unspecified ([facility])      docusate sodium (COLACE) 100 mg capsule TAKE 1 CAP BY MOUTH 2 TIMES A DAY FOR 90 DAYS. Qty: 60 Cap, Refills: 2    Associated Diagnoses: Constipation, unspecified      guaiFENesin ER (MUCINEX) 600 mg ER tablet Take 1 Tab by mouth two (2) times a day. Qty: 60 Tab, Refills: 2      aspirin delayed-release 81 mg tablet TAKE 1 TABLET BY MOUTH EVERY DAY  Qty: 90 Tab, Refills: 1    Associated Diagnoses: Other chest pain      True Metrix Glucose Test Strip strip USE ONE STRIP DAILY TO TEST BLOOD GLUCOSE SUBCUTANEOUSLY. E11.9  Qty: 100 Strip, Refills: 1    Comments: DX Code Needed  .   Associated Diagnoses: Controlled type 2 diabetes mellitus without complication, without long-term current use of insulin ([facility])      ergocalciferol (ERGOCALCIFEROL) 1,250 mcg (50,000 unit) capsule TAKE ONE CAPSULE BY MOUTH ONCE A WEEK INDICATIONS: VITAMIN D DEFICIENCY (HIGH DOSE THERAPY)  Qty: 12 Cap, Refills: 1      azelastine (ASTELIN) 137 mcg (0.1 %) nasal spray 2 Sprays by Both Nostrils route nightly. Qty: 1 Bottle, Refills: 5    Associated Diagnoses: Seasonal allergic rhinitis, unspecified trigger      amLODIPine (NORVASC) 10 mg tablet TAKE 1 TABLET BY MOUTH EVERY DAY  Qty: 30 Tab, Refills: 2    Comments: DX Code Needed  REFILL REQUEST PLEASE. Bere Whatley Associated Diagnoses: Essential hypertension      hydrOXYzine HCL (ATARAX) 25 mg tablet Take 1 Tab by mouth three (3) times daily as needed for Itching. Qty: 60 Tab, Refills: 1    Associated Diagnoses: Generalized pruritus      triamcinolone (ARISTOCORT) 0.5 % topical cream Apply thin layer to area of skin that itches twice a day as needed for itching  Qty: 30 g, Refills: 2    Associated Diagnoses: Generalized pruritus      atorvastatin (LIPITOR) 20 mg tablet Take 1 Tab by mouth daily. Qty: 90 Tab, Refills: 1    Associated Diagnoses: Hyperlipidemia, unspecified hyperlipidemia type      ipratropium (ATROVENT) 42 mcg (0.06 %) nasal spray 2 SPRAYS BY BOTH NOSTRILS ROUTE FOUR (4) TIMES DAILY AS NEEDED FOR RHINITIS (POST NASAL DRAINAGE). Qty: 15 mL, Refills: 3    Associated Diagnoses: Seasonal allergic rhinitis, unspecified trigger      furosemide (LASIX) 40 mg tablet TAKE 1 TABLET BY MOUTH EVERY DAY  Qty: 90 Tab, Refills: 1    Associated Diagnoses: Essential (primary) hypertension      lisinopriL (PRINIVIL, ZESTRIL) 20 mg tablet TAKE 1 TABLET BY MOUTH EVERY DAY  Qty: 90 Tab, Refills: 1    Associated Diagnoses: Essential (primary) hypertension      fluticasone propionate (FLONASE) 50 mcg/actuation nasal spray SPRAY 2 SPRAYS INTO EACH NOSTRIL EVERY DAY  Qty: 1 Bottle, Refills: 2      metFORMIN (GLUCOPHAGE) 500 mg tablet TAKE 1 TABLET BY MOUTH TWICE A DAY  Qty: 180 Tab, Refills: 1    Associated Diagnoses: Type 2 diabetes mellitus without complications (HCC)      Alcohol Prep Pads padm 1 PAD BY APPLY EXTERNALLY ROUTE DAILY. USE TO CHECK BLOOD SUGAR DAILY.   Qty: 100 Pad, Refills: 1    Associated Diagnoses: Type 2 diabetes mellitus without complication, without long-term current use of insulin (HCC)      Ultra Thin Lancets 30 gauge misc USE ONCE LANCET PER DAY TO CHECK BLOOD SUGAR. fluticasone furoate-vilanteroL (BREO ELLIPTA) 100-25 mcg/dose inhaler Breo Ellipta 100 mcg-25 mcg/dose powder for inhalation      lidocaine (LIDODERM) 5 % APPLY 1 PATCH EVERY DAY. REMOVE AFTER 12 HOURS      zolpidem (AMBIEN) 5 mg tablet nightly as needed. Only as needed      albuterol (PROVENTIL VENTOLIN) 2.5 mg /3 mL (0.083 %) nebu albuterol sulfate 2.5 mg/3 mL (0.083 %) solution for nebulization   USE ONE VIAL VIA NEBULIZER EVERY EIGHT HOURS AS NEEDED      albuterol (PROVENTIL HFA, VENTOLIN HFA, PROAIR HFA) 90 mcg/actuation inhaler Take 2 Puffs by inhalation every four (4) hours as needed for Wheezing. !! pantoprazole (PROTONIX) 40 mg tablet Take 40 mg by mouth Before breakfast and dinner. montelukast (SINGULAIR) 10 mg tablet TAKE 1 TABLET BY MOUTH EVERY DAY FOR ALLERGY SYMPTOMS  Refills: 5       !! - Potential duplicate medications found. Please discuss with provider. 2.   Follow-up Information    None       3. Return to ED if worse   4. Current Discharge Medication List            Diagnosis     Clinical Impression: No diagnosis found. Attestations:    Ulises Jang MD    Please note that this dictation was completed with [employer], the [employer] voice recognition software. Quite often unanticipated grammatical, syntax, homophones, and other interpretive errors are inadvertently transcribed by the computer software. Please disregard these errors. Please excuse any errors that have escaped final proofreading. Thank you.

## 2021-06-09 LAB — SARS-COV-2, COV2NT: NOT DETECTED

## 2021-06-11 ENCOUNTER — VIRTUAL VISIT (OUTPATIENT)
Dept: PRIMARY CARE CLINIC | Age: 66
End: 2021-06-11
Payer: MEDICARE

## 2021-06-11 DIAGNOSIS — J31.0 CHRONIC RHINITIS: ICD-10-CM

## 2021-06-11 DIAGNOSIS — K21.9 GASTROESOPHAGEAL REFLUX DISEASE WITHOUT ESOPHAGITIS: ICD-10-CM

## 2021-06-11 DIAGNOSIS — J45.20 MILD INTERMITTENT ASTHMA WITHOUT COMPLICATION: ICD-10-CM

## 2021-06-11 DIAGNOSIS — J41.1 MUCOPURULENT CHRONIC BRONCHITIS (HCC): ICD-10-CM

## 2021-06-11 DIAGNOSIS — J44.1 COPD EXACERBATION (HCC): ICD-10-CM

## 2021-06-11 DIAGNOSIS — J01.01 ACUTE RECURRENT MAXILLARY SINUSITIS: Primary | ICD-10-CM

## 2021-06-11 DIAGNOSIS — E11.69 TYPE 2 DIABETES MELLITUS WITH OTHER SPECIFIED COMPLICATION, WITHOUT LONG-TERM CURRENT USE OF INSULIN (HCC): ICD-10-CM

## 2021-06-11 DIAGNOSIS — E11.9 TYPE 2 DIABETES MELLITUS WITHOUT COMPLICATIONS (HCC): ICD-10-CM

## 2021-06-11 PROCEDURE — 99214 OFFICE O/P EST MOD 30 MIN: CPT | Performed by: FAMILY MEDICINE

## 2021-06-11 RX ORDER — FLUTICASONE FUROATE AND VILANTEROL 100; 25 UG/1; UG/1
1 POWDER RESPIRATORY (INHALATION) DAILY
Qty: 3 INHALER | Refills: 1 | Status: SHIPPED | OUTPATIENT
Start: 2021-06-11 | End: 2021-09-17 | Stop reason: ALTCHOICE

## 2021-06-11 RX ORDER — LORATADINE 10 MG/1
10 TABLET ORAL DAILY
Qty: 90 TABLET | Refills: 1 | Status: SHIPPED | OUTPATIENT
Start: 2021-06-11 | End: 2021-07-22 | Stop reason: SDUPTHER

## 2021-06-11 RX ORDER — METFORMIN HYDROCHLORIDE 500 MG/1
500 TABLET ORAL 2 TIMES DAILY WITH MEALS
Qty: 180 TABLET | Refills: 1 | Status: SHIPPED | OUTPATIENT
Start: 2021-06-11 | End: 2021-11-14

## 2021-06-11 RX ORDER — MOMETASONE FUROATE 1 MG/G
OINTMENT TOPICAL DAILY
COMMUNITY
Start: 2021-05-23 | End: 2022-06-22 | Stop reason: SDUPTHER

## 2021-06-11 NOTE — PROGRESS NOTES
Radha Christopher (: 1955) is a 73 y.o. female, established patient, here for evaluation of the following chief complaint(s):   Sinus Infection, COPD, and Diabetes       ASSESSMENT/PLAN:  Below is the assessment and plan developed based on review of pertinent labs, studies, and medications. 1. Acute recurrent maxillary sinusitis  2. COPD exacerbation ([facility] [address]  3. Type 2 diabetes mellitus with other specified complication, without long-term current use of insulin ([facility] [address]  Assessment & Plan:   at goal, continue current medications pending work up below, lifestyle modifications recommended  4. Gastroesophageal reflux disease without esophagitis  5. Chronic rhinitis  6. Mucopurulent chronic bronchitis (HCC)  -     fluticasone furoate-vilanteroL (BREO ELLIPTA) 100-25 mcg/dose inhaler; Take 1 Puff by inhalation daily. , Normal, Disp-3 Inhaler, R-1  7. Mild intermittent asthma without complication  -     loratadine (CLARITIN) 10 mg tablet; Take 1 Tablet by mouth daily. , Normal, Disp-90 Tablet, R-1DX: J45.3  8. Type 2 diabetes mellitus without complications (HCC)  -     metFORMIN (GLUCOPHAGE) 500 mg tablet; Take 1 Tablet by mouth two (2) times daily (with meals). , Normal, Disp-180 Tablet, R-1      No follow-ups on file. SUBJECTIVE/OBJECTIVE:  This patient was recently see in the ED for sinusitis and exacerbation of her COPD. She was treated with Azithromycin and her Prednisone increased to 20 mg a day for 5 days. Her sugar went up but is now back down. Took extra Metformin and needs this refilled and also needs her Breo Ellipta and loratadine refilled. Symptoms have resolved and she feels well. Has an appointment to see her ENT doctor next week. Review of Systems   Constitutional: Negative. HENT: Positive for congestion, postnasal drip and rhinorrhea. Respiratory: Positive for shortness of breath and wheezing.          Patient-Reported Vitals 3/12/2021   Patient-Reported Pulse 109   Patient-Reported Temperature 98.4   Patient-Reported SpO2 94% on 2 liters nc   Patient-Reported Systolic  292   Patient-Reported Diastolic 70       Physical Exam  Constitutional:       Appearance: Normal appearance. Pulmonary:      Effort: Pulmonary effort is normal.   Neurological:      Mental Status: She is alert. She appears in no distress and I feel her treatment was adequate. She will see her ENT soon and he will address any residual sinus problems. Sugars are in good control and her blood pressure was normal at the time of the ER visit. Pamella Cha, was evaluated through a synchronous (real-time) audio-video encounter. The patient (or guardian if applicable) is aware that this is a billable service. Verbal consent to proceed has been obtained within the past 12 months. The visit was conducted pursuant to the emergency declaration under the [address] [place] [address] authority and the [employer] General Act. Patient identification was verified, and a caregiver was present when appropriate. The patient was located in a state where the provider was credentialed to provide care. An electronic signature was used to authenticate this note.   -- Dain Gann MD

## 2021-06-11 NOTE — PATIENT INSTRUCTIONS
Chronic Obstructive Pulmonary Disease (COPD): Care Instructions Your Care Instructions Chronic obstructive pulmonary disease (COPD) is a general term for a group of lung diseases, including emphysema and chronic bronchitis. People with COPD have decreased airflow in and out of the lungs, which makes it hard to breathe. The airways also can get clogged with thick mucus. Cigarette smoking is a major cause of COPD. Although there is no cure for COPD, you can slow its progress. Following your treatment plan and taking care of yourself can help you feel better and live longer. Follow-up care is a key part of your treatment and safety. Be sure to make and go to all appointments, and call your doctor if you are having problems. It's also a good idea to know your test results and keep a list of the medicines you take. How can you care for yourself at home? Staying healthy 
  · Do not smoke. This is the most important step you can take to prevent more damage to your lungs. If you need help quitting, talk to your doctor about stop-smoking programs and medicines. These can increase your chances of quitting for good.  
  · Avoid colds and flu. Get a pneumococcal vaccine shot. If you have had one before, ask your doctor whether you need a second dose. Get the flu vaccine every fall. If you must be around people with colds or the flu, wash your hands often.  
  · Avoid secondhand smoke, air pollution, and high altitudes. Also avoid cold, dry air and hot, humid air. Stay at home with your windows closed when air pollution is bad. Medicines and oxygen therapy 
  · Take your medicines exactly as prescribed. Call your doctor if you think you are having a problem with your medicine. You may be taking medicines such as: 
? Bronchodilators. These help open your airways and make breathing easier. They are either short-acting (work for 6 to 9 hours) or long-acting (work for 24 hours).  You inhale most bronchodilators, so they start to act quickly. Always carry your quick-relief inhaler with you in case you need it while you are away from home. ? Corticosteroids (prednisone, budesonide). These reduce airway inflammation. They come in pill or inhaled form. You must take these medicines every day for them to work well.  
  · Ask your doctor or pharmacist if a spacer is right for you. A spacer may help you get more inhaled medicine to your lungs. If you use one, ask how to use it properly.  
  · Do not take any vitamins, over-the-counter medicine, or herbal products without talking to your doctor first.  
  · If your doctor prescribed antibiotics, take them as directed. Do not stop taking them just because you feel better. You need to take the full course of antibiotics.  
  · If you use oxygen therapy, use the flow rate your doctor has recommended. Don't change it without talking to your doctor first. Oxygen therapy boosts the amount of oxygen in your blood and helps you breathe easier. Activity 
  · Get regular exercise. Walking is an easy way to get exercise. Start out slowly, and walk a little more each day.  
  · Pay attention to your breathing. You are exercising too hard if you can't talk while you exercise.  
  · Take short rest breaks when doing household chores and other activities.  
  · Learn breathing methodssuch as breathing through pursed lipsto help you become less short of breath.  
  · If your doctor has not set you up with a pulmonary rehabilitation program, ask if rehab is right for you. Rehab includes exercise programs, education about your disease and how to manage it, help with diet and other changes, and emotional support. Diet 
  · Eat regular, healthy meals. Use bronchodilators about 1 hour before you eat to make it easier to eat. Eat several small meals instead of three large ones.  Drink beverages at the end of the meal. Avoid foods that are hard to chew.  
  · Eat foods that contain protein so you don't lose muscle mass.  
  · Talk with your doctor if you gain too much weight or if you lose weight without trying. Mental health 
  · Talk to your family, friends, or a therapist about your feelings. Some people feel frightened, angry, hopeless, helpless, and even guilty. Talking openly about bad feelings can help you cope. If these feelings last, talk to your doctor. When should you call for help? Call 911 anytime you think you may need emergency care. For example, call if: 
  · You have severe trouble breathing. Call your doctor now or seek immediate medical care if: 
  · You have new or worse trouble breathing.  
  · You cough up blood.  
  · You have a fever. Watch closely for changes in your health, and be sure to contact your doctor if: 
  · You cough more deeply or more often, especially if you notice more mucus or a change in the color of your mucus.  
  · You have new or worse swelling in your legs or belly.  
  · You are not getting better as expected. Where can you learn more? Go to http://www.[employer]/ Nathalie Archuleta in the search box to learn more about \"Chronic Obstructive Pulmonary Disease (COPD): Care Instructions. \" Current as of: October 26, 2020               Content Version: 12.8 © 2006-2021 [employer]. Care instructions adapted under license by [employer] (which disclaims liability or warranty for this information). If you have questions about a medical condition or this instruction, always ask your healthcare professional. [place] [address] any warranty or liability for your use of this information. Sinusitis: Care Instructions Your Care Instructions Sinusitis is an infection of the lining of the sinus cavities in your head. Sinusitis often follows a cold. It causes pain and pressure in your head and face. In most cases, sinusitis gets better on its own in 1 to 2 weeks.  But some mild symptoms may last for several weeks. Sometimes antibiotics are needed. Follow-up care is a key part of your treatment and safety. Be sure to make and go to all appointments, and call your doctor if you are having problems. It's also a good idea to know your test results and keep a list of the medicines you take. How can you care for yourself at home? · Take an over-the-counter pain medicine, such as acetaminophen (Tylenol), ibuprofen (Advil, Motrin), or naproxen (Aleve). Read and follow all instructions on the label. · If the doctor prescribed antibiotics, take them as directed. Do not stop taking them just because you feel better. You need to take the full course of antibiotics. · Be careful when taking over-the-counter cold or flu medicines and Tylenol at the same time. Many of these medicines have acetaminophen, which is Tylenol. Read the labels to make sure that you are not taking more than the recommended dose. Too much acetaminophen (Tylenol) can be harmful. · Breathe warm, moist air from a steamy shower, a hot bath, or a sink filled with hot water. Avoid cold, dry air. Using a humidifier in your home may help. Follow the directions for cleaning the machine. · Use saline (saltwater) nasal washes. This can help keep your nasal passages open and wash out mucus and bacteria. You can buy saline nose drops at a grocery store or drugstore. Or you can make your own at home by adding 1 teaspoon of salt and 1 teaspoon of baking soda to 2 cups of distilled water. If you make your own, fill a bulb syringe with the solution, insert the tip into your nostril, and squeeze gently. [place] your nose. · Put a hot, wet towel or a warm gel pack on your face 3 or 4 times a day for 5 to 10 minutes each time. · Try a decongestant nasal spray like oxymetazoline (Afrin). Do not use it for more than 3 days in a row. Using it for more than 3 days can make your congestion worse. When should you call for help?  
 Call your doctor now or seek immediate medical care if: 
  · You have new or worse swelling or redness in your face or around your eyes.  
  · You have a new or higher fever. Watch closely for changes in your health, and be sure to contact your doctor if: 
  · You have new or worse facial pain.  
  · The mucus from your nose becomes thicker (like pus) or has new blood in it.  
  · You are not getting better as expected. Where can you learn more? Go to http://www.gray.com/ Enter F547 in the search box to learn more about \"Sinusitis: Care Instructions. \" Current as of: December 2, 2020               Content Version: 12.8 © 0984-3536 [employer]. Care instructions adapted under license by [employer] (which disclaims liability or warranty for this information). If you have questions about a medical condition or this instruction, always ask your healthcare professional. [address] any warranty or liability for your use of this information.

## 2021-06-21 DIAGNOSIS — J44.1 COPD EXACERBATION (HCC): Primary | ICD-10-CM

## 2021-06-22 DIAGNOSIS — I10 ESSENTIAL HYPERTENSION: ICD-10-CM

## 2021-06-22 NOTE — TELEPHONE ENCOUNTER
Requested Prescriptions     Pending Prescriptions Disp Refills    amLODIPine (NORVASC) 10 mg tablet 30 Tablet 2

## 2021-06-23 ENCOUNTER — TELEPHONE (OUTPATIENT)
Dept: PRIMARY CARE CLINIC | Age: 66
End: 2021-06-23

## 2021-06-23 DIAGNOSIS — J44.9 CHRONIC OBSTRUCTIVE PULMONARY DISEASE, UNSPECIFIED (HCC): ICD-10-CM

## 2021-06-25 RX ORDER — AMLODIPINE BESYLATE 10 MG/1
10 TABLET ORAL DAILY
Qty: 90 TABLET | Refills: 1 | Status: SHIPPED | OUTPATIENT
Start: 2021-06-25 | End: 2022-02-03 | Stop reason: SDUPTHER

## 2021-06-25 RX ORDER — FLUTICASONE PROPIONATE 50 MCG
SPRAY, SUSPENSION (ML) NASAL
Qty: 1 BOTTLE | Refills: 2 | Status: SHIPPED | OUTPATIENT
Start: 2021-06-25 | End: 2021-08-14

## 2021-06-25 RX ORDER — ALBUTEROL SULFATE 90 UG/1
AEROSOL, METERED RESPIRATORY (INHALATION)
Qty: 25.5 INHALER | Refills: 1 | Status: SHIPPED | OUTPATIENT
Start: 2021-06-25 | End: 2021-09-17 | Stop reason: SDUPTHER

## 2021-06-25 RX ORDER — ALBUTEROL SULFATE 0.83 MG/ML
SOLUTION RESPIRATORY (INHALATION)
Qty: 360 ML | Refills: 1 | Status: SHIPPED | OUTPATIENT
Start: 2021-06-25 | End: 2021-10-07 | Stop reason: SDUPTHER

## 2021-06-28 NOTE — TELEPHONE ENCOUNTER
Patient was checking on the Nebulizer solution which I informed her was sent in to [facility] on 06/25/2021 with the other RX's that she has received. I informed her to check with them re: this.

## 2021-07-06 DIAGNOSIS — I10 ESSENTIAL (PRIMARY) HYPERTENSION: ICD-10-CM

## 2021-07-06 RX ORDER — FUROSEMIDE 40 MG/1
40 TABLET ORAL DAILY
Qty: 90 TABLET | Refills: 1 | Status: SHIPPED | OUTPATIENT
Start: 2021-07-06 | End: 2021-07-09 | Stop reason: SDUPTHER

## 2021-07-06 NOTE — TELEPHONE ENCOUNTER
Requested Prescriptions     Pending Prescriptions Disp Refills    furosemide (LASIX) 40 mg tablet 90 Tablet 1

## 2021-07-09 ENCOUNTER — TELEPHONE (OUTPATIENT)
Dept: PRIMARY CARE CLINIC | Age: 66
End: 2021-07-09

## 2021-07-09 DIAGNOSIS — I10 ESSENTIAL (PRIMARY) HYPERTENSION: ICD-10-CM

## 2021-07-09 NOTE — TELEPHONE ENCOUNTER
Requested Prescriptions     Pending Prescriptions Disp Refills    furosemide (LASIX) 40 mg tablet 90 Tablet 1     Sig: Take 1 Tablet by mouth daily.

## 2021-07-12 RX ORDER — FUROSEMIDE 40 MG/1
40 TABLET ORAL DAILY
Qty: 90 TABLET | Refills: 1 | Status: SHIPPED | OUTPATIENT
Start: 2021-07-12 | End: 2021-11-19

## 2021-07-16 DIAGNOSIS — I10 ESSENTIAL (PRIMARY) HYPERTENSION: ICD-10-CM

## 2021-07-16 RX ORDER — LISINOPRIL 20 MG/1
TABLET ORAL
Qty: 90 TABLET | Refills: 1 | Status: SHIPPED | OUTPATIENT
Start: 2021-07-16 | End: 2021-11-19

## 2021-07-16 NOTE — TELEPHONE ENCOUNTER
LVM for patient that I did not see a request from [facility] for a refill for her Lisinopril but I would send one to Dr. Lokesh Benitez and she could check with [facility] a little later on today.

## 2021-07-16 NOTE — TELEPHONE ENCOUNTER
Patient called about her lisinopril that [facility] has contacted Dr. Inés Estes about. [facility] does not have the prescription. Patient would like a call back.

## 2021-07-22 DIAGNOSIS — J45.20 MILD INTERMITTENT ASTHMA WITHOUT COMPLICATION: ICD-10-CM

## 2021-07-22 NOTE — TELEPHONE ENCOUNTER
Requested Prescriptions     Pending Prescriptions Disp Refills    loratadine (CLARITIN) 10 mg tablet 90 Tablet 1     Sig: Take 1 Tablet by mouth daily.

## 2021-07-23 RX ORDER — LORATADINE 10 MG/1
10 TABLET ORAL DAILY
Qty: 90 TABLET | Refills: 1 | Status: SHIPPED | OUTPATIENT
Start: 2021-07-23 | End: 2021-11-04 | Stop reason: ALTCHOICE

## 2021-08-03 PROBLEM — K21.9 LARYNGOPHARYNGEAL REFLUX (LPR): Status: RESOLVED | Noted: 2021-01-26 | Resolved: 2021-08-03

## 2021-08-17 ENCOUNTER — HOSPITAL ENCOUNTER (EMERGENCY)
Age: 66
Discharge: HOME | End: 2021-08-17
Attending: EMERGENCY MEDICINE | Admitting: EMERGENCY MEDICINE
Payer: MEDICARE

## 2021-08-17 ENCOUNTER — APPOINTMENT (OUTPATIENT)
Dept: GENERAL RADIOLOGY | Age: 66
End: 2021-08-17
Attending: EMERGENCY MEDICINE
Payer: MEDICARE

## 2021-08-17 VITALS
TEMPERATURE: 49.6 F | OXYGEN SATURATION: 98 % | HEIGHT: 67 IN | WEIGHT: 176 LBS | HEART RATE: 108 BPM | SYSTOLIC BLOOD PRESSURE: 132 MMHG | RESPIRATION RATE: 18 BRPM | DIASTOLIC BLOOD PRESSURE: 77 MMHG | BODY MASS INDEX: 27.62 KG/M2

## 2021-08-17 DIAGNOSIS — J44.1 COPD EXACERBATION (HCC): Primary | ICD-10-CM

## 2021-08-17 DIAGNOSIS — K21.9 GASTROESOPHAGEAL REFLUX DISEASE, UNSPECIFIED WHETHER ESOPHAGITIS PRESENT: ICD-10-CM

## 2021-08-17 DIAGNOSIS — R07.89 ATYPICAL CHEST PAIN: ICD-10-CM

## 2021-08-17 LAB
ALBUMIN SERPL-MCNC: 4 G/DL (ref 3.5–5)
ALBUMIN/GLOB SERPL: 1.2 {RATIO} (ref 1.1–2.2)
ALP SERPL-CCNC: 90 U/L (ref 45–117)
ALT SERPL-CCNC: 41 U/L (ref 12–78)
ANION GAP SERPL CALC-SCNC: 3 MMOL/L (ref 5–15)
AST SERPL W P-5'-P-CCNC: 32 U/L (ref 15–37)
BASOPHILS # BLD: 0 K/UL (ref 0–0.2)
BASOPHILS NFR BLD: 0 % (ref 0–2.5)
BILIRUB SERPL-MCNC: 0.6 MG/DL (ref 0.2–1)
BNP SERPL-MCNC: 35 PG/ML
BUN SERPL-MCNC: 17 MG/DL (ref 6–20)
BUN/CREAT SERPL: 17 (ref 12–20)
CA-I BLD-MCNC: 9.5 MG/DL (ref 8.5–10.1)
CHLORIDE SERPL-SCNC: 90 MMOL/L (ref 97–108)
CO2 SERPL-SCNC: 33 MMOL/L (ref 21–32)
CREAT SERPL-MCNC: 1.03 MG/DL (ref 0.55–1.02)
EOSINOPHIL # BLD: 0 K/UL (ref 0–0.7)
EOSINOPHIL NFR BLD: 1 % (ref 0.9–2.9)
ERYTHROCYTE [DISTWIDTH] IN BLOOD BY AUTOMATED COUNT: 14 % (ref 11.5–14.5)
GLOBULIN SER CALC-MCNC: 3.4 G/DL (ref 2–4)
GLUCOSE SERPL-MCNC: 131 MG/DL (ref 65–100)
HCT VFR BLD AUTO: 29.3 % (ref 36–46)
HGB BLD-MCNC: 10.5 G/DL (ref 13.5–17.5)
LYMPHOCYTES # BLD: 0.5 K/UL (ref 1–4.8)
LYMPHOCYTES NFR BLD: 7 % (ref 20.5–51.1)
MCH RBC QN AUTO: 32.7 PG (ref 31–34)
MCHC RBC AUTO-ENTMCNC: 35.7 G/DL (ref 31–36)
MCV RBC AUTO: 91.8 FL (ref 80–100)
MONOCYTES # BLD: 0.5 K/UL (ref 0.2–2.4)
MONOCYTES NFR BLD: 7 % (ref 1.7–9.3)
NEUTS SEG # BLD: 6.9 K/UL (ref 1.8–7.7)
NEUTS SEG NFR BLD: 85 % (ref 42–75)
NRBC # BLD: 0 K/UL
NRBC BLD-RTO: 0 PER 100 WBC
PLATELET # BLD AUTO: 261 K/UL (ref 150–400)
PMV BLD AUTO: 7.4 FL (ref 6.5–11.5)
POTASSIUM SERPL-SCNC: 4.5 MMOL/L (ref 3.5–5.1)
PROT SERPL-MCNC: 7.4 G/DL (ref 6.4–8.2)
RBC # BLD AUTO: 3.19 M/UL (ref 4.5–5.9)
SODIUM SERPL-SCNC: 126 MMOL/L (ref 136–145)
TROPONIN I SERPL-MCNC: <0.05 NG/ML
WBC # BLD AUTO: 8 K/UL (ref 4.4–11.3)

## 2021-08-17 PROCEDURE — 80053 COMPREHEN METABOLIC PANEL: CPT

## 2021-08-17 PROCEDURE — 71045 X-RAY EXAM CHEST 1 VIEW: CPT

## 2021-08-17 PROCEDURE — 93005 ELECTROCARDIOGRAM TRACING: CPT

## 2021-08-17 PROCEDURE — 74011250637 HC RX REV CODE- 250/637: Performed by: EMERGENCY MEDICINE

## 2021-08-17 PROCEDURE — 83880 ASSAY OF NATRIURETIC PEPTIDE: CPT

## 2021-08-17 PROCEDURE — 74011000250 HC RX REV CODE- 250: Performed by: EMERGENCY MEDICINE

## 2021-08-17 PROCEDURE — 99285 EMERGENCY DEPT VISIT HI MDM: CPT

## 2021-08-17 PROCEDURE — 94640 AIRWAY INHALATION TREATMENT: CPT

## 2021-08-17 PROCEDURE — 84484 ASSAY OF TROPONIN QUANT: CPT

## 2021-08-17 PROCEDURE — 85025 COMPLETE CBC W/AUTO DIFF WBC: CPT

## 2021-08-17 RX ORDER — SULFAMETHOXAZOLE AND TRIMETHOPRIM 800; 160 MG/1; MG/1
1 TABLET ORAL
Status: COMPLETED | OUTPATIENT
Start: 2021-08-17 | End: 2021-08-17

## 2021-08-17 RX ORDER — MAG HYDROX/ALUMINUM HYD/SIMETH 200-200-20
30 SUSPENSION, ORAL (FINAL DOSE FORM) ORAL
Status: DISCONTINUED | OUTPATIENT
Start: 2021-08-17 | End: 2021-08-17 | Stop reason: HOSPADM

## 2021-08-17 RX ORDER — SULFAMETHOXAZOLE AND TRIMETHOPRIM 800; 160 MG/1; MG/1
1 TABLET ORAL 2 TIMES DAILY
Qty: 6 TABLET | Refills: 0 | Status: SHIPPED | OUTPATIENT
Start: 2021-08-17 | End: 2021-08-20

## 2021-08-17 RX ORDER — IPRATROPIUM BROMIDE AND ALBUTEROL SULFATE 2.5; .5 MG/3ML; MG/3ML
3 SOLUTION RESPIRATORY (INHALATION)
Status: COMPLETED | OUTPATIENT
Start: 2021-08-17 | End: 2021-08-17

## 2021-08-17 RX ADMIN — SULFAMETHOXAZOLE AND TRIMETHOPRIM 1 TABLET: 800; 160 TABLET ORAL at 14:54

## 2021-08-17 RX ADMIN — MAGNESIUM HYDROXIDE/ALUMINUM HYDROXICE/SIMETHICONE 30 ML: 120; 1200; 1200 SUSPENSION ORAL at 14:10

## 2021-08-17 RX ADMIN — IPRATROPIUM BROMIDE AND ALBUTEROL SULFATE 3 ML: .5; 2.5 SOLUTION RESPIRATORY (INHALATION) at 13:26

## 2021-08-17 NOTE — ED NOTES
Dr. Natalee Womack reviewed discharge instructions with the patient. The patient verbalized understanding. All questions and concerns were addressed. The patient is discharged via wheelchair in the care of family members with instructions and prescriptions in hand. Pt is alert and oriented x 4. Respirations are clear and unlabored.

## 2021-08-17 NOTE — ED TRIAGE NOTES
Pt reports substernal chest tightness, sore throat x yesterday.  Pt denies nay other complaints at this itme    PMHX COPD-- chronic 2 LPM via NC.

## 2021-08-17 NOTE — ED PROVIDER NOTES
EMERGENCY DEPARTMENT HISTORY AND PHYSICAL EXAM      Date: 8/17/2021  Patient Name: Miki Mayo    History of Presenting Illness     Chief Complaint   Patient presents with    Chest Pain       History Provided By: Patient    HPI: Miki Mayo, 73 y.o. female with a past medical history significant diabetes, hypertension and COPD and multiple medical problems listed below presents to the ED with cc of chest tightness which began with coughing spells last night sensation of recurrence of her GERD symptoms with throat irritation on the left side. No true chest pain coughing and dyspnea improved with nebulizer treatment this morning. Vaccinated for Covid x2 denies fever cough is productive of clear sputum only. Currently on prednisone. There are no other complaints, changes, or physical findings at this time. PCP: Jhonny Joe MD    No current facility-administered medications on file prior to encounter. Current Outpatient Medications on File Prior to Encounter   Medication Sig Dispense Refill    fluticasone propionate (FLONASE) 50 mcg/actuation nasal spray SPRAY 2 SPRAYS INTO EACH NOSTRIL EVERY DAY 3 Bottle 1    loratadine (CLARITIN) 10 mg tablet Take 1 Tablet by mouth daily. 90 Tablet 1    lisinopriL (PRINIVIL, ZESTRIL) 20 mg tablet TAKE 1 TABLET BY MOUTH EVERY DAY 90 Tablet 1    furosemide (LASIX) 40 mg tablet Take 1 Tablet by mouth daily. 90 Tablet 1    albuterol (PROVENTIL VENTOLIN) 2.5 mg /3 mL (0.083 %) nebu albuterol sulfate 2.5 mg/3 mL (0.083 %) solution for nebulization  USE ONE VIAL VIA NEBULIZER EVERY EIGHT HOURS AS NEEDED 360 mL 1    amLODIPine (NORVASC) 10 mg tablet Take 1 Tablet by mouth daily. 90 Tablet 1    ProAir HFA 90 mcg/actuation inhaler TAKE 2 PUFFS BY MOUTH EVERY 4 HOURS 25.5 Inhaler 1    mometasone (ELOCON) 0.1 % ointment       fluticasone furoate-vilanteroL (BREO ELLIPTA) 100-25 mcg/dose inhaler Take 1 Puff by inhalation daily.  3 Inhaler 1    metFORMIN (GLUCOPHAGE) 500 mg tablet Take 1 Tablet by mouth two (2) times daily (with meals). 180 Tablet 1    azithromycin (Zithromax TRI-HECTOR) 500 mg tab 1 tab p.o. daily for 3 days 3 Tablet 0    sucralfate (CARAFATE) 1 gram tablet TAKE 1 TABLET BY MOUTH FOUR TIMES A  Tablet 3    pantoprazole (PROTONIX) 40 mg tablet Take 1 Tablet by mouth Before breakfast and dinner. Indications: inflammation of the esophagus with erosion, gastroesophageal reflux disease 60 Tablet 5    predniSONE (DELTASONE) 10 mg tablet TAKE 10 MG BY MOUTH DAILY. 90 Tab 1    docusate sodium (COLACE) 100 mg capsule TAKE 1 CAP BY MOUTH 2 TIMES A DAY FOR 90 DAYS. 60 Cap 2    guaiFENesin ER (MUCINEX) 600 mg ER tablet Take 1 Tab by mouth two (2) times a day. 60 Tab 2    aspirin delayed-release 81 mg tablet TAKE 1 TABLET BY MOUTH EVERY DAY 90 Tab 1    Alcohol Prep Pads padm 1 PAD BY APPLY EXTERNALLY ROUTE DAILY. USE TO CHECK BLOOD SUGAR DAILY. 100 Pad 1    True Metrix Glucose Test Strip strip USE ONE STRIP DAILY TO TEST BLOOD GLUCOSE SUBCUTANEOUSLY. E11.9 100 Strip 1    ergocalciferol (ERGOCALCIFEROL) 1,250 mcg (50,000 unit) capsule TAKE ONE CAPSULE BY MOUTH ONCE A WEEK INDICATIONS: VITAMIN D DEFICIENCY (HIGH DOSE THERAPY) 12 Cap 1    azelastine (ASTELIN) 137 mcg (0.1 %) nasal spray 2 Sprays by Both Nostrils route nightly. 1 Bottle 5    hydrOXYzine HCL (ATARAX) 25 mg tablet Take 1 Tab by mouth three (3) times daily as needed for Itching. 60 Tab 1    atorvastatin (LIPITOR) 20 mg tablet Take 1 Tab by mouth daily. 90 Tab 1    ipratropium (ATROVENT) 42 mcg (0.06 %) nasal spray 2 SPRAYS BY BOTH NOSTRILS ROUTE FOUR (4) TIMES DAILY AS NEEDED FOR RHINITIS (POST NASAL DRAINAGE). 15 mL 3    Ultra Thin Lancets 30 gauge misc USE ONCE LANCET PER DAY TO CHECK BLOOD SUGAR.  lidocaine (LIDODERM) 5 % APPLY 1 PATCH EVERY DAY. REMOVE AFTER 12 HOURS      zolpidem (AMBIEN) 5 mg tablet nightly as needed.  Only as needed      pantoprazole (PROTONIX) 40 mg tablet Take 40 mg by mouth Before breakfast and dinner.  montelukast (SINGULAIR) 10 mg tablet TAKE 1 TABLET BY MOUTH EVERY DAY FOR ALLERGY SYMPTOMS  5       Past History     Past Medical History:  Past Medical History:   Diagnosis Date    Arthritis     Bronchitis 9/1/2020    Chronic obstructive pulmonary disease (HCC)     Chronic pain     Diabetes ([address]     Gastroesophageal reflux disease without esophagitis 9/1/2020    GERD (gastroesophageal reflux disease)     History of multiple allergies     HTN (hypertension) 9/1/2020    Hypertension     Intermittent asthma 9/1/2020    Lung disorder     PATIENT IS ON OXYGEN    Menopause     Pain of upper abdomen 9/1/2020    Seasonal allergic rhinitis 9/1/2020    Sinus problem     Vitamin D deficiency 9/1/2020       Past Surgical History:  Past Surgical History:   Procedure Laterality Date    COLONOSCOPY N/A 5/25/2020    COLONOSCOPY performed by Little Genao MD at [facility]    HX COLONOSCOPY      HX GI      colonscopy    HX ORTHOPAEDIC  10/24/2018    Total Right Hip Arthroplasty Dr. Meliza Escobar. Family History:  Family History   Problem Relation Age of Onset    Hypertension Mother     Cancer Sister        Social History:  Social History     Tobacco Use    Smoking status: Former Smoker    Smokeless tobacco: Never Used   Substance Use Topics    Alcohol use: Yes     Alcohol/week: 3.0 standard drinks     Types: 3 Cans of beer per week    Drug use: No       Allergies:  No Known Allergies      Review of Systems   Review of all other systems negative  Review of Systems    Physical Exam   Is an elderly AA female no acute distress habitus of COPD mild dyspnea  Physical Exam  Vitals and nursing note reviewed. Constitutional:       General: She is not in acute distress. Appearance: Normal appearance. She is not ill-appearing, toxic-appearing or diaphoretic. HENT:      Head: Normocephalic and atraumatic. Right Ear: Tympanic membrane normal.      Left Ear: Tympanic membrane normal.      Nose: Nose normal.      Mouth/Throat:      Mouth: Mucous membranes are moist.   Eyes:      Extraocular Movements: Extraocular movements intact. Conjunctiva/sclera: Conjunctivae normal.      Pupils: Pupils are equal, round, and reactive to light. Cardiovascular:      Rate and Rhythm: Normal rate and regular rhythm. Pulses: Normal pulses. Carotid pulses are 2+ on the right side and 2+ on the left side. Radial pulses are 2+ on the right side and 2+ on the left side. Dorsalis pedis pulses are 2+ on the right side and 2+ on the left side. Posterior tibial pulses are 2+ on the right side and 2+ on the left side. Heart sounds: Normal heart sounds. No murmur heard. Pulmonary:      Effort: Pulmonary effort is normal. No respiratory distress. Breath sounds: Examination of the right-middle field reveals wheezing. Examination of the left-middle field reveals wheezing. Examination of the right-lower field reveals wheezing. Examination of the left-lower field reveals wheezing. Wheezing present. No decreased breath sounds, rhonchi or rales. Chest:      Chest wall: No tenderness or edema. Abdominal:      General: Abdomen is flat. Palpations: There is no mass. Tenderness: There is no abdominal tenderness. There is no right CVA tenderness, left CVA tenderness, guarding or rebound. Hernia: No hernia is present. Musculoskeletal:         General: No tenderness, deformity or signs of injury. Normal range of motion. Cervical back: Normal range of motion and neck supple. No rigidity. No muscular tenderness. Right lower leg: No edema. Left lower leg: No edema. Skin:     General: Skin is warm. Findings: No erythema or rash. Neurological:      General: No focal deficit present. Mental Status: She is alert and oriented to person, place, and time. Cranial Nerves: No cranial nerve deficit. Sensory: No sensory deficit. Motor: No weakness. Psychiatric:         Mood and Affect: Mood normal.         Behavior: Behavior normal.         Thought Content: Thought content normal.         Lab and Diagnostic Study Results     Labs -     Recent Results (from the past 12 hour(s))   EKG, 12 LEAD, INITIAL    Collection Time: 08/17/21 12:56 PM   Result Value Ref Range    Ventricular Rate 107 BPM    Atrial Rate 107 BPM    P-R Interval 170 ms    QRS Duration 82 ms    Q-T Interval 317 ms    QTC Calculation (Bezet) 423 ms    Calculated P Axis 82 degrees    Calculated R Axis 65 degrees    Calculated T Axis 62 degrees    Diagnosis       Sinus tachycardia  RSR' in V1 or V2, right VCD or RVH     CBC WITH AUTOMATED DIFF    Collection Time: 08/17/21  1:20 PM   Result Value Ref Range    WBC 8.0 4.4 - 11.3 K/uL    RBC 3.19 (L) 4.50 - 5.90 M/uL    HGB 10.5 (L) 13.5 - 17.5 g/dL    HCT 29.3 (L) 36 - 46 %    MCV 91.8 80 - 100 FL    MCH 32.7 31 - 34 PG    MCHC 35.7 31.0 - 36.0 g/dL    RDW 14.0 11.5 - 14.5 %    PLATELET 076 309 - 671 K/uL    MPV 7.4 6.5 - 11.5 FL    NRBC 0.0  WBC    ABSOLUTE NRBC 0.00 K/uL    NEUTROPHILS 85 (H) 42 - 75 %    LYMPHOCYTES 7 (L) 20.5 - 51.1 %    MONOCYTES 7 1.7 - 9.3 %    EOSINOPHILS 1 0.9 - 2.9 %    BASOPHILS 0 0.0 - 2.5 %    ABS. NEUTROPHILS 6.9 1.8 - 7.7 K/UL    ABS. LYMPHOCYTES 0.5 (L) 1.0 - 4.8 K/UL    ABS. MONOCYTES 0.5 0.2 - 2.4 K/UL    ABS. EOSINOPHILS 0.0 0.0 - 0.7 K/UL    ABS.  BASOPHILS 0.0 0.0 - 0.2 K/UL   METABOLIC PANEL, COMPREHENSIVE    Collection Time: 08/17/21  1:20 PM   Result Value Ref Range    Sodium 126 (L) 136 - 145 mmol/L    Potassium 4.5 3.5 - 5.1 mmol/L    Chloride 90 (L) 97 - 108 mmol/L    CO2 33 (H) 21 - 32 mmol/L    Anion gap 3 (L) 5 - 15 mmol/L    Glucose 131 (H) 65 - 100 mg/dL    BUN 17 6 - 20 mg/dL    Creatinine 1.03 (H) 0.55 - 1.02 mg/dL    BUN/Creatinine ratio 17 12 - 20      GFR est AA >60 >60 ml/min/1.73m2    GFR est non-AA 54 (L) >60 ml/min/1.73m2    Calcium 9.5 8.5 - 10.1 mg/dL    Bilirubin, total 0.6 0.2 - 1.0 mg/dL    AST (SGOT) 32 15 - 37 U/L    ALT (SGPT) 41 12 - 78 U/L    Alk. phosphatase 90 45 - 117 U/L    Protein, total 7.4 6.4 - 8.2 g/dL    Albumin 4.0 3.5 - 5.0 g/dL    Globulin 3.4 2.0 - 4.0 g/dL    A-G Ratio 1.2 1.1 - 2.2     TROPONIN I    Collection Time: 08/17/21  1:20 PM   Result Value Ref Range    Troponin-I, Qt. <0.05 <0.05 ng/mL   BNP    Collection Time: 08/17/21  1:20 PM   Result Value Ref Range    NT pro-BNP 35 <125 pg/mL     EKG performed 1256 interpreted 1259 shows a sinus tachycardia 107 no acute ST-T changes there is an RSR prime pattern in V1 V2 WA interval is normal QRS duration is normal QRS axis is normal interpretation sinus tachycardia without acute finding radiologic Studies -   @lastxrresult@  CT Results  (Last 48 hours)    None        CXR Results  (Last 48 hours)               08/17/21 1324  XR CHEST PORT Final result    Narrative:  Chest single view. Comparison single view chest June 8, 2021. Lungs clear; no interstitial or alveolar pulmonary edema. Cardiac silhouette   within normal limits. Atherosclerotic change thoracic aorta with likely grade   vessel ectasia similar compared to prior imaging. No pneumothorax or sizable   pleural effusion. Medical Decision Making   - I am the first provider for this patient. - I reviewed the vital signs, available nursing notes, past medical history, past surgical history, family history and social history. - Initial assessment performed. The patients presenting problems have been discussed, and they are in agreement with the care plan formulated and outlined with them. I have encouraged them to ask questions as they arise throughout their visit. Vital Signs-Reviewed the patient's vital signs.   Patient Vitals for the past 12 hrs:   Temp Pulse Resp BP SpO2   08/17/21 1326 -- -- -- -- 100 %   08/17/21 1251 (!) 49.6 °F (9.8 °C) (!) 104 22 (!) 152/82 98 %       Records Reviewed: Nursing Notes and Old Medical Records    The patient presents with chest tightness with a differential diagnosis of angina ACS exacerbation of COPD GERD reflux chest irritation from coughing chest wall pain      ED Course: Patient given nebulizer treatment shortly after arrival with marked improvement of symptoms she now feels well and requests discharge she still has minor throat irritation her and her  both states she has chronic reflux when she has coughing spells through the night given dose of Maalox here will plan for discharge troponin is negative labs         Provider Notes (Medical Decision Making):   Very atypical pain with a combination of GERD symptoms and exacerbation COPD troponin is negative. Feeling better after single nebulizer treatment will discharge to home to return if symptoms worsen  MDM       Procedures   Medical Decision Makingedical Decision Making  Performed by: Nurys Frye MD  PROCEDURES:  Procedures       Disposition   Disposition: Condition unchanged and stable  DC- Adult Discharges: All of the diagnostic tests were reviewed and questions answered. Diagnosis, care plan and treatment options were discussed. The patient understands the instructions and will follow up as directed. The patients results have been reviewed with them. They have been counseled regarding their diagnosis. The patient verbally convey understanding and agreement of the signs, symptoms, diagnosis, treatment and prognosis and additionally agrees to follow up as recommended with their PCP in 24 - 48 hours. They also agree with the care-plan and convey that all of their questions have been answered.   I have also put together some discharge instructions for them that include: 1) educational information regarding their diagnosis, 2) how to care for their diagnosis at home, as well a 3) list of reasons why they would want to return to the ED prior to their follow-up appointment, should their condition change. DISCHARGE PLAN:  1. Current Discharge Medication List      CONTINUE these medications which have NOT CHANGED    Details   fluticasone propionate (FLONASE) 50 mcg/actuation nasal spray SPRAY 2 SPRAYS INTO EACH NOSTRIL EVERY DAY  Qty: 3 Bottle, Refills: 1      loratadine (CLARITIN) 10 mg tablet Take 1 Tablet by mouth daily. Qty: 90 Tablet, Refills: 1    Comments: DX: J45.3  Associated Diagnoses: Mild intermittent asthma without complication      lisinopriL (PRINIVIL, ZESTRIL) 20 mg tablet TAKE 1 TABLET BY MOUTH EVERY DAY  Qty: 90 Tablet, Refills: 1    Associated Diagnoses: Essential (primary) hypertension      furosemide (LASIX) 40 mg tablet Take 1 Tablet by mouth daily. Qty: 90 Tablet, Refills: 1    Associated Diagnoses: Essential (primary) hypertension      albuterol (PROVENTIL VENTOLIN) 2.5 mg /3 mL (0.083 %) nebu albuterol sulfate 2.5 mg/3 mL (0.083 %) solution for nebulization  USE ONE VIAL VIA NEBULIZER EVERY EIGHT HOURS AS NEEDED  Qty: 360 mL, Refills: 1    Associated Diagnoses: COPD exacerbation (HCC)      amLODIPine (NORVASC) 10 mg tablet Take 1 Tablet by mouth daily. Qty: 90 Tablet, Refills: 1    Associated Diagnoses: Essential hypertension      ProAir HFA 90 mcg/actuation inhaler TAKE 2 PUFFS BY MOUTH EVERY 4 HOURS  Qty: 25.5 Inhaler, Refills: 1    Comments: DX Code Needed  REFILL REQUEST. Associated Diagnoses: Chronic obstructive pulmonary disease, unspecified (HCC)      mometasone (ELOCON) 0.1 % ointment       fluticasone furoate-vilanteroL (BREO ELLIPTA) 100-25 mcg/dose inhaler Take 1 Puff by inhalation daily. Qty: 3 Inhaler, Refills: 1    Associated Diagnoses: Mucopurulent chronic bronchitis (HCC)      metFORMIN (GLUCOPHAGE) 500 mg tablet Take 1 Tablet by mouth two (2) times daily (with meals).   Qty: 180 Tablet, Refills: 1    Associated Diagnoses: Type 2 diabetes mellitus without complications (HCC)      azithromycin (Zithromax TRI-HECTOR) 500 mg tab 1 tab p.o. daily for 3 days  Qty: 3 Tablet, Refills: 0      sucralfate (CARAFATE) 1 gram tablet TAKE 1 TABLET BY MOUTH FOUR TIMES A DAY  Qty: 120 Tablet, Refills: 3    Associated Diagnoses: Epigastric pain      !! pantoprazole (PROTONIX) 40 mg tablet Take 1 Tablet by mouth Before breakfast and dinner. Indications: inflammation of the esophagus with erosion, gastroesophageal reflux disease  Qty: 60 Tablet, Refills: 5    Associated Diagnoses: Gastroesophageal reflux disease with esophagitis without hemorrhage      predniSONE (DELTASONE) 10 mg tablet TAKE 10 MG BY MOUTH DAILY. Qty: 90 Tab, Refills: 1    Associated Diagnoses: Chronic obstructive pulmonary disease, unspecified ([facility])      docusate sodium (COLACE) 100 mg capsule TAKE 1 CAP BY MOUTH 2 TIMES A DAY FOR 90 DAYS. Qty: 60 Cap, Refills: 2    Associated Diagnoses: Constipation, unspecified      guaiFENesin ER (MUCINEX) 600 mg ER tablet Take 1 Tab by mouth two (2) times a day. Qty: 60 Tab, Refills: 2      aspirin delayed-release 81 mg tablet TAKE 1 TABLET BY MOUTH EVERY DAY  Qty: 90 Tab, Refills: 1    Associated Diagnoses: Other chest pain      Alcohol Prep Pads padm 1 PAD BY APPLY EXTERNALLY ROUTE DAILY. USE TO CHECK BLOOD SUGAR DAILY. Qty: 100 Pad, Refills: 1    Associated Diagnoses: Type 2 diabetes mellitus without complication, without long-term current use of insulin ([facility])      True Metrix Glucose Test Strip strip USE ONE STRIP DAILY TO TEST BLOOD GLUCOSE SUBCUTANEOUSLY. E11.9  Qty: 100 Strip, Refills: 1    Comments: DX Code Needed  .   Associated Diagnoses: Controlled type 2 diabetes mellitus without complication, without long-term current use of insulin ([facility])      ergocalciferol (ERGOCALCIFEROL) 1,250 mcg (50,000 unit) capsule TAKE ONE CAPSULE BY MOUTH ONCE A WEEK INDICATIONS: VITAMIN D DEFICIENCY (HIGH DOSE THERAPY)  Qty: 12 Cap, Refills: 1      azelastine (ASTELIN) 137 mcg (0.1 %) nasal spray 2 Sprays by Both Nostrils route nightly. Qty: 1 Bottle, Refills: 5    Associated Diagnoses: Seasonal allergic rhinitis, unspecified trigger      hydrOXYzine HCL (ATARAX) 25 mg tablet Take 1 Tab by mouth three (3) times daily as needed for Itching. Qty: 60 Tab, Refills: 1    Associated Diagnoses: Generalized pruritus      atorvastatin (LIPITOR) 20 mg tablet Take 1 Tab by mouth daily. Qty: 90 Tab, Refills: 1    Associated Diagnoses: Hyperlipidemia, unspecified hyperlipidemia type      ipratropium (ATROVENT) 42 mcg (0.06 %) nasal spray 2 SPRAYS BY BOTH NOSTRILS ROUTE FOUR (4) TIMES DAILY AS NEEDED FOR RHINITIS (POST NASAL DRAINAGE). Qty: 15 mL, Refills: 3    Associated Diagnoses: Seasonal allergic rhinitis, unspecified trigger      Ultra Thin Lancets 30 gauge misc USE ONCE LANCET PER DAY TO CHECK BLOOD SUGAR.      lidocaine (LIDODERM) 5 % APPLY 1 PATCH EVERY DAY. REMOVE AFTER 12 HOURS      zolpidem (AMBIEN) 5 mg tablet nightly as needed. Only as needed      !! pantoprazole (PROTONIX) 40 mg tablet Take 40 mg by mouth Before breakfast and dinner. montelukast (SINGULAIR) 10 mg tablet TAKE 1 TABLET BY MOUTH EVERY DAY FOR ALLERGY SYMPTOMS  Refills: 5       !! - Potential duplicate medications found. Please discuss with provider. 2.   Follow-up Information    None       3. Return to ED if worse   4. Current Discharge Medication List            Diagnosis     Clinical Impression: Exacerbation of COPD atypical chest tightness  Attestations:    Shahriar Lafleur MD    Please note that this dictation was completed with Fl[employer], the computer voice recognition software. Quite often unanticipated grammatical, syntax, homophones, and other interpretive errors are inadvertently transcribed by the computer software. Please disregard these errors. Please excuse any errors that have escaped final proofreading. Thank you.

## 2021-08-18 LAB
ATRIAL RATE: 107 BPM
CALCULATED P AXIS, ECG09: 82 DEGREES
CALCULATED R AXIS, ECG10: 65 DEGREES
CALCULATED T AXIS, ECG11: 62 DEGREES
DIAGNOSIS, 93000: NORMAL
P-R INTERVAL, ECG05: 170 MS
Q-T INTERVAL, ECG07: 317 MS
QRS DURATION, ECG06: 82 MS
QTC CALCULATION (BEZET), ECG08: 423 MS
VENTRICULAR RATE, ECG03: 107 BPM

## 2021-08-27 ENCOUNTER — OFFICE VISIT (OUTPATIENT)
Dept: PRIMARY CARE CLINIC | Age: 66
End: 2021-08-27
Payer: MEDICARE

## 2021-08-27 VITALS
RESPIRATION RATE: 18 BRPM | DIASTOLIC BLOOD PRESSURE: 67 MMHG | HEART RATE: 106 BPM | TEMPERATURE: 97.5 F | BODY MASS INDEX: 26.94 KG/M2 | SYSTOLIC BLOOD PRESSURE: 124 MMHG | OXYGEN SATURATION: 98 % | WEIGHT: 172 LBS

## 2021-08-27 DIAGNOSIS — J01.11 ACUTE RECURRENT FRONTAL SINUSITIS: ICD-10-CM

## 2021-08-27 DIAGNOSIS — J44.9 CHRONIC OBSTRUCTIVE PULMONARY DISEASE, UNSPECIFIED COPD TYPE (HCC): ICD-10-CM

## 2021-08-27 DIAGNOSIS — J30.9 CHRONIC ALLERGIC RHINITIS: Primary | ICD-10-CM

## 2021-08-27 PROCEDURE — 99214 OFFICE O/P EST MOD 30 MIN: CPT | Performed by: NURSE PRACTITIONER

## 2021-08-27 RX ORDER — DOXYCYCLINE 100 MG/1
100 TABLET ORAL 2 TIMES DAILY
Qty: 20 TABLET | Refills: 0 | Status: SHIPPED | OUTPATIENT
Start: 2021-08-27 | End: 2021-09-06

## 2021-08-27 RX ORDER — MONTELUKAST SODIUM 10 MG/1
TABLET ORAL
Qty: 90 TABLET | Refills: 1 | Status: SHIPPED | OUTPATIENT
Start: 2021-08-27 | End: 2021-11-04 | Stop reason: SDUPTHER

## 2021-08-27 RX ORDER — IPRATROPIUM BROMIDE AND ALBUTEROL SULFATE 2.5; .5 MG/3ML; MG/3ML
SOLUTION RESPIRATORY (INHALATION)
COMMUNITY
Start: 2021-06-30 | End: 2021-09-17 | Stop reason: SDUPTHER

## 2021-08-27 RX ORDER — FLUTICASONE FUROATE, UMECLIDINIUM BROMIDE AND VILANTEROL TRIFENATATE 100; 62.5; 25 UG/1; UG/1; UG/1
1 POWDER RESPIRATORY (INHALATION) DAILY
COMMUNITY
Start: 2021-08-24

## 2021-08-27 NOTE — PROGRESS NOTES
Xander Partida is a 73 y.o. female who presents to the office today for the following:    Chief Complaint   Patient presents with    Allergies    Nasal Congestion       Past Medical History:   Diagnosis Date    Arthritis     Bronchitis 9/1/2020    Chronic obstructive pulmonary disease ([address]     Chronic pain     [facility] [address]     Gastroesophageal reflux disease without esophagitis 9/1/2020    GERD (gastroesophageal reflux disease)     History of multiple allergies     HTN (hypertension) 9/1/2020    Hypertension     Intermittent asthma 9/1/2020    Lung disorder     PATIENT IS ON OXYGEN    Menopause     Pain of upper abdomen 9/1/2020    Seasonal allergic rhinitis 9/1/2020    Sinus problem     Vitamin D deficiency 9/1/2020       Past Surgical History:   Procedure Laterality Date    COLONOSCOPY N/A 5/25/2020    COLON[facility] performed by Rip Severs, [facility] at [facility] ENDOSCOPY    HX COLONOSCOPY      HX GI      colonscopy    HX ORTHOPAEDIC  10/24/2018    Total Right Hip Arthroplasty Dr. Anirudh Guillaume. Family History   Problem Relation Age of Onset    Hypertension Mother     Cancer Sister         Social History     Tobacco Use    Smoking status: Former Smoker    Smokeless tobacco: Never Used   Substance Use Topics    Alcohol use: Yes     Alcohol/week: 3.0 standard drinks     Types: 3 Cans of beer per week    Drug use: No      HPI  Patient here with PMH of copd, chronic sinus, allergies, hypertension, hyperlipidemia, GERD, type 2 diabetes, insomnia , oxygen dependence and vitamin d deficiency who presents with complaint of postnasal drip and sinus congestion which has worsened over the past week. No fever, chest pain or increased shortness of breath. Appetite ok.     Current Outpatient Medications on File Prior to Visit   Medication Sig    Trelegy Ellipta 100-62.5-25 mcg inhaler     beclomethasone dipropionate (QVAR REDIHALER HFA) 80 mcg/actuation HFAb inhaler Qvar 80 mcg/actuation Metered Aerosol oral inhaler   TAKE ONE PUFF TWICE A DAY    albuterol-ipratropium (DUO-NEB) 2.5 mg-0.5 mg/3 ml nebu USE 1 VIAL EVERY 4 6 HOURS AS NEEDED. DX J44.9. NEED MED B INFO    fluticasone propionate (FLONASE) 50 mcg/actuation nasal spray SPRAY 2 SPRAYS INTO EACH NOSTRIL EVERY DAY    loratadine (CLARITIN) 10 mg tablet Take 1 Tablet by mouth daily.  lisinopriL (PRINIVIL, ZESTRIL) 20 mg tablet TAKE 1 TABLET BY MOUTH EVERY DAY    furosemide (LASIX) 40 mg tablet Take 1 Tablet by mouth daily.  albuterol (PROVENTIL VENTOLIN) 2.5 mg /3 mL (0.083 %) nebu albuterol sulfate 2.5 mg/3 mL (0.083 %) solution for nebulization  USE ONE VIAL VIA NEBULIZER EVERY EIGHT HOURS AS NEEDED    amLODIPine (NORVASC) 10 mg tablet Take 1 Tablet by mouth daily.  ProAir HFA 90 mcg/actuation inhaler TAKE 2 PUFFS BY MOUTH EVERY 4 HOURS    mometasone (ELOCON) 0.1 % ointment     fluticasone furoate-vilanteroL (BREO ELLIPTA) 100-25 mcg/dose inhaler Take 1 Puff by inhalation daily.  metFORMIN (GLUCOPHAGE) 500 mg tablet Take 1 Tablet by mouth two (2) times daily (with meals).  [DISCONTINUED] azithromycin (Zithromax TRI-HECTOR) 500 mg tab 1 tab p.o. daily for 3 days    sucralfate (CARAFATE) 1 gram tablet TAKE 1 TABLET BY MOUTH FOUR TIMES A DAY    pantoprazole (PROTONIX) 40 mg tablet Take 1 Tablet by mouth Before breakfast and dinner. Indications: inflammation of the esophagus with erosion, gastroesophageal reflux disease    docusate sodium (COLACE) 100 mg capsule TAKE 1 CAP BY MOUTH 2 TIMES A DAY FOR 90 DAYS.  [DISCONTINUED] predniSONE (DELTASONE) 10 mg tablet TAKE 10 MG BY MOUTH DAILY.  [DISCONTINUED] guaiFENesin ER (MUCINEX) 600 mg ER tablet Take 1 Tab by mouth two (2) times a day.  aspirin delayed-release 81 mg tablet TAKE 1 TABLET BY MOUTH EVERY DAY    Alcohol Prep Pads padm 1 PAD BY APPLY EXTERNALLY ROUTE DAILY. USE TO CHECK BLOOD SUGAR DAILY.     True Metrix Glucose Test Strip strip USE ONE STRIP DAILY TO TEST BLOOD GLUCOSE SUBCUTANEOUSLY. E11.9    ergocalciferol (ERGOCALCIFEROL) 1,250 mcg (50,000 unit) capsule TAKE ONE CAPSULE BY MOUTH ONCE A WEEK INDICATIONS: VITAMIN D DEFICIENCY (HIGH DOSE THERAPY)    azelastine (ASTELIN) 137 mcg (0.1 %) nasal spray 2 Sprays by Both Nostrils route nightly.  hydrOXYzine HCL (ATARAX) 25 mg tablet Take 1 Tab by mouth three (3) times daily as needed for Itching.  atorvastatin (LIPITOR) 20 mg tablet Take 1 Tab by mouth daily.  [DISCONTINUED] ipratropium (ATROVENT) 42 mcg (0.06 %) nasal spray 2 SPRAYS BY BOTH NOSTRILS ROUTE FOUR (4) TIMES DAILY AS NEEDED FOR RHINITIS (POST NASAL DRAINAGE).  Ultra Thin Lancets 30 gauge misc USE ONCE LANCET PER DAY TO CHECK BLOOD SUGAR.  lidocaine (LIDODERM) 5 % APPLY 1 PATCH EVERY DAY. REMOVE AFTER 12 HOURS    zolpidem (AMBIEN) 5 mg tablet nightly as needed. Only as needed    pantoprazole (PROTONIX) 40 mg tablet Take 40 mg by mouth Before breakfast and dinner.  [DISCONTINUED] montelukast (SINGULAIR) 10 mg tablet TAKE 1 TABLET BY MOUTH EVERY DAY FOR ALLERGY SYMPTOMS     No current facility-administered medications on file prior to visit. Medications Ordered Today   Medications    montelukast (SINGULAIR) 10 mg tablet     Sig: TAKE 1 TABLET BY MOUTH EVERY DAY FOR ALLERGY SYMPTOMS     Dispense:  90 Tablet     Refill:  1    doxycycline (ADOXA) 100 mg tablet     Sig: Take 1 Tablet by mouth two (2) times a day for 10 days. Dispense:  20 Tablet     Refill:  0        Review of Systems   Constitutional: Negative for chills, diaphoresis, fever, malaise/fatigue and weight loss. HENT: Positive for congestion. Negative for ear discharge, ear pain, hearing loss and sore throat. Respiratory: Positive for cough (occasional ), sputum production, shortness of breath and wheezing. Negative for stridor. Cardiovascular: Negative. Gastrointestinal: Negative. Genitourinary: Negative.     Musculoskeletal: Positive for myalgias. Neurological: Negative. Visit Vitals  /67 (BP 1 Location: Left upper arm, BP Patient Position: Sitting, BP Cuff Size: Adult)   Pulse (!) 106   Temp 97.5 °F (36.4 °C) (Temporal)   Resp 18   Wt 172 lb (78 kg)   SpO2 98%   BMI 26.94 kg/m²       Physical Exam  Vitals and nursing note reviewed. Constitutional:       General: She is not in acute distress. Appearance: Normal appearance. She is not toxic-appearing. HENT:      Right Ear: Tympanic membrane normal.      Left Ear: Tympanic membrane normal.      Nose: Rhinorrhea present. Mouth/Throat:      Pharynx: No oropharyngeal exudate or posterior oropharyngeal erythema. Eyes:      Pupils: Pupils are equal, round, and reactive to light. Cardiovascular:      Rate and Rhythm: Normal rate. Pulses: Normal pulses. Heart sounds: Normal heart sounds. Pulmonary:      Effort: Pulmonary effort is normal.      Comments: diminished breath sounds bilaterally   Abdominal:      General: Bowel sounds are normal.      Palpations: Abdomen is soft. Tenderness: There is no abdominal tenderness. Musculoskeletal:         General: Normal range of motion. Lymphadenopathy:      Cervical: No cervical adenopathy. Skin:     General: Skin is warm and dry. Neurological:      Mental Status: She is alert. Gait: Gait abnormal.      Comments: Using walker   Psychiatric:         Mood and Affect: Mood normal.         Behavior: Behavior normal.            1. Chronic allergic rhinitis      2. Chronic obstructive pulmonary disease, unspecified COPD type ([address]      3. Acute recurrent sinusitis      Symptoms chronic in nature but recent worsening nasal congestion and sinus pressure  Will start on doxycycline as directed and advised to take until complete  Sees ENT and is on  flonase, azelestine and saline nasal rinses.   She uses humidfied oxygen  Encouraged to keep hydrated to thin mucus  Continue mucinex prn  She will notify provider if any change or worsening symptoms  Continue routine follow up with Dr. Gunner Morales and ENT for chronic conditions    Patient verbalizes understanding of plan of care as discussed above

## 2021-08-31 ENCOUNTER — TELEPHONE (OUTPATIENT)
Dept: PRIMARY CARE CLINIC | Age: 66
End: 2021-08-31

## 2021-08-31 NOTE — TELEPHONE ENCOUNTER
Yes. All antibiotics can risk of diarrhea and nausea. Should make sure she is taking with food. If diarrhea significant then want her to stop antibiotics.

## 2021-08-31 NOTE — TELEPHONE ENCOUNTER
Patient states that she started taking antibiotic on Saturday that was prescribed. She is now having diarrhea and nausea since yesterday. Unsure if it is coming  From antibiotic. Please advise.

## 2021-09-01 ENCOUNTER — HOSPITAL ENCOUNTER (EMERGENCY)
Age: 66
Discharge: HOME | End: 2021-09-01
Attending: EMERGENCY MEDICINE
Payer: MEDICARE

## 2021-09-01 ENCOUNTER — APPOINTMENT (OUTPATIENT)
Dept: GENERAL RADIOLOGY | Age: 66
End: 2021-09-01
Attending: EMERGENCY MEDICINE
Payer: MEDICARE

## 2021-09-01 VITALS
HEART RATE: 97 BPM | HEIGHT: 67 IN | SYSTOLIC BLOOD PRESSURE: 115 MMHG | BODY MASS INDEX: 26.68 KG/M2 | TEMPERATURE: 98.5 F | WEIGHT: 170 LBS | OXYGEN SATURATION: 98 % | RESPIRATION RATE: 18 BRPM | DIASTOLIC BLOOD PRESSURE: 79 MMHG

## 2021-09-01 DIAGNOSIS — R07.9 ACUTE CHEST PAIN: Primary | ICD-10-CM

## 2021-09-01 LAB
ALBUMIN SERPL-MCNC: 3.8 G/DL (ref 3.5–5)
ALBUMIN/GLOB SERPL: 1.1 {RATIO} (ref 1.1–2.2)
ALP SERPL-CCNC: 103 U/L (ref 45–117)
ALT SERPL-CCNC: 36 U/L (ref 12–78)
ANION GAP SERPL CALC-SCNC: 7 MMOL/L (ref 5–15)
AST SERPL W P-5'-P-CCNC: 35 U/L (ref 15–37)
BASOPHILS # BLD: 0 K/UL (ref 0–0.2)
BASOPHILS NFR BLD: 1 % (ref 0–2.5)
BILIRUB SERPL-MCNC: 0.4 MG/DL (ref 0.2–1)
BUN SERPL-MCNC: 10 MG/DL (ref 6–20)
BUN/CREAT SERPL: 11 (ref 12–20)
CA-I BLD-MCNC: 9.3 MG/DL (ref 8.5–10.1)
CHLORIDE SERPL-SCNC: 97 MMOL/L (ref 97–108)
CO2 SERPL-SCNC: 33 MMOL/L (ref 21–32)
CREAT SERPL-MCNC: 0.91 MG/DL (ref 0.55–1.02)
EOSINOPHIL # BLD: 0 K/UL (ref 0–0.7)
EOSINOPHIL NFR BLD: 1 % (ref 0.9–2.9)
ERYTHROCYTE [DISTWIDTH] IN BLOOD BY AUTOMATED COUNT: 14.1 % (ref 11.5–14.5)
GLOBULIN SER CALC-MCNC: 3.4 G/DL (ref 2–4)
GLUCOSE SERPL-MCNC: 134 MG/DL (ref 65–100)
HCT VFR BLD AUTO: 32.1 % (ref 36–46)
HGB BLD-MCNC: 10.7 G/DL (ref 13.5–17.5)
INR PPP: 1 (ref 0.9–1.1)
LYMPHOCYTES # BLD: 0.6 K/UL (ref 1–4.8)
LYMPHOCYTES NFR BLD: 9 % (ref 20.5–51.1)
MAGNESIUM SERPL-MCNC: 1.5 MG/DL (ref 1.6–2.4)
MCH RBC QN AUTO: 32.1 PG (ref 31–34)
MCHC RBC AUTO-ENTMCNC: 33.3 G/DL (ref 31–36)
MCV RBC AUTO: 96.4 FL (ref 80–100)
MONOCYTES # BLD: 0.3 K/UL (ref 0.2–2.4)
MONOCYTES NFR BLD: 5 % (ref 1.7–9.3)
NEUTS SEG # BLD: 6 K/UL (ref 1.8–7.7)
NEUTS SEG NFR BLD: 84 % (ref 42–75)
NRBC # BLD: 0.01 K/UL
NRBC BLD-RTO: 0.1 PER 100 WBC
PLATELET # BLD AUTO: 296 K/UL (ref 150–400)
PMV BLD AUTO: 7.9 FL (ref 6.5–11.5)
POTASSIUM SERPL-SCNC: 3.8 MMOL/L (ref 3.5–5.1)
PROT SERPL-MCNC: 7.2 G/DL (ref 6.4–8.2)
PROTHROMBIN TIME: 10.1 SEC (ref 9–11.1)
RBC # BLD AUTO: 3.33 M/UL (ref 4.5–5.9)
SODIUM SERPL-SCNC: 137 MMOL/L (ref 136–145)
TROPONIN I SERPL-MCNC: <0.05 NG/ML
TROPONIN I SERPL-MCNC: <0.05 NG/ML
WBC # BLD AUTO: 7 K/UL (ref 4.4–11.3)

## 2021-09-01 PROCEDURE — 74011250637 HC RX REV CODE- 250/637: Performed by: EMERGENCY MEDICINE

## 2021-09-01 PROCEDURE — 85025 COMPLETE CBC W/AUTO DIFF WBC: CPT

## 2021-09-01 PROCEDURE — 83735 ASSAY OF MAGNESIUM: CPT

## 2021-09-01 PROCEDURE — 93005 ELECTROCARDIOGRAM TRACING: CPT

## 2021-09-01 PROCEDURE — 99285 EMERGENCY DEPT VISIT HI MDM: CPT

## 2021-09-01 PROCEDURE — 71045 X-RAY EXAM CHEST 1 VIEW: CPT

## 2021-09-01 PROCEDURE — 36415 COLL VENOUS BLD VENIPUNCTURE: CPT

## 2021-09-01 PROCEDURE — 85610 PROTHROMBIN TIME: CPT

## 2021-09-01 PROCEDURE — 80053 COMPREHEN METABOLIC PANEL: CPT

## 2021-09-01 PROCEDURE — 84484 ASSAY OF TROPONIN QUANT: CPT

## 2021-09-01 RX ORDER — GUAIFENESIN 100 MG/5ML
324 LIQUID (ML) ORAL
Status: COMPLETED | OUTPATIENT
Start: 2021-09-01 | End: 2021-09-01

## 2021-09-01 RX ORDER — LANOLIN ALCOHOL/MO/W.PET/CERES
800 CREAM (GRAM) TOPICAL ONCE
Status: COMPLETED | OUTPATIENT
Start: 2021-09-01 | End: 2021-09-01

## 2021-09-01 RX ADMIN — MAGNESIUM OXIDE 800 MG: 400 TABLET ORAL at 15:45

## 2021-09-01 RX ADMIN — ASPIRIN 243 MG: 81 TABLET, CHEWABLE ORAL at 13:49

## 2021-09-01 NOTE — ED PROVIDER NOTES
EMERGENCY DEPARTMENT HISTORY AND PHYSICAL EXAM      Date: 9/1/2021  Patient Name: Daniel Ivory    History of Presenting Illness     Chief Complaint   Patient presents with    Chest Pain       History Provided By: Patient    HPI: Daniel Ivory, 73 y.o. female with a past medical history significant diabetes, hypertension and COPD presents to the ED with cc of chest tightness, intensity 4/10 started this morning while patient was bathing    There are no other complaints, changes, or physical findings at this time. PCP: Jazmyn Rockwell MD    No current facility-administered medications on file prior to encounter. Current Outpatient Medications on File Prior to Encounter   Medication Sig Dispense Refill    Trelegy Ellipta 100-62.5-25 mcg inhaler       beclomethasone dipropionate (QVAR REDIHALER HFA) 80 mcg/actuation HFAb inhaler Qvar 80 mcg/actuation Metered Aerosol oral inhaler   TAKE ONE PUFF TWICE A DAY      albuterol-ipratropium (DUO-NEB) 2.5 mg-0.5 mg/3 ml nebu USE 1 VIAL EVERY 4 6 HOURS AS NEEDED. DX J44.9. NEED MED B INFO      montelukast (SINGULAIR) 10 mg tablet TAKE 1 TABLET BY MOUTH EVERY DAY FOR ALLERGY SYMPTOMS 90 Tablet 1    doxycycline (ADOXA) 100 mg tablet Take 1 Tablet by mouth two (2) times a day for 10 days. 20 Tablet 0    fluticasone propionate (FLONASE) 50 mcg/actuation nasal spray SPRAY 2 SPRAYS INTO EACH NOSTRIL EVERY DAY 3 Bottle 1    loratadine (CLARITIN) 10 mg tablet Take 1 Tablet by mouth daily. 90 Tablet 1    lisinopriL (PRINIVIL, ZESTRIL) 20 mg tablet TAKE 1 TABLET BY MOUTH EVERY DAY 90 Tablet 1    furosemide (LASIX) 40 mg tablet Take 1 Tablet by mouth daily. 90 Tablet 1    albuterol (PROVENTIL VENTOLIN) 2.5 mg /3 mL (0.083 %) nebu albuterol sulfate 2.5 mg/3 mL (0.083 %) solution for nebulization  USE ONE VIAL VIA NEBULIZER EVERY EIGHT HOURS AS NEEDED 360 mL 1    amLODIPine (NORVASC) 10 mg tablet Take 1 Tablet by mouth daily.  90 Tablet 1    ProAir HFA 90 mcg/actuation inhaler TAKE 2 PUFFS BY MOUTH EVERY 4 HOURS 25.5 Inhaler 1    mometasone (ELOCON) 0.1 % ointment       fluticasone furoate-vilanteroL (BREO ELLIPTA) 100-25 mcg/dose inhaler Take 1 Puff by inhalation daily. 3 Inhaler 1    metFORMIN (GLUCOPHAGE) 500 mg tablet Take 1 Tablet by mouth two (2) times daily (with meals). 180 Tablet 1    sucralfate (CARAFATE) 1 gram tablet TAKE 1 TABLET BY MOUTH FOUR TIMES A  Tablet 3    pantoprazole (PROTONIX) 40 mg tablet Take 1 Tablet by mouth Before breakfast and dinner. Indications: inflammation of the esophagus with erosion, gastroesophageal reflux disease 60 Tablet 5    docusate sodium (COLACE) 100 mg capsule TAKE 1 CAP BY MOUTH 2 TIMES A DAY FOR 90 DAYS. 60 Cap 2    aspirin delayed-release 81 mg tablet TAKE 1 TABLET BY MOUTH EVERY DAY 90 Tab 1    Alcohol Prep Pads padm 1 PAD BY APPLY EXTERNALLY ROUTE DAILY. USE TO CHECK BLOOD SUGAR DAILY. 100 Pad 1    True Metrix Glucose Test Strip strip USE ONE STRIP DAILY TO TEST BLOOD GLUCOSE SUBCUTANEOUSLY. E11.9 100 Strip 1    ergocalciferol (ERGOCALCIFEROL) 1,250 mcg (50,000 unit) capsule TAKE ONE CAPSULE BY MOUTH ONCE A WEEK INDICATIONS: VITAMIN D DEFICIENCY (HIGH DOSE THERAPY) 12 Cap 1    azelastine (ASTELIN) 137 mcg (0.1 %) nasal spray 2 Sprays by Both Nostrils route nightly. 1 Bottle 5    hydrOXYzine HCL (ATARAX) 25 mg tablet Take 1 Tab by mouth three (3) times daily as needed for Itching. 60 Tab 1    atorvastatin (LIPITOR) 20 mg tablet Take 1 Tab by mouth daily. 90 Tab 1    Ultra Thin Lancets 30 gauge misc USE ONCE LANCET PER DAY TO CHECK BLOOD SUGAR.  lidocaine (LIDODERM) 5 % APPLY 1 PATCH EVERY DAY. REMOVE AFTER 12 HOURS      zolpidem (AMBIEN) 5 mg tablet nightly as needed. Only as needed      pantoprazole (PROTONIX) 40 mg tablet Take 40 mg by mouth Before breakfast and dinner.          Past History     Past Medical History:  Past Medical History:   Diagnosis Date    Arthritis     Bronchitis 9/1/2020    Chronic obstructive pulmonary disease (HCC)     Chronic pain     Diabetes (HCC)     Gastroesophageal reflux disease without esophagitis 9/1/2020    GERD (gastroesophageal reflux disease)     GERD (gastroesophageal reflux disease)     History of multiple allergies     HTN (hypertension) 9/1/2020    Hypertension     Intermittent asthma 9/1/2020    Lung disorder     PATIENT IS ON OXYGEN    Menopause     Pain of upper abdomen 9/1/2020    Seasonal allergic rhinitis 9/1/2020    Sinus problem     Vitamin D deficiency 9/1/2020       Past Surgical History:  Past Surgical History:   Procedure Laterality Date    COLONOSCOPY N/A 5/25/2020    [facility]asmyn Head [facility] ENDOSCOPY    HX COLONOSCOPY      HX GI      colonscopy    HX ORTHOPAEDIC  10/24/2018    Total Right Hip Arthroplasty Dr. Kate Ford. Family History:  Family History   Problem Relation Age of Onset    Hypertension Mother     Cancer Sister        Social History:  Social History     Tobacco Use    Smoking status: Former Smoker    Smokeless tobacco: Never Used   Substance Use Topics    Alcohol use: Yes     Alcohol/week: 3.0 standard drinks     Types: 3 Cans of beer per week    Drug use: No       Allergies:  No Known Allergies      Review of Systems     Review of Systems   Constitutional: Negative for chills and fever. HENT: Negative for rhinorrhea and sore throat. Eyes: Negative for pain and visual disturbance. Respiratory: Positive for chest tightness. Negative for cough and shortness of breath. Cardiovascular: Positive for chest pain. Gastrointestinal: Negative for abdominal pain and vomiting. Genitourinary: Negative for dysuria and urgency. Musculoskeletal: Negative for back pain and neck pain. Skin: Negative for color change. Neurological: Negative for weakness and numbness. Psychiatric/Behavioral: Negative.         Physical Exam     Physical Exam  Vitals and nursing note reviewed. Constitutional:       General: She is not in acute distress. Appearance: She is well-developed. She is not ill-appearing, toxic-appearing or diaphoretic. HENT:      Head: Normocephalic and atraumatic. Eyes:      Extraocular Movements: Extraocular movements intact. Pupils: Pupils are equal, round, and reactive to light. Cardiovascular:      Rate and Rhythm: Normal rate and regular rhythm. Heart sounds: Normal heart sounds. Pulmonary:      Effort: Pulmonary effort is normal.      Breath sounds: Normal breath sounds. Abdominal:      General: Bowel sounds are normal.      Palpations: Abdomen is soft. Musculoskeletal:         General: Normal range of motion. Cervical back: Normal range of motion and neck supple. Right lower leg: No tenderness. No edema. Left lower leg: No tenderness. No edema. Skin:     General: Skin is warm and dry. Capillary Refill: Capillary refill takes less than 2 seconds. Neurological:      General: No focal deficit present. Mental Status: She is alert and oriented to person, place, and time.    Psychiatric:         Mood and Affect: Mood normal.         Behavior: Behavior normal.         Lab and Diagnostic Study Results     Labs -     Recent Results (from the past 12 hour(s))   EKG, 12 LEAD, INITIAL    Collection Time: 09/01/21  1:14 PM   Result Value Ref Range    Ventricular Rate 104 BPM    Atrial Rate 106 BPM    P-R Interval 163 ms    QRS Duration 152 ms    Q-T Interval 311 ms    QTC Calculation (Bezet) 409 ms    Calculated P Axis 77 degrees    Calculated R Axis 68 degrees    Calculated T Axis 60 degrees    Diagnosis       Sinus tachycardia  Ventricular premature complex  Nonspecific intraventricular conduction delay         Radiologic Studies -   @lastxrresult@  CT Results  (Last 48 hours)    None        CXR Results  (Last 48 hours)    None            Medical Decision Making   - I am the first provider for this patient. - I reviewed the vital signs, available nursing notes, past medical history, past surgical history, family history and social history. - Initial assessment performed. The patients presenting problems have been discussed, and they are in agreement with the care plan formulated and outlined with them. I have encouraged them to ask questions as they arise throughout their visit. Vital Signs-Reviewed the patient's vital signs. Patient Vitals for the past 12 hrs:   Temp Pulse Resp BP SpO2   09/01/21 1325 -- -- -- -- 100 %   09/01/21 1313 98.5 °F (36.9 °C) (!) 103 18 138/81 100 %       Records Reviewed: Nursing Notes    The patient presents with chest pain with a differential diagnosis of  ACS, arrhythmia and pnuemonia      ED Course:          Provider Notes (Medical Decision Making): MDM       Procedures   Medical Decision Makingedical Decision Making  Performed by: Sally Mckeon MD  PROCEDURES:  Procedures       Disposition   Disposition: Condition stable and improved  DC- Adult Discharges: All of the diagnostic tests were reviewed and questions answered. Diagnosis, care plan and treatment options were discussed. The patient understands the instructions and will follow up as directed. The patients results have been reviewed with them. They have been counseled regarding their diagnosis. The patient verbally convey understanding and agreement of the signs, symptoms, diagnosis, treatment and prognosis and additionally agrees to follow up as recommended with their PCP in 24 - 48 hours. They also agree with the care-plan and convey that all of their questions have been answered.   I have also put together some discharge instructions for them that include: 1) educational information regarding their diagnosis, 2) how to care for their diagnosis at home, as well a 3) list of reasons why they would want to return to the ED prior to their follow-up appointment, should their condition change. DISCHARGE PLAN:  1. Current Discharge Medication List      CONTINUE these medications which have NOT CHANGED    Details   Radha Ellipta 100-62.5-25 mcg inhaler       beclomethasone dipropionate (QVAR REDIHALER HFA) 80 mcg/actuation HFAb inhaler Qvar 80 mcg/actuation Metered Aerosol oral inhaler   TAKE ONE PUFF TWICE A DAY      albuterol-ipratropium (DUO-NEB) 2.5 mg-0.5 mg/3 ml nebu USE 1 VIAL EVERY 4 6 HOURS AS NEEDED. DX J44.9. NEED MED B INFO      montelukast (SINGULAIR) 10 mg tablet TAKE 1 TABLET BY MOUTH EVERY DAY FOR ALLERGY SYMPTOMS  Qty: 90 Tablet, Refills: 1      doxycycline (ADOXA) 100 mg tablet Take 1 Tablet by mouth two (2) times a day for 10 days. Qty: 20 Tablet, Refills: 0    Associated Diagnoses: Acute recurrent frontal sinusitis      fluticasone propionate (FLONASE) 50 mcg/actuation nasal spray SPRAY 2 SPRAYS INTO EACH NOSTRIL EVERY DAY  Qty: 3 Bottle, Refills: 1      loratadine (CLARITIN) 10 mg tablet Take 1 Tablet by mouth daily. Qty: 90 Tablet, Refills: 1    Comments: DX: J45.3  Associated Diagnoses: Mild intermittent asthma without complication      lisinopriL (PRINIVIL, ZESTRIL) 20 mg tablet TAKE 1 TABLET BY MOUTH EVERY DAY  Qty: 90 Tablet, Refills: 1    Associated Diagnoses: Essential (primary) hypertension      furosemide (LASIX) 40 mg tablet Take 1 Tablet by mouth daily. Qty: 90 Tablet, Refills: 1    Associated Diagnoses: Essential (primary) hypertension      albuterol (PROVENTIL VENTOLIN) 2.5 mg /3 mL (0.083 %) nebu albuterol sulfate 2.5 mg/3 mL (0.083 %) solution for nebulization  USE ONE VIAL VIA NEBULIZER EVERY EIGHT HOURS AS NEEDED  Qty: 360 mL, Refills: 1    Associated Diagnoses: COPD exacerbation (HCC)      amLODIPine (NORVASC) 10 mg tablet Take 1 Tablet by mouth daily.   Qty: 90 Tablet, Refills: 1    Associated Diagnoses: Essential hypertension      ProAir HFA 90 mcg/actuation inhaler TAKE 2 PUFFS BY MOUTH EVERY 4 HOURS  Qty: 25.5 Inhaler, Refills: 1 Comments: DX Code Needed  REFILL REQUEST. Associated Diagnoses: Chronic obstructive pulmonary disease, unspecified ([facility])      mometasone (ELOCON) 0.1 % ointment       fluticasone furoate-vilanteroL (BREO ELLIPTA) 100-25 mcg/dose inhaler Take 1 Puff by inhalation daily. Qty: 3 Inhaler, Refills: 1    Associated Diagnoses: Mucopurulent chronic bronchitis ([facility])      metFORMIN (GLUCOPHAGE) 500 mg tablet Take 1 Tablet by mouth two (2) times daily (with meals). Qty: 180 Tablet, Refills: 1    Associated Diagnoses: Type 2 diabetes mellitus without complications ([facility])      sucralfate (CARAFATE) 1 gram tablet TAKE 1 TABLET BY MOUTH FOUR TIMES A DAY  Qty: 120 Tablet, Refills: 3    Associated Diagnoses: Epigastric pain      !! pantoprazole (PROTONIX) 40 mg tablet Take 1 Tablet by mouth Before breakfast and dinner. Indications: inflammation of the esophagus with erosion, gastroesophageal reflux disease  Qty: 60 Tablet, Refills: 5    Associated Diagnoses: Gastroesophageal reflux disease with esophagitis without hemorrhage      docusate sodium (COLACE) 100 mg capsule TAKE 1 CAP BY MOUTH 2 TIMES A DAY FOR 90 DAYS. Qty: 60 Cap, Refills: 2    Associated Diagnoses: Constipation, unspecified      aspirin delayed-release 81 mg tablet TAKE 1 TABLET BY MOUTH EVERY DAY  Qty: 90 Tab, Refills: 1    Associated Diagnoses: Other chest pain      Alcohol Prep Pads padm 1 PAD BY APPLY EXTERNALLY ROUTE DAILY. USE TO CHECK BLOOD SUGAR DAILY. Qty: 100 Pad, Refills: 1    Associated Diagnoses: Type 2 diabetes mellitus without complication, without long-term current use of insulin ([facility])      True Metrix Glucose Test Strip strip USE ONE STRIP DAILY TO TEST BLOOD GLUCOSE SUBCUTANEOUSLY. E11.9  Qty: 100 Strip, Refills: 1    Comments: DX Code Needed  .   Associated Diagnoses: Controlled type 2 diabetes mellitus without complication, without long-term current use of insulin ([facility])      ergocalciferol (ERGOCALCIFEROL) 1,250 mcg (50,000 unit) capsule TAKE ONE CAPSULE BY MOUTH ONCE A WEEK INDICATIONS: VITAMIN D DEFICIENCY (HIGH DOSE THERAPY)  Qty: 12 Cap, Refills: 1      azelastine (ASTELIN) 137 mcg (0.1 %) nasal spray 2 Sprays by Both Nostrils route nightly. Qty: 1 Bottle, Refills: 5    Associated Diagnoses: Seasonal allergic rhinitis, unspecified trigger      hydrOXYzine HCL (ATARAX) 25 mg tablet Take 1 Tab by mouth three (3) times daily as needed for Itching. Qty: 60 Tab, Refills: 1    Associated Diagnoses: Generalized pruritus      atorvastatin (LIPITOR) 20 mg tablet Take 1 Tab by mouth daily. Qty: 90 Tab, Refills: 1    Associated Diagnoses: Hyperlipidemia, unspecified hyperlipidemia type      Ultra Thin Lancets 30 gauge misc USE ONCE LANCET PER DAY TO CHECK BLOOD SUGAR.      lidocaine (LIDODERM) 5 % APPLY 1 PATCH EVERY DAY. REMOVE AFTER 12 HOURS      zolpidem (AMBIEN) 5 mg tablet nightly as needed. Only as needed      !! pantoprazole (PROTONIX) 40 mg tablet Take 40 mg by mouth Before breakfast and dinner. !! - Potential duplicate medications found. Please discuss with provider. 2.   Follow-up Information    None       3. Return to ED if worse   4. Current Discharge Medication List            Diagnosis     Clinical Impression: No diagnosis found. Attestations:    Sofi Palomares MD    Please note that this dictation was completed with [employer], the computer voice recognition software. Quite often unanticipated grammatical, syntax, homophones, and other interpretive errors are inadvertently transcribed by the computer software. Please disregard these errors. Please excuse any errors that have escaped final proofreading. Thank you.

## 2021-09-01 NOTE — ED NOTES
Pt given discharge and follow up instructions. Pt advised to follow up with PCP and cardio. Contact info given. No further questions at this time.

## 2021-09-02 LAB
ATRIAL RATE: 106 BPM
CALCULATED P AXIS, ECG09: 77 DEGREES
CALCULATED R AXIS, ECG10: 68 DEGREES
CALCULATED T AXIS, ECG11: 60 DEGREES
DIAGNOSIS, 93000: NORMAL
P-R INTERVAL, ECG05: 163 MS
Q-T INTERVAL, ECG07: 311 MS
QRS DURATION, ECG06: 152 MS
QTC CALCULATION (BEZET), ECG08: 409 MS
VENTRICULAR RATE, ECG03: 104 BPM

## 2021-09-07 ENCOUNTER — TELEPHONE (OUTPATIENT)
Dept: PRIMARY CARE CLINIC | Age: 66
End: 2021-09-07

## 2021-09-07 DIAGNOSIS — Z12.31 ENCOUNTER FOR SCREENING MAMMOGRAM FOR MALIGNANT NEOPLASM OF BREAST: Primary | ICD-10-CM

## 2021-09-07 DIAGNOSIS — E78.5 HYPERLIPIDEMIA, UNSPECIFIED HYPERLIPIDEMIA TYPE: ICD-10-CM

## 2021-09-07 RX ORDER — ATORVASTATIN CALCIUM 20 MG/1
TABLET, FILM COATED ORAL
Qty: 90 TABLET | Refills: 1 | Status: SHIPPED | OUTPATIENT
Start: 2021-09-07 | End: 2021-12-23 | Stop reason: SDUPTHER

## 2021-09-07 NOTE — TELEPHONE ENCOUNTER
Patient called and stated she needed an order for her mammogram which is due on 09/30/21. Patient would like a call back.

## 2021-09-17 ENCOUNTER — OFFICE VISIT (OUTPATIENT)
Dept: PRIMARY CARE CLINIC | Age: 66
End: 2021-09-17
Payer: MEDICARE

## 2021-09-17 VITALS
BODY MASS INDEX: 26.94 KG/M2 | RESPIRATION RATE: 18 BRPM | DIASTOLIC BLOOD PRESSURE: 73 MMHG | SYSTOLIC BLOOD PRESSURE: 135 MMHG | TEMPERATURE: 97.8 F | OXYGEN SATURATION: 98 % | HEART RATE: 103 BPM | WEIGHT: 172 LBS

## 2021-09-17 DIAGNOSIS — J44.9 CHRONIC OBSTRUCTIVE PULMONARY DISEASE, UNSPECIFIED (HCC): ICD-10-CM

## 2021-09-17 DIAGNOSIS — S40.021A HEMATOMA OF ARM, RIGHT, INITIAL ENCOUNTER: Primary | ICD-10-CM

## 2021-09-17 PROCEDURE — G8432 DEP SCR NOT DOC, RNG: HCPCS | Performed by: NURSE PRACTITIONER

## 2021-09-17 PROCEDURE — 1090F PRES/ABSN URINE INCON ASSESS: CPT | Performed by: NURSE PRACTITIONER

## 2021-09-17 PROCEDURE — G9899 SCRN MAM PERF RSLTS DOC: HCPCS | Performed by: NURSE PRACTITIONER

## 2021-09-17 PROCEDURE — 99212 OFFICE O/P EST SF 10 MIN: CPT | Performed by: NURSE PRACTITIONER

## 2021-09-17 PROCEDURE — 1101F PT FALLS ASSESS-DOCD LE1/YR: CPT | Performed by: NURSE PRACTITIONER

## 2021-09-17 PROCEDURE — G8399 PT W/DXA RESULTS DOCUMENT: HCPCS | Performed by: NURSE PRACTITIONER

## 2021-09-17 PROCEDURE — G8427 DOCREV CUR MEDS BY ELIG CLIN: HCPCS | Performed by: NURSE PRACTITIONER

## 2021-09-17 PROCEDURE — G8417 CALC BMI ABV UP PARAM F/U: HCPCS | Performed by: NURSE PRACTITIONER

## 2021-09-17 PROCEDURE — G8752 SYS BP LESS 140: HCPCS | Performed by: NURSE PRACTITIONER

## 2021-09-17 PROCEDURE — G8754 DIAS BP LESS 90: HCPCS | Performed by: NURSE PRACTITIONER

## 2021-09-17 PROCEDURE — 3017F COLORECTAL CA SCREEN DOC REV: CPT | Performed by: NURSE PRACTITIONER

## 2021-09-17 PROCEDURE — G8536 NO DOC ELDER MAL SCRN: HCPCS | Performed by: NURSE PRACTITIONER

## 2021-09-17 RX ORDER — ALBUTEROL SULFATE 90 UG/1
AEROSOL, METERED RESPIRATORY (INHALATION)
Qty: 1 EACH | Refills: 5 | Status: SHIPPED | OUTPATIENT
Start: 2021-09-17 | End: 2021-10-05 | Stop reason: ALTCHOICE

## 2021-09-17 RX ORDER — ALBUTEROL SULFATE 90 UG/1
AEROSOL, METERED RESPIRATORY (INHALATION)
Status: CANCELLED | OUTPATIENT
Start: 2021-09-17

## 2021-09-17 NOTE — PROGRESS NOTES
Fran Ortiz is a 73 y.o. female who presents to the office today for the following:    Chief Complaint   Patient presents with    Bleeding/Bruising       Past Medical History:   Diagnosis Date    Arthritis     Bronchitis 9/1/2020    Chronic obstructive pulmonary disease ([address]     Chronic pain     [facility] [facility]     Gastroesophageal reflux disease without esophagitis 9/1/2020    GERD (gastroesophageal reflux disease)     GERD (gastroesophageal reflux disease)     History of multiple allergies     HTN (hypertension) 9/1/2020    Hypertension     Intermittent asthma 9/1/2020    Lung disorder     PATIENT IS ON OXYGEN    Menopause     Pain of upper abdomen 9/1/2020    Seasonal allergic rhinitis 9/1/2020    Sinus problem     Vitamin D deficiency 9/1/2020       Past Surgical History:   Procedure Laterality Date    COLONOSCOPY N/A 5/25/2020    COLON[facility]COPY performed [facility] Marcelo Sifuentes [facility]COPY    HX COLONOSCOPY      HX GI      colonscopy    HX ORTHOPAEDIC  10/24/2018    Total Right Hip Arthroplasty Dr. Eliel Grace. Family History   Problem Relation Age of Onset    Hypertension Mother     Cancer Sister         Social History     Tobacco Use    Smoking status: Former Smoker    Smokeless tobacco: Never Used   Substance Use Topics    Alcohol use: Yes     Alcohol/week: 3.0 standard drinks     Types: 3 Cans of beer per week    Drug use: No        HPI  Patient here PMH of copd, chronic sinus, allergies, hypertension, hyperlipidemia, GERD, type 2 diabetes, insomnia , oxygen dependence and vitamin d deficiency who reports that about 1 week ago she woke up with a bruise and \"knot\" on her right arm. Thought something bit her due to it was itching and did not remember hitting it. Started putting warm compresses on it and now has gotten smaller along with bruising is fading.     Current Outpatient Medications on File Prior to Visit   Medication Sig    atorvastatin (LIPITOR) 20 mg tablet TAKE 1 TABLET BY MOUTH EVERY DAY    Trelegy Ellipta 100-62.5-25 mcg inhaler     montelukast (SINGULAIR) 10 mg tablet TAKE 1 TABLET BY MOUTH EVERY DAY FOR ALLERGY SYMPTOMS    [DISCONTINUED] beclomethasone dipropionate (QVAR REDIHALER HFA) 80 mcg/actuation HFAb inhaler Qvar 80 mcg/actuation Metered Aerosol oral inhaler   TAKE ONE PUFF TWICE A DAY    [DISCONTINUED] albuterol-ipratropium (DUO-NEB) 2.5 mg-0.5 mg/3 ml nebu USE 1 VIAL EVERY 4 6 HOURS AS NEEDED. DX J44.9. NEED MED B INFO    fluticasone propionate (FLONASE) 50 mcg/actuation nasal spray SPRAY 2 SPRAYS INTO EACH NOSTRIL EVERY DAY    loratadine (CLARITIN) 10 mg tablet Take 1 Tablet by mouth daily.  lisinopriL (PRINIVIL, ZESTRIL) 20 mg tablet TAKE 1 TABLET BY MOUTH EVERY DAY    furosemide (LASIX) 40 mg tablet Take 1 Tablet by mouth daily.  albuterol (PROVENTIL VENTOLIN) 2.5 mg /3 mL (0.083 %) nebu albuterol sulfate 2.5 mg/3 mL (0.083 %) solution for nebulization  USE ONE VIAL VIA NEBULIZER EVERY EIGHT HOURS AS NEEDED    amLODIPine (NORVASC) 10 mg tablet Take 1 Tablet by mouth daily.  [DISCONTINUED] ProAir HFA 90 mcg/actuation inhaler TAKE 2 PUFFS BY MOUTH EVERY 4 HOURS    mometasone (ELOCON) 0.1 % ointment     metFORMIN (GLUCOPHAGE) 500 mg tablet Take 1 Tablet by mouth two (2) times daily (with meals).  [DISCONTINUED] fluticasone furoate-vilanteroL (BREO ELLIPTA) 100-25 mcg/dose inhaler Take 1 Puff by inhalation daily.  sucralfate (CARAFATE) 1 gram tablet TAKE 1 TABLET BY MOUTH FOUR TIMES A DAY    pantoprazole (PROTONIX) 40 mg tablet Take 1 Tablet by mouth Before breakfast and dinner. Indications: inflammation of the esophagus with erosion, gastroesophageal reflux disease    docusate sodium (COLACE) 100 mg capsule TAKE 1 CAP BY MOUTH 2 TIMES A DAY FOR 90 DAYS.  aspirin delayed-release 81 mg tablet TAKE 1 TABLET BY MOUTH EVERY DAY    Alcohol Prep Pads padm 1 PAD BY APPLY EXTERNALLY ROUTE DAILY. USE TO CHECK BLOOD SUGAR DAILY.  True Metrix Glucose Test Strip strip USE ONE STRIP DAILY TO TEST BLOOD GLUCOSE SUBCUTANEOUSLY. E11.9    ergocalciferol (ERGOCALCIFEROL) 1,250 mcg (50,000 unit) capsule TAKE ONE CAPSULE BY MOUTH ONCE A WEEK INDICATIONS: VITAMIN D DEFICIENCY (HIGH DOSE THERAPY)    azelastine (ASTELIN) 137 mcg (0.1 %) nasal spray 2 Sprays by Both Nostrils route nightly.  hydrOXYzine HCL (ATARAX) 25 mg tablet Take 1 Tab by mouth three (3) times daily as needed for Itching.  Ultra Thin Lancets 30 gauge misc USE ONCE LANCET PER DAY TO CHECK BLOOD SUGAR.  lidocaine (LIDODERM) 5 % APPLY 1 PATCH EVERY DAY. REMOVE AFTER 12 HOURS    zolpidem (AMBIEN) 5 mg tablet nightly as needed. Only as needed    [DISCONTINUED] pantoprazole (PROTONIX) 40 mg tablet Take 40 mg by mouth Before breakfast and dinner. No current facility-administered medications on file prior to visit. Medications Ordered Today   Medications    albuterol (ProAir HFA) 90 mcg/actuation inhaler     Sig: TAKE 2 PUFFS BY MOUTH EVERY 4 HOURS     Dispense:  1 Each     Refill:  5     J44.1        Review of Systems   Constitutional: Negative for chills, diaphoresis, fever and malaise/fatigue. Respiratory: Positive for shortness of breath. Negative for cough and hemoptysis. Cardiovascular: Negative. Gastrointestinal: Negative. Genitourinary: Negative. Musculoskeletal: Positive for myalgias. Skin:        Knot on right arm          Visit Vitals  /73 (BP 1 Location: Left upper arm, BP Patient Position: Sitting, BP Cuff Size: Adult)   Pulse (!) 103   Temp 97.8 °F (36.6 °C) (Temporal)   Resp 18   Wt 172 lb (78 kg)   SpO2 98%   BMI 26.94 kg/m²       Physical Exam  Vitals and nursing note reviewed. Cardiovascular:      Rate and Rhythm: Normal rate. Pulses: Normal pulses. Pulmonary:      Effort: Pulmonary effort is normal.   Abdominal:      General: Bowel sounds are normal.      Palpations: Abdomen is soft. Tenderness: There is no abdominal tenderness. Skin:     Findings: Bruising (pen size nodule with mild purplish, red bruising surrounding) present. Neurological:      Mental Status: She is alert. Mental status is at baseline. 1. Hematoma of arm, right, initial encounter  This is resolving  Continue warm compresses several times daily   Follow up for persistent or worsening symptoms          Patient verbalizes understanding of plan of care as discussed above    Follow-up and Dispositions    · Return if symptoms worsen or fail to improve.

## 2021-10-01 ENCOUNTER — TELEPHONE (OUTPATIENT)
Dept: PRIMARY CARE CLINIC | Age: 66
End: 2021-10-01

## 2021-10-03 RX ORDER — ERGOCALCIFEROL 1.25 MG/1
CAPSULE ORAL
Qty: 12 CAPSULE | Refills: 1 | Status: SHIPPED | OUTPATIENT
Start: 2021-10-03 | End: 2022-02-28 | Stop reason: SDUPTHER

## 2021-10-05 ENCOUNTER — TELEPHONE (OUTPATIENT)
Dept: PRIMARY CARE CLINIC | Age: 66
End: 2021-10-05

## 2021-10-05 DIAGNOSIS — J44.1 COPD EXACERBATION (HCC): Primary | ICD-10-CM

## 2021-10-05 RX ORDER — ALBUTEROL SULFATE 90 UG/1
1 AEROSOL, METERED RESPIRATORY (INHALATION)
Qty: 18 G | Refills: 5 | Status: SHIPPED | OUTPATIENT
Start: 2021-10-05 | End: 2021-11-19

## 2021-10-05 NOTE — TELEPHONE ENCOUNTER
Patient requesting RX for different inhaler. Insurance does not cover ProAir HFA.     List of suggestions that the insurance company gave her (this was a struggle)    -albuterol sulfate -ipratropium inhalation solution  -albuterol sulfate oral syrup 2 mg  -albuterol sulfate inhalation

## 2021-10-06 ENCOUNTER — TELEPHONE (OUTPATIENT)
Dept: PRIMARY CARE CLINIC | Age: 66
End: 2021-10-06

## 2021-10-06 ENCOUNTER — TELEPHONE (OUTPATIENT)
Dept: BEHAVIORAL/MENTAL HEALTH CLINIC | Age: 66
End: 2021-10-06

## 2021-10-06 DIAGNOSIS — J41.1 MUCOPURULENT CHRONIC BRONCHITIS (HCC): Primary | ICD-10-CM

## 2021-10-06 DIAGNOSIS — I10 ESSENTIAL HYPERTENSION: ICD-10-CM

## 2021-10-06 RX ORDER — PREDNISONE 10 MG/1
10 TABLET ORAL DAILY
Qty: 90 TABLET | Refills: 1 | Status: SHIPPED | OUTPATIENT
Start: 2021-10-06 | End: 2021-11-04 | Stop reason: SDUPTHER

## 2021-10-06 RX ORDER — AMLODIPINE BESYLATE 10 MG/1
10 TABLET ORAL DAILY
Qty: 90 TABLET | Refills: 1 | Status: CANCELLED | OUTPATIENT
Start: 2021-10-06

## 2021-10-06 NOTE — TELEPHONE ENCOUNTER
I understand that she wants inhaler but please call pharmacy as I have sent order for plain generic abluterol inhaler. I did not send proair, ventolin,etc. Again she already has nebs which was put in the alternative recommendations. She likely may be in the donut hole with insurance also.

## 2021-10-06 NOTE — TELEPHONE ENCOUNTER
Amlodipine is the generic name. It should not be Norvasc or another brand name it should be just amlodipine. Please Clarify with [facility] as to what I am supposed to do.   I did send in the prednisone

## 2021-10-07 DIAGNOSIS — J44.1 COPD EXACERBATION (HCC): ICD-10-CM

## 2021-10-07 RX ORDER — ALBUTEROL SULFATE 0.83 MG/ML
SOLUTION RESPIRATORY (INHALATION)
Qty: 360 ML | Refills: 1 | Status: SHIPPED | OUTPATIENT
Start: 2021-10-07 | End: 2021-10-12

## 2021-10-10 ENCOUNTER — HOSPITAL ENCOUNTER (EMERGENCY)
Age: 66
Discharge: HOME | End: 2021-10-10
Attending: EMERGENCY MEDICINE
Payer: MEDICARE

## 2021-10-10 VITALS
RESPIRATION RATE: 20 BRPM | WEIGHT: 179 LBS | OXYGEN SATURATION: 100 % | HEIGHT: 66 IN | BODY MASS INDEX: 28.77 KG/M2 | DIASTOLIC BLOOD PRESSURE: 83 MMHG | HEART RATE: 113 BPM | TEMPERATURE: 98.7 F | SYSTOLIC BLOOD PRESSURE: 151 MMHG

## 2021-10-10 DIAGNOSIS — M54.41 BILATERAL LOW BACK PAIN WITH BILATERAL SCIATICA, UNSPECIFIED CHRONICITY: Primary | ICD-10-CM

## 2021-10-10 DIAGNOSIS — M54.42 BILATERAL LOW BACK PAIN WITH BILATERAL SCIATICA, UNSPECIFIED CHRONICITY: Primary | ICD-10-CM

## 2021-10-10 PROCEDURE — 74011250636 HC RX REV CODE- 250/636: Performed by: EMERGENCY MEDICINE

## 2021-10-10 PROCEDURE — 96372 THER/PROPH/DIAG INJ SC/IM: CPT

## 2021-10-10 PROCEDURE — 99283 EMERGENCY DEPT VISIT LOW MDM: CPT

## 2021-10-10 PROCEDURE — 99282 EMERGENCY DEPT VISIT SF MDM: CPT

## 2021-10-10 RX ORDER — IBUPROFEN 600 MG/1
600 TABLET ORAL
Qty: 20 TABLET | Refills: 0 | Status: SHIPPED | OUTPATIENT
Start: 2021-10-10 | End: 2021-11-04 | Stop reason: ALTCHOICE

## 2021-10-10 RX ORDER — KETOROLAC TROMETHAMINE 30 MG/ML
60 INJECTION, SOLUTION INTRAMUSCULAR; INTRAVENOUS
Status: COMPLETED | OUTPATIENT
Start: 2021-10-10 | End: 2021-10-10

## 2021-10-10 RX ADMIN — METHYLPREDNISOLONE SODIUM SUCCINATE 125 MG: 125 INJECTION, POWDER, FOR SOLUTION INTRAMUSCULAR; INTRAVENOUS at 13:46

## 2021-10-10 RX ADMIN — KETOROLAC TROMETHAMINE 60 MG: 30 INJECTION, SOLUTION INTRAMUSCULAR at 13:45

## 2021-10-10 NOTE — ED PROVIDER NOTES
EMERGENCY DEPARTMENT HISTORY AND PHYSICAL EXAM      Date: 10/10/2021  Patient Name: Sean Gerard    History of Presenting Illness     Chief Complaint   Patient presents with    Back Pain     Pt reports falling 2 fridays ago and has back pain ever since       History Provided By: Patient    HPI: Sean Gerard, 73 y.o. female with a past medical history significant Chronic lower back pain presents to the ED with cc of low back pain for 2 weeks patient states that her walker the brakes failed and she fell backwards onto her buttocks and 1 week ago she started to feel increased pain in her lower back with pain radiating to both hips, pain intensity 8/10, pain worsened with movement    There are no other complaints, changes, or physical findings at this time. PCP: Mao Yepez MD    No current facility-administered medications on file prior to encounter. Current Outpatient Medications on File Prior to Encounter   Medication Sig Dispense Refill    albuterol (PROVENTIL VENTOLIN) 2.5 mg /3 mL (0.083 %) nebu albuterol sulfate 2.5 mg/3 mL (0.083 %) solution for nebulization  USE ONE VIAL VIA NEBULIZER EVERY EIGHT HOURS AS NEEDED 360 mL 1    predniSONE (DELTASONE) 10 mg tablet Take 10 mg by mouth daily. 90 Tablet 1    albuterol (PROVENTIL HFA, VENTOLIN HFA, PROAIR HFA) 90 mcg/actuation inhaler Take 1 Puff by inhalation every four (4) hours as needed for Wheezing.  18 g 5    ergocalciferol (ERGOCALCIFEROL) 1,250 mcg (50,000 unit) capsule TAKE ONE CAPSULE BY MOUTH ONCE A WEEK INDICATIONS: VITAMIN D DEFICIENCY (HIGH DOSE THERAPY) 12 Capsule 1    atorvastatin (LIPITOR) 20 mg tablet TAKE 1 TABLET BY MOUTH EVERY DAY 90 Tablet 1    Trelegy Ellipta 100-62.5-25 mcg inhaler       montelukast (SINGULAIR) 10 mg tablet TAKE 1 TABLET BY MOUTH EVERY DAY FOR ALLERGY SYMPTOMS 90 Tablet 1    fluticasone propionate (FLONASE) 50 mcg/actuation nasal spray SPRAY 2 SPRAYS INTO EACH NOSTRIL EVERY DAY 3 Bottle 1    loratadine (CLARITIN) 10 mg tablet Take 1 Tablet by mouth daily. 90 Tablet 1    lisinopriL (PRINIVIL, ZESTRIL) 20 mg tablet TAKE 1 TABLET BY MOUTH EVERY DAY 90 Tablet 1    furosemide (LASIX) 40 mg tablet Take 1 Tablet by mouth daily. 90 Tablet 1    amLODIPine (NORVASC) 10 mg tablet Take 1 Tablet by mouth daily. 90 Tablet 1    mometasone (ELOCON) 0.1 % ointment       metFORMIN (GLUCOPHAGE) 500 mg tablet Take 1 Tablet by mouth two (2) times daily (with meals). 180 Tablet 1    sucralfate (CARAFATE) 1 gram tablet TAKE 1 TABLET BY MOUTH FOUR TIMES A  Tablet 3    pantoprazole (PROTONIX) 40 mg tablet Take 1 Tablet by mouth Before breakfast and dinner. Indications: inflammation of the esophagus with erosion, gastroesophageal reflux disease 60 Tablet 5    docusate sodium (COLACE) 100 mg capsule TAKE 1 CAP BY MOUTH 2 TIMES A DAY FOR 90 DAYS. 60 Cap 2    aspirin delayed-release 81 mg tablet TAKE 1 TABLET BY MOUTH EVERY DAY 90 Tab 1    Alcohol Prep Pads padm 1 PAD BY APPLY EXTERNALLY ROUTE DAILY. USE TO CHECK BLOOD SUGAR DAILY. 100 Pad 1    True Metrix Glucose Test Strip strip USE ONE STRIP DAILY TO TEST BLOOD GLUCOSE SUBCUTANEOUSLY. E11.9 100 Strip 1    azelastine (ASTELIN) 137 mcg (0.1 %) nasal spray 2 Sprays by Both Nostrils route nightly. 1 Bottle 5    hydrOXYzine HCL (ATARAX) 25 mg tablet Take 1 Tab by mouth three (3) times daily as needed for Itching. 60 Tab 1    Ultra Thin Lancets 30 gauge misc USE ONCE LANCET PER DAY TO CHECK BLOOD SUGAR.  lidocaine (LIDODERM) 5 % APPLY 1 PATCH EVERY DAY. REMOVE AFTER 12 HOURS      zolpidem (AMBIEN) 5 mg tablet nightly as needed.  Only as needed         Past History     Past Medical History:  Past Medical History:   Diagnosis Date    Arthritis     Bronchitis 9/1/2020    Chronic obstructive pulmonary disease (HCC)     Chronic pain     [facility]     Gastroesophageal reflux disease without esophagitis 9/1/2020    GERD (gastroesophageal reflux disease)  GERD (gastroesophageal reflux disease)     History of multiple allergies     HTN (hypertension) 9/1/2020    Hypertension     Intermittent asthma 9/1/2020    Lung disorder     PATIENT IS ON OXYGEN    Menopause     Pain of upper abdomen 9/1/2020    Seasonal allergic rhinitis 9/1/2020    Sinus problem     Vitamin D deficiency 9/1/2020       Past Surgical History:  Past Surgical History:   Procedure Laterality Date    COLONOSCOPY N/A 5/25/2020    COL[facility] performed by Nagi Palumbo [facility] at [facility] ENDOSCOPY    HX COLONOSCOPY      HX GI      colonscopy    HX ORTHOPAEDIC  10/24/2018    Total Right Hip Arthroplasty Dr. Lc Jackson. Family History:  Family History   Problem Relation Age of Onset    Hypertension Mother     Cancer Sister        Social History:  Social History     Tobacco Use    Smoking status: Former Smoker    Smokeless tobacco: Never Used   Substance Use Topics    Alcohol use: Yes     Alcohol/week: 3.0 standard drinks     Types: 3 Cans of beer per week    Drug use: No       Allergies:  No Known Allergies      Review of Systems     Review of Systems   Constitutional: Negative for chills and fever. HENT: Negative for rhinorrhea and sore throat. Eyes: Negative for pain and visual disturbance. Respiratory: Negative for cough and shortness of breath. Cardiovascular: Negative for chest pain and leg swelling. Gastrointestinal: Negative for abdominal pain and vomiting. Endocrine: Negative for polydipsia and polyuria. Genitourinary: Negative for dysuria and urgency. Musculoskeletal: Positive for back pain and myalgias. Skin: Negative for color change and wound. Neurological: Negative for weakness and numbness. Psychiatric/Behavioral: Negative. Physical Exam     Physical Exam  Vitals and nursing note reviewed. Constitutional:       General: She is not in acute distress. Appearance: Normal appearance. She is obese.  She is not ill-appearing, toxic-appearing or diaphoretic. HENT:      Head: Normocephalic and atraumatic. Mouth/Throat:      Mouth: Mucous membranes are moist.      Pharynx: Oropharynx is clear. Eyes:      Extraocular Movements: Extraocular movements intact. Conjunctiva/sclera: Conjunctivae normal.      Pupils: Pupils are equal, round, and reactive to light. Cardiovascular:      Rate and Rhythm: Normal rate and regular rhythm. Pulses: Normal pulses. Heart sounds: Normal heart sounds. Pulmonary:      Effort: Pulmonary effort is normal.      Breath sounds: Normal breath sounds. Abdominal:      General: Bowel sounds are normal.      Palpations: Abdomen is soft. Musculoskeletal:         General: Normal range of motion. Cervical back: Normal, normal range of motion and neck supple. Thoracic back: Normal.      Lumbar back: Normal.   Skin:     General: Skin is warm and dry. Capillary Refill: Capillary refill takes less than 2 seconds. Neurological:      General: No focal deficit present. Mental Status: She is alert and oriented to person, place, and time. Psychiatric:         Mood and Affect: Mood normal.         Behavior: Behavior normal.         Lab and Diagnostic Study Results     Labs -   No results found for this or any previous visit (from the past 12 hour(s)). Radiologic Studies -   @lastxrresult@  CT Results  (Last 48 hours)    None        CXR Results  (Last 48 hours)    None            Medical Decision Making   - I am the first provider for this patient. - I reviewed the vital signs, available nursing notes, past medical history, past surgical history, family history and social history. - Initial assessment performed. The patients presenting problems have been discussed, and they are in agreement with the care plan formulated and outlined with them. I have encouraged them to ask questions as they arise throughout their visit.     Vital Signs-Reviewed the patient's vital signs. Patient Vitals for the past 12 hrs:   Temp Pulse Resp BP SpO2   10/10/21 1307 98.7 °F (37.1 °C) (!) 113 20 (!) 151/83 100 %       Records Reviewed: Nursing Notes    The patient presents with back pain with a differential diagnosis of  lumbar strain, pneumonia, pyelonephritis and traumatic injury      ED Course:          Provider Notes (Medical Decision Making): MDM       Procedures   Medical Decision Makingedical Decision Making  Performed by: Sepideh Lacy MD  PROCEDURES:  Procedures       Disposition   Disposition: Condition stable and improved  DC- Adult Discharges: All of the diagnostic tests were reviewed and questions answered. Diagnosis, care plan and treatment options were discussed. The patient understands the instructions and will follow up as directed. The patients results have been reviewed with them. They have been counseled regarding their diagnosis. The patient verbally convey understanding and agreement of the signs, symptoms, diagnosis, treatment and prognosis and additionally agrees to follow up as recommended with their PCP in 24 - 48 hours. They also agree with the care-plan and convey that all of their questions have been answered. I have also put together some discharge instructions for them that include: 1) educational information regarding their diagnosis, 2) how to care for their diagnosis at home, as well a 3) list of reasons why they would want to return to the ED prior to their follow-up appointment, should their condition change. DISCHARGE PLAN:  1.    Current Discharge Medication List      CONTINUE these medications which have NOT CHANGED    Details   albuterol (PROVENTIL VENTOLIN) 2.5 mg /3 mL (0.083 %) nebu albuterol sulfate 2.5 mg/3 mL (0.083 %) solution for nebulization  USE ONE VIAL VIA NEBULIZER EVERY EIGHT HOURS AS NEEDED  Qty: 360 mL, Refills: 1    Associated Diagnoses: COPD exacerbation (HCC)      predniSONE (DELTASONE) 10 mg tablet Take 10 mg by mouth daily. Qty: 90 Tablet, Refills: 1    Associated Diagnoses: Mucopurulent chronic bronchitis (HCC)      albuterol (PROVENTIL HFA, VENTOLIN HFA, PROAIR HFA) 90 mcg/actuation inhaler Take 1 Puff by inhalation every four (4) hours as needed for Wheezing. Qty: 18 g, Refills: 5    Associated Diagnoses: COPD exacerbation (HCC)      ergocalciferol (ERGOCALCIFEROL) 1,250 mcg (50,000 unit) capsule TAKE ONE CAPSULE BY MOUTH ONCE A WEEK INDICATIONS: VITAMIN D DEFICIENCY (HIGH DOSE THERAPY)  Qty: 12 Capsule, Refills: 1      atorvastatin (LIPITOR) 20 mg tablet TAKE 1 TABLET BY MOUTH EVERY DAY  Qty: 90 Tablet, Refills: 1    Associated Diagnoses: Hyperlipidemia, unspecified hyperlipidemia type      Trelegy Ellipta 100-62.5-25 mcg inhaler       montelukast (SINGULAIR) 10 mg tablet TAKE 1 TABLET BY MOUTH EVERY DAY FOR ALLERGY SYMPTOMS  Qty: 90 Tablet, Refills: 1      fluticasone propionate (FLONASE) 50 mcg/actuation nasal spray SPRAY 2 SPRAYS INTO EACH NOSTRIL EVERY DAY  Qty: 3 Bottle, Refills: 1      loratadine (CLARITIN) 10 mg tablet Take 1 Tablet by mouth daily. Qty: 90 Tablet, Refills: 1    Comments: DX: J45.3  Associated Diagnoses: Mild intermittent asthma without complication      lisinopriL (PRINIVIL, ZESTRIL) 20 mg tablet TAKE 1 TABLET BY MOUTH EVERY DAY  Qty: 90 Tablet, Refills: 1    Associated Diagnoses: Essential (primary) hypertension      furosemide (LASIX) 40 mg tablet Take 1 Tablet by mouth daily. Qty: 90 Tablet, Refills: 1    Associated Diagnoses: Essential (primary) hypertension      amLODIPine (NORVASC) 10 mg tablet Take 1 Tablet by mouth daily. Qty: 90 Tablet, Refills: 1    Associated Diagnoses: Essential hypertension      mometasone (ELOCON) 0.1 % ointment       metFORMIN (GLUCOPHAGE) 500 mg tablet Take 1 Tablet by mouth two (2) times daily (with meals).   Qty: 180 Tablet, Refills: 1    Associated Diagnoses: Type 2 diabetes mellitus without complications (HCC)      sucralfate (CARAFATE) 1 gram tablet TAKE 1 TABLET BY MOUTH FOUR TIMES A DAY  Qty: 120 Tablet, Refills: 3    Associated Diagnoses: Epigastric pain      pantoprazole (PROTONIX) 40 mg tablet Take 1 Tablet by mouth Before breakfast and dinner. Indications: inflammation of the esophagus with erosion, gastroesophageal reflux disease  Qty: 60 Tablet, Refills: 5    Associated Diagnoses: Gastroesophageal reflux disease with esophagitis without hemorrhage      docusate sodium (COLACE) 100 mg capsule TAKE 1 CAP BY MOUTH 2 TIMES A DAY FOR 90 DAYS. Qty: 60 Cap, Refills: 2    Associated Diagnoses: Constipation, unspecified      aspirin delayed-release 81 mg tablet TAKE 1 TABLET BY MOUTH EVERY DAY  Qty: 90 Tab, Refills: 1    Associated Diagnoses: Other chest pain      Alcohol Prep Pads padm 1 PAD BY APPLY EXTERNALLY ROUTE DAILY. USE TO CHECK BLOOD SUGAR DAILY. Qty: 100 Pad, Refills: 1    Associated Diagnoses: Type 2 diabetes mellitus without complication, without long-term current use of insulin ([facility])      True Metrix Glucose Test Strip strip USE ONE STRIP DAILY TO TEST BLOOD GLUCOSE SUBCUTANEOUSLY. E11.9  Qty: 100 Strip, Refills: 1    Comments: DX Code Needed  . Associated Diagnoses: Controlled type 2 diabetes mellitus without complication, without long-term current use of insulin ([facility])      azelastine (ASTELIN) 137 mcg (0.1 %) nasal spray 2 Sprays by Both Nostrils route nightly. Qty: 1 Bottle, Refills: 5    Associated Diagnoses: Seasonal allergic rhinitis, unspecified trigger      hydrOXYzine HCL (ATARAX) 25 mg tablet Take 1 Tab by mouth three (3) times daily as needed for Itching. Qty: 60 Tab, Refills: 1    Associated Diagnoses: Generalized pruritus      Ultra Thin Lancets 30 gauge misc USE ONCE LANCET PER DAY TO CHECK BLOOD SUGAR.      lidocaine (LIDODERM) 5 % APPLY 1 PATCH EVERY DAY. REMOVE AFTER 12 HOURS      zolpidem (AMBIEN) 5 mg tablet nightly as needed. Only as needed           2. Follow-up Information    None       3. Return to ED if worse   4. Current Discharge Medication List            Diagnosis     Clinical Impression: No diagnosis found. Attestations:    Obi Castillo MD    Please note that this dictation was completed with [employer], the computer voice recognition software. Quite often unanticipated grammatical, syntax, homophones, and other interpretive errors are inadvertently transcribed by the computer software. Please disregard these errors. Please excuse any errors that have escaped final proofreading. Thank you.

## 2021-10-10 NOTE — ED NOTES
Pt  Given discharge instructions, pt verbalizes understanding of medications, pt is advised to keep follow up apt with Dr Minor Butler on tuesday

## 2021-10-10 NOTE — ED TRIAGE NOTES
Pt reports falling 2 weeks ago from her rollator moving out from under her. Pt states she fell on her back, she tried to get up then she slipped again. Pt also states she hit her head when she fell. Pt has Hx of back pain but no prior surgeries. Pt has a dr bustillo on Tuesday with Dr Shari Parson for the back pain.

## 2021-10-12 ENCOUNTER — OFFICE VISIT (OUTPATIENT)
Dept: ORTHOPEDIC SURGERY | Age: 66
End: 2021-10-12
Payer: MEDICARE

## 2021-10-12 VITALS — HEIGHT: 67 IN | WEIGHT: 176 LBS | BODY MASS INDEX: 27.62 KG/M2

## 2021-10-12 DIAGNOSIS — M54.50 LOW BACK PAIN, UNSPECIFIED BACK PAIN LATERALITY, UNSPECIFIED CHRONICITY, UNSPECIFIED WHETHER SCIATICA PRESENT: Primary | ICD-10-CM

## 2021-10-12 PROCEDURE — G9899 SCRN MAM PERF RSLTS DOC: HCPCS | Performed by: ORTHOPAEDIC SURGERY

## 2021-10-12 PROCEDURE — G8432 DEP SCR NOT DOC, RNG: HCPCS | Performed by: ORTHOPAEDIC SURGERY

## 2021-10-12 PROCEDURE — G8427 DOCREV CUR MEDS BY ELIG CLIN: HCPCS | Performed by: ORTHOPAEDIC SURGERY

## 2021-10-12 PROCEDURE — 1101F PT FALLS ASSESS-DOCD LE1/YR: CPT | Performed by: ORTHOPAEDIC SURGERY

## 2021-10-12 PROCEDURE — 3017F COLORECTAL CA SCREEN DOC REV: CPT | Performed by: ORTHOPAEDIC SURGERY

## 2021-10-12 PROCEDURE — G8399 PT W/DXA RESULTS DOCUMENT: HCPCS | Performed by: ORTHOPAEDIC SURGERY

## 2021-10-12 PROCEDURE — 1090F PRES/ABSN URINE INCON ASSESS: CPT | Performed by: ORTHOPAEDIC SURGERY

## 2021-10-12 PROCEDURE — G8536 NO DOC ELDER MAL SCRN: HCPCS | Performed by: ORTHOPAEDIC SURGERY

## 2021-10-12 PROCEDURE — G8417 CALC BMI ABV UP PARAM F/U: HCPCS | Performed by: ORTHOPAEDIC SURGERY

## 2021-10-12 PROCEDURE — G8756 NO BP MEASURE DOC: HCPCS | Performed by: ORTHOPAEDIC SURGERY

## 2021-10-12 PROCEDURE — 99214 OFFICE O/P EST MOD 30 MIN: CPT | Performed by: ORTHOPAEDIC SURGERY

## 2021-10-12 RX ORDER — METHYLPREDNISOLONE 4 MG/1
TABLET ORAL
Qty: 1 DOSE PACK | Refills: 0 | Status: SHIPPED | OUTPATIENT
Start: 2021-10-12 | End: 2021-11-04 | Stop reason: ALTCHOICE

## 2021-10-12 RX ORDER — IPRATROPIUM BROMIDE AND ALBUTEROL SULFATE 2.5; .5 MG/3ML; MG/3ML
SOLUTION RESPIRATORY (INHALATION)
COMMUNITY
Start: 2021-10-06

## 2021-10-12 NOTE — PATIENT INSTRUCTIONS
The patient's back is neurovascularly intact and appears to have good symmetry on exam with no muscle atrophy noted. Normal curvature of the spine with positive tenderness to palpation. Decreased range of motion in all planes secondary to pain but normal strength. No rashes, echymosis or other skin lesions noted. The patients bilateral lower extremities are grossly neurovascularly intact with good cap refill, full range of motion and strength. No swelling, echymosis or instability noted. Negative straight leg raise, negative onofre's and negative lachman's. No tenderness to palpation. No foot drop present and patient has a normal gait.

## 2021-10-12 NOTE — PROGRESS NOTES
Name: Sean Gerard    : 1955     Service Dept: [facility] Medicine    Patient's Pharmacies:    [facility]/pharmacy #[id number]  Grace Moncada  [address] [place] [id number]  Phone: [phone] Fax: [phone]       Chief Complaint   Patient presents with    Hip Pain        Visit Vitals  Ht 5' 7\" (1.702 m)   Wt 176 lb (79.8 kg)   BMI 27.57 kg/m²      No Known Allergies   Current Outpatient Medications   Medication Sig Dispense Refill    albuterol-ipratropium (DUO-NEB) 2.5 mg-0.5 mg/3 ml nebu USE 1 VIAL EVERY 4 6 HOURS AS NEEDED. DX J44.9. NEED MED B INFO      methylPREDNISolone (MEDROL DOSEPACK) 4 mg tablet Per dose pack instructions 1 Dose Pack 0    ibuprofen (MOTRIN) 600 mg tablet Take 1 Tablet by mouth every six (6) hours as needed for Pain. 20 Tablet 0    predniSONE (DELTASONE) 10 mg tablet Take 10 mg by mouth daily. 90 Tablet 1    albuterol (PROVENTIL HFA, VENTOLIN HFA, PROAIR HFA) 90 mcg/actuation inhaler Take 1 Puff by inhalation every four (4) hours as needed for Wheezing. 18 g 5    ergocalciferol (ERGOCALCIFEROL) 1,250 mcg (50,000 unit) capsule TAKE ONE CAPSULE BY MOUTH ONCE A WEEK INDICATIONS: VITAMIN D DEFICIENCY (HIGH DOSE THERAPY) 12 Capsule 1    atorvastatin (LIPITOR) 20 mg tablet TAKE 1 TABLET BY MOUTH EVERY DAY 90 Tablet 1    Trelegy Ellipta 100-62.5-25 mcg inhaler       montelukast (SINGULAIR) 10 mg tablet TAKE 1 TABLET BY MOUTH EVERY DAY FOR ALLERGY SYMPTOMS 90 Tablet 1    fluticasone propionate (FLONASE) 50 mcg/actuation nasal spray SPRAY 2 SPRAYS INTO EACH NOSTRIL EVERY DAY 3 Bottle 1    loratadine (CLARITIN) 10 mg tablet Take 1 Tablet by mouth daily. 90 Tablet 1    lisinopriL (PRINIVIL, ZESTRIL) 20 mg tablet TAKE 1 TABLET BY MOUTH EVERY DAY 90 Tablet 1    furosemide (LASIX) 40 mg tablet Take 1 Tablet by mouth daily.  90 Tablet 1    amLODIPine (NORVASC) 10 mg tablet Take 1 Tablet by mouth daily. 90 Tablet 1    mometasone (ELOCON) 0.1 % ointment       metFORMIN (GLUCOPHAGE) 500 mg tablet Take 1 Tablet by mouth two (2) times daily (with meals). 180 Tablet 1    sucralfate (CARAFATE) 1 gram tablet TAKE 1 TABLET BY MOUTH FOUR TIMES A  Tablet 3    pantoprazole (PROTONIX) 40 mg tablet Take 1 Tablet by mouth Before breakfast and dinner. Indications: inflammation of the esophagus with erosion, gastroesophageal reflux disease 60 Tablet 5    docusate sodium (COLACE) 100 mg capsule TAKE 1 CAP BY MOUTH 2 TIMES A DAY FOR 90 DAYS. 60 Cap 2    aspirin delayed-release 81 mg tablet TAKE 1 TABLET BY MOUTH EVERY DAY 90 Tab 1    Alcohol Prep Pads padm 1 PAD BY APPLY EXTERNALLY ROUTE DAILY. USE TO CHECK BLOOD SUGAR DAILY. 100 Pad 1    True Metrix Glucose Test Strip strip USE ONE STRIP DAILY TO TEST BLOOD GLUCOSE SUBCUTANEOUSLY. E11.9 100 Strip 1    azelastine (ASTELIN) 137 mcg (0.1 %) nasal spray 2 Sprays by Both Nostrils route nightly. 1 Bottle 5    hydrOXYzine HCL (ATARAX) 25 mg tablet Take 1 Tab by mouth three (3) times daily as needed for Itching. 60 Tab 1    Ultra Thin Lancets 30 gauge misc USE ONCE LANCET PER DAY TO CHECK BLOOD SUGAR.  lidocaine (LIDODERM) 5 % APPLY 1 PATCH EVERY DAY. REMOVE AFTER 12 HOURS      zolpidem (AMBIEN) 5 mg tablet nightly as needed.  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    Seasonal allergic rhinitis J30.2    Intermittent asthma J45.20    Vitamin D deficiency E55.9    Gastroesophageal reflux disease without esophagitis K21.9    HTN (hypertension) I10    Bronchitis J40    Degeneration of lumbar intervertebral disc M51.36    Type 2 diabetes mellitus with other specified complication, without long-term current use of insulin (HCC) E11.69    Acute kidney injury ([facility] [address] N17.9    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00     Pack years: 15.00     Quit date: 2000     Years since quittin.7    Smokeless tobacco: Never Used   Vaping Use    Vaping Use: Never used   Substance and Sexual Activity    Alcohol use: Yes     Alcohol/week: 3.0 standard drinks     Types: 3 Cans of beer per week    Drug use: No    Sexual activity: Not Currently     Social Determinants of Health     Financial Resource Strain:     Difficulty of Paying Living Expenses:    Food Insecurity:     Worried About Running Out of Food in the Last Year:     Ran Out of Food in the Last Year:    Transportation Needs:     Lack of Transportation (Medical):      Lack of Transportation (Non-Medical):    Physical Activity:     Days of Exercise per Week:     Minutes of Exercise per Session:    Stress:     Feeling of Stress :    Social Connections:     Frequency of Communication with Friends and Family:     Frequency of Social Gatherings with Friends and Family:     Attends [employer] Services:     Active Member of Clubs or Organizations:     Attends Club or Organization Meetings:     Marital Status:       Past Surgical History:   Procedure Laterality Date    COLONOSCOPY N/A 2020    COLONOSCOPY performed by Jhon Tejeda MD at [address] HX COLONOSCOPY      HX GI      colonscopy    HX ORTHOPAEDIC  10/24/2018    Total Right Hip Arthroplasty Dr. Robin Cardozo Liang. Past Medical History:   Diagnosis Date    Arthritis     Bronchitis 9/1/2020    Chronic obstructive pulmonary disease (HCC)     Chronic pain     Diabetes ([facility] [address]     Gastroesophageal reflux disease without esophagitis 9/1/2020    GERD (gastroesophageal reflux disease)     GERD (gastroesophageal reflux disease)     History of multiple allergies     HTN (hypertension) 9/1/2020    Hypertension     Intermittent asthma 9/1/2020    Lung disorder     PATIENT IS ON OXYGEN    Menopause     Pain of upper abdomen 9/1/2020    Seasonal allergic rhinitis 9/1/2020    Sinus problem     Vitamin D deficiency 9/1/2020        I have reviewed and agree with PFSH and ROS and intake form in chart and the record furthermore I have reviewed prior medical record(s) regarding this patients care during this appointment. Review of Systems:   Patient is a pleasant appearing individual, appropriately dressed, well hydrated, well nourished, who is alert, appropriately oriented for age, and in no acute distress with a normal gait and normal affect who does not appear to be in any significant pain. Physical Exam:  The patient's back is neurovascularly intact and appears to have good symmetry on exam with no muscle atrophy noted. Normal curvature of the spine with positive tenderness to palpation. Decreased range of motion in all planes secondary to pain but normal strength. No rashes, echymosis or other skin lesions noted. The patients bilateral lower extremities are grossly neurovascularly intact with good cap refill, full range of motion and strength. No swelling, echymosis or instability noted. Negative straight leg raise, negative onofre's and negative lachman's. No tenderness to palpation. No foot drop present and patient has a normal gait.      Encounter Diagnoses     ICD-10-CM ICD-9-CM   1. Low back pain, unspecified back pain laterality, unspecified chronicity, unspecified whether sciatica present  M54.50 724.2       HPI:  The patient is here with a chief complaint of low back pain, throbbing burning pain. Pain is 9/10. ROS:  10-point review of systems is positive for nighttime pain. X-rays of the hips are unremarkable, well-placed prosthesis on the right, mild-to-moderate OA on the left. Assessment/Plan:  Plan at this point, Medrol pack. We will have her see a spine specialist.  We will see her back as needed and go from there. As part of continued conservative pain management options the patient was advised to utilize Tylenol or OTC NSAIDS as long as it is not medically contraindicated. Return to Office: Follow-up and Dispositions    · Return if symptoms worsen or fail to improve. Scribed by Sharla Soto LPN as dictated by [facility] BARBI Riddle MD.  Documentation True and Accepted Wero Riddle MD

## 2021-10-12 NOTE — LETTER
10/13/2021    Patient: Itzel Diaz   YOB: 1955   Date of Visit: 10/12/2021     Richard Valente MD  [address] [place] [address]    Dear Richard Valente MD,      Thank you for referring Ms. Itzel Diaz to [address] [place] [address] AND [facility] for evaluation. My notes for this consultation are attached. If you have questions, please do not hesitate to call me. I look forward to following your patient along with you.       Sincerely,    Jesse Carlson MD

## 2021-10-14 DIAGNOSIS — E11.9 TYPE 2 DIABETES MELLITUS WITHOUT COMPLICATIONS (HCC): ICD-10-CM

## 2021-10-14 RX ORDER — LANCETS 30 GAUGE
EACH MISCELLANEOUS
Qty: 90 LANCET | Refills: 1 | Status: SHIPPED | OUTPATIENT
Start: 2021-10-14 | End: 2022-06-07 | Stop reason: SDUPTHER

## 2021-10-21 DIAGNOSIS — M54.50 LOW BACK PAIN, UNSPECIFIED BACK PAIN LATERALITY, UNSPECIFIED CHRONICITY, UNSPECIFIED WHETHER SCIATICA PRESENT: Primary | ICD-10-CM

## 2021-10-31 NOTE — TELEPHONE ENCOUNTER
Attempted to contact patient re: safety of vaccine, lvm for patient to return call.
Spine appears normal, range of motion is not limited, no muscle or joint tenderness.  5/5 strength to extremities x4

## 2021-11-01 ENCOUNTER — TELEPHONE (OUTPATIENT)
Dept: PRIMARY CARE CLINIC | Age: 66
End: 2021-11-01

## 2021-11-01 NOTE — TELEPHONE ENCOUNTER
please read message below and provide recommendation and we will call her back       ----- Message from Elise Nath sent at 11/1/2021 10:06 AM EDT -----  Subject: Message to Provider    QUESTIONS  Information for Provider? pt is inquiring about getting 3rd booster and   not knowing if she's ok to have it done being that she is so sick and is   on oxygen. please call pt back. pt had moderna   ---------------------------------------------------------------------------  --------------  CALL BACK INFO  What is the best way for the office to contact you? OK to leave message on   voicemail  Preferred Call Back Phone Number? [phone]  ---------------------------------------------------------------------------  --------------  SCRIPT ANSWERS  Relationship to Patient?  Self

## 2021-11-01 NOTE — TELEPHONE ENCOUNTER
It would be safe for her to get the Materna booster as well as it would help prevent her from getting Covid which is more important than any potential side effects.

## 2021-11-04 ENCOUNTER — OFFICE VISIT (OUTPATIENT)
Dept: PRIMARY CARE CLINIC | Age: 66
End: 2021-11-04
Payer: MEDICARE

## 2021-11-04 VITALS
TEMPERATURE: 98.3 F | RESPIRATION RATE: 18 BRPM | OXYGEN SATURATION: 100 % | SYSTOLIC BLOOD PRESSURE: 109 MMHG | HEIGHT: 67 IN | DIASTOLIC BLOOD PRESSURE: 63 MMHG | BODY MASS INDEX: 26.06 KG/M2 | HEART RATE: 109 BPM | WEIGHT: 166 LBS

## 2021-11-04 DIAGNOSIS — E55.9 VITAMIN D DEFICIENCY: ICD-10-CM

## 2021-11-04 DIAGNOSIS — E11.69 TYPE 2 DIABETES MELLITUS WITH OTHER SPECIFIED COMPLICATION, WITHOUT LONG-TERM CURRENT USE OF INSULIN (HCC): ICD-10-CM

## 2021-11-04 DIAGNOSIS — J41.1 MUCOPURULENT CHRONIC BRONCHITIS (HCC): ICD-10-CM

## 2021-11-04 DIAGNOSIS — J41.8 MIXED SIMPLE AND MUCOPURULENT CHRONIC BRONCHITIS (HCC): ICD-10-CM

## 2021-11-04 DIAGNOSIS — I10 PRIMARY HYPERTENSION: ICD-10-CM

## 2021-11-04 DIAGNOSIS — M79.10 MYALGIA: ICD-10-CM

## 2021-11-04 DIAGNOSIS — Z00.00 ENCOUNTER FOR ANNUAL WELLNESS VISIT (AWV) IN MEDICARE PATIENT: ICD-10-CM

## 2021-11-04 DIAGNOSIS — M51.36 DEGENERATION OF LUMBAR INTERVERTEBRAL DISC: ICD-10-CM

## 2021-11-04 DIAGNOSIS — J30.9 ALLERGIC RHINITIS, UNSPECIFIED SEASONALITY, UNSPECIFIED TRIGGER: ICD-10-CM

## 2021-11-04 DIAGNOSIS — E11.9 TYPE 2 DIABETES MELLITUS WITHOUT COMPLICATION, WITHOUT LONG-TERM CURRENT USE OF INSULIN (HCC): Primary | ICD-10-CM

## 2021-11-04 DIAGNOSIS — R79.0 LOW MAGNESIUM LEVEL: ICD-10-CM

## 2021-11-04 DIAGNOSIS — Z23 NEEDS FLU SHOT: ICD-10-CM

## 2021-11-04 DIAGNOSIS — J34.89 NASAL SORE: ICD-10-CM

## 2021-11-04 DIAGNOSIS — K21.9 GASTROESOPHAGEAL REFLUX DISEASE WITHOUT ESOPHAGITIS: ICD-10-CM

## 2021-11-04 LAB — HBA1C MFR BLD HPLC: 5.2 %

## 2021-11-04 PROCEDURE — 2022F DILAT RTA XM EVC RTNOPTHY: CPT | Performed by: FAMILY MEDICINE

## 2021-11-04 PROCEDURE — G0439 PPPS, SUBSEQ VISIT: HCPCS | Performed by: FAMILY MEDICINE

## 2021-11-04 PROCEDURE — G8399 PT W/DXA RESULTS DOCUMENT: HCPCS | Performed by: FAMILY MEDICINE

## 2021-11-04 PROCEDURE — 90694 VACC AIIV4 NO PRSRV 0.5ML IM: CPT | Performed by: FAMILY MEDICINE

## 2021-11-04 PROCEDURE — 3044F HG A1C LEVEL LT 7.0%: CPT | Performed by: FAMILY MEDICINE

## 2021-11-04 PROCEDURE — 3017F COLORECTAL CA SCREEN DOC REV: CPT | Performed by: FAMILY MEDICINE

## 2021-11-04 PROCEDURE — G8536 NO DOC ELDER MAL SCRN: HCPCS | Performed by: FAMILY MEDICINE

## 2021-11-04 PROCEDURE — G0008 ADMIN INFLUENZA VIRUS VAC: HCPCS | Performed by: FAMILY MEDICINE

## 2021-11-04 PROCEDURE — 1101F PT FALLS ASSESS-DOCD LE1/YR: CPT | Performed by: FAMILY MEDICINE

## 2021-11-04 PROCEDURE — G8752 SYS BP LESS 140: HCPCS | Performed by: FAMILY MEDICINE

## 2021-11-04 PROCEDURE — G8754 DIAS BP LESS 90: HCPCS | Performed by: FAMILY MEDICINE

## 2021-11-04 PROCEDURE — G8510 SCR DEP NEG, NO PLAN REQD: HCPCS | Performed by: FAMILY MEDICINE

## 2021-11-04 PROCEDURE — G9899 SCRN MAM PERF RSLTS DOC: HCPCS | Performed by: FAMILY MEDICINE

## 2021-11-04 PROCEDURE — 1090F PRES/ABSN URINE INCON ASSESS: CPT | Performed by: FAMILY MEDICINE

## 2021-11-04 PROCEDURE — G8427 DOCREV CUR MEDS BY ELIG CLIN: HCPCS | Performed by: FAMILY MEDICINE

## 2021-11-04 PROCEDURE — 83036 HEMOGLOBIN GLYCOSYLATED A1C: CPT | Performed by: FAMILY MEDICINE

## 2021-11-04 PROCEDURE — G8417 CALC BMI ABV UP PARAM F/U: HCPCS | Performed by: FAMILY MEDICINE

## 2021-11-04 PROCEDURE — 99214 OFFICE O/P EST MOD 30 MIN: CPT | Performed by: FAMILY MEDICINE

## 2021-11-04 RX ORDER — BACLOFEN 10 MG/1
10 TABLET ORAL 2 TIMES DAILY
Qty: 60 TABLET | Refills: 1 | Status: SHIPPED | OUTPATIENT
Start: 2021-11-04 | End: 2022-05-31 | Stop reason: ALTCHOICE

## 2021-11-04 RX ORDER — MUPIROCIN 20 MG/G
OINTMENT TOPICAL
Qty: 22 G | Refills: 0 | Status: SHIPPED | OUTPATIENT
Start: 2021-11-04 | End: 2022-05-31 | Stop reason: ALTCHOICE

## 2021-11-04 RX ORDER — MONTELUKAST SODIUM 10 MG/1
TABLET ORAL
Qty: 90 TABLET | Refills: 1 | Status: SHIPPED | OUTPATIENT
Start: 2021-11-04 | End: 2022-04-01 | Stop reason: SDUPTHER

## 2021-11-04 RX ORDER — PREDNISONE 10 MG/1
TABLET ORAL
Qty: 100 TABLET | Refills: 1 | Status: SHIPPED | OUTPATIENT
Start: 2021-11-04 | End: 2022-06-22 | Stop reason: ALTCHOICE

## 2021-11-04 NOTE — PATIENT INSTRUCTIONS
Medicare Wellness Visit, Female    The best way to improve and maintain good health is to have a healthy lifestyle by eating a well-balanced diet, exercising regularly, limiting alcohol and stopping smoking. Regular visits with your physician or non-physician health care provider also support your good health. Preventive screening tests can find health problems before they become diseases or illnesses. Here is a list of your current Health Maintenance items with a due date:  Health Maintenance   Topic Date Due    Foot Exam Q1  Never done    Eye Exam Retinal or Dilated  Never done    DTaP/Tdap/Td series (1 - Tdap) Never done    Cervical cancer screen  Never done    Shingrix Vaccine Age 50> (1 of 2) Never done    Flu Vaccine (1) 09/01/2021    MICROALBUMIN Q1  01/19/2022    Lipid Screen  03/16/2022    Breast Cancer Screen Mammogram  09/29/2022    A1C test (Diabetic or Prediabetic)  11/04/2022    Medicare Yearly Exam  11/05/2022    Pneumococcal 65+ years (2 of 2 - PPSV23) 11/24/2025    Colorectal Cancer Screening Combo  05/25/2030    Hepatitis C Screening  Completed    Bone Densitometry (Dexa) Screening  Completed    COVID-19 Vaccine  Completed       Preventive services such as immunizations prevent serious infections. All people over age 72 should have a Pneumovax and a Prevnar-13 shot to prevent potentially life threatening infections with the pneumococcus bacteria, a common cause of pneumonia. These are once in a lifetime unless you and your provider decide differently. All people over 65 should have a yearly influenza vaccine or \"flu\" shot. This does not prevent infection with cold viruses but has been proven to prevent hospitalization and death from influenza. Although Medicare part B \"regular Medicare\" currently only covers tetanus vaccination in the context of an injury, a tetanus vaccine (Tdap or Td) is recommended every 10 years.     A new 2 shot shingles vaccine series (Shingrix) is recommended after age 48 even for people who have already received Zostavax (the old vaccine). It is also not covered by Medicare part B. Note, however, that both the Shingles vaccine and Tdap/Td are generally covered by secondary carriers. Please check your coverage and out of pocket expenses. Consider contacting your local health department because it may stock these vaccines for a reasonable charge. We currently have documentation of the following immunization history for you:  Immunization History   Administered Date(s) Administered    COVID-19, Neha Rim, Primary or Immunocompromised Series, MRNA, PF, 100mcg/0.5mL 03/20/2021, 04/17/2021    Influenza Vaccine 10/01/2017    Influenza Vaccine (Mdck Quadrivalent)(>2 Yrs Flucelvax Quad vial [id number]) 11/24/2020    Pneumococcal Conjugate (PCV-13) 03/28/2019    Pneumococcal Polysaccharide (PPSV-23) 11/24/2020           Screening for infection with Hepatitis C is recommended for anyone born between [birth year] through 1965. The table at the top of this document indicates the status of this and other preventive services. A bone mass density test (DEXA) to screen for osteoporosis or thinning of the bones should be done at least once after age 72 and may be done up to every 2 years as determined by you and your health care provider. The most recent DEXA we have on file for you is:  DEXA Results (most recent):  Results from Abstract encounter on 11/12/20    DEXA BONE DENSITY STUDY AXIAL      Screening for diabetes mellitus with a blood sugar test (glucose) should be done at least every 3 years until age 79. You and your health care provider may decide whether to continue screening after age 79. The most recent blood glucose we have on file for you is:   Lab Results   Component Value Date/Time    Glucose 134 (H) 09/01/2021 01:51 PM    Glucose (POC) 118 (H) 11/17/2020 12:22 PM       Fasting sugars >126 on 2 separate occassions are consistent with diabetes.   Random sugars >200 on 2 separate occassions are consistent with diabetes    Glaucoma is a disease of the eye due to increased ocular pressure that can lead to blindness. People with risk factors for glaucoma ( race, diabetes, family history) should consider screening at least every 2 years by an eye professional.     Cardiovascular screening tests that check for elevated lipids or cholesterol (fatty part of blood) which can lead to heart disease and strokes should be done every 4-6 years through age 79. You and your health care provider may decide whether to continue screening after age 79. The most recent lipid panel we have on file for you is:   Lab Results   Component Value Date/Time    Cholesterol, total 275 (H) 03/16/2021 01:32 PM    HDL Cholesterol 137 03/16/2021 01:32 PM    LDL, calculated 123 (H) 03/16/2021 01:32 PM    LDL, calculated 96.4 11/17/2020 06:18 AM    VLDL, calculated 15 03/16/2021 01:32 PM    VLDL, calculated 13.6 11/17/2020 06:18 AM    Triglyceride 92 03/16/2021 01:32 PM    CHOL/HDL Ratio 1.8 11/17/2020 06:18 AM       Colorectal cancer screening that evaluates for blood or polyps in your colon for people with average risk should be done yearly as a stool test, every five years as a flexible sigmoidoscope or every 10 years as a colonoscopy up to age 76. You and your health care provider may decide whether to continue screening after age 76. Breast cancer screening with a mammogram is recommended at least once every 2 years  for women age 54-69. You and your health care provider may decide whether to continue screening after age 76. The most recent mammogram we have on file for you is:   [facility] Results (most recent):  Results from [facility] [place] [facility] on 09[facility]29/20    [facility] MAMMO BI SCREENING INCL CAD    Narrative  Examination: Screening Mammogram    Indication/History: Screening. No current issues. Comparisons: Mammograms 5/10/2016-9/26/2019    Findings:     The breasts are heterogeneously dense which can obscure small masses. The study is limited due to the patient's inability to stand for repeat imaging. No suspicious masses or malignant type calcifications are identified given the  study limitations. 3D images were reviewed. Computer aided detection (CAD) is used. Impression  Impression:    Limited study. No specific mammographic evidence of malignancy. Next screening  mammogram is recommended in one year. CAROLE 5-year risk: 1.6%  CAROLE Lifetime risk: 6.2%      BI-RADS 1: Negative      Screening for cervical cancer with a pap smear is recommended for all women with a cervix until age 72. The frequency of this test is based on the details of her prior pap smear testing. You and your health care provider may decide whether to continue screening after age 72. Your Medicare Wellness Exam is recommended annually. Well Visit, Women 48 to 72: Care Instructions  Overview     Well visits can help you stay healthy. Your doctor has checked your overall health and may have suggested ways to take good care of yourself. Your doctor also may have recommended tests. At home, you can help prevent illness with healthy eating, regular exercise, and other steps. Follow-up care is a key part of your treatment and safety. Be sure to make and go to all appointments, and call your doctor if you are having problems. It's also a good idea to know your test results and keep a list of the medicines you take. How can you care for yourself at home? · Get screening tests that you and your doctor decide on. Screening helps find diseases before any symptoms appear. · Eat healthy foods. Choose fruits, vegetables, whole grains, protein, and low-fat dairy foods. Limit fat, especially saturated fat. Reduce salt in your diet. · Limit alcohol. Have no more than 1 drink a day or 7 drinks a week. · Get at least 30 minutes of exercise on most days of the week. Walking is a good choice.  You also may want to do other activities, such as running, swimming, cycling, or playing tennis or team sports. · Reach and stay at a healthy weight. This will lower your risk for many problems, such as obesity, diabetes, heart disease, and high blood pressure. · Do not smoke. Smoking can make health problems worse. If you need help quitting, talk to your doctor about stop-smoking programs and medicines. These can increase your chances of quitting for good. · Care for your mental health. It is easy to get weighed down by worry and stress. Learn strategies to manage stress, like deep breathing and mindfulness, and stay connected with your family and community. If you find you often feel sad or hopeless, talk with your doctor. Treatment can help. · Talk to your doctor about whether you have any risk factors for sexually transmitted infections (STIs). You can help prevent STIs if you wait to have sex with a new partner (or partners) until you've each been tested for STIs. It also helps if you use condoms (male or female condoms) and if you limit your sex partners to one person who only has sex with you. Vaccines are available for some STIs. · If you think you may have a problem with alcohol or drug use, talk to your doctor. This includes prescription medicines (such as amphetamines and opioids) and illegal drugs (such as cocaine and methamphetamine). Your doctor can help you figure out what type of treatment is best for you. · Protect your skin from too much sun. When you're outdoors from 10 a.m. to 4 p.m., stay in the shade or cover up with clothing and a hat with a wide brim. Wear sunglasses that block UV rays. Even when it's cloudy, put broad-spectrum sunscreen (SPF 30 or higher) on any exposed skin. · See a dentist one or two times a year for checkups and to have your teeth cleaned. · Wear a seat belt in the car. When should you call for help?   Watch closely for changes in your health, and be sure to contact your doctor if you have any problems or symptoms that concern you. Where can you learn more? Go to http://www.gray.com/  Enter [id number] in the search box to learn more about \"Well Visit, Women 50 to 72: Care Instructions. \"  Current as of: February 11, 2021               Content Version: 13.0  © 2006-2021 [employer]. Care instructions adapted under license by [employer] (which disclaims liability or warranty for this information). If you have questions about a medical condition or this instruction, always ask your healthcare professional. [place] [address] any warranty or liability for your use of this information. Hip Bursitis: Care Instructions  Your Care Instructions     Bursitis is inflammation of the bursa. A bursa is a small sac of fluid that cushions a joint and helps it move easily. A bursa sits between a bone in the hip and the muscles and tendons in the thigh and buttock. Injury or overuse of the hip can cause bursitis. Activities that can lead to bursitis include twisting and rapid joint movement. Bursitis can cause hip pain. Bursitis usually gets better if you avoid the activity that caused it. If pain lasts or gets worse despite home treatment, your doctor may draw fluid from the bursa through a needle. This may relieve your pain and help your doctor know if you have an infection. If so, your doctor will prescribe antibiotics. If you have inflammation only, you may get a corticosteroid shot to reduce swelling and pain. Sometimes surgery is needed to drain or remove the bursa. Follow-up care is a key part of your treatment and safety. Be sure to make and go to all appointments, and call your doctor if you are having problems. It's also a good idea to know your test results and keep a list of the medicines you take. How can you care for yourself at home? · Put ice or a cold pack on your hip for 10 to 20 minutes at a time.  Put a thin cloth between the ice and your skin. · After 3 days of using ice, you may use heat on your hip. You can use a hot water bottle, a heating pad set on low, or a warm, moist towel. · Rest your hip. Stop any activities that cause pain. Switch to activities that do not stress your hip. · Take your medicines exactly as prescribed. Call your doctor if you think you are having a problem with your medicine. · Ask your doctor if you can take an over-the-counter pain medicine, such as acetaminophen (Tylenol), ibuprofen (Advil, Motrin), or naproxen (Aleve). Be safe with medicines. Read and follow all instructions on the label. · To prevent stiffness, gently move the hip joint as much as you can without pain every day. As the pain gets better, keep doing range-of-motion exercises. Ask your doctor for exercises that will make the muscles around the hip joint stronger. Do these as directed. · You can slowly return to the activity that caused the pain, but do it with less effort until you can do it without pain or swelling. Be sure to warm up before and stretch after you do the activity. When should you call for help? Call your doctor now or seek immediate medical care if:    · You have a fever.     · You have increased swelling or redness in your hip.     · You cannot use your hip, or the pain in your hip gets worse. Watch closely for changes in your health, and be sure to contact your doctor if:    · You have pain for 2 weeks or longer despite home treatment. Where can you learn more? Go to http://www.gray.com/  Enter I156 in the search box to learn more about \"Hip Bursitis: Care Instructions. \"  Current as of: July 1, 2021               Content Version: 13.0  © 0797-3649 [employer]. Care instructions adapted under license by "[employer] (which disclaims liability or warranty for this information).  If you have questions about a medical condition or this instruction, always ask your healthcare professional. [address] any warranty or liability for your use of this information. Hip Bursitis: Exercises  Introduction  Here are some examples of exercises for you to try. The exercises may be suggested for a condition or for rehabilitation. Start each exercise slowly. Ease off the exercises if you start to have pain. You will be told when to start these exercises and which ones will work best for you. How to do the exercises  Hip rotator stretch    1. Lie on your back with both knees bent and your feet flat on the floor. 2. Put the ankle of your affected leg on your opposite thigh near your knee. 3. Use your hand to gently push your knee away from your body until you feel a gentle stretch around your hip. 4. Hold the stretch for 15 to 30 seconds. 5. Repeat 2 to 4 times. 6. Repeat steps 1 through 5, but this time use your hand to gently pull your knee toward your opposite shoulder. Iliotibial band stretch    1. Lean sideways against a wall. If you are not steady on your feet, hold on to a chair or counter. 2. Stand on the leg with the affected hip, with that leg close to the wall. Then cross your other leg in front of it. 3. Let your affected hip drop out to the side of your body and against wall. Then lean away from your affected hip until you feel a stretch. 4. Hold the stretch for 15 to 30 seconds. 5. Repeat 2 to 4 times. Straight-leg raises to the outside    1. Lie on your side, with your affected hip on top. 2. Tighten the front thigh muscles of your top leg to keep your knee straight. 3. Keep your hip and your leg straight in line with the rest of your body, and keep your knee pointing forward. Do not drop your hip back. 4. Lift your top leg straight up toward the ceiling, about 12 inches off the floor. Hold for about 6 seconds, then slowly lower your leg. 5. Repeat 8 to 12 times. Clamshell    1.  Lie on your side, with your affected hip on top and your head propped on a pillow. Keep your feet and knees together and your knees bent. 2. Raise your top knee, but keep your feet together. Do not let your hips roll back. Your legs should open up like a clamshell. 3. Hold for 6 seconds. 4. Slowly lower your knee back down. Rest for 10 seconds. 5. Repeat 8 to 12 times. Follow-up care is a key part of your treatment and safety. Be sure to make and go to all appointments, and call your doctor if you are having problems. It's also a good idea to know your test results and keep a list of the medicines you take. Where can you learn more? Go to http://www.samson.com/  Enter H674 in the search box to learn more about \"Hip Bursitis: Exercises. \"  Current as of: July 1, 2021               Content Version: 13.0  © 2006-2021 [email or web address]. Care instructions adapted under license by [employer] (which disclaims liability or warranty for this information). If you have questions about a medical condition or this instruction, always ask your healthcare professional. [place] [address] any warranty or liability for your use of this information. Trochanteric Bursitis: Exercises  Introduction  Here are some examples of exercises for you to try. The exercises may be suggested for a condition or for rehabilitation. Start each exercise slowly. Ease off the exercises if you start to have pain. You will be told when to start these exercises and which ones will work best for you. How to do the exercises  Hamstring wall stretch    1. Lie on your back in a doorway, with your good leg through the open door. 2. Slide your affected leg up the wall to straighten your knee. You should feel a gentle stretch down the back of your leg. 3. Hold the stretch for at least 1 minute to begin. Then try to lengthen the time you hold the stretch to as long as 6 minutes.   4. Repeat 2 to 4 times.  5. If you do not have a place to do this exercise in a doorway, there is another way to do it:  6. Lie on your back, and bend the knee of your affected leg. 7. Loop a towel under the ball and toes of that foot, and hold the ends of the towel in your hands. 8. Straighten your knee, and slowly pull back on the towel. You should feel a gentle stretch down the back of your leg. 9. Hold the stretch for 15 to 30 seconds. Or even better, hold the stretch for 1 minute if you can. 10. Repeat 2 to 4 times. 1. Do not arch your back. 2. Do not bend either knee. 3. Keep one heel touching the floor and the other heel touching the wall. Do not point your toes. Straight-leg raises to the outside    1. Lie on your side, with your affected leg on top. 2. Tighten the front thigh muscles of your top leg to keep your knee straight. 3. Keep your hip and your leg straight in line with the rest of your body, and keep your knee pointing forward. Do not drop your hip back. 4. Lift your top leg straight up toward the ceiling, about 12 inches off the floor. Hold for about 6 seconds, then slowly lower your leg. 5. Repeat 8 to 12 times. Clamshell    1. Lie on your side, with your affected leg on top and your head propped on a pillow. Keep your feet and knees together and your knees bent. 2. Raise your top knee, but keep your feet together. Do not let your hips roll back. Your legs should open up like a clamshell. 3. Hold for 6 seconds. 4. Slowly lower your knee back down. Rest for 10 seconds. 5. Repeat 8 to 12 times. Standing quadriceps stretch    1. If you are not steady on your feet, hold on to a chair, counter, or wall. You can also lie on your stomach or your side to do this exercise. 2. Bend the knee of the leg you want to stretch, and reach behind you to grab the front of your foot or ankle with the hand on the same side. For example, if you are stretching your right leg, use your right hand.   3. Keeping your knees next to each other, pull your foot toward your buttock until you feel a gentle stretch across the front of your hip and down the front of your thigh. Your knee should be pointed directly to the ground, and not out to the side. 4. Hold the stretch for 15 to 30 seconds. 5. Repeat 2 to 4 times. Piriformis stretch    1. Lie on your back with your legs straight. 2. Lift your affected leg and bend your knee. With your opposite hand, reach across your body, and then gently pull your knee toward your opposite shoulder. 3. Hold the stretch for 15 to 30 seconds. 4. Repeat 2 to 4 times. Double knee-to-chest    1. Lie on your back with your knees bent and your feet flat on the floor. You can put a small pillow under your head and neck if it is more comfortable. 2. Bring both knees to your chest.  3. Keep your lower back pressed to the floor. Hold for 15 to 30 seconds. 4. Relax, and lower your knees to the starting position. 5. Repeat 2 to 4 times. Follow-up care is a key part of your treatment and safety. Be sure to make and go to all appointments, and call your doctor if you are having problems. It's also a good idea to know your test results and keep a list of the medicines you take. Where can you learn more? Go to http://www.gray.com/  Enter N503 in the search box to learn more about \"Trochanteric Bursitis: Exercises. \"  Current as of: July 1, 2021               Content Version: 13.0  © 2006-2021 [employer]. Care instructions adapted under license by [employer] (which disclaims liability or warranty for this information). If you have questions about a medical condition or this instruction, always ask your healthcare professional. [address] any warranty or liability for your use of this information.

## 2021-11-04 NOTE — PROGRESS NOTES
Chief Complaint   Patient presents with   [facility] Annual Wellness Visit     Medicare (Subsequent)    Hypertension    Diabetes    COPD    Labs    Hip Pain     Patient states she fell last month and went to see Dr. Duncan Beth. Nothing was broken (left hip) but she still has pain with right hip. She is requesting something for pain because the Lidocaine patches , Ibuprofen and Tylenol do not work.  Medication Refill     Patient states she needs generic Singulair so the insurance will pay for it.  Lesion     Sore on nose from wearing nasal cannula. 1. Have you been to the ER, urgent care clinic since your last visit? Hospitalized since your last visit? No    2. Have you seen or consulted any other health care providers outside of the [address] [place] [address] since your last visit? Include any pap smears or colon screening. No    This is the Subsequent Medicare Annual Wellness Exam, performed 12 months or more after the Initial AWV or the last Subsequent AWV    I have reviewed the patient's medical history in detail and updated the computerized patient record. Assessment/Plan   Education and counseling provided:  Are appropriate based on today's review and evaluation  End-of-Life planning (with patient's consent)    1. Type 2 diabetes mellitus without complication, without long-term current use of insulin (HCC)  -     AMB POC HEMOGLOBIN A1C  -     LIPID PANEL  -     MICROALBUMIN, UR, RAND W/ MICROALB/CREAT RATIO  2. Encounter for annual wellness visit (AWV) in Medicare patient  3. Needs flu shot  -     FLU (FLUAD QUAD INFLUENZA VACCINE,QUAD,ADJUVANTED)  4. Primary hypertension  -     CBC WITH AUTOMATED DIFF  -     METABOLIC PANEL, COMPREHENSIVE  -     URINALYSIS W/ RFLX MICROSCOPIC  5. Gastroesophageal reflux disease without esophagitis  6. Type 2 diabetes mellitus with other specified complication, without long-term current use of insulin (HCC)  7. Mixed simple and mucopurulent chronic bronchitis ([facility]  8. Degeneration of lumbar intervertebral disc  -     baclofen (LIORESAL) 10 mg tablet; Take 1 Tablet by mouth two (2) times a day. Indications: Muscle cramps, Normal, Disp-60 Tablet, R-1  9. Vitamin D deficiency  10. Low magnesium level  -     MAGNESIUM  11. Allergic rhinitis, unspecified seasonality, unspecified trigger  -     montelukast (SINGULAIR) 10 mg tablet; TAKE 1 TABLET BY MOUTH EVERY DAY FOR ALLERGY SYMPTOMS, Normal, Disp-90 Tablet, R-1  12. Myalgia  -     baclofen (LIORESAL) 10 mg tablet; Take 1 Tablet by mouth two (2) times a day. Indications: Muscle cramps, Normal, Disp-60 Tablet, R-1  13. Nasal sore  -     mupirocin (BACTROBAN) 2 % ointment; Apply to sore area in nose daily as needed, Normal, Disp-22 g, R-0  14. Mucopurulent chronic bronchitis (HCC)  -     predniSONE (DELTASONE) 10 mg tablet; Take 1 tablet by mouth twice a day for 10 days and then reduce to 1 tablet by mouth once a day, Normal, Disp-100 Tablet, R-1       Depression Risk Factor Screening     3 most recent PHQ Screens 11/4/2021   Little interest or pleasure in doing things Not at all   Feeling down, depressed, irritable, or hopeless Not at all   Total Score PHQ 2 0       Alcohol Risk Screen    Do you average more than 1 drink per night or more than 7 drinks a week:  No    On any one occasion in the past three months have you have had more than 3 drinks containing alcohol:  No        Functional Ability and Level of Safety    Hearing: Hearing is good. Activities of Daily Living: The home contains: handrails and grab bars  Patient needs help with:  transportation, preparing meals, laundry, housework, dressing and bathing      Ambulation: with difficulty, uses a walker     Fall Risk:  Fall Risk Assessment, last 12 mths 11/4/2021   Able to walk? Yes   Fall in past 12 months? 1   Do you feel unsteady? 1   Are you worried about falling 1   Is the gait abnormal? 0   Number of falls in past 12 months 1   Fall with injury?  1      Abuse Screen:  Patient is not abused       Cognitive Screening    Has your family/caregiver stated any concerns about your memory: no     Cognitive Screening: Normal - Mini Cog Test    Health Maintenance Due     Health Maintenance Due   Topic Date Due    Foot Exam Q1  Never done    Eye Exam Retinal or Dilated  Never done    DTaP/Tdap/Td series (1 - Tdap) Never done    Cervical cancer screen  Never done    Shingrix Vaccine Age 50> (1 of 2) Never done       Patient Care Team   Patient Care Team:  Peter Goode MD as PCP - General (Family Medicine)  Peter Goode MD as PCP - [address] [place] [address]  [facility] Provider  Rajni Guillermo MD (Orthopedic Surgery)    History     Patient Active Problem List   Diagnosis Code    Arthritis of right hip M16.11    COPD exacerbation ([address] J44.1    Cecum mass K63.89    Seasonal allergic rhinitis J30.2    Intermittent asthma J45.20    Vitamin D deficiency E55.9    Gastroesophageal reflux disease without esophagitis K21.9    HTN (hypertension) I10    Bronchitis J40    Degeneration of lumbar intervertebral disc M51.36    Type 2 diabetes mellitus with other specified complication, without long-term current use of insulin (HCC) E11.69    Acute kidney injury ([address] N17.9    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    Menopause present Z78.0    Chest pain R07.9    Chronic obstructive pulmonary disease ([address] J44.9    History of multiple allergies Z91.89    Chronic rhinitis J31.0    PND (post-nasal drip) R09.82     Past Medical History:   Diagnosis Date    Arthritis     Bronchitis 9/1/2020    Chronic obstructive pulmonary disease ([address]     Chronic pain     Diabetes ([address]     Gastroesophageal reflux disease without esophagitis 9/1/2020    GERD (gastroesophageal reflux disease)     GERD (gastroesophageal reflux disease)     History of multiple allergies     HTN (hypertension) 9/1/2020    Hypertension     Intermittent asthma 9/1/2020    Lung disorder     PATIENT IS ON OXYGEN    Menopause     Pain of upper abdomen 9/1/2020    Seasonal allergic rhinitis 9/1/2020    Sinus problem     Vitamin D deficiency 9/1/2020      Past Surgical History:   Procedure Laterality Date    COLONOSCOPY N/A 5/25/2020    COL[facility] performed [facility] Mayte Ram [facility] at [facility]COPY    HX COLONOSCOPY      HX GI      colonscopy    HX ORTHOPAEDIC  10/24/2018    Total Right Hip Arthroplasty Dr. Jaya Byers. Current Outpatient Medications   Medication Sig Dispense Refill    montelukast (SINGULAIR) 10 mg tablet TAKE 1 TABLET BY MOUTH EVERY DAY FOR ALLERGY SYMPTOMS 90 Tablet 1    baclofen (LIORESAL) 10 mg tablet Take 1 Tablet by mouth two (2) times a day. Indications: Muscle cramps 60 Tablet 1    mupirocin (BACTROBAN) 2 % ointment Apply to sore area in nose daily as needed 22 g 0    predniSONE (DELTASONE) 10 mg tablet Take 1 tablet by mouth twice a day for 10 days and then reduce to 1 tablet by mouth once a day 100 Tablet 1    Ultra Thin Lancets 30 gauge misc USE ONCE LANCET PER DAY TO CHECK BLOOD SUGAR. 90 Lancet 1    albuterol-ipratropium (DUO-NEB) 2.5 mg-0.5 mg/3 ml nebu USE 1 VIAL EVERY 4 6 HOURS AS NEEDED. DX J44.9. NEED MED B INFO      albuterol (PROVENTIL HFA, VENTOLIN HFA, PROAIR HFA) 90 mcg/actuation inhaler Take 1 Puff by inhalation every four (4) hours as needed for Wheezing.  18 g 5    ergocalciferol (ERGOCALCIFEROL) 1,250 mcg (50,000 unit) capsule TAKE ONE CAPSULE BY MOUTH ONCE A WEEK INDICATIONS: VITAMIN D DEFICIENCY (HIGH DOSE THERAPY) 12 Capsule 1    atorvastatin (LIPITOR) 20 mg tablet TAKE 1 TABLET BY MOUTH EVERY DAY 90 Tablet 1    Trelegy Ellipta 100-62.5-25 mcg inhaler       fluticasone propionate (FLONASE) 50 mcg/actuation nasal spray SPRAY 2 SPRAYS INTO EACH NOSTRIL EVERY DAY 3 Bottle 1    lisinopriL (PRINIVIL, ZESTRIL) 20 mg tablet TAKE 1 TABLET BY MOUTH EVERY DAY 90 Tablet 1    furosemide (LASIX) 40 mg tablet Take 1 Tablet by mouth daily. 90 Tablet 1    amLODIPine (NORVASC) 10 mg tablet Take 1 Tablet by mouth daily. 90 Tablet 1    mometasone (ELOCON) 0.1 % ointment       metFORMIN (GLUCOPHAGE) 500 mg tablet Take 1 Tablet by mouth two (2) times daily (with meals). 180 Tablet 1    docusate sodium (COLACE) 100 mg capsule TAKE 1 CAP BY MOUTH 2 TIMES A DAY FOR 90 DAYS. 60 Cap 2    aspirin delayed-release 81 mg tablet TAKE 1 TABLET BY MOUTH EVERY DAY 90 Tab 1    Alcohol Prep Pads padm 1 PAD BY APPLY EXTERNALLY ROUTE DAILY. USE TO CHECK BLOOD SUGAR DAILY. 100 Pad 1    True Metrix Glucose Test Strip strip USE ONE STRIP DAILY TO TEST BLOOD GLUCOSE SUBCUTANEOUSLY. E11.9 100 Strip 1    azelastine (ASTELIN) 137 mcg (0.1 %) nasal spray 2 Sprays by Both Nostrils route nightly. 1 Bottle 5    lidocaine (LIDODERM) 5 % APPLY 1 PATCH EVERY DAY. REMOVE AFTER 12 HOURS       No Known Allergies    Family History   Problem Relation Age of Onset    Hypertension Mother     Cancer Sister      Social History     Tobacco Use    Smoking status: Former Smoker     Packs/day: 1.00     Years: 15.00     Pack years: 15.00     Quit date: 2000     Years since quittin.8    Smokeless tobacco: Never Used   Substance Use Topics    Alcohol use: Yes     Alcohol/week: 3.0 standard drinks     Types: 3 Cans of beer per week     Maurilio Watts (: 1955) is a 73 y.o. female, established patient, here for evaluation of the following chief complaint(s): Annual Wellness Visit (Medicare (Subsequent)), Hypertension, Diabetes, COPD, Labs, Hip Pain (Patient states she fell last month and went to see Dr. Gautam Cisse. Nothing was broken (left hip) but she still has pain with right hip.   She is requesting something for pain because the Lidocaine patches , Ibuprofen and Tylenol do not work.), Medication Refill (Patient states she needs generic Singulair so the insurance will pay for it.), and Lesion (Sore on nose from wearing nasal cannula.)       ASSESSMENT/PLAN:  Below is the assessment and plan developed based on review of pertinent history, physical exam, labs, studies, and medications. 1. Type 2 diabetes mellitus without complication, without long-term current use of insulin ([facility])  -     AMB POC HEMOGLOBIN A1C  -     LIPID PANEL  -     MICROALBUMIN, UR, RAND W/ MICROALB/CREAT RATIO  2. Encounter for annual wellness visit (AWV) in Medicare patient  3. Needs flu shot  -     FLU (FLUAD QUAD INFLUENZA VACCINE,QUAD,ADJUVANTED)  4. Primary hypertension  -     CBC WITH AUTOMATED DIFF  -     METABOLIC PANEL, COMPREHENSIVE  -     URINALYSIS W/ RFLX MICROSCOPIC  5. Gastroesophageal reflux disease without esophagitis  6. Type 2 diabetes mellitus with other specified complication, without long-term current use of insulin ([facility])  7. Mixed simple and mucopurulent chronic bronchitis ([address]  8. Degeneration of lumbar intervertebral disc  -     baclofen (LIORESAL) 10 mg tablet; Take 1 Tablet by mouth two (2) times a day. Indications: Muscle cramps, Normal, Disp-60 Tablet, R-1  9. Vitamin D deficiency  10. Low magnesium level  -     MAGNESIUM  11. Allergic rhinitis, unspecified seasonality, unspecified trigger  -     montelukast (SINGULAIR) 10 mg tablet; TAKE 1 TABLET BY MOUTH EVERY DAY FOR ALLERGY SYMPTOMS, Normal, Disp-90 Tablet, R-1  12. Myalgia  -     baclofen (LIORESAL) 10 mg tablet; Take 1 Tablet by mouth two (2) times a day. Indications: Muscle cramps, Normal, Disp-60 Tablet, R-1  13. Nasal sore  -     mupirocin (BACTROBAN) 2 % ointment; Apply to sore area in nose daily as needed, Normal, Disp-22 g, R-0  14. Mucopurulent chronic bronchitis (HCC)  -     predniSONE (DELTASONE) 10 mg tablet; Take 1 tablet by mouth twice a day for 10 days and then reduce to 1 tablet by mouth once a day, Normal, Disp-100 Tablet, R-1      Return in about 3 months (around 2/4/2022) for Follow up of chronic medical conditions, Fasting Lab Appointment.       SUBJECTIVE/OBJECTIVE:  This patient is here for her Annual Medicare Wellness exam and follow up for her Type 2 DM, COPD, hypertension, hyperlipidemia, GERD, DJD spine and Vitamin D def. She has persistent pain in the hips radiating to the thighs. She has an appointment in [place]. She will see a back and hip specialist in [place]. Review of Systems   Constitutional: Negative. Respiratory: Positive for shortness of breath and wheezing. On Oxygen   Cardiovascular: Negative. Gastrointestinal: Negative. Endocrine: Negative. Checks sugars daily   Genitourinary: Negative. Musculoskeletal: Positive for arthralgias (Hips and thigh), back pain and myalgias. Allergic/Immunologic: Negative. Neurological: Negative. Hematological: Negative. Psychiatric/Behavioral: Negative. Physical Exam  Vitals and nursing note reviewed. Constitutional:       Appearance: Normal appearance. She is normal weight. She is ill-appearing. HENT:      Head: Normocephalic and atraumatic. Cardiovascular:      Rate and Rhythm: Normal rate and regular rhythm. Heart sounds: Normal heart sounds. Pulmonary:      Effort: Pulmonary effort is normal.      Breath sounds: Normal breath sounds. Abdominal:      General: Abdomen is flat. Bowel sounds are normal.      Palpations: Abdomen is soft. Musculoskeletal:         General: Normal range of motion. Legs:    Neurological:      General: No focal deficit present. Mental Status: She is alert. Psychiatric:         Mood and Affect: Mood normal.         Behavior: Behavior normal.         Thought Content: Thought content normal.         Judgment: Judgment normal.       Overall this patient's medical conditions are stable. She does have pain on the lateral aspects of both hips and likely has an element of trochanteric bursitis.   She already has an appointment with a specialist in [place] and will increase the dose of her prednisone to 2 tablets a day for 10 days and then reduce it from there. I also have agreed to give her some baclofen for the myalgias she has in her quadriceps muscle which may be related to this problem. We will check labs to see how she is doing with her other medical problems. She continues to use the oxygen and seems to do well with this. An electronic signature was used to authenticate this note.   -- MD Adrianne Amin MD

## 2021-11-05 ENCOUNTER — TELEPHONE (OUTPATIENT)
Dept: PRIMARY CARE CLINIC | Age: 66
End: 2021-11-05

## 2021-11-05 NOTE — TELEPHONE ENCOUNTER
Informed patient that Dr. Blaine Hu had sent in Bactroban and Prednisone to [facility] for her late yesterday.

## 2021-11-05 NOTE — TELEPHONE ENCOUNTER
----- Message from Asael Arias sent at 11/4/2021  5:50 PM EDT -----  Subject: Message to Provider    QUESTIONS  Information for Provider? Patient is looking for the Antibiotic ointment   for nose and Prednisone RX's that she thought were being called in after   her appt yesterday. There were two Rx's called in for her but she says   should be two more. Please call her. Thank you  ---------------------------------------------------------------------------  --------------  CALL BACK INFO  What is the best way for the office to contact you? OK to leave message on   voicemail  Preferred Call Back Phone Number? [phone]  ---------------------------------------------------------------------------  --------------  SCRIPT ANSWERS  Relationship to Patient?  Self abnormal

## 2021-11-10 LAB
ALBUMIN SERPL-MCNC: 4.9 G/DL (ref 3.8–4.8)
ALBUMIN/CREAT UR: <15 MG/G CREAT (ref 0–29)
ALBUMIN/GLOB SERPL: 2.1 {RATIO} (ref 1.2–2.2)
ALP SERPL-CCNC: 90 IU/L (ref 44–121)
ALT SERPL-CCNC: 33 IU/L (ref 0–32)
APPEARANCE UR: CLEAR
AST SERPL-CCNC: 38 IU/L (ref 0–40)
BACTERIA #/AREA URNS HPF: NORMAL /[HPF]
BASOPHILS # BLD AUTO: 0.1 X10E3/UL (ref 0–0.2)
BASOPHILS NFR BLD AUTO: 1 %
BILIRUB SERPL-MCNC: 0.4 MG/DL (ref 0–1.2)
BILIRUB UR QL STRIP: NEGATIVE
BUN SERPL-MCNC: 11 MG/DL (ref 8–27)
BUN/CREAT SERPL: 13 (ref 12–28)
CALCIUM SERPL-MCNC: 10.1 MG/DL (ref 8.7–10.3)
CASTS URNS QL MICRO: NORMAL /LPF
CHLORIDE SERPL-SCNC: 84 MMOL/L (ref 96–106)
CHOLEST SERPL-MCNC: 248 MG/DL (ref 100–199)
CO2 SERPL-SCNC: 30 MMOL/L (ref 20–29)
COLOR UR: YELLOW
CREAT SERPL-MCNC: 0.87 MG/DL (ref 0.57–1)
CREAT UR-MCNC: 19.4 MG/DL
EOSINOPHIL # BLD AUTO: 0.1 X10E3/UL (ref 0–0.4)
EOSINOPHIL NFR BLD AUTO: 1 %
EPI CELLS #/AREA URNS HPF: NORMAL /HPF (ref 0–10)
ERYTHROCYTE [DISTWIDTH] IN BLOOD BY AUTOMATED COUNT: 12.6 % (ref 11.7–15.4)
GLOBULIN SER CALC-MCNC: 2.3 G/DL (ref 1.5–4.5)
GLUCOSE SERPL-MCNC: 87 MG/DL (ref 65–99)
GLUCOSE UR QL: NEGATIVE
HCT VFR BLD AUTO: 31.3 % (ref 34–46.6)
HDLC SERPL-MCNC: 138 MG/DL
HGB BLD-MCNC: 10.6 G/DL (ref 11.1–15.9)
HGB UR QL STRIP: NEGATIVE
IMM GRANULOCYTES # BLD AUTO: 0.1 X10E3/UL (ref 0–0.1)
IMM GRANULOCYTES NFR BLD AUTO: 1 %
KETONES UR QL STRIP: NEGATIVE
LDLC SERPL CALC-MCNC: 99 MG/DL (ref 0–99)
LEUKOCYTE ESTERASE UR QL STRIP: ABNORMAL
LYMPHOCYTES # BLD AUTO: 1.2 X10E3/UL (ref 0.7–3.1)
LYMPHOCYTES NFR BLD AUTO: 14 %
MAGNESIUM SERPL-MCNC: 1.8 MG/DL (ref 1.6–2.3)
MCH RBC QN AUTO: 31.6 PG (ref 26.6–33)
MCHC RBC AUTO-ENTMCNC: 33.9 G/DL (ref 31.5–35.7)
MCV RBC AUTO: 93 FL (ref 79–97)
MICRO URNS: ABNORMAL
MICROALBUMIN UR-MCNC: <3 UG/ML
MONOCYTES # BLD AUTO: 0.7 X10E3/UL (ref 0.1–0.9)
MONOCYTES NFR BLD AUTO: 8 %
NEUTROPHILS # BLD AUTO: 6.7 X10E3/UL (ref 1.4–7)
NEUTROPHILS NFR BLD AUTO: 75 %
NITRITE UR QL STRIP: NEGATIVE
PH UR STRIP: 6 [PH] (ref 5–7.5)
PLATELET # BLD AUTO: 340 X10E3/UL (ref 150–450)
POTASSIUM SERPL-SCNC: 4.9 MMOL/L (ref 3.5–5.2)
PROT SERPL-MCNC: 7.2 G/DL (ref 6–8.5)
PROT UR QL STRIP: NEGATIVE
RBC # BLD AUTO: 3.35 X10E6/UL (ref 3.77–5.28)
RBC #/AREA URNS HPF: NORMAL /HPF (ref 0–2)
SODIUM SERPL-SCNC: 128 MMOL/L (ref 134–144)
SP GR UR: 1.01 (ref 1–1.03)
TRIGL SERPL-MCNC: 68 MG/DL (ref 0–149)
UROBILINOGEN UR STRIP-MCNC: 0.2 MG/DL (ref 0.2–1)
VLDLC SERPL CALC-MCNC: 11 MG/DL (ref 5–40)
WBC # BLD AUTO: 8.8 X10E3/UL (ref 3.4–10.8)
WBC #/AREA URNS HPF: NORMAL /HPF (ref 0–5)

## 2021-11-14 DIAGNOSIS — E11.9 TYPE 2 DIABETES MELLITUS WITHOUT COMPLICATIONS (HCC): ICD-10-CM

## 2021-11-14 RX ORDER — METFORMIN HYDROCHLORIDE 500 MG/1
500 TABLET ORAL 2 TIMES DAILY WITH MEALS
Qty: 180 TABLET | Refills: 1 | Status: SHIPPED | OUTPATIENT
Start: 2021-11-14 | End: 2022-06-22 | Stop reason: ALTCHOICE

## 2021-11-15 DIAGNOSIS — J44.1 COPD EXACERBATION (HCC): ICD-10-CM

## 2021-11-15 DIAGNOSIS — I10 ESSENTIAL (PRIMARY) HYPERTENSION: ICD-10-CM

## 2021-11-18 DIAGNOSIS — E11.9 CONTROLLED TYPE 2 DIABETES MELLITUS WITHOUT COMPLICATION, WITHOUT LONG-TERM CURRENT USE OF INSULIN (HCC): ICD-10-CM

## 2021-11-18 NOTE — TELEPHONE ENCOUNTER
Requested Prescriptions     Pending Prescriptions Disp Refills    glucose blood VI test strips (True Metrix Glucose Test Strip) strip 100 Strip 1     Patient is completely out.

## 2021-11-19 RX ORDER — FUROSEMIDE 40 MG/1
TABLET ORAL
Qty: 90 TABLET | Refills: 1 | Status: SHIPPED | OUTPATIENT
Start: 2021-11-19 | End: 2022-03-23 | Stop reason: SDUPTHER

## 2021-11-19 RX ORDER — ALBUTEROL SULFATE 90 UG/1
AEROSOL, METERED RESPIRATORY (INHALATION)
Qty: 18 EACH | Refills: 1 | Status: SHIPPED | OUTPATIENT
Start: 2021-11-19 | End: 2022-05-31 | Stop reason: SDUPTHER

## 2021-11-19 RX ORDER — LISINOPRIL 20 MG/1
TABLET ORAL
Qty: 90 TABLET | Refills: 1 | Status: SHIPPED | OUTPATIENT
Start: 2021-11-19 | End: 2022-04-26 | Stop reason: SDUPTHER

## 2021-11-19 RX ORDER — CALCIUM CITRATE/VITAMIN D3 200MG-6.25
TABLET ORAL
Qty: 100 STRIP | Refills: 1 | Status: SHIPPED | OUTPATIENT
Start: 2021-11-19 | End: 2022-02-18 | Stop reason: SDUPTHER

## 2021-11-26 DIAGNOSIS — E87.1 HYPONATREMIA: Primary | ICD-10-CM

## 2021-11-30 ENCOUNTER — TELEPHONE (OUTPATIENT)
Dept: BEHAVIORAL/MENTAL HEALTH CLINIC | Age: 66
End: 2021-11-30

## 2021-12-01 ENCOUNTER — TELEPHONE (OUTPATIENT)
Dept: PRIMARY CARE CLINIC | Age: 66
End: 2021-12-01

## 2021-12-01 DIAGNOSIS — K59.00 CONSTIPATION, UNSPECIFIED: ICD-10-CM

## 2021-12-01 RX ORDER — DOCUSATE SODIUM 100 MG/1
CAPSULE, LIQUID FILLED ORAL
Qty: 60 CAPSULE | Refills: 2 | Status: SHIPPED | OUTPATIENT
Start: 2021-12-01 | End: 2022-04-01 | Stop reason: SDUPTHER

## 2021-12-01 NOTE — TELEPHONE ENCOUNTER
Requested Prescriptions     Pending Prescriptions Disp Refills    docusate sodium (COLACE) 100 mg capsule 60 Capsule 2

## 2021-12-01 NOTE — TELEPHONE ENCOUNTER
Patient would like for you to give her a call. Needs further clarification on Furosemide dosage and for how long.

## 2021-12-03 NOTE — TELEPHONE ENCOUNTER
Reviewed recommendations re: lab results again with patient. She stated understanding and will come in on 12/15/2021 for repeat lab work.

## 2021-12-06 ENCOUNTER — TELEPHONE (OUTPATIENT)
Dept: PRIMARY CARE CLINIC | Age: 66
End: 2021-12-06

## 2021-12-06 NOTE — TELEPHONE ENCOUNTER
Brian Paz, [facility], following up on paperwork- COM-  that was mailed for incontinence supplies.

## 2021-12-16 LAB
BUN SERPL-MCNC: 9 MG/DL (ref 8–27)
BUN/CREAT SERPL: 11 (ref 12–28)
CALCIUM SERPL-MCNC: 10.1 MG/DL (ref 8.7–10.3)
CHLORIDE SERPL-SCNC: 89 MMOL/L (ref 96–106)
CO2 SERPL-SCNC: 28 MMOL/L (ref 20–29)
CREAT SERPL-MCNC: 0.81 MG/DL (ref 0.57–1)
GLUCOSE SERPL-MCNC: 141 MG/DL (ref 65–99)
POTASSIUM SERPL-SCNC: 4.7 MMOL/L (ref 3.5–5.2)
SODIUM SERPL-SCNC: 133 MMOL/L (ref 134–144)

## 2021-12-23 DIAGNOSIS — E78.5 HYPERLIPIDEMIA, UNSPECIFIED HYPERLIPIDEMIA TYPE: ICD-10-CM

## 2021-12-23 RX ORDER — ATORVASTATIN CALCIUM 20 MG/1
20 TABLET, FILM COATED ORAL DAILY
Qty: 90 TABLET | Refills: 1 | Status: SHIPPED | OUTPATIENT
Start: 2021-12-23 | End: 2022-05-31 | Stop reason: ALTCHOICE

## 2022-01-04 DIAGNOSIS — E11.9 TYPE 2 DIABETES MELLITUS WITHOUT COMPLICATIONS (HCC): ICD-10-CM

## 2022-01-04 RX ORDER — METFORMIN HYDROCHLORIDE 500 MG/1
500 TABLET ORAL 2 TIMES DAILY WITH MEALS
Qty: 180 TABLET | Refills: 1 | OUTPATIENT
Start: 2022-01-04

## 2022-01-04 NOTE — TELEPHONE ENCOUNTER
Requested Prescriptions     Pending Prescriptions Disp Refills    metFORMIN (GLUCOPHAGE) 500 mg tablet 180 Tablet 1     Sig: Take 1 Tablet by mouth two (2) times daily (with meals).

## 2022-01-11 ENCOUNTER — TELEPHONE (OUTPATIENT)
Dept: PRIMARY CARE CLINIC | Age: 67
End: 2022-01-11

## 2022-02-03 DIAGNOSIS — I10 ESSENTIAL HYPERTENSION: ICD-10-CM

## 2022-02-03 RX ORDER — AMLODIPINE BESYLATE 10 MG/1
10 TABLET ORAL DAILY
Qty: 90 TABLET | Refills: 0 | Status: SHIPPED | OUTPATIENT
Start: 2022-02-03 | End: 2022-04-22 | Stop reason: SDUPTHER

## 2022-02-04 ENCOUNTER — TELEPHONE (OUTPATIENT)
Dept: PRIMARY CARE CLINIC | Age: 67
End: 2022-02-04

## 2022-02-04 NOTE — TELEPHONE ENCOUNTER
----- Message from Day Chacon sent at 2/4/2022  9:44 AM EST -----  Subject: Refill Request    QUESTIONS  Name of Medication? predniSONE (DELTASONE) 20 mg tablet  Patient-reported dosage and instructions? once a day in the morning  How many days do you have left? 0  Preferred Pharmacy? [id number]  Pharmacy phone number (if available)? [phone]  ---------------------------------------------------------------------------  --------------  Nereida DORAN  What is the best way for the office to contact you? Do not leave any   message, patient will call back for answer  Preferred Call Back Phone Number?  [phone]

## 2022-02-15 ENCOUNTER — TELEPHONE (OUTPATIENT)
Dept: BEHAVIORAL/MENTAL HEALTH CLINIC | Age: 67
End: 2022-02-15

## 2022-02-15 NOTE — TELEPHONE ENCOUNTER
Patient called and said that medicare said that she can't get test strips anymore. Patient is requesting a phone call to find out what she can do.

## 2022-02-18 ENCOUNTER — OFFICE VISIT (OUTPATIENT)
Dept: PRIMARY CARE CLINIC | Age: 67
End: 2022-02-18
Payer: MEDICARE

## 2022-02-18 ENCOUNTER — TELEPHONE (OUTPATIENT)
Dept: PRIMARY CARE CLINIC | Age: 67
End: 2022-02-18

## 2022-02-18 VITALS
RESPIRATION RATE: 18 BRPM | DIASTOLIC BLOOD PRESSURE: 63 MMHG | HEART RATE: 108 BPM | OXYGEN SATURATION: 95 % | WEIGHT: 161 LBS | BODY MASS INDEX: 25.27 KG/M2 | TEMPERATURE: 97.2 F | HEIGHT: 67 IN | SYSTOLIC BLOOD PRESSURE: 130 MMHG

## 2022-02-18 DIAGNOSIS — J30.9 ALLERGIC RHINITIS, UNSPECIFIED SEASONALITY, UNSPECIFIED TRIGGER: ICD-10-CM

## 2022-02-18 PROCEDURE — G9899 SCRN MAM PERF RSLTS DOC: HCPCS | Performed by: NURSE PRACTITIONER

## 2022-02-18 PROCEDURE — G8399 PT W/DXA RESULTS DOCUMENT: HCPCS | Performed by: NURSE PRACTITIONER

## 2022-02-18 PROCEDURE — 3017F COLORECTAL CA SCREEN DOC REV: CPT | Performed by: NURSE PRACTITIONER

## 2022-02-18 PROCEDURE — G8752 SYS BP LESS 140: HCPCS | Performed by: NURSE PRACTITIONER

## 2022-02-18 PROCEDURE — G8536 NO DOC ELDER MAL SCRN: HCPCS | Performed by: NURSE PRACTITIONER

## 2022-02-18 PROCEDURE — G8427 DOCREV CUR MEDS BY ELIG CLIN: HCPCS | Performed by: NURSE PRACTITIONER

## 2022-02-18 PROCEDURE — 1090F PRES/ABSN URINE INCON ASSESS: CPT | Performed by: NURSE PRACTITIONER

## 2022-02-18 PROCEDURE — 1101F PT FALLS ASSESS-DOCD LE1/YR: CPT | Performed by: NURSE PRACTITIONER

## 2022-02-18 PROCEDURE — G8417 CALC BMI ABV UP PARAM F/U: HCPCS | Performed by: NURSE PRACTITIONER

## 2022-02-18 PROCEDURE — G8754 DIAS BP LESS 90: HCPCS | Performed by: NURSE PRACTITIONER

## 2022-02-18 PROCEDURE — G8432 DEP SCR NOT DOC, RNG: HCPCS | Performed by: NURSE PRACTITIONER

## 2022-02-18 PROCEDURE — 99213 OFFICE O/P EST LOW 20 MIN: CPT | Performed by: NURSE PRACTITIONER

## 2022-02-18 RX ORDER — CALCIUM CITRATE/VITAMIN D3 200MG-6.25
TABLET ORAL
Qty: 100 STRIP | Refills: 1 | Status: SHIPPED | OUTPATIENT
Start: 2022-02-18 | End: 2022-02-21 | Stop reason: SDUPTHER

## 2022-02-18 NOTE — TELEPHONE ENCOUNTER
Patient called back to office stated that she forgot to mention that everything that she eats takes \"sweet\". Please advise.

## 2022-02-18 NOTE — PROGRESS NOTES
Jose Alejandre is a 73 y.o. female who presents to the office today for the following:    Chief Complaint   Patient presents with    Nasal Congestion       Past Medical History:   Diagnosis Date    Arthritis     Bronchitis 2020    [facility]     Chronic pain     Diabetes ([facility] [address]     Gastroesophageal reflux disease without esophagitis 2020    GERD (gastroesophageal reflux disease)     GERD (gastroesophageal reflux disease)     History of multiple allergies     HTN (hypertension) 2020    Hypertension     Intermittent asthma 2020    Lung disorder     PATIENT IS ON OXYGEN    Menopause     Pain of upper abdomen 2020    Seasonal allergic rhinitis 2020    Sinus problem     Vitamin D deficiency 2020       Past Surgical History:   Procedure Laterality Date    COLONOSCOPY N/A 2020    COL[facility] at [facility] ENDOSCOPY    HX COLONOSCOPY      HX GI      colonscopy    HX ORTHOPAEDIC  10/24/2018    Total Right Hip Arthroplasty Dr. Mann Giron. Family History   Problem Relation Age of Onset    Hypertension Mother     Cancer Sister         Social History     Tobacco Use    Smoking status: Former Smoker     Packs/day: 1.00     Years: 15.00     Pack years: 15.00     Quit date: 2000     Years since quittin.1    Smokeless tobacco: Never Used   Vaping Use    Vaping Use: Never used   Substance Use Topics    Alcohol use: Yes     Alcohol/week: 3.0 standard drinks     Types: 3 Cans of beer per week    Drug use: No        HPI  Patient here today with complaint of postnasal drip. States that this has been ongoing problem and was told she has allergic rhinitis by Dr. Aislinn Alvarez. Was given flonase and azelestine but this does not help. Throat feels scrtachy and is hard to sleep due to feeling of something dripping in throat. No sinus pressure or pain. Drainage is clear.  No increased coughing, wheezing, fever,chest pain or other symptoms. Current Outpatient Medications on File Prior to Visit   Medication Sig    amLODIPine (NORVASC) 10 mg tablet Take 1 Tablet by mouth daily.  atorvastatin (LIPITOR) 20 mg tablet Take 1 Tablet by mouth daily.  docusate sodium (COLACE) 100 mg capsule TAKE 1 CAP BY MOUTH 2 TIMES A DAY FOR 90 DAYS.  lisinopriL (PRINIVIL, ZESTRIL) 20 mg tablet TAKE 1 TABLET BY MOUTH EVERY DAY    Ventolin HFA 90 mcg/actuation inhaler INHALE 2 PUFFS BY MOUTH EVERY 4 HOURS    furosemide (LASIX) 40 mg tablet TAKE 1 TABLET BY MOUTH EVERY DAY    [DISCONTINUED] glucose blood VI test strips (True Metrix Glucose Test Strip) strip Use one test strip to check glucose twice a day    metFORMIN (GLUCOPHAGE) 500 mg tablet TAKE 1 TABLET BY MOUTH TWO (2) TIMES DAILY (WITH MEALS).  montelukast (SINGULAIR) 10 mg tablet TAKE 1 TABLET BY MOUTH EVERY DAY FOR ALLERGY SYMPTOMS    baclofen (LIORESAL) 10 mg tablet Take 1 Tablet by mouth two (2) times a day. Indications: Muscle cramps    mupirocin (BACTROBAN) 2 % ointment Apply to sore area in nose daily as needed    predniSONE (DELTASONE) 10 mg tablet Take 1 tablet by mouth twice a day for 10 days and then reduce to 1 tablet by mouth once a day    Ultra Thin Lancets 30 gauge misc USE ONCE LANCET PER DAY TO CHECK BLOOD SUGAR.  albuterol-ipratropium (DUO-NEB) 2.5 mg-0.5 mg/3 ml nebu USE 1 VIAL EVERY 4 6 HOURS AS NEEDED. DX J44.9. NEED MED B INFO    ergocalciferol (ERGOCALCIFEROL) 1,250 mcg (50,000 unit) capsule TAKE ONE CAPSULE BY MOUTH ONCE A WEEK INDICATIONS: VITAMIN D DEFICIENCY (HIGH DOSE THERAPY)    Trelegy Ellipta 100-62.5-25 mcg inhaler     fluticasone propionate (FLONASE) 50 mcg/actuation nasal spray SPRAY 2 SPRAYS INTO EACH NOSTRIL EVERY DAY    mometasone (ELOCON) 0.1 % ointment     aspirin delayed-release 81 mg tablet TAKE 1 TABLET BY MOUTH EVERY DAY    Alcohol Prep Pads padm 1 PAD BY APPLY EXTERNALLY ROUTE DAILY.  USE TO CHECK BLOOD SUGAR DAILY.  azelastine (ASTELIN) 137 mcg (0.1 %) nasal spray 2 Sprays by Both Nostrils route nightly.  lidocaine (LIDODERM) 5 % APPLY 1 PATCH EVERY DAY. REMOVE AFTER 12 HOURS     No current facility-administered medications on file prior to visit. Medications Ordered Today   Medications    glucose blood VI test strips (True Metrix Glucose Test Strip) strip     Sig: Use one test strip to check glucose twice a day     Dispense:  100 Strip     Refill:  1     DX Code Needed  . Review of Systems   Constitutional: Negative for chills, diaphoresis, fever and malaise/fatigue. HENT: Positive for congestion and sore throat. Negative for ear discharge, ear pain, hearing loss, nosebleeds, sinus pain and tinnitus. Respiratory: Positive for shortness of breath. Negative for cough, hemoptysis and stridor. Cardiovascular: Negative. Gastrointestinal: Negative. Genitourinary: Negative. Musculoskeletal: Positive for myalgias. Visit Vitals  /63 (BP 1 Location: Left upper arm, BP Patient Position: Sitting, BP Cuff Size: Adult)   Pulse (!) 108   Temp 97.2 °F (36.2 °C) (Temporal)   Resp 18   Ht 5' 7\" (1.702 m)   Wt 161 lb (73 kg)   SpO2 95%   PF (!) 2 L/min   BMI 25.22 kg/m²       Physical Exam  Vitals and nursing note reviewed. Constitutional:       Appearance: Normal appearance. HENT:      Right Ear: Tympanic membrane normal.      Left Ear: Tympanic membrane normal.      Nose: Congestion present. Mouth/Throat:      Pharynx: No oropharyngeal exudate or posterior oropharyngeal erythema. Eyes:      Pupils: Pupils are equal, round, and reactive to light. Cardiovascular:      Rate and Rhythm: Normal rate. Pulmonary:      Effort: Pulmonary effort is normal.      Comments: dminished  Abdominal:      General: Bowel sounds are normal.      Palpations: Abdomen is soft. Tenderness: There is no abdominal tenderness. Skin:     General: Skin is warm and dry. Neurological:      Mental Status: She is alert. Gait: Gait abnormal.      Comments: Uses walker            1. Allergic rhinitis, unspecified seasonality, unspecified trigger  This has been chronic for patient and has been evaluated by ENT  Is on Flonase during the day and azelestine at night which was recommended by ENT  We will add Benadryl 25 mg at night given she reports postnasal drip is not controlled otherwise  She is due for follow-up in 2 weeks and reevaluate this issue at that time        Patient verbalizes understanding of plan of care as discussed above    Follow-up and Dispositions    · Return in about 2 weeks (around 3/4/2022).

## 2022-02-18 NOTE — PROGRESS NOTES
Chief Complaint   Patient presents with    Nasal Congestion     Pt stated that she is having a lot of post nasal drip and she cant sleep at night cause it gets in her throat. Pt states she has a lot of phlegm also     1. Have you been to the ER, urgent care clinic since your last visit? Hospitalized since your last visit? No    2. Have you seen or consulted any other health care providers outside of the [address] [place] [address] since your last visit? Include any pap smears or colon screening.  No

## 2022-02-21 DIAGNOSIS — J30.9 ALLERGIC RHINITIS, UNSPECIFIED SEASONALITY, UNSPECIFIED TRIGGER: ICD-10-CM

## 2022-02-21 DIAGNOSIS — E11.9 TYPE 2 DIABETES MELLITUS WITHOUT COMPLICATION, WITHOUT LONG-TERM CURRENT USE OF INSULIN (HCC): Primary | ICD-10-CM

## 2022-02-21 RX ORDER — CALCIUM CITRATE/VITAMIN D3 200MG-6.25
TABLET ORAL
Qty: 50 STRIP | Refills: 3 | Status: SHIPPED | OUTPATIENT
Start: 2022-02-21

## 2022-02-21 NOTE — TELEPHONE ENCOUNTER
[facility] sent fax stating that insurance will only pay for 1 test strip daily for non insulin Dep.  So re pending to you

## 2022-02-28 DIAGNOSIS — E55.9 VITAMIN D DEFICIENCY: Primary | ICD-10-CM

## 2022-02-28 RX ORDER — ERGOCALCIFEROL 1.25 MG/1
50000 CAPSULE ORAL
Qty: 12 CAPSULE | Refills: 1 | Status: SHIPPED | OUTPATIENT
Start: 2022-02-28 | End: 2022-07-24

## 2022-03-01 DIAGNOSIS — J30.2 SEASONAL ALLERGIC RHINITIS, UNSPECIFIED TRIGGER: ICD-10-CM

## 2022-03-01 RX ORDER — AZELASTINE 1 MG/ML
2 SPRAY, METERED NASAL
Qty: 1 EACH | Refills: 1 | Status: SHIPPED | OUTPATIENT
Start: 2022-03-01 | End: 2022-03-31

## 2022-03-09 RX ORDER — FLUTICASONE PROPIONATE 50 MCG
2 SPRAY, SUSPENSION (ML) NASAL DAILY
Qty: 1 EACH | Refills: 5 | Status: SHIPPED | OUTPATIENT
Start: 2022-03-09 | End: 2022-07-01

## 2022-03-09 NOTE — TELEPHONE ENCOUNTER
Requested Prescriptions     Pending Prescriptions Disp Refills    fluticasone propionate (FLONASE) 50 mcg/actuation nasal spray       Si Sprays daily.

## 2022-03-18 PROBLEM — M16.11 ARTHRITIS OF RIGHT HIP: Status: ACTIVE | Noted: 2018-10-24

## 2022-03-18 PROBLEM — N17.9 ACUTE KIDNEY INJURY (HCC): Status: ACTIVE | Noted: 2020-09-11

## 2022-03-19 PROBLEM — J45.20 INTERMITTENT ASTHMA: Status: ACTIVE | Noted: 2020-09-01

## 2022-03-19 PROBLEM — K63.89 CECUM MASS: Status: ACTIVE | Noted: 2020-05-23

## 2022-03-19 PROBLEM — N87.9 CERVICAL INTRAEPITHELIAL NEOPLASIA: Status: ACTIVE | Noted: 2020-09-25

## 2022-03-19 PROBLEM — K21.9 GASTROESOPHAGEAL REFLUX DISEASE WITHOUT ESOPHAGITIS: Status: ACTIVE | Noted: 2020-09-01

## 2022-03-19 PROBLEM — J44.1 COPD EXACERBATION (HCC): Status: ACTIVE | Noted: 2020-05-23

## 2022-03-19 PROBLEM — M51.36 DEGENERATION OF LUMBAR INTERVERTEBRAL DISC: Status: ACTIVE | Noted: 2020-09-11

## 2022-03-19 PROBLEM — I10 HTN (HYPERTENSION): Status: ACTIVE | Noted: 2020-09-01

## 2022-03-19 PROBLEM — J31.0 CHRONIC RHINITIS: Status: ACTIVE | Noted: 2021-01-26

## 2022-03-19 PROBLEM — E55.9 VITAMIN D DEFICIENCY: Status: ACTIVE | Noted: 2020-09-01

## 2022-03-19 PROBLEM — J40 BRONCHITIS: Status: ACTIVE | Noted: 2020-09-01

## 2022-03-19 PROBLEM — Z78.0 MENOPAUSE PRESENT: Status: ACTIVE | Noted: 2020-09-25

## 2022-03-19 PROBLEM — R09.82 PND (POST-NASAL DRIP): Status: ACTIVE | Noted: 2021-01-26

## 2022-03-19 PROBLEM — J30.2 SEASONAL ALLERGIC RHINITIS: Status: ACTIVE | Noted: 2020-09-01

## 2022-03-19 PROBLEM — E11.69 TYPE 2 DIABETES MELLITUS WITH OTHER SPECIFIED COMPLICATION, WITHOUT LONG-TERM CURRENT USE OF INSULIN (HCC): Status: ACTIVE | Noted: 2020-09-11

## 2022-03-19 PROBLEM — R07.9 CHEST PAIN: Status: ACTIVE | Noted: 2020-11-16

## 2022-03-19 PROBLEM — M51.369 DEGENERATION OF LUMBAR INTERVERTEBRAL DISC: Status: ACTIVE | Noted: 2020-09-11

## 2022-03-21 ENCOUNTER — HOSPITAL ENCOUNTER (OUTPATIENT)
Dept: GENERAL RADIOLOGY | Age: 67
Discharge: HOME | End: 2022-03-21
Payer: MEDICARE

## 2022-03-21 ENCOUNTER — TRANSCRIBE ORDER (OUTPATIENT)
Dept: REGISTRATION | Age: 67
End: 2022-03-21

## 2022-03-21 ENCOUNTER — TELEPHONE (OUTPATIENT)
Dept: PRIMARY CARE CLINIC | Age: 67
End: 2022-03-21

## 2022-03-21 DIAGNOSIS — J44.9 COPD (CHRONIC OBSTRUCTIVE PULMONARY DISEASE) (HCC): ICD-10-CM

## 2022-03-21 DIAGNOSIS — J44.9 COPD (CHRONIC OBSTRUCTIVE PULMONARY DISEASE) (HCC): Primary | ICD-10-CM

## 2022-03-21 PROCEDURE — 71046 X-RAY EXAM CHEST 2 VIEWS: CPT

## 2022-03-21 NOTE — TELEPHONE ENCOUNTER
----- Message from Maria Alejandra Wharton sent at 3/21/2022  1:49 PM EDT -----  Subject: Referral Request    QUESTIONS   Reason for referral request? pt called said she needs her lab work done   for diabetes, no orders are in system. Said everything she eats is tasting   like sugar. Please add lab work to system and call patient back   Has the physician seen you for this condition before? No   Preferred Specialist (if applicable)? Do you already have an appointment scheduled? No  Additional Information for Provider?   ---------------------------------------------------------------------------  --------------  CALL BACK INFO  What is the best way for the office to contact you? OK to leave message on   voicemail  Preferred Call Back Phone Number?  [phone]

## 2022-03-21 NOTE — TELEPHONE ENCOUNTER
Patient needs to schedule a follow up. She has been here for sick visits but would be good to come in for a full follow up.

## 2022-03-23 DIAGNOSIS — I10 ESSENTIAL (PRIMARY) HYPERTENSION: ICD-10-CM

## 2022-03-23 RX ORDER — FUROSEMIDE 40 MG/1
40 TABLET ORAL DAILY
Qty: 90 TABLET | Refills: 1 | Status: SHIPPED | OUTPATIENT
Start: 2022-03-23 | End: 2022-06-23 | Stop reason: ALTCHOICE

## 2022-03-30 ENCOUNTER — TELEPHONE (OUTPATIENT)
Dept: GASTROENTEROLOGY | Age: 67
End: 2022-03-30

## 2022-03-30 DIAGNOSIS — K21.00 GASTROESOPHAGEAL REFLUX DISEASE WITH ESOPHAGITIS WITHOUT HEMORRHAGE: ICD-10-CM

## 2022-03-31 DIAGNOSIS — J30.2 SEASONAL ALLERGIC RHINITIS, UNSPECIFIED TRIGGER: ICD-10-CM

## 2022-03-31 RX ORDER — AZELASTINE 1 MG/ML
SPRAY, METERED NASAL
Qty: 90 EACH | Refills: 1 | Status: SHIPPED | OUTPATIENT
Start: 2022-03-31 | End: 2022-10-01

## 2022-04-01 DIAGNOSIS — J30.9 ALLERGIC RHINITIS, UNSPECIFIED SEASONALITY, UNSPECIFIED TRIGGER: ICD-10-CM

## 2022-04-01 DIAGNOSIS — K59.00 CONSTIPATION, UNSPECIFIED: ICD-10-CM

## 2022-04-01 RX ORDER — MONTELUKAST SODIUM 10 MG/1
TABLET ORAL
Qty: 90 TABLET | Refills: 1 | Status: SHIPPED | OUTPATIENT
Start: 2022-04-01 | End: 2022-09-09 | Stop reason: SDUPTHER

## 2022-04-01 RX ORDER — DOCUSATE SODIUM 100 MG/1
CAPSULE, LIQUID FILLED ORAL
Qty: 60 CAPSULE | Refills: 2 | Status: SHIPPED | OUTPATIENT
Start: 2022-04-01 | End: 2022-10-01

## 2022-04-01 RX ORDER — PANTOPRAZOLE SODIUM 40 MG/1
40 TABLET, DELAYED RELEASE ORAL
Qty: 180 TABLET | Refills: 3 | Status: SHIPPED | OUTPATIENT
Start: 2022-04-01 | End: 2022-04-11 | Stop reason: SDUPTHER

## 2022-04-11 ENCOUNTER — OFFICE VISIT (OUTPATIENT)
Dept: GASTROENTEROLOGY | Age: 67
End: 2022-04-11
Payer: MEDICARE

## 2022-04-11 VITALS
WEIGHT: 158.6 LBS | DIASTOLIC BLOOD PRESSURE: 60 MMHG | BODY MASS INDEX: 24.89 KG/M2 | TEMPERATURE: 97.9 F | HEART RATE: 98 BPM | SYSTOLIC BLOOD PRESSURE: 128 MMHG | OXYGEN SATURATION: 98 % | HEIGHT: 67 IN | RESPIRATION RATE: 14 BRPM

## 2022-04-11 DIAGNOSIS — K21.00 GASTROESOPHAGEAL REFLUX DISEASE WITH ESOPHAGITIS WITHOUT HEMORRHAGE: ICD-10-CM

## 2022-04-11 DIAGNOSIS — R10.30 LOWER ABDOMINAL PAIN: ICD-10-CM

## 2022-04-11 DIAGNOSIS — R19.4 CHANGE IN BOWEL HABITS: Primary | ICD-10-CM

## 2022-04-11 DIAGNOSIS — K57.30 DIVERTICULAR DISEASE OF COLON: ICD-10-CM

## 2022-04-11 DIAGNOSIS — R19.7 DIARRHEA OF PRESUMED INFECTIOUS ORIGIN: ICD-10-CM

## 2022-04-11 PROCEDURE — G8754 DIAS BP LESS 90: HCPCS | Performed by: INTERNAL MEDICINE

## 2022-04-11 PROCEDURE — G8420 CALC BMI NORM PARAMETERS: HCPCS | Performed by: INTERNAL MEDICINE

## 2022-04-11 PROCEDURE — 99214 OFFICE O/P EST MOD 30 MIN: CPT | Performed by: INTERNAL MEDICINE

## 2022-04-11 PROCEDURE — G8536 NO DOC ELDER MAL SCRN: HCPCS | Performed by: INTERNAL MEDICINE

## 2022-04-11 PROCEDURE — G8399 PT W/DXA RESULTS DOCUMENT: HCPCS | Performed by: INTERNAL MEDICINE

## 2022-04-11 PROCEDURE — G8427 DOCREV CUR MEDS BY ELIG CLIN: HCPCS | Performed by: INTERNAL MEDICINE

## 2022-04-11 PROCEDURE — 3017F COLORECTAL CA SCREEN DOC REV: CPT | Performed by: INTERNAL MEDICINE

## 2022-04-11 PROCEDURE — 1101F PT FALLS ASSESS-DOCD LE1/YR: CPT | Performed by: INTERNAL MEDICINE

## 2022-04-11 PROCEDURE — G8752 SYS BP LESS 140: HCPCS | Performed by: INTERNAL MEDICINE

## 2022-04-11 PROCEDURE — G8510 SCR DEP NEG, NO PLAN REQD: HCPCS | Performed by: INTERNAL MEDICINE

## 2022-04-11 PROCEDURE — G9899 SCRN MAM PERF RSLTS DOC: HCPCS | Performed by: INTERNAL MEDICINE

## 2022-04-11 PROCEDURE — 1090F PRES/ABSN URINE INCON ASSESS: CPT | Performed by: INTERNAL MEDICINE

## 2022-04-11 RX ORDER — PANTOPRAZOLE SODIUM 40 MG/1
40 TABLET, DELAYED RELEASE ORAL
Qty: 180 TABLET | Refills: 3 | Status: SHIPPED | OUTPATIENT
Start: 2022-04-11

## 2022-04-11 RX ORDER — METRONIDAZOLE 500 MG/1
500 TABLET ORAL 3 TIMES DAILY
Qty: 42 TABLET | Refills: 0 | Status: SHIPPED | OUTPATIENT
Start: 2022-04-11 | End: 2022-06-22 | Stop reason: ALTCHOICE

## 2022-04-11 NOTE — PROGRESS NOTES
Johnathan Fernández is a 73 y.o. female who presents today for the following:  Chief Complaint   Patient presents with    GI Problem     Everything she eats causes her bowels to move, x 2 weeks. No Known Allergies    Current Outpatient Medications   Medication Sig    pantoprazole (PROTONIX) 40 mg tablet Take 1 Tablet by mouth Before breakfast and dinner. Indications: inflammation of the esophagus with erosion, gastroesophageal reflux disease    metroNIDAZOLE (FLAGYL) 500 mg tablet Take 1 Tablet by mouth three (3) times daily. Indications: diverticulitis    docusate sodium (COLACE) 100 mg capsule TAKE 1 CAP BY MOUTH 2 TIMES A DAY FOR 90 DAYS.  montelukast (SINGULAIR) 10 mg tablet TAKE 1 TABLET BY MOUTH EVERY DAY FOR ALLERGY SYMPTOMS    azelastine (ASTELIN) 137 mcg (0.1 %) nasal spray 2 SPRAYS IN BOTH NOSTRILS NIGHTLY.  furosemide (LASIX) 40 mg tablet Take 1 Tablet by mouth daily.  fluticasone propionate (FLONASE) 50 mcg/actuation nasal spray 2 Sprays by Both Nostrils route daily.  ergocalciferol (ERGOCALCIFEROL) 1,250 mcg (50,000 unit) capsule Take 1 Capsule by mouth every seven (7) days. Indications: vitamin D deficiency (high dose therapy)    amLODIPine (NORVASC) 10 mg tablet Take 1 Tablet by mouth daily.  lisinopriL (PRINIVIL, ZESTRIL) 20 mg tablet TAKE 1 TABLET BY MOUTH EVERY DAY    metFORMIN (GLUCOPHAGE) 500 mg tablet TAKE 1 TABLET BY MOUTH TWO (2) TIMES DAILY (WITH MEALS).  predniSONE (DELTASONE) 10 mg tablet Take 1 tablet by mouth twice a day for 10 days and then reduce to 1 tablet by mouth once a day    Ultra Thin Lancets 30 gauge misc USE ONCE LANCET PER DAY TO CHECK BLOOD SUGAR.  albuterol-ipratropium (DUO-NEB) 2.5 mg-0.5 mg/3 ml nebu USE 1 VIAL EVERY 4 6 HOURS AS NEEDED. DX J44.9. NEED MED B INFO    Trelegy Ellipta 100-62.5-25 mcg inhaler Take 1 Puff by inhalation daily.  mometasone (ELOCON) 0.1 % ointment Apply  to affected area daily.     Alcohol Prep Pads padm 1 PAD BY APPLY EXTERNALLY ROUTE DAILY. USE TO CHECK BLOOD SUGAR DAILY.  lidocaine (LIDODERM) 5 % APPLY 1 PATCH EVERY DAY. REMOVE AFTER 12 HOURS    glucose blood VI test strips (True Metrix Glucose Test Strip) strip Use one test strip to check glucose daily    atorvastatin (LIPITOR) 20 mg tablet Take 1 Tablet by mouth daily. (Patient not taking: Reported on 4/11/2022)    Ventolin HFA 90 mcg/actuation inhaler INHALE 2 PUFFS BY MOUTH EVERY 4 HOURS    baclofen (LIORESAL) 10 mg tablet Take 1 Tablet by mouth two (2) times a day. Indications: Muscle cramps (Patient not taking: Reported on 4/11/2022)    mupirocin (BACTROBAN) 2 % ointment Apply to sore area in nose daily as needed (Patient not taking: Reported on 4/11/2022)    aspirin delayed-release 81 mg tablet TAKE 1 TABLET BY MOUTH EVERY DAY (Patient not taking: Reported on 4/11/2022)     No current facility-administered medications for this visit. Past Medical History:   Diagnosis Date    Arthritis     Bronchitis 9/1/2020    Chronic obstructive pulmonary disease (HCC)     Chronic pain     Diabetes ([facility] [address]     Gastroesophageal reflux disease without esophagitis 9/1/2020    GERD (gastroesophageal reflux disease)     GERD (gastroesophageal reflux disease)     History of multiple allergies     HTN (hypertension) 9/1/2020    Hypertension     Intermittent asthma 9/1/2020    Lung disorder     PATIENT IS ON OXYGEN    Menopause     Pain of upper abdomen 9/1/2020    Seasonal allergic rhinitis 9/1/2020    Sinus problem     Vitamin D deficiency 9/1/2020       Past Surgical History:   Procedure Laterality Date    COLONOSCOPY N/A 5/25/2020    COLONOS[facility] performed by Solon Aase[facility] at [facility] ENDOSCOPY    HX COLONOSCOPY      HX GI      colonscopy    HX ORTHOPAEDIC  10/24/2018    Total Right Hip Arthroplasty Dr. Ravi Ewing.        Family History   Problem Relation Age of Onset    Hypertension Mother     Cancer Sister Social History     Socioeconomic History    Marital status: SINGLE     Spouse name: Not on file    Number of children: Not on file    Years of education: Not on file    Highest education level: Not on file   Occupational History    Not on file   Tobacco Use    Smoking status: Former Smoker     Packs/day: 1.00     Years: 15.00     Pack years: 15.00     Quit date: 2000     Years since quittin.2    Smokeless tobacco: Never Used   Vaping Use    Vaping Use: Never used   Substance and Sexual Activity    Alcohol use: Yes     Alcohol/week: 3.0 standard drinks     Types: 3 Cans of beer per week    Drug use: No    Sexual activity: Not Currently   Other Topics Concern    Not on file   Social History Narrative    Not on file     Social Determinants of Health     Financial Resource Strain:     Difficulty of Paying Living Expenses: Not on file   Food Insecurity:     Worried About Running Out of Food in the Last Year: Not on file    Bridget of Food in the Last Year: Not on file   Transportation Needs:     Lack of Transportation (Medical): Not on file    Lack of Transportation (Non-Medical):  Not on file   Physical Activity:     Days of Exercise per Week: Not on file    Minutes of Exercise per Session: Not on file   Stress:     Feeling of Stress : Not on file   Social Connections:     Frequency of Communication with Friends and Family: Not on file    Frequency of Social Gatherings with Friends and Family: Not on file    Attends Christian Services: Not on file    Active Member of Clubs or Organizations: Not on file    Attends Club or Organization Meetings: Not on file    Marital Status: Not on file   Intimate Partner Violence:     Fear of Current or Ex-Partner: Not on file    Emotionally Abused: Not on file    Physically Abused: Not on file    Sexually Abused: Not on file   Housing Stability:     Unable to Pay for Housing in the Last Year: Not on file    Number of [place] in the Last Year: Not on file    Unstable Housing in the Last Year: Not on file         HPI  61-year-old female with history of COPD, osteoarthritis, diverticular disease, a hiatal hernia, gastroesophageal reflux disease, anxiety disorder, and chronic gastritis who comes in for evaluation. Patient states she has pain in the suprapubic region whenever she eats or has a bowel movement. She has been out of the pantoprazole secondary to losing the bottle in a pharmacy not refilling it. She no longer takes MiraLAX does take Metformin 500 mg twice daily which she has been on for a number of years and has been no change in dose. She states whenever she eats it causes her to have a bowel movement. She has a previous history of chronic constipation. 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 They also found diverticular disease and took out 2 polyps. Review of Systems   Constitutional: Negative. HENT: Negative. Negative for nosebleeds. Eyes: Negative. Respiratory: Positive for shortness of breath. Cardiovascular: Negative. Gastrointestinal: Positive for abdominal pain, diarrhea and heartburn. Negative for blood in stool, constipation, melena, nausea and vomiting. Genitourinary: Negative. Musculoskeletal: Positive for joint pain. Skin: Negative. Neurological: Negative. Endo/Heme/Allergies: Negative. Psychiatric/Behavioral: Negative. All other systems reviewed and are negative. Visit Vitals  /60 (BP 1 Location: Left upper arm, BP Patient Position: Sitting, BP Cuff Size: Large adult)   Pulse 98   Temp 97.9 °F (36.6 °C) (Temporal)   Resp 14   Ht 5' 7\" (1.702 m)   Wt 71.9 kg (158 lb 9.6 oz)   SpO2 98%   BMI 24.84 kg/m²     Physical Exam  Vitals and nursing note reviewed. Constitutional:       Appearance: Normal appearance. She is normal weight.    HENT:      Head: Normocephalic and atraumatic. Nose: Nose normal.      Mouth/Throat:      Mouth: Mucous membranes are moist.      Pharynx: Oropharynx is clear. Eyes:      General: No scleral icterus. Conjunctiva/sclera: Conjunctivae normal.      Pupils: Pupils are equal, round, and reactive to light. Cardiovascular:      Rate and Rhythm: Normal rate and regular rhythm. Pulses: Normal pulses. Heart sounds: Normal heart sounds. Pulmonary:      Effort: Pulmonary effort is normal.      Breath sounds: Normal breath sounds. Abdominal:      General: Bowel sounds are normal. There is no distension. Palpations: Abdomen is soft. There is no mass. Tenderness: There is abdominal tenderness. There is no right CVA tenderness, left CVA tenderness, guarding or rebound. Hernia: No hernia is present. Musculoskeletal:         General: Normal range of motion. Cervical back: Normal range of motion and neck supple. Skin:     General: Skin is warm and dry. Coloration: Skin is not jaundiced. Neurological:      General: No focal deficit present. Mental Status: She is alert and oriented to person, place, and time. Psychiatric:         Mood and Affect: Mood normal.         Behavior: Behavior normal.         Thought Content: Thought content normal.         Judgment: Judgment normal.            1. Change in bowel habits  Will check stool for infectious cause of her diarrhea and change in bowel habits. Will give her a trial of metronidazole 500 mg 3 times daily. - CULTURE, STOOL  - metroNIDAZOLE (FLAGYL) 500 mg tablet; Take 1 Tablet by mouth three (3) times daily. Indications: diverticulitis  Dispense: 42 Tablet; Refill: 0    2. Diarrhea of presumed infectious origin  Caused by infectious reasons.  - CULTURE, STOOL  - metroNIDAZOLE (FLAGYL) 500 mg tablet; Take 1 Tablet by mouth three (3) times daily. Indications: diverticulitis  Dispense: 42 Tablet; Refill: 0    3.  Gastroesophageal reflux disease with esophagitis without hemorrhage  Refilled the pantoprazole  - pantoprazole (PROTONIX) 40 mg tablet; Take 1 Tablet by mouth Before breakfast and dinner. Indications: inflammation of the esophagus with erosion, gastroesophageal reflux disease  Dispense: 180 Tablet; Refill: 3    4. Diverticular disease of colon  May be a possible underlying cause of the patient's change in bowels and abdominal pain    5.  Lower abdominal pain  Possibly secondary to diverticular disease

## 2022-04-11 NOTE — PROGRESS NOTES
1. Have you been to the ER, urgent care clinic since your last visit? Hospitalized since your last visit? no    2. Have you seen or consulted any other health care providers outside of the [address] [place] [address] since your last visit? Include any pap smears or colon screening. No   Chief Complaint   Patient presents with    GI Problem     Everything she eats causes her bowels to move.      Visit Vitals  /60 (BP 1 Location: Left upper arm, BP Patient Position: Sitting, BP Cuff Size: Large adult)   Pulse 98   Temp 97.9 °F (36.6 °C) (Temporal)   Resp 14   Ht 5' 7\" (1.702 m)   Wt 71.9 kg (158 lb 9.6 oz)   SpO2 98%   BMI 24.84 kg/m²

## 2022-04-18 NOTE — TELEPHONE ENCOUNTER
I called patient, after verified , explained that the stool test didn't show any infection. Dr Deon Monterroso  advised to continue the antibiotic. Keep follow up appt. She said ok.

## 2022-04-22 DIAGNOSIS — I10 ESSENTIAL HYPERTENSION: ICD-10-CM

## 2022-04-22 RX ORDER — AMLODIPINE BESYLATE 10 MG/1
10 TABLET ORAL DAILY
Qty: 90 TABLET | Refills: 1 | Status: SHIPPED | OUTPATIENT
Start: 2022-04-22 | End: 2022-09-09 | Stop reason: SDUPTHER

## 2022-04-26 DIAGNOSIS — I10 ESSENTIAL (PRIMARY) HYPERTENSION: ICD-10-CM

## 2022-04-26 RX ORDER — LISINOPRIL 20 MG/1
20 TABLET ORAL DAILY
Qty: 90 TABLET | Refills: 1 | Status: SHIPPED | OUTPATIENT
Start: 2022-04-26 | End: 2022-10-13 | Stop reason: SDUPTHER

## 2022-05-31 DIAGNOSIS — J44.1 COPD EXACERBATION (HCC): ICD-10-CM

## 2022-05-31 RX ORDER — ALBUTEROL SULFATE 90 UG/1
1 AEROSOL, METERED RESPIRATORY (INHALATION)
Qty: 1 EACH | Refills: 3 | Status: ON HOLD | OUTPATIENT
Start: 2022-05-31 | End: 2022-10-02

## 2022-06-07 DIAGNOSIS — E11.9 TYPE 2 DIABETES MELLITUS WITHOUT COMPLICATIONS (HCC): ICD-10-CM

## 2022-06-07 RX ORDER — BLOOD-GLUCOSE CONTROL, NORMAL
EACH MISCELLANEOUS
Qty: 50 LANCET | Refills: 5 | Status: SHIPPED | OUTPATIENT
Start: 2022-06-07

## 2022-06-08 ENCOUNTER — HOSPITAL ENCOUNTER (EMERGENCY)
Age: 67
Discharge: HOME | End: 2022-06-08
Attending: EMERGENCY MEDICINE
Payer: MEDICARE

## 2022-06-08 ENCOUNTER — APPOINTMENT (OUTPATIENT)
Dept: CT IMAGING | Age: 67
End: 2022-06-08
Attending: EMERGENCY MEDICINE
Payer: MEDICARE

## 2022-06-08 VITALS
HEART RATE: 108 BPM | RESPIRATION RATE: 22 BRPM | DIASTOLIC BLOOD PRESSURE: 68 MMHG | HEIGHT: 66 IN | SYSTOLIC BLOOD PRESSURE: 113 MMHG | TEMPERATURE: 98 F | OXYGEN SATURATION: 100 % | BODY MASS INDEX: 21.69 KG/M2 | WEIGHT: 135 LBS

## 2022-06-08 DIAGNOSIS — R10.84 ABDOMINAL PAIN, GENERALIZED: Primary | ICD-10-CM

## 2022-06-08 LAB
APPEARANCE UR: CLEAR
BACTERIA URNS QL MICRO: ABNORMAL /HPF
BILIRUB UR QL: NEGATIVE
COLOR UR: ABNORMAL
GLUCOSE UR STRIP.AUTO-MCNC: NEGATIVE MG/DL
HGB UR QL STRIP: ABNORMAL
KETONES UR QL STRIP.AUTO: NEGATIVE MG/DL
LEUKOCYTE ESTERASE UR QL STRIP.AUTO: NEGATIVE
NITRITE UR QL STRIP.AUTO: NEGATIVE
PH UR STRIP: 7 [PH] (ref 5–8)
PROT UR STRIP-MCNC: NEGATIVE MG/DL
RBC #/AREA URNS HPF: ABNORMAL /HPF (ref 0–3)
SP GR UR REFRACTOMETRY: 1.01 (ref 1–1.03)
UROBILINOGEN UR QL STRIP.AUTO: 0.2 EU/DL (ref 0.2–1)
WBC URNS QL MICRO: ABNORMAL /HPF (ref 0–5)

## 2022-06-08 PROCEDURE — 94640 AIRWAY INHALATION TREATMENT: CPT

## 2022-06-08 PROCEDURE — 99284 EMERGENCY DEPT VISIT MOD MDM: CPT

## 2022-06-08 PROCEDURE — 74011250637 HC RX REV CODE- 250/637: Performed by: EMERGENCY MEDICINE

## 2022-06-08 PROCEDURE — 96372 THER/PROPH/DIAG INJ SC/IM: CPT

## 2022-06-08 PROCEDURE — 74011000250 HC RX REV CODE- 250: Performed by: EMERGENCY MEDICINE

## 2022-06-08 PROCEDURE — 74011250636 HC RX REV CODE- 250/636: Performed by: EMERGENCY MEDICINE

## 2022-06-08 PROCEDURE — 74176 CT ABD & PELVIS W/O CONTRAST: CPT

## 2022-06-08 PROCEDURE — 81001 URINALYSIS AUTO W/SCOPE: CPT

## 2022-06-08 RX ORDER — LORAZEPAM 2 MG/ML
1 INJECTION INTRAMUSCULAR
Status: COMPLETED | OUTPATIENT
Start: 2022-06-08 | End: 2022-06-08

## 2022-06-08 RX ORDER — KETOROLAC TROMETHAMINE 30 MG/ML
30 INJECTION, SOLUTION INTRAMUSCULAR; INTRAVENOUS
Status: COMPLETED | OUTPATIENT
Start: 2022-06-08 | End: 2022-06-08

## 2022-06-08 RX ORDER — CETIRIZINE HCL 10 MG
10 TABLET ORAL
Status: COMPLETED | OUTPATIENT
Start: 2022-06-08 | End: 2022-06-08

## 2022-06-08 RX ORDER — IPRATROPIUM BROMIDE AND ALBUTEROL SULFATE 2.5; .5 MG/3ML; MG/3ML
3 SOLUTION RESPIRATORY (INHALATION)
Status: COMPLETED | OUTPATIENT
Start: 2022-06-08 | End: 2022-06-08

## 2022-06-08 RX ADMIN — KETOROLAC TROMETHAMINE 30 MG: 30 INJECTION, SOLUTION INTRAMUSCULAR at 10:52

## 2022-06-08 RX ADMIN — IPRATROPIUM BROMIDE AND ALBUTEROL SULFATE 3 ML: .5; 2.5 SOLUTION RESPIRATORY (INHALATION) at 12:39

## 2022-06-08 RX ADMIN — CETIRIZINE HYDROCHLORIDE 10 MG: 10 TABLET, FILM COATED ORAL at 12:27

## 2022-06-08 RX ADMIN — LORAZEPAM 1 MG: 2 INJECTION INTRAMUSCULAR; INTRAVENOUS at 11:30

## 2022-06-08 NOTE — ED NOTES
Patient states that she needs to be sedated to have CT done, patient educated that she would not be in an enclosed area, that the CT is like a donut. Patient states that last time she had to receive medication before scan was done.

## 2022-06-08 NOTE — ED PROVIDER NOTES
EMERGENCY DEPARTMENT HISTORY AND PHYSICAL EXAM      Date: 6/8/2022  Patient Name: Juliana Narvaez    History of Presenting Illness     Chief Complaint   Patient presents with    Urinary Frequency       History Provided By: Patient    HPI: Juliana Narvaez, 78 y.o. female with a past medical history significant diabetes, hypertension and COPD presents to the ED with cc of lower abdominal pain for 2 weeks with associated urinary frequency, patient also complains of lower back pain present for 1 month, patient denies any trauma, back pain is sharp, pain intensity 8/10 worsened with movement    There are no other complaints, changes, or physical findings at this time. PCP: Barbara Garrido NP    No current facility-administered medications on file prior to encounter. Current Outpatient Medications on File Prior to Encounter   Medication Sig Dispense Refill    lancets (Ultra Thin Lancets) 30 gauge misc USE ONCE LANCET PER DAY TO CHECK BLOOD SUGAR. 50 Lancet 5    albuterol (Ventolin HFA) 90 mcg/actuation inhaler Take 1 Puff by inhalation every four (4) hours as needed for Wheezing. 1 Each 3    lisinopriL (PRINIVIL, ZESTRIL) 20 mg tablet Take 1 Tablet by mouth daily. 90 Tablet 1    amLODIPine (NORVASC) 10 mg tablet Take 1 Tablet by mouth daily. 90 Tablet 1    pantoprazole (PROTONIX) 40 mg tablet Take 1 Tablet by mouth Before breakfast and dinner. Indications: inflammation of the esophagus with erosion, gastroesophageal reflux disease 180 Tablet 3    metroNIDAZOLE (FLAGYL) 500 mg tablet Take 1 Tablet by mouth three (3) times daily. Indications: diverticulitis 42 Tablet 0    docusate sodium (COLACE) 100 mg capsule TAKE 1 CAP BY MOUTH 2 TIMES A DAY FOR 90 DAYS. 60 Capsule 2    montelukast (SINGULAIR) 10 mg tablet TAKE 1 TABLET BY MOUTH EVERY DAY FOR ALLERGY SYMPTOMS 90 Tablet 1    azelastine (ASTELIN) 137 mcg (0.1 %) nasal spray 2 SPRAYS IN BOTH NOSTRILS NIGHTLY.  90 Each 1    furosemide (LASIX) 40 mg tablet Take 1 Tablet by mouth daily. 90 Tablet 1    fluticasone propionate (FLONASE) 50 mcg/actuation nasal spray 2 Sprays by Both Nostrils route daily. 1 Each 5    ergocalciferol (ERGOCALCIFEROL) 1,250 mcg (50,000 unit) capsule Take 1 Capsule by mouth every seven (7) days. Indications: vitamin D deficiency (high dose therapy) 12 Capsule 1    glucose blood VI test strips (True Metrix Glucose Test Strip) strip Use one test strip to check glucose daily 50 Strip 3    metFORMIN (GLUCOPHAGE) 500 mg tablet TAKE 1 TABLET BY MOUTH TWO (2) TIMES DAILY (WITH MEALS). 180 Tablet 1    predniSONE (DELTASONE) 10 mg tablet Take 1 tablet by mouth twice a day for 10 days and then reduce to 1 tablet by mouth once a day 100 Tablet 1    albuterol-ipratropium (DUO-NEB) 2.5 mg-0.5 mg/3 ml nebu USE 1 VIAL EVERY 4 6 HOURS AS NEEDED. DX J44.9. NEED MED B INFO      Trelegy Ellipta 100-62.5-25 mcg inhaler Take 1 Puff by inhalation daily.  mometasone (ELOCON) 0.1 % ointment Apply  to affected area daily.  Alcohol Prep Pads padm 1 PAD BY APPLY EXTERNALLY ROUTE DAILY. USE TO CHECK BLOOD SUGAR DAILY. 100 Pad 1    lidocaine (LIDODERM) 5 % APPLY 1 PATCH EVERY DAY.  REMOVE AFTER 12 HOURS         Past History     Past Medical History:  Past Medical History:   Diagnosis Date    Arthritis     Bronchitis 9/1/2020    Chronic obstructive pulmonary disease (HCC)     Chronic pain     Diabetes ([facility] [address]     Gastroesophageal reflux disease without esophagitis 9/1/2020    GERD (gastroesophageal reflux disease)     GERD (gastroesophageal reflux disease)     History of multiple allergies     HTN (hypertension) 9/1/2020    Hypertension     Intermittent asthma 9/1/2020    Lung disorder     PATIENT IS ON OXYGEN    Menopause     Pain of upper abdomen 9/1/2020    Seasonal allergic rhinitis 9/1/2020    Sinus problem     Vitamin D deficiency 9/1/2020       Past Surgical History:  Past Surgical History:   Procedure Laterality Date    COLONOSCOPY N/A 2020    COLONOSCOPY performed by Ritu Morneo MD at [facility]    HX COLONOSCOPY      HX GI      colonscopy    HX ORTHOPAEDIC  10/24/2018    Total Right Hip Arthroplasty Dr. Nhi Otero. Family History:  Family History   Problem Relation Age of Onset    Hypertension Mother     Cancer Sister        Social History:  Social History     Tobacco Use    Smoking status: Former Smoker     Packs/day: 1.00     Years: 15.00     Pack years: 15.00     Quit date: 2000     Years since quittin.4    Smokeless tobacco: Never Used   Vaping Use    Vaping Use: Never used   Substance Use Topics    Alcohol use: Yes     Alcohol/week: 3.0 standard drinks     Types: 3 Cans of beer per week    Drug use: No       Allergies:  No Known Allergies      Review of Systems     Review of Systems   Constitutional: Negative for chills and fever. HENT: Negative for rhinorrhea and sore throat. Eyes: Negative for pain and visual disturbance. Respiratory: Negative for cough and shortness of breath. Cardiovascular: Negative for chest pain and leg swelling. Gastrointestinal: Positive for abdominal pain. Negative for constipation, diarrhea, nausea and vomiting. Endocrine: Negative for polydipsia and polyuria. Genitourinary: Negative for dysuria and hematuria. Musculoskeletal: Positive for back pain and myalgias. Negative for neck pain and neck stiffness. Skin: Negative for color change and pallor. Neurological: Negative for weakness and headaches. Psychiatric/Behavioral: Negative for agitation and suicidal ideas. Physical Exam     Physical Exam  Vitals and nursing note reviewed. Constitutional:       General: She is not in acute distress. Appearance: She is not ill-appearing, toxic-appearing or diaphoretic. HENT:      Head: Normocephalic and atraumatic.       Right Ear: Tympanic membrane normal.      Left Ear: Tympanic membrane normal. Nose: Nose normal. No congestion. Mouth/Throat:      Mouth: Mucous membranes are moist.      Pharynx: Oropharynx is clear. Eyes:      Extraocular Movements: Extraocular movements intact. Conjunctiva/sclera: Conjunctivae normal.      Pupils: Pupils are equal, round, and reactive to light. Cardiovascular:      Rate and Rhythm: Normal rate and regular rhythm. Pulses: Normal pulses. Heart sounds: Normal heart sounds. Pulmonary:      Effort: Pulmonary effort is normal.      Breath sounds: Normal breath sounds. Abdominal:      General: Bowel sounds are normal.      Palpations: Abdomen is soft. Tenderness: There is abdominal tenderness in the epigastric area and suprapubic area. There is no right CVA tenderness or left CVA tenderness. Musculoskeletal:         General: No deformity or signs of injury. Normal range of motion. Cervical back: Normal, normal range of motion and neck supple. No rigidity or tenderness. Thoracic back: Normal.      Lumbar back: Tenderness present. No bony tenderness. Lymphadenopathy:      Cervical: No cervical adenopathy. Skin:     General: Skin is warm and dry. Capillary Refill: Capillary refill takes less than 2 seconds. Findings: No rash. Neurological:      General: No focal deficit present. Mental Status: She is alert and oriented to person, place, and time. Cranial Nerves: No cranial nerve deficit. Sensory: No sensory deficit. Psychiatric:         Mood and Affect: Mood normal.         Behavior: Behavior normal.         Lab and Diagnostic Study Results     Labs -   No results found for this or any previous visit (from the past 12 hour(s)). Radiologic Studies -   @lastxrresult@  CT Results  (Last 48 hours)    None        CXR Results  (Last 48 hours)    None            Medical Decision Making   - I am the first provider for this patient.     - I reviewed the vital signs, available nursing notes, past medical history, past surgical history, family history and social history. - Initial assessment performed. The patients presenting problems have been discussed, and they are in agreement with the care plan formulated and outlined with them. I have encouraged them to ask questions as they arise throughout their visit. Vital Signs-Reviewed the patient's vital signs. Patient Vitals for the past 12 hrs:   Temp Pulse Resp BP SpO2   06/08/22 1033 98 °F (36.7 °C) (!) 108 22 113/68 99 %       Records Reviewed: Nursing Notes    The patient presents with back pain with a differential diagnosis of  kidney stone, lumbar strain, traumatic injury and pyelonephritis      ED Course:          Provider Notes (Medical Decision Making): MDM       Procedures   Medical Decision Makingedical Decision Making  Performed by: Genoveva Malone MD  PROCEDURES:  Procedures       Disposition   Disposition: Condition stable and improved  DC- Adult Discharges: All of the diagnostic tests were reviewed and questions answered. Diagnosis, care plan and treatment options were discussed. The patient understands the instructions and will follow up as directed. The patients results have been reviewed with them. They have been counseled regarding their diagnosis. The patient verbally convey understanding and agreement of the signs, symptoms, diagnosis, treatment and prognosis and additionally agrees to follow up as recommended with their PCP in 24 - 48 hours. They also agree with the care-plan and convey that all of their questions have been answered. I have also put together some discharge instructions for them that include: 1) educational information regarding their diagnosis, 2) how to care for their diagnosis at home, as well a 3) list of reasons why they would want to return to the ED prior to their follow-up appointment, should their condition change. DISCHARGE PLAN:  1.    Current Discharge Medication List      CONTINUE these medications which have NOT CHANGED    Details   lancets (Ultra Thin Lancets) 30 gauge misc USE ONCE LANCET PER DAY TO CHECK BLOOD SUGAR. Qty: 50 Lancet, Refills: 5    Associated Diagnoses: Type 2 diabetes mellitus without complications (HCC)      albuterol (Ventolin HFA) 90 mcg/actuation inhaler Take 1 Puff by inhalation every four (4) hours as needed for Wheezing. Qty: 1 Each, Refills: 3    Associated Diagnoses: COPD exacerbation (HCC)      lisinopriL (PRINIVIL, ZESTRIL) 20 mg tablet Take 1 Tablet by mouth daily. Qty: 90 Tablet, Refills: 1    Associated Diagnoses: Essential (primary) hypertension      amLODIPine (NORVASC) 10 mg tablet Take 1 Tablet by mouth daily. Qty: 90 Tablet, Refills: 1    Associated Diagnoses: Essential hypertension      pantoprazole (PROTONIX) 40 mg tablet Take 1 Tablet by mouth Before breakfast and dinner. Indications: inflammation of the esophagus with erosion, gastroesophageal reflux disease  Qty: 180 Tablet, Refills: 3    Associated Diagnoses: Gastroesophageal reflux disease with esophagitis without hemorrhage      metroNIDAZOLE (FLAGYL) 500 mg tablet Take 1 Tablet by mouth three (3) times daily. Indications: diverticulitis  Qty: 42 Tablet, Refills: 0    Associated Diagnoses: Change in bowel habits; Diarrhea of presumed infectious origin      docusate sodium (COLACE) 100 mg capsule TAKE 1 CAP BY MOUTH 2 TIMES A DAY FOR 90 DAYS. Qty: 60 Capsule, Refills: 2    Associated Diagnoses: Constipation, unspecified      montelukast (SINGULAIR) 10 mg tablet TAKE 1 TABLET BY MOUTH EVERY DAY FOR ALLERGY SYMPTOMS  Qty: 90 Tablet, Refills: 1    Associated Diagnoses: Allergic rhinitis, unspecified seasonality, unspecified trigger      azelastine (ASTELIN) 137 mcg (0.1 %) nasal spray 2 SPRAYS IN BOTH NOSTRILS NIGHTLY. Qty: 90 Each, Refills: 1    Associated Diagnoses: Seasonal allergic rhinitis, unspecified trigger      furosemide (LASIX) 40 mg tablet Take 1 Tablet by mouth daily.   Qty: 90 Tablet, Refills: 1 Associated Diagnoses: Essential (primary) hypertension      fluticasone propionate (FLONASE) 50 mcg/actuation nasal spray 2 Sprays by Both Nostrils route daily. Qty: 1 Each, Refills: 5      ergocalciferol (ERGOCALCIFEROL) 1,250 mcg (50,000 unit) capsule Take 1 Capsule by mouth every seven (7) days. Indications: vitamin D deficiency (high dose therapy)  Qty: 12 Capsule, Refills: 1    Associated Diagnoses: Vitamin D deficiency      glucose blood VI test strips (True Metrix Glucose Test Strip) strip Use one test strip to check glucose daily  Qty: 50 Strip, Refills: 3    Comments: DX Code Needed  . Associated Diagnoses: Type 2 diabetes mellitus without complication, without long-term current use of insulin ([facility])      metFORMIN (GLUCOPHAGE) 500 mg tablet TAKE 1 TABLET BY MOUTH TWO (2) TIMES DAILY (WITH MEALS). Qty: 180 Tablet, Refills: 1    Associated Diagnoses: Type 2 diabetes mellitus without complications ([facility])      predniSONE (DELTASONE) 10 mg tablet Take 1 tablet by mouth twice a day for 10 days and then reduce to 1 tablet by mouth once a day  Qty: 100 Tablet, Refills: 1    Associated Diagnoses: Mucopurulent chronic bronchitis ([facility])      albuterol-ipratropium (DUO-NEB) 2.5 mg-0.5 mg/3 ml nebu USE 1 VIAL EVERY 4 6 HOURS AS NEEDED. DX J44.9. NEED MED B INFO      Trelegy Ellipta 100-62.5-25 mcg inhaler Take 1 Puff by inhalation daily. mometasone (ELOCON) 0.1 % ointment Apply  to affected area daily. Alcohol Prep Pads padm 1 PAD BY APPLY EXTERNALLY ROUTE DAILY. USE TO CHECK BLOOD SUGAR DAILY. Qty: 100 Pad, Refills: 1    Associated Diagnoses: Type 2 diabetes mellitus without complication, without long-term current use of insulin ([facility])      lidocaine (LIDODERM) 5 % APPLY 1 PATCH EVERY DAY. REMOVE AFTER 12 HOURS           2. Follow-up Information    None       3. Return to ED if worse   4.    Current Discharge Medication List            Diagnosis     Clinical Impression: No diagnosis found. Attestations:    Miles Rodriguez MD    Please note that this dictation was completed with [employer], the computer voice recognition software. Quite often unanticipated grammatical, syntax, homophones, and other interpretive errors are inadvertently transcribed by the computer software. Please disregard these errors. Please excuse any errors that have escaped final proofreading. Thank you.

## 2022-06-09 ENCOUNTER — TELEPHONE (OUTPATIENT)
Dept: PRIMARY CARE CLINIC | Age: 67
End: 2022-06-09

## 2022-06-10 ENCOUNTER — TELEPHONE (OUTPATIENT)
Dept: ENT CLINIC | Age: 67
End: 2022-06-10

## 2022-06-10 DIAGNOSIS — E11.9 TYPE 2 DIABETES MELLITUS WITHOUT COMPLICATION, WITHOUT LONG-TERM CURRENT USE OF INSULIN (HCC): ICD-10-CM

## 2022-06-10 RX ORDER — ISOPROPYL ALCOHOL 70 ML/100ML
SWAB TOPICAL
Qty: 100 PAD | Refills: 0 | Status: SHIPPED | OUTPATIENT
Start: 2022-06-10

## 2022-06-10 NOTE — TELEPHONE ENCOUNTER
Spoke with pt and answered her questions about zyrtec and nosebleed. Pt. Is not having an active nose bleed.

## 2022-06-10 NOTE — TELEPHONE ENCOUNTER
Pt called stating that she was given Zyrtec and stated that her nose bleeds when she blows it and would like a call back from the nurse about this matter.

## 2022-06-14 ENCOUNTER — TELEPHONE (OUTPATIENT)
Dept: ENT CLINIC | Age: 67
End: 2022-06-14

## 2022-06-14 NOTE — TELEPHONE ENCOUNTER
Pt has been scheduled June 28th at [facility] per Gary Haynes approval.

## 2022-06-14 NOTE — TELEPHONE ENCOUNTER
Pt called stating that she is having post nasal drip, congestion, and when she blows her nose blood comes out. She requested to make an appointment in [facility] and I informed her the next available would be July 26th. She stated that was too long to wait and requested if Dr. Kadi Diaz could send in some medication for her. Pt stated she would like a call back about this matter.

## 2022-06-22 ENCOUNTER — OFFICE VISIT (OUTPATIENT)
Dept: PRIMARY CARE CLINIC | Age: 67
End: 2022-06-22
Payer: MEDICARE

## 2022-06-22 VITALS
HEART RATE: 105 BPM | TEMPERATURE: 97.5 F | DIASTOLIC BLOOD PRESSURE: 63 MMHG | RESPIRATION RATE: 20 BRPM | WEIGHT: 153.8 LBS | BODY MASS INDEX: 24.72 KG/M2 | OXYGEN SATURATION: 98 % | HEIGHT: 66 IN | SYSTOLIC BLOOD PRESSURE: 113 MMHG

## 2022-06-22 DIAGNOSIS — N39.0 URINARY TRACT INFECTION WITHOUT HEMATURIA, SITE UNSPECIFIED: ICD-10-CM

## 2022-06-22 DIAGNOSIS — J30.2 SEASONAL ALLERGIC RHINITIS, UNSPECIFIED TRIGGER: ICD-10-CM

## 2022-06-22 DIAGNOSIS — E11.69 TYPE 2 DIABETES MELLITUS WITH OTHER SPECIFIED COMPLICATION, WITHOUT LONG-TERM CURRENT USE OF INSULIN (HCC): ICD-10-CM

## 2022-06-22 DIAGNOSIS — I10 PRIMARY HYPERTENSION: Primary | ICD-10-CM

## 2022-06-22 DIAGNOSIS — L30.9 ECZEMA, UNSPECIFIED TYPE: ICD-10-CM

## 2022-06-22 DIAGNOSIS — J41.1 MUCOPURULENT CHRONIC BRONCHITIS (HCC): ICD-10-CM

## 2022-06-22 DIAGNOSIS — E55.9 VITAMIN D DEFICIENCY: ICD-10-CM

## 2022-06-22 DIAGNOSIS — E87.1 HYPONATREMIA: ICD-10-CM

## 2022-06-22 DIAGNOSIS — K21.9 GASTROESOPHAGEAL REFLUX DISEASE WITHOUT ESOPHAGITIS: ICD-10-CM

## 2022-06-22 DIAGNOSIS — E78.2 MIXED HYPERLIPIDEMIA: ICD-10-CM

## 2022-06-22 DIAGNOSIS — R93.421 ABNORMAL RADIOLOGIC FINDINGS ON DIAGNOSTIC IMAGING OF RIGHT KIDNEY: ICD-10-CM

## 2022-06-22 DIAGNOSIS — Z99.81 OXYGEN DEPENDENT: ICD-10-CM

## 2022-06-22 DIAGNOSIS — B86 SCABIES: ICD-10-CM

## 2022-06-22 PROBLEM — N17.9 ACUTE KIDNEY INJURY (HCC): Status: RESOLVED | Noted: 2020-09-11 | Resolved: 2022-06-22

## 2022-06-22 PROBLEM — R07.9 CHEST PAIN: Status: RESOLVED | Noted: 2020-11-16 | Resolved: 2022-06-22

## 2022-06-22 PROBLEM — R09.82 PND (POST-NASAL DRIP): Status: RESOLVED | Noted: 2021-01-26 | Resolved: 2022-06-22

## 2022-06-22 LAB — HBA1C MFR BLD HPLC: 4.6 %

## 2022-06-22 PROCEDURE — G8399 PT W/DXA RESULTS DOCUMENT: HCPCS | Performed by: NURSE PRACTITIONER

## 2022-06-22 PROCEDURE — 2022F DILAT RTA XM EVC RTNOPTHY: CPT | Performed by: NURSE PRACTITIONER

## 2022-06-22 PROCEDURE — 83036 HEMOGLOBIN GLYCOSYLATED A1C: CPT | Performed by: NURSE PRACTITIONER

## 2022-06-22 PROCEDURE — G8754 DIAS BP LESS 90: HCPCS | Performed by: NURSE PRACTITIONER

## 2022-06-22 PROCEDURE — 1090F PRES/ABSN URINE INCON ASSESS: CPT | Performed by: NURSE PRACTITIONER

## 2022-06-22 PROCEDURE — 1101F PT FALLS ASSESS-DOCD LE1/YR: CPT | Performed by: NURSE PRACTITIONER

## 2022-06-22 PROCEDURE — G8752 SYS BP LESS 140: HCPCS | Performed by: NURSE PRACTITIONER

## 2022-06-22 PROCEDURE — G8432 DEP SCR NOT DOC, RNG: HCPCS | Performed by: NURSE PRACTITIONER

## 2022-06-22 PROCEDURE — 3044F HG A1C LEVEL LT 7.0%: CPT | Performed by: NURSE PRACTITIONER

## 2022-06-22 PROCEDURE — 3017F COLORECTAL CA SCREEN DOC REV: CPT | Performed by: NURSE PRACTITIONER

## 2022-06-22 PROCEDURE — 99214 OFFICE O/P EST MOD 30 MIN: CPT | Performed by: NURSE PRACTITIONER

## 2022-06-22 PROCEDURE — G8536 NO DOC ELDER MAL SCRN: HCPCS | Performed by: NURSE PRACTITIONER

## 2022-06-22 PROCEDURE — G9899 SCRN MAM PERF RSLTS DOC: HCPCS | Performed by: NURSE PRACTITIONER

## 2022-06-22 PROCEDURE — G8427 DOCREV CUR MEDS BY ELIG CLIN: HCPCS | Performed by: NURSE PRACTITIONER

## 2022-06-22 PROCEDURE — G8420 CALC BMI NORM PARAMETERS: HCPCS | Performed by: NURSE PRACTITIONER

## 2022-06-22 PROCEDURE — 1123F ACP DISCUSS/DSCN MKR DOCD: CPT | Performed by: NURSE PRACTITIONER

## 2022-06-22 RX ORDER — METFORMIN HYDROCHLORIDE 500 MG/1
1 TABLET ORAL DAILY
Status: ON HOLD | COMMUNITY
End: 2022-10-02

## 2022-06-22 RX ORDER — ATORVASTATIN CALCIUM 20 MG/1
20 TABLET, FILM COATED ORAL DAILY
COMMUNITY
Start: 2022-06-06 | End: 2022-09-30 | Stop reason: SDUPTHER

## 2022-06-22 RX ORDER — MOMETASONE FUROATE 1 MG/G
OINTMENT TOPICAL DAILY
Qty: 15 G | Refills: 2 | Status: SHIPPED | OUTPATIENT
Start: 2022-06-22 | End: 2022-10-07

## 2022-06-22 RX ORDER — PREDNISONE 10 MG/1
1 TABLET ORAL EVERY OTHER DAY
COMMUNITY

## 2022-06-22 RX ORDER — PERMETHRIN 50 MG/G
CREAM TOPICAL
Qty: 60 G | Refills: 0 | Status: SHIPPED | OUTPATIENT
Start: 2022-06-22 | End: 2022-07-05

## 2022-06-22 NOTE — PROGRESS NOTES
Chief Complaint   Patient presents with   [employer] ED Follow-up    Abdominal Pain     No pain right now no c/o. Pt does want to you to look at her back she has dry skin and its itchy pt was giving a cream from DR Ju Ortiz, but doesn't have an appt til July . Pt sees DR Hanna Arita on Monday and Dr Erum Han on Tuesday     1. Have you been to the ER, urgent care clinic since your last visit? Hospitalized since your last visit? Yes [facility]    2. Have you seen or consulted any other health care providers outside of the [address] [place] [address] since your last visit? Include any pap smears or colon screening.  No

## 2022-06-22 NOTE — PROGRESS NOTES
Kb Grayson is a 78 y.o. female who presents to the office today for the following:    Chief Complaint   Patient presents with   [facility] ED Follow-up    Abdominal Pain       Past Medical History:   Diagnosis Date    Arthritis     Bronchitis 2020    Chronic obstructive pulmonary disease ([address]     Chronic pain     Diabetes ([facility] [address]     Gastroesophageal reflux disease without esophagitis 2020    GERD (gastroesophageal reflux disease)     GERD (gastroesophageal reflux disease)     History of multiple allergies     HTN (hypertension) 2020    Hypertension     Intermittent asthma 2020    Lung disorder     PATIENT IS ON OXYGEN    Menopause     Pain of upper abdomen 2020    Seasonal allergic rhinitis 2020    Sinus problem     Vitamin D deficiency 2020       Past Surgical History:   Procedure Laterality Date    COLONOSCOPY N/A 2020    [facility]    HX COLONOSCOPY      HX GI      colonscopy    HX ORTHOPAEDIC  10/24/2018    Total Right Hip Arthroplasty Dr. Olegario Gaston. Family History   Problem Relation Age of Onset    Hypertension Mother     Cancer Sister         Social History     Tobacco Use    Smoking status: Former Smoker     Packs/day: 1.00     Years: 15.00     Pack years: 15.00     Quit date: 2000     Years since quittin.4    Smokeless tobacco: Never Used   Vaping Use    Vaping Use: Never used   Substance Use Topics    Alcohol use: Yes     Alcohol/week: 3.0 standard drinks     Types: 3 Cans of beer per week    Drug use: No        HPI  Patient here today for follow up of chronic conditions as well as ED follow up with PMH of hypertension, chronic bronchitis, allergic rhinitis, GERD, Vitamin D deficiency, oxygen dependent 2lpm nc, eczema and type 2 diabetes. States that tshe is taking medications as noted in chart.  Following with multiple specialist including pulmonology(Dr. Khan), ENT (Dr. Ofelia Garrido) and Gastroenterology (Dr. Greyson Srinivasan). Was seen in ED due to abdominal pain but states this is resolved now. Is complaining of an itchy rash to back that started in the past month. Itching worsen at night. Denies any changes in soaps or detergents. Using some left over eczema cream but not helping much. Current Outpatient Medications on File Prior to Visit   Medication Sig    Oxygen 2L    predniSONE (DELTASONE) 10 mg tablet Take 1 Tablet by mouth every other day.  metFORMIN (GLUCOPHAGE) 500 mg tablet Take 1 Tablet by mouth daily.  atorvastatin (LIPITOR) 20 mg tablet Take 20 mg by mouth daily.  alcohol swabs (Alcohol Prep Pads) padm 1 PAD BY APPLY EXTERNALLY ROUTE DAILY. USE TO CHECK BLOOD SUGAR DAILY.  lancets (Ultra Thin Lancets) 30 gauge misc USE ONCE LANCET PER DAY TO CHECK BLOOD SUGAR.  albuterol (Ventolin HFA) 90 mcg/actuation inhaler Take 1 Puff by inhalation every four (4) hours as needed for Wheezing.  lisinopriL (PRINIVIL, ZESTRIL) 20 mg tablet Take 1 Tablet by mouth daily.  amLODIPine (NORVASC) 10 mg tablet Take 1 Tablet by mouth daily.  pantoprazole (PROTONIX) 40 mg tablet Take 1 Tablet by mouth Before breakfast and dinner. Indications: inflammation of the esophagus with erosion, gastroesophageal reflux disease    [DISCONTINUED] metroNIDAZOLE (FLAGYL) 500 mg tablet Take 1 Tablet by mouth three (3) times daily. Indications: diverticulitis    docusate sodium (COLACE) 100 mg capsule TAKE 1 CAP BY MOUTH 2 TIMES A DAY FOR 90 DAYS.  montelukast (SINGULAIR) 10 mg tablet TAKE 1 TABLET BY MOUTH EVERY DAY FOR ALLERGY SYMPTOMS    azelastine (ASTELIN) 137 mcg (0.1 %) nasal spray 2 SPRAYS IN BOTH NOSTRILS NIGHTLY.  furosemide (LASIX) 40 mg tablet Take 1 Tablet by mouth daily.  fluticasone propionate (FLONASE) 50 mcg/actuation nasal spray 2 Sprays by Both Nostrils route daily.     ergocalciferol (ERGOCALCIFEROL) 1,250 mcg (50,000 unit) capsule Take 1 Capsule by mouth every seven (7) days. Indications: vitamin D deficiency (high dose therapy)    glucose blood VI test strips (True Metrix Glucose Test Strip) strip Use one test strip to check glucose daily    [DISCONTINUED] metFORMIN (GLUCOPHAGE) 500 mg tablet TAKE 1 TABLET BY MOUTH TWO (2) TIMES DAILY (WITH MEALS).  [DISCONTINUED] predniSONE (DELTASONE) 10 mg tablet Take 1 tablet by mouth twice a day for 10 days and then reduce to 1 tablet by mouth once a day    albuterol-ipratropium (DUO-NEB) 2.5 mg-0.5 mg/3 ml nebu USE 1 VIAL EVERY 4 6 HOURS AS NEEDED. DX J44.9. NEED MED B INFO    Trelegy Ellipta 100-62.5-25 mcg inhaler Take 1 Puff by inhalation daily.  [DISCONTINUED] mometasone (ELOCON) 0.1 % ointment Apply  to affected area daily.  lidocaine (LIDODERM) 5 % APPLY 1 PATCH EVERY DAY. REMOVE AFTER 12 HOURS     No current facility-administered medications on file prior to visit. Medications Ordered Today   Medications    permethrin (ACTICIN) 5 % topical cream     Sig: apply sparingly as directed     Dispense:  60 g     Refill:  0    mometasone (ELOCON) 0.1 % ointment     Sig: Apply  to affected area daily. Dispense:  15 g     Refill:  2        Review of Systems   Constitutional: Negative. HENT: Positive for congestion. Negative for ear discharge, ear pain, sinus pain, sore throat and tinnitus. Respiratory: Positive for cough and shortness of breath. Cardiovascular: Negative. Gastrointestinal: Negative. Genitourinary: Negative. Musculoskeletal: Positive for myalgias. Skin: Positive for itching and rash. Neurological: Negative. Psychiatric/Behavioral: Negative.            Visit Vitals  /63 (BP 1 Location: Left upper arm, BP Patient Position: Sitting, BP Cuff Size: Adult)   Pulse (!) 105   Temp 97.5 °F (36.4 °C) (Temporal)   Resp 20   Ht 5' 6\" (1.676 m)   Wt 153 lb 12.8 oz (69.8 kg)   SpO2 98%   PF (!) 2 L/min   BMI 24.82 kg/m²       Physical Exam  Vitals and nursing note reviewed. Constitutional:       Appearance: Normal appearance. Cardiovascular:      Rate and Rhythm: Normal rate and regular rhythm. Heart sounds: Normal heart sounds. Pulmonary:      Comments: Diminished bases   Abdominal:      General: Bowel sounds are normal.      Palpations: Abdomen is soft. Tenderness: There is no abdominal tenderness. Musculoskeletal:         General: Normal range of motion. Right lower leg: No edema. Left lower leg: No edema. Skin:     Findings: Rash present. Neurological:      Mental Status: She is alert and oriented to person, place, and time. Mental status is at baseline. Psychiatric:         Mood and Affect: Mood normal.         Behavior: Behavior normal.            1. Primary hypertension  Blood pressure is controlled and continue medications as directed  Checking fasting labs today  Encourage to monitor at home and notify provider if > 140/90 consistently  - CBC WITH AUTOMATED DIFF  - METABOLIC PANEL, COMPREHENSIVE  - URINALYSIS W/ RFLX MICROSCOPIC  - MICROALBUMIN, UR, RAND W/ MICROALB/CREAT RATIO  - TSH RFX ON ABNORMAL TO FREE T4    2. Mucopurulent chronic bronchitis (HCC)  On prednisone 10mg every other day, Trelegy and albuterol prn  Followed by Dr. Teo Alanis    3. Type 2 diabetes mellitus with other specified complication, without long-term current use of insulin (HCC)  Lab Results   Component Value Date/Time    Hemoglobin A1c 4.9 01/19/2021 11:05 AM    Hemoglobin A1c (POC) 4.6 06/22/2022 01:22 PM    Hemoglobin A1c, External 6.0 11/02/2017 12:00 AM   Will have her reduce the metformin to 500mg daily  No low sugars reported but notify provider if > 200 or < 70  Encourage following diabetic diet and staying active as possible  Continue regular eye exams  Check feet daily and notify provider immediately if wound develops  Updating fasting labs  - AMB POC HEMOGLOBIN A1C    4.  Gastroesophageal reflux disease without esophagitis  Stable on and protonix as directed  Followed by Dr. Brittni Goldberg    5. Seasonal allergic rhinitis, unspecified trigger  Using azelastine and on montelukast  Followed by Dr. Jorge Ro    6. Vitamin D deficiency  Check vitamin d  Encourage vitamin d 2000 units daily     7. Urinary tract infection without hematuria, site unspecified  Urine clear    8. Oxygen dependent  Continuous 2lpm nc    9. Eczema, unspecified type  Not clear that rash is eczema related but does use topical emollient and mometasone prn    10. Mixed hyperlipidemia  Lab Results   Component Value Date/Time    LDL, calculated 99 11/09/2021 10:17 AM    LDL, calculated 96.4 11/17/2020 06:18 AM   Continues statin medication as directed    11. Abnormal radiologic findings on diagnostic imaging of right kidney  CT Results (most recent):  Results from Hospital Encounter encounter on 06/08/22    CT ABD PELV WO CONT    Narrative  CT abdomen and pelvis without IV contrast.    Comparison CT abdomen and pelvis September 7, 2020. Axial images are reviewed along with reformatted sagittal/coronal images. No IV  contrast administered. Dose reduction: All CT scans at this facility are performed using dose reduction  optimization techniques as appropriate to a performed exam including the  following-  automated exposure control, adjustments of mA and/or Kv according to patient  size, or use of iterative reconstructive technique. Lung bases are clear. Degree fatty change for the liver. Mild gallbladder distention. Small dependent  gallstones and/or sludge within gallbladder lumen. Pancreas and bilateral  adrenal glands appear unremarkable. Normal volume spleen. Left kidney appears unremarkable on this nonenhanced study. No hydronephrosis. Right kidney with small round not well defined hypodense medial mid pole  structure, consider further characterization with ultrasound. No hydronephrosis. Stomach and small bowel loops are decompressed.  Stool and air through colon. Sigmoid colon diverticula. No CT evidence for appendicitis or diverticulitis. Mild bladder distention. Advanced atherosclerotic change normal caliber abdominal aorta with extension to  pelvic arteries. Lumbar spondylosis; multilevel compression deformities. L5 compression deformity advanced compared to prior imaging. Advanced DJD left  hip. Intact right hip hardware. Impression  Cholelithiasis. Right kidney with not well-defined medial midpole round hypodense site;  recommend ultrasound correlation. No hydronephrosis either kidney. Sigmoid colon diverticulosis. No CT evidence for appendicitis or diverticulitis. Advanced atherosclerotic change abdominal aorta. No abdominal aortic aneurysm. Lumbar spondylosis. L5 compression deformity advanced compared to prior imaging. Will complete follow up US of right kidney finding  She scheduled to see Dr. Brittni Goldberg already but not having any symptoms currently and pain was located in lower abdomen making gallstones unlikely cause of her symptoms at that time. - US RETROPERITONEUM COMP; Future    12. Scabies  Most areas are secondary so difficult to tell but feel would be wise to cover for scabies and will send topical permetherin to treat. She made appointment with dermatology that is upcoming (Dr. Ulises Santiago)  - permethrin (ACTICIN) 5 % topical cream; apply sparingly as directed  Dispense: 60 g; Refill: 0      We discussed the expected course, resolution and complications of the diagnosis(es) in detail. Medication risks, benefits, costs, interactions, and alternatives were discussed as indicated. I advised her to contact the office if her condition worsens, changes or fails to improve as anticipated. She expressed understanding with the diagnosis(es) and plan.      Patient verbalizes understanding of plan of care as discussed above    Follow-up and Dispositions    · Return in about 6 months (around 12/22/2022) for or sooner for worsening symptoms.

## 2022-06-23 LAB
ALBUMIN SERPL-MCNC: 4.3 G/DL (ref 3.8–4.8)
ALBUMIN/CREAT UR: <14 MG/G CREAT (ref 0–29)
ALBUMIN/GLOB SERPL: 1.9 {RATIO} (ref 1.2–2.2)
ALP SERPL-CCNC: 98 IU/L (ref 44–121)
ALT SERPL-CCNC: 32 IU/L (ref 0–32)
APPEARANCE UR: CLEAR
AST SERPL-CCNC: 43 IU/L (ref 0–40)
BACTERIA #/AREA URNS HPF: NORMAL /[HPF]
BASOPHILS # BLD AUTO: 0 X10E3/UL (ref 0–0.2)
BASOPHILS NFR BLD AUTO: 1 %
BILIRUB SERPL-MCNC: 0.6 MG/DL (ref 0–1.2)
BILIRUB UR QL STRIP: NEGATIVE
BUN SERPL-MCNC: 9 MG/DL (ref 8–27)
BUN/CREAT SERPL: 12 (ref 12–28)
CALCIUM SERPL-MCNC: 9.9 MG/DL (ref 8.7–10.3)
CASTS URNS QL MICRO: NORMAL /LPF
CHLORIDE SERPL-SCNC: 87 MMOL/L (ref 96–106)
CO2 SERPL-SCNC: 28 MMOL/L (ref 20–29)
COLOR UR: YELLOW
CREAT SERPL-MCNC: 0.77 MG/DL (ref 0.57–1)
CREAT UR-MCNC: 21.5 MG/DL
EGFR: 85 ML/MIN/1.73
EOSINOPHIL # BLD AUTO: 0.1 X10E3/UL (ref 0–0.4)
EOSINOPHIL NFR BLD AUTO: 2 %
EPI CELLS #/AREA URNS HPF: NORMAL /HPF (ref 0–10)
ERYTHROCYTE [DISTWIDTH] IN BLOOD BY AUTOMATED COUNT: 12.3 % (ref 11.7–15.4)
GLOBULIN SER CALC-MCNC: 2.3 G/DL (ref 1.5–4.5)
GLUCOSE SERPL-MCNC: 86 MG/DL (ref 65–99)
GLUCOSE UR QL STRIP: NEGATIVE
HCT VFR BLD AUTO: 30.4 % (ref 34–46.6)
HGB BLD-MCNC: 10.5 G/DL (ref 11.1–15.9)
HGB UR QL STRIP: NEGATIVE
IMM GRANULOCYTES # BLD AUTO: 0 X10E3/UL (ref 0–0.1)
IMM GRANULOCYTES NFR BLD AUTO: 0 %
KETONES UR QL STRIP: NEGATIVE
LEUKOCYTE ESTERASE UR QL STRIP: ABNORMAL
LYMPHOCYTES # BLD AUTO: 1.1 X10E3/UL (ref 0.7–3.1)
LYMPHOCYTES NFR BLD AUTO: 19 %
MCH RBC QN AUTO: 31.4 PG (ref 26.6–33)
MCHC RBC AUTO-ENTMCNC: 34.5 G/DL (ref 31.5–35.7)
MCV RBC AUTO: 91 FL (ref 79–97)
MICRO URNS: ABNORMAL
MICROALBUMIN UR-MCNC: <3 UG/ML
MONOCYTES # BLD AUTO: 0.5 X10E3/UL (ref 0.1–0.9)
MONOCYTES NFR BLD AUTO: 9 %
NEUTROPHILS # BLD AUTO: 3.9 X10E3/UL (ref 1.4–7)
NEUTROPHILS NFR BLD AUTO: 69 %
NITRITE UR QL STRIP: NEGATIVE
PH UR STRIP: 7.5 [PH] (ref 5–7.5)
PLATELET # BLD AUTO: 364 X10E3/UL (ref 150–450)
POTASSIUM SERPL-SCNC: 4.7 MMOL/L (ref 3.5–5.2)
PROT SERPL-MCNC: 6.6 G/DL (ref 6–8.5)
PROT UR QL STRIP: NEGATIVE
RBC # BLD AUTO: 3.34 X10E6/UL (ref 3.77–5.28)
RBC #/AREA URNS HPF: NORMAL /HPF (ref 0–2)
SODIUM SERPL-SCNC: 130 MMOL/L (ref 134–144)
SP GR UR STRIP: 1 (ref 1–1.03)
TSH SERPL DL<=0.005 MIU/L-ACNC: 1.18 UIU/ML (ref 0.45–4.5)
UROBILINOGEN UR STRIP-MCNC: 0.2 MG/DL (ref 0.2–1)
WBC # BLD AUTO: 5.7 X10E3/UL (ref 3.4–10.8)
WBC #/AREA URNS HPF: NORMAL /HPF (ref 0–5)

## 2022-06-23 RX ORDER — FUROSEMIDE 20 MG/1
1 TABLET ORAL DAILY
COMMUNITY
End: 2022-07-07 | Stop reason: SDUPTHER

## 2022-06-23 NOTE — PROGRESS NOTES
Please let patient know that sodium appears to stay consistently decreased. This can become problematic if drops too low. Would like to have her cut the lasix back to 20mg daily. Monitor for fluid overload symptom such as leg swelling or > 5lb weight gain. Repeat sodium in 1 mo. Otherwise labs are stable to baseline.

## 2022-06-24 NOTE — PROGRESS NOTES
Patient called back with another bottle of Furosemide 40 mg. She stated she can break these in half to make them 20 mg.  I explained to her to take 1/2 tablet of the 40 mg. Tablet every day and she needs to come back in 1 month to have blood work done. I transferred her to the front to schedule this.

## 2022-06-24 NOTE — PROGRESS NOTES
I updated her chart yesterday. She was listed on the 40mg tablet so I changed after I recommended the decrease to 20mg. Please contact back and confirm what mg tablet she has at home. If she is on 20mg then the recommendation is going to be different, likely decrease to 10mg.

## 2022-06-24 NOTE — PROGRESS NOTES
Informed patient of lab results and recommendations per BARBI Childs NP. Patient stated she is only taking the Furosemide 20 mg 1 time per day. On medication list, she is taking it every day. Informed her to watch for signs of fluid overload also.

## 2022-06-27 ENCOUNTER — OFFICE VISIT (OUTPATIENT)
Dept: GASTROENTEROLOGY | Age: 67
End: 2022-06-27
Payer: MEDICARE

## 2022-06-27 VITALS
HEIGHT: 66 IN | HEART RATE: 103 BPM | WEIGHT: 155.4 LBS | TEMPERATURE: 98 F | SYSTOLIC BLOOD PRESSURE: 97 MMHG | RESPIRATION RATE: 18 BRPM | DIASTOLIC BLOOD PRESSURE: 51 MMHG | BODY MASS INDEX: 24.98 KG/M2 | OXYGEN SATURATION: 100 %

## 2022-06-27 DIAGNOSIS — R93.429 ABNORMAL CT SCAN, KIDNEY: ICD-10-CM

## 2022-06-27 DIAGNOSIS — R10.30 LOWER ABDOMINAL PAIN: ICD-10-CM

## 2022-06-27 DIAGNOSIS — K21.00 GASTROESOPHAGEAL REFLUX DISEASE WITH ESOPHAGITIS WITHOUT HEMORRHAGE: ICD-10-CM

## 2022-06-27 DIAGNOSIS — K80.20 CALCULUS OF GALLBLADDER WITHOUT CHOLECYSTITIS WITHOUT OBSTRUCTION: ICD-10-CM

## 2022-06-27 DIAGNOSIS — K57.30 DIVERTICULAR DISEASE OF COLON: Primary | ICD-10-CM

## 2022-06-27 PROCEDURE — G8399 PT W/DXA RESULTS DOCUMENT: HCPCS | Performed by: INTERNAL MEDICINE

## 2022-06-27 PROCEDURE — 99214 OFFICE O/P EST MOD 30 MIN: CPT | Performed by: INTERNAL MEDICINE

## 2022-06-27 PROCEDURE — G8536 NO DOC ELDER MAL SCRN: HCPCS | Performed by: INTERNAL MEDICINE

## 2022-06-27 PROCEDURE — G8427 DOCREV CUR MEDS BY ELIG CLIN: HCPCS | Performed by: INTERNAL MEDICINE

## 2022-06-27 PROCEDURE — G8417 CALC BMI ABV UP PARAM F/U: HCPCS | Performed by: INTERNAL MEDICINE

## 2022-06-27 PROCEDURE — 3017F COLORECTAL CA SCREEN DOC REV: CPT | Performed by: INTERNAL MEDICINE

## 2022-06-27 PROCEDURE — G8754 DIAS BP LESS 90: HCPCS | Performed by: INTERNAL MEDICINE

## 2022-06-27 PROCEDURE — 1101F PT FALLS ASSESS-DOCD LE1/YR: CPT | Performed by: INTERNAL MEDICINE

## 2022-06-27 PROCEDURE — G9899 SCRN MAM PERF RSLTS DOC: HCPCS | Performed by: INTERNAL MEDICINE

## 2022-06-27 PROCEDURE — 1090F PRES/ABSN URINE INCON ASSESS: CPT | Performed by: INTERNAL MEDICINE

## 2022-06-27 PROCEDURE — G8752 SYS BP LESS 140: HCPCS | Performed by: INTERNAL MEDICINE

## 2022-06-27 PROCEDURE — 1123F ACP DISCUSS/DSCN MKR DOCD: CPT | Performed by: INTERNAL MEDICINE

## 2022-06-27 PROCEDURE — G8510 SCR DEP NEG, NO PLAN REQD: HCPCS | Performed by: INTERNAL MEDICINE

## 2022-06-27 RX ORDER — METRONIDAZOLE 500 MG/1
500 TABLET ORAL 2 TIMES DAILY
Qty: 28 TABLET | Refills: 0 | Status: SHIPPED | OUTPATIENT
Start: 2022-06-27 | End: 2022-10-01 | Stop reason: ALTCHOICE

## 2022-06-27 NOTE — PROGRESS NOTES
Kb Grayson is a 78 y.o. female who presents today for the following:  Chief Complaint   Patient presents with    Abdominal Pain         No Known Allergies    Current Outpatient Medications   Medication Sig    metroNIDAZOLE (FLAGYL) 500 mg tablet Take 1 Tablet by mouth two (2) times a day. Indications: diverticulitis    furosemide (LASIX) 20 mg tablet Take 1 Tablet by mouth daily.  atorvastatin (LIPITOR) 20 mg tablet Take 20 mg by mouth daily.  Oxygen 2L    predniSONE (DELTASONE) 10 mg tablet Take 1 Tablet by mouth every other day.  metFORMIN (GLUCOPHAGE) 500 mg tablet Take 1 Tablet by mouth daily.  permethrin (ACTICIN) 5 % topical cream apply sparingly as directed    mometasone (ELOCON) 0.1 % ointment Apply  to affected area daily.  alcohol swabs (Alcohol Prep Pads) padm 1 PAD BY APPLY EXTERNALLY ROUTE DAILY. USE TO CHECK BLOOD SUGAR DAILY.  lancets (Ultra Thin Lancets) 30 gauge misc USE ONCE LANCET PER DAY TO CHECK BLOOD SUGAR.  albuterol (Ventolin HFA) 90 mcg/actuation inhaler Take 1 Puff by inhalation every four (4) hours as needed for Wheezing.  lisinopriL (PRINIVIL, ZESTRIL) 20 mg tablet Take 1 Tablet by mouth daily.  amLODIPine (NORVASC) 10 mg tablet Take 1 Tablet by mouth daily.  pantoprazole (PROTONIX) 40 mg tablet Take 1 Tablet by mouth Before breakfast and dinner. Indications: inflammation of the esophagus with erosion, gastroesophageal reflux disease    docusate sodium (COLACE) 100 mg capsule TAKE 1 CAP BY MOUTH 2 TIMES A DAY FOR 90 DAYS.  montelukast (SINGULAIR) 10 mg tablet TAKE 1 TABLET BY MOUTH EVERY DAY FOR ALLERGY SYMPTOMS    azelastine (ASTELIN) 137 mcg (0.1 %) nasal spray 2 SPRAYS IN BOTH NOSTRILS NIGHTLY.  fluticasone propionate (FLONASE) 50 mcg/actuation nasal spray 2 Sprays by Both Nostrils route daily.  ergocalciferol (ERGOCALCIFEROL) 1,250 mcg (50,000 unit) capsule Take 1 Capsule by mouth every seven (7) days.  Indications: vitamin D deficiency (high dose therapy)    glucose blood VI test strips (True Metrix Glucose Test Strip) strip Use one test strip to check glucose daily    albuterol-ipratropium (DUO-NEB) 2.5 mg-0.5 mg/3 ml nebu USE 1 VIAL EVERY 4 6 HOURS AS NEEDED. DX J44.9. NEED MED B INFO    Trelegy Ellipta 100-62.5-25 mcg inhaler Take 1 Puff by inhalation daily.  lidocaine (LIDODERM) 5 % APPLY 1 PATCH EVERY DAY. REMOVE AFTER 12 HOURS     No current facility-administered medications for this visit. Past Medical History:   Diagnosis Date    Arthritis     Bronchitis 2020    Chronic obstructive pulmonary disease (HCC)     Chronic pain     Diabetes ([facility] [address]     Gastroesophageal reflux disease without esophagitis 2020    GERD (gastroesophageal reflux disease)     GERD (gastroesophageal reflux disease)     History of multiple allergies     HTN (hypertension) 2020    Hypertension     Intermittent asthma 2020    Lung disorder     PATIENT IS ON OXYGEN    Menopause     Pain of upper abdomen 2020    Seasonal allergic rhinitis 2020    Sinus problem     Vitamin D deficiency 2020       Past Surgical History:   Procedure Laterality Date    COLONOSCOPY N/A 2020    [facility]inger [facility] END[facility]COPY    HX COLONOSCOPY      HX GI      colonscopy    HX ORTHOPAEDIC  10/24/2018    Total Right Hip Arthroplasty Dr. Elaine Schmidt.        Family History   Problem Relation Age of Onset    Hypertension Mother     Cancer Sister        Social History     Socioeconomic History    Marital status: SINGLE     Spouse name: Not on file    Number of children: Not on file    Years of education: Not on file    Highest education level: Not on file   Occupational History    Not on file   Tobacco Use    Smoking status: Former Smoker     Packs/day: 1.00     Years: 15.00     Pack years: 15.00     Quit date: 2000     Years since quittin.5    Smokeless tobacco: Never Used   Vaping Use    Vaping Use: Never used   Substance and Sexual Activity    Alcohol use: Yes     Alcohol/week: 3.0 standard drinks     Types: 3 Cans of beer per week    Drug use: No    Sexual activity: Not Currently   Other Topics Concern    Not on file   Social History Narrative    Not on file     Social Determinants of Health     Financial Resource Strain:     Difficulty of Paying Living Expenses: Not on file   Food Insecurity:     Worried About Running Out of Food in the Last Year: Not on file    Bridget of Food in the Last Year: Not on file   Transportation Needs:     Lack of Transportation (Medical): Not on file    Lack of Transportation (Non-Medical): Not on file   Physical Activity:     Days of Exercise per Week: Not on file    Minutes of Exercise per Session: Not on file   Stress:     Feeling of Stress : Not on file   Social Connections:     Frequency of Communication with Friends and Family: Not on file    Frequency of Social Gatherings with Friends and Family: Not on file    Attends Muslim Services: Not on file    Active Member of [address] [place] [address] or Organizations: Not on file    Attends Club or Organization Meetings: Not on file    Marital Status: Not on file   Intimate Partner Violence:     Fear of Current or Ex-Partner: Not on file    Emotionally Abused: Not on file    Physically Abused: Not on file    Sexually Abused: Not on file   Housing Stability:     Unable to Pay for Housing in the Last Year: Not on file    Number of [place] in the Last Year: Not on file    Unstable Housing in the Last Year: Not on file         HPI  60-year-old female with history of COPD on home O2, arthritis, diverticular disease, hiatal hernia, gastroesophageal reflux disease, anxiety disorder, and chronic gastritis who comes in for follow-up visit.   Patient states that she had a CT scan done which showed gallstones, a right kidney hypodense lesion which was not well visualized and sigmoid diverticulosis. Patient states she is to get an abdominal ultrasound in a couple days. He states his pain is involving the lower abdomen and she takes Tylenol every morning. States her bowel movements are presently doing very good and regular. She continues have pain across the lower abdomen. Patient states she did get blood work done recently by her PCP. Review of Systems   Constitutional: Negative. HENT: Negative. Negative for nosebleeds. Eyes: Negative. Respiratory: Positive for shortness of breath. Cardiovascular: Negative. Gastrointestinal: Positive for abdominal pain and heartburn. Negative for blood in stool, constipation, diarrhea, melena, nausea and vomiting. Genitourinary: Negative. Musculoskeletal: Positive for back pain and joint pain. Skin: Negative. Neurological: Negative. Endo/Heme/Allergies: Negative. Psychiatric/Behavioral: Negative. All other systems reviewed and are negative. Visit Vitals  BP (!) 97/51 (BP 1 Location: Right arm, BP Patient Position: Sitting, BP Cuff Size: Adult)   Pulse (!) 103   Temp 98 °F (36.7 °C) (Oral)   Resp 18   Ht 5' 6\" (1.676 m)   Wt 70.5 kg (155 lb 6.4 oz)   SpO2 100%   BMI 25.08 kg/m²     Physical Exam  Vitals and nursing note reviewed. Constitutional:       Appearance: Normal appearance. She is normal weight. HENT:      Head: Normocephalic and atraumatic. Nose: Nose normal.      Mouth/Throat:      Mouth: Mucous membranes are moist.      Pharynx: Oropharynx is clear. Eyes:      General: No scleral icterus. Conjunctiva/sclera: Conjunctivae normal.      Pupils: Pupils are equal, round, and reactive to light. Cardiovascular:      Rate and Rhythm: Normal rate and regular rhythm. Pulses: Normal pulses. Heart sounds: Normal heart sounds. Pulmonary:      Effort: Pulmonary effort is normal.      Breath sounds: Normal breath sounds.    Abdominal:      General: Bowel sounds are normal. There is no distension. Palpations: Abdomen is soft. There is no mass. Tenderness: There is abdominal tenderness. There is no right CVA tenderness, left CVA tenderness, guarding or rebound. Hernia: No hernia is present. Musculoskeletal:         General: Normal range of motion. Cervical back: Normal range of motion and neck supple. Right lower leg: Edema present. Left lower leg: Edema present. Skin:     General: Skin is warm and dry. Coloration: Skin is not jaundiced. Neurological:      General: No focal deficit present. Mental Status: She is alert and oriented to person, place, and time. Psychiatric:         Mood and Affect: Mood normal.         Behavior: Behavior normal.         Thought Content: Thought content normal.         Judgment: Judgment normal.            1. Diverticular disease of colon  We will give patient a trial of antibiotic therapy for the persistent lower abdominal pain and suspected diverticular cause. - metroNIDAZOLE (FLAGYL) 500 mg tablet; Take 1 Tablet by mouth two (2) times a day. Indications: diverticulitis  Dispense: 28 Tablet; Refill: 0    2. Lower abdominal pain  Secondary to diverticular disease. 3. Calculus of gallbladder without cholecystitis without obstruction  Presence of gallstones without symptoms presently. 4. Abnormal CT scan, kidney  Ultrasound set to further evaluate the kidney findings on CT scan    5.  Gastroesophageal reflux disease with esophagitis without hemorrhage  Continue pantoprazole 40 mg daily

## 2022-06-28 ENCOUNTER — TELEPHONE (OUTPATIENT)
Dept: PRIMARY CARE CLINIC | Age: 67
End: 2022-06-28

## 2022-06-28 NOTE — TELEPHONE ENCOUNTER
She was to use the permetherin as directed on bottle only 1 time over night. Its not prn. She was given mometasone also but I will resend.

## 2022-06-29 ENCOUNTER — TELEPHONE (OUTPATIENT)
Dept: PRIMARY CARE CLINIC | Age: 67
End: 2022-06-29

## 2022-06-29 ENCOUNTER — HOSPITAL ENCOUNTER (OUTPATIENT)
Dept: ULTRASOUND IMAGING | Age: 67
Discharge: HOME | End: 2022-06-29
Attending: NURSE PRACTITIONER
Payer: MEDICARE

## 2022-06-29 DIAGNOSIS — R93.421 ABNORMAL RADIOLOGIC FINDINGS ON DIAGNOSTIC IMAGING OF RIGHT KIDNEY: ICD-10-CM

## 2022-06-29 PROCEDURE — 76770 US EXAM ABDO BACK WALL COMP: CPT

## 2022-06-29 NOTE — TELEPHONE ENCOUNTER
LVM stating patient was to use the Permethrin once overnight and then the mometasone. Ms. Peña Hein will resend it.

## 2022-06-29 NOTE — TELEPHONE ENCOUNTER
Patient LVM because she wanted me to call her back re: voice mail I had left earlier . I had to leave another voice mail stating that she was to use the Perethrin once at bedtime and the mometasone every day on the affected area as directed. Ms. Abhishek De La Rosa will send more into Pharmacy.

## 2022-07-01 RX ORDER — FLUTICASONE PROPIONATE 50 MCG
SPRAY, SUSPENSION (ML) NASAL
Qty: 1 EACH | Refills: 5 | Status: SHIPPED | OUTPATIENT
Start: 2022-07-01

## 2022-07-05 DIAGNOSIS — B86 SCABIES: ICD-10-CM

## 2022-07-05 RX ORDER — PERMETHRIN 50 MG/G
CREAM TOPICAL
Qty: 60 G | Refills: 0 | Status: SHIPPED | OUTPATIENT
Start: 2022-07-05 | End: 2022-07-06 | Stop reason: SDUPTHER

## 2022-07-06 DIAGNOSIS — B86 SCABIES: ICD-10-CM

## 2022-07-06 RX ORDER — PERMETHRIN 50 MG/G
1 CREAM TOPICAL
Qty: 60 G | Refills: 0 | Status: SHIPPED | OUTPATIENT
Start: 2022-07-06 | End: 2022-07-06

## 2022-07-06 NOTE — PROGRESS NOTES
Informed patient of  US shows Fatty Liver and A sonographic correlate to the finding in the right kidney reported on CT  abdomen pelvis 6/8/2022 is not definitively identified. I read from the [facility] what Fatty Liver is and what \"may\" cause it because patient wanted to know. She stated understanding. I informed her that if she has anymore questions or concerns to call our office.

## 2022-07-06 NOTE — PROGRESS NOTES
A sonographic correlate to the finding in the right kidney reported on CT  abdomen pelvis 6/8/2022 is not definitively identified. The included liver has dense, echogenic parenchyma suggesting infiltrative  process such as hepatic steatosis (fatty liver). If any questions, let me know.

## 2022-07-07 ENCOUNTER — TELEPHONE (OUTPATIENT)
Dept: PRIMARY CARE CLINIC | Age: 67
End: 2022-07-07

## 2022-07-07 RX ORDER — FUROSEMIDE 20 MG/1
20 TABLET ORAL DAILY
Qty: 90 TABLET | Refills: 1 | Status: SHIPPED | OUTPATIENT
Start: 2022-07-07 | End: 2022-09-30 | Stop reason: SDUPTHER

## 2022-07-07 NOTE — TELEPHONE ENCOUNTER
Requested Prescriptions     Pending Prescriptions Disp Refills    furosemide (LASIX) 20 mg tablet       Sig: Take 1 Tablet by mouth daily.

## 2022-07-11 ENCOUNTER — TELEPHONE (OUTPATIENT)
Dept: PRIMARY CARE CLINIC | Age: 67
End: 2022-07-11

## 2022-07-12 NOTE — TELEPHONE ENCOUNTER
LVM stating patient can get Shingles shot at any Pharmacy at any time. They will ask certain questions and if she qualifies, she can get one.

## 2022-07-23 DIAGNOSIS — E55.9 VITAMIN D DEFICIENCY: ICD-10-CM

## 2022-07-24 RX ORDER — ERGOCALCIFEROL 1.25 MG/1
50000 CAPSULE ORAL
Qty: 4 CAPSULE | Refills: 5 | Status: SHIPPED | OUTPATIENT
Start: 2022-07-24

## 2022-08-08 ENCOUNTER — TELEPHONE (OUTPATIENT)
Dept: ENT CLINIC | Age: 67
End: 2022-08-08

## 2022-08-08 NOTE — TELEPHONE ENCOUNTER
Pt last seen 1/26/21 stated she is having post nasal drainage going down her throat and would like to know what she can do for it?      Please advise

## 2022-08-15 ENCOUNTER — TELEPHONE (OUTPATIENT)
Dept: GASTROENTEROLOGY | Age: 67
End: 2022-08-15

## 2022-08-15 NOTE — TELEPHONE ENCOUNTER
Patient called again, c/o diarrhea x 2 weeks, lung doctor called in some antibiotics and prednisone pack for her. C/o some abd pain too. She had a diatherix test in April and it was negative. She is using OTC immodium. Is on bland diet. What else can she do?

## 2022-08-17 ENCOUNTER — TELEPHONE (OUTPATIENT)
Dept: PRIMARY CARE CLINIC | Age: 67
End: 2022-08-17

## 2022-08-17 NOTE — TELEPHONE ENCOUNTER
I called and spoke with Maria C Hylton, she said she has changed her diet  to soft moist bland foods, and diarrhea has subsided. She is doing much better now.

## 2022-09-09 DIAGNOSIS — J30.9 ALLERGIC RHINITIS, UNSPECIFIED SEASONALITY, UNSPECIFIED TRIGGER: ICD-10-CM

## 2022-09-09 DIAGNOSIS — I10 ESSENTIAL HYPERTENSION: ICD-10-CM

## 2022-09-09 RX ORDER — AMLODIPINE BESYLATE 10 MG/1
10 TABLET ORAL DAILY
Qty: 90 TABLET | Refills: 1 | Status: SHIPPED | OUTPATIENT
Start: 2022-09-09

## 2022-09-09 RX ORDER — MONTELUKAST SODIUM 10 MG/1
TABLET ORAL
Qty: 90 TABLET | Refills: 1 | Status: SHIPPED | OUTPATIENT
Start: 2022-09-09

## 2022-09-27 ENCOUNTER — TELEPHONE (OUTPATIENT)
Dept: PRIMARY CARE CLINIC | Age: 67
End: 2022-09-27

## 2022-09-27 NOTE — TELEPHONE ENCOUNTER
Patient was last seen 06/22/2022 and was informed to come back in 1 month to have Sodium rechecked due to it being decreased. She was to make an appointment. Calls today wanting lab work done. You recommended she RTC around 12/22/2022 for F/U.

## 2022-09-27 NOTE — TELEPHONE ENCOUNTER
Pt cld & std to ask Lionel Both if she will put in request for her to have blood work and A1C without her having to come for appt. Advised her I would ask but she wld probably have to make appt.  Please advise

## 2022-09-28 NOTE — TELEPHONE ENCOUNTER
Informed patient to schedule lab appointment to have lab work done and I would do her A1C. Patient transferred to the front to do this.

## 2022-09-30 ENCOUNTER — CLINICAL SUPPORT (OUTPATIENT)
Dept: PRIMARY CARE CLINIC | Age: 67
End: 2022-09-30
Payer: MEDICARE

## 2022-09-30 DIAGNOSIS — K59.00 CONSTIPATION, UNSPECIFIED CONSTIPATION TYPE: Primary | ICD-10-CM

## 2022-09-30 DIAGNOSIS — E78.2 MIXED HYPERLIPIDEMIA: Primary | ICD-10-CM

## 2022-09-30 DIAGNOSIS — E11.69 TYPE 2 DIABETES MELLITUS WITH OTHER SPECIFIED COMPLICATION, WITHOUT LONG-TERM CURRENT USE OF INSULIN (HCC): Primary | ICD-10-CM

## 2022-09-30 LAB — HBA1C MFR BLD HPLC: 4.7 %

## 2022-09-30 PROCEDURE — 3044F HG A1C LEVEL LT 7.0%: CPT | Performed by: NURSE PRACTITIONER

## 2022-09-30 PROCEDURE — 83036 HEMOGLOBIN GLYCOSYLATED A1C: CPT | Performed by: NURSE PRACTITIONER

## 2022-09-30 RX ORDER — FUROSEMIDE 20 MG/1
20 TABLET ORAL DAILY
Qty: 90 TABLET | Refills: 1 | Status: SHIPPED | OUTPATIENT
Start: 2022-09-30

## 2022-09-30 RX ORDER — ATORVASTATIN CALCIUM 20 MG/1
20 TABLET, FILM COATED ORAL DAILY
Qty: 90 TABLET | Refills: 1 | Status: SHIPPED | OUTPATIENT
Start: 2022-09-30

## 2022-10-01 ENCOUNTER — APPOINTMENT (OUTPATIENT)
Dept: CT IMAGING | Age: 67
End: 2022-10-01
Attending: EMERGENCY MEDICINE
Payer: MEDICARE

## 2022-10-01 ENCOUNTER — HOSPITAL ENCOUNTER (EMERGENCY)
Age: 67
Discharge: ANOTHER HOSPITAL | End: 2022-10-01
Attending: EMERGENCY MEDICINE
Payer: MEDICARE

## 2022-10-01 ENCOUNTER — APPOINTMENT (OUTPATIENT)
Dept: GENERAL RADIOLOGY | Age: 67
End: 2022-10-01
Attending: EMERGENCY MEDICINE
Payer: MEDICARE

## 2022-10-01 ENCOUNTER — HOSPITAL ENCOUNTER (INPATIENT)
Age: 67
LOS: 3 days | Discharge: HOME HEALTH | DRG: 536 | End: 2022-10-04
Attending: EMERGENCY MEDICINE | Admitting: HOSPITALIST
Payer: MEDICARE

## 2022-10-01 VITALS
SYSTOLIC BLOOD PRESSURE: 120 MMHG | OXYGEN SATURATION: 100 % | DIASTOLIC BLOOD PRESSURE: 66 MMHG | TEMPERATURE: 98.7 F | RESPIRATION RATE: 16 BRPM | HEART RATE: 102 BPM | HEIGHT: 67 IN | WEIGHT: 169 LBS | BODY MASS INDEX: 26.53 KG/M2

## 2022-10-01 DIAGNOSIS — S32.502A CLOSED FRACTURE OF LEFT PUBIS, UNSPECIFIED PORTION OF PUBIS, INITIAL ENCOUNTER (HCC): Primary | ICD-10-CM

## 2022-10-01 DIAGNOSIS — S32.501A CLOSED NONDISPLACED FRACTURE OF RIGHT PUBIS, INITIAL ENCOUNTER (HCC): ICD-10-CM

## 2022-10-01 DIAGNOSIS — S72.002A CLOSED FRACTURE OF NECK OF LEFT FEMUR, INITIAL ENCOUNTER (HCC): Primary | ICD-10-CM

## 2022-10-01 PROBLEM — S32.509A PUBIC BONE FRACTURE (HCC): Status: ACTIVE | Noted: 2022-10-01

## 2022-10-01 LAB
ALBUMIN SERPL-MCNC: 4 G/DL (ref 3.5–5)
ALBUMIN/GLOB SERPL: 1 {RATIO} (ref 1.1–2.2)
ALP SERPL-CCNC: 113 U/L (ref 45–117)
ALT SERPL-CCNC: 39 U/L (ref 12–78)
ANION GAP SERPL CALC-SCNC: 6 MMOL/L (ref 5–15)
AST SERPL W P-5'-P-CCNC: 53 U/L (ref 15–37)
BASOPHILS # BLD: 0 K/UL (ref 0–0.1)
BASOPHILS NFR BLD: 0 % (ref 0–1)
BILIRUB SERPL-MCNC: 0.8 MG/DL (ref 0.2–1)
BUN SERPL-MCNC: 7 MG/DL (ref 8–27)
BUN SERPL-MCNC: 8 MG/DL (ref 6–20)
BUN/CREAT SERPL: 10 (ref 12–20)
BUN/CREAT SERPL: 10 (ref 12–28)
CA-I BLD-MCNC: 9.7 MG/DL (ref 8.5–10.1)
CALCIUM SERPL-MCNC: 10 MG/DL (ref 8.7–10.3)
CHLORIDE SERPL-SCNC: 89 MMOL/L (ref 96–106)
CHLORIDE SERPL-SCNC: 90 MMOL/L (ref 97–108)
CO2 SERPL-SCNC: 28 MMOL/L (ref 20–29)
CO2 SERPL-SCNC: 31 MMOL/L (ref 21–32)
CREAT SERPL-MCNC: 0.73 MG/DL (ref 0.57–1)
CREAT SERPL-MCNC: 0.77 MG/DL (ref 0.55–1.02)
DIFFERENTIAL METHOD BLD: ABNORMAL
EGFR: 91 ML/MIN/1.73
EOSINOPHIL # BLD: 0 K/UL (ref 0–0.4)
EOSINOPHIL NFR BLD: 0 % (ref 0–7)
ERYTHROCYTE [DISTWIDTH] IN BLOOD BY AUTOMATED COUNT: 12.9 % (ref 11.5–14.5)
GLOBULIN SER CALC-MCNC: 3.9 G/DL (ref 2–4)
GLUCOSE BLD STRIP.AUTO-MCNC: 143 MG/DL (ref 65–100)
GLUCOSE SERPL-MCNC: 101 MG/DL (ref 65–100)
GLUCOSE SERPL-MCNC: 87 MG/DL (ref 70–99)
HCT VFR BLD AUTO: 32.4 % (ref 35–47)
HGB BLD-MCNC: 11 G/DL (ref 11.5–16)
IMM GRANULOCYTES # BLD AUTO: 0.1 K/UL (ref 0–0.04)
IMM GRANULOCYTES NFR BLD AUTO: 1 % (ref 0–0.5)
LYMPHOCYTES # BLD: 0.4 K/UL (ref 0.8–3.5)
LYMPHOCYTES NFR BLD: 4 % (ref 12–49)
MCH RBC QN AUTO: 31.3 PG (ref 26–34)
MCHC RBC AUTO-ENTMCNC: 34 G/DL (ref 30–36.5)
MCV RBC AUTO: 92 FL (ref 80–99)
MONOCYTES # BLD: 0.4 K/UL (ref 0–1)
MONOCYTES NFR BLD: 3 % (ref 5–13)
NEUTS SEG # BLD: 10 K/UL (ref 1.8–8)
NEUTS SEG NFR BLD: 92 % (ref 32–75)
NRBC # BLD: 0 K/UL (ref 0–0.01)
NRBC BLD-RTO: 0 PER 100 WBC
PERFORMED BY, TECHID: ABNORMAL
PLATELET # BLD AUTO: 322 K/UL (ref 150–400)
PMV BLD AUTO: 9.8 FL (ref 8.9–12.9)
POTASSIUM SERPL-SCNC: 4.7 MMOL/L (ref 3.5–5.1)
POTASSIUM SERPL-SCNC: 4.7 MMOL/L (ref 3.5–5.2)
PROT SERPL-MCNC: 7.9 G/DL (ref 6.4–8.2)
RBC # BLD AUTO: 3.52 M/UL (ref 3.8–5.2)
SODIUM SERPL-SCNC: 127 MMOL/L (ref 136–145)
SODIUM SERPL-SCNC: 131 MMOL/L (ref 134–144)
WBC # BLD AUTO: 10.8 K/UL (ref 3.6–11)

## 2022-10-01 PROCEDURE — 74011000250 HC RX REV CODE- 250: Performed by: HOSPITALIST

## 2022-10-01 PROCEDURE — 94761 N-INVAS EAR/PLS OXIMETRY MLT: CPT

## 2022-10-01 PROCEDURE — 36415 COLL VENOUS BLD VENIPUNCTURE: CPT

## 2022-10-01 PROCEDURE — 82962 GLUCOSE BLOOD TEST: CPT

## 2022-10-01 PROCEDURE — 99285 EMERGENCY DEPT VISIT HI MDM: CPT

## 2022-10-01 PROCEDURE — 74011250636 HC RX REV CODE- 250/636: Performed by: EMERGENCY MEDICINE

## 2022-10-01 PROCEDURE — 96374 THER/PROPH/DIAG INJ IV PUSH: CPT

## 2022-10-01 PROCEDURE — 87641 MR-STAPH DNA AMP PROBE: CPT

## 2022-10-01 PROCEDURE — 65270000032 HC RM SEMIPRIVATE

## 2022-10-01 PROCEDURE — 94640 AIRWAY INHALATION TREATMENT: CPT

## 2022-10-01 PROCEDURE — 77010033678 HC OXYGEN DAILY

## 2022-10-01 PROCEDURE — 74011250636 HC RX REV CODE- 250/636: Performed by: HOSPITALIST

## 2022-10-01 PROCEDURE — 96375 TX/PRO/DX INJ NEW DRUG ADDON: CPT

## 2022-10-01 PROCEDURE — 74011250637 HC RX REV CODE- 250/637: Performed by: HOSPITALIST

## 2022-10-01 PROCEDURE — 73502 X-RAY EXAM HIP UNI 2-3 VIEWS: CPT

## 2022-10-01 PROCEDURE — 72192 CT PELVIS W/O DYE: CPT

## 2022-10-01 PROCEDURE — 85025 COMPLETE CBC W/AUTO DIFF WBC: CPT

## 2022-10-01 PROCEDURE — 80053 COMPREHEN METABOLIC PANEL: CPT

## 2022-10-01 RX ORDER — KETOROLAC TROMETHAMINE 30 MG/ML
15 INJECTION, SOLUTION INTRAMUSCULAR; INTRAVENOUS
Status: DISCONTINUED | OUTPATIENT
Start: 2022-10-01 | End: 2022-10-01 | Stop reason: HOSPADM

## 2022-10-01 RX ORDER — MORPHINE SULFATE 4 MG/ML
4 INJECTION INTRAVENOUS ONCE
Status: COMPLETED | OUTPATIENT
Start: 2022-10-01 | End: 2022-10-01

## 2022-10-01 RX ORDER — AMLODIPINE BESYLATE 5 MG/1
10 TABLET ORAL DAILY
Status: DISCONTINUED | OUTPATIENT
Start: 2022-10-02 | End: 2022-10-04 | Stop reason: HOSPADM

## 2022-10-01 RX ORDER — IPRATROPIUM BROMIDE AND ALBUTEROL SULFATE 2.5; .5 MG/3ML; MG/3ML
3 SOLUTION RESPIRATORY (INHALATION)
Status: DISCONTINUED | OUTPATIENT
Start: 2022-10-01 | End: 2022-10-02

## 2022-10-01 RX ORDER — MONTELUKAST SODIUM 10 MG/1
10 TABLET ORAL DAILY
Status: DISCONTINUED | OUTPATIENT
Start: 2022-10-02 | End: 2022-10-04 | Stop reason: HOSPADM

## 2022-10-01 RX ORDER — LISINOPRIL 20 MG/1
20 TABLET ORAL DAILY
Status: DISCONTINUED | OUTPATIENT
Start: 2022-10-02 | End: 2022-10-04 | Stop reason: HOSPADM

## 2022-10-01 RX ORDER — KETOROLAC TROMETHAMINE 30 MG/ML
30 INJECTION, SOLUTION INTRAMUSCULAR; INTRAVENOUS ONCE
Status: DISCONTINUED | OUTPATIENT
Start: 2022-10-01 | End: 2022-10-01

## 2022-10-01 RX ORDER — PANTOPRAZOLE SODIUM 40 MG/1
40 TABLET, DELAYED RELEASE ORAL
Status: DISCONTINUED | OUTPATIENT
Start: 2022-10-02 | End: 2022-10-04 | Stop reason: HOSPADM

## 2022-10-01 RX ORDER — ATORVASTATIN CALCIUM 20 MG/1
20 TABLET, FILM COATED ORAL DAILY
Status: DISCONTINUED | OUTPATIENT
Start: 2022-10-02 | End: 2022-10-04 | Stop reason: HOSPADM

## 2022-10-01 RX ORDER — ACETAMINOPHEN 325 MG/1
650 TABLET ORAL
Status: DISCONTINUED | OUTPATIENT
Start: 2022-10-01 | End: 2022-10-04 | Stop reason: HOSPADM

## 2022-10-01 RX ORDER — ALBUTEROL SULFATE 90 UG/1
1 AEROSOL, METERED RESPIRATORY (INHALATION)
Status: DISCONTINUED | OUTPATIENT
Start: 2022-10-01 | End: 2022-10-04 | Stop reason: HOSPADM

## 2022-10-01 RX ORDER — MORPHINE SULFATE 4 MG/ML
4 INJECTION INTRAVENOUS
Status: DISPENSED | OUTPATIENT
Start: 2022-10-01 | End: 2022-10-03

## 2022-10-01 RX ORDER — DEXTROSE MONOHYDRATE 100 MG/ML
0-250 INJECTION, SOLUTION INTRAVENOUS AS NEEDED
Status: DISCONTINUED | OUTPATIENT
Start: 2022-10-01 | End: 2022-10-04 | Stop reason: HOSPADM

## 2022-10-01 RX ORDER — PREDNISONE 10 MG/1
10 TABLET ORAL EVERY OTHER DAY
Status: DISCONTINUED | OUTPATIENT
Start: 2022-10-01 | End: 2022-10-01

## 2022-10-01 RX ORDER — MONTELUKAST SODIUM 10 MG/1
10 TABLET ORAL
Status: DISCONTINUED | OUTPATIENT
Start: 2022-10-01 | End: 2022-10-01

## 2022-10-01 RX ORDER — INSULIN LISPRO 100 [IU]/ML
INJECTION, SOLUTION INTRAVENOUS; SUBCUTANEOUS
Status: DISCONTINUED | OUTPATIENT
Start: 2022-10-01 | End: 2022-10-04 | Stop reason: HOSPADM

## 2022-10-01 RX ORDER — MAGNESIUM SULFATE 100 %
4 CRYSTALS MISCELLANEOUS AS NEEDED
Status: DISCONTINUED | OUTPATIENT
Start: 2022-10-01 | End: 2022-10-04 | Stop reason: HOSPADM

## 2022-10-01 RX ORDER — SODIUM CHLORIDE 9 MG/ML
100 INJECTION, SOLUTION INTRAVENOUS ONCE
Status: DISCONTINUED | OUTPATIENT
Start: 2022-10-01 | End: 2022-10-01 | Stop reason: HOSPADM

## 2022-10-01 RX ORDER — SODIUM CHLORIDE 0.9 % (FLUSH) 0.9 %
5-40 SYRINGE (ML) INJECTION EVERY 8 HOURS
Status: DISCONTINUED | OUTPATIENT
Start: 2022-10-01 | End: 2022-10-04 | Stop reason: HOSPADM

## 2022-10-01 RX ORDER — ONDANSETRON 4 MG/1
4 TABLET, ORALLY DISINTEGRATING ORAL
Status: DISCONTINUED | OUTPATIENT
Start: 2022-10-01 | End: 2022-10-04 | Stop reason: HOSPADM

## 2022-10-01 RX ORDER — ACETAMINOPHEN 650 MG/1
650 SUPPOSITORY RECTAL
Status: DISCONTINUED | OUTPATIENT
Start: 2022-10-01 | End: 2022-10-04 | Stop reason: HOSPADM

## 2022-10-01 RX ORDER — SODIUM CHLORIDE 0.9 % (FLUSH) 0.9 %
5-40 SYRINGE (ML) INJECTION AS NEEDED
Status: DISCONTINUED | OUTPATIENT
Start: 2022-10-01 | End: 2022-10-04 | Stop reason: HOSPADM

## 2022-10-01 RX ORDER — HYDROMORPHONE HYDROCHLORIDE 1 MG/ML
1 INJECTION, SOLUTION INTRAMUSCULAR; INTRAVENOUS; SUBCUTANEOUS ONCE
Status: COMPLETED | OUTPATIENT
Start: 2022-10-01 | End: 2022-10-01

## 2022-10-01 RX ORDER — ONDANSETRON 2 MG/ML
4 INJECTION INTRAMUSCULAR; INTRAVENOUS
Status: DISCONTINUED | OUTPATIENT
Start: 2022-10-01 | End: 2022-10-04 | Stop reason: HOSPADM

## 2022-10-01 RX ORDER — PREDNISONE 10 MG/1
10 TABLET ORAL EVERY OTHER DAY
Status: DISCONTINUED | OUTPATIENT
Start: 2022-10-02 | End: 2022-10-04 | Stop reason: HOSPADM

## 2022-10-01 RX ORDER — BUDESONIDE AND FORMOTEROL FUMARATE DIHYDRATE 160; 4.5 UG/1; UG/1
2 AEROSOL RESPIRATORY (INHALATION)
Status: DISCONTINUED | OUTPATIENT
Start: 2022-10-01 | End: 2022-10-04 | Stop reason: HOSPADM

## 2022-10-01 RX ADMIN — MORPHINE SULFATE 4 MG: 4 INJECTION INTRAVENOUS at 20:41

## 2022-10-01 RX ADMIN — BUDESONIDE AND FORMOTEROL FUMARATE DIHYDRATE 2 PUFF: 160; 4.5 AEROSOL RESPIRATORY (INHALATION) at 21:40

## 2022-10-01 RX ADMIN — MORPHINE SULFATE 4 MG: 4 INJECTION, SOLUTION INTRAMUSCULAR; INTRAVENOUS at 14:22

## 2022-10-01 RX ADMIN — SODIUM CHLORIDE, PRESERVATIVE FREE 10 ML: 5 INJECTION INTRAVENOUS at 22:26

## 2022-10-01 RX ADMIN — IPRATROPIUM BROMIDE AND ALBUTEROL SULFATE 3 ML: 2.5; .5 SOLUTION RESPIRATORY (INHALATION) at 21:40

## 2022-10-01 RX ADMIN — HYDROMORPHONE HYDROCHLORIDE 1 MG: 1 INJECTION, SOLUTION INTRAMUSCULAR; INTRAVENOUS; SUBCUTANEOUS at 15:31

## 2022-10-01 NOTE — ED TRIAGE NOTES
Transferred from [facility] with left hip fracture. She was being chased by a waterbug and fell. Hx of right hip replacement. 20g right ac.  Dilaudid given before transfer/

## 2022-10-01 NOTE — ED NOTES
Pleasant elderly female is resting on stretcher. Incontinent of urine- cleaned and placed in gown. All belongings placed in bag and given to . O2 sat 100% on O2 at 2l nc- Wears O2 at 2l NC at home.   has been at bedside

## 2022-10-01 NOTE — ED PROVIDER NOTES
HPI 79-year-old female multiple medical problems listed below complains of chest bulge fell onto her left hip yesterday mild pain at the time much worse today unable to bear weight on the left lower extremity due to left hip pain. Minimal left arm injury yesterday this is much better today    Past Medical History:   Diagnosis Date    Arthritis     Bronchitis 2020    Chronic obstructive pulmonary disease (HCC)     Chronic pain     Diabetes ([address]     Gastroesophageal reflux disease without esophagitis 2020    GERD (gastroesophageal reflux disease)     GERD (gastroesophageal reflux disease)     History of multiple allergies     HTN (hypertension) 2020    Hypertension     Intermittent asthma 2020    Lung disorder     PATIENT IS ON OXYGEN    Menopause     Pain of upper abdomen 2020    Seasonal allergic rhinitis 2020    Sinus problem     Vitamin D deficiency 2020       Past Surgical History:   Procedure Laterality Date    COLONOSCOPY N/A 2020    COLONOSCOPY performed by Belen Cuellar MD at [facility] ENDOSCOPY    HX COLONOSCOPY      HX GI      colonscopy    HX ORTHOPAEDIC  10/24/2018    Total Right Hip Arthroplasty Dr. Zainab Garza. Family History:   Problem Relation Age of Onset    Hypertension Mother     Cancer Sister        Social History     Socioeconomic History    Marital status: SINGLE     Spouse name: Not on file    Number of children: Not on file    Years of education: Not on file    Highest education level: Not on file   Occupational History    Not on file   Tobacco Use    Smoking status: Former     Packs/day: 1.00     Years: 15.00     Pack years: 15.00     Types: Cigarettes     Quit date: 2000     Years since quittin.7    Smokeless tobacco: Never   Vaping Use    Vaping Use: Never used   Substance and Sexual Activity    Alcohol use:  Yes     Alcohol/week: 3.0 standard drinks     Types: 3 Cans of beer per week    Drug use: No    Sexual activity: Not Currently   Other Topics Concern    Not on file   Social History Narrative    Not on file     Social Determinants of Health     Financial Resource Strain: Not on file   Food Insecurity: Not on file   Transportation Needs: Not on file   Physical Activity: Not on file   Stress: Not on file   Social Connections: Not on file   Intimate Partner Violence: Not on file   Housing Stability: Not on file         ALLERGIES: Patient has no known allergies. Review of Systems   All other systems reviewed and are negative. Vitals:    10/01/22 1254   BP: 125/73   Pulse: (!) 108   Resp: 18   Temp: 98.7 °F (37.1 °C)   SpO2: 99%   Weight: 76.7 kg (169 lb)   Height: 5' 7\" (1.702 m)          Pleasant elderly AA female no acute distress on continuous oxygen saturation 99%  Physical Exam  Vitals and nursing note reviewed. Constitutional:       General: She is not in acute distress. Appearance: Normal appearance. She is not ill-appearing or toxic-appearing. HENT:      Head: Normocephalic and atraumatic. Nose: Nose normal.   Eyes:      Extraocular Movements: Extraocular movements intact. Conjunctiva/sclera: Conjunctivae normal.   Musculoskeletal:         General: Tenderness and signs of injury present. No deformity. Cervical back: Normal range of motion and neck supple. No rigidity or tenderness. Comments: Left hip is tender to palpation and movement of lower extremity causes severe pain   Skin:     General: Skin is warm and dry. Capillary Refill: Capillary refill takes less than 2 seconds. Neurological:      Mental Status: She is alert and oriented to person, place, and time. Sensory: No sensory deficit. Motor: No weakness. Psychiatric:         Behavior: Behavior normal.        MDM    X-ray of the left hip shows a femoral neck fracture; CT of the abdomen confirms patient is transferred to [facility] ED after discussion with the ED doctor.   Additional pain medicine prior to leaving for    Procedures

## 2022-10-01 NOTE — ED TRIAGE NOTES
Patient reports trying to arthur a bug last night ans suffered a ground level fall on hard wood floor landing on her left hip and arm.  Patient reports pain  to left hip making it difficult for her to walk today

## 2022-10-01 NOTE — CONSULTS
Consult    Patient: Stephie Latif MRN: [id number]  SSN: [id number]    YOB: 1955  Age: 78 y.o. Sex: female      Subjective:      Stephie Latif is a 78 y.o. female who is being seen for Pubic symphyseal fracture. Patient is a 23-year-old female who fell on the previous night hurting her left hip and was transferred to our facility with a suspicion of a femoral neck fracture. CT scan evaluation revealed pubic symphyseal fracture with no obvious femoral neck fracture. I was present in the emergency department at the time of the transfer and requested to evaluate the patient. Patient rates the pain as 4 out of 10 intensity worse with range of motion of the hip relieved with rest.  Denies any low back pain or any radiating pain down the leg. Clinical comorbidities include diabetes, COPD, GERD, hypertension. Past Medical History:   Diagnosis Date    Arthritis     Bronchitis 9/1/2020    Chronic obstructive pulmonary disease (HCC)     Chronic pain     Diabetes ([address]     Gastroesophageal reflux disease without esophagitis 9/1/2020    GERD (gastroesophageal reflux disease)     GERD (gastroesophageal reflux disease)     History of multiple allergies     HTN (hypertension) 9/1/2020    Hypertension     Intermittent asthma 9/1/2020    Lung disorder     PATIENT IS ON OXYGEN    Menopause     Pain of upper abdomen 9/1/2020    Seasonal allergic rhinitis 9/1/2020    Sinus problem     Vitamin D deficiency 9/1/2020     Past Surgical History:   Procedure Laterality Date    COLONOSCOPY N/A 5/25/2020    COLONOSCOPY performed by Fredy Blesdoe MD at [facility] ENDOSCOPY    HX COLONOSCOPY      HX GI      colonscopy    HX ORTHOPAEDIC  10/24/2018    Total Right Hip Arthroplasty Dr. Basim Polanco.       Family History   Problem Relation Age of Onset    Hypertension Mother     Cancer Sister      Social History     Tobacco Use    Smoking status: Former     Packs/day: 1.00 Years: 15.00     Pack years: 15.00     Types: Cigarettes     Quit date: 2000     Years since quittin.7    Smokeless tobacco: Never   Substance Use Topics    Alcohol use:  Yes     Alcohol/week: 3.0 standard drinks     Types: 3 Cans of beer per week     Comment: occasionally      Current Facility-Administered Medications   Medication Dose Route Frequency Provider Last Rate Last Admin    bethanechol (URECHOLINE) tablet 25 mg  25 mg Oral Garon Chess, NP   25 mg at 10/04/22 3872    albuterol-ipratropium (DUO-NEB) 2.5 MG-0.5 MG/3 ML  3 mL Nebulization Q6HWA RT Becki Whaley MD   3 mL at 10/04/22 0900    lidocaine 4 % patch 3 Patch  3 Patch TransDERmal Q24H John Leon NP   3 Patch at 10/04/22 1006    traMADoL (ULTRAM) tablet 50 mg  50 mg Oral Q6H Docia Custsudarshan, NP   50 mg at 10/04/22 0954    polyethylene glycol (MIRALAX) packet 17 g  17 g Oral DAILY Docia Custsudarshan, NP   17 g at 10/04/22 0953    docusate (COLACE) 50 mg/5 mL oral liquid 200 mg  200 mg Oral BID Docia Custsudarshan, NP   200 mg at 10/04/22 0955    pantoprazole (PROTONIX) tablet 40 mg  40 mg Oral ACB&D Becki Whaley MD   40 mg at 10/04/22 0615    lisinopriL (PRINIVIL, ZESTRIL) tablet 20 mg  20 mg Oral DAILY Becki Whaley MD   20 mg at 10/04/22 0956    albuterol (PROVENTIL HFA, VENTOLIN HFA, PROAIR HFA) inhaler 1 Puff  1 Puff Inhalation Q4H PRN Becki Whaley MD        amLODIPine (NORVASC) tablet 10 mg  10 mg Oral DAILY Becki Whaley MD   10 mg at 10/04/22 0955    atorvastatin (LIPITOR) tablet 20 mg  20 mg Oral DAILY Becki Whaley MD   20 mg at 10/04/22 0955    budesonide-formoteroL (SYMBICORT) 160-4.5 mcg/actuation HFA inhaler 2 Puff  2 Puff Inhalation BID RT Becki Whaley MD   2 Puff at 10/04/22 0900    And    [Held by provider] tiotropium bromide (SPIRIVA RESPIMAT) 2.5 mcg /actuation  2 Puff Inhalation DAILY Becki Whaley MD        insulin lispro (HUMALOG) injection   SubCUTAneous AC&HS Darlene Gallardo MD   3 Units at 10/02/22 1805    glucose chewable tablet 16 g  4 Tablet Oral PRN Darlene Gallardo MD        glucagon (GLUCAGEN) injection 1 mg  1 mg IntraMUSCular PRN Darlene Gallardo MD        sodium chloride (NS) flush 5-40 mL  5-40 mL IntraVENous Q8H Darlene Gallardo MD   10 mL at 10/04/22 0616    sodium chloride (NS) flush 5-40 mL  5-40 mL IntraVENous PRN Darlene Gallardo MD        acetaminophen (TYLENOL) tablet 650 mg  650 mg Oral Q6H PRN Darlene Gallardo MD   650 mg at 10/03/22 1654    Or    acetaminophen (TYLENOL) suppository 650 mg  650 mg Rectal Q6H PRN Darlene Gallardo MD        ondansetron (ZOFRAN ODT) tablet 4 mg  4 mg Oral Q6H PRN Darlene Gallardo MD        Or    ondansetron (ZOFRAN) injection 4 mg  4 mg IntraVENous Q6H PRN Darlene Gallardo MD        dextrose 10% infusion 0-250 mL  0-250 mL IntraVENous PRN Darlene Gallardo MD        predniSONE (DELTASONE) tablet 10 mg  10 mg Oral EVERY OTHER DAY Darlene Gallardo MD   10 mg at 10/04/22 0955    montelukast (SINGULAIR) tablet 10 mg  10 mg Oral DAILY Darlene Gallardo MD   10 mg at 10/04/22 0954        No Known Allergies    Review of Systems:  A comprehensive review of systems was negative except for that written in the History of Present Illness. Objective:     Vitals:    10/04/22 0219 10/04/22 0727 10/04/22 0906 10/04/22 0927   BP: 131/78 123/72     Pulse: 95 95     Resp: 18 18     Temp: 98.1 °F (36.7 °C) 98.4 °F (36.9 °C)     SpO2: 99% 99% 99% 98%   Weight:       Height:            Physical Exam:  General:  Alert, cooperative, no distress, appears stated age. Eyes:  Conjunctivae/corneas clear. PERRL, EOMs intact. Fundi benign   Ears:  Normal TMs and external ear canals both ears. Nose: Nares normal. Septum midline. Mucosa normal. No drainage or sinus tenderness.    Mouth/Throat: Lips, mucosa, and tongue normal. Teeth and gums normal.   Neck: Supple, symmetrical, trachea midline, no adenopathy, thyroid: no enlargment/tenderness/nodules, no carotid bruit and no JVD. Back:   Symmetric, no curvature. ROM normal. No CVA tenderness. Lungs:   Clear to auscultation bilaterally. Heart:  Regular rate and rhythm, S1, S2 normal, no murmur, click, rub or gallop. Abdomen:   Soft, non-tender. Bowel sounds normal. No masses,  No organomegaly. Extremities: Extremities normal, atraumatic, no cyanosis or edema. Pulses: 2+ and symmetric all extremities. Skin: Skin color, texture, turgor normal. No rashes or lesions   Lymph nodes: Cervical, supraclavicular, and axillary nodes normal.   Neurologic: CNII-XII intact. Normal strength, sensation and reflexes throughout. Evaluation of the pelvis and the lumbar spine:  Patient can actively straight leg raise bilateral lower extremities. Pain noted along the left anterior pubic symphyseal region   L2-S1 dermatomal sensations are intact. Logroll and heel strike are negative bilaterally. No pain noted along the SI joints bilaterally. Mild discomfort noted along the lumbar spine. Paraspinal musculature does not have any tenderness. Assessment:       CT scan pelvis reveals  IMPRESSION  1. Acute comminuted left parasymphyseal pubic bone and inferior pubic ramus  fractures. No definite left femoral fracture. 2.  Left femoral head avascular necrosis. Severe left hip joint osteoarthritis  with small joint effusion. 3.  Chronic L5 and L4 compression fractures. Hospital Problems  Date Reviewed: 10/1/2022            Codes Class Noted POA    Falls ICD-10-CM: W19. Narciso Kuhn  ICD-9-CM: E888.9  10/2/2022 Yes        Ambulatory dysfunction ICD-10-CM: R26.2  ICD-9-CM: 719.7  10/2/2022 Yes        Constipation ICD-10-CM: K59.00  ICD-9-CM: 564.00  10/2/2022 Yes        Chronic pain ICD-10-CM: G89.29  ICD-9-CM: 338.29  10/2/2022 Yes        Pubic bone fracture ([facility] [address] ICD-10-CM: V32.192Z  ICD-9-CM: 808.2 10/1/2022 Yes           Plan:     71-year-old female who ambulates using a walker sustained a fall resulting in a pubic symphyseal fracture and pubic ramus fracture on the left hemipelvis with intact posterior column. No concern for hip fracture based on the current clinical exam.  Encourage the patient to do weight-bear as tolerated using a walker   Will need orthopedic follow-up in 2 weeks.     Signed By: Jacque Cheung MD     October 4, 2022                                 -
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
## 2022-10-02 PROBLEM — K59.00 CONSTIPATION: Status: ACTIVE | Noted: 2022-10-02

## 2022-10-02 PROBLEM — G89.29 CHRONIC PAIN: Status: ACTIVE | Noted: 2022-10-02

## 2022-10-02 PROBLEM — R26.2 AMBULATORY DYSFUNCTION: Status: ACTIVE | Noted: 2022-10-02

## 2022-10-02 PROBLEM — R29.6 FALLS: Status: ACTIVE | Noted: 2022-10-02

## 2022-10-02 LAB
25(OH)D3 SERPL-MCNC: 146.2 NG/ML (ref 30–100)
ALBUMIN SERPL-MCNC: 3.4 G/DL (ref 3.5–5)
ANION GAP SERPL CALC-SCNC: 5 MMOL/L (ref 5–15)
BASOPHILS # BLD: 0 K/UL (ref 0–0.1)
BASOPHILS NFR BLD: 0 % (ref 0–1)
BUN SERPL-MCNC: 14 MG/DL (ref 6–20)
BUN/CREAT SERPL: 13 (ref 12–20)
CA-I BLD-MCNC: 9.2 MG/DL (ref 8.5–10.1)
CHLORIDE SERPL-SCNC: 92 MMOL/L (ref 97–108)
CO2 SERPL-SCNC: 32 MMOL/L (ref 21–32)
CREAT SERPL-MCNC: 1.05 MG/DL (ref 0.55–1.02)
DIFFERENTIAL METHOD BLD: ABNORMAL
EOSINOPHIL # BLD: 0.1 K/UL (ref 0–0.4)
EOSINOPHIL NFR BLD: 1 % (ref 0–7)
ERYTHROCYTE [DISTWIDTH] IN BLOOD BY AUTOMATED COUNT: 12.8 % (ref 11.5–14.5)
EST. AVERAGE GLUCOSE BLD GHB EST-MCNC: 94 MG/DL
GLUCOSE BLD STRIP.AUTO-MCNC: 105 MG/DL (ref 65–100)
GLUCOSE BLD STRIP.AUTO-MCNC: 163 MG/DL (ref 65–100)
GLUCOSE BLD STRIP.AUTO-MCNC: 178 MG/DL (ref 65–100)
GLUCOSE BLD STRIP.AUTO-MCNC: 224 MG/DL (ref 65–100)
GLUCOSE SERPL-MCNC: 162 MG/DL (ref 65–100)
HBA1C MFR BLD: 4.9 % (ref 4–5.6)
HCT VFR BLD AUTO: 26.7 % (ref 35–47)
HGB BLD-MCNC: 8.9 G/DL (ref 11.5–16)
IMM GRANULOCYTES # BLD AUTO: 0.1 K/UL (ref 0–0.04)
IMM GRANULOCYTES NFR BLD AUTO: 1 % (ref 0–0.5)
LYMPHOCYTES # BLD: 0.4 K/UL (ref 0.8–3.5)
LYMPHOCYTES NFR BLD: 4 % (ref 12–49)
MCH RBC QN AUTO: 31.6 PG (ref 26–34)
MCHC RBC AUTO-ENTMCNC: 33.3 G/DL (ref 30–36.5)
MCV RBC AUTO: 94.7 FL (ref 80–99)
MONOCYTES # BLD: 0.5 K/UL (ref 0–1)
MONOCYTES NFR BLD: 6 % (ref 5–13)
MRSA DNA SPEC QL NAA+PROBE: NOT DETECTED
NEUTS SEG # BLD: 8.7 K/UL (ref 1.8–8)
NEUTS SEG NFR BLD: 88 % (ref 32–75)
NRBC # BLD: 0 K/UL (ref 0–0.01)
NRBC BLD-RTO: 0 PER 100 WBC
PERFORMED BY, TECHID: ABNORMAL
PHOSPHATE SERPL-MCNC: 3.6 MG/DL (ref 2.6–4.7)
PLATELET # BLD AUTO: 264 K/UL (ref 150–400)
PMV BLD AUTO: 9.8 FL (ref 8.9–12.9)
POTASSIUM SERPL-SCNC: 4.3 MMOL/L (ref 3.5–5.1)
RBC # BLD AUTO: 2.82 M/UL (ref 3.8–5.2)
SODIUM SERPL-SCNC: 129 MMOL/L (ref 136–145)
TSH SERPL DL<=0.05 MIU/L-ACNC: 1.81 UIU/ML (ref 0.36–3.74)
URATE SERPL-MCNC: 6.1 MG/DL (ref 2.6–6)
WBC # BLD AUTO: 9.8 K/UL (ref 3.6–11)

## 2022-10-02 PROCEDURE — 84443 ASSAY THYROID STIM HORMONE: CPT

## 2022-10-02 PROCEDURE — 94640 AIRWAY INHALATION TREATMENT: CPT

## 2022-10-02 PROCEDURE — 74011000250 HC RX REV CODE- 250: Performed by: INTERNAL MEDICINE

## 2022-10-02 PROCEDURE — 36415 COLL VENOUS BLD VENIPUNCTURE: CPT

## 2022-10-02 PROCEDURE — 65270000032 HC RM SEMIPRIVATE

## 2022-10-02 PROCEDURE — 74011250637 HC RX REV CODE- 250/637: Performed by: NURSE PRACTITIONER

## 2022-10-02 PROCEDURE — 74011250637 HC RX REV CODE- 250/637: Performed by: HOSPITALIST

## 2022-10-02 PROCEDURE — 84550 ASSAY OF BLOOD/URIC ACID: CPT

## 2022-10-02 PROCEDURE — 85025 COMPLETE CBC W/AUTO DIFF WBC: CPT

## 2022-10-02 PROCEDURE — 51798 US URINE CAPACITY MEASURE: CPT

## 2022-10-02 PROCEDURE — 80069 RENAL FUNCTION PANEL: CPT

## 2022-10-02 PROCEDURE — 82306 VITAMIN D 25 HYDROXY: CPT

## 2022-10-02 PROCEDURE — 74011000250 HC RX REV CODE- 250: Performed by: HOSPITALIST

## 2022-10-02 PROCEDURE — 83036 HEMOGLOBIN GLYCOSYLATED A1C: CPT

## 2022-10-02 PROCEDURE — 82962 GLUCOSE BLOOD TEST: CPT

## 2022-10-02 PROCEDURE — 74011250636 HC RX REV CODE- 250/636: Performed by: HOSPITALIST

## 2022-10-02 PROCEDURE — 74011636637 HC RX REV CODE- 636/637: Performed by: HOSPITALIST

## 2022-10-02 RX ORDER — POLYETHYLENE GLYCOL 3350 17 G/17G
17 POWDER, FOR SOLUTION ORAL DAILY
Status: DISCONTINUED | OUTPATIENT
Start: 2022-10-02 | End: 2022-10-04 | Stop reason: HOSPADM

## 2022-10-02 RX ORDER — SODIUM CHLORIDE 9 MG/ML
100 INJECTION, SOLUTION INTRAVENOUS CONTINUOUS
Status: DISPENSED | OUTPATIENT
Start: 2022-10-02 | End: 2022-10-02

## 2022-10-02 RX ORDER — TRAMADOL HYDROCHLORIDE 50 MG/1
50 TABLET ORAL EVERY 6 HOURS
Status: DISPENSED | OUTPATIENT
Start: 2022-10-02 | End: 2022-10-04

## 2022-10-02 RX ORDER — IPRATROPIUM BROMIDE AND ALBUTEROL SULFATE 2.5; .5 MG/3ML; MG/3ML
3 SOLUTION RESPIRATORY (INHALATION)
Status: DISCONTINUED | OUTPATIENT
Start: 2022-10-02 | End: 2022-10-03

## 2022-10-02 RX ORDER — DOCUSATE SODIUM 50 MG/5ML
200 LIQUID ORAL 2 TIMES DAILY
Status: DISCONTINUED | OUTPATIENT
Start: 2022-10-02 | End: 2022-10-04 | Stop reason: HOSPADM

## 2022-10-02 RX ORDER — LIDOCAINE 4 G/100G
3 PATCH TOPICAL EVERY 24 HOURS
Status: DISCONTINUED | OUTPATIENT
Start: 2022-10-03 | End: 2022-10-04 | Stop reason: HOSPADM

## 2022-10-02 RX ORDER — LIDOCAINE 4 G/100G
1 PATCH TOPICAL EVERY 24 HOURS
Status: DISCONTINUED | OUTPATIENT
Start: 2022-10-02 | End: 2022-10-02

## 2022-10-02 RX ADMIN — MORPHINE SULFATE 4 MG: 4 INJECTION INTRAVENOUS at 16:13

## 2022-10-02 RX ADMIN — DOCUSATE SODIUM LIQUID 200 MG: 100 LIQUID ORAL at 21:29

## 2022-10-02 RX ADMIN — SODIUM CHLORIDE 100 ML/HR: 9 INJECTION, SOLUTION INTRAVENOUS at 07:24

## 2022-10-02 RX ADMIN — BUDESONIDE AND FORMOTEROL FUMARATE DIHYDRATE 2 PUFF: 160; 4.5 AEROSOL RESPIRATORY (INHALATION) at 20:02

## 2022-10-02 RX ADMIN — PREDNISONE 10 MG: 10 TABLET ORAL at 08:51

## 2022-10-02 RX ADMIN — PANTOPRAZOLE SODIUM 40 MG: 40 TABLET, DELAYED RELEASE ORAL at 08:51

## 2022-10-02 RX ADMIN — SODIUM CHLORIDE, PRESERVATIVE FREE 10 ML: 5 INJECTION INTRAVENOUS at 05:46

## 2022-10-02 RX ADMIN — AMLODIPINE BESYLATE 10 MG: 5 TABLET ORAL at 08:51

## 2022-10-02 RX ADMIN — INSULIN LISPRO 3 UNITS: 100 INJECTION, SOLUTION INTRAVENOUS; SUBCUTANEOUS at 18:05

## 2022-10-02 RX ADMIN — INSULIN LISPRO 2 UNITS: 100 INJECTION, SOLUTION INTRAVENOUS; SUBCUTANEOUS at 12:33

## 2022-10-02 RX ADMIN — IPRATROPIUM BROMIDE AND ALBUTEROL SULFATE 3 ML: 2.5; .5 SOLUTION RESPIRATORY (INHALATION) at 14:27

## 2022-10-02 RX ADMIN — MONTELUKAST 10 MG: 10 TABLET, FILM COATED ORAL at 08:54

## 2022-10-02 RX ADMIN — LISINOPRIL 20 MG: 20 TABLET ORAL at 08:51

## 2022-10-02 RX ADMIN — SODIUM CHLORIDE, PRESERVATIVE FREE 10 ML: 5 INJECTION INTRAVENOUS at 21:48

## 2022-10-02 RX ADMIN — PANTOPRAZOLE SODIUM 40 MG: 40 TABLET, DELAYED RELEASE ORAL at 16:20

## 2022-10-02 RX ADMIN — IPRATROPIUM BROMIDE AND ALBUTEROL SULFATE 3 ML: 2.5; .5 SOLUTION RESPIRATORY (INHALATION) at 09:03

## 2022-10-02 RX ADMIN — SODIUM CHLORIDE 100 ML/HR: 9 INJECTION, SOLUTION INTRAVENOUS at 16:15

## 2022-10-02 RX ADMIN — ATORVASTATIN CALCIUM 20 MG: 20 TABLET, FILM COATED ORAL at 08:51

## 2022-10-02 RX ADMIN — MORPHINE SULFATE 4 MG: 4 INJECTION INTRAVENOUS at 21:27

## 2022-10-02 RX ADMIN — BUDESONIDE AND FORMOTEROL FUMARATE DIHYDRATE 2 PUFF: 160; 4.5 AEROSOL RESPIRATORY (INHALATION) at 09:03

## 2022-10-02 RX ADMIN — IPRATROPIUM BROMIDE AND ALBUTEROL SULFATE 3 ML: 2.5; .5 SOLUTION RESPIRATORY (INHALATION) at 20:02

## 2022-10-02 RX ADMIN — MORPHINE SULFATE 4 MG: 4 INJECTION INTRAVENOUS at 06:39

## 2022-10-02 RX ADMIN — TRAMADOL HYDROCHLORIDE 50 MG: 50 TABLET, COATED ORAL at 13:29

## 2022-10-02 NOTE — PROGRESS NOTES
Bedside shift report handed over to [facility]3]. Patient is alert and oriented x4 in bed, no signs of distress on continous normal saline flowing at 100mls, on oxygen 2ltres via nasal prongs.

## 2022-10-02 NOTE — ED PROVIDER NOTES
EMERGENCY DEPARTMENT HISTORY AND PHYSICAL EXAM      Date: 10/1/2022  Patient Name: Curtis Yeung    History of Presenting Illness     Chief Complaint   Patient presents with    Fall    Hip Pain       History Provided By: Patient    HPI: Curtis Yeung, 78 y.o. female with history of COPD, hypertension, diabetes presenting to the emergency department as transfer from outside facility for evaluation of possible femur fracture. Patient states that she fell last night onto her left hip and arm. Patient has been ambulatory, however reports significant pain after fall. X-ray of the hip at outside facility demonstrated questionable acute basicervical femoral fracture, however CT obtained prior to transportation showed no femoral fracture. There is pubic bone fracture. Case discussed with orthopedic physician, Dr. Shaila Delarosa, who has no plans for surgical intervention at this time. Will reevaluate in the morning. There are no other complaints, changes, or physical findings at this time.     PCP: Millicent Smart NP    Current Facility-Administered Medications on File Prior to Encounter   Medication Dose Route Frequency Provider Last Rate Last Admin    [COMPLETED] morphine injection 4 mg  4 mg IntraVENous ONCE Nhi Berkowitz MD   4 mg at 10/01/22 1422    [COMPLETED] HYDROmorphone (DILAUDID) injection 1 mg  1 mg IntraVENous ONCE Nhi Berkowitz MD   1 mg at 10/01/22 1531    [DISCONTINUED] ketorolac (TORADOL) injection 30 mg  30 mg IntraMUSCular ONCE Nhi Berkowitz MD        [DISCONTINUED] 0.9% sodium chloride infusion  100 mL/hr IntraVENous ONCE Nhi Berkowitz MD        [DISCONTINUED] ketorolac (TORADOL) injection 15 mg  15 mg IntraVENous NOW Nhi Berkowitz MD         Current Outpatient Medications on File Prior to Encounter   Medication Sig Dispense Refill    montelukast (SINGULAIR) 10 mg tablet TAKE 1 TABLET BY MOUTH EVERY DAY FOR ALLERGY SYMPTOMS 90 Tablet 1    amLODIPine (NORVASC) 10 mg tablet Take 1 Tablet by mouth daily. 90 Tablet 1    atorvastatin (LIPITOR) 20 mg tablet Take 1 Tablet by mouth daily. 90 Tablet 1    furosemide (LASIX) 20 mg tablet Take 1 Tablet by mouth daily. 90 Tablet 1    ergocalciferol (ERGOCALCIFEROL) 1,250 mcg (50,000 unit) capsule TAKE 1 CAPSULE BY MOUTH EVERY SEVEN (7) DAYS. INDICATIONS: VITAMIN D DEFICIENCY (HIGH DOSE THERAPY) 4 Capsule 5    fluticasone propionate (FLONASE) 50 mcg/actuation nasal spray SPRAY 2 SPRAYS INTO EACH NOSTRIL EVERY DAY 1 Each 5    [DISCONTINUED] metroNIDAZOLE (FLAGYL) 500 mg tablet Take 1 Tablet by mouth two (2) times a day. Indications: diverticulitis 28 Tablet 0    Oxygen 2L      predniSONE (DELTASONE) 10 mg tablet Take 1 Tablet by mouth every other day. metFORMIN (GLUCOPHAGE) 500 mg tablet Take 1 Tablet by mouth daily. mometasone (ELOCON) 0.1 % ointment Apply  to affected area daily. 15 g 2    alcohol swabs (Alcohol Prep Pads) padm 1 PAD BY APPLY EXTERNALLY ROUTE DAILY. USE TO CHECK BLOOD SUGAR DAILY. 100 Pad 0    lancets (Ultra Thin Lancets) 30 gauge misc USE ONCE LANCET PER DAY TO CHECK BLOOD SUGAR. 50 Lancet 5    albuterol (Ventolin HFA) 90 mcg/actuation inhaler Take 1 Puff by inhalation every four (4) hours as needed for Wheezing. 1 Each 3    lisinopriL (PRINIVIL, ZESTRIL) 20 mg tablet Take 1 Tablet by mouth daily. 90 Tablet 1    pantoprazole (PROTONIX) 40 mg tablet Take 1 Tablet by mouth Before breakfast and dinner. Indications: inflammation of the esophagus with erosion, gastroesophageal reflux disease 180 Tablet 3    [DISCONTINUED] docusate sodium (COLACE) 100 mg capsule TAKE 1 CAP BY MOUTH 2 TIMES A DAY FOR 90 DAYS. 60 Capsule 2    [DISCONTINUED] azelastine (ASTELIN) 137 mcg (0.1 %) nasal spray 2 SPRAYS IN BOTH NOSTRILS NIGHTLY.  90 Each 1    glucose blood VI test strips (True Metrix Glucose Test Strip) strip Use one test strip to check glucose daily 50 Strip 3    albuterol-ipratropium (DUO-NEB) 2.5 mg-0.5 mg/3 ml nebu USE 1 VIAL EVERY 4 6 HOURS AS NEEDED. DX J44.9. NEED MED B INFO      Rajeevledick Ellipta 100-62.5-25 mcg inhaler Take 1 Puff by inhalation daily. [DISCONTINUED] lidocaine (LIDODERM) 5 % APPLY 1 PATCH EVERY DAY. REMOVE AFTER 12 HOURS         Past History     Past Medical History:  Past Medical History:   Diagnosis Date    Arthritis     Bronchitis 2020    Chronic obstructive pulmonary disease ([address]     Chronic pain     Diabetes ([facility] [address]     Gastroesophageal reflux disease without esophagitis 2020    GERD (gastroesophageal reflux disease)     GERD (gastroesophageal reflux disease)     History of multiple allergies     HTN (hypertension) 2020    Hypertension     Intermittent asthma 2020    Lung disorder     PATIENT IS ON OXYGEN    Menopause     Pain of upper abdomen 2020    Seasonal allergic rhinitis 2020    Sinus problem     Vitamin D deficiency 2020       Past Surgical History:  Past Surgical History:   Procedure Laterality Date    COLONOSCOPY N/A 2020    COLONOSCOPY performed by Nico Julien MD at [facility]COPY    HX COLONOSCOPY      HX GI      colonscopy    HX ORTHOPAEDIC  10/24/2018    Total Right Hip Arthroplasty Dr. Luis M Razo. Family History:  Family History   Problem Relation Age of Onset    Hypertension Mother     Cancer Sister        Social History:  Social History     Tobacco Use    Smoking status: Former     Packs/day: 1.00     Years: 15.00     Pack years: 15.00     Types: Cigarettes     Quit date: 2000     Years since quittin.7    Smokeless tobacco: Never   Vaping Use    Vaping Use: Never used   Substance Use Topics    Alcohol use: Yes     Alcohol/week: 3.0 standard drinks     Types: 3 Cans of beer per week     Comment: occasionally    Drug use: No       Allergies:  No Known Allergies    Review of Systems   Review of Systems   Constitutional:  Negative for chills and fever. Eyes:  Negative for pain and visual disturbance.    Respiratory:  Negative for cough and shortness of breath. Cardiovascular:  Negative for chest pain and palpitations. Gastrointestinal:  Negative for constipation, diarrhea, nausea and vomiting. Genitourinary:  Negative for dysuria and frequency. Musculoskeletal:  Positive for arthralgias and gait problem. Negative for myalgias. Skin:  Negative for color change and rash. Neurological:  Negative for dizziness, weakness, light-headedness, numbness and headaches. Physical Exam   Physical Exam  Constitutional:       Appearance: Normal appearance. HENT:      Head: Normocephalic and atraumatic. Right Ear: External ear normal.      Left Ear: External ear normal.      Nose: Nose normal.      Mouth/Throat:      Mouth: Mucous membranes are moist.   Eyes:      Extraocular Movements: Extraocular movements intact. Conjunctiva/sclera: Conjunctivae normal.   Cardiovascular:      Rate and Rhythm: Normal rate and regular rhythm. Pulses: Normal pulses. Heart sounds: Normal heart sounds. Pulmonary:      Effort: Pulmonary effort is normal.      Breath sounds: Normal breath sounds. Abdominal:      General: Abdomen is flat. There is no distension. Palpations: Abdomen is soft. Tenderness: There is no abdominal tenderness. Musculoskeletal:         General: Tenderness present. Cervical back: Normal range of motion. Left hip: Tenderness and bony tenderness present. Decreased range of motion. Skin:     General: Skin is warm and dry. Capillary Refill: Capillary refill takes less than 2 seconds. Neurological:      General: No focal deficit present. Mental Status: She is alert and oriented to person, place, and time. Mental status is at baseline.    Psychiatric:         Mood and Affect: Mood normal.         Behavior: Behavior normal.       Lab and Diagnostic Study Results   Labs -     Recent Results (from the past 12 hour(s))   CBC WITH AUTOMATED DIFF    Collection Time: 10/01/22  2:17 PM   Result Value Ref Range    WBC 10.8 3.6 - 11.0 K/uL    RBC 3.52 (L) 3.80 - 5.20 M/uL    HGB 11.0 (L) 11.5 - 16.0 g/dL    HCT 32.4 (L) 35.0 - 47.0 %    MCV 92.0 80.0 - 99.0 FL    MCH 31.3 26.0 - 34.0 PG    MCHC 34.0 30.0 - 36.5 g/dL    RDW 12.9 11.5 - 14.5 %    PLATELET 947 472 - 546 K/uL    MPV 9.8 8.9 - 12.9 FL    NRBC 0.0 0.0  WBC    ABSOLUTE NRBC 0.00 0.00 - 0.01 K/uL    NEUTROPHILS 92 (H) 32 - 75 %    LYMPHOCYTES 4 (L) 12 - 49 %    MONOCYTES 3 (L) 5 - 13 %    EOSINOPHILS 0 0 - 7 %    BASOPHILS 0 0 - 1 %    IMMATURE GRANULOCYTES 1 (H) 0 - 0.5 %    ABS. NEUTROPHILS 10.0 (H) 1.8 - 8.0 K/UL    ABS. LYMPHOCYTES 0.4 (L) 0.8 - 3.5 K/UL    ABS. MONOCYTES 0.4 0.0 - 1.0 K/UL    ABS. EOSINOPHILS 0.0 0.0 - 0.4 K/UL    ABS. BASOPHILS 0.0 0.0 - 0.1 K/UL    ABS. IMM. GRANS. 0.1 (H) 0.00 - 0.04 K/UL    DF AUTOMATED     METABOLIC PANEL, COMPREHENSIVE    Collection Time: 10/01/22  2:17 PM   Result Value Ref Range    Sodium 127 (L) 136 - 145 mmol/L    Potassium 4.7 3.5 - 5.1 mmol/L    Chloride 90 (L) 97 - 108 mmol/L    CO2 31 21 - 32 mmol/L    Anion gap 6 5 - 15 mmol/L    Glucose 101 (H) 65 - 100 mg/dL    BUN 8 6 - 20 mg/dL    Creatinine 0.77 0.55 - 1.02 mg/dL    BUN/Creatinine ratio 10 (L) 12 - 20      GFR est AA >60 >60 ml/min/1.73m2    GFR est non-AA >60 >60 ml/min/1.73m2    Calcium 9.7 8.5 - 10.1 mg/dL    Bilirubin, total 0.8 0.2 - 1.0 mg/dL    AST (SGOT) 53 (H) 15 - 37 U/L    ALT (SGPT) 39 12 - 78 U/L    Alk. phosphatase 113 45 - 117 U/L    Protein, total 7.9 6.4 - 8.2 g/dL    Albumin 4.0 3.5 - 5.0 g/dL    Globulin 3.9 2.0 - 4.0 g/dL    A-G Ratio 1.0 (L) 1.1 - 2.2         Radiologic Studies -   @lastxrresult@  CT Results  (Last 48 hours)                 10/01/22 1509  CT PELV WO CONT Final result    Impression:  1. Acute comminuted left parasymphyseal pubic bone and inferior pubic ramus   fractures. No definite left femoral fracture. 2.  Left femoral head avascular necrosis.  Severe left hip joint osteoarthritis   with small joint effusion. 3.  Chronic L5 and L4 compression fractures. Narrative:  EXAM: CT PELV WO CONT       INDICATION: Probable femoral neck fracture         COMPARISON: Pelvis and left hip radiographs 10/1/2022       TECHNIQUE: Helical CT of the pelvis with coronal and sagittal reformats. Images   reviewed in soft tissue and bone windows. CT dose reduction was achieved   through the use of a standardized protocol tailored for this examination and   automatic exposure control for dose modulation. CONTRAST: None. FINDINGS: Bones: Acute comminuted left parasymphyseal pubic bone and inferior   pubic ramus fractures. No definite left femoral fracture. No malalignment. Left   femoral head avascular necrosis. Chronic severe L5 and mild L4 compression   fractures. Joint fluid: Small left hip joint effusion. Articulations: Right total hip arthroplasty. Severe left hip osteoarthritis. Degenerative changes of the lower lumbosacral spine. Mild pubic symphysis   arthropathy. Tendons: No definite full-thickness tendon tear. Muscles: No intramuscular hematoma. No focal atrophy. Soft tissues: Atherosclerosis. Diverticulosis coli. CXR Results  (Last 48 hours)      None            Medical Decision Making and ED Course   Differential Diagnosis & Medical Decision Making Provider Note:   11year-old female transferred to this department for evaluation of possible femoral fracture. CT obtained prior to transport demonstrates no femoral fracture but pubic fracture. Orthopedic physician with no immediate plans for surgical intervention. States he will reevaluate patient in the morning. Patient with no skin color changes, pallor, paresthesias to the left lower extremity.    - I am the first provider for this patient. I reviewed the vital signs, available nursing notes, past medical history, past surgical history, family history and social history.  The patients presenting problems have been discussed, and they are in agreement with the care plan formulated and outlined with them. I have encouraged them to ask questions as they arise throughout their visit. Vital Signs-Reviewed the patient's vital signs. Patient Vitals for the past 12 hrs:   Temp Pulse Resp BP SpO2   10/01/22 1622 97.6 °F (36.4 °C) 98 17 129/76 100 %       ED Course:          Procedures   Performed by: Leilani Conner DO  Procedures      Disposition   Disposition: Admitted to Floor Medical Floor the case was discussed with the admitting physician     Diagnosis/Clinical Impression     Clinical Impression:   1. Closed fracture of left pubis, unspecified portion of pubis, initial encounter [facility])        Attestations: Ariela Sheridan, , am the primary clinician of record. Please note that this dictation was completed with [employer], the computer voice recognition software. Quite often unanticipated grammatical, syntax, homophones, and other interpretive errors are inadvertently transcribed by the computer software. Please disregard these errors. Please excuse any errors that have escaped final proofreading. Thank you.

## 2022-10-02 NOTE — PROGRESS NOTES
Reason for Admission:  fracture                     RUR Score: 13%                    Plan for utilizing home health:  None at this time-see narrative        PCP: First and Last name:  Foreign Muñiz NP     Name of Practice:    Are you a current patient: Yes/No: yes    Approximate date of last visit: Jan 2022   Can you participate in a virtual visit with your PCP:                     Current Advanced Directive/Advance Care Plan: Full Code      Healthcare Decision Maker:   Click here to complete [address] including selection of the Healthcare Decision Maker Relationship (ie \"Primary\")             Primary Decision MakerRmarine Emma - [phone]    Secondary Decision Maker: Rex Alford - Daughter-in-Law - [phone]                  Transition of Care Plan:       Met with the pt at bedside. She reports that she lives in a single level home with her Theo Treadwell who will transport at [facility]- [phone]. States assist with ADLs and IADLs. She has a walker and cane for ambulation. Shower chair and O2 are in use in home. [employer] provides O2. She uses the [facility] in [facility]. The pt will decline in-home and in-pt PT/OT.     Stated there is an [facility] rehab at [facility] in [facility] that she is familiar with and would like to use again, if services are to be ordered post hospital.

## 2022-10-02 NOTE — PROGRESS NOTES
Hospitalist Progress Note            Daily Progress Note: 10/2/2022 9:54 AM  Hospital course: The patient is a 71-year-old female with a PMH significant for COPD on home oxygen, GERD, asthma, chronic lumbar radiculopathy, HTN, HLD urinary retention, chronic hyponatremia and DM. She presented to outside facility after ground-level fall with great amount of pain in her left hip, unable to bear weight on her lower extremity due to pain. Argelia Vargas She was transferred to [facility] for further evaluation and treatment of questionable femur fracture. In review of CT obtained prior to transportation there is no femur fracture, there is a pubic bone fracture. Patient states she could not walk. X-ray of hip reveals parasymphyseal pubic bone and inferior pubic ramus fractures, left femoral head avascular necrosis with severe left hip joint osteoarthritis and small joint effusion, chronic L4 and L5 compression fractures. CT of abdomen and pelvis revealing multilevel degenerative disc and facet disease compression deformities of T12, L1, L3 and L5, grade 1 L3-4 and L4-5 anterior listhesis without instability similar to previous scans and new superior endplate compression fracture of L4. Admitted for further management of pubic fracture. Orthopedic surgery consulted.     Subjective:     Examined patient at the bedside  Complaining of pain, discomfort and urinary retention    Assessment/Plan:   Active Problems:    Pubic bone fracture ([address] (10/1/2022)    Acute comminuted left parasymphyseal pubic bone and inferior pubic ramus fractures  left femoral head avascular necrosis and severe left hip joint osteoarthritis  Ambulatory dysfunction  Falls  Orthopedic surgery consulted  -Pain management-we will increase lidocaine patch to 3 to on either side of spine in wound to the left hip and scheduled tramadol for 2 days  -PT OT    Hyponatremia   -History of hyponatremia  -Will discontinue Lasix  -Started on IV fluids Chronic obstructive pulmonary disease on home oxygen  -On chronic steroid therapy     Benign essential hypertension   -Continue home medications    DM  -Accu-Cheks with SSI    Chronic constipation  Will start bowel regimen    DVT Prophylaxis: SCD  Code Status: Full Code  POA/NOK:    Disposition and discharge barriers:   Surgery consult and recommendations pending  PT and OT  Care Plan discussed with: Patient and staff    Current Facility-Administered Medications   Medication Dose Route Frequency    0.9% sodium chloride infusion  100 mL/hr IntraVENous CONTINUOUS    lidocaine 4 % patch 1 Patch  1 Patch TransDERmal Q24H    albuterol-ipratropium (DUO-NEB) 2.5 MG-0.5 MG/3 ML  3 mL Nebulization Q6H RT    pantoprazole (PROTONIX) tablet 40 mg  40 mg Oral ACB&D    lisinopriL (PRINIVIL, ZESTRIL) tablet 20 mg  20 mg Oral DAILY    albuterol (PROVENTIL HFA, VENTOLIN HFA, PROAIR HFA) inhaler 1 Puff  1 Puff Inhalation Q4H PRN    amLODIPine (NORVASC) tablet 10 mg  10 mg Oral DAILY    atorvastatin (LIPITOR) tablet 20 mg  20 mg Oral DAILY    budesonide-formoteroL (SYMBICORT) 160-4.5 mcg/actuation HFA inhaler 2 Puff  2 Puff Inhalation BID RT    And    [Held by provider] tiotropium bromide (SPIRIVA RESPIMAT) 2.5 mcg /actuation  2 Puff Inhalation DAILY    insulin lispro (HUMALOG) injection   SubCUTAneous AC&HS    glucose chewable tablet 16 g  4 Tablet Oral PRN    glucagon (GLUCAGEN) injection 1 mg  1 mg IntraMUSCular PRN    sodium chloride (NS) flush 5-40 mL  5-40 mL IntraVENous Q8H    sodium chloride (NS) flush 5-40 mL  5-40 mL IntraVENous PRN    acetaminophen (TYLENOL) tablet 650 mg  650 mg Oral Q6H PRN    Or    acetaminophen (TYLENOL) suppository 650 mg  650 mg Rectal Q6H PRN    ondansetron (ZOFRAN ODT) tablet 4 mg  4 mg Oral Q6H PRN    Or    ondansetron (ZOFRAN) injection 4 mg  4 mg IntraVENous Q6H PRN    dextrose 10% infusion 0-250 mL  0-250 mL IntraVENous PRN    morphine injection 4 mg  4 mg IntraVENous Q4H PRN    predniSONE (DELTASONE) tablet 10 mg  10 mg Oral EVERY OTHER DAY    montelukast (SINGULAIR) tablet 10 mg  10 mg Oral DAILY        REVIEW OF SYSTEMS    Review of Systems   Constitutional:  Positive for malaise/fatigue. Respiratory:  Negative for shortness of breath. Cardiovascular:  Negative for chest pain. Gastrointestinal:  Positive for constipation. Genitourinary:  Positive for dysuria. Complaining of retention   Musculoskeletal:  Positive for back pain, falls and joint pain. Neurological:  Positive for weakness. Psychiatric/Behavioral:  The patient is nervous/anxious. Objective:     Visit Vitals  /63 (BP 1 Location: Left upper arm, BP Patient Position: At rest)   Pulse (!) 51   Temp 97.6 °F (36.4 °C)   Resp 18   Ht 5' 6\" (1.676 m)   Wt 80.3 kg (177 lb)   SpO2 100%   BMI 28.57 kg/m²    O2 Flow Rate (L/min): 2 l/min O2 Device: Nasal cannula    Temp (24hrs), Av.9 °F (36.6 °C), Min:97.5 °F (36.4 °C), Max:98.7 °F (37.1 °C)      No intake/output data recorded.  1901 - 10/02 0700  In: -   Out: 500 [Urine:500]    PHYSICAL EXAM:    Physical Exam  Constitutional:       Appearance: She is ill-appearing. Cardiovascular:      Rate and Rhythm: Regular rhythm. Bradycardia present. Pulmonary:      Effort: No respiratory distress. Musculoskeletal:         General: Deformity and signs of injury present. Comments: Severe back and hip pain on the left   Neurological:      Motor: Weakness present.       Gait: Gait abnormal.        Data Review    Recent Results (from the past 24 hour(s))   CBC WITH AUTOMATED DIFF    Collection Time: 10/01/22  2:17 PM   Result Value Ref Range    WBC 10.8 3.6 - 11.0 K/uL    RBC 3.52 (L) 3.80 - 5.20 M/uL    HGB 11.0 (L) 11.5 - 16.0 g/dL    HCT 32.4 (L) 35.0 - 47.0 %    MCV 92.0 80.0 - 99.0 FL    MCH 31.3 26.0 - 34.0 PG    MCHC 34.0 30.0 - 36.5 g/dL    RDW 12.9 11.5 - 14.5 %    PLATELET 409 463 - 886 K/uL    MPV 9.8 8.9 - 12.9 FL    NRBC 0.0 0.0  WBC ABSOLUTE NRBC 0.00 0.00 - 0.01 K/uL    NEUTROPHILS 92 (H) 32 - 75 %    LYMPHOCYTES 4 (L) 12 - 49 %    MONOCYTES 3 (L) 5 - 13 %    EOSINOPHILS 0 0 - 7 %    BASOPHILS 0 0 - 1 %    IMMATURE GRANULOCYTES 1 (H) 0 - 0.5 %    ABS. NEUTROPHILS 10.0 (H) 1.8 - 8.0 K/UL    ABS. LYMPHOCYTES 0.4 (L) 0.8 - 3.5 K/UL    ABS. MONOCYTES 0.4 0.0 - 1.0 K/UL    ABS. EOSINOPHILS 0.0 0.0 - 0.4 K/UL    ABS. BASOPHILS 0.0 0.0 - 0.1 K/UL    ABS. IMM. GRANS. 0.1 (H) 0.00 - 0.04 K/UL    DF AUTOMATED     METABOLIC PANEL, COMPREHENSIVE    Collection Time: 10/01/22  2:17 PM   Result Value Ref Range    Sodium 127 (L) 136 - 145 mmol/L    Potassium 4.7 3.5 - 5.1 mmol/L    Chloride 90 (L) 97 - 108 mmol/L    CO2 31 21 - 32 mmol/L    Anion gap 6 5 - 15 mmol/L    Glucose 101 (H) 65 - 100 mg/dL    BUN 8 6 - 20 mg/dL    Creatinine 0.77 0.55 - 1.02 mg/dL    BUN/Creatinine ratio 10 (L) 12 - 20      GFR est AA >60 >60 ml/min/1.73m2    GFR est non-AA >60 >60 ml/min/1.73m2    Calcium 9.7 8.5 - 10.1 mg/dL    Bilirubin, total 0.8 0.2 - 1.0 mg/dL    AST (SGOT) 53 (H) 15 - 37 U/L    ALT (SGPT) 39 12 - 78 U/L    Alk.  phosphatase 113 45 - 117 U/L    Protein, total 7.9 6.4 - 8.2 g/dL    Albumin 4.0 3.5 - 5.0 g/dL    Globulin 3.9 2.0 - 4.0 g/dL    A-G Ratio 1.0 (L) 1.1 - 2.2     GLUCOSE, POC    Collection Time: 10/01/22 10:10 PM   Result Value Ref Range    Glucose (POC) 143 (H) 65 - 100 mg/dL    Performed by Cathy Henson    MRSA SCREEN - PCR (NASAL)    Collection Time: 10/01/22 11:07 PM   Result Value Ref Range    MRSA by PCR, Nasal Not Detected Not Detected     GLUCOSE, POC    Collection Time: 10/02/22  7:15 AM   Result Value Ref Range    Glucose (POC) 105 (H) 65 - 100 mg/dL    Performed by Camilo Arreaga        No orders to display             _____________________________________________________________________________  Time spent in direct care including coordination of service, review of data and examination: > 35 minutes    ______________________________________________________________________________    Arch Ivanoff, NP    This is dictation was done by dragon, computer voice recognition software. Quite often unanticipated grammatical, syntax, homophones and other interpretive errors or inadvertently transcribed by the computer software. Please excuse errors that have escaped final proofreading. Thank you.

## 2022-10-02 NOTE — PROGRESS NOTES
Patient did not have any urine output since last night. Patient was bladder scanned, it showed 455ml. Patient was straight cathed, urine output 350ml.

## 2022-10-02 NOTE — PROGRESS NOTES
Problem: Pressure Injury - Risk of  Goal: *Prevention of pressure injury  Description: Document Gilmar Scale and appropriate interventions in the flowsheet. Outcome: Progressing Towards Goal  Note: Pressure Injury Interventions:       Moisture Interventions: Absorbent underpads    Activity Interventions: PT/OT evaluation    Mobility Interventions: HOB 30 degrees or less    Nutrition Interventions: Document food/fluid/supplement intake    Friction and Shear Interventions: Minimize layers                Problem: Patient Education: Go to Patient Education Activity  Goal: Patient/Family Education  Outcome: Progressing Towards Goal     Problem: Falls - Risk of  Goal: *Absence of Falls  Description: Document Shemar Fall Risk and appropriate interventions in the flowsheet.   Outcome: Progressing Towards Goal  Note: Fall Risk Interventions:  Mobility Interventions: Patient to call before getting OOB         Medication Interventions: Patient to call before getting OOB    Elimination Interventions: Call light in reach, Patient to call for help with toileting needs    History of Falls Interventions: Door open when patient unattended

## 2022-10-02 NOTE — H&P
Attention  Hospitalist History & Physical Notes. Laurent Bunn. Name : Curtis Yeung      MRN number : [id number]     YOB: 1955     Subjective :   Chief Complaint : Fall with suspected fracture left hip transferred from [address] [place] [address] of information : Patient, ED provider and records from the referring facility. History of present illness:   78 y.o. female with history of COPD on home oxygen, diabetes mellitus on Accu-Cheks but I do not see any medications on the list, hypertension on treatment presents to the referring facility emergency room complaining of fall with pain in her left hip. Fall happened yesterday, since then she was not able to ambulate or bear weight on her leg. Complaining of pain 10 on scale of 10, there is a suspicion of femoral fracture on x-ray of the hip at the referring facility, had a CT pelvis ordered and sent here for further management. ED provider already discussed with Dr. Dirk Chung, the CT is saying that it is worse pubic fracture. She has a history of arthroplasty of the hip done in the past.  She is in severe pain when I seen her, complaining of urinary retention. Past Medical History:   Diagnosis Date    Arthritis     Bronchitis 9/1/2020    Chronic obstructive pulmonary disease (HCC)     Chronic pain     Diabetes ([address]     Gastroesophageal reflux disease without esophagitis 9/1/2020    History of multiple allergies     HTN (hypertension) 9/1/2020    Hypertension     Seasonal allergic rhinitis 9/1/2020    Vitamin D deficiency 9/1/2020     Past Surgical History:   Procedure Laterality Date    COLONOSCOPY N/A 5/25/2020    COLONOSCOPY performed by Carolyne Srivastava MD at [facility]  10/24/2018    Total Right Hip Arthroplasty Dr. Josef Harvey.      Family History   Problem Relation Age of Onset    Hypertension Mother     Cancer Sister       Social History     Tobacco Use Smoking status: Former     Packs/day: 1.00     Years: 15.00     Pack years: 15.00     Types: Cigarettes     Quit date: 2000     Years since quittin.7    Smokeless tobacco: Never   Substance Use Topics    Alcohol use: Yes     Alcohol/week: 3.0 standard drinks     Types: 3 Cans of beer per week     Comment: occasionally       Prior to Admission medications    Medication Sig Start Date End Date Taking? Authorizing Provider   montelukast (SINGULAIR) 10 mg tablet TAKE 1 TABLET BY MOUTH EVERY DAY FOR ALLERGY SYMPTOMS 22   Christina Murguia NP   amLODIPine (NORVASC) 10 mg tablet Take 1 Tablet by mouth daily. 22   Christina Murguia NP   atorvastatin (LIPITOR) 20 mg tablet Take 1 Tablet by mouth daily. 22   Christina Murguia NP   furosemide (LASIX) 20 mg tablet Take 1 Tablet by mouth daily. 22   Christina Murguia NP   ergocalciferol (ERGOCALCIFEROL) 1,250 mcg (50,000 unit) capsule TAKE 1 CAPSULE BY MOUTH EVERY SEVEN (7) DAYS. INDICATIONS: VITAMIN D DEFICIENCY (HIGH DOSE THERAPY) 22   Christina Murguia NP   fluticasone propionate (FLONASE) 50 mcg/actuation nasal spray SPRAY 2 SPRAYS INTO EACH NOSTRIL EVERY DAY 22   Christina Murguia NP   Oxygen 2L    Provider, Historical   predniSONE (DELTASONE) 10 mg tablet Take 1 Tablet by mouth every other day. Provider, Historical   metFORMIN (GLUCOPHAGE) 500 mg tablet Take 1 Tablet by mouth daily. Provider, Historical   mometasone (ELOCON) 0.1 % ointment Apply  to affected area daily. 22   Christina Murguia NP   alcohol swabs (Alcohol Prep Pads) padm 1 PAD BY APPLY EXTERNALLY ROUTE DAILY. USE TO CHECK BLOOD SUGAR DAILY.  6/10/22   Christina Murguia NP   lancets (Ultra Thin Lancets) 30 gauge misc USE ONCE LANCET PER DAY TO CHECK BLOOD SUGAR. 22   Christina Murguia NP   albuterol (Ventolin HFA) 90 mcg/actuation inhaler Take 1 Puff by inhalation every four (4) hours as needed for Wheezing. 22   Christina Murguia NP   lisinopriL (PRINIVIL, ZESTRIL) 20 mg tablet Take 1 Tablet by mouth daily. 4/26/22   Millicent Smart NP   pantoprazole (PROTONIX) 40 mg tablet Take 1 Tablet by mouth Before breakfast and dinner. Indications: inflammation of the esophagus with erosion, gastroesophageal reflux disease 4/11/22   Jason Raza MD   glucose blood VI test strips (True Metrix Glucose Test Strip) strip Use one test strip to check glucose daily 2/21/22   Millicent Smart NP   albuterol-ipratropium (DUO-NEB) 2.5 mg-0.5 mg/3 ml nebu USE 1 VIAL EVERY 4 6 HOURS AS NEEDED. DX J44.9. NEED MED B INFO 10/6/21   Provider, Historical   Trelegy Ellipta 100-62.5-25 mcg inhaler Take 1 Puff by inhalation daily. 8/24/21   Provider, Historical     No Known Allergies          Review of Systems:  Constitutional: Appetite is good, denies weight loss, no fever, no chills, no night sweats. Eye: No recent visual disturbances, no discharge, no double vision. Ear/nose/mouth/throat : No hearing disturbance, no ear pain, no nasal congestion, no sore throat, no trouble swallowing. Respiratory : No trouble breathing, no cough, no shortness of breath. On oxygen and nebulizer treatments and she is on chronic steroid. Cardiovascular : No chest pain, no palpitation, no orthopnea, no  peripheral edema. Gastrointestinal : No nausea, no vomiting, no diarrhea, No constipation, No heartburn, No abdominal pain. Genitourinary : No dysuria, no hematuria,  No incontinence. Endocrine : No excessive thirst, No polyuria   Immunologic : No hives, urticaria, No seasonal allergies. Musculoskeletal : As in history of present illness. Integumentary : No rash, No pruritus,   Hematology : No petechiae, No easy bruising,  No tendency to bleed easy. Neurology : Denies change in mental status, , No headache, No confusion, No numbness or tingling. Psychiatric : No mood swings, No anxiety, No depression.     Vitals:   Patient Vitals for the past 12 hrs:   Temp Pulse Resp BP SpO2   10/01/22 1622 97.6 °F (36.4 °C) 98 17 129/76 100 %       Physical Exam:   General : Crying and uncomfortable due to pain. No acute respiratory distress. HEENT : PERRLA, dry oral mucosa, atraumatic normocephalic, Normal ear and nose. Neck : Supple, no JVD, no masses noted, no carotid bruit. Lungs : Breath sounds with moderate air entry bilaterally, prolonged expirations, decreased breath sounds at bases. No active wheezing noted. CVS : Rhythm rate regular, S1+, S2+, no murmur or gallop. Abdomen : Soft, nontender,  bowel sounds active. Extremities : No edema noted,  pedal pulses palpable. Musculoskeletal :  no joint swelling or effusion, muscle tone and power appears normal.   Skin : Moist, warm, no pathological rash. Lymphatic : No cervical lymphadenopathy. Neurological : Awake, alert, oriented to time place person. Estil Bautista Psychiatric : Mood and affect appears appropriate to the situation. Data Review:   Recent Results (from the past 24 hour(s))   CBC WITH AUTOMATED DIFF    Collection Time: 10/01/22  2:17 PM   Result Value Ref Range    WBC 10.8 3.6 - 11.0 K/uL    RBC 3.52 (L) 3.80 - 5.20 M/uL    HGB 11.0 (L) 11.5 - 16.0 g/dL    HCT 32.4 (L) 35.0 - 47.0 %    MCV 92.0 80.0 - 99.0 FL    MCH 31.3 26.0 - 34.0 PG    MCHC 34.0 30.0 - 36.5 g/dL    RDW 12.9 11.5 - 14.5 %    PLATELET 988 203 - 047 K/uL    MPV 9.8 8.9 - 12.9 FL    NRBC 0.0 0.0  WBC    ABSOLUTE NRBC 0.00 0.00 - 0.01 K/uL    NEUTROPHILS 92 (H) 32 - 75 %    LYMPHOCYTES 4 (L) 12 - 49 %    MONOCYTES 3 (L) 5 - 13 %    EOSINOPHILS 0 0 - 7 %    BASOPHILS 0 0 - 1 %    IMMATURE GRANULOCYTES 1 (H) 0 - 0.5 %    ABS. NEUTROPHILS 10.0 (H) 1.8 - 8.0 K/UL    ABS. LYMPHOCYTES 0.4 (L) 0.8 - 3.5 K/UL    ABS. MONOCYTES 0.4 0.0 - 1.0 K/UL    ABS. EOSINOPHILS 0.0 0.0 - 0.4 K/UL    ABS. BASOPHILS 0.0 0.0 - 0.1 K/UL    ABS. IMM.  GRANS. 0.1 (H) 0.00 - 0.04 K/UL    DF AUTOMATED     METABOLIC PANEL, COMPREHENSIVE    Collection Time: 10/01/22  2:17 PM   Result Value Ref Range    Sodium 127 (L) 136 - 145 mmol/L    Potassium 4.7 3.5 - 5.1 mmol/L    Chloride 90 (L) 97 - 108 mmol/L    CO2 31 21 - 32 mmol/L    Anion gap 6 5 - 15 mmol/L    Glucose 101 (H) 65 - 100 mg/dL    BUN 8 6 - 20 mg/dL    Creatinine 0.77 0.55 - 1.02 mg/dL    BUN/Creatinine ratio 10 (L) 12 - 20      GFR est AA >60 >60 ml/min/1.73m2    GFR est non-AA >60 >60 ml/min/1.73m2    Calcium 9.7 8.5 - 10.1 mg/dL    Bilirubin, total 0.8 0.2 - 1.0 mg/dL    AST (SGOT) 53 (H) 15 - 37 U/L    ALT (SGPT) 39 12 - 78 U/L    Alk. phosphatase 113 45 - 117 U/L    Protein, total 7.9 6.4 - 8.2 g/dL    Albumin 4.0 3.5 - 5.0 g/dL    Globulin 3.9 2.0 - 4.0 g/dL    A-G Ratio 1.0 (L) 1.1 - 2.2         Radiologic Studies :   CT Results  (Last 48 hours)                 10/01/22 1509  CT PELV WO CONT Final result    Impression:  1. Acute comminuted left parasymphyseal pubic bone and inferior pubic ramus   fractures. No definite left femoral fracture. 2.  Left femoral head avascular necrosis. Severe left hip joint osteoarthritis   with small joint effusion. 3.  Chronic L5 and L4 compression fractures. Narrative:  EXAM: CT PELV WO CONT       INDICATION: Probable femoral neck fracture         COMPARISON: Pelvis and left hip radiographs 10/1/2022       TECHNIQUE: Helical CT of the pelvis with coronal and sagittal reformats. Images   reviewed in soft tissue and bone windows. CT dose reduction was achieved   through the use of a standardized protocol tailored for this examination and   automatic exposure control for dose modulation. CONTRAST: None. FINDINGS: Bones: Acute comminuted left parasymphyseal pubic bone and inferior   pubic ramus fractures. No definite left femoral fracture. No malalignment. Left   femoral head avascular necrosis. Chronic severe L5 and mild L4 compression   fractures. Joint fluid: Small left hip joint effusion. Articulations: Right total hip arthroplasty. Severe left hip osteoarthritis.    Degenerative changes of the lower lumbosacral spine. Mild pubic symphysis   arthropathy. Tendons: No definite full-thickness tendon tear. Muscles: No intramuscular hematoma. No focal atrophy. Soft tissues: Atherosclerosis. Diverticulosis coli. Assessment and Plan :     Acute comminuted left parasymphyseal pubic bone and inferior pubic ramus fractures, ED provider already contacted orthopedic physician and discussed about this. The CT also suggested left femoral head avascular necrosis and severe left hip joint osteoarthritis. Will follow with recommendations from orthopedics. Hyponatremia: She does have hyponatremia before, discontinue furosemide. We will keep her on IV fluids     Chronic obstructive pulmonary disease on home oxygen, seems stable. On chronic steroid therapy. May need to review this due to avascular necrosis. Benign essential hypertension: We will continue home medications    She has supplies for blood sugar checking but I do not see medications on her list.  We will keep her on Accu-Cheks with a sliding scale insulin. Admitted to surgical floor, full CODE STATUS, home medications reviewed and verified with external Rx history patient unable to verify. Has advanced medical directives on file. Will not do any anticoagulation for VTE prophylaxis until surgical decision is made. CC : Saintclair Fujisawa, NP  Signed By: Jt Flowers MD     October 1, 2022      This dictation was done by dragon, computer voice recognition software. Often unanticipated grammatical, syntax, Randolph phones and other interpretive errors are inadvertently transcribed. Please excuse errors that have escaped final proofreading.

## 2022-10-03 ENCOUNTER — APPOINTMENT (OUTPATIENT)
Dept: GENERAL RADIOLOGY | Age: 67
DRG: 536 | End: 2022-10-03
Attending: INTERNAL MEDICINE
Payer: MEDICARE

## 2022-10-03 LAB
ANION GAP SERPL CALC-SCNC: 7 MMOL/L (ref 5–15)
APPEARANCE UR: CLEAR
BACTERIA URNS QL MICRO: NEGATIVE /HPF
BASOPHILS # BLD: 0 K/UL (ref 0–0.1)
BASOPHILS NFR BLD: 0 % (ref 0–1)
BILIRUB UR QL: NEGATIVE
BUN SERPL-MCNC: 8 MG/DL (ref 6–20)
BUN/CREAT SERPL: 12 (ref 12–20)
CA-I BLD-MCNC: 8.9 MG/DL (ref 8.5–10.1)
CHLORIDE SERPL-SCNC: 99 MMOL/L (ref 97–108)
CO2 SERPL-SCNC: 27 MMOL/L (ref 21–32)
COLOR UR: ABNORMAL
CREAT SERPL-MCNC: 0.69 MG/DL (ref 0.55–1.02)
DIFFERENTIAL METHOD BLD: ABNORMAL
EOSINOPHIL # BLD: 0.1 K/UL (ref 0–0.4)
EOSINOPHIL NFR BLD: 2 % (ref 0–7)
ERYTHROCYTE [DISTWIDTH] IN BLOOD BY AUTOMATED COUNT: 13 % (ref 11.5–14.5)
GLUCOSE BLD STRIP.AUTO-MCNC: 109 MG/DL (ref 65–100)
GLUCOSE BLD STRIP.AUTO-MCNC: 126 MG/DL (ref 65–100)
GLUCOSE BLD STRIP.AUTO-MCNC: 136 MG/DL (ref 65–100)
GLUCOSE BLD STRIP.AUTO-MCNC: 136 MG/DL (ref 65–100)
GLUCOSE SERPL-MCNC: 132 MG/DL (ref 65–100)
GLUCOSE UR STRIP.AUTO-MCNC: 150 MG/DL
HCT VFR BLD AUTO: 26.8 % (ref 35–47)
HGB BLD-MCNC: 8.8 G/DL (ref 11.5–16)
HGB UR QL STRIP: ABNORMAL
IMM GRANULOCYTES # BLD AUTO: 0 K/UL (ref 0–0.04)
IMM GRANULOCYTES NFR BLD AUTO: 0 % (ref 0–0.5)
KETONES UR QL STRIP.AUTO: NEGATIVE MG/DL
LEUKOCYTE ESTERASE UR QL STRIP.AUTO: NEGATIVE
LYMPHOCYTES # BLD: 0.9 K/UL (ref 0.8–3.5)
LYMPHOCYTES NFR BLD: 11 % (ref 12–49)
MCH RBC QN AUTO: 31.3 PG (ref 26–34)
MCHC RBC AUTO-ENTMCNC: 32.8 G/DL (ref 30–36.5)
MCV RBC AUTO: 95.4 FL (ref 80–99)
MONOCYTES # BLD: 0.6 K/UL (ref 0–1)
MONOCYTES NFR BLD: 8 % (ref 5–13)
MUCOUS THREADS URNS QL MICRO: ABNORMAL /LPF
NEUTS SEG # BLD: 6.2 K/UL (ref 1.8–8)
NEUTS SEG NFR BLD: 79 % (ref 32–75)
NITRITE UR QL STRIP.AUTO: NEGATIVE
NRBC # BLD: 0 K/UL (ref 0–0.01)
NRBC BLD-RTO: 0 PER 100 WBC
PERFORMED BY, TECHID: ABNORMAL
PH UR STRIP: 5 [PH] (ref 5–8)
PLATELET # BLD AUTO: 245 K/UL (ref 150–400)
PMV BLD AUTO: 10.1 FL (ref 8.9–12.9)
POTASSIUM SERPL-SCNC: 4 MMOL/L (ref 3.5–5.1)
PROT UR STRIP-MCNC: NEGATIVE MG/DL
RBC # BLD AUTO: 2.81 M/UL (ref 3.8–5.2)
RBC #/AREA URNS HPF: ABNORMAL /HPF (ref 0–5)
SODIUM SERPL-SCNC: 133 MMOL/L (ref 136–145)
SP GR UR REFRACTOMETRY: 1.02 (ref 1–1.03)
UROBILINOGEN UR QL STRIP.AUTO: 0.1 EU/DL (ref 0.1–1)
WBC # BLD AUTO: 7.8 K/UL (ref 3.6–11)
WBC URNS QL MICRO: ABNORMAL /HPF (ref 0–4)

## 2022-10-03 PROCEDURE — 36415 COLL VENOUS BLD VENIPUNCTURE: CPT

## 2022-10-03 PROCEDURE — 77010033678 HC OXYGEN DAILY

## 2022-10-03 PROCEDURE — 97530 THERAPEUTIC ACTIVITIES: CPT

## 2022-10-03 PROCEDURE — 74018 RADEX ABDOMEN 1 VIEW: CPT

## 2022-10-03 PROCEDURE — 97165 OT EVAL LOW COMPLEX 30 MIN: CPT

## 2022-10-03 PROCEDURE — 97161 PT EVAL LOW COMPLEX 20 MIN: CPT

## 2022-10-03 PROCEDURE — 80048 BASIC METABOLIC PNL TOTAL CA: CPT

## 2022-10-03 PROCEDURE — 94761 N-INVAS EAR/PLS OXIMETRY MLT: CPT

## 2022-10-03 PROCEDURE — 74011250637 HC RX REV CODE- 250/637: Performed by: INTERNAL MEDICINE

## 2022-10-03 PROCEDURE — 81001 URINALYSIS AUTO W/SCOPE: CPT

## 2022-10-03 PROCEDURE — 74011000250 HC RX REV CODE- 250: Performed by: NURSE PRACTITIONER

## 2022-10-03 PROCEDURE — 87086 URINE CULTURE/COLONY COUNT: CPT

## 2022-10-03 PROCEDURE — 51798 US URINE CAPACITY MEASURE: CPT

## 2022-10-03 PROCEDURE — 82962 GLUCOSE BLOOD TEST: CPT

## 2022-10-03 PROCEDURE — 94640 AIRWAY INHALATION TREATMENT: CPT

## 2022-10-03 PROCEDURE — 74011250637 HC RX REV CODE- 250/637: Performed by: HOSPITALIST

## 2022-10-03 PROCEDURE — 99221 1ST HOSP IP/OBS SF/LOW 40: CPT | Performed by: UROLOGY

## 2022-10-03 PROCEDURE — 74011000250 HC RX REV CODE- 250: Performed by: HOSPITALIST

## 2022-10-03 PROCEDURE — 65270000032 HC RM SEMIPRIVATE

## 2022-10-03 PROCEDURE — 74011250637 HC RX REV CODE- 250/637: Performed by: NURSE PRACTITIONER

## 2022-10-03 PROCEDURE — 74011000250 HC RX REV CODE- 250: Performed by: INTERNAL MEDICINE

## 2022-10-03 PROCEDURE — 85025 COMPLETE CBC W/AUTO DIFF WBC: CPT

## 2022-10-03 RX ORDER — DOCUSATE SODIUM 50 MG/5ML
200 LIQUID ORAL 2 TIMES DAILY
Qty: 1200 ML | Refills: 0 | Status: SHIPPED | OUTPATIENT
Start: 2022-10-03 | End: 2022-11-02

## 2022-10-03 RX ORDER — IPRATROPIUM BROMIDE AND ALBUTEROL SULFATE 2.5; .5 MG/3ML; MG/3ML
3 SOLUTION RESPIRATORY (INHALATION)
Status: DISCONTINUED | OUTPATIENT
Start: 2022-10-04 | End: 2022-10-04 | Stop reason: HOSPADM

## 2022-10-03 RX ORDER — POLYETHYLENE GLYCOL 3350 17 G/17G
17 POWDER, FOR SOLUTION ORAL DAILY
Qty: 30 PACKET | Refills: 0 | Status: SHIPPED | OUTPATIENT
Start: 2022-10-04 | End: 2022-11-03

## 2022-10-03 RX ORDER — LIDOCAINE 4 G/100G
PATCH TOPICAL
Qty: 90 PATCH | Refills: 0 | Status: SHIPPED | OUTPATIENT
Start: 2022-10-04 | End: 2022-11-03

## 2022-10-03 RX ORDER — BETHANECHOL CHLORIDE 25 MG/1
25 TABLET ORAL
Status: DISCONTINUED | OUTPATIENT
Start: 2022-10-03 | End: 2022-10-04 | Stop reason: HOSPADM

## 2022-10-03 RX ORDER — TRAMADOL HYDROCHLORIDE 50 MG/1
50 TABLET ORAL EVERY 6 HOURS
Qty: 5 TABLET | Refills: 0 | Status: SHIPPED | OUTPATIENT
Start: 2022-10-03 | End: 2022-10-05

## 2022-10-03 RX ADMIN — LISINOPRIL 20 MG: 20 TABLET ORAL at 08:50

## 2022-10-03 RX ADMIN — BUDESONIDE AND FORMOTEROL FUMARATE DIHYDRATE 2 PUFF: 160; 4.5 AEROSOL RESPIRATORY (INHALATION) at 21:22

## 2022-10-03 RX ADMIN — DOCUSATE SODIUM LIQUID 200 MG: 100 LIQUID ORAL at 20:18

## 2022-10-03 RX ADMIN — PANTOPRAZOLE SODIUM 40 MG: 40 TABLET, DELAYED RELEASE ORAL at 16:54

## 2022-10-03 RX ADMIN — SODIUM CHLORIDE, PRESERVATIVE FREE 10 ML: 5 INJECTION INTRAVENOUS at 20:23

## 2022-10-03 RX ADMIN — SODIUM CHLORIDE, PRESERVATIVE FREE 10 ML: 5 INJECTION INTRAVENOUS at 16:56

## 2022-10-03 RX ADMIN — PANTOPRAZOLE SODIUM 40 MG: 40 TABLET, DELAYED RELEASE ORAL at 08:49

## 2022-10-03 RX ADMIN — IPRATROPIUM BROMIDE AND ALBUTEROL SULFATE 3 ML: 2.5; .5 SOLUTION RESPIRATORY (INHALATION) at 21:06

## 2022-10-03 RX ADMIN — ACETAMINOPHEN 650 MG: 325 TABLET ORAL at 16:54

## 2022-10-03 RX ADMIN — AMLODIPINE BESYLATE 10 MG: 5 TABLET ORAL at 08:50

## 2022-10-03 RX ADMIN — TRAMADOL HYDROCHLORIDE 50 MG: 50 TABLET, COATED ORAL at 02:32

## 2022-10-03 RX ADMIN — TRAMADOL HYDROCHLORIDE 50 MG: 50 TABLET, COATED ORAL at 20:18

## 2022-10-03 RX ADMIN — LACTULOSE 45 ML: 20 SOLUTION ORAL at 02:32

## 2022-10-03 RX ADMIN — IPRATROPIUM BROMIDE AND ALBUTEROL SULFATE 3 ML: 2.5; .5 SOLUTION RESPIRATORY (INHALATION) at 08:56

## 2022-10-03 RX ADMIN — IPRATROPIUM BROMIDE AND ALBUTEROL SULFATE 3 ML: 2.5; .5 SOLUTION RESPIRATORY (INHALATION) at 14:12

## 2022-10-03 RX ADMIN — SODIUM CHLORIDE, PRESERVATIVE FREE 10 ML: 5 INJECTION INTRAVENOUS at 05:43

## 2022-10-03 RX ADMIN — IPRATROPIUM BROMIDE AND ALBUTEROL SULFATE 3 ML: 2.5; .5 SOLUTION RESPIRATORY (INHALATION) at 01:26

## 2022-10-03 RX ADMIN — MONTELUKAST 10 MG: 10 TABLET, FILM COATED ORAL at 08:50

## 2022-10-03 RX ADMIN — ATORVASTATIN CALCIUM 20 MG: 20 TABLET, FILM COATED ORAL at 08:50

## 2022-10-03 RX ADMIN — TRAMADOL HYDROCHLORIDE 50 MG: 50 TABLET, COATED ORAL at 08:50

## 2022-10-03 RX ADMIN — BETHANECHOL CHLORIDE 25 MG: 25 TABLET ORAL at 16:54

## 2022-10-03 RX ADMIN — BUDESONIDE AND FORMOTEROL FUMARATE DIHYDRATE 2 PUFF: 160; 4.5 AEROSOL RESPIRATORY (INHALATION) at 08:56

## 2022-10-03 RX ADMIN — DOCUSATE SODIUM LIQUID 200 MG: 100 LIQUID ORAL at 08:49

## 2022-10-03 NOTE — ROUTINE PROCESS
Patient c/o of pain and stated her abdomen is more distended since yesterday. Patient stated she has not had a bowel movement since she has been in the hospital and she vomited some peaches when she ate dinner. Dr. Neil Bueno notified, orders for stat KUB and one time dose of lactulose 30mg.

## 2022-10-03 NOTE — PROGRESS NOTES
PHYSICAL THERAPY EVALUATION  Patient: Dashawn Bear (15 y.o. female)  Date: 10/3/2022  Primary Diagnosis: Pubic bone fracture ([facility] [address] [S32.509A]       Precautions: fall, WBAT LLE       ASSESSMENT  As per attending Alvaro helton's notes on 10/03/2022 at 0830 (The patient is a 60-year-old female with a PMH significant for COPD on home oxygen, GERD, asthma, chronic lumbar radiculopathy, HTN, HLD urinary retention, chronic hyponatremia and DM. She presented to outside facility after ground-level fall with great amount of pain in her left hip, unable to bear weight on her lower extremity due to pain. Sita Bills She was transferred to [facility] for further evaluation and treatment of questionable femur fracture. In review of CT obtained prior to transportation there is no femur fracture, there is a pubic bone fracture. Patient states she could not walk. X-ray of hip reveals parasymphyseal pubic bone and inferior pubic ramus fractures, left femoral head avascular necrosis with severe left hip joint osteoarthritis and small joint effusion, chronic L4 and L5 compression fractures. CT of abdomen and pelvis revealing multilevel degenerative disc and facet disease compression deformities of T12, L1, L3 and L5, grade 1 L3-4 and L4-5 anterior listhesis without instability similar to previous scans and new superior endplate compression fracture of L4. Admitted for further management of pubic fracture. Orthopedic surgery consulted. No surgical intervention is planned at this time. PT and OT have evaluated the patient for discharge disposition. They recommend home with home health PT and OT she already owns a rolling walker. Attempt to discharge patient, she has had little urine output and post void bladder scan revealed 600 cc of retained urine. Mendoza catheter placed and urology has been consulted.)     PMH: as below. Pt A&O x 4. Patient lives with SO and was indep at home. Patient reported frequent falls and doesn't know why. She has been followed by [facility] as outpatient for rehab and reported has an appointment with Dr Tram Willson soon was referred by Dr Laure Cat. Patient now has a pelvic Fx and as per ortho is WBAT lle. patient wants to go home. She was inde to sup for bed mob to sup to sit and cg to min assist for sit to stand to transfer in the chair. Given first day OOB she did well. She can go home with [facility] and can rent a wheel chair for long distance walking. Has a walker at home. Based on the objective data described below, the patient presents with generalized weakness, impaired functional mobility, impaired amb, impaired balance, and decreased endurance to activities. Pt did fair with session today with PT. Pt will benefit from continued skilled PT to address above deficits and return to PLOF. Current PT DC recommendation HHPT due to above deficits. PLAN :  Recommendations and Planned Interventions: bed mobility training, transfer training, gait training, therapeutic exercises, patient and family training/education, and therapeutic activities      Recommend with staff: RW for transfers    Frequency/Duration: Patient will be followed by physical therapy:  3-5x/week to address goals. Recommendation for discharge: (in order for the patient to meet his/her long term goals)  Home with [address] [place] [address]    This discharge recommendation:  Has been made in collaboration with the attending provider and/or case management    IF patient discharges home will need the following DME: rolling walker and wheelchair         SUBJECTIVE:   Patient stated I am ok.     OBJECTIVE DATA SUMMARY:   HISTORY:    Past Medical History:   Diagnosis Date    Arthritis     Bronchitis 9/1/2020    Chronic obstructive pulmonary disease (HCC)     Chronic pain     Diabetes ([facility] [address]     Gastroesophageal reflux disease without esophagitis 9/1/2020    GERD (gastroesophageal reflux disease)     GERD (gastroesophageal reflux disease)     History of multiple allergies     HTN (hypertension) 9/1/2020    Hypertension     Intermittent asthma 9/1/2020    Lung disorder     PATIENT IS ON OXYGEN    Menopause     Pain of upper abdomen 9/1/2020    Seasonal allergic rhinitis 9/1/2020    Sinus problem     Vitamin D deficiency 9/1/2020     Past Surgical History:   Procedure Laterality Date    COLONOSCOPY N/A 5/25/2020    [facility] by Radha [facility]agher MD [facility] ENDOSCOPY    HX COLONOSCOPY      HX GI      colonscopy    HX ORTHOPAEDIC  10/24/2018    Total Right Hip Arthroplasty Dr. Mayela Chan. Home Situation  Home Environment: Private residence  # Steps to Enter: 6  Rails to Enter: No  One/Two Story Residence: One story  Living Alone: No  Support Systems: Other Family Member(s), Friend/Neighbor  Patient Expects to be Discharged to[de-identified] Home with home health  Current DME Used/Available at Home: Walker, rollator, Cane, straight, Shower chair    EXAMINATION/PRESENTATION/DECISION MAKING:   Critical Behavior:  Neurologic State: Alert  Orientation Level: Oriented X4  Cognition: Follows commands     Hearing: Auditory  Auditory Impairment: None  Skin:  intact  Edema:   Range Of Motion:  AROM: Generally decreased, functional                       Strength:    Strength: Generally decreased, functional                    Tone & Sensation:   Tone: Normal                              Coordination:  Coordination: Generally decreased, functional  Vision:      Functional Mobility:  Bed Mobility:     Supine to Sit: Supervision;Modified independent     Scooting: Modified independent  Transfers:  Sit to Stand: Minimum assistance; Moderate assistance  Stand to Sit: Minimum assistance; Moderate assistance                       Balance:   Sitting: Intact; With support  Standing: Impaired;Pull to stand; With support  Standing - Static: Fair;Constant support  Standing - Dynamic : Fair;Constant support  Ambulation/Gait Training:  Distance (ft): 3 Feet (ft)  Assistive Device: Walker, rolling  Ambulation - Level of Assistance: Minimal assistance; Moderate assistance     Gait Description (WDL): Exceptions to WDL        Left Side Weight Bearing: As tolerated                                 Stairs:  Verbally discussed to go up with good leg and come down with bad leg . Therapeutic Exercises:   AP, LAQ, heel slides all reviewed    Functional Measure:  [facility] AM-PAC 6 Clicks         Basic Mobility Inpatient Short Form  How much difficulty does the patient currently have. .. Unable A Lot A Little None   1. Turning over in bed (including adjusting bedclothes, sheets and blankets)? [] 1   [] 2   [x] 3   [] 4   2. Sitting down on and standing up from a chair with arms ( e.g., wheelchair, bedside commode, etc.)   [] 1   [] 2   [x] 3   [] 4   3. Moving from lying on back to sitting on the side of the bed? [] 1   [] 2   [x] 3   [] 4          How much help from another person does the patient currently need. .. Total A Lot A Little None   4. Moving to and from a bed to a chair (including a wheelchair)? [] 1   [x] 2   [] 3   [] 4   5. Need to walk in hospital room? [] 1   [x] 2   [] 3   [] 4   6. Climbing 3-5 steps with a railing? [] 1   [x] 2   [] 3   [] 4   © 2007, Trustees of [facility], under license to [employer]. All rights reserved     Score:  Initial: 15/24 Most Recent: X (Date: 10/03/2022 )   Interpretation of Tool:  Represents activities that are increasingly more difficult (i.e. Bed mobility, Transfers, Gait).   Score 24 23 22-20 19-15 14-10 9-7 6   Modifier CH CI CJ CK CL CM CN         Physical Therapy Evaluation Charge Determination   History Examination Presentation Decision-Making   LOW Complexity : Zero comorbidities / personal factors that will impact the outcome / POC LOW Complexity : 1-2 Standardized tests and measures addressing body structure, function, activity limitation and / or participation in recreation LOW Complexity : Stable, uncomplicated  Other outcome measures [facility] 6  15/24      Based on the above components, the patient evaluation is determined to be of the following complexity level: LOW     Pain Ratin/10 pelvis     Activity Tolerance:   Good and Fair    After treatment patient left in no apparent distress:   Bed returned to lowest position, Sitting in chair and Call bell within reach . GOALS:    Problem: Mobility Impaired (Adult and Pediatric)  Goal: *Acute Goals and Plan of Care (Insert Text)  Description: Patient stated goal: go home  Patient will move from supine to sit and sit to supine , scoot up and down, and roll side to side in bed with modified independence within 7 day(s). Patient will transfer from bed to chair and chair to bed with modified independence using the least restrictive device within 7 day(s). Patient will improve static standing balance to modified independence within 1 week(s). Patient will ambulate 35 feet with modified independence with least restrictive device within 1 weeks. Patient will be able to perform 4 training steps up and down with cg to sup x 7 days  Outcome: Progressing Towards Goal       COMMUNICATION/EDUCATION:   The patients plan of care was discussed with: Occupational therapist, Registered nurse, Physician, and Case management. Fall prevention education was provided and the patient/caregiver indicated understanding., Patient/family have participated as able in goal setting and plan of care. , and Patient/family agree to work toward stated goals and plan of care. Thank you for this referral.  Annette Jin, PT.    Time Calculation: 25 mins

## 2022-10-03 NOTE — PROGRESS NOTES
1444: CM spoke with primary physician. DC order being cancelled for today.    __________________________________________________    1303: CM noted discharge order. CM met with patient at bedside. Patient agreeable to [facility]. Choice letter signed and placed on chart. Referral sent. Patient accepted with [facility] with anticipated start of care date of 10/4/22. Patient made aware and agreeable to plan. Primary RN made aware. Discharge plan of care/case management plan validated with provider discharge order.

## 2022-10-03 NOTE — CONSULTS
ORTHOPEDIC CONSULT    Patient: Florecita Lacy MRN: [id number]  SSN: [id number]    YOB: 1955  Age: 78 y.o. Sex: female      Subjective:      Florecita Lacy is a 78 y.o. female who is being seen in orthopedic consult for left pubic ramus fracture. The patient states she was at home and she became frightened of a bug while she was put on her pants which caused her to fall injuring her left hip. The patient was initially seen at [facility] and she was thought to have a hip fracture and was transferred here for further care. Further evaluation here reveals she did not have a left hip fracture but a subsequent left pelvic ramus fracture. The patient is now complaining of mild to moderate pain in her left groin region. She denies any numbness or tingling of the left lower extremity. She states she does have a history of degenerative joint disease of the left hip. She recently underwent a right total hip replacement in [facility]. She denies any left knee pain. She denies any injury to head or cervical spine from a fall. She denies any other musculoskeletal complaints at this time.   Past Medical History:   Diagnosis Date    Arthritis     Bronchitis 9/1/2020    Chronic obstructive pulmonary disease (HCC)     Chronic pain     Diabetes ([address]     Gastroesophageal reflux disease without esophagitis 9/1/2020    GERD (gastroesophageal reflux disease)     GERD (gastroesophageal reflux disease)     History of multiple allergies     HTN (hypertension) 9/1/2020    Hypertension     Intermittent asthma 9/1/2020    Lung disorder     PATIENT IS ON OXYGEN    Menopause     Pain of upper abdomen 9/1/2020    Seasonal allergic rhinitis 9/1/2020    Sinus problem     Vitamin D deficiency 9/1/2020     Past Surgical History:   Procedure Laterality Date    COLONOSCOPY N/A 5/25/2020    COLONOSCOPY performed by Clary Frankel, MD at [facility] ENDOSCOPY    HX COLONOSCOPY      HX GI      colonscopy    HX ORTHOPAEDIC  10/24/2018    Total Right Hip Arthroplasty Dr. Mann Giron. Family History   Problem Relation Age of Onset    Hypertension Mother     Cancer Sister      Social History     Tobacco Use    Smoking status: Former     Packs/day: 1.00     Years: 15.00     Pack years: 15.00     Types: Cigarettes     Quit date: 2000     Years since quittin.7    Smokeless tobacco: Never   Substance Use Topics    Alcohol use: Yes     Alcohol/week: 3.0 standard drinks     Types: 3 Cans of beer per week     Comment: occasionally      Prior to Admission medications    Medication Sig Start Date End Date Taking? Authorizing Provider   atorvastatin (LIPITOR) 20 mg tablet Take 1 Tablet by mouth daily. 22   Christina Murguia NP   furosemide (LASIX) 20 mg tablet Take 1 Tablet by mouth daily. 22   Christina Murguia NP   montelukast (SINGULAIR) 10 mg tablet TAKE 1 TABLET BY MOUTH EVERY DAY FOR ALLERGY SYMPTOMS 22   Christina Murguia NP   amLODIPine (NORVASC) 10 mg tablet Take 1 Tablet by mouth daily. 22   Christina Murguia NP   ergocalciferol (ERGOCALCIFEROL) 1,250 mcg (50,000 unit) capsule TAKE 1 CAPSULE BY MOUTH EVERY SEVEN (7) DAYS. INDICATIONS: VITAMIN D DEFICIENCY (HIGH DOSE THERAPY) 22   Christina Murguia NP   fluticasone propionate (FLONASE) 50 mcg/actuation nasal spray SPRAY 2 SPRAYS INTO EACH NOSTRIL EVERY DAY 22   Christina Murguia NP   Oxygen 2L    Provider, Historical   predniSONE (DELTASONE) 10 mg tablet Take 1 Tablet by mouth every other day. Provider, Historical   mometasone (ELOCON) 0.1 % ointment Apply  to affected area daily. 22   Christina Murguia NP   alcohol swabs (Alcohol Prep Pads) padm 1 PAD BY APPLY EXTERNALLY ROUTE DAILY. USE TO CHECK BLOOD SUGAR DAILY.  6/10/22   Christina Murguia NP   lancets (Ultra Thin Lancets) 30 gauge misc USE ONCE LANCET PER DAY TO CHECK BLOOD SUGAR. 22   Christina Murguia NP   lisinopriL (PRINIVIL, ZESTRIL) 20 mg tablet Take 1 Tablet by mouth daily. 4/26/22   Roby Jones NP   pantoprazole (PROTONIX) 40 mg tablet Take 1 Tablet by mouth Before breakfast and dinner. Indications: inflammation of the esophagus with erosion, gastroesophageal reflux disease 4/11/22   Radha Yousif MD   glucose blood VI test strips (True Metrix Glucose Test Strip) strip Use one test strip to check glucose daily 2/21/22   Roby Jones NP   albuterol-ipratropium (DUO-NEB) 2.5 mg-0.5 mg/3 ml nebu USE 1 VIAL EVERY 4 6 HOURS AS NEEDED. DX J44.9. NEED MED B INFO 10/6/21   Provider, Historical   Trelegy Ellipta 100-62.5-25 mcg inhaler Take 1 Puff by inhalation daily. 8/24/21   Provider, Historical       No Known Allergies    Review of Systems:  Review of Systems   Constitutional: Negative. HENT: Negative. Eyes: Negative. Respiratory: Negative. Cardiovascular: Negative. Gastrointestinal: Negative. Genitourinary: Negative. Musculoskeletal:  Positive for joint pain. Left hip   Skin: Negative. Neurological: Negative. Endo/Heme/Allergies: Negative. Psychiatric/Behavioral: Negative.          Objective:     Current Facility-Administered Medications   Medication Dose Route Frequency    albuterol-ipratropium (DUO-NEB) 2.5 MG-0.5 MG/3 ML  3 mL Nebulization Q6H RT    lidocaine 4 % patch 3 Patch  3 Patch TransDERmal Q24H    traMADoL (ULTRAM) tablet 50 mg  50 mg Oral Q6H    polyethylene glycol (MIRALAX) packet 17 g  17 g Oral DAILY    docusate (COLACE) 50 mg/5 mL oral liquid 200 mg  200 mg Oral BID    pantoprazole (PROTONIX) tablet 40 mg  40 mg Oral ACB&D    lisinopriL (PRINIVIL, ZESTRIL) tablet 20 mg  20 mg Oral DAILY    albuterol (PROVENTIL HFA, VENTOLIN HFA, PROAIR HFA) inhaler 1 Puff  1 Puff Inhalation Q4H PRN    amLODIPine (NORVASC) tablet 10 mg  10 mg Oral DAILY    atorvastatin (LIPITOR) tablet 20 mg  20 mg Oral DAILY    budesonide-formoteroL (SYMBICORT) 160-4.5 mcg/actuation HFA inhaler 2 Puff  2 Puff Inhalation BID RT    And [Held by provider] tiotropium bromide (SPIRIVA RESPIMAT) 2.5 mcg /actuation  2 Puff Inhalation DAILY    insulin lispro (HUMALOG) injection   SubCUTAneous AC&HS    glucose chewable tablet 16 g  4 Tablet Oral PRN    glucagon (GLUCAGEN) injection 1 mg  1 mg IntraMUSCular PRN    sodium chloride (NS) flush 5-40 mL  5-40 mL IntraVENous Q8H    sodium chloride (NS) flush 5-40 mL  5-40 mL IntraVENous PRN    acetaminophen (TYLENOL) tablet 650 mg  650 mg Oral Q6H PRN    Or    acetaminophen (TYLENOL) suppository 650 mg  650 mg Rectal Q6H PRN    ondansetron (ZOFRAN ODT) tablet 4 mg  4 mg Oral Q6H PRN    Or    ondansetron (ZOFRAN) injection 4 mg  4 mg IntraVENous Q6H PRN    dextrose 10% infusion 0-250 mL  0-250 mL IntraVENous PRN    morphine injection 4 mg  4 mg IntraVENous Q4H PRN    predniSONE (DELTASONE) tablet 10 mg  10 mg Oral EVERY OTHER DAY    montelukast (SINGULAIR) tablet 10 mg  10 mg Oral DAILY      Vitals:    10/03/22 0823 10/03/22 0833 10/03/22 0856 10/03/22 0916   BP:  138/80     Pulse:  (!) 101     Resp:  17     Temp:  98.5 °F (36.9 °C)     SpO2: 100% 97% 94% 94%   Weight:       Height:            Alert and oriented x3, No apparent distress    Physical Exam:  Left lower extremity: Leg lengths were equal.  No malrotation or foreshortening seen. There is no erythema ecchymosis seen throughout left lower extremity. No tenderness palpation throughout left lower extremity. There was no tenderness to internal/external rotation of her left lower extremity. There was tenderness to straight leg raise past 20 degrees of her left hip. There was tenderness to abduction past 15 degrees. No calf pain to palpation. DP/PT pulse are palpable. EHL/DF/PF is 5 out of 5. Negative Homans. Left lower extremity neurovascular intact.   Labs:  CBC:  Recent Labs     10/03/22  1053 10/02/22  1126 10/01/22  1417   WBC 7.8 9.8 10.8   RBC 2.81* 2.82* 3.52*   HGB 8.8* 8.9* 11.0*   HCT 26.8* 26.7* 32.4*   MCV 95.4 94.7 92.0   RDW 13.0 12.8 12.9    264 322     CHEMISTRIES:  Recent Labs     10/03/22  1053 10/02/22  1126 10/01/22  1417   * 129* 127*   K 4.0 4.3 4.7   CL 99 92* 90*   CO2 27 32 31   BUN 8 14 8   CREA 0.69 1.05* 0.77   CA 8.9 9.2 9.7   PHOS  --  3.6  --    PT/INR:No results for input(s): INR, INREXT in the last 72 hours. No lab exists for component: PROTIME  APTT:No results for input(s): APTT in the last 72 hours. LIVER PROFILE:  Recent Labs     10/01/22  1417   AST 53*   ALT 39       IMAGING:  X-rays taken of her left hip on 10/1/2022 show a right total hip arthroplasty in good position alignment. There is cystic degeneration seen of the femoral head of the left hip and moderate degenerative joint disease seen. There is also reading of a possible acute nondisplaced basicervical left femoral neck fracture. CT scan taken of her pelvis on 10/1/2022 show an acute left inferior pubic ramus and pubic symphysis fracture. There is a reading of left femoral head avascular porosis with severe degenerative joint disease of the left hip with a small joint effusion. There is also chronic L4 and L5 compression fractures. Assessment/Plan:     Hospital Problems  Date Reviewed: 10/1/2022            Codes Class Noted POA    Falls ICD-10-CM: W19. [facility]  ICD-9-CM: E888.9  10/2/2022 Yes        Ambulatory dysfunction ICD-10-CM: R26.2  ICD-9-CM: 719.7  10/2/2022 Yes        Constipation ICD-10-CM: K59.00  ICD-9-CM: 564.00  10/2/2022 Yes        Chronic pain ICD-10-CM: G89.29  ICD-9-CM: 338.29  10/2/2022 Yes        Pubic bone fracture ([facility] [address] ICD-10-CM: H05.551S  ICD-9-CM: 808.2  10/1/2022 Yes         Left pelvic ramus fracture  Severe degenerative joint disease left hip    No acute orthopedic surgical intervention needed at this time. Patient can ambulate weightbearing as tolerated left lower extremity. She is complaining constipation at this time. She is currently taking morphine for pain management.   We will discontinue morphine and continue with oral pain management as needed. Continue oral stool softener. I did discuss with the patient that she will eventually need a left total hip arthroplasty. She states she has been told that in the past and that the surgeon who performed her right total hip arthroplasty has retired she is currently looking for new orthopedic surgeon. I did give her the information with Dr. Filippo Cobb and she can follow-up with him as an outpatient in a couple weeks. Okay for patient to discharge when cleared by medicine. The patient to follow-up with Dr. Filippo Cobb as outpatient in 2 to 4 weeks. This patient was examined direct consult with Dr. Filippo Cobb. Thank you for the courtesy of this consult. I independently evaluated the patient and agree with above plan.  I was notified of her admission Monday and re-evaluated her       Signed By: Marcos Nichols PA-C     October 3, 2022

## 2022-10-03 NOTE — PROGRESS NOTES
Bedside shift report given to [facility]. Patient in bed awake, alert and oriented x4 on oxygen 2litres via nasal prongs on normal saline flowing well no signs of distress noted.

## 2022-10-03 NOTE — PROGRESS NOTES
Patient has not been able to urinate for 2 days. Patient straight cathed twice. This RN bladder scanned her. Bladder scan showing 500ml. Mendoza catheter placed per NP Elaina Matthew.

## 2022-10-03 NOTE — ROUTINE PROCESS
Patient unable to void. Bladder scan performed, patient retaining 350mL urine. Straight cath completed, 230mL urine output. Patient tolerated procedure well. Will continue to monitor.

## 2022-10-03 NOTE — ROUTINE PROCESS
Patient has not voided since previous straight cath. Bladder scan completed showing 371mL urine retaining. Dr. Alexandra Camacho notified, no orders to straight cath at this time, bladder scan in another 6 hours. Will continue to monitor.

## 2022-10-03 NOTE — DISCHARGE SUMMARY
Hospitalist Discharge Summary     Patient ID:    Jose Sales  [id number]  78 y.o.  1955    Admit date: 10/1/2022    Discharge date : 10/3/2022      Final Diagnoses: Active Problems:    Pubic bone fracture ([address] (10/1/2022)      Falls (10/2/2022)      Ambulatory dysfunction (10/2/2022)      Constipation (10/2/2022)      Chronic pain (10/2/2022)        Reason for Hospitalization/Hospital Course: The patient is a 61-year-old female with a PMH significant for COPD on home oxygen, GERD, asthma, chronic lumbar radiculopathy, HTN, HLD urinary retention, chronic hyponatremia and DM. She presented to outside facility after ground-level fall with great amount of pain in her left hip, unable to bear weight on her lower extremity due to pain. iVvian Quiñonez She was transferred to [facility] for further evaluation and treatment of questionable femur fracture. In review of CT obtained prior to transportation there is no femur fracture, there is a pubic bone fracture. Patient states she could not walk. X-ray of hip reveals parasymphyseal pubic bone and inferior pubic ramus fractures, left femoral head avascular necrosis with severe left hip joint osteoarthritis and small joint effusion, chronic L4 and L5 compression fractures. CT of abdomen and pelvis revealing multilevel degenerative disc and facet disease compression deformities of T12, L1, L3 and L5, grade 1 L3-4 and L4-5 anterior listhesis without instability similar to previous scans and new superior endplate compression fracture of L4. Admitted for further management of pubic fracture. Orthopedic surgery consulted. No indication for surgical intervention. PT and OT have evaluated the patient can go home with home health PT and OT. Follow-up outpatient with her PCP for further management of chronic healthcare needs.     Discharge Medications:   Current Discharge Medication List        START taking these medications Details   traMADoL (ULTRAM) 50 mg tablet Take 1 Tablet by mouth every six (6) hours for 5 doses. Max Daily Amount: 200 mg. Qty: 5 Tablet, Refills: 0  Start date: 10/3/2022, End date: 10/5/2022    Associated Diagnoses: Closed nondisplaced fracture of right pubis, initial encounter ([facility] [address]      polyethylene glycol (MIRALAX) 17 gram packet Take 1 Packet by mouth daily for 30 days. Qty: 30 Packet, Refills: 0  Start date: 10/4/2022, End date: 11/3/2022      lidocaine 4 % patch Apply patch to back on either side of spine into the left hip. Apply patch to the affected area for 12 hours a day and remove for 12 hours a day. Qty: 90 Patch, Refills: 0  Start date: 10/4/2022, End date: 11/3/2022      docusate (COLACE) 50 mg/5 mL liquid Take 20 mL by mouth two (2) times a day for 30 days. Indications: constipation, Stop if having diarrhea  Qty: 1200 mL, Refills: 0  Start date: 10/3/2022, End date: 11/2/2022           CONTINUE these medications which have NOT CHANGED    Details   atorvastatin (LIPITOR) 20 mg tablet Take 1 Tablet by mouth daily. Qty: 90 Tablet, Refills: 1    Associated Diagnoses: Mixed hyperlipidemia      furosemide (LASIX) 20 mg tablet Take 1 Tablet by mouth daily. Qty: 90 Tablet, Refills: 1    Associated Diagnoses: Constipation, unspecified constipation type      montelukast (SINGULAIR) 10 mg tablet TAKE 1 TABLET BY MOUTH EVERY DAY FOR ALLERGY SYMPTOMS  Qty: 90 Tablet, Refills: 1    Associated Diagnoses: Allergic rhinitis, unspecified seasonality, unspecified trigger      amLODIPine (NORVASC) 10 mg tablet Take 1 Tablet by mouth daily. Qty: 90 Tablet, Refills: 1    Associated Diagnoses: Essential hypertension      ergocalciferol (ERGOCALCIFEROL) 1,250 mcg (50,000 unit) capsule TAKE 1 CAPSULE BY MOUTH EVERY SEVEN (7) DAYS.  INDICATIONS: VITAMIN D DEFICIENCY (HIGH DOSE THERAPY)  Qty: 4 Capsule, Refills: 5    Associated Diagnoses: Vitamin D deficiency      fluticasone propionate (FLONASE) 50 mcg/actuation nasal spray SPRAY 2 SPRAYS INTO EACH NOSTRIL EVERY DAY  Qty: 1 Each, Refills: 5      Oxygen 2L      predniSONE (DELTASONE) 10 mg tablet Take 1 Tablet by mouth every other day. mometasone (ELOCON) 0.1 % ointment Apply  to affected area daily. Qty: 15 g, Refills: 2    Associated Diagnoses: Eczema, unspecified type      alcohol swabs (Alcohol Prep Pads) padm 1 PAD BY APPLY EXTERNALLY ROUTE DAILY. USE TO CHECK BLOOD SUGAR DAILY. Qty: 100 Pad, Refills: 0    Associated Diagnoses: Type 2 diabetes mellitus without complication, without long-term current use of insulin ([facility])      lancets (Ultra Thin Lancets) 30 gauge misc USE ONCE LANCET PER DAY TO CHECK BLOOD SUGAR. Qty: 50 Lancet, Refills: 5    Associated Diagnoses: Type 2 diabetes mellitus without complications ([facility])      lisinopriL (PRINIVIL, ZESTRIL) 20 mg tablet Take 1 Tablet by mouth daily. Qty: 90 Tablet, Refills: 1    Associated Diagnoses: Essential (primary) hypertension      pantoprazole (PROTONIX) 40 mg tablet Take 1 Tablet by mouth Before breakfast and dinner. Indications: inflammation of the esophagus with erosion, gastroesophageal reflux disease  Qty: 180 Tablet, Refills: 3    Associated Diagnoses: Gastroesophageal reflux disease with esophagitis without hemorrhage      glucose blood VI test strips (True Metrix Glucose Test Strip) strip Use one test strip to check glucose daily  Qty: 50 Strip, Refills: 3    Comments: DX Code Needed  . Associated Diagnoses: Type 2 diabetes mellitus without complication, without long-term current use of insulin ([facility])      albuterol-ipratropium (DUO-NEB) 2.5 mg-0.5 mg/3 ml nebu USE 1 VIAL EVERY 4 6 HOURS AS NEEDED. DX J44.9. NEED MED B INFO      Trelegy Ellipta 100-62.5-25 mcg inhaler Take 1 Puff by inhalation daily.            STOP taking these medications       metroNIDAZOLE (FLAGYL) 500 mg tablet Comments:   Reason for Stopping:         albuterol (Ventolin HFA) 90 mcg/actuation inhaler Comments:   Reason for Stopping: Follow up Care:    1. Fozia Guevara NP in 1-2 weeks. Follow-up Information       Follow up With Specialties Details Why [address], NP [facility] Nurse Practitioner   [address] [facility]  [phone]                Patient Follow Up Instructions: Activity: PT/OT per Home Health  Diet:  Cardiac Diet  Wound Care: None needed     Condition at Discharge:  Stable  __________________________________________________________________    Disposition  Home Health Care Svc  ____________________________________________________________________    Code Status:  Full Code  ___________________________________________________________________    Discharge Exam:  Patient seen and examined by me on discharge day. Pertinent Findings:  Gen:    Not in distress  Chest: Clear lungs  CVS:   Regular rhythm.   No edema  Abd:  Soft, not distended, not tender  Neuro:  Alert        CONSULTATIONS: Orthopedic Surgery    Significant Diagnostic Studies:   Recent Results (from the past 24 hour(s))   GLUCOSE, POC    Collection Time: 10/02/22  4:21 PM   Result Value Ref Range    Glucose (POC) 224 (H) 65 - 100 mg/dL    Performed by Taiwo JIMÉNEZ    GLUCOSE, POC    Collection Time: 10/02/22  9:44 PM   Result Value Ref Range    Glucose (POC) 163 (H) 65 - 100 mg/dL    Performed by Tam Murdock    GLUCOSE, POC    Collection Time: 10/03/22  8:38 AM   Result Value Ref Range    Glucose (POC) 109 (H) 65 - 100 mg/dL    Performed by Walt Barber    CBC WITH AUTOMATED DIFF    Collection Time: 10/03/22 10:53 AM   Result Value Ref Range    WBC 7.8 3.6 - 11.0 K/uL    RBC 2.81 (L) 3.80 - 5.20 M/uL    HGB 8.8 (L) 11.5 - 16.0 g/dL    HCT 26.8 (L) 35.0 - 47.0 %    MCV 95.4 80.0 - 99.0 FL    MCH 31.3 26.0 - 34.0 PG    MCHC 32.8 30.0 - 36.5 g/dL    RDW 13.0 11.5 - 14.5 %    PLATELET 156 665 - 214 K/uL    MPV 10.1 8.9 - 12.9 FL    NRBC 0.0 0.0  WBC    ABSOLUTE NRBC 0.00 0.00 - 0.01 K/uL    NEUTROPHILS 79 (H) 32 - 75 %    LYMPHOCYTES 11 (L) 12 - 49 %    MONOCYTES 8 5 - 13 %    EOSINOPHILS 2 0 - 7 %    BASOPHILS 0 0 - 1 %    IMMATURE GRANULOCYTES 0 0 - 0.5 %    ABS. NEUTROPHILS 6.2 1.8 - 8.0 K/UL    ABS. LYMPHOCYTES 0.9 0.8 - 3.5 K/UL    ABS. MONOCYTES 0.6 0.0 - 1.0 K/UL    ABS. EOSINOPHILS 0.1 0.0 - 0.4 K/UL    ABS. BASOPHILS 0.0 0.0 - 0.1 K/UL    ABS. IMM. GRANS. 0.0 0.00 - 0.04 K/UL    DF AUTOMATED     METABOLIC PANEL, BASIC    Collection Time: 10/03/22 10:53 AM   Result Value Ref Range    Sodium 133 (L) 136 - 145 mmol/L    Potassium 4.0 3.5 - 5.1 mmol/L    Chloride 99 97 - 108 mmol/L    CO2 27 21 - 32 mmol/L    Anion gap 7 5 - 15 mmol/L    Glucose 132 (H) 65 - 100 mg/dL    BUN 8 6 - 20 mg/dL    Creatinine 0.69 0.55 - 1.02 mg/dL    BUN/Creatinine ratio 12 12 - 20      GFR est AA >60 >60 ml/min/1.73m2    GFR est non-AA >60 >60 ml/min/1.73m2    Calcium 8.9 8.5 - 10.1 mg/dL   GLUCOSE, POC    Collection Time: 10/03/22 11:48 AM   Result Value Ref Range    Glucose (POC) 136 (H) 65 - 100 mg/dL    Performed by [facility]      XR ABD (KUB)   Final Result   1. Nonobstructive bowel gas pattern. 2. Stable acute left pubic rami fractures.               Time spent in direct and indirect care including coordination of services: Greater than 35 minutes    Signed:  Soniya Aguirre NP  10/3/2022  11:59 AM

## 2022-10-03 NOTE — PROGRESS NOTES
Hospitalist Progress Note            Daily Progress Note: 10/3/2022 9:54 AM  Hospital course: The patient is a 61-year-old female with a PMH significant for COPD on home oxygen, GERD, asthma, chronic lumbar radiculopathy, HTN, HLD urinary retention, chronic hyponatremia and DM. She presented to outside facility after ground-level fall with great amount of pain in her left hip, unable to bear weight on her lower extremity due to pain. Pinky Angles She was transferred to [facility] for further evaluation and treatment of questionable femur fracture. In review of CT obtained prior to transportation there is no femur fracture, there is a pubic bone fracture. Patient states she could not walk. X-ray of hip reveals parasymphyseal pubic bone and inferior pubic ramus fractures, left femoral head avascular necrosis with severe left hip joint osteoarthritis and small joint effusion, chronic L4 and L5 compression fractures. CT of abdomen and pelvis revealing multilevel degenerative disc and facet disease compression deformities of T12, L1, L3 and L5, grade 1 L3-4 and L4-5 anterior listhesis without instability similar to previous scans and new superior endplate compression fracture of L4. Admitted for further management of pubic fracture. Orthopedic surgery consulted. No surgical intervention is planned at this time. PT and OT have evaluated the patient for discharge disposition. They recommend home with home health PT and OT she already owns a rolling walker. Attempt to discharge patient, she has had little urine output and post void bladder scan revealed 600 cc of retained urine. Mendoza catheter placed and urology has been consulted. Subjective:     Examined patient at the bedside  She continues to complain of pain and discomfort. PT and OT evaluations pending.     Assessment/Plan:   Active Problems:    Pubic bone fracture ([address] (10/1/2022)      Falls (10/2/2022)      Ambulatory dysfunction (10/2/2022)      Constipation (10/2/2022)      Chronic pain (10/2/2022)  Acute comminuted left parasymphyseal pubic bone and inferior pubic ramus fractures  left femoral head avascular necrosis and severe left hip joint osteoarthritis  Ambulatory dysfunction  Falls  Orthopedic surgery consulted-no indication for surgical intervention at this time, will await notes for recommendations  -Pain management-we will increase lidocaine patch to 3 to on either side of spine in wound to the left hip and scheduled tramadol for 2 days  -PT OT    Hyponatremia   -History of hyponatremia  -Will discontinue Lasix  -Started on IV fluids     Chronic obstructive pulmonary disease on home oxygen  -On chronic steroid therapy     Benign essential hypertension   -Continue home medications    DM  -Accu-Cheks with SSI    Chronic constipation  Will start bowel regimen    Chronic urinary retention  Decreased urinary output requiring straight cath during this admission  Post void bladder scan revealing +600 cc of urine retained  Mendoza catheter inserted, started bethanechol, urine analysis and urine culture ordered  Urology consulted    DVT Prophylaxis: SCD  Code Status: Full Code  POA/NOK:    Disposition and discharge barriers:   Surgery consult and recommendations pending  PT and OT  Mendoza inserted for urinary retention  Urology consulted  Care Plan discussed with: Patient and staff    Current Facility-Administered Medications   Medication Dose Route Frequency    albuterol-ipratropium (DUO-NEB) 2.5 MG-0.5 MG/3 ML  3 mL Nebulization Q6H RT    lidocaine 4 % patch 3 Patch  3 Patch TransDERmal Q24H    traMADoL (ULTRAM) tablet 50 mg  50 mg Oral Q6H    polyethylene glycol (MIRALAX) packet 17 g  17 g Oral DAILY    docusate (COLACE) 50 mg/5 mL oral liquid 200 mg  200 mg Oral BID    pantoprazole (PROTONIX) tablet 40 mg  40 mg Oral ACB&D    lisinopriL (PRINIVIL, ZESTRIL) tablet 20 mg  20 mg Oral DAILY    albuterol (PROVENTIL HFA, VENTOLIN HFA, PROAIR HFA) inhaler 1 Puff  1 Puff Inhalation Q4H PRN    amLODIPine (NORVASC) tablet 10 mg  10 mg Oral DAILY    atorvastatin (LIPITOR) tablet 20 mg  20 mg Oral DAILY    budesonide-formoteroL (SYMBICORT) 160-4.5 mcg/actuation HFA inhaler 2 Puff  2 Puff Inhalation BID RT    And    [Held by provider] tiotropium bromide (SPIRIVA RESPIMAT) 2.5 mcg /actuation  2 Puff Inhalation DAILY    insulin lispro (HUMALOG) injection   SubCUTAneous AC&HS    glucose chewable tablet 16 g  4 Tablet Oral PRN    glucagon (GLUCAGEN) injection 1 mg  1 mg IntraMUSCular PRN    sodium chloride (NS) flush 5-40 mL  5-40 mL IntraVENous Q8H    sodium chloride (NS) flush 5-40 mL  5-40 mL IntraVENous PRN    acetaminophen (TYLENOL) tablet 650 mg  650 mg Oral Q6H PRN    Or    acetaminophen (TYLENOL) suppository 650 mg  650 mg Rectal Q6H PRN    ondansetron (ZOFRAN ODT) tablet 4 mg  4 mg Oral Q6H PRN    Or    ondansetron (ZOFRAN) injection 4 mg  4 mg IntraVENous Q6H PRN    dextrose 10% infusion 0-250 mL  0-250 mL IntraVENous PRN    morphine injection 4 mg  4 mg IntraVENous Q4H PRN    predniSONE (DELTASONE) tablet 10 mg  10 mg Oral EVERY OTHER DAY    montelukast (SINGULAIR) tablet 10 mg  10 mg Oral DAILY        REVIEW OF SYSTEMS    Review of Systems   Constitutional:  Positive for malaise/fatigue. Respiratory:  Negative for shortness of breath. Cardiovascular:  Negative for chest pain. Gastrointestinal:  Positive for constipation. Genitourinary:  Positive for dysuria. Complaining of retention   Musculoskeletal:  Positive for back pain, falls and joint pain. Neurological:  Positive for weakness. Psychiatric/Behavioral:  The patient is nervous/anxious.        Objective:     Visit Vitals  /72   Pulse (!) 102   Temp 98.1 °F (36.7 °C)   Resp 18   Ht 5' 6\" (1.676 m)   Wt 80.3 kg (177 lb)   SpO2 100%   BMI 28.57 kg/m²    O2 Flow Rate (L/min): 1 l/min O2 Device: Nasal cannula    Temp (24hrs), Av.1 °F (36.7 °C), Min:97.9 °F (36.6 °C), Max:98.2 °F (36.8 °C)      No intake/output data recorded. 10/01 1901 - 10/03 0700  In: 2975 [P.O.:850; I.V.:2125]  Out: 1580 [Urine:1580]    PHYSICAL EXAM:    Physical Exam  Constitutional:       Appearance: She is ill-appearing. Cardiovascular:      Rate and Rhythm: Regular rhythm. Bradycardia present. Pulmonary:      Effort: No respiratory distress. Musculoskeletal:         General: Deformity and signs of injury present. Comments: Severe back and hip pain on the left   Neurological:      Motor: Weakness present.       Gait: Gait abnormal.        Data Review    Recent Results (from the past 24 hour(s))   HEMOGLOBIN A1C WITH EAG    Collection Time: 10/02/22 11:26 AM   Result Value Ref Range    Hemoglobin A1c 4.9 4.0 - 5.6 %    Est. average glucose 94 mg/dL   RENAL FUNCTION PANEL    Collection Time: 10/02/22 11:26 AM   Result Value Ref Range    Sodium 129 (L) 136 - 145 mmol/L    Potassium 4.3 3.5 - 5.1 mmol/L    Chloride 92 (L) 97 - 108 mmol/L    CO2 32 21 - 32 mmol/L    Anion gap 5 5 - 15 mmol/L    Glucose 162 (H) 65 - 100 mg/dL    BUN 14 6 - 20 mg/dL    Creatinine 1.05 (H) 0.55 - 1.02 mg/dL    BUN/Creatinine ratio 13 12 - 20      GFR est AA >60 >60 ml/min/1.73m2    GFR est non-AA 52 (L) >60 ml/min/1.73m2    Calcium 9.2 8.5 - 10.1 mg/dL    Phosphorus 3.6 2.6 - 4.7 mg/dL    Albumin 3.4 (L) 3.5 - 5.0 g/dL   TSH 3RD GENERATION    Collection Time: 10/02/22 11:26 AM   Result Value Ref Range    TSH 1.81 0.36 - 3.74 uIU/mL   URIC ACID    Collection Time: 10/02/22 11:26 AM   Result Value Ref Range    Uric acid 6.1 (H) 2.6 - 6.0 mg/dL   VITAMIN D, 25 HYDROXY    Collection Time: 10/02/22 11:26 AM   Result Value Ref Range    Vitamin D 25-Hydroxy 146.2 (H) 30 - 100 ng/mL   CBC WITH AUTOMATED DIFF    Collection Time: 10/02/22 11:26 AM   Result Value Ref Range    WBC 9.8 3.6 - 11.0 K/uL    RBC 2.82 (L) 3.80 - 5.20 M/uL    HGB 8.9 (L) 11.5 - 16.0 g/dL    HCT 26.7 (L) 35.0 - 47.0 %    MCV 94.7 80.0 - 99.0 FL    MCH 31.6 26.0 - 34.0 PG    MCHC 33.3 30.0 - 36.5 g/dL    RDW 12.8 11.5 - 14.5 %    PLATELET 950 324 - 405 K/uL    MPV 9.8 8.9 - 12.9 FL    NRBC 0.0 0.0  WBC    ABSOLUTE NRBC 0.00 0.00 - 0.01 K/uL    NEUTROPHILS 88 (H) 32 - 75 %    LYMPHOCYTES 4 (L) 12 - 49 %    MONOCYTES 6 5 - 13 %    EOSINOPHILS 1 0 - 7 %    BASOPHILS 0 0 - 1 %    IMMATURE GRANULOCYTES 1 (H) 0 - 0.5 %    ABS. NEUTROPHILS 8.7 (H) 1.8 - 8.0 K/UL    ABS. LYMPHOCYTES 0.4 (L) 0.8 - 3.5 K/UL    ABS. MONOCYTES 0.5 0.0 - 1.0 K/UL    ABS. EOSINOPHILS 0.1 0.0 - 0.4 K/UL    ABS. BASOPHILS 0.0 0.0 - 0.1 K/UL    ABS. IMM. GRANS. 0.1 (H) 0.00 - 0.04 K/UL    DF AUTOMATED     GLUCOSE, POC    Collection Time: 10/02/22 11:26 AM   Result Value Ref Range    Glucose (POC) 178 (H) 65 - 100 mg/dL    Performed by [place], POC    Collection Time: 10/02/22  4:21 PM   Result Value Ref Range    Glucose (POC) 224 (H) 65 - 100 mg/dL    Performed by [place], POC    Collection Time: 10/02/22  9:44 PM   Result Value Ref Range    Glucose (POC) 163 (H) 65 - 100 mg/dL    Performed by Douglas Harris        XR ABD (KUB)   Final Result   1. Nonobstructive bowel gas pattern. 2. Stable acute left pubic rami fractures. _____________________________________________________________________________  Time spent in direct care including coordination of service, review of data and examination: > 35 minutes    ______________________________________________________________________________    Mckenzie Brice NP    This is dictation was done by [employer], computer voice recognition software. Quite often unanticipated grammatical, syntax, homophones and other interpretive errors or inadvertently transcribed by the computer software. Please excuse errors that have escaped final proofreading. Thank you.

## 2022-10-04 ENCOUNTER — TELEPHONE (OUTPATIENT)
Dept: UROLOGY | Age: 67
End: 2022-10-04

## 2022-10-04 VITALS
WEIGHT: 177 LBS | OXYGEN SATURATION: 99 % | SYSTOLIC BLOOD PRESSURE: 123 MMHG | TEMPERATURE: 98.4 F | HEIGHT: 66 IN | DIASTOLIC BLOOD PRESSURE: 72 MMHG | BODY MASS INDEX: 28.45 KG/M2 | HEART RATE: 95 BPM | RESPIRATION RATE: 18 BRPM

## 2022-10-04 PROBLEM — R33.8 ACUTE URINARY RETENTION: Status: ACTIVE | Noted: 2022-10-04

## 2022-10-04 LAB
ANION GAP SERPL CALC-SCNC: 7 MMOL/L (ref 5–15)
BASOPHILS # BLD: 0 K/UL (ref 0–0.1)
BASOPHILS NFR BLD: 1 % (ref 0–1)
BUN SERPL-MCNC: 4 MG/DL (ref 6–20)
BUN/CREAT SERPL: 7 (ref 12–20)
CA-I BLD-MCNC: 8.9 MG/DL (ref 8.5–10.1)
CHLORIDE SERPL-SCNC: 99 MMOL/L (ref 97–108)
CO2 SERPL-SCNC: 27 MMOL/L (ref 21–32)
CREAT SERPL-MCNC: 0.56 MG/DL (ref 0.55–1.02)
DIFFERENTIAL METHOD BLD: ABNORMAL
EOSINOPHIL # BLD: 0.3 K/UL (ref 0–0.4)
EOSINOPHIL NFR BLD: 4 % (ref 0–7)
ERYTHROCYTE [DISTWIDTH] IN BLOOD BY AUTOMATED COUNT: 12.9 % (ref 11.5–14.5)
GLUCOSE BLD STRIP.AUTO-MCNC: 116 MG/DL (ref 65–100)
GLUCOSE BLD STRIP.AUTO-MCNC: 98 MG/DL (ref 65–100)
GLUCOSE SERPL-MCNC: 85 MG/DL (ref 65–100)
HCT VFR BLD AUTO: 26 % (ref 35–47)
HGB BLD-MCNC: 8.4 G/DL (ref 11.5–16)
IMM GRANULOCYTES # BLD AUTO: 0 K/UL (ref 0–0.04)
IMM GRANULOCYTES NFR BLD AUTO: 1 % (ref 0–0.5)
LYMPHOCYTES # BLD: 1.1 K/UL (ref 0.8–3.5)
LYMPHOCYTES NFR BLD: 16 % (ref 12–49)
MCH RBC QN AUTO: 31.2 PG (ref 26–34)
MCHC RBC AUTO-ENTMCNC: 32.3 G/DL (ref 30–36.5)
MCV RBC AUTO: 96.7 FL (ref 80–99)
MONOCYTES # BLD: 0.7 K/UL (ref 0–1)
MONOCYTES NFR BLD: 11 % (ref 5–13)
NEUTS SEG # BLD: 4.5 K/UL (ref 1.8–8)
NEUTS SEG NFR BLD: 67 % (ref 32–75)
NRBC # BLD: 0 K/UL (ref 0–0.01)
NRBC BLD-RTO: 0 PER 100 WBC
PERFORMED BY, TECHID: ABNORMAL
PERFORMED BY, TECHID: NORMAL
PLATELET # BLD AUTO: 260 K/UL (ref 150–400)
PMV BLD AUTO: 9.6 FL (ref 8.9–12.9)
POTASSIUM SERPL-SCNC: 4.5 MMOL/L (ref 3.5–5.1)
RBC # BLD AUTO: 2.69 M/UL (ref 3.8–5.2)
SODIUM SERPL-SCNC: 133 MMOL/L (ref 136–145)
WBC # BLD AUTO: 6.6 K/UL (ref 3.6–11)

## 2022-10-04 PROCEDURE — 82962 GLUCOSE BLOOD TEST: CPT

## 2022-10-04 PROCEDURE — 36415 COLL VENOUS BLD VENIPUNCTURE: CPT

## 2022-10-04 PROCEDURE — 85025 COMPLETE CBC W/AUTO DIFF WBC: CPT

## 2022-10-04 PROCEDURE — 77010033678 HC OXYGEN DAILY

## 2022-10-04 PROCEDURE — 74011250637 HC RX REV CODE- 250/637: Performed by: HOSPITALIST

## 2022-10-04 PROCEDURE — 74011250637 HC RX REV CODE- 250/637: Performed by: NURSE PRACTITIONER

## 2022-10-04 PROCEDURE — 80048 BASIC METABOLIC PNL TOTAL CA: CPT

## 2022-10-04 PROCEDURE — 97530 THERAPEUTIC ACTIVITIES: CPT

## 2022-10-04 PROCEDURE — 94761 N-INVAS EAR/PLS OXIMETRY MLT: CPT

## 2022-10-04 PROCEDURE — 74011000250 HC RX REV CODE- 250: Performed by: HOSPITALIST

## 2022-10-04 PROCEDURE — 94640 AIRWAY INHALATION TREATMENT: CPT

## 2022-10-04 PROCEDURE — 74011636637 HC RX REV CODE- 636/637: Performed by: HOSPITALIST

## 2022-10-04 PROCEDURE — 74011000250 HC RX REV CODE- 250: Performed by: NURSE PRACTITIONER

## 2022-10-04 RX ADMIN — PANTOPRAZOLE SODIUM 40 MG: 40 TABLET, DELAYED RELEASE ORAL at 06:15

## 2022-10-04 RX ADMIN — IPRATROPIUM BROMIDE AND ALBUTEROL SULFATE 3 ML: 2.5; .5 SOLUTION RESPIRATORY (INHALATION) at 13:57

## 2022-10-04 RX ADMIN — DOCUSATE SODIUM LIQUID 200 MG: 100 LIQUID ORAL at 09:55

## 2022-10-04 RX ADMIN — IPRATROPIUM BROMIDE AND ALBUTEROL SULFATE 3 ML: 2.5; .5 SOLUTION RESPIRATORY (INHALATION) at 09:00

## 2022-10-04 RX ADMIN — MONTELUKAST 10 MG: 10 TABLET, FILM COATED ORAL at 09:54

## 2022-10-04 RX ADMIN — POLYETHYLENE GLYCOL 3350 17 G: 17 POWDER, FOR SOLUTION ORAL at 09:53

## 2022-10-04 RX ADMIN — TRAMADOL HYDROCHLORIDE 50 MG: 50 TABLET, COATED ORAL at 09:54

## 2022-10-04 RX ADMIN — SODIUM CHLORIDE, PRESERVATIVE FREE 10 ML: 5 INJECTION INTRAVENOUS at 06:16

## 2022-10-04 RX ADMIN — BETHANECHOL CHLORIDE 25 MG: 25 TABLET ORAL at 06:14

## 2022-10-04 RX ADMIN — LISINOPRIL 20 MG: 20 TABLET ORAL at 09:56

## 2022-10-04 RX ADMIN — BUDESONIDE AND FORMOTEROL FUMARATE DIHYDRATE 2 PUFF: 160; 4.5 AEROSOL RESPIRATORY (INHALATION) at 09:00

## 2022-10-04 RX ADMIN — ATORVASTATIN CALCIUM 20 MG: 20 TABLET, FILM COATED ORAL at 09:55

## 2022-10-04 RX ADMIN — AMLODIPINE BESYLATE 10 MG: 5 TABLET ORAL at 09:55

## 2022-10-04 RX ADMIN — PREDNISONE 10 MG: 10 TABLET ORAL at 09:55

## 2022-10-04 RX ADMIN — TRAMADOL HYDROCHLORIDE 50 MG: 50 TABLET, COATED ORAL at 02:23

## 2022-10-04 NOTE — TELEPHONE ENCOUNTER
She is going to rehab. Lianne said voiding trial and cysto, CMG, Uroflow outpatient if not done in rehab. I would aim for 3-4 weeks.

## 2022-10-04 NOTE — PROGRESS NOTES
CM noted discharge order. Patient is set up with [facility]. CM has uploaded DC summary and orders. CM met with patient and she is agreeable to plan. She states she has a walker and wheelchair at home. Patient also states that she has a friend that lives with her and a niece that can come by to help with household chores and cooking. Primary RN made aware. Discharge plan of care/case management plan validated with provider discharge order. Medicare pt has received, reviewed, and signed 2nd IM letter informing them of their right to appeal the discharge. Signed copied has been placed on pt bedside chart.

## 2022-10-04 NOTE — CONSULTS
UROLOGY CONSULT    Sean Mag[place], [address] [facility] Office    Patient: Curtis Patel MRN: [id number]  SSN: [id number]    YOB: 1955  Age: 78 y.o. Sex: female          Date of Encounter:  October 3, 2022  ADMITTED: 10/1/2022 to Beverly Epley, MD by Brandon Lewis MD for Pubic bone fracture [facility]) [[id number]]  Chief Complaint:  pubic fracture  Reason for consult: urinary retention           History of Present Illness:  Patient is a 78 y.o. female admitted 10/1/2022 to the hospital for Pubic bone fracture ([facility] Jhony Almeida. The patient is a 71-year-old female with a PMH significant for COPD on home oxygen, GERD, asthma, chronic lumbar radiculopathy, HTN, HLD urinary retention, chronic hyponatremia and DM. She presented to outside facility after ground-level fall with great amount of pain in her left hip. She was transferred to [facility] and found to have a pubic bone fracture. Workup also reveals chronic L4 and L5 compression fractures. CT of abdomen and pelvis revealing multilevel degenerative disc and facet disease compression deformities of T12, L1, L3 and L5, grade 1 L3-4 and L4-5 anterior listhesis without instability similar to previous scans. Also noted is new superior endplate compression fracture of L4. She was found to be in severe pain and urinary retention in the ED. Nursing also noted urinary incontinence. She states she has had poor awareness of a full bladder. She says she was voiding normally prior to admission. She has chronic back pain and sciatica pains shooting down her left leg. Past Medical History:  No Known Allergies   Prior to Admission medications    Medication Sig Start Date End Date Taking? Authorizing Provider   traMADoL (ULTRAM) 50 mg tablet Take 1 Tablet by mouth every six (6) hours for 5 doses.  Max Daily Amount: 200 mg. 10/3/22 10/5/22 Yes Keri Scales NP   polyethylene glycol (MIRALAX) 17 gram packet Take 1 Packet by mouth daily for 30 days. 10/4/22 11/3/22 Yes Ammon Marx NP   lidocaine 4 % patch Apply patch to back on either side of spine into the left hip. Apply patch to the affected area for 12 hours a day and remove for 12 hours a day. 10/4/22 11/3/22 Yes Ammon Marx NP   docusate (COLACE) 50 mg/5 mL liquid Take 20 mL by mouth two (2) times a day for 30 days. Indications: constipation, Stop if having diarrhea 10/3/22 11/2/22 Yes Ammon Marx NP   atorvastatin (LIPITOR) 20 mg tablet Take 1 Tablet by mouth daily. 9/30/22   Kamar Keys NP   furosemide (LASIX) 20 mg tablet Take 1 Tablet by mouth daily. 9/30/22   Kamar Keys NP   montelukast (SINGULAIR) 10 mg tablet TAKE 1 TABLET BY MOUTH EVERY DAY FOR ALLERGY SYMPTOMS 9/9/22   Kamar Keys NP   amLODIPine (NORVASC) 10 mg tablet Take 1 Tablet by mouth daily. 9/9/22   Kamar Keys NP   ergocalciferol (ERGOCALCIFEROL) 1,250 mcg (50,000 unit) capsule TAKE 1 CAPSULE BY MOUTH EVERY SEVEN (7) DAYS. INDICATIONS: VITAMIN D DEFICIENCY (HIGH DOSE THERAPY) 7/24/22   Kamar Keys NP   fluticasone propionate (FLONASE) 50 mcg/actuation nasal spray SPRAY 2 SPRAYS INTO EACH NOSTRIL EVERY DAY 7/1/22   Kamar Keys NP   Oxygen 2L    Provider, Historical   predniSONE (DELTASONE) 10 mg tablet Take 1 Tablet by mouth every other day. Provider, Historical   mometasone (ELOCON) 0.1 % ointment Apply  to affected area daily. 6/22/22   Kamar Keys NP   alcohol swabs (Alcohol Prep Pads) padm 1 PAD BY APPLY EXTERNALLY ROUTE DAILY. USE TO CHECK BLOOD SUGAR DAILY. 6/10/22   Kamar Keys NP   lancets (Ultra Thin Lancets) 30 gauge misc USE ONCE LANCET PER DAY TO CHECK BLOOD SUGAR. 6/7/22   Kamar Keys NP   lisinopriL (PRINIVIL, ZESTRIL) 20 mg tablet Take 1 Tablet by mouth daily. 4/26/22   Kamar Keys NP   pantoprazole (PROTONIX) 40 mg tablet Take 1 Tablet by mouth Before breakfast and dinner.  Indications: inflammation of the esophagus with erosion, gastroesophageal reflux disease 22   Bruce Mcdaniel MD   glucose blood VI test strips (True Metrix Glucose Test Strip) strip Use one test strip to check glucose daily 22   Doug Ayers NP   albuterol-ipratropium (DUO-NEB) 2.5 mg-0.5 mg/3 ml nebu USE 1 VIAL EVERY 4 6 HOURS AS NEEDED. DX J44.9. NEED MED B INFO 10/6/21   Provider, Historical   Trelegy Ellipta 100-62.5-25 mcg inhaler Take 1 Puff by inhalation daily. 21   Provider, Historical      PMHx:  has a past medical history of Arthritis, Bronchitis (2020), Chronic obstructive pulmonary disease ([facility] Chronic pain, Diabetes ([facility] Gastroesophageal reflux disease without esophagitis (2020), GERD (gastroesophageal reflux disease), GERD (gastroesophageal reflux disease), History of multiple allergies, HTN (hypertension) (2020), Hypertension, Intermittent asthma (2020), Lung disorder, Menopause, Pain of upper abdomen (2020), Seasonal allergic rhinitis (2020), Sinus problem, and Vitamin D deficiency (2020). PSurgHx:  has a past surgical history that includes hx gi; colonoscopy (N/A, 2020); hx colonoscopy; and hx orthopaedic (10/24/2018). PSocHx:  reports that she quit smoking about 22 years ago. Her smoking use included cigarettes. She has a 15.00 pack-year smoking history. She has never used smokeless tobacco. She reports current alcohol use of about 3.0 standard drinks per week. She reports that she does not use drugs. FamilyHx:   Family History   Problem Relation Age of Onset    Hypertension Mother     Cancer Sister      ROS:  Admission ROS by Kayli Tavarez MD from 10/1/2022 were reviewed with the patient and changes (other than per HPI) include: none. All 12 systems were reviewed and were otherwise negative. Physical Exam:            Vitals[de-identified]    Temp (24hrs), Av.4 °F (36.9 °C), Min:98.1 °F (36.7 °C), Max:99 °F (37.2 °C)   Blood pressure 135/78, pulse 93, temperature 99 °F (37.2 °C), resp. rate 20, height 5' 6\" (1.676 m), weight 177 lb (80.3 kg), SpO2 99 %. Estimated body mass index is 28.57 kg/m² as calculated from the following:    Height as of this encounter: 5' 6\" (1.676 m). Weight as of this encounter: 177 lb (80.3 kg).              I&O's:    10/03 1901 - 10/04 0700  In: -   Out: 26 [Urine:470]   General Well developed, in NAD   Conjunctiva/Lids Normal without gross defects   Pupil/Iris Pupils equal, round, reactive, anicteric   External Ears/Nose No lesions or deformities   Hearing  Grossly intact   Neck Supple without obvious, masses   Lymphatic No obvious supraclavicular or cervical adenopathy   Respiratory Effort Breathing easily, no audible wheezing, rhonchi, stridor   CV RRR   Abdomen / Flank Soft, non tender without guarding or rebound, without obvious masses, no CVA tenderness   Digits/ Nails No clubbing, cyanosis, petechiae      Mendoza draining clear yellow urine   Skin Inspection Warm and dry, no obvious rashes   Neurologic S&O x3   Judgement / Insight intact   Mood / Affect normal       Lab Results   Component Value Date/Time    WBC 7.8 10/03/2022 10:53 AM    HCT 26.8 (L) 10/03/2022 10:53 AM    PLATELET 811 24/06/0647 10:53 AM    Sodium 133 (L) 10/03/2022 10:53 AM    Potassium 4.0 10/03/2022 10:53 AM    Chloride 99 10/03/2022 10:53 AM    CO2 27 10/03/2022 10:53 AM    BUN 8 10/03/2022 10:53 AM    Creatinine 0.69 10/03/2022 10:53 AM    Glucose 132 (H) 10/03/2022 10:53 AM    Calcium 8.9 10/03/2022 10:53 AM    Magnesium 1.8 11/09/2021 10:17 AM    INR 1.0 09/01/2021 01:51 PM       UA:   Lab Results   Component Value Date/Time    Color Yellow/Straw 10/03/2022 06:29 PM    Appearance Clear 10/03/2022 06:29 PM    Specific gravity 1.016 10/03/2022 06:29 PM    Specific gravity 1.006 06/08/2022 10:49 AM    pH (UA) 5.0 10/03/2022 06:29 PM    Protein Negative 10/03/2022 06:29 PM    Glucose 150 (A) 10/03/2022 06:29 PM    Ketone Negative 10/03/2022 06:29 PM    Bilirubin Negative 10/03/2022 06:29 PM    Urobilinogen 0.1 10/03/2022 06:29 PM    Nitrites Negative 10/03/2022 06:29 PM    Leukocyte Esterase Negative 10/03/2022 06:29 PM    Epithelial cells FEW 10/15/2018 11:44 AM    Bacteria Negative 10/03/2022 06:29 PM    WBC 10-20 10/03/2022 06:29 PM    RBC  10/03/2022 06:29 PM       Xrays:       Diagnoses and all orders for this visit:    1. Closed fracture of left pubis, unspecified portion of pubis, initial encounter ([facility]    2. Closed nondisplaced fracture of right pubis, initial encounter ([facility])  -     traMADoL (ULTRAM) 50 mg tablet; Take 1 Tablet by mouth every six (6) hours for 5 doses. Max Daily Amount: 200 mg.     Other orders  -     IP CONSULT TO HOSPITALIST; Standing  -     pantoprazole (PROTONIX) tablet 40 mg  -     lisinopriL (PRINIVIL, ZESTRIL) tablet 20 mg  -     albuterol (PROVENTIL HFA, VENTOLIN HFA, PROAIR HFA) inhaler 1 Puff  -     amLODIPine (NORVASC) tablet 10 mg  -     atorvastatin (LIPITOR) tablet 20 mg  -     budesonide-formoteroL (SYMBICORT) 160-4.5 mcg/actuation HFA inhaler 2 Puff  -     tiotropium bromide (SPIRIVA RESPIMAT) 2.5 mcg /actuation  -     NURSING-MISCELLANEOUS:; Standing  -     NURSING-MISCELLANEOUS:; Standing  -     NURSING-MISCELLANEOUS:; Standing  -     NURSING-MISCELLANEOUS:; Standing  -     NURSING-MISCELLANEOUS:; Standing  -     NURSING-MISCELLANEOUS:; Standing  -     REASON FOR NOT SELECTING BASAL INSULIN; Standing  -     insulin lispro (HUMALOG) injection  -     glucose chewable tablet 16 g  -     glucagon (GLUCAGEN) injection 1 mg  -     NURSING-MISCELLANEOUS:; Standing  -     POC GLUCOSE; Standing  -     HEMOGLOBIN A1C WITH EAG; Standing  -     INITIAL PHYSICIAN ORDER: INPATIENT; Standing  -     FULL CODE; Standing  -     VITAL SIGNS; Standing  -     UP AD KANE; Standing  -     INTAKE AND OUTPUT; Standing  -     RT--OXIMETRY, SPOT CHECK; Standing  -     sodium chloride (NS) flush 5-40 mL  -     sodium chloride (NS) flush 5-40 mL  -     acetaminophen (TYLENOL) tablet 650 mg  -     acetaminophen (TYLENOL) suppository 650 mg  -     ondansetron (ZOFRAN ODT) tablet 4 mg  -     ondansetron (ZOFRAN) injection 4 mg  -     dextrose 10% infusion 0-250 mL  -     DIET ADULT; Standing  -     RT--OXYGEN CANNULA; Standing  -     ANTICOAGULANT THERAPY IS CONTRAINDICATED; Standing  -     RENAL FUNCTION PANEL; Standing  -     TSH 3RD GENERATION; Standing  -     URIC ACID; Standing  -     VITAMIN D, 25 HYDROXY; Standing  -     morphine injection 4 mg  -     GLUCOSE, POC; Standing  -     MRSA SCREEN - PCR (NASAL); Standing  -     predniSONE (DELTASONE) tablet 10 mg  -     montelukast (SINGULAIR) tablet 10 mg  -     0.9% sodium chloride infusion  -     GLUCOSE, POC; Standing  -     albuterol-ipratropium (DUO-NEB) 2.5 MG-0.5 MG/3 ML  -     GLUCOSE, POC; Standing  -     STRAIGHT CATHETER, INSERTION; Standing  -     lidocaine 4 % patch 3 Patch  -     traMADoL (ULTRAM) tablet 50 mg  -     polyethylene glycol (MIRALAX) packet 17 g  -     docusate (COLACE) 50 mg/5 mL oral liquid 200 mg  -     GLUCOSE, POC; Standing  -     GLUCOSE, POC; Standing  -     lactulose (CHRONULAC) 10 gram/15 mL solution 45 mL  -     XR ABD (KUB); Standing  -     CBC WITH AUTOMATED DIFF; Standing  -     METABOLIC PANEL, BASIC; Standing  -     IP CONSULT TO ORTHOPEDIC SURGERY; Standing  -     IP CONSULT TO PHYSICAL THERAPY; Standing  -     IP CONSULT TO OCCUPATIONAL THERAPY; Standing  -     GLUCOSE, POC; Standing  -     GLUCOSE, POC; Standing  -     FOLLOW UP VISIT; Standing  -     polyethylene glycol (MIRALAX) 17 gram packet; Take 1 Packet by mouth daily for 30 days. -     lidocaine 4 % patch; Apply patch to back on either side of spine into the left hip. Apply patch to the affected area for 12 hours a day and remove for 12 hours a day. -     docusate (COLACE) 50 mg/5 mL liquid; Take 20 mL by mouth two (2) times a day for 30 days.  Indications: constipation, Stop if having diarrhea  -     IP CONSULT TO UROLOGY; Standing  -     bethanechol (URECHOLINE) tablet 25 mg  -     CULTURE, URINE; Standing  -     URINALYSIS W/MICROSCOPIC; Standing  -     GLUCOSE, POC; Standing             Hospital Problems  Date Reviewed: 10/1/2022            Codes Class Noted POA    Falls ICD-10-CM: W19. Kofi Prader  ICD-9-CM: E888.9  10/2/2022 Yes        Ambulatory dysfunction ICD-10-CM: R26.2  ICD-9-CM: 719.7  10/2/2022 Yes        Constipation ICD-10-CM: K59.00  ICD-9-CM: 564.00  10/2/2022 Yes        Chronic pain ICD-10-CM: G89.29  ICD-9-CM: 338.29  10/2/2022 Yes        Pubic bone fracture ([address] ICD-10-CM: W80.047H  ICD-9-CM: 808.2  10/1/2022 Yes           Assessment/Plan:  Urinary retention, neurogenic bladder, sciatica, pubic fracture. She has a talbot. I recommend a voiding trial after her pain and acute symptoms have subsided. FVoiding trial in rehab or in office.         Signed By: Eve Madden MD  - October 3, 2022

## 2022-10-04 NOTE — PROGRESS NOTES
OCCUPATIONAL THERAPY TREATMENT  Patient: Jose Alejandre (35 y.o. female)  Date: 10/4/2022  Diagnosis: Pubic bone fracture ([facility] [address] [S32.509A] Falls      Precautions:    Chart, occupational therapy assessment, plan of care, and goals were reviewed. ASSESSMENT  Patient continues with skilled OT services and is progressing towards goals. Upon BARAHONA/PT arrival, pt semi supine in bed and agreeable to tx session. Pt completed bed mobility with Trino and additional time. Donning of socks completed at EOB with ModA due to pt able to complete donning of R sock but unable to reach for L foot. Pt noted with anxiety throughout session and required increased time for calming down and cueing for PLB. Pt completed sit>stand from EOB with Min/ModA x2 using RW for balance upon standing. Pt ambulated in room with ModA x2 using RW with chair follow due to anxiety pt required increased cueing and stating \"I can't I need to sit\". Once therapist provided emotional support and assisted pt in calming down, pt able to continue ambulation with [address] [facility] x2. Pt completed transfer into chair with Min/ModA. Pt left sitting in recliner with call bell within reach and needs met. Entered room later in day for completion of stairs. Pt completed 2 steps up and down with CGA/Trino using B handrails. Pt required constant cueing and emotional support due to anxiety and pain. Pt returned to room and left sitting in recliner with call bell within reach and needs met. Will continue to follow pt throughout remainder of stay and progress towards OT goals. Recommending IRF at discharge when medically appropriate. Other factors to consider for discharge: family/social support, DME, time since onset, severity of deficits, decline from functional baseline         PLAN :  Patient continues to benefit from skilled intervention to address the above impairments. Continue treatment per established plan of care. to address goals.     Recommendation for discharge: (in order for the patient to meet his/her long term goals)  [address]     This discharge recommendation:  Has been made in collaboration with the attending provider and/or case management    IF patient discharges home will need the following DME: TBD       SUBJECTIVE:   Patient stated It just hurts so bad, I can't do it.     OBJECTIVE DATA SUMMARY:   Cognitive/Behavioral Status:  Neurologic State: Alert  Orientation Level: Oriented X4  Cognition: Follows commands    Functional Mobility and Transfers for ADLs:  Bed Mobility:  Rolling: Minimum assistance  Supine to Sit: Minimum assistance  Scooting: Minimum assistance    Transfers:  Sit to Stand: Minimum assistance; Moderate assistance;Assist x2     Bed to Chair: Moderate assistance;Assist x2    Balance:  Sitting: Impaired; With support  Sitting - Static: Good (unsupported)  Sitting - Dynamic: Fair (occasional)  Standing: Impaired; With support  Standing - Static: Constant support; Fair  Standing - Dynamic : Constant support; Fair    ADL Intervention:  Upper Body Dressing Assistance  Shirt simulation with hospital gown: Minimum  assistance    Lower Body Dressing Assistance  Socks: Moderate assistance    Pain:  8/10 via FACES scale pelvic pain    Activity Tolerance:   Fair and requires frequent rest breaks  Please refer to the flowsheet for vital signs taken during this treatment. After treatment patient left in no apparent distress:   Bed locked and in lowest position, Sitting in chair and Call bell within reach    COMMUNICATION/COLLABORATION:   The patients plan of care was discussed with: Physical therapist and Registered nurse. [place] with PT for increased safety for pt and clinician.     Lindalou Gottron, COTA  First Session: 27 mins  Second Session: 18 mins    Problem: Self Care Deficits Care Plan (Adult)  Goal: *Acute Goals and Plan of Care (Insert Text)  Description: Patient stated goal: Get to the toilet and move with less pain. 1. Pt will be IND sup <> sit in prep for EOB ADLs  2. Pt will be IND grooming sitting/standing at sink  3. Pt will be mod I LE dressing sitting EOB/long sit  4. Pt will be IND sit <>  prep for toileting LRAD  5. Pt will be IND toileting/toilet transfer/cloth mgmt LRAD  6.  Pt will be IND following UE HEP in prep for self care tasks      Outcome: Progressing Towards Goal

## 2022-10-04 NOTE — DISCHARGE SUMMARY
Hospitalist Discharge Summary     Patient ID:    Dashawn Bear  [id number]  78 y.o.  1955    Admit date: 10/1/2022    Discharge date : 10/4/2022    Chronic Diagnoses:    Problem List as of 10/4/2022 Date Reviewed: 10/1/2022            Codes Class Noted - Resolved    Acute urinary retention ICD-10-CM: R33.8  ICD-9-CM: 788.29  10/4/2022 - Present        * (Principal) Falls ICD-10-CM: W19. Chato Constable  ICD-9-CM: E888.9  10/2/2022 - Present        Ambulatory dysfunction ICD-10-CM: R26.2  ICD-9-CM: 719.7  10/2/2022 - Present        Constipation ICD-10-CM: K59.00  ICD-9-CM: 564.00  10/2/2022 - Present        Pub[facility] ([facility] ICD-10-CM: S32.509A  ICD-9-CM: 808.2  10/1/2022 - Present        RESOLVED: Laryngopharyngeal reflux (LPR) ICD-10-CM: K21.9  ICD-9-CM: 478.79  1/26/2021 - 8/3/2021        RESOLVED: PND (post-nasal drip) ICD-10-CM: R09.82  ICD-9-CM: 784.91  1/26/2021 - 6/22/2022        RESOLVED: Chest pain ICD-10-CM: R07.9  ICD-9-CM: 786.50  11/16/2020 - 6/22/2022        RESOLVED: Acute hyponatremia ICD-10-CM: E87.1  ICD-9-CM: 276.1  9/11/2020 - 9/13/2020        RESOLVED: Acute kidney injury ([facility] [address] ICD-10-CM: N17.9  ICD-9-CM: 584.9  9/11/2020 - 6/22/2022 22    Final Diagnoses:   Principal Problem:    Falls (10/2/2022)    Active Problems:    Pubic bone fracture ([facility] [address] (10/1/2022)      Ambulatory dysfunction (10/2/2022)      Constipation (10/2/2022)      Acute urinary retention (10/4/2022)        Hospital Course:   Dashawn Bear is a 50-year-old female with a PMHx significant for COPD on home oxygen, GERD, asthma, chronic lumbar radiculopathy, HTN, HLD urinary retention, chronic hyponatremia and DM who presented to outside facility after ground-level fall with great amount of pain in her left hip, unable to bear weight on her lower extremity due to pain. She was transferred to [facility] for further evaluation and treatment of questionable femur fracture.   In review of CT obtained prior to transportation there is no femur fracture, there is a pubic bone fracture. Patient states she could not walk. X-ray of hip reveals parasymphyseal pubic bone and inferior pubic ramus fractures, left femoral head avascular necrosis with severe left hip joint osteoarthritis and small joint effusion, chronic L4 and L5 compression fractures. CT of abdomen and pelvis revealing multilevel degenerative disc and facet disease compression deformities of T12, L1, L3 and L5, grade 1 L3-4 and L4-5 anterior listhesis without instability similar to previous scans and new superior endplate compression fracture of L4. Admitted for further management of pubic fracture. Orthopedic surgery consulted. No indication for surgical intervention. Patient developed urinary retention. Urology consult. Talbot cath placed. She can discharge with talbot catheter and follow-up in outpatient setting for voiding trial. PT and OT have evaluated the patient and she can go home with home health PT and OT. Follow-up outpatient with her PCP for further management of chronic healthcare needs. Discharge Medications:   Current Discharge Medication List        START taking these medications    Details   traMADoL (ULTRAM) 50 mg tablet Take 1 Tablet by mouth every six (6) hours for 5 doses. Max Daily Amount: 200 mg. Qty: 5 Tablet, Refills: 0  Start date: 10/3/2022, End date: 10/5/2022    Associated Diagnoses: Closed nondisplaced fracture of right pubis, initial encounter ([facility] [address]      polyethylene glycol (MIRALAX) 17 gram packet Take 1 Packet by mouth daily for 30 days. Qty: 30 Packet, Refills: 0  Start date: 10/4/2022, End date: 11/3/2022      lidocaine 4 % patch Apply patch to back on either side of spine into the left hip. Apply patch to the affected area for 12 hours a day and remove for 12 hours a day.   Qty: 90 Patch, Refills: 0  Start date: 10/4/2022, End date: 11/3/2022      docusate (COLACE) 50 mg/5 mL liquid Take 20 mL by mouth two (2) times a day for 30 days. Indications: constipation, Stop if having diarrhea  Qty: 1200 mL, Refills: 0  Start date: 10/3/2022, End date: 11/2/2022           CONTINUE these medications which have NOT CHANGED    Details   atorvastatin (LIPITOR) 20 mg tablet Take 1 Tablet by mouth daily. Qty: 90 Tablet, Refills: 1    Associated Diagnoses: Mixed hyperlipidemia      furosemide (LASIX) 20 mg tablet Take 1 Tablet by mouth daily. Qty: 90 Tablet, Refills: 1    Associated Diagnoses: Constipation, unspecified constipation type      montelukast (SINGULAIR) 10 mg tablet TAKE 1 TABLET BY MOUTH EVERY DAY FOR ALLERGY SYMPTOMS  Qty: 90 Tablet, Refills: 1    Associated Diagnoses: Allergic rhinitis, unspecified seasonality, unspecified trigger      amLODIPine (NORVASC) 10 mg tablet Take 1 Tablet by mouth daily. Qty: 90 Tablet, Refills: 1    Associated Diagnoses: Essential hypertension      ergocalciferol (ERGOCALCIFEROL) 1,250 mcg (50,000 unit) capsule TAKE 1 CAPSULE BY MOUTH EVERY SEVEN (7) DAYS. INDICATIONS: VITAMIN D DEFICIENCY (HIGH DOSE THERAPY)  Qty: 4 Capsule, Refills: 5    Associated Diagnoses: Vitamin D deficiency      fluticasone propionate (FLONASE) 50 mcg/actuation nasal spray SPRAY 2 SPRAYS INTO EACH NOSTRIL EVERY DAY  Qty: 1 Each, Refills: 5      Oxygen 2L      predniSONE (DELTASONE) 10 mg tablet Take 1 Tablet by mouth every other day. mometasone (ELOCON) 0.1 % ointment Apply  to affected area daily. Qty: 15 g, Refills: 2    Associated Diagnoses: Eczema, unspecified type      alcohol swabs (Alcohol Prep Pads) padm 1 PAD BY APPLY EXTERNALLY ROUTE DAILY. USE TO CHECK BLOOD SUGAR DAILY. Qty: 100 Pad, Refills: 0    Associated Diagnoses: Type 2 diabetes mellitus without complication, without long-term current use of insulin (HCC)      lancets (Ultra Thin Lancets) 30 gauge misc USE ONCE LANCET PER DAY TO CHECK BLOOD SUGAR.   Qty: 50 Lancet, Refills: 5    Associated Diagnoses: Type 2 diabetes mellitus without complications (HCC)      lisinopriL (PRINIVIL, ZESTRIL) 20 mg tablet Take 1 Tablet by mouth daily. Qty: 90 Tablet, Refills: 1    Associated Diagnoses: Essential (primary) hypertension      pantoprazole (PROTONIX) 40 mg tablet Take 1 Tablet by mouth Before breakfast and dinner. Indications: inflammation of the esophagus with erosion, gastroesophageal reflux disease  Qty: 180 Tablet, Refills: 3    Associated Diagnoses: Gastroesophageal reflux disease with esophagitis without hemorrhage      glucose blood VI test strips (True Metrix Glucose Test Strip) strip Use one test strip to check glucose daily  Qty: 50 Strip, Refills: 3    Comments: DX Code Needed  . Associated Diagnoses: Type 2 diabetes mellitus without complication, without long-term current use of insulin (HCC)      albuterol-ipratropium (DUO-NEB) 2.5 mg-0.5 mg/3 ml nebu USE 1 VIAL EVERY 4 6 HOURS AS NEEDED. DX J44.9. NEED MED B INFO      Trelegy Ellipta 100-62.5-25 mcg inhaler Take 1 Puff by inhalation daily. STOP taking these medications       metroNIDAZOLE (FLAGYL) 500 mg tablet Comments:   Reason for Stopping:         albuterol (Ventolin HFA) 90 mcg/actuation inhaler Comments:   Reason for Stopping: Follow up Care:    1. Ellis Martins NP in 1-2 weeks. Follow-up Information       Follow up With Specialties Details Why Contact Info    Ellis Martins NP Family Nurse Practitioner  Please call to schedule your follow up. [address]      Keagan Jones MD Urology Schedule an appointment as soon as possible for a visit in 3 day(s) Voiding trial. Cysto, CMG, Uroflow.  [address] [place] [id number]  [phone]      Vivek Orr MD Orthopedic Surgery, Sports Medicine Physician Schedule an appointment as soon as possible for a visit in 3 week(s) Orthopedic follow-up pubic rami fx. [facility] [address]  [address] [id number] [phone]      [address] [facility] [address] [facility] Emergency Medicine Follow up As needed, If symptoms worsen [address]   [phone]              Patient Follow Up Instructions: Activity: PT/OT per Home Health  Diet:  Diabetic Diet  Wound care: None required    Condition at Discharge:  Stable  __________________________________________________________________    Disposition  [address]  ____________________________________________________________________    Code Status:  Full Code  ___________________________________________________________________    Discharge Exam:  Patient seen and examined by me on discharge day. Pertinent Findings:    Gen:    Not in distress  Chest: Clear lungs. Room air. CVS:   Regular rhythm. +S1/S2. No edema  Abd:  Soft, not distended, not tender  Musk: Painful ROM lower extremities. Neuro:  Alert and oriented x3. CN II-XII grossly intact.      CONSULTATIONS: Orthopedic Surgery and Urology    Significant Diagnostic Studies:   Recent Results (from the past 24 hour(s))   GLUCOSE, POC    Collection Time: 10/03/22 11:48 AM   Result Value Ref Range    Glucose (POC) 136 (H) 65 - 100 mg/dL    Performed by Brisa 14, POC    Collection Time: 10/03/22  4:41 PM   Result Value Ref Range    Glucose (POC) 126 (H) 65 - 100 mg/dL    Performed by Pipe Levi W/MICROSCOPIC    Collection Time: 10/03/22  6:29 PM   Result Value Ref Range    Color Yellow/Straw      Appearance Clear Clear      Specific gravity 1.016 1.003 - 1.030      pH (UA) 5.0 5.0 - 8.0      Protein Negative Negative mg/dL    Glucose 150 (A) Negative mg/dL    Ketone Negative Negative mg/dL    Bilirubin Negative Negative      Blood Large (A) Negative      Urobilinogen 0.1 0.1 - 1.0 EU/dL    Nitrites Negative Negative      Leukocyte Esterase Negative Negative      WBC 10-20 0 - 4 /hpf    RBC  0 - 5 /hpf    Bacteria Negative Negative /hpf    Mucus Trace /lpf GLUCOSE, POC    Collection Time: 10/03/22  8:38 PM   Result Value Ref Range    Glucose (POC) 136 (H) 65 - 100 mg/dL    Performed by VIOLETA MARTINI    CBC WITH AUTOMATED DIFF    Collection Time: 10/04/22  7:08 AM   Result Value Ref Range    WBC 6.6 3.6 - 11.0 K/uL    RBC 2.69 (L) 3.80 - 5.20 M/uL    HGB 8.4 (L) 11.5 - 16.0 g/dL    HCT 26.0 (L) 35.0 - 47.0 %    MCV 96.7 80.0 - 99.0 FL    MCH 31.2 26.0 - 34.0 PG    MCHC 32.3 30.0 - 36.5 g/dL    RDW 12.9 11.5 - 14.5 %    PLATELET 238 639 - 307 K/uL    MPV 9.6 8.9 - 12.9 FL    NRBC 0.0 0.0  WBC    ABSOLUTE NRBC 0.00 0.00 - 0.01 K/uL    NEUTROPHILS 67 32 - 75 %    LYMPHOCYTES 16 12 - 49 %    MONOCYTES 11 5 - 13 %    EOSINOPHILS 4 0 - 7 %    BASOPHILS 1 0 - 1 %    IMMATURE GRANULOCYTES 1 (H) 0 - 0.5 %    ABS. NEUTROPHILS 4.5 1.8 - 8.0 K/UL    ABS. LYMPHOCYTES 1.1 0.8 - 3.5 K/UL    ABS. MONOCYTES 0.7 0.0 - 1.0 K/UL    ABS. EOSINOPHILS 0.3 0.0 - 0.4 K/UL    ABS. BASOPHILS 0.0 0.0 - 0.1 K/UL    ABS. IMM. GRANS. 0.0 0.00 - 0.04 K/UL    DF AUTOMATED     METABOLIC PANEL, BASIC    Collection Time: 10/04/22  7:08 AM   Result Value Ref Range    Sodium 133 (L) 136 - 145 mmol/L    Potassium 4.5 3.5 - 5.1 mmol/L    Chloride 99 97 - 108 mmol/L    CO2 27 21 - 32 mmol/L    Anion gap 7 5 - 15 mmol/L    Glucose 85 65 - 100 mg/dL    BUN 4 (L) 6 - 20 mg/dL    Creatinine 0.56 0.55 - 1.02 mg/dL    BUN/Creatinine ratio 7 (L) 12 - 20      eGFR >60 >60 ml/min/1.73m2    Calcium 8.9 8.5 - 10.1 mg/dL   GLUCOSE, POC    Collection Time: 10/04/22  7:25 AM   Result Value Ref Range    Glucose (POC) 98 65 - 100 mg/dL    Performed by Adriana RO ABD (KUB)   Final Result   1. Nonobstructive bowel gas pattern. 2. Stable acute left pubic rami fractures.                   Signed:  Robert Wood PA-C  10/4/2022  11:18 AM

## 2022-10-04 NOTE — PROGRESS NOTES
Pt has a discharge order in for today. Pt is being discharged home with home health services. Pt has been cleared by attending provider and all consults. Pt is not being discharged with an IV, drain, or telemetry. Vital signs stable and she shows no signs of distress. Pt is being discharged home with a talbot catheter. Talbot catheter education provided to patient. Pt verbalized understanding. Prescriptions were sent to patient's preferred pharmacy and follow up appointments were made. Pt was wheeled down by staff and left in a private vehicle with her family member.

## 2022-10-04 NOTE — TELEPHONE ENCOUNTER
Pastora Boo called [phone]  To make follow up for patient for urinary retention, talbot cath placed, voiding trial, cysto cmg per hospital note?     Please advise appt date and time

## 2022-10-04 NOTE — PROGRESS NOTES
PHYSICAL THERAPY TREATMENT  Patient: Leny Conner (89 y.o. female)  Date: 10/4/2022  Diagnosis: Pubic bone fracture ([facility] [address] [S32.509A] Falls      Precautions:  fall, WBAT  Chart, physical therapy assessment, plan of care and goals were reviewed. ASSESSMENT  Patient continues with skilled PT services and is progressing towards goals. Pt semi supine upon PT arrival, agreeable to session. (See below for objective details and assist levels). Overall pt tolerated session fair today with therapy. Will continue to benefit from skilled PT services, and will continue to progress as tolerated. Patient seen again for steps up and down for her to be able to go home  Current Level of Function Impacting Discharge (mobility/balance): required assist for all mobility more due to anxiety    Other factors to consider for discharge: HHPT and family         PLAN :  Patient continues to benefit from skilled intervention to address the above impairments. Continue treatment per established plan of care to address goals. Recommend with staff: Out of bed to chair for meals, Encourage HEP in prep for ADLs/mobility, and Use of bed/chair alarm for safety    Recommendation for discharge: (in order for the patient to meet his/her long term goals)  Home with [address] [place] [address]    This discharge recommendation:  Has been made in collaboration with the attending provider and/or case management    IF patient discharges home will need the following DME: gait belt and rolling walker       SUBJECTIVE:   Patient stated I want to go to rehab not home. I thought about it thru out the night    OBJECTIVE DATA SUMMARY:   Critical Behavior:  Neurologic State: Alert  Orientation Level: Oriented X4  Cognition: Follows commands  Safety/Judgement: Awareness of environment, Insight into deficits  Functional Mobility Training:  Bed Mobility:  Rolling: Minimum assistance  Supine to Sit: Minimum assistance     Scooting: Minimum assistance Transfers:  Sit to Stand: Minimum assistance; Moderate assistance  Stand to Sit: Minimum assistance; Moderate assistance        Bed to Chair: Moderate assistance;Assist x2                    Balance:  Sitting: Impaired; With support  Sitting - Static: Good (unsupported)  Sitting - Dynamic: Fair (occasional)  Standing: Impaired;Pull to stand; With support  Standing - Static: Fair;Constant support  Standing - Dynamic : Fair;Constant support  Ambulation/Gait Training:  Distance (ft): 10 Feet (ft)  Assistive Device: Gait belt;Walker, rolling  Ambulation - Level of Assistance: Moderate assistance;Assist x2              Left Side Weight Bearing: As tolerated                               Therapeutic Exercises:       EXERCISE   Sets   Reps   Active Active Assist   Passive Self ROM   Comments   Ankle Pumps   [] [] [] []    Quad Sets/Glut Sets   [] [] [] []    Hamstring Sets   [] [] [] []    Short Arc Quads   [] [] [] []    Heel Slides   [] [] [] []    Straight Leg Raises   [] [] [] []    Hip abd/add   [] [] [] []    Long Arc Quads   [] [] [] []    Marching   [] [] [] []       [] [] [] []       Pain Ratin/10 left le    Activity Tolerance:   Fair    After treatment patient left in no apparent distress:   Bed locked and returned to lowest position, Sitting in chair and Call bell within reach    GOALS:    Problem: Mobility Impaired (Adult and Pediatric)  Goal: *Acute Goals and Plan of Care (Insert Text)  Description: Patient stated goal: go home  Patient will move from supine to sit and sit to supine , scoot up and down, and roll side to side in bed with modified independence within 7 day(s). Patient will transfer from bed to chair and chair to bed with modified independence using the least restrictive device within 7 day(s). Patient will improve static standing balance to modified independence within 1 week(s). Patient will ambulate 35 feet with modified independence with least restrictive device within 1 weeks. Patient will be able to perform 4 training steps up and down with cg to sup x 7 days  Outcome: Progressing Towards Goal       COMMUNICATION/COLLABORATION:   The patients plan of care was discussed with: Occupational therapy assistant, Registered nurse, Physician, and Case management.          Monica Sykes, PT   Time Calculation: 18 mins second treatment  [id number] for 39 mins 1st treatment

## 2022-10-05 ENCOUNTER — TELEPHONE (OUTPATIENT)
Dept: PRIMARY CARE CLINIC | Age: 67
End: 2022-10-05

## 2022-10-05 LAB
BACTERIA SPEC CULT: NORMAL
COLONY COUNT,CNT: NORMAL
COLONY COUNT,CNT: NORMAL
SPECIAL REQUESTS,SREQ: NORMAL

## 2022-10-06 NOTE — PROGRESS NOTES
Physician Progress Note      Queta AMIN #:                  [id number]  :                       1955  ADMIT DATE:       10/1/2022 4:19 PM  [address] DATE:        10/4/2022 2:26 PM  RESPONDING  PROVIDER #:        Judy CASTILLO PA-C          QUERY TEXT:    Dear Christy Humphreys -    Pt admitted with pubic fractures following fall. Pt noted to have COPD with requirement for home 02. If possible, please document in the progress notes and discharge summary if you are evaluating and/or treating any of the following: The medical record reflects the following:  Risk Factors: 78 F presented as transfer from outside facility with pubic fracture following fall  Clinical Indicators: patient with COPD and requires home 02; stable 02 sats on 02 2lpm NC at % with stable respiratory rate ranging 16-20  Treatment: 02, chronic Prednisone, nebs, inhalers    Thank you,  Debbi Petersen BSN, RN, CRCR  Clinical   [email or web address] or contact via [employer]  Options provided:  -- Chronic respiratory failure with hypoxia  -- Chronic respiratory failure with hypercapnia  -- Chronic respiratory failure with hypoxia and hypercapnia  -- Other - I will add my own diagnosis  -- Disagree - Not applicable / Not valid  -- Disagree - Clinically unable to determine / Unknown  -- Refer to Clinical Documentation Reviewer    PROVIDER RESPONSE TEXT:    This patient has chronic respiratory failure with hypoxia.     Query created by: Charline Chatman on 10/3/2022 1:16 PM      Electronically signed by:  Nguyen Viramontes PA-C 10/6/2022 1:48 PM

## 2022-10-07 ENCOUNTER — VIRTUAL VISIT (OUTPATIENT)
Dept: PRIMARY CARE CLINIC | Age: 67
End: 2022-10-07
Payer: MEDICARE

## 2022-10-07 DIAGNOSIS — M16.12 PRIMARY OSTEOARTHRITIS OF LEFT HIP: ICD-10-CM

## 2022-10-07 DIAGNOSIS — I10 ESSENTIAL HYPERTENSION: ICD-10-CM

## 2022-10-07 DIAGNOSIS — K21.9 GASTROESOPHAGEAL REFLUX DISEASE WITHOUT ESOPHAGITIS: ICD-10-CM

## 2022-10-07 DIAGNOSIS — E78.2 MIXED HYPERLIPIDEMIA: ICD-10-CM

## 2022-10-07 DIAGNOSIS — Z74.09 IMPAIRED MOBILITY AND ADLS: ICD-10-CM

## 2022-10-07 DIAGNOSIS — Z96.0 URINARY CATHETER IN PLACE: ICD-10-CM

## 2022-10-07 DIAGNOSIS — S32.000A COMPRESSION FRACTURE OF LUMBAR VERTEBRA, UNSPECIFIED LUMBAR VERTEBRAL LEVEL, INITIAL ENCOUNTER (HCC): ICD-10-CM

## 2022-10-07 DIAGNOSIS — L30.9 ECZEMA, UNSPECIFIED TYPE: ICD-10-CM

## 2022-10-07 DIAGNOSIS — M87.052 AVASCULAR NECROSIS OF BONE OF LEFT HIP (HCC): ICD-10-CM

## 2022-10-07 DIAGNOSIS — E11.69 TYPE 2 DIABETES MELLITUS WITH OTHER SPECIFIED COMPLICATION, WITHOUT LONG-TERM CURRENT USE OF INSULIN (HCC): ICD-10-CM

## 2022-10-07 DIAGNOSIS — E55.9 VITAMIN D DEFICIENCY: ICD-10-CM

## 2022-10-07 DIAGNOSIS — Z78.9 IMPAIRED MOBILITY AND ADLS: ICD-10-CM

## 2022-10-07 DIAGNOSIS — J41.1 MUCOPURULENT CHRONIC BRONCHITIS (HCC): ICD-10-CM

## 2022-10-07 DIAGNOSIS — S32.509A CLOSED FRACTURE OF PUBIS, UNSPECIFIED PORTION OF PUBIS, UNSPECIFIED LATERALITY, INITIAL ENCOUNTER (HCC): Primary | ICD-10-CM

## 2022-10-07 DIAGNOSIS — Z99.81 OXYGEN DEPENDENT: ICD-10-CM

## 2022-10-07 DIAGNOSIS — K59.00 CONSTIPATION, UNSPECIFIED CONSTIPATION TYPE: ICD-10-CM

## 2022-10-07 PROCEDURE — 1111F DSCHRG MED/CURRENT MED MERGE: CPT | Performed by: NURSE PRACTITIONER

## 2022-10-07 PROCEDURE — G8427 DOCREV CUR MEDS BY ELIG CLIN: HCPCS | Performed by: NURSE PRACTITIONER

## 2022-10-07 PROCEDURE — 2022F DILAT RTA XM EVC RTNOPTHY: CPT | Performed by: NURSE PRACTITIONER

## 2022-10-07 PROCEDURE — G8432 DEP SCR NOT DOC, RNG: HCPCS | Performed by: NURSE PRACTITIONER

## 2022-10-07 PROCEDURE — G8756 NO BP MEASURE DOC: HCPCS | Performed by: NURSE PRACTITIONER

## 2022-10-07 PROCEDURE — 1123F ACP DISCUSS/DSCN MKR DOCD: CPT | Performed by: NURSE PRACTITIONER

## 2022-10-07 PROCEDURE — G9899 SCRN MAM PERF RSLTS DOC: HCPCS | Performed by: NURSE PRACTITIONER

## 2022-10-07 PROCEDURE — 3017F COLORECTAL CA SCREEN DOC REV: CPT | Performed by: NURSE PRACTITIONER

## 2022-10-07 PROCEDURE — G8536 NO DOC ELDER MAL SCRN: HCPCS | Performed by: NURSE PRACTITIONER

## 2022-10-07 PROCEDURE — G8399 PT W/DXA RESULTS DOCUMENT: HCPCS | Performed by: NURSE PRACTITIONER

## 2022-10-07 PROCEDURE — 1090F PRES/ABSN URINE INCON ASSESS: CPT | Performed by: NURSE PRACTITIONER

## 2022-10-07 PROCEDURE — 99214 OFFICE O/P EST MOD 30 MIN: CPT | Performed by: NURSE PRACTITIONER

## 2022-10-07 PROCEDURE — 1101F PT FALLS ASSESS-DOCD LE1/YR: CPT | Performed by: NURSE PRACTITIONER

## 2022-10-07 PROCEDURE — 3044F HG A1C LEVEL LT 7.0%: CPT | Performed by: NURSE PRACTITIONER

## 2022-10-07 PROCEDURE — G8417 CALC BMI ABV UP PARAM F/U: HCPCS | Performed by: NURSE PRACTITIONER

## 2022-10-07 RX ORDER — MOMETASONE FUROATE 1 MG/G
OINTMENT TOPICAL
Qty: 15 G | Refills: 2 | Status: SHIPPED | OUTPATIENT
Start: 2022-10-07

## 2022-10-07 RX ORDER — TRAMADOL HYDROCHLORIDE 50 MG/1
50 TABLET ORAL
Qty: 12 TABLET | Refills: 0 | Status: SHIPPED | OUTPATIENT
Start: 2022-10-07 | End: 2022-10-10

## 2022-10-07 NOTE — PROGRESS NOTES
Krysta Eubanks (: 1955) is a 78 y.o. female, established patient, here for evaluation of the following chief complaint(s):   Hospital Follow Up       ASSESSMENT/PLAN:  Below is the assessment and plan developed based on review of pertinent history, labs, studies, and medications. 1. Closed fracture of pubis, unspecified portion of pubis, unspecified laterality, initial encounter ([facility]  CT Results (most recent):  Results from Hospital Encounter encounter on 10/01/22    CT PELV WO CONT    Narrative  EXAM: CT PELV WO CONT    INDICATION: Probable femoral neck fracture    COMPARISON: Pelvis and left hip radiographs 10/1/2022    TECHNIQUE: Helical CT of the pelvis with coronal and sagittal reformats. Images  reviewed in soft tissue and bone windows. CT dose reduction was achieved  through the use of a standardized protocol tailored for this examination and  automatic exposure control for dose modulation. CONTRAST: None. FINDINGS: Bones: Acute comminuted left parasymphyseal pubic bone and inferior  pubic ramus fractures. No definite left femoral fracture. No malalignment. Left  femoral head avascular necrosis. Chronic severe L5 and mild L4 compression  fractures. Joint fluid: Small left hip joint effusion. Articulations: Right total hip arthroplasty. Severe left hip osteoarthritis. Degenerative changes of the lower lumbosacral spine. Mild pubic symphysis  arthropathy. Tendons: No definite full-thickness tendon tear. Muscles: No intramuscular hematoma. No focal atrophy. Soft tissues: Atherosclerosis. Diverticulosis coli. Impression  1. Acute comminuted left parasymphyseal pubic bone and inferior pubic ramus  fractures. No definite left femoral fracture. 2.  Left femoral head avascular necrosis. Severe left hip joint osteoarthritis  with small joint effusion. 3.  Chronic L5 and L4 compression fractures.      Evaluated by orthopedic inpatient and no surgical intervention advised  Recommended for left hip replacement  She has PT/OT coming to home and weight bearing as tolerated  Will send referral to orthopedic outpatient to evaluate and treat  Encourage tylenol and heat prn  Will refill tramadol to use sparingly prn for pain unrelieved with other measures  Controlled Substance Monitoring:    RX Monitoring 10/7/2022   Periodic Controlled Substance Monitoring Possible medication side effects, risk of tolerance/dependence & alternative treatments discussed. ;No signs of potential drug abuse or diversion identified.       -     REFERRAL TO ORTHOPEDIC SURGERY  -     traMADoL (ULTRAM) 50 mg tablet; Take 1 Tablet by mouth every six (6) hours as needed for Pain for up to 3 days. Max Daily Amount: 200 mg., Normal, Disp-12 Tablet, R-0    2. Avascular necrosis of bone of left hip ([facility]  See above    3. Primary osteoarthritis of left hip  See above    4. Type 2 diabetes mellitus with other specified complication, without long-term current use of insulin ([facility])  Lab Results   Component Value Date/Time    Hemoglobin A1c 4.9 10/02/2022 11:26 AM    Hemoglobin A1c (POC) 4.7 09/30/2022 11:25 AM    Hemoglobin A1c, External 6.0 11/02/2017 12:00 AM   Currently off metformin and has been controlled with diet  Continue to encourage following diabetic diet and staying active as possible  Check fasting glucose 1-2 times weekly and notify provider if staying above 200   Continue regular eye exams  Check feet daily and notify provider immediately if wound develops    5. Compression fracture of lumbar vertebra, unspecified lumbar vertebral level, initial encounter ([facility]  Encourage heat and range of motion exercises   Tylenol prn     6. Essential hypertension  Blood pressure is controlled and continue amlodipine and lasix as directed  Encourage to monitor at home and notify provider if > 140/90 consistently     7. Vitamin D deficiency  Continues vitamin d 2000 units daily    8.  Mucopurulent chronic bronchitis ([facility]  Stable  On prednisone 10mg every other day, Trelegy and albuterol prn  Followed by Dr. Alfreda Hicks    9. Gastroesophageal reflux disease without esophagitis  Stable and continues protonix as directed    10. Oxygen dependent  Continues 2lpm nc    11. Mixed hyperlipidemia  Lab Results   Component Value Date/Time    LDL, calculated 99 11/09/2021 10:17 AM    LDL, calculated 96.4 11/17/2020 06:18 AM      12. Constipation, unspecified constipation type  Improved and continues stool softeners     13. Urinary catheter in place  Placed inpatient and needs outpatient voiding trial  Referral to urology sent and she will let us know if any difficulty getting appointment  -     [address] [place] [address]    14. Impaired mobility and ADL's  Currently having difficulty ambulating due to pain  PT/OT have been set up    Return in about 6 weeks (around 11/18/2022) for or sooner for worsening symptoms. SUBJECTIVE/OBJECTIVE:  HPI  Patient who presents via virtual visit for hospital follow up with PMH of hypertension, chronic bronchitis, allergic rhinitis, GERD, Vitamin D deficiency, oxygen dependent 2lpm nc, eczema and type 2 diabetes. Was admitted to hospital 10/1/22 due to fall with subsequent pubic fracture. States that she has had difficulty with weight bearing and also problems with urinary retention. Currently has talbot catheter and was advised to see urologist for voiding trial outpatient. Was not recommended for any immediate surgical intervention but told she will need hip replacement. Needs referral to see orthopedic. Is having a lot of pain and states that tylenol is not helping. Review of Systems   Constitutional: Negative. Respiratory:  Positive for shortness of breath and wheezing. On Oxygen   Cardiovascular: Negative. Gastrointestinal: Negative. Endocrine: Negative. Checks sugars daily   Genitourinary: Negative.     Musculoskeletal:  Positive for arthralgias (Hips and thigh), back pain and myalgias. Allergic/Immunologic: Negative. Neurological: Negative. Hematological: Negative. Psychiatric/Behavioral: Negative. No data recorded     Physical Exam  Vitals and nursing note reviewed. Constitutional:       Appearance: Normal appearance. HENT:      Head: Normocephalic and atraumatic. Pulmonary:      Effort: Pulmonary effort is normal.      Comments: Oxygen on  Neurological:      Mental Status: She is alert and oriented to person, place, and time. Gait: Gait abnormal.   Psychiatric:         Mood and Affect: Mood normal.         Behavior: Behavior normal.         Thought Content: Thought content normal.               Spike Eller, was evaluated through a synchronous (real-time) audio-video encounter. The patient (or guardian if applicable) is aware that this is a billable service, which includes applicable co-pays. This Virtual Visit was conducted with patient's (and/or legal guardian's) consent. The visit was conducted pursuant to the emergency declaration under the [address] [place] [address], [address] [place] [address] authority and the If You Can and [employer] General Act. Patient identification was verified, and a caregiver was present when appropriate. Visit completed via [email or web address]. On this date 10/07/2022 I have spent 30 minutes reviewing previous notes, test results and face to face (virtual) with the patient discussing the diagnosis and importance of compliance with the treatment plan as well as documenting on the day of the visit. The patient was located at: Home: [facility] [address]  [address]  The provider was located at: [facility] [address] [place] [address] [address] [facility] [id number]       An electronic signature was used to authenticate this note.   -- Mary Pearl NP

## 2022-10-11 ENCOUNTER — TELEPHONE (OUTPATIENT)
Dept: PRIMARY CARE CLINIC | Age: 67
End: 2022-10-11

## 2022-10-11 NOTE — TELEPHONE ENCOUNTER
Mikayla, [facility], left message. Patient's heart rate has been elevated with the highest being 122 and the lowest 108. Her pain level is much better.

## 2022-10-13 DIAGNOSIS — I10 ESSENTIAL (PRIMARY) HYPERTENSION: ICD-10-CM

## 2022-10-13 RX ORDER — LISINOPRIL 20 MG/1
20 TABLET ORAL DAILY
Qty: 90 TABLET | Refills: 1 | Status: SHIPPED | OUTPATIENT
Start: 2022-10-13

## 2022-10-18 ENCOUNTER — TELEPHONE (OUTPATIENT)
Dept: GASTROENTEROLOGY | Age: 67
End: 2022-10-18

## 2022-11-07 ENCOUNTER — TELEPHONE (OUTPATIENT)
Dept: UROLOGY | Age: 67
End: 2022-11-07

## 2022-11-07 PROBLEM — E11.9 TYPE 2 DIABETES MELLITUS (HCC): Status: ACTIVE | Noted: 2022-11-07

## 2022-11-21 DIAGNOSIS — E11.9 TYPE 2 DIABETES MELLITUS WITHOUT COMPLICATION, WITHOUT LONG-TERM CURRENT USE OF INSULIN (HCC): ICD-10-CM

## 2022-11-21 RX ORDER — CALCIUM CITRATE/VITAMIN D3 200MG-6.25
TABLET ORAL
Qty: 50 STRIP | Refills: 5 | Status: SHIPPED | OUTPATIENT
Start: 2022-11-21

## 2022-11-23 ENCOUNTER — TELEPHONE (OUTPATIENT)
Dept: PRIMARY CARE CLINIC | Age: 67
End: 2022-11-23

## 2022-12-05 DIAGNOSIS — E11.9 TYPE 2 DIABETES MELLITUS WITHOUT COMPLICATION, WITHOUT LONG-TERM CURRENT USE OF INSULIN (HCC): ICD-10-CM

## 2022-12-05 RX ORDER — ISOPROPYL ALCOHOL 70 ML/100ML
SWAB TOPICAL
Qty: 100 PAD | Refills: 5 | Status: SHIPPED | OUTPATIENT
Start: 2022-12-05

## 2022-12-20 DIAGNOSIS — J30.9 ALLERGIC RHINITIS, UNSPECIFIED SEASONALITY, UNSPECIFIED TRIGGER: Primary | ICD-10-CM

## 2022-12-20 RX ORDER — FLUTICASONE PROPIONATE 50 MCG
SPRAY, SUSPENSION (ML) NASAL
Qty: 1 EACH | Refills: 5 | Status: SHIPPED | OUTPATIENT
Start: 2022-12-20

## 2022-12-22 ENCOUNTER — HOSPITAL ENCOUNTER (EMERGENCY)
Age: 67
Discharge: HOME | End: 2022-12-23
Attending: EMERGENCY MEDICINE
Payer: MEDICARE

## 2022-12-22 DIAGNOSIS — J44.1 CHRONIC OBSTRUCTIVE PULMONARY DISEASE WITH ACUTE EXACERBATION (HCC): Primary | ICD-10-CM

## 2022-12-22 PROCEDURE — 99284 EMERGENCY DEPT VISIT MOD MDM: CPT

## 2022-12-23 VITALS
HEART RATE: 113 BPM | HEIGHT: 67 IN | DIASTOLIC BLOOD PRESSURE: 75 MMHG | WEIGHT: 151.6 LBS | TEMPERATURE: 98.8 F | RESPIRATION RATE: 28 BRPM | SYSTOLIC BLOOD PRESSURE: 156 MMHG | OXYGEN SATURATION: 100 % | BODY MASS INDEX: 23.79 KG/M2

## 2022-12-23 PROCEDURE — 74011000250 HC RX REV CODE- 250: Performed by: EMERGENCY MEDICINE

## 2022-12-23 PROCEDURE — 74011250637 HC RX REV CODE- 250/637: Performed by: EMERGENCY MEDICINE

## 2022-12-23 PROCEDURE — 94640 AIRWAY INHALATION TREATMENT: CPT

## 2022-12-23 RX ORDER — AZITHROMYCIN 250 MG/1
TABLET, FILM COATED ORAL
Qty: 6 TABLET | Refills: 0 | Status: SHIPPED | OUTPATIENT
Start: 2022-12-23 | End: 2022-12-28

## 2022-12-23 RX ORDER — AZITHROMYCIN 250 MG/1
500 TABLET, FILM COATED ORAL
Status: COMPLETED | OUTPATIENT
Start: 2022-12-23 | End: 2022-12-23

## 2022-12-23 RX ORDER — IPRATROPIUM BROMIDE AND ALBUTEROL SULFATE 2.5; .5 MG/3ML; MG/3ML
3 SOLUTION RESPIRATORY (INHALATION)
Status: COMPLETED | OUTPATIENT
Start: 2022-12-23 | End: 2022-12-23

## 2022-12-23 RX ADMIN — AZITHROMYCIN MONOHYDRATE 500 MG: 250 TABLET ORAL at 00:13

## 2022-12-23 RX ADMIN — IPRATROPIUM BROMIDE AND ALBUTEROL SULFATE 3 ML: .5; 2.5 SOLUTION RESPIRATORY (INHALATION) at 00:07

## 2022-12-23 NOTE — ED TRIAGE NOTES
Pt presents to ED with c/o fever of 101.3 and cough x1 day. Pt reports coughing up mucous, does not report taking any medication for a fever.

## 2022-12-23 NOTE — ED NOTES
Pt receptive to discharge teaching including diagnosis, medications, and medication side effects. NAD noted, discharged to home in wheelchair.

## 2022-12-23 NOTE — ED PROVIDER NOTES
Patient with COPD and on home Oxygen presents with complaint of cough and increased shortness of breath since yesterday. No fever or chills. No chest pain . ymptoms are moderate and worse with exertion. No other associated signs or symptoms. Past Medical History:   Diagnosis Date    Arthritis     Bronchitis 2020    Chronic obstructive pulmonary disease (HCC)     Chronic pain     Diabetes ([address]     Gastroesophageal reflux disease without esophagitis 2020    GERD (gastroesophageal reflux disease)     GERD (gastroesophageal reflux disease)     History of multiple allergies     HTN (hypertension) 2020    Hypertension     Intermittent asthma 2020    Lung disorder     PATIENT IS ON OXYGEN    Menopause     Pain of upper abdomen 2020    Seasonal allergic rhinitis 2020    Sinus problem     Vitamin D deficiency 2020       Past Surgical History:   Procedure Laterality Date    COLONOSCOPY N/A 2020    COLONOSCOPY performed by Flavio Bravo MD at [facility] ENDOSCOPY    HX COLONOSCOPY      HX GI      colonscopy    HX ORTHOPAEDIC  10/24/2018    Total Right Hip Arthroplasty Dr. Amy Almendarez. Family History:   Problem Relation Age of Onset    Hypertension Mother     Cancer Sister        Social History     Socioeconomic History    Marital status: SINGLE     Spouse name: Not on file    Number of children: Not on file    Years of education: Not on file    Highest education level: Not on file   Occupational History    Not on file   Tobacco Use    Smoking status: Former     Packs/day: 1.00     Years: 15.00     Pack years: 15.00     Types: Cigarettes     Quit date: 2000     Years since quittin.9    Smokeless tobacco: Never   Vaping Use    Vaping Use: Never used   Substance and Sexual Activity    Alcohol use:  Yes     Alcohol/week: 3.0 standard drinks     Types: 3 Cans of beer per week     Comment: occasionally    Drug use: No    Sexual activity: Not Currently Other Topics Concern    Not on file   Social History Narrative    Not on file     Social Determinants of Health     Financial Resource Strain: Not on file   Food Insecurity: Not on file   Transportation Needs: Not on file   Physical Activity: Not on file   Stress: Not on file   Social Connections: Not on file   Intimate Partner Violence: Not on file   Housing Stability: Not on file         ALLERGIES: Patient has no known allergies. Review of Systems   Constitutional:  Positive for fatigue. Negative for chills and fever. HENT:  Positive for congestion. Eyes: Negative. Respiratory: Negative. Cardiovascular: Negative. Negative for chest pain. Gastrointestinal: Negative. Endocrine: Negative. Genitourinary: Negative. Skin: Negative. Allergic/Immunologic: Negative. Neurological: Negative. Hematological: Negative. Psychiatric/Behavioral: Negative. All other systems reviewed and are negative. There were no vitals filed for this visit. Physical Exam  Vitals and nursing note reviewed. Constitutional:       General: She is not in acute distress. Appearance: She is well-developed. She is not ill-appearing, toxic-appearing or diaphoretic. HENT:      Head: Normocephalic and atraumatic. Nose: Congestion and rhinorrhea present. Cardiovascular:      Rate and Rhythm: Normal rate and regular rhythm. Heart sounds: Normal heart sounds. Pulmonary:      Breath sounds: Rhonchi present. Abdominal:      General: Bowel sounds are normal.      Palpations: Abdomen is soft. Musculoskeletal:         General: Normal range of motion. Cervical back: Normal range of motion and neck supple. Neurological:      General: No focal deficit present. Mental Status: She is alert.    Psychiatric:         Mood and Affect: Mood normal.         Behavior: Behavior normal.        MDM         Procedures

## 2023-01-04 DIAGNOSIS — K59.00 CONSTIPATION, UNSPECIFIED CONSTIPATION TYPE: ICD-10-CM

## 2023-01-04 RX ORDER — FUROSEMIDE 20 MG/1
TABLET ORAL
Qty: 90 TABLET | Refills: 1 | Status: SHIPPED | OUTPATIENT
Start: 2023-01-04

## 2023-01-06 DIAGNOSIS — E55.9 VITAMIN D DEFICIENCY: ICD-10-CM

## 2023-01-06 RX ORDER — ERGOCALCIFEROL 1.25 MG/1
50000 CAPSULE ORAL
Qty: 4 CAPSULE | Refills: 5 | Status: SHIPPED | OUTPATIENT
Start: 2023-01-06

## 2023-01-09 ENCOUNTER — OFFICE VISIT (OUTPATIENT)
Dept: PRIMARY CARE CLINIC | Age: 68
End: 2023-01-09
Payer: MEDICARE

## 2023-01-09 ENCOUNTER — TELEPHONE (OUTPATIENT)
Dept: PRIMARY CARE CLINIC | Age: 68
End: 2023-01-09

## 2023-01-09 VITALS
BODY MASS INDEX: 22.73 KG/M2 | TEMPERATURE: 98.6 F | RESPIRATION RATE: 18 BRPM | HEIGHT: 67 IN | OXYGEN SATURATION: 98 % | HEART RATE: 104 BPM | WEIGHT: 144.8 LBS | SYSTOLIC BLOOD PRESSURE: 97 MMHG | DIASTOLIC BLOOD PRESSURE: 60 MMHG

## 2023-01-09 DIAGNOSIS — E11.9 TYPE 2 DIABETES MELLITUS WITHOUT COMPLICATION, WITHOUT LONG-TERM CURRENT USE OF INSULIN (HCC): ICD-10-CM

## 2023-01-09 DIAGNOSIS — I10 ESSENTIAL (PRIMARY) HYPERTENSION: ICD-10-CM

## 2023-01-09 DIAGNOSIS — S32.000A COMPRESSION FRACTURE OF LUMBAR VERTEBRA, UNSPECIFIED LUMBAR VERTEBRAL LEVEL, INITIAL ENCOUNTER (HCC): ICD-10-CM

## 2023-01-09 DIAGNOSIS — J41.1 MUCOPURULENT CHRONIC BRONCHITIS (HCC): ICD-10-CM

## 2023-01-09 DIAGNOSIS — Z74.09 IMPAIRED MOBILITY AND ADLS: ICD-10-CM

## 2023-01-09 DIAGNOSIS — E55.9 VITAMIN D DEFICIENCY: ICD-10-CM

## 2023-01-09 DIAGNOSIS — Z23 NEEDS FLU SHOT: ICD-10-CM

## 2023-01-09 DIAGNOSIS — Z99.81 OXYGEN DEPENDENT: ICD-10-CM

## 2023-01-09 DIAGNOSIS — S32.509A CLOSED FRACTURE OF PUBIS, UNSPECIFIED PORTION OF PUBIS, UNSPECIFIED LATERALITY, INITIAL ENCOUNTER (HCC): ICD-10-CM

## 2023-01-09 DIAGNOSIS — M16.12 PRIMARY OSTEOARTHRITIS OF LEFT HIP: ICD-10-CM

## 2023-01-09 DIAGNOSIS — M87.052 AVASCULAR NECROSIS OF BONE OF LEFT HIP (HCC): ICD-10-CM

## 2023-01-09 DIAGNOSIS — Z00.00 MEDICARE ANNUAL WELLNESS VISIT, SUBSEQUENT: ICD-10-CM

## 2023-01-09 DIAGNOSIS — K21.9 GASTROESOPHAGEAL REFLUX DISEASE WITHOUT ESOPHAGITIS: ICD-10-CM

## 2023-01-09 DIAGNOSIS — Z12.31 SCREENING MAMMOGRAM FOR BREAST CANCER: Primary | ICD-10-CM

## 2023-01-09 DIAGNOSIS — Z78.9 IMPAIRED MOBILITY AND ADLS: ICD-10-CM

## 2023-01-09 DIAGNOSIS — E78.2 MIXED HYPERLIPIDEMIA: ICD-10-CM

## 2023-01-09 LAB — HBA1C MFR BLD HPLC: 4.4 %

## 2023-01-09 PROCEDURE — 99214 OFFICE O/P EST MOD 30 MIN: CPT | Performed by: NURSE PRACTITIONER

## 2023-01-09 PROCEDURE — G8536 NO DOC ELDER MAL SCRN: HCPCS | Performed by: NURSE PRACTITIONER

## 2023-01-09 PROCEDURE — 83036 HEMOGLOBIN GLYCOSYLATED A1C: CPT | Performed by: NURSE PRACTITIONER

## 2023-01-09 PROCEDURE — G8432 DEP SCR NOT DOC, RNG: HCPCS | Performed by: NURSE PRACTITIONER

## 2023-01-09 PROCEDURE — G8399 PT W/DXA RESULTS DOCUMENT: HCPCS | Performed by: NURSE PRACTITIONER

## 2023-01-09 PROCEDURE — 3078F DIAST BP <80 MM HG: CPT | Performed by: NURSE PRACTITIONER

## 2023-01-09 PROCEDURE — 90694 VACC AIIV4 NO PRSRV 0.5ML IM: CPT | Performed by: NURSE PRACTITIONER

## 2023-01-09 PROCEDURE — 2022F DILAT RTA XM EVC RTNOPTHY: CPT | Performed by: NURSE PRACTITIONER

## 2023-01-09 PROCEDURE — G0439 PPPS, SUBSEQ VISIT: HCPCS | Performed by: NURSE PRACTITIONER

## 2023-01-09 PROCEDURE — G8427 DOCREV CUR MEDS BY ELIG CLIN: HCPCS | Performed by: NURSE PRACTITIONER

## 2023-01-09 PROCEDURE — 1090F PRES/ABSN URINE INCON ASSESS: CPT | Performed by: NURSE PRACTITIONER

## 2023-01-09 PROCEDURE — G9899 SCRN MAM PERF RSLTS DOC: HCPCS | Performed by: NURSE PRACTITIONER

## 2023-01-09 PROCEDURE — 3044F HG A1C LEVEL LT 7.0%: CPT | Performed by: NURSE PRACTITIONER

## 2023-01-09 PROCEDURE — 3017F COLORECTAL CA SCREEN DOC REV: CPT | Performed by: NURSE PRACTITIONER

## 2023-01-09 PROCEDURE — 3074F SYST BP LT 130 MM HG: CPT | Performed by: NURSE PRACTITIONER

## 2023-01-09 PROCEDURE — 1101F PT FALLS ASSESS-DOCD LE1/YR: CPT | Performed by: NURSE PRACTITIONER

## 2023-01-09 PROCEDURE — 1123F ACP DISCUSS/DSCN MKR DOCD: CPT | Performed by: NURSE PRACTITIONER

## 2023-01-09 PROCEDURE — G8420 CALC BMI NORM PARAMETERS: HCPCS | Performed by: NURSE PRACTITIONER

## 2023-01-09 PROCEDURE — G0008 ADMIN INFLUENZA VIRUS VAC: HCPCS | Performed by: NURSE PRACTITIONER

## 2023-01-09 RX ORDER — TRIAMCINOLONE ACETONIDE 1 MG/G
CREAM TOPICAL
COMMUNITY
Start: 2023-01-04

## 2023-01-09 NOTE — PROGRESS NOTES
Jose Jo is a 78 y.o. female who presents to the office today for the following:    Chief Complaint   Patient presents with    Diabetes       Past Medical History:   Diagnosis Date    Arthritis     Bronchitis 2020    Chronic obstructive pulmonary disease ([address]     Chronic pain     Diabetes ([facility] [address]     Gastroesophageal reflux disease without esophagitis 2020    GERD (gastroesophageal reflux disease)     GERD (gastroesophageal reflux disease)     History of multiple allergies     HTN (hypertension) 2020    Hypertension     Intermittent asthma 2020    Lung disorder     PATIENT IS ON OXYGEN    Menopause     Pain of upper abdomen 2020    Seasonal allergic rhinitis 2020    Sinus problem     Vitamin D deficiency 2020       Past Surgical History:   Procedure Laterality Date    COLONOSCOPY N/A 2020    COLONOSCOPY performed by Lauryn Pickett MD at [address] [place] [address] ENDOSCOPY    HX COLONOSCOPY      HX GI      colonscopy    HX ORTHOPAEDIC  10/24/2018    Total Right Hip Arthroplasty Dr. Mckayla Moreno. Family History   Problem Relation Age of Onset    Hypertension Mother     Cancer Sister         Social History     Tobacco Use    Smoking status: Former     Packs/day: 1.00     Years: 15.00     Pack years: 15.00     Types: Cigarettes     Quit date: 2000     Years since quittin.0    Smokeless tobacco: Never   Vaping Use    Vaping Use: Never used   Substance Use Topics    Alcohol use: Yes     Alcohol/week: 3.0 standard drinks     Types: 3 Cans of beer per week     Comment: occasionally    Drug use: No        HPI  Patient  with PMH of hypertension, chronic bronchitis, allergic rhinitis, GERD, Vitamin D deficiency, oxygen dependence 2lpm nc, eczema, compression fracture spine, osteoarthritis and type 2 diabetes who presents for follow up of chronic conditions. Reports taking medicines as noted in chart.  Has been doing well since last visit and reports pain in hip is resolved. Did not arrange appointment with orthopedic however. Is back walking with walker after home PT/OT. Does experience continued cough and congestion with has been chronic. Denies any increased sob , sore throat, sinus pain or fever associated. Sees ENT for allergic rhinitis and taking flonase as directed. Current Outpatient Medications on File Prior to Visit   Medication Sig    triamcinolone acetonide (KENALOG) 0.1 % topical cream APPLY DAILY TO SKIN TO AFFECTED AREA TWICE A DAY FOR 7 DAYS    ergocalciferol (ERGOCALCIFEROL) 1,250 mcg (50,000 unit) capsule TAKE 1 CAPSULE BY MOUTH EVERY SEVEN (7) DAYS. INDICATIONS: VITAMIN D DEFICIENCY (HIGH DOSE THERAPY)    furosemide (LASIX) 20 mg tablet TAKE 1 TABLET BY MOUTH EVERY DAY    fluticasone propionate (FLONASE) 50 mcg/actuation nasal spray SPRAY 2 SPRAYS INTO EACH NOSTRIL EVERY DAY    alcohol swabs (Alcohol Prep Pads) padm 1 PAD BY APPLY EXTERNALLY ROUTE DAILY. USE TO CHECK BLOOD SUGAR DAILY. glucose blood VI test strips (True Metrix Glucose Test Strip) strip Use one test strip to check glucose daily    lisinopriL (PRINIVIL, ZESTRIL) 20 mg tablet Take 1 Tablet by mouth daily. mometasone (ELOCON) 0.1 % ointment APPLY TO AFFECTED AREA EVERY DAY    atorvastatin (LIPITOR) 20 mg tablet Take 1 Tablet by mouth daily. montelukast (SINGULAIR) 10 mg tablet TAKE 1 TABLET BY MOUTH EVERY DAY FOR ALLERGY SYMPTOMS    amLODIPine (NORVASC) 10 mg tablet Take 1 Tablet by mouth daily. Oxygen 2L    predniSONE (DELTASONE) 10 mg tablet Take 1 Tablet by mouth every other day. lancets (Ultra Thin Lancets) 30 gauge misc USE ONCE LANCET PER DAY TO CHECK BLOOD SUGAR.    pantoprazole (PROTONIX) 40 mg tablet Take 1 Tablet by mouth Before breakfast and dinner. Indications: inflammation of the esophagus with erosion, gastroesophageal reflux disease    albuterol-ipratropium (DUO-NEB) 2.5 mg-0.5 mg/3 ml nebu USE 1 VIAL EVERY 4 6 HOURS AS NEEDED. DX J44.9.  NEED MED B INFO Trelegy Ellipta 100-62.5-25 mcg inhaler Take 1 Puff by inhalation daily. No current facility-administered medications on file prior to visit. No orders of the defined types were placed in this encounter. Review of Systems   Constitutional: Negative. HENT:  Positive for congestion. Negative for ear discharge, ear pain, sinus pain, sore throat and tinnitus. Eyes:  Negative for pain and discharge. Wears glasses   Respiratory:  Positive for cough and shortness of breath. Cardiovascular: Negative. Gastrointestinal: Negative. Genitourinary: Negative. Musculoskeletal:  Positive for myalgias. Skin:  Positive for itching. Negative for rash. Neurological: Negative. Psychiatric/Behavioral: Negative. Visit Vitals  BP 97/60 (BP 1 Location: Left upper arm, BP Patient Position: Sitting, BP Cuff Size: Adult)   Pulse (!) 104   Temp 98.6 °F (37 °C) (Temporal)   Resp 18   Ht 5' 7\" (1.702 m)   Wt 144 lb 12.8 oz (65.7 kg)   SpO2 98%   PF (!) 2 L/min   BMI 22.68 kg/m²       Physical Exam  Vitals and nursing note reviewed. Constitutional:       Appearance: Normal appearance. Eyes:      Pupils: Pupils are equal, round, and reactive to light. Cardiovascular:      Rate and Rhythm: Normal rate and regular rhythm. Pulses: Normal pulses. Heart sounds: Normal heart sounds. Pulmonary:      Comments: Diminished in bases  On oxygen 2lpm nc continuous  Abdominal:      General: Bowel sounds are normal.      Palpations: Abdomen is soft. Tenderness: There is no abdominal tenderness. Musculoskeletal:      Cervical back: Normal.      Thoracic back: Normal.      Lumbar back: No tenderness. Normal range of motion. Left hip: No tenderness. Decreased range of motion. Right lower leg: No edema. Left lower leg: No edema. Lymphadenopathy:      Cervical: No cervical adenopathy. Skin:     General: Skin is warm and dry.    Neurological:      Mental Status: She is alert.      Gait: Gait abnormal.      Comments: Using rollator   Psychiatric:         Mood and Affect: Mood normal.         Behavior: Behavior normal.            1. Closed fracture of pubis, unspecified portion of pubis, unspecified laterality, initial encounter ([facility] [address]  CT Results (most recent):  Results from Hospital Encounter encounter on 10/01/22    CT PELV WO CONT    Narrative  EXAM: CT PELV WO CONT    INDICATION: Probable femoral neck fracture    COMPARISON: Pelvis and left hip radiographs 10/1/2022    TECHNIQUE: Helical CT of the pelvis with coronal and sagittal reformats. Images  reviewed in soft tissue and bone windows. CT dose reduction was achieved  through the use of a standardized protocol tailored for this examination and  automatic exposure control for dose modulation. CONTRAST: None. FINDINGS: Bones: Acute comminuted left parasymphyseal pubic bone and inferior  pubic ramus fractures. No definite left femoral fracture. No malalignment. Left  femoral head avascular necrosis. Chronic severe L5 and mild L4 compression  fractures. Joint fluid: Small left hip joint effusion. Articulations: Right total hip arthroplasty. Severe left hip osteoarthritis. Degenerative changes of the lower lumbosacral spine. Mild pubic symphysis  arthropathy. Tendons: No definite full-thickness tendon tear. Muscles: No intramuscular hematoma. No focal atrophy. Soft tissues: Atherosclerosis. Diverticulosis coli. Impression  1. Acute comminuted left parasymphyseal pubic bone and inferior pubic ramus  fractures. No definite left femoral fracture. 2.  Left femoral head avascular necrosis. Severe left hip joint osteoarthritis  with small joint effusion. 3.  Chronic L5 and L4 compression fractures.        Evaluated by orthopedic inpatient and no surgical intervention advised  Recommended for left hip replacement  Pain is improved today and she did not schedule the appointment with orthopedic  Reports she does have Dr. Karen Morgan contact information and will schedule  Continue use of walker and weight bearing as tolerated  Encourage tylenol , rang of motion exercises and heat prn      2. Avascular necrosis of bone of left hip ([facility]  See above    3. Primary osteoarthritis of left hip  See above    4. Type 2 diabetes mellitus with other specified complication, without long-term current use of insulin ([facility])  Lab Results   Component Value Date/Time    Hemoglobin A1c 4.9 10/02/2022 11:26 AM    Hemoglobin A1c (POC) 4.4 01/09/2023 02:33 PM    Hemoglobin A1c, External 6.0 11/02/2017 12:00 AM   Currently off metformin and has been controlled with diet  She reports some intentional weight loss and has been watching diet   Continue to encourage following diabetic diet and staying active as possible  Check fasting glucose 1-2 times weekly and notify provider if staying above 200   Continue regular eye exams and follows with Dr. Radha Nj in [place]  Check feet daily and notify provider immediately if wound develops    5. Compression fracture of lumbar vertebra, unspecified lumbar vertebral level, initial encounter ([facility]  Pain is improved today and she did not schedule the appointment with orthopedic  Reports she does have Dr. Karen Morgan contact information and will schedule  Continue use of walker and weight bearing as tolerated  Encourage tylenol , rang of motion exercises and heat prn    6. Essential hypertension  Blood pressure is controlled and continue amlodipine, lisinopril and lasix as directed  Encourage to monitor at home and notify provider if > 140/90 consistently     7. Vitamin D deficiency  Continues vitamin d 2000 units daily    8.  Mucopurulent chronic bronchitis ([facility]  Does continue with frequent cough and congestion which has been chronic problem   On prednisone 10mg every other day, Trelegy and albuterol prn  At this time has been similar to baseline and will hold on antibiotics unless worsening further or develops other concerning symptoms (fever,etc). Does follow with Dr. Jerrica Carvajal and encouraged to continue regular follow up     9. Gastroesophageal reflux disease without esophagitis  Stable and continues protonix as directed    10. Oxygen dependent  Continues 2lpm nc    11. Mixed hyperlipidemia  Lab Results   Component Value Date/Time    LDL, calculated 99 11/09/2021 10:17 AM    LDL, calculated 96.4 11/17/2020 06:18 AM   Continues atorvastatin as directed    12. Impaired mobility and ADL's  Continues use of rollator walker    12. Allergic rhinitis  Continues flonase daily as directed      Medication risks/benefits/costs/interactions/alternatives discussed with patient. Advised patient to call back or return to office if symptoms worsen/change/persist. If patient cannot reach us or should anything more severe/urgent arise he/she should proceed directly to the nearest emergency department. Discussed expected course/resolution/complications of diagnosis in detail with patient. Patient given a written after visit summary which includes her diagnoses, current medications and vitals. Patient expressed understanding with the diagnosis and plan. Follow-up and Dispositions    Return in about 4 months (around 5/9/2023) for or sooner for worsening symptoms. This is the Subsequent Medicare Annual Wellness Exam, performed 12 months or more after the Initial AWV or the last Subsequent AWV    I have reviewed the patient's medical history in detail and updated the computerized patient record.        Assessment/Plan   Education and counseling provided:  Are appropriate based on today's review and evaluation  End-of-Life planning (with patient's consent)  Pneumococcal Vaccine  Influenza Vaccine  Screening Mammography  Colorectal cancer screening tests  Cardiovascular screening blood test  Bone mass measurement (DEXA)  Screening for glaucoma  Diabetes screening test  Medical nutrition therapy for individuals with diabetes or renal disease    1. Screening mammogram for breast cancer  -     [facility] MAMMO BI SCREENING INCL CAD; Future  2. Closed fracture of pubis, unspecified portion of pubis, unspecified laterality, initial encounter ([facility]  3. Avascular necrosis of bone of left hip ([facility]  4. Primary osteoarthritis of left hip  5. Type 2 diabetes mellitus without complication, without long-term current use of insulin (HCC)  -     AMB POC HEMOGLOBIN A1C  -     CBC WITH AUTOMATED DIFF  -     METABOLIC PANEL, COMPREHENSIVE  -     URINALYSIS W/ RFLX MICROSCOPIC  -     MICROALBUMIN, UR, RAND W/ MICROALB/CREAT RATIO  -     LIPID PANEL  -     TSH RFX ON ABNORMAL TO FREE T4  6. Compression fracture of lumbar vertebra, unspecified lumbar vertebral level, initial encounter ([facility]  7. Essential (primary) hypertension  8. Vitamin D deficiency  9. Mucopurulent chronic bronchitis ([facility]  10. Gastroesophageal reflux disease without esophagitis  11. Oxygen dependent  12. Mixed hyperlipidemia  13. Impaired mobility and ADLs  14. Medicare annual wellness visit, subsequent  15. Needs flu shot  -     INFLUENZA, [address] 81, (AGE 72 Y+), IM, PF, 0.7 ML       Depression Risk Factor Screening     3 most recent PHQ Screens 1/9/2023   Little interest or pleasure in doing things Not at all   Feeling down, depressed, irritable, or hopeless Not at all   Total Score PHQ 2 0       Alcohol & Drug Abuse Risk Screen    Do you average more than 1 drink per night or more than 7 drinks a week:  No    On any one occasion in the past three months have you have had more than 3 drinks containing alcohol:  No          Functional Ability and Level of Safety    Hearing: Hearing is good. Activities of Daily Living: The home contains: handrails  Patient needs help with:  transportation, shopping, and walking      Ambulation: with difficulty, uses a walker     Fall Risk:  Fall Risk Assessment, last 12 mths 1/9/2023   Able to walk? Yes   Fall in past 12 months?  1   Do you feel unsteady? 1   Are you worried about falling 1   Is TUG test greater than 12 seconds? 0   Is the gait abnormal? 0   Number of falls in past 12 months 1   Fall with injury? 0      Abuse Screen:  Patient is not abused       Cognitive Screening    Has your family/caregiver stated any concerns about your memory: no     Cognitive Screening: Normal - Mini Cog Test    Health Maintenance Due     Health Maintenance Due   Topic Date Due    Foot Exam Q1  Never done    Eye Exam Retinal or Dilated  Never done    DTaP/Tdap/Td series (1 - Tdap) Never done    Shingles Vaccine (1 of 2) Never done    Breast Cancer Screen Mammogram  09/29/2022    Lipid Screen  11/09/2022   Reviewed recommended screenings and vaccines for age. Patient Care Team   Patient Care Team:  Darene Carrel, NP as PCP - General (Physician Assistant)  Darene Carrel, NP as PCP - [facility]  Radha Garcia MD (Orthopedic Surgery)    History     Patient Active Problem List   Diagnosis Code    Pubic bone fracture [facility]) S32.509A    Falls W19. XXXA    Ambulatory dysfunction R26.2    Constipation K59.00    Acute urinary retention R33.8    Type 2 diabetes mellitus E11.9    Compression of lumbar vertebra (HCC) S32.000A    Primary osteoarthritis of left hip M16.12    Avascular necrosis of bone of left hip (HCC) M87.052    Vitamin D deficiency E55.9    Mucopurulent chronic bronchitis (HCC) J41.1    Gastroesophageal reflux disease without esophagitis K21.9    Mixed hyperlipidemia E78.2    Essential (primary) hypertension I10     Past Medical History:   Diagnosis Date    Arthritis     Bronchitis 9/1/2020    Chronic obstructive pulmonary disease (HCC)     Chronic pain     Diabetes (HCC)     Gastroesophageal reflux disease without esophagitis 9/1/2020    GERD (gastroesophageal reflux disease)     GERD (gastroesophageal reflux disease)     History of multiple allergies     HTN (hypertension) 9/1/2020    Hypertension     Intermittent asthma 9/1/2020    Lung disorder     PATIENT IS ON OXYGEN    Menopause     Pain of upper abdomen 9/1/2020    Seasonal allergic rhinitis 9/1/2020    Sinus problem     Vitamin D deficiency 9/1/2020      Past Surgical History:   Procedure Laterality Date    COLONOSCOPY N/A 5/25/2020    COLONOSCOPY performed by Shamar Estrada MD at [facility] ENDOSCOPY    HX COLONOSCOPY      HX GI      colonscopy    HX ORTHOPAEDIC  10/24/2018    Total Right Hip Arthroplasty Dr. Nain Root. Current Outpatient Medications   Medication Sig Dispense Refill    triamcinolone acetonide (KENALOG) 0.1 % topical cream APPLY DAILY TO SKIN TO AFFECTED AREA TWICE A DAY FOR 7 DAYS      ergocalciferol (ERGOCALCIFEROL) 1,250 mcg (50,000 unit) capsule TAKE 1 CAPSULE BY MOUTH EVERY SEVEN (7) DAYS. INDICATIONS: VITAMIN D DEFICIENCY (HIGH DOSE THERAPY) 4 Capsule 5    furosemide (LASIX) 20 mg tablet TAKE 1 TABLET BY MOUTH EVERY DAY 90 Tablet 1    fluticasone propionate (FLONASE) 50 mcg/actuation nasal spray SPRAY 2 SPRAYS INTO EACH NOSTRIL EVERY DAY 1 Each 5    alcohol swabs (Alcohol Prep Pads) padm 1 PAD BY APPLY EXTERNALLY ROUTE DAILY. USE TO CHECK BLOOD SUGAR DAILY. 100 Pad 5    glucose blood VI test strips (True Metrix Glucose Test Strip) strip Use one test strip to check glucose daily 50 Strip 5    lisinopriL (PRINIVIL, ZESTRIL) 20 mg tablet Take 1 Tablet by mouth daily. 90 Tablet 1    mometasone (ELOCON) 0.1 % ointment APPLY TO AFFECTED AREA EVERY DAY 15 g 2    atorvastatin (LIPITOR) 20 mg tablet Take 1 Tablet by mouth daily. 90 Tablet 1    montelukast (SINGULAIR) 10 mg tablet TAKE 1 TABLET BY MOUTH EVERY DAY FOR ALLERGY SYMPTOMS 90 Tablet 1    amLODIPine (NORVASC) 10 mg tablet Take 1 Tablet by mouth daily. 90 Tablet 1    Oxygen 2L      predniSONE (DELTASONE) 10 mg tablet Take 1 Tablet by mouth every other day.       lancets (Ultra Thin Lancets) 30 gauge misc USE ONCE LANCET PER DAY TO CHECK BLOOD SUGAR. 50 Lancet 5    pantoprazole (PROTONIX) 40 mg tablet Take 1 Tablet by mouth Before breakfast and dinner. Indications: inflammation of the esophagus with erosion, gastroesophageal reflux disease 180 Tablet 3    albuterol-ipratropium (DUO-NEB) 2.5 mg-0.5 mg/3 ml nebu USE 1 VIAL EVERY 4 6 HOURS AS NEEDED. DX J44.9. NEED MED B INFO      Trelegy Ellipta 100-62.5-25 mcg inhaler Take 1 Puff by inhalation daily. No Known Allergies    Family History   Problem Relation Age of Onset    Hypertension Mother     Cancer Sister      Social History     Tobacco Use    Smoking status: Former     Packs/day: 1.00     Years: 15.00     Pack years: 15.00     Types: Cigarettes     Quit date: 2000     Years since quittin.0    Smokeless tobacco: Never   Substance Use Topics    Alcohol use:  Yes     Alcohol/week: 3.0 standard drinks     Types: 3 Cans of beer per week     Comment: occasionally         Mary Vences NP

## 2023-01-09 NOTE — PROGRESS NOTES
Chief Complaint   Patient presents with    Diabetes     Follow up        1. \"Have you been to the ER, urgent care clinic since your last visit? Hospitalized since your last visit? \" No    2. \"Have you seen or consulted any other health care providers outside of the [address] [place] [address] since your last visit? \" No     3. For patients aged 39-70: Has the patient had a colonoscopy / FIT/ Cologuard? Yes - no Care Gap present      If the patient is female:    4. For patients aged 41-77: Has the patient had a mammogram within the past 2 years? No      5. For patients aged 21-65: Has the patient had a pap smear?  NA - based on age or sex

## 2023-02-03 ENCOUNTER — TELEPHONE (OUTPATIENT)
Dept: PRIMARY CARE CLINIC | Age: 68
End: 2023-02-03

## 2023-02-03 DIAGNOSIS — Z12.31 SCREENING MAMMOGRAM, ENCOUNTER FOR: Primary | ICD-10-CM

## 2023-02-19 ENCOUNTER — APPOINTMENT (OUTPATIENT)
Dept: CT IMAGING | Age: 68
End: 2023-02-19
Attending: EMERGENCY MEDICINE
Payer: MEDICARE

## 2023-02-19 ENCOUNTER — HOSPITAL ENCOUNTER (EMERGENCY)
Age: 68
Discharge: HOME | End: 2023-02-19
Attending: EMERGENCY MEDICINE
Payer: MEDICARE

## 2023-02-19 VITALS
HEIGHT: 67 IN | HEART RATE: 114 BPM | SYSTOLIC BLOOD PRESSURE: 115 MMHG | OXYGEN SATURATION: 98 % | BODY MASS INDEX: 22.6 KG/M2 | WEIGHT: 144 LBS | TEMPERATURE: 98 F | DIASTOLIC BLOOD PRESSURE: 63 MMHG | RESPIRATION RATE: 21 BRPM

## 2023-02-19 DIAGNOSIS — S30.0XXA SACRAL CONTUSION, INITIAL ENCOUNTER: Primary | ICD-10-CM

## 2023-02-19 PROCEDURE — 74011250636 HC RX REV CODE- 250/636: Performed by: EMERGENCY MEDICINE

## 2023-02-19 PROCEDURE — 72131 CT LUMBAR SPINE W/O DYE: CPT

## 2023-02-19 PROCEDURE — 96372 THER/PROPH/DIAG INJ SC/IM: CPT

## 2023-02-19 PROCEDURE — 99284 EMERGENCY DEPT VISIT MOD MDM: CPT

## 2023-02-19 RX ORDER — HYDROMORPHONE HYDROCHLORIDE 1 MG/ML
0.5 INJECTION, SOLUTION INTRAMUSCULAR; INTRAVENOUS; SUBCUTANEOUS ONCE
Status: COMPLETED | OUTPATIENT
Start: 2023-02-19 | End: 2023-02-19

## 2023-02-19 RX ORDER — IBUPROFEN 600 MG/1
600 TABLET ORAL
Qty: 20 TABLET | Refills: 0 | Status: SHIPPED | OUTPATIENT
Start: 2023-02-19

## 2023-02-19 RX ADMIN — HYDROMORPHONE HYDROCHLORIDE 0.5 MG: 1 INJECTION, SOLUTION INTRAMUSCULAR; INTRAVENOUS; SUBCUTANEOUS at 14:31

## 2023-02-19 NOTE — ED PROVIDER NOTES
[facility] EMERGENCY DEPT  EMERGENCY DEPARTMENT HISTORY AND PHYSICAL EXAM      Date: 2023  Patient Name: Melida Bianchi  MRN: [id number]  [place]: 1955  Date of evaluation: 2023  Provider: Moshe Hernandez MD   Note Started: 2:27 PM 23    HISTORY OF PRESENT ILLNESS     Chief Complaint   Patient presents with    Fall       History Provided By: Patient    HPI: Melida Bianchi, 78 y.o. female presents with complaint of lower back pain after fall    PAST MEDICAL HISTORY   Past Medical History:  Past Medical History:   Diagnosis Date    Arthritis     Bronchitis 2020    Chronic obstructive pulmonary disease ([address]     Chronic pain     Diabetes [address] [address]     Gastroesophageal reflux disease without esophagitis 2020    GERD (gastroesophageal reflux disease)     GERD (gastroesophageal reflux disease)     History of multiple allergies     HTN (hypertension) 2020    Hypertension     Intermittent asthma 2020    Lung disorder     PATIENT IS ON OXYGEN    Menopause     Pain of upper abdomen 2020    Seasonal allergic rhinitis 2020    Sinus problem     Vitamin D deficiency 2020       Past Surgical History:  Past Surgical History:   Procedure Laterality Date    COLONOSCOPY N/A 2020    COLONOSCOPY performed by Norberto Ormond, MD at [facility] ENDOSCOPY    HX COLONOSCOPY      HX GI      colonscopy    HX ORTHOPAEDIC  10/24/2018    Total Right Hip Arthroplasty Dr. Sana Cornejo. Family History:  Family History   Problem Relation Age of Onset    Hypertension Mother     Cancer Sister        Social History:  Social History     Tobacco Use    Smoking status: Former     Packs/day: 1.00     Years: 15.00     Pack years: 15.00     Types: Cigarettes     Quit date: 2000     Years since quittin.1    Smokeless tobacco: Never   Vaping Use    Vaping Use: Never used   Substance Use Topics    Alcohol use:  Yes     Alcohol/week: 3.0 standard drinks     Types: 3 Cans of beer per week     Comment: occasionally    Drug use: No       Allergies:  No Known Allergies    PCP: Lisha Duckworth NP    Current Meds:   Previous Medications    ALBUTEROL-IPRATROPIUM (DUO-NEB) 2.5 MG-0.5 MG/3 ML NEBU    USE 1 VIAL EVERY 4 6 HOURS AS NEEDED. DX J44.9. NEED MED B INFO    ALCOHOL SWABS (ALCOHOL PREP PADS) PADM    1 PAD BY APPLY EXTERNALLY ROUTE DAILY. USE TO CHECK BLOOD SUGAR DAILY. AMLODIPINE (NORVASC) 10 MG TABLET    Take 1 Tablet by mouth daily. ATORVASTATIN (LIPITOR) 20 MG TABLET    Take 1 Tablet by mouth daily. ERGOCALCIFEROL (ERGOCALCIFEROL) 1,250 MCG (50,000 UNIT) CAPSULE    TAKE 1 CAPSULE BY MOUTH EVERY SEVEN (7) DAYS. INDICATIONS: VITAMIN D DEFICIENCY (HIGH DOSE THERAPY)    FLUTICASONE PROPIONATE (FLONASE) 50 MCG/ACTUATION NASAL SPRAY    SPRAY 2 SPRAYS INTO EACH NOSTRIL EVERY DAY    FUROSEMIDE (LASIX) 20 MG TABLET    TAKE 1 TABLET BY MOUTH EVERY DAY    GLUCOSE BLOOD VI TEST STRIPS (TRUE METRIX GLUCOSE TEST STRIP) STRIP    Use one test strip to check glucose daily    LANCETS (ULTRA THIN LANCETS) 30 GAUGE MISC    USE ONCE LANCET PER DAY TO CHECK BLOOD SUGAR.    LISINOPRIL (PRINIVIL, ZESTRIL) 20 MG TABLET    Take 1 Tablet by mouth daily. MOMETASONE (ELOCON) 0.1 % OINTMENT    APPLY TO AFFECTED AREA EVERY DAY    MONTELUKAST (SINGULAIR) 10 MG TABLET    TAKE 1 TABLET BY MOUTH EVERY DAY FOR ALLERGY SYMPTOMS    OXYGEN    2L    PANTOPRAZOLE (PROTONIX) 40 MG TABLET    Take 1 Tablet by mouth Before breakfast and dinner. Indications: inflammation of the esophagus with erosion, gastroesophageal reflux disease    PREDNISONE (DELTASONE) 10 MG TABLET    Take 1 Tablet by mouth every other day. TRELEGY ELLIPTA 100-62.5-25 MCG INHALER    Take 1 Puff by inhalation daily. TRIAMCINOLONE ACETONIDE (KENALOG) 0.1 % TOPICAL CREAM    APPLY DAILY TO SKIN TO AFFECTED AREA TWICE A DAY FOR 7 DAYS       REVIEW OF SYSTEMS   Review of Systems   Musculoskeletal:  Positive for arthralgias.    All other systems reviewed and are negative. Positives and Pertinent negatives as per HPI. PHYSICAL EXAM     ED Triage Vitals [02/19/23 1215]   ED Encounter Vitals Group      /63      Pulse (Heart Rate) (!) 114      Resp Rate 21      Temp 98 °F (36.7 °C)      Temp src       O2 Sat (%) 98 %      Weight 144 lb      Height 5' 7\"      Physical Exam  Vitals and nursing note reviewed. Musculoskeletal:      Lumbar back: Positive right straight leg raise test.        Back:    Neurological:      General: No focal deficit present. Mental Status: She is oriented to person, place, and time. GCS: GCS eye subscore is 4. GCS verbal subscore is 5. GCS motor subscore is 6. Cranial Nerves: Cranial nerves 2-12 are intact. Sensory: Sensation is intact. Motor: Motor function is intact. Coordination: Coordination is intact. Gait: Gait is intact. SCREENINGS               No data recorded      LAB, EKG AND DIAGNOSTIC RESULTS   Labs:  No results found for this or any previous visit (from the past 12 hour(s)). EKG: Initial EKG interpreted by me. Shows     Radiologic Studies:  Non-plain film images such as CT, Ultrasound and MRI are read by the radiologist. Plain radiographic images are visualized and preliminarily interpreted by the ED Provider with the below findings:    *    Interpretation per the Radiologist below, if available at the time of this note:  No results found. PROCEDURES   Unless otherwise noted below, none. Performed by:  Deirdre Jo MD   Procedures      CRITICAL CARE TIME       ED COURSE and DIFFERENTIAL DIAGNOSIS/MDM   Vitals:    Vitals:    02/19/23 1215   BP: 115/63   Pulse: (!) 114   Resp: 21   Temp: 98 °F (36.7 °C)   SpO2: 98%   Weight: 65.3 kg (144 lb)   Height: 5' 7\" (1.702 m)        Patient was given the following medications:  Medications   HYDROmorphone (DILAUDID) injection 0.5 mg (has no administration in time range)       CONSULTS: (Who and What was discussed)  None     Chronic Conditions: COPD, hypertension diabetes  Social Determinants affecting Dx or Tx: None  Counseling:      Records Reviewed (source and summary of external notes): None    CC/HPI Summary, DDx, ED Course, and Reassessment:     ED Course as of 02/19/23 1427   Sun Feb 19, 2023   1416 Patient states she fell onto her buttocks last night complaining of 10/10 lower back pain worsened with movement [SB]      ED Course User Index  [SB] Bernadine Knott MD     Patient states she sustained injury when she was reaching for her roller walker and it rolled away from her and patient fell, patient points to area of the junction of lower back and sacrum where she is experiencing pain    DDx closed fracture, joint dislocation  CT lumbar ordered  Disposition Considerations (Tests not done, Shared Decision Making, Pt Expectation of Test or Treatment.):      FINAL IMPRESSION   No diagnosis found. DISPOSITION/PLAN       Discharge Note: The patient is stable for discharge home. The signs, symptoms, diagnosis, and discharge instructions have been discussed, understanding conveyed, and agreed upon. The patient is to follow up as recommended or return to ER should their symptoms worsen. PATIENT REFERRED TO:  Follow-up Information    None           DISCHARGE MEDICATIONS:  Current Discharge Medication List            DISCONTINUED MEDICATIONS:  Current Discharge Medication List          I am the Primary Clinician of Record: Too Chacon MD (electronically signed)    (Please note that parts of this dictation were completed with voice recognition software. Quite often unanticipated grammatical, syntax, homophones, and other interpretive errors are inadvertently transcribed by the computer software. Please disregards these errors.  Please excuse any errors that have escaped final proofreading.)

## 2023-02-19 NOTE — ED TRIAGE NOTES
Pt reports she fell last night. States her rollator was not locked and it rolled away from her causing her to fall. Pt reports she now has low back pain.

## 2023-02-27 ENCOUNTER — OFFICE VISIT (OUTPATIENT)
Dept: GASTROENTEROLOGY | Age: 68
End: 2023-02-27
Payer: MEDICARE

## 2023-02-27 VITALS
RESPIRATION RATE: 14 BRPM | DIASTOLIC BLOOD PRESSURE: 64 MMHG | BODY MASS INDEX: 22.41 KG/M2 | HEIGHT: 67 IN | OXYGEN SATURATION: 98 % | SYSTOLIC BLOOD PRESSURE: 120 MMHG | HEART RATE: 109 BPM | WEIGHT: 142.8 LBS | TEMPERATURE: 97.7 F

## 2023-02-27 DIAGNOSIS — K44.9 HIATAL HERNIA: ICD-10-CM

## 2023-02-27 DIAGNOSIS — K21.9 GASTROESOPHAGEAL REFLUX DISEASE WITHOUT ESOPHAGITIS: ICD-10-CM

## 2023-02-27 DIAGNOSIS — K57.92 DIVERTICULITIS: Primary | ICD-10-CM

## 2023-02-27 DIAGNOSIS — R10.30 LOWER ABDOMINAL PAIN: ICD-10-CM

## 2023-02-27 PROCEDURE — G8399 PT W/DXA RESULTS DOCUMENT: HCPCS | Performed by: INTERNAL MEDICINE

## 2023-02-27 PROCEDURE — 3017F COLORECTAL CA SCREEN DOC REV: CPT | Performed by: INTERNAL MEDICINE

## 2023-02-27 PROCEDURE — 1101F PT FALLS ASSESS-DOCD LE1/YR: CPT | Performed by: INTERNAL MEDICINE

## 2023-02-27 PROCEDURE — G8510 SCR DEP NEG, NO PLAN REQD: HCPCS | Performed by: INTERNAL MEDICINE

## 2023-02-27 PROCEDURE — 3078F DIAST BP <80 MM HG: CPT | Performed by: INTERNAL MEDICINE

## 2023-02-27 PROCEDURE — G9899 SCRN MAM PERF RSLTS DOC: HCPCS | Performed by: INTERNAL MEDICINE

## 2023-02-27 PROCEDURE — G8427 DOCREV CUR MEDS BY ELIG CLIN: HCPCS | Performed by: INTERNAL MEDICINE

## 2023-02-27 PROCEDURE — 3074F SYST BP LT 130 MM HG: CPT | Performed by: INTERNAL MEDICINE

## 2023-02-27 PROCEDURE — 1123F ACP DISCUSS/DSCN MKR DOCD: CPT | Performed by: INTERNAL MEDICINE

## 2023-02-27 PROCEDURE — G8536 NO DOC ELDER MAL SCRN: HCPCS | Performed by: INTERNAL MEDICINE

## 2023-02-27 PROCEDURE — 1090F PRES/ABSN URINE INCON ASSESS: CPT | Performed by: INTERNAL MEDICINE

## 2023-02-27 PROCEDURE — 99214 OFFICE O/P EST MOD 30 MIN: CPT | Performed by: INTERNAL MEDICINE

## 2023-02-27 PROCEDURE — G8420 CALC BMI NORM PARAMETERS: HCPCS | Performed by: INTERNAL MEDICINE

## 2023-02-27 RX ORDER — CIPROFLOXACIN 500 MG/1
500 TABLET ORAL 2 TIMES DAILY
Qty: 30 TABLET | Refills: 0 | Status: SHIPPED | OUTPATIENT
Start: 2023-02-27

## 2023-02-27 NOTE — PROGRESS NOTES
1. Have you been to the ER, urgent care clinic since your last visit? Hospitalized since your last visit? yes    2. Have you seen or consulted any other health care providers outside of the [address] [place] [address] since your last visit? Include any pap smears or colon screening.   No   Chief Complaint   Patient presents with    Follow-up     Visit Vitals  /64 (BP 1 Location: Left upper arm, BP Patient Position: Sitting, BP Cuff Size: Adult)   Pulse (!) 109   Temp 97.7 °F (36.5 °C) (Temporal)   Resp 14   Ht 5' 7\" (1.702 m)   Wt 64.8 kg (142 lb 12.8 oz)   SpO2 98% Comment: 2 l/m  02   BMI 22.37 kg/m²

## 2023-02-28 NOTE — PROGRESS NOTES
Chen Lee is a 78 y.o. female who presents today for the following:  Chief Complaint   Patient presents with    Follow-up     OV 6-27-22         No Known Allergies    Current Outpatient Medications   Medication Sig    ciprofloxacin HCl (CIPRO) 500 mg tablet Take 1 Tablet by mouth two (2) times a day. Indications: diverticulitis    ergocalciferol (ERGOCALCIFEROL) 1,250 mcg (50,000 unit) capsule TAKE 1 CAPSULE BY MOUTH EVERY SEVEN (7) DAYS. INDICATIONS: VITAMIN D DEFICIENCY (HIGH DOSE THERAPY)    furosemide (LASIX) 20 mg tablet TAKE 1 TABLET BY MOUTH EVERY DAY    fluticasone propionate (FLONASE) 50 mcg/actuation nasal spray SPRAY 2 SPRAYS INTO EACH NOSTRIL EVERY DAY    alcohol swabs (Alcohol Prep Pads) padm 1 PAD BY APPLY EXTERNALLY ROUTE DAILY. USE TO CHECK BLOOD SUGAR DAILY. glucose blood VI test strips (True Metrix Glucose Test Strip) strip Use one test strip to check glucose daily    lisinopriL (PRINIVIL, ZESTRIL) 20 mg tablet Take 1 Tablet by mouth daily. atorvastatin (LIPITOR) 20 mg tablet Take 1 Tablet by mouth daily. montelukast (SINGULAIR) 10 mg tablet TAKE 1 TABLET BY MOUTH EVERY DAY FOR ALLERGY SYMPTOMS    amLODIPine (NORVASC) 10 mg tablet Take 1 Tablet by mouth daily. Oxygen 2L    predniSONE (DELTASONE) 10 mg tablet Take 1 Tablet by mouth every other day. lancets (Ultra Thin Lancets) 30 gauge misc USE ONCE LANCET PER DAY TO CHECK BLOOD SUGAR.    pantoprazole (PROTONIX) 40 mg tablet Take 1 Tablet by mouth Before breakfast and dinner. Indications: inflammation of the esophagus with erosion, gastroesophageal reflux disease    Trelegy Ellipta 100-62.5-25 mcg inhaler Take 1 Puff by inhalation daily. triamcinolone acetonide (KENALOG) 0.1 % topical cream APPLY DAILY TO SKIN TO AFFECTED AREA TWICE A DAY FOR 7 DAYS (Patient not taking: Reported on 2/27/2023)    albuterol-ipratropium (DUO-NEB) 2.5 mg-0.5 mg/3 ml nebu USE 1 VIAL EVERY 4 6 HOURS AS NEEDED. DX J44.9.  NEED MED B INFO     No current facility-administered medications for this visit. Past Medical History:   Diagnosis Date    Arthritis     Bronchitis 2020    Chronic obstructive pulmonary disease (HCC)     Chronic pain     Diabetes ([address]     Gastroesophageal reflux disease without esophagitis 2020    GERD (gastroesophageal reflux disease)     GERD (gastroesophageal reflux disease)     History of multiple allergies     HTN (hypertension) 2020    Hypertension     Intermittent asthma 2020    Lung disorder     PATIENT IS ON OXYGEN    Menopause     Pain of upper abdomen 2020    Seasonal allergic rhinitis 2020    Sinus problem     Vitamin D deficiency 2020       Past Surgical History:   Procedure Laterality Date    COLONOSCOPY N/A 2020    COLONOSCOPY performed by Madi Monroy MD at [facility] ENDOSCOPY    HX COLONOSCOPY      HX GI      colonscopy    HX ORTHOPAEDIC  10/24/2018    Total Right Hip Arthroplasty Dr. Nancy Lemon. Family History   Problem Relation Age of Onset    Hypertension Mother     Cancer Sister        Social History     Socioeconomic History    Marital status: SINGLE     Spouse name: Not on file    Number of children: Not on file    Years of education: Not on file    Highest education level: Not on file   Occupational History    Not on file   Tobacco Use    Smoking status: Former     Packs/day: 1.00     Years: 15.00     Pack years: 15.00     Types: Cigarettes     Quit date: 2000     Years since quittin.1    Smokeless tobacco: Never   Vaping Use    Vaping Use: Never used   Substance and Sexual Activity    Alcohol use:  Yes     Alcohol/week: 3.0 standard drinks     Types: 3 Cans of beer per week     Comment: occasionally    Drug use: No    Sexual activity: Not Currently   Other Topics Concern    Not on file   Social History Narrative    Not on file     Social Determinants of Health     Financial Resource Strain: Not on file   Food Insecurity: Not on file Transportation Needs: Not on file   Physical Activity: Not on file   Stress: Not on file   Social Connections: Not on file   Intimate Partner Violence: Not on file   Housing Stability: Not on file         HPI  69-year-old female with history of COPD on home O2, arthritis, diverticular disease, hiatal hernia, gastroesophageal reflux disease, anxiety disorder, chronic gastritis, diverticular particular disease, and gallstones who comes in for follow-up visit. Patient states that her stomach hurts across the lower abdomen and states is constant no improving factors. She is eating okay. Her bowel movements regular. She did have a recent fall and went to the emergency room. She just completed a course of ibuprofen. Patient states she is not had any antibiotics for quite some time. Review of Systems   Constitutional: Negative. HENT: Negative. Negative for nosebleeds. Eyes: Negative. Respiratory:  Positive for shortness of breath. Cardiovascular: Negative. Gastrointestinal:  Positive for abdominal pain and heartburn. Negative for blood in stool, constipation, diarrhea, melena, nausea and vomiting. Genitourinary: Negative. Musculoskeletal:  Positive for back pain and joint pain. Skin: Negative. Neurological: Negative. Endo/Heme/Allergies: Negative. Psychiatric/Behavioral:  The patient is nervous/anxious. All other systems reviewed and are negative. Visit Vitals  /64 (BP 1 Location: Left upper arm, BP Patient Position: Sitting, BP Cuff Size: Adult)   Pulse (!) 109   Temp 97.7 °F (36.5 °C) (Temporal)   Resp 14   Ht 5' 7\" (1.702 m)   Wt 64.8 kg (142 lb 12.8 oz)   SpO2 98% Comment: 2 l/m  02   BMI 22.37 kg/m²     Physical Exam  Vitals and nursing note reviewed. Constitutional:       Appearance: Normal appearance. She is normal weight. HENT:      Head: Normocephalic and atraumatic.       Nose: Nose normal.      Mouth/Throat:      Mouth: Mucous membranes are moist. Pharynx: Oropharynx is clear. Eyes:      General: No scleral icterus. Conjunctiva/sclera: Conjunctivae normal.      Pupils: Pupils are equal, round, and reactive to light. Cardiovascular:      Rate and Rhythm: Normal rate and regular rhythm. Pulses: Normal pulses. Heart sounds: Normal heart sounds. Pulmonary:      Effort: Pulmonary effort is normal.      Breath sounds: Normal breath sounds. Abdominal:      General: Bowel sounds are normal. There is no distension. Palpations: Abdomen is soft. There is no mass. Tenderness: There is abdominal tenderness. There is no right CVA tenderness, left CVA tenderness, guarding or rebound. Hernia: No hernia is present. Comments: Tenderness across the lower abdomen in the left lower quadrant. Musculoskeletal:         General: Normal range of motion. Cervical back: Normal range of motion and neck supple. Skin:     General: Skin is warm and dry. Coloration: Skin is not jaundiced. Neurological:      General: No focal deficit present. Mental Status: She is alert and oriented to person, place, and time. Psychiatric:         Mood and Affect: Mood normal.         Behavior: Behavior normal.         Thought Content: Thought content normal.         Judgment: Judgment normal.          1. Diverticulitis  Affected that the pain patient has in the lower abdomen is secondary to his diverticular disease. Patient has no symptoms related to urination. - ciprofloxacin HCl (CIPRO) 500 mg tablet; Take 1 Tablet by mouth two (2) times a day. Indications: diverticulitis  Dispense: 30 Tablet; Refill: 0    2. Gastroesophageal reflux disease without esophagitis      3. Lower abdominal pain      4.  Hiatal hernia

## 2023-03-08 ENCOUNTER — TELEPHONE (OUTPATIENT)
Dept: GASTROENTEROLOGY | Age: 68
End: 2023-03-08

## 2023-03-08 ENCOUNTER — TELEPHONE (OUTPATIENT)
Dept: PRIMARY CARE CLINIC | Age: 68
End: 2023-03-08

## 2023-03-08 NOTE — TELEPHONE ENCOUNTER
I dont see where there was an order ever put in for a walker or a request      ----- Message from Mary Jane Cabrera sent at 3/8/2023  2:41 PM EST -----  Subject: Message to Provider    QUESTIONS  Information for Provider? Flavia Cruz called and said that the order for a   walker was never sent to the [facility]- (   [phone]) She is on oxygen, She will need a four wheel walker,   with lock, seat and basket. Please give her a call as soon as possible   ---------------------------------------------------------------------------  --------------  Blas DORAN  [phone]; OK to leave message on voicemail  ---------------------------------------------------------------------------  --------------  SCRIPT ANSWERS  Relationship to Patient?  Self

## 2023-03-08 NOTE — TELEPHONE ENCOUNTER
Pt cld & std cipro is not helping with her pain at all. She is also taking tylenol and still in terrible pain.   Please advise

## 2023-03-10 NOTE — TELEPHONE ENCOUNTER
I called and spoke with Ya Florez, she said the cipro has not stopped the pain in her mid abd. Tylenol helps some for a while. She would like sooner appt, says may need CT Scan. Appt made for 1:45pm on 3-. She is eating and drinking ok, bowels are moving, urine is passing without problem.

## 2023-03-14 ENCOUNTER — HOSPITAL ENCOUNTER (OUTPATIENT)
Dept: LAB | Age: 68
Discharge: HOME | End: 2023-03-14
Attending: NURSE PRACTITIONER
Payer: MEDICARE

## 2023-03-14 ENCOUNTER — HOSPITAL ENCOUNTER (OUTPATIENT)
Dept: CT IMAGING | Age: 68
Discharge: HOME | End: 2023-03-14
Attending: NURSE PRACTITIONER
Payer: MEDICARE

## 2023-03-14 ENCOUNTER — OFFICE VISIT (OUTPATIENT)
Dept: PRIMARY CARE CLINIC | Age: 68
End: 2023-03-14
Payer: MEDICARE

## 2023-03-14 VITALS
RESPIRATION RATE: 18 BRPM | WEIGHT: 144.6 LBS | HEIGHT: 67 IN | BODY MASS INDEX: 22.7 KG/M2 | DIASTOLIC BLOOD PRESSURE: 60 MMHG | SYSTOLIC BLOOD PRESSURE: 118 MMHG | OXYGEN SATURATION: 95 % | HEART RATE: 110 BPM | TEMPERATURE: 97.4 F

## 2023-03-14 DIAGNOSIS — I10 ESSENTIAL (PRIMARY) HYPERTENSION: ICD-10-CM

## 2023-03-14 DIAGNOSIS — R10.30 LOWER ABDOMINAL PAIN: Primary | ICD-10-CM

## 2023-03-14 DIAGNOSIS — R26.89 IMPAIRED GAIT AND MOBILITY: ICD-10-CM

## 2023-03-14 DIAGNOSIS — Z87.81 HISTORY OF RECURRENT VERTEBRAL FRACTURES: ICD-10-CM

## 2023-03-14 DIAGNOSIS — M48.56XA NONTRAUMATIC COMPRESSION FRACTURE OF L2 VERTEBRA, INITIAL ENCOUNTER (HCC): ICD-10-CM

## 2023-03-14 DIAGNOSIS — R10.30 LOWER ABDOMINAL PAIN: ICD-10-CM

## 2023-03-14 DIAGNOSIS — N28.89 MASS OF RIGHT KIDNEY: ICD-10-CM

## 2023-03-14 DIAGNOSIS — R79.89 LOW SERUM SODIUM: ICD-10-CM

## 2023-03-14 DIAGNOSIS — M89.9 DISORDER OF BONE: ICD-10-CM

## 2023-03-14 LAB
ALBUMIN SERPL-MCNC: 4 G/DL (ref 3.5–5)
ALBUMIN/GLOB SERPL: 1.3 (ref 1.1–2.2)
ALP SERPL-CCNC: 87 U/L (ref 45–117)
ALT SERPL-CCNC: 22 U/L (ref 12–78)
ANION GAP SERPL CALC-SCNC: 8 MMOL/L (ref 5–15)
AST SERPL W P-5'-P-CCNC: 23 U/L (ref 15–37)
BASOPHILS # BLD: 0 K/UL (ref 0–0.1)
BASOPHILS NFR BLD: 1 % (ref 0–1)
BILIRUB SERPL-MCNC: 0.5 MG/DL (ref 0.2–1)
BUN SERPL-MCNC: 12 MG/DL (ref 6–20)
BUN/CREAT SERPL: 12 (ref 12–20)
CA-I BLD-MCNC: 9.2 MG/DL (ref 8.5–10.1)
CHLORIDE SERPL-SCNC: 90 MMOL/L (ref 97–108)
CO2 SERPL-SCNC: 31 MMOL/L (ref 21–32)
CREAT SERPL-MCNC: 0.98 MG/DL (ref 0.55–1.02)
DIFFERENTIAL METHOD BLD: ABNORMAL
EOSINOPHIL # BLD: 0.1 K/UL (ref 0–0.4)
EOSINOPHIL NFR BLD: 1 % (ref 0–7)
ERYTHROCYTE [DISTWIDTH] IN BLOOD BY AUTOMATED COUNT: 13.3 % (ref 11.5–14.5)
GLOBULIN SER CALC-MCNC: 3.2 G/DL (ref 2–4)
GLUCOSE SERPL-MCNC: 88 MG/DL (ref 65–100)
HCT VFR BLD AUTO: 28.9 % (ref 35–47)
HGB BLD-MCNC: 10 G/DL (ref 11.5–16)
IMM GRANULOCYTES # BLD AUTO: 0 K/UL (ref 0–0.04)
IMM GRANULOCYTES NFR BLD AUTO: 0 % (ref 0–0.5)
LIPASE SERPL-CCNC: 116 U/L (ref 73–393)
LYMPHOCYTES # BLD: 1.6 K/UL (ref 0.8–3.5)
LYMPHOCYTES NFR BLD: 31 % (ref 12–49)
MCH RBC QN AUTO: 31.6 PG (ref 26–34)
MCHC RBC AUTO-ENTMCNC: 34.6 G/DL (ref 30–36.5)
MCV RBC AUTO: 91.5 FL (ref 80–99)
MONOCYTES # BLD: 0.5 K/UL (ref 0–1)
MONOCYTES NFR BLD: 10 % (ref 5–13)
NEUTS SEG # BLD: 2.9 K/UL (ref 1.8–8)
NEUTS SEG NFR BLD: 57 % (ref 32–75)
NRBC # BLD: 0 K/UL (ref 0–0.01)
NRBC BLD-RTO: 0 PER 100 WBC
PLATELET # BLD AUTO: 376 K/UL (ref 150–400)
PMV BLD AUTO: 8.5 FL (ref 8.9–12.9)
POTASSIUM SERPL-SCNC: 3.9 MMOL/L (ref 3.5–5.1)
PROT SERPL-MCNC: 7.2 G/DL (ref 6.4–8.2)
RBC # BLD AUTO: 3.16 M/UL (ref 3.8–5.2)
SODIUM SERPL-SCNC: 129 MMOL/L (ref 136–145)
WBC # BLD AUTO: 5.1 K/UL (ref 3.6–11)

## 2023-03-14 PROCEDURE — 74011000636 HC RX REV CODE- 636: Performed by: NURSE PRACTITIONER

## 2023-03-14 PROCEDURE — G8420 CALC BMI NORM PARAMETERS: HCPCS | Performed by: NURSE PRACTITIONER

## 2023-03-14 PROCEDURE — G9899 SCRN MAM PERF RSLTS DOC: HCPCS | Performed by: NURSE PRACTITIONER

## 2023-03-14 PROCEDURE — G8536 NO DOC ELDER MAL SCRN: HCPCS | Performed by: NURSE PRACTITIONER

## 2023-03-14 PROCEDURE — 3074F SYST BP LT 130 MM HG: CPT | Performed by: NURSE PRACTITIONER

## 2023-03-14 PROCEDURE — 85025 COMPLETE CBC W/AUTO DIFF WBC: CPT

## 2023-03-14 PROCEDURE — G8432 DEP SCR NOT DOC, RNG: HCPCS | Performed by: NURSE PRACTITIONER

## 2023-03-14 PROCEDURE — 1090F PRES/ABSN URINE INCON ASSESS: CPT | Performed by: NURSE PRACTITIONER

## 2023-03-14 PROCEDURE — 1101F PT FALLS ASSESS-DOCD LE1/YR: CPT | Performed by: NURSE PRACTITIONER

## 2023-03-14 PROCEDURE — G8427 DOCREV CUR MEDS BY ELIG CLIN: HCPCS | Performed by: NURSE PRACTITIONER

## 2023-03-14 PROCEDURE — 83690 ASSAY OF LIPASE: CPT

## 2023-03-14 PROCEDURE — 3078F DIAST BP <80 MM HG: CPT | Performed by: NURSE PRACTITIONER

## 2023-03-14 PROCEDURE — 80053 COMPREHEN METABOLIC PANEL: CPT

## 2023-03-14 PROCEDURE — G8399 PT W/DXA RESULTS DOCUMENT: HCPCS | Performed by: NURSE PRACTITIONER

## 2023-03-14 PROCEDURE — 74177 CT ABD & PELVIS W/CONTRAST: CPT

## 2023-03-14 PROCEDURE — 1123F ACP DISCUSS/DSCN MKR DOCD: CPT | Performed by: NURSE PRACTITIONER

## 2023-03-14 PROCEDURE — 36415 COLL VENOUS BLD VENIPUNCTURE: CPT

## 2023-03-14 PROCEDURE — 99214 OFFICE O/P EST MOD 30 MIN: CPT | Performed by: NURSE PRACTITIONER

## 2023-03-14 PROCEDURE — 3017F COLORECTAL CA SCREEN DOC REV: CPT | Performed by: NURSE PRACTITIONER

## 2023-03-14 RX ADMIN — IOPAMIDOL 100 ML: 755 INJECTION, SOLUTION INTRAVENOUS at 13:37

## 2023-03-14 RX ADMIN — DIATRIZOATE MEGLUMINE AND DIATRIZOATE SODIUM 30 ML: 660; 100 LIQUID ORAL; RECTAL at 13:36

## 2023-03-14 NOTE — PROGRESS NOTES
Octavio Puga is a 78 y.o. female who presents to the office today for the following:    Chief Complaint   Patient presents with    New Order    Abdominal Pain       Past Medical History:   Diagnosis Date    Arthritis     Bronchitis 2020    Chronic obstructive pulmonary disease ([address]     Chronic pain     Diabetes ([facility]     Gastroesophageal reflux disease without esophagitis 2020    GERD (gastroesophageal reflux disease)     GERD (gastroesophageal reflux disease)     History of multiple allergies     HTN (hypertension) 2020    Hypertension     Intermittent asthma 2020    Lung disorder     PATIENT IS ON OXYGEN    Menopause     Pain of upper abdomen 2020    Seasonal allergic rhinitis 2020    Sinus problem     Vitamin D deficiency 2020       Past Surgical History:   Procedure Laterality Date    COLONOSCOPY N/A 2020    COLONOSCOPY performed by Mackenzie Smith MD at [facility] ENDOSCOPY    HX COLONOSCOPY      HX GI      colonscopy    HX ORTHOPAEDIC  10/24/2018    Total Right Hip Arthroplasty Dr. Martha Mckeon. Family History   Problem Relation Age of Onset    Hypertension Mother     Cancer Sister         Social History     Tobacco Use    Smoking status: Former     Packs/day: 1.00     Years: 15.00     Pack years: 15.00     Types: Cigarettes     Quit date: 2000     Years since quittin.2    Smokeless tobacco: Never   Vaping Use    Vaping Use: Never used   Substance Use Topics    Alcohol use: Yes     Alcohol/week: 3.0 standard drinks     Types: 3 Cans of beer per week     Comment: occasionally    Drug use: No        HPI  Patient  with PMH of hypertension, chronic bronchitis, allergic rhinitis, GERD, Vitamin D deficiency, oxygen dependence 2lpm nc, eczema, compression fracture spine, osteoarthritis and type 2 diabetes who presents with concerns regarding lower abdominal pain as well as requesting order for rollator walker.   States that she saw Dr. Mei Hernandez for lower abdominal pain on 2/27/2023. Started on antibiotic at that time and told to follow-up if pain not improving. States that she has history of diverticular disease but has not seen any improvement with antibiotics this time. Did have a soft bowel movement this a.m. and usually regular. No vomiting, fever, diarrhea or blood in stool. Appetite has been low as well as she has had some weight loss since her last visit. Trying to eat very bland due to lower abdominal pain. Lastly, has difficulty ambulating with standard cane as she frequently needs to rest when she is walking. Would like to get order for rollator walker which she feels will help her more when ambulating. Current Outpatient Medications on File Prior to Visit   Medication Sig    ciprofloxacin HCl (CIPRO) 500 mg tablet Take 1 Tablet by mouth two (2) times a day. Indications: diverticulitis    ergocalciferol (ERGOCALCIFEROL) 1,250 mcg (50,000 unit) capsule TAKE 1 CAPSULE BY MOUTH EVERY SEVEN (7) DAYS. INDICATIONS: VITAMIN D DEFICIENCY (HIGH DOSE THERAPY)    furosemide (LASIX) 20 mg tablet TAKE 1 TABLET BY MOUTH EVERY DAY    fluticasone propionate (FLONASE) 50 mcg/actuation nasal spray SPRAY 2 SPRAYS INTO EACH NOSTRIL EVERY DAY    alcohol swabs (Alcohol Prep Pads) padm 1 PAD BY APPLY EXTERNALLY ROUTE DAILY. USE TO CHECK BLOOD SUGAR DAILY. glucose blood VI test strips (True Metrix Glucose Test Strip) strip Use one test strip to check glucose daily    lisinopriL (PRINIVIL, ZESTRIL) 20 mg tablet Take 1 Tablet by mouth daily. atorvastatin (LIPITOR) 20 mg tablet Take 1 Tablet by mouth daily. montelukast (SINGULAIR) 10 mg tablet TAKE 1 TABLET BY MOUTH EVERY DAY FOR ALLERGY SYMPTOMS    amLODIPine (NORVASC) 10 mg tablet Take 1 Tablet by mouth daily. Oxygen 2L    predniSONE (DELTASONE) 10 mg tablet Take 1 Tablet by mouth every other day. lancets (Ultra Thin Lancets) 30 gauge misc USE ONCE LANCET PER DAY TO CHECK BLOOD SUGAR. pantoprazole (PROTONIX) 40 mg tablet Take 1 Tablet by mouth Before breakfast and dinner. Indications: inflammation of the esophagus with erosion, gastroesophageal reflux disease    albuterol-ipratropium (DUO-NEB) 2.5 mg-0.5 mg/3 ml nebu USE 1 VIAL EVERY 4 6 HOURS AS NEEDED. DX J44.9. NEED MED B INFO    [place] Ellipta 100-62.5-25 mcg inhaler Take 1 Puff by inhalation daily. No current facility-administered medications on file prior to visit. No orders of the defined types were placed in this encounter. Review of Systems   Constitutional: Negative. HENT:  Negative for ear discharge, ear pain, sinus pain, sore throat and tinnitus. Eyes:  Negative for pain and discharge. Wears glasses   Respiratory:  Positive for cough and shortness of breath. Cardiovascular: Negative. Gastrointestinal:  Positive for abdominal pain and nausea. Negative for blood in stool, constipation, diarrhea, heartburn and vomiting. Genitourinary: Negative. Musculoskeletal:  Positive for myalgias. Skin:  Negative for rash. Neurological: Negative. Psychiatric/Behavioral: Negative. Visit Vitals  /60 (BP 1 Location: Left upper arm, BP Patient Position: Sitting, BP Cuff Size: Adult)   Pulse (!) 110   Temp 97.4 °F (36.3 °C) (Temporal)   Resp 18   Ht 5' 7\" (1.702 m)   Wt 144 lb 9.6 oz (65.6 kg)   SpO2 95%   PF (!) 2 L/min   BMI 22.65 kg/m²       Physical Exam  Vitals and nursing note reviewed. Constitutional:       Appearance: Normal appearance. Cardiovascular:      Rate and Rhythm: Normal rate and regular rhythm. Pulses: Normal pulses. Heart sounds: Normal heart sounds. Pulmonary:      Comments: Diminished in bases  On oxygen 2lpm nc continuous  Abdominal:      General: Bowel sounds are normal. There is distension. Palpations: Abdomen is soft. Tenderness: There is abdominal tenderness. There is guarding.    Musculoskeletal:      Cervical back: Normal.      Thoracic back: Normal.      Lumbar back: No tenderness. Normal range of motion. Left hip: No tenderness. Decreased range of motion. Right lower leg: No edema. Left lower leg: No edema. Skin:     General: Skin is warm and dry. Neurological:      Mental Status: She is alert. Gait: Gait abnormal.      Comments: Using rollator   Psychiatric:         Mood and Affect: Mood normal.         Behavior: Behavior normal.        1. Lower abdominal pain  Hx of diverticular disease  Tenderness present in bilateral lower quadrants  Is having regular bowel movements with last this a.m. and no fever or vomiting  On Cipro as directed from Dr. Syl Velázquez which was started after visit on 2/27/2023  Reports no improvement in her symptoms and is scheduled in 2 days to see Dr. Syl Velázquez again  Will check CBC lipase and CMP  Order stat CT abdomen pelvis with contrast given significant tenderness on exam and no improvement with antibiotics  Advised if she develops increasing pain or other worsening condition should seek emergency care immediately as unknown if could be life threatening. Also to proceed with visit with Dr. Syl Velázquez in 2 days  - CT ABD PELV W CONT; Future  - CBC WITH AUTOMATED DIFF  - LIPASE  - METABOLIC PANEL, COMPREHENSIVE    2. Impaired gait and mobility  Patient have mobility deficits that impair her ability to complete 1 or more ADLs independently such as cooking or bathing. Use of a rollator walker could resolve mobility deficit. Mobility deficit cannot be resolved with use of a standard walker or cane. She is safely able to use rollator walker.  - AMB SUPPLY ORDER                 Medication risks/benefits/costs/interactions/alternatives discussed with patient. Advised patient to call back or return to office if symptoms worsen/change/persist. If patient cannot reach us or should anything more severe/urgent arise he/she should proceed directly to the nearest emergency department.   Discussed expected course/resolution/complications of diagnosis in detail with patient. Patient given a written after visit summary which includes her diagnoses, current medications and vitals. Patient expressed understanding with the diagnosis and plan. Follow-up and Dispositions    Return in about 3 months (around 6/14/2023), or if symptoms worsen or fail to improve.

## 2023-03-14 NOTE — PROGRESS NOTES
Chief Complaint   Patient presents with    New Order    Abdominal Pain       Pt would like to get an order for a new walker. 1. Have you been to the ER, urgent care clinic since your last visit? Hospitalized since your last visit? No    2. Have you seen or consulted any other health care providers outside of the [address] [place] [address] since your last visit? Include any pap smears or colon screening.  No

## 2023-03-15 ENCOUNTER — TELEPHONE (OUTPATIENT)
Dept: GASTROENTEROLOGY | Age: 68
End: 2023-03-15

## 2023-03-15 ENCOUNTER — TELEPHONE (OUTPATIENT)
Dept: PRIMARY CARE CLINIC | Age: 68
End: 2023-03-15

## 2023-03-15 RX ORDER — FUROSEMIDE 20 MG/1
10 TABLET ORAL DAILY
Qty: 45 TABLET | Refills: 0 | Status: SHIPPED | OUTPATIENT
Start: 2023-03-15

## 2023-03-15 NOTE — TELEPHONE ENCOUNTER
Pt cld on 3/14/23 & std she went to Ms. Duque that day and she sent her for CT of abdomen & pelvis. She wanted to know if she shd keep appt with [facility] Staff on 3/16/23. I s/w Evelyn on 3/15 to ask what to tell pt. She std to advise pt to call Ms. Duque & ck on results and depending on results and what she and Ms. Duque decide. We are leaving her on schedule with [facility] Staff until we hear from the pt.

## 2023-03-15 NOTE — PROGRESS NOTES
Spoke with patient. Going to refer for urgent visit to Dr. Cecilio Valdez. She canceled prior appointment and we discussed importance in timely follow up due to concern about rapidly developed mass on kidney and possibility for malignancy. Also referring urgently to her spine surgeon as she did not attend last appointment either. Noted to have 2 new acute versus subacute compression fractures of the lumbar spine and possible need for kyphoplasty. She is already established and will contact immediately to schedule. Will have azam set up appointment with urology and contact her back today. No acute findings to explain abdominal pain on the CT but is scheduled tomorrow with Dr. Adele Denver and denies any worsening or change in symptoms. She will contact us back if any difficulty arranging or attending appointments. Will have lovely schedule 6 week follow up.

## 2023-03-16 ENCOUNTER — OFFICE VISIT (OUTPATIENT)
Dept: GASTROENTEROLOGY | Age: 68
End: 2023-03-16

## 2023-03-16 VITALS
HEART RATE: 100 BPM | DIASTOLIC BLOOD PRESSURE: 64 MMHG | OXYGEN SATURATION: 96 % | WEIGHT: 142.6 LBS | BODY MASS INDEX: 22.38 KG/M2 | RESPIRATION RATE: 14 BRPM | SYSTOLIC BLOOD PRESSURE: 120 MMHG | TEMPERATURE: 97.6 F | HEIGHT: 67 IN

## 2023-03-16 DIAGNOSIS — E11.9 TYPE 2 DIABETES MELLITUS WITHOUT COMPLICATION, WITHOUT LONG-TERM CURRENT USE OF INSULIN (HCC): Primary | ICD-10-CM

## 2023-03-16 DIAGNOSIS — I10 ESSENTIAL HYPERTENSION: ICD-10-CM

## 2023-03-16 DIAGNOSIS — R93.429 ABNORMAL CT SCAN, KIDNEY: ICD-10-CM

## 2023-03-16 DIAGNOSIS — K21.9 GASTROESOPHAGEAL REFLUX DISEASE WITHOUT ESOPHAGITIS: ICD-10-CM

## 2023-03-16 DIAGNOSIS — S32.000A COMPRESSION FRACTURE OF LUMBAR VERTEBRA, UNSPECIFIED LUMBAR VERTEBRAL LEVEL, INITIAL ENCOUNTER (HCC): ICD-10-CM

## 2023-03-16 DIAGNOSIS — K59.04 CHRONIC IDIOPATHIC CONSTIPATION: ICD-10-CM

## 2023-03-16 DIAGNOSIS — J41.1 MUCOPURULENT CHRONIC BRONCHITIS (HCC): ICD-10-CM

## 2023-03-16 DIAGNOSIS — K57.30 DIVERTICULAR DISEASE OF COLON: Primary | ICD-10-CM

## 2023-03-16 DIAGNOSIS — K80.20 GALLSTONES: ICD-10-CM

## 2023-03-16 NOTE — PROGRESS NOTES
1. Have you been to the ER, urgent care clinic since your last visit? Hospitalized since your last visit? no    2. Have you seen or consulted any other health care providers outside of the [address] [place] [address] since your last visit? Include any pap smears or colon screening. No   Chief Complaint   Patient presents with    GI Problem     Antibiotics not working, having lower mid abd pain and back pain, but bot as bad as it was. Had CT recently on 3-. Finished Cipro Tuesday.     Follow-up     Visit Vitals  /64 (BP 1 Location: Left upper arm, BP Patient Position: Sitting, BP Cuff Size: Adult)   Pulse 100   Temp 97.6 °F (36.4 °C) (Temporal)   Resp 14   Ht 5' 7\" (1.702 m)   Wt 64.7 kg (142 lb 9.6 oz)   SpO2 96% Comment: on 2 l/m oxygen   BMI 22.33 kg/m²

## 2023-03-17 LAB
APPEARANCE UR: CLEAR
BACTERIA #/AREA URNS HPF: NORMAL /[HPF]
BILIRUB UR QL STRIP: NEGATIVE
CASTS URNS QL MICRO: NORMAL /LPF
COLOR UR: YELLOW
EPI CELLS #/AREA URNS HPF: NORMAL /HPF (ref 0–10)
GLUCOSE UR QL STRIP: NEGATIVE
HGB UR QL STRIP: NEGATIVE
KETONES UR QL STRIP: NEGATIVE
LEUKOCYTE ESTERASE UR QL STRIP: ABNORMAL
MICRO URNS: ABNORMAL
NITRITE UR QL STRIP: NEGATIVE
PH UR STRIP: 7 [PH] (ref 5–7.5)
PROT UR QL STRIP: NEGATIVE
RBC #/AREA URNS HPF: NORMAL /HPF (ref 0–2)
SP GR UR STRIP: 1.01 (ref 1–1.03)
UROBILINOGEN UR STRIP-MCNC: 0.2 MG/DL (ref 0.2–1)
WBC #/AREA URNS HPF: NORMAL /HPF (ref 0–5)

## 2023-03-18 NOTE — PROGRESS NOTES
Melida Bianchi is a 78 y.o. female who presents today for the following:  Chief Complaint   Patient presents with    GI Problem     Antibiotics not working, having lower mid abd pain and back pain, but bot as bad as it was. Had CT recently on 3-. Finished Cipro Tuesday. Follow-up         No Known Allergies    Current Outpatient Medications   Medication Sig    furosemide (LASIX) 20 mg tablet Take 0.5 Tablets by mouth daily. ergocalciferol (ERGOCALCIFEROL) 1,250 mcg (50,000 unit) capsule TAKE 1 CAPSULE BY MOUTH EVERY SEVEN (7) DAYS. INDICATIONS: VITAMIN D DEFICIENCY (HIGH DOSE THERAPY)    fluticasone propionate (FLONASE) 50 mcg/actuation nasal spray SPRAY 2 SPRAYS INTO EACH NOSTRIL EVERY DAY    alcohol swabs (Alcohol Prep Pads) padm 1 PAD BY APPLY EXTERNALLY ROUTE DAILY. USE TO CHECK BLOOD SUGAR DAILY. glucose blood VI test strips (True Metrix Glucose Test Strip) strip Use one test strip to check glucose daily    lisinopriL (PRINIVIL, ZESTRIL) 20 mg tablet Take 1 Tablet by mouth daily. atorvastatin (LIPITOR) 20 mg tablet Take 1 Tablet by mouth daily. montelukast (SINGULAIR) 10 mg tablet TAKE 1 TABLET BY MOUTH EVERY DAY FOR ALLERGY SYMPTOMS    amLODIPine (NORVASC) 10 mg tablet Take 1 Tablet by mouth daily. Oxygen 2L    predniSONE (DELTASONE) 10 mg tablet Take 1 Tablet by mouth every other day. lancets (Ultra Thin Lancets) 30 gauge misc USE ONCE LANCET PER DAY TO CHECK BLOOD SUGAR.    pantoprazole (PROTONIX) 40 mg tablet Take 1 Tablet by mouth Before breakfast and dinner. Indications: inflammation of the esophagus with erosion, gastroesophageal reflux disease    Trelegy Ellipta 100-62.5-25 mcg inhaler Take 1 Puff by inhalation daily. albuterol-ipratropium (DUO-NEB) 2.5 mg-0.5 mg/3 ml nebu USE 1 VIAL EVERY 4 6 HOURS AS NEEDED. DX J44.9. NEED MED B INFO     No current facility-administered medications for this visit.        Past Medical History:   Diagnosis Date    Arthritis Bronchitis 2020    Chronic obstructive pulmonary disease (HCC)     Chronic pain     Diabetes (HCC)     Gastroesophageal reflux disease without esophagitis 2020    GERD (gastroesophageal reflux disease)     GERD (gastroesophageal reflux disease)     History of multiple allergies     HTN (hypertension) 2020    Hypertension     Intermittent asthma 2020    Lung disorder     PATIENT IS ON OXYGEN    Menopause     Pain of upper abdomen 2020    Seasonal allergic rhinitis 2020    Sinus problem     Vitamin D deficiency 2020       Past Surgical History:   Procedure Laterality Date    COLONOSCOPY N/A 2020    COLONOSCOPY performed by Eufemia Love MD at [facility] ENDOSCOPY    HX COLONOSCOPY      HX GI      colonscopy    HX ORTHOPAEDIC  10/24/2018    Total Right Hip Arthroplasty Dr. Frank Espinal. Family History   Problem Relation Age of Onset    Hypertension Mother     Cancer Sister        Social History     Socioeconomic History    Marital status: SINGLE     Spouse name: Not on file    Number of children: Not on file    Years of education: Not on file    Highest education level: Not on file   Occupational History    Not on file   Tobacco Use    Smoking status: Former     Packs/day: 1.00     Years: 15.00     Pack years: 15.00     Types: Cigarettes     Quit date: 2000     Years since quittin.2    Smokeless tobacco: Never   Vaping Use    Vaping Use: Never used   Substance and Sexual Activity    Alcohol use:  Yes     Alcohol/week: 3.0 standard drinks     Types: 3 Cans of beer per week     Comment: occasionally    Drug use: No    Sexual activity: Not Currently   Other Topics Concern    Not on file   Social History Narrative    Not on file     Social Determinants of Health     Financial Resource Strain: Not on file   Food Insecurity: Not on file   Transportation Needs: Not on file   Physical Activity: Not on file   Stress: Not on file   Social Connections: Not on file   Intimate Partner Violence: Not on file   Housing Stability: Not on file         HPI  76-year-old female with history of COPD on home oxygen, arthritis, diverticular disease, hiatal hernia, gastroesophageal reflux disease, anxiety disorder, chronic gastritis, and gallstones who comes in after recent CT scan of the abdomen and pelvis which showed a mass in the right kidney multiple fractures in the lumbar spine. Patient states she still has pain, but not as bad in the lower abdomen. She states she has pain from her back across her lower abdomen. No improvement with her bowel movements. She had a CT scan of the abdomen and pelvis which showed fractures in her lumbar spine and a right kidney mass. Patient has been referred to orthopedics as well as to a urologist.    Review of Systems   Constitutional: Negative. HENT: Negative. Negative for nosebleeds. Eyes: Negative. Respiratory:  Positive for cough and shortness of breath. Cardiovascular: Negative. Gastrointestinal:  Positive for abdominal pain and heartburn. Negative for blood in stool, constipation, diarrhea, melena, nausea and vomiting. Genitourinary: Negative. Musculoskeletal:  Positive for back pain and joint pain. Skin: Negative. Neurological: Negative. Endo/Heme/Allergies: Negative. Psychiatric/Behavioral: Negative. All other systems reviewed and are negative. Visit Vitals  /64 (BP 1 Location: Left upper arm, BP Patient Position: Sitting, BP Cuff Size: Adult)   Pulse 100   Temp 97.6 °F (36.4 °C) (Temporal)   Resp 14   Ht 5' 7\" (1.702 m)   Wt 64.7 kg (142 lb 9.6 oz)   SpO2 96% Comment: on 2 l/m oxygen   BMI 22.33 kg/m²     Physical Exam  Vitals and nursing note reviewed. Constitutional:       Appearance: Normal appearance. She is normal weight. HENT:      Head: Normocephalic and atraumatic.       Nose: Nose normal.      Mouth/Throat:      Mouth: Mucous membranes are moist.      Pharynx: Oropharynx is clear.   Eyes:      General: No scleral icterus. Conjunctiva/sclera: Conjunctivae normal.      Pupils: Pupils are equal, round, and reactive to light. Cardiovascular:      Rate and Rhythm: Normal rate and regular rhythm. Pulses: Normal pulses. Heart sounds: Normal heart sounds. Pulmonary:      Effort: Pulmonary effort is normal.      Breath sounds: Normal breath sounds. Abdominal:      General: Bowel sounds are normal. There is no distension. Palpations: Abdomen is soft. There is no mass. Tenderness: There is abdominal tenderness. There is no right CVA tenderness, left CVA tenderness, guarding or rebound. Hernia: No hernia is present. Musculoskeletal:         General: Normal range of motion. Cervical back: Normal range of motion and neck supple. Skin:     General: Skin is warm and dry. Coloration: Skin is not jaundiced. Neurological:      General: No focal deficit present. Mental Status: She is alert and oriented to person, place, and time. Psychiatric:         Mood and Affect: Mood normal.         Behavior: Behavior normal.         Thought Content: Thought content normal.         Judgment: Judgment normal.          1. Diverticular disease of colon  Some of the pain which she felt was secondary to diverticular disease and may have been related to her back issues. 2. Chronic idiopathic constipation      3. Gastroesophageal reflux disease without esophagitis  Continue the pantoprazole 40 mg daily. 4. Gallstones  Long-term present without symptoms    5. Abnormal CT scan, kidney  On the CT scan are suggestive of renal cell carcinoma since on recent CT scans of the last few years it was not present. 6. Compression fracture of lumbar vertebra, unspecified lumbar vertebral level, initial encounter ([address]      7.  Mucopurulent chronic bronchitis ([address]

## 2023-03-26 DIAGNOSIS — E87.1 HYPONATREMIA: Primary | ICD-10-CM

## 2023-04-19 ENCOUNTER — TELEPHONE (OUTPATIENT)
Dept: PRIMARY CARE CLINIC | Age: 68
End: 2023-04-19

## 2023-04-19 NOTE — TELEPHONE ENCOUNTER
cold like symptoms-coughing, chest congestion -no fever sick since last Sunday-pt would like to be seen tmrw if possible in person or VV

## 2023-04-20 ENCOUNTER — OFFICE VISIT (OUTPATIENT)
Dept: PRIMARY CARE CLINIC | Age: 68
End: 2023-04-20
Payer: MEDICARE

## 2023-04-20 VITALS
OXYGEN SATURATION: 94 % | BODY MASS INDEX: 22.07 KG/M2 | WEIGHT: 140.6 LBS | RESPIRATION RATE: 20 BRPM | SYSTOLIC BLOOD PRESSURE: 131 MMHG | HEART RATE: 108 BPM | DIASTOLIC BLOOD PRESSURE: 63 MMHG | HEIGHT: 67 IN | TEMPERATURE: 97.8 F

## 2023-04-20 DIAGNOSIS — J06.9 UPPER RESPIRATORY TRACT INFECTION, UNSPECIFIED TYPE: Primary | ICD-10-CM

## 2023-04-20 PROCEDURE — 3017F COLORECTAL CA SCREEN DOC REV: CPT

## 2023-04-20 PROCEDURE — 1101F PT FALLS ASSESS-DOCD LE1/YR: CPT

## 2023-04-20 PROCEDURE — G8420 CALC BMI NORM PARAMETERS: HCPCS

## 2023-04-20 PROCEDURE — 1123F ACP DISCUSS/DSCN MKR DOCD: CPT

## 2023-04-20 PROCEDURE — G9899 SCRN MAM PERF RSLTS DOC: HCPCS

## 2023-04-20 PROCEDURE — 99213 OFFICE O/P EST LOW 20 MIN: CPT

## 2023-04-20 PROCEDURE — G8536 NO DOC ELDER MAL SCRN: HCPCS

## 2023-04-20 PROCEDURE — 3075F SYST BP GE 130 - 139MM HG: CPT

## 2023-04-20 PROCEDURE — G8427 DOCREV CUR MEDS BY ELIG CLIN: HCPCS

## 2023-04-20 PROCEDURE — G8399 PT W/DXA RESULTS DOCUMENT: HCPCS

## 2023-04-20 PROCEDURE — G8432 DEP SCR NOT DOC, RNG: HCPCS

## 2023-04-20 PROCEDURE — 1090F PRES/ABSN URINE INCON ASSESS: CPT

## 2023-04-20 PROCEDURE — 3078F DIAST BP <80 MM HG: CPT

## 2023-04-20 RX ORDER — PROMETHAZINE HYDROCHLORIDE AND DEXTROMETHORPHAN HYDROBROMIDE 6.25; 15 MG/5ML; MG/5ML
5 SYRUP ORAL
Qty: 50 ML | Refills: 0 | Status: SHIPPED | OUTPATIENT
Start: 2023-04-20

## 2023-04-20 RX ORDER — DOXYCYCLINE 100 MG/1
100 TABLET ORAL 2 TIMES DAILY
Qty: 20 TABLET | Refills: 0 | Status: SHIPPED | OUTPATIENT
Start: 2023-04-20 | End: 2023-04-30

## 2023-04-20 RX ORDER — PREDNISONE 20 MG/1
20 TABLET ORAL DAILY
Qty: 9 TABLET | Refills: 0 | Status: SHIPPED | OUTPATIENT
Start: 2023-04-20

## 2023-04-20 RX ORDER — GUAIFENESIN 600 MG/1
600 TABLET, EXTENDED RELEASE ORAL 2 TIMES DAILY
Qty: 30 TABLET | Refills: 0 | Status: SHIPPED | OUTPATIENT
Start: 2023-04-20

## 2023-04-20 NOTE — ASSESSMENT & PLAN NOTE
Due increasingly worse symptomology will start Doxycycline now. Take full course of antibiotic as directed. There is some mild wheezes noted today without respiratory distress. Planned administration of Decadron IM however is unavailable therefore will increase prednisone for 6 days followed by return to 10mg every other day as directed by [facility]. SPO2 on 2L NC 94%. Mucinex recommended to help clear congestion. Take as directed. Promethazine DM prn bedtime for cough. Contact provider immediately or seek ER care for worsening shortness of breath, chest pain, fever, N/V, abdominal pain, dizziness, palpitations or near syncope.

## 2023-04-20 NOTE — PROGRESS NOTES
Myesha Bautista is a 78 y.o. female who presents to the office today for the following:    Chief Complaint   Patient presents with    Cough     C/O cough & congestion since , Coughing think yellow & green mucus        Past Medical History:   Diagnosis Date    Arthritis     Bronchitis 2020    Chronic obstructive pulmonary disease ([facility] [address]     Chronic pain     Diabetes ([facility] [address]     Gastroesophageal reflux disease without esophagitis 2020    GERD (gastroesophageal reflux disease)     GERD (gastroesophageal reflux disease)     History of multiple allergies     HTN (hypertension) 2020    Hypertension     Intermittent asthma 2020    Lung disorder     PATIENT IS ON OXYGEN    Menopause     Pain of upper abdomen 2020    Seasonal allergic rhinitis 2020    Sinus problem     Vitamin D deficiency 2020       Past Surgical History:   Procedure Laterality Date    COLONOSCOPY N/A 2020    COLONOSCOPY performed by Gavin Harris MD at [facility] ENDOSCOPY    HX COLONOSCOPY      HX GI      colonscopy    HX ORTHOPAEDIC  10/24/2018    Total Right Hip Arthroplasty Dr. Anthony Faith. Family History   Problem Relation Age of Onset    Hypertension Mother     Cancer Sister         Social History     Tobacco Use    Smoking status: Former     Packs/day: 1.00     Years: 15.00     Pack years: 15.00     Types: Cigarettes     Quit date: 2000     Years since quittin.3    Smokeless tobacco: Never   Vaping Use    Vaping Use: Never used   Substance Use Topics    Alcohol use: Yes     Alcohol/week: 3.0 standard drinks     Types: 3 Cans of beer per week     Comment: occasionally    Drug use: No        HPI  Patient here for nasal congestion, fatigue and productive cough for 7 days with PMH type 2 DM, GERD, hyperlipidemia, Htn, oxygen dependence and chronic bronchitis. Home O2 at 2L NC. Reports some mild increase of shortness of breath with activity.  Denies fever, body aches, chills, N/V, abdominal or chest pain. Positive sick contact in home. Current Outpatient Medications on File Prior to Visit   Medication Sig    ergocalciferol (ERGOCALCIFEROL) 1,250 mcg (50,000 unit) capsule TAKE 1 CAPSULE BY MOUTH EVERY SEVEN (7) DAYS. INDICATIONS: VITAMIN D DEFICIENCY (HIGH DOSE THERAPY)    fluticasone propionate (FLONASE) 50 mcg/actuation nasal spray SPRAY 2 SPRAYS INTO EACH NOSTRIL EVERY DAY    alcohol swabs (Alcohol Prep Pads) padm 1 PAD BY APPLY EXTERNALLY ROUTE DAILY. USE TO CHECK BLOOD SUGAR DAILY. glucose blood VI test strips (True Metrix Glucose Test Strip) strip Use one test strip to check glucose daily    lisinopriL (PRINIVIL, ZESTRIL) 20 mg tablet Take 1 Tablet by mouth daily. atorvastatin (LIPITOR) 20 mg tablet Take 1 Tablet by mouth daily. montelukast (SINGULAIR) 10 mg tablet TAKE 1 TABLET BY MOUTH EVERY DAY FOR ALLERGY SYMPTOMS    amLODIPine (NORVASC) 10 mg tablet Take 1 Tablet by mouth daily. Oxygen 2L    predniSONE (DELTASONE) 10 mg tablet Take 10 mg by mouth every other day. lancets (Ultra Thin Lancets) 30 gauge misc USE ONCE LANCET PER DAY TO CHECK BLOOD SUGAR.    pantoprazole (PROTONIX) 40 mg tablet Take 1 Tablet by mouth Before breakfast and dinner. Indications: inflammation of the esophagus with erosion, gastroesophageal reflux disease    albuterol-ipratropium (DUO-NEB) 2.5 mg-0.5 mg/3 ml nebu USE 1 VIAL EVERY 4 6 HOURS AS NEEDED. DX J44.9. NEED MED B INFO    Trelegy Ellipta 100-62.5-25 mcg inhaler Take 1 Puff by inhalation daily. No current facility-administered medications on file prior to visit. Medications Ordered Today   Medications    doxycycline (ADOXA) 100 mg tablet     Sig: Take 1 Tablet by mouth two (2) times a day for 10 days. Dispense:  20 Tablet     Refill:  0    promethazine-dextromethorphan (PROMETHAZINE-DM) 6.25-15 mg/5 mL syrup     Sig: Take 5 mL by mouth nightly as needed for Cough.      Dispense:  50 mL     Refill:  0    guaiFENesin ER (MUCINEX) 600 mg ER tablet     Sig: Take 1 Tablet by mouth two (2) times a day. Dispense:  30 Tablet     Refill:  0    predniSONE (DELTASONE) 20 mg tablet     Sig: Take 1 Tablet by mouth daily. Take 2 tabs once daily for 3 days then take  tab once daily for 3 days. Dispense:  9 Tablet     Refill:  0        Review of Systems   Constitutional:  Positive for malaise/fatigue. Negative for chills and fever. HENT:  Positive for congestion and sinus pain. Negative for ear discharge, ear pain, nosebleeds and sore throat. Eyes: Negative. Respiratory:  Positive for cough, sputum production, shortness of breath and wheezing. Negative for hemoptysis and stridor. Cardiovascular: Negative. Gastrointestinal: Negative. Genitourinary: Negative. Musculoskeletal:  Positive for myalgias. Neurological: Negative. Psychiatric/Behavioral: Negative. Visit Vitals  /63 (BP 1 Location: Left arm, BP Patient Position: Sitting, BP Cuff Size: Adult)   Pulse (!) 108   Temp 97.8 °F (36.6 °C) (Axillary)   Resp 20   Ht 5' 7\" (1.702 m)   Wt 140 lb 9.6 oz (63.8 kg)   SpO2 94%   BMI 22.02 kg/m²       Last Point of Care HGB A1C  Hemoglobin A1c (POC)   Date Value Ref Range Status   01/09/2023 4.4 % Final         Physical Exam  Constitutional:       Appearance: Normal appearance. She is normal weight. Comments: Ambulates with rollator   HENT:      Head: Normocephalic and atraumatic. Right Ear: Tympanic membrane, ear canal and external ear normal.      Left Ear: Tympanic membrane, ear canal and external ear normal.      Nose: Congestion present. Mouth/Throat:      Mouth: Mucous membranes are moist.      Pharynx: Oropharyngeal exudate present. Eyes:      Extraocular Movements: Extraocular movements intact. Conjunctiva/sclera: Conjunctivae normal.      Pupils: Pupils are equal, round, and reactive to light. Comments: glasses   Cardiovascular:      Rate and Rhythm: Regular rhythm. Tachycardia present. Heart sounds: Normal heart sounds. Pulmonary:      Effort: No respiratory distress. Breath sounds: Wheezing present. Comments: Mild wheezes noted throughout lung fields, no evidence of tachypnea or respiratory distress  Abdominal:      General: Abdomen is flat. Bowel sounds are normal.      Palpations: Abdomen is soft. Skin:     General: Skin is warm and dry. Neurological:      General: No focal deficit present. Mental Status: She is alert and oriented to person, place, and time. Psychiatric:         Mood and Affect: Mood normal.         Behavior: Behavior normal.         Thought Content: Thought content normal.         Judgment: Judgment normal.        1. Upper respiratory tract infection, unspecified type  Assessment & Plan:  Due increasingly worse symptomology will start Doxycycline now. Take full course of antibiotic as directed. There is some mild wheezes noted today without respiratory distress. Planned administration of Decadron IM however is unavailable therefore will increase prednisone for 6 days followed by return to 10mg every other day as directed by [facility]. SPO2 on 2L NC 94%. Mucinex recommended to help clear congestion. Take as directed. Promethazine DM prn bedtime for cough. Contact provider immediately or seek ER care for worsening shortness of breath, chest pain, fever, N/V, abdominal pain, dizziness, palpitations or near syncope. Orders:  -     doxycycline (ADOXA) 100 mg tablet; Take 1 Tablet by mouth two (2) times a day for 10 days. , Normal, Disp-20 Tablet, R-0  -     promethazine-dextromethorphan (PROMETHAZINE-DM) 6.25-15 mg/5 mL syrup; Take 5 mL by mouth nightly as needed for Cough., Normal, Disp-50 mL, R-0  -     guaiFENesin ER (MUCINEX) 600 mg ER tablet; Take 1 Tablet by mouth two (2) times a day., Normal, Disp-30 Tablet, R-0  -     predniSONE (DELTASONE) 20 mg tablet; Take 1 Tablet by mouth daily.  Take 2 tabs once daily for 3 days then take  tab once daily for 3 days. , Normal, Disp-9 Tablet, R-0       We discussed the expected course, resolution and complications of the diagnosis(es) in detail. Medication risks, benefits, costs, interactions, and alternatives were discussed as indicated. I advised her to contact the office if her condition worsens, changes or fails to improve as anticipated. She expressed understanding with the diagnosis(es) and plan. Patient verbalizes understanding of plan of care as discussed above    Follow-up and Dispositions    Return if symptoms worsen or fail to improve.

## 2023-04-20 NOTE — PROGRESS NOTES
Chief Complaint   Patient presents with    Cough     C/O cough & congestion since Sunday, Coughing think yellow & green mucus      1. \"Have you been to the ER, urgent care clinic since your last visit? Hospitalized since your last visit? \" No    2. \"Have you seen or consulted any other health care providers outside of the [address] [place] [address] since your last visit? \" No     3. For patients aged 39-70: Has the patient had a colonoscopy / FIT/ Cologuard? Yes - no Care Gap present      If the patient is female:    4. For patients aged 41-77: Has the patient had a mammogram within the past 2 years? Yes - no Care Gap present      5. For patients aged 21-65: Has the patient had a pap smear?  Yes - no Care Gap present  Visit Vitals  /63 (BP 1 Location: Left arm, BP Patient Position: Sitting, BP Cuff Size: Adult)   Pulse (!) 115   Temp 98.4 °F (36.9 °C) (Oral)   Resp 18   Ht 5' 7\" (1.702 m)   Wt 140 lb 9.6 oz (63.8 kg)   SpO2 91%   BMI 22.02 kg/m²

## 2023-04-27 DIAGNOSIS — E78.2 MIXED HYPERLIPIDEMIA: ICD-10-CM

## 2023-04-27 DIAGNOSIS — K21.00 GASTROESOPHAGEAL REFLUX DISEASE WITH ESOPHAGITIS WITHOUT HEMORRHAGE: ICD-10-CM

## 2023-04-27 DIAGNOSIS — I10 ESSENTIAL HYPERTENSION: ICD-10-CM

## 2023-04-27 RX ORDER — AMLODIPINE BESYLATE 10 MG/1
10 TABLET ORAL DAILY
Qty: 90 TABLET | Refills: 1 | Status: SHIPPED | OUTPATIENT
Start: 2023-04-27

## 2023-04-27 RX ORDER — ATORVASTATIN CALCIUM 20 MG/1
20 TABLET, FILM COATED ORAL DAILY
Qty: 90 TABLET | Refills: 1 | Status: SHIPPED | OUTPATIENT
Start: 2023-04-27

## 2023-04-27 RX ORDER — PANTOPRAZOLE SODIUM 40 MG/1
40 TABLET, DELAYED RELEASE ORAL
Qty: 180 TABLET | Refills: 3 | Status: SHIPPED | OUTPATIENT
Start: 2023-04-27

## 2023-05-01 ENCOUNTER — TRANSCRIBE ORDERS (OUTPATIENT)
Facility: HOSPITAL | Age: 68
End: 2023-05-01

## 2023-05-01 DIAGNOSIS — N28.89 RENAL MASS: Primary | ICD-10-CM

## 2023-05-04 ENCOUNTER — TELEPHONE (OUTPATIENT)
Dept: PRIMARY CARE CLINIC | Age: 68
End: 2023-05-04

## 2023-05-04 DIAGNOSIS — I10 ESSENTIAL (PRIMARY) HYPERTENSION: ICD-10-CM

## 2023-05-04 RX ORDER — LISINOPRIL 20 MG/1
20 TABLET ORAL DAILY
Qty: 90 TABLET | Refills: 1 | Status: SHIPPED | OUTPATIENT
Start: 2023-05-04

## 2023-05-08 DIAGNOSIS — J30.2 OTHER SEASONAL ALLERGIC RHINITIS: Primary | ICD-10-CM

## 2023-05-08 RX ORDER — PANTOPRAZOLE SODIUM 40 MG/1
40 TABLET, DELAYED RELEASE ORAL DAILY
COMMUNITY

## 2023-05-08 RX ORDER — LISINOPRIL 20 MG/1
20 TABLET ORAL DAILY
COMMUNITY

## 2023-05-08 RX ORDER — ERGOCALCIFEROL 1.25 MG/1
50000 CAPSULE ORAL WEEKLY
COMMUNITY

## 2023-05-08 RX ORDER — BLOOD SUGAR DIAGNOSTIC
STRIP MISCELLANEOUS
COMMUNITY

## 2023-05-08 RX ORDER — FLUTICASONE FUROATE, UMECLIDINIUM BROMIDE AND VILANTEROL TRIFENATATE 100; 62.5; 25 UG/1; UG/1; UG/1
1 POWDER RESPIRATORY (INHALATION) DAILY
COMMUNITY

## 2023-05-08 RX ORDER — MONTELUKAST SODIUM 10 MG/1
10 TABLET ORAL NIGHTLY
Qty: 90 TABLET | Refills: 1 | Status: SHIPPED | OUTPATIENT
Start: 2023-05-08

## 2023-05-08 RX ORDER — AMLODIPINE BESYLATE 10 MG/1
10 TABLET ORAL DAILY
COMMUNITY

## 2023-05-08 RX ORDER — LANCETS 30 GAUGE
1 EACH MISCELLANEOUS DAILY
COMMUNITY

## 2023-05-08 RX ORDER — ALBUTEROL SULFATE 2.5 MG/3ML
2.5 SOLUTION RESPIRATORY (INHALATION) EVERY 6 HOURS PRN
COMMUNITY

## 2023-05-08 RX ORDER — FUROSEMIDE 20 MG/1
20 TABLET ORAL DAILY
COMMUNITY

## 2023-05-08 RX ORDER — ATORVASTATIN CALCIUM 20 MG/1
20 TABLET, FILM COATED ORAL DAILY
COMMUNITY

## 2023-05-08 RX ORDER — PREDNISONE 1 MG/1
1 TABLET ORAL DAILY
COMMUNITY
End: 2023-05-12 | Stop reason: SDUPTHER

## 2023-05-08 RX ORDER — MONTELUKAST SODIUM 10 MG/1
10 TABLET ORAL NIGHTLY
COMMUNITY
End: 2023-05-08 | Stop reason: SDUPTHER

## 2023-05-08 RX ORDER — FLUTICASONE PROPIONATE 50 MCG
2 SPRAY, SUSPENSION (ML) NASAL DAILY
COMMUNITY

## 2023-05-12 DIAGNOSIS — J41.1 MUCOPURULENT CHRONIC BRONCHITIS (HCC): Primary | ICD-10-CM

## 2023-05-12 RX ORDER — PREDNISONE 20 MG/1
20 TABLET ORAL DAILY
Qty: 5 TABLET | Refills: 0 | Status: SHIPPED | OUTPATIENT
Start: 2023-05-12 | End: 2023-05-17

## 2023-05-17 ENCOUNTER — TELEPHONE (OUTPATIENT)
Facility: CLINIC | Age: 68
End: 2023-05-17

## 2023-05-17 DIAGNOSIS — J41.1 MUCOPURULENT CHRONIC BRONCHITIS (HCC): Primary | ICD-10-CM

## 2023-05-17 DIAGNOSIS — J06.9 ACUTE UPPER RESPIRATORY INFECTION, UNSPECIFIED: ICD-10-CM

## 2023-05-17 RX ORDER — AMOXICILLIN AND CLAVULANATE POTASSIUM 875; 125 MG/1; MG/1
1 TABLET, FILM COATED ORAL 2 TIMES DAILY
Qty: 20 TABLET | Refills: 0 | Status: SHIPPED | OUTPATIENT
Start: 2023-05-17 | End: 2023-05-27

## 2023-05-20 RX ORDER — GUAIFENESIN 600 MG/1
600 TABLET, EXTENDED RELEASE ORAL 2 TIMES DAILY
COMMUNITY
Start: 2023-04-20

## 2023-05-20 RX ORDER — DEXTROMETHORPHAN HYDROBROMIDE AND PROMETHAZINE HYDROCHLORIDE 15; 6.25 MG/5ML; MG/5ML
5 SYRUP ORAL
COMMUNITY
Start: 2023-04-20 | End: 2023-07-19 | Stop reason: ALTCHOICE

## 2023-05-20 RX ORDER — IPRATROPIUM BROMIDE AND ALBUTEROL SULFATE 2.5; .5 MG/3ML; MG/3ML
SOLUTION RESPIRATORY (INHALATION)
COMMUNITY
Start: 2021-10-06

## 2023-05-22 ENCOUNTER — HOSPITAL ENCOUNTER (OUTPATIENT)
Facility: HOSPITAL | Age: 68
Discharge: HOME | End: 2023-05-25
Payer: MEDICARE

## 2023-05-22 ENCOUNTER — TELEPHONE (OUTPATIENT)
Facility: CLINIC | Age: 68
End: 2023-05-22

## 2023-05-22 DIAGNOSIS — J41.1 MUCOPURULENT CHRONIC BRONCHITIS (HCC): Primary | ICD-10-CM

## 2023-05-22 PROCEDURE — 71046 X-RAY EXAM CHEST 2 VIEWS: CPT

## 2023-06-24 ENCOUNTER — APPOINTMENT (OUTPATIENT)
Facility: HOSPITAL | Age: 68
DRG: 140 | End: 2023-06-24
Attending: EMERGENCY MEDICINE
Payer: MEDICAID

## 2023-06-24 ENCOUNTER — HOSPITAL ENCOUNTER (INPATIENT)
Facility: HOSPITAL | Age: 68
LOS: 3 days | Discharge: HOME | DRG: 140 | End: 2023-06-28
Attending: EMERGENCY MEDICINE | Admitting: HOSPITALIST
Payer: MEDICAID

## 2023-06-24 DIAGNOSIS — J96.21 ACUTE ON CHRONIC RESPIRATORY FAILURE WITH HYPOXIA (HCC): ICD-10-CM

## 2023-06-24 DIAGNOSIS — R00.0 TACHYCARDIA: ICD-10-CM

## 2023-06-24 DIAGNOSIS — J44.1 COPD WITH ACUTE EXACERBATION (HCC): Primary | ICD-10-CM

## 2023-06-24 DIAGNOSIS — F41.9 ANXIETY: ICD-10-CM

## 2023-06-24 DIAGNOSIS — E87.1 HYPONATREMIA: ICD-10-CM

## 2023-06-24 PROBLEM — I10 HTN (HYPERTENSION): Status: ACTIVE | Noted: 2023-01-09

## 2023-06-24 PROBLEM — E83.42 HYPOMAGNESEMIA: Status: ACTIVE | Noted: 2023-06-24

## 2023-06-24 LAB
ALBUMIN SERPL-MCNC: 4 G/DL (ref 3.5–5)
ALBUMIN/GLOB SERPL: 1.1 (ref 1.1–2.2)
ALP SERPL-CCNC: 80 U/L (ref 45–117)
ALT SERPL-CCNC: 38 U/L (ref 12–78)
ANION GAP SERPL CALC-SCNC: 9 MMOL/L (ref 5–15)
ARTERIAL PATENCY WRIST A: YES
AST SERPL W P-5'-P-CCNC: 43 U/L (ref 15–37)
BASE DEFICIT BLDA-SCNC: 0.5 MMOL/L
BASOPHILS # BLD: 0 K/UL (ref 0–0.1)
BASOPHILS NFR BLD: 1 % (ref 0–1)
BDY SITE: ABNORMAL
BILIRUB SERPL-MCNC: 0.4 MG/DL (ref 0.2–1)
BNP SERPL-MCNC: 274 PG/ML
BUN SERPL-MCNC: 6 MG/DL (ref 6–20)
BUN/CREAT SERPL: 8 (ref 12–20)
CA-I BLD-MCNC: 9.3 MG/DL (ref 8.5–10.1)
CHLORIDE SERPL-SCNC: 90 MMOL/L (ref 97–108)
CO2 SERPL-SCNC: 29 MMOL/L (ref 21–32)
COHGB MFR BLD: 0.3 % (ref 1–2)
CREAT SERPL-MCNC: 0.77 MG/DL (ref 0.55–1.02)
DIFFERENTIAL METHOD BLD: ABNORMAL
EOSINOPHIL # BLD: 0 K/UL (ref 0–0.4)
EOSINOPHIL NFR BLD: 0 % (ref 0–7)
ERYTHROCYTE [DISTWIDTH] IN BLOOD BY AUTOMATED COUNT: 12.7 % (ref 11.5–14.5)
FIO2 ON VENT: 28 %
GAS FLOW.O2 O2 DELIVERY SYS: 2 L/MIN
GLOBULIN SER CALC-MCNC: 3.5 G/DL (ref 2–4)
GLUCOSE SERPL-MCNC: 125 MG/DL (ref 65–100)
HCO3 BLDA-SCNC: 24 MMOL/L (ref 22–26)
HCT VFR BLD AUTO: 28.7 % (ref 35–47)
HGB BLD-MCNC: 9.8 G/DL (ref 11.5–16)
IMM GRANULOCYTES # BLD AUTO: 0.1 K/UL (ref 0–0.04)
IMM GRANULOCYTES NFR BLD AUTO: 1 % (ref 0–0.5)
LYMPHOCYTES # BLD: 0.5 K/UL (ref 0.8–3.5)
LYMPHOCYTES NFR BLD: 7 % (ref 12–49)
MAGNESIUM SERPL-MCNC: 1.5 MG/DL (ref 1.6–2.4)
MCH RBC QN AUTO: 31 PG (ref 26–34)
MCHC RBC AUTO-ENTMCNC: 34.1 G/DL (ref 30–36.5)
MCV RBC AUTO: 90.8 FL (ref 80–99)
METHGB MFR BLD: 0.3 % (ref 0–1.4)
MONOCYTES # BLD: 0.6 K/UL (ref 0–1)
MONOCYTES NFR BLD: 9 % (ref 5–13)
NEUTS SEG # BLD: 5.4 K/UL (ref 1.8–8)
NEUTS SEG NFR BLD: 82 % (ref 32–75)
NRBC # BLD: 0 K/UL (ref 0–0.01)
NRBC BLD-RTO: 0 PER 100 WBC
OXYHGB MFR BLD: 96.1 % (ref 95–99)
PCO2 BLDA: 36 MMHG (ref 35–45)
PERFORMED BY:: ABNORMAL
PH BLDA: 7.43 (ref 7.35–7.45)
PLATELET # BLD AUTO: 222 K/UL (ref 150–400)
PMV BLD AUTO: 9.6 FL (ref 8.9–12.9)
PO2 BLDA: 93 MMHG (ref 80–100)
POTASSIUM SERPL-SCNC: 3.8 MMOL/L (ref 3.5–5.1)
PROT SERPL-MCNC: 7.5 G/DL (ref 6.4–8.2)
RBC # BLD AUTO: 3.16 M/UL (ref 3.8–5.2)
RSV AG NPH QL IA: POSITIVE
SAO2% DEVICE SAO2% SENSOR NAME: ABNORMAL
SODIUM SERPL-SCNC: 128 MMOL/L (ref 136–145)
SPECIMEN SITE: ABNORMAL
TROPONIN I SERPL HS-MCNC: 10 NG/L (ref 0–51)
TROPONIN I SERPL HS-MCNC: 6 NG/L (ref 0–51)
WBC # BLD AUTO: 6.6 K/UL (ref 3.6–11)

## 2023-06-24 PROCEDURE — 6360000002 HC RX W HCPCS: Performed by: HOSPITALIST

## 2023-06-24 PROCEDURE — 96375 TX/PRO/DX INJ NEW DRUG ADDON: CPT

## 2023-06-24 PROCEDURE — G0378 HOSPITAL OBSERVATION PER HR: HCPCS

## 2023-06-24 PROCEDURE — 6370000000 HC RX 637 (ALT 250 FOR IP): Performed by: EMERGENCY MEDICINE

## 2023-06-24 PROCEDURE — 71250 CT THORAX DX C-: CPT

## 2023-06-24 PROCEDURE — 96376 TX/PRO/DX INJ SAME DRUG ADON: CPT

## 2023-06-24 PROCEDURE — 6370000000 HC RX 637 (ALT 250 FOR IP): Performed by: HOSPITALIST

## 2023-06-24 PROCEDURE — 99285 EMERGENCY DEPT VISIT HI MDM: CPT

## 2023-06-24 PROCEDURE — 94762 N-INVAS EAR/PLS OXIMTRY CONT: CPT

## 2023-06-24 PROCEDURE — 96372 THER/PROPH/DIAG INJ SC/IM: CPT

## 2023-06-24 PROCEDURE — 83735 ASSAY OF MAGNESIUM: CPT

## 2023-06-24 PROCEDURE — 82803 BLOOD GASES ANY COMBINATION: CPT

## 2023-06-24 PROCEDURE — 71045 X-RAY EXAM CHEST 1 VIEW: CPT

## 2023-06-24 PROCEDURE — 84484 ASSAY OF TROPONIN QUANT: CPT

## 2023-06-24 PROCEDURE — 6360000002 HC RX W HCPCS: Performed by: EMERGENCY MEDICINE

## 2023-06-24 PROCEDURE — 83880 ASSAY OF NATRIURETIC PEPTIDE: CPT

## 2023-06-24 PROCEDURE — 36415 COLL VENOUS BLD VENIPUNCTURE: CPT

## 2023-06-24 PROCEDURE — 2580000003 HC RX 258: Performed by: EMERGENCY MEDICINE

## 2023-06-24 PROCEDURE — 87807 RSV ASSAY W/OPTIC: CPT

## 2023-06-24 PROCEDURE — 96366 THER/PROPH/DIAG IV INF ADDON: CPT

## 2023-06-24 PROCEDURE — 2580000003 HC RX 258: Performed by: HOSPITALIST

## 2023-06-24 PROCEDURE — 96365 THER/PROPH/DIAG IV INF INIT: CPT

## 2023-06-24 PROCEDURE — 80053 COMPREHEN METABOLIC PANEL: CPT

## 2023-06-24 PROCEDURE — 36600 WITHDRAWAL OF ARTERIAL BLOOD: CPT

## 2023-06-24 PROCEDURE — 94640 AIRWAY INHALATION TREATMENT: CPT

## 2023-06-24 PROCEDURE — 2500000003 HC RX 250 WO HCPCS: Performed by: EMERGENCY MEDICINE

## 2023-06-24 PROCEDURE — 96361 HYDRATE IV INFUSION ADD-ON: CPT

## 2023-06-24 PROCEDURE — 93005 ELECTROCARDIOGRAM TRACING: CPT | Performed by: HOSPITALIST

## 2023-06-24 PROCEDURE — 2700000000 HC OXYGEN THERAPY PER DAY

## 2023-06-24 PROCEDURE — 93005 ELECTROCARDIOGRAM TRACING: CPT | Performed by: EMERGENCY MEDICINE

## 2023-06-24 PROCEDURE — 85025 COMPLETE CBC W/AUTO DIFF WBC: CPT

## 2023-06-24 RX ORDER — ATORVASTATIN CALCIUM 20 MG/1
20 TABLET, FILM COATED ORAL NIGHTLY
Status: DISCONTINUED | OUTPATIENT
Start: 2023-06-24 | End: 2023-06-27

## 2023-06-24 RX ORDER — MONTELUKAST SODIUM 10 MG/1
10 TABLET ORAL NIGHTLY
Status: DISCONTINUED | OUTPATIENT
Start: 2023-06-24 | End: 2023-06-28 | Stop reason: HOSPADM

## 2023-06-24 RX ORDER — CETIRIZINE HYDROCHLORIDE 10 MG/1
10 TABLET ORAL DAILY
Status: DISCONTINUED | OUTPATIENT
Start: 2023-06-24 | End: 2023-06-28 | Stop reason: HOSPADM

## 2023-06-24 RX ORDER — 0.9 % SODIUM CHLORIDE 0.9 %
1000 INTRAVENOUS SOLUTION INTRAVENOUS ONCE
Status: COMPLETED | OUTPATIENT
Start: 2023-06-24 | End: 2023-06-24

## 2023-06-24 RX ORDER — METOPROLOL TARTRATE 5 MG/5ML
10 INJECTION INTRAVENOUS
Status: COMPLETED | OUTPATIENT
Start: 2023-06-24 | End: 2023-06-24

## 2023-06-24 RX ORDER — MAGNESIUM SULFATE IN WATER 40 MG/ML
2000 INJECTION, SOLUTION INTRAVENOUS ONCE
Status: COMPLETED | OUTPATIENT
Start: 2023-06-24 | End: 2023-06-24

## 2023-06-24 RX ORDER — BUDESONIDE 0.5 MG/2ML
0.5 INHALANT ORAL 2 TIMES DAILY
Status: DISCONTINUED | OUTPATIENT
Start: 2023-06-24 | End: 2023-06-28 | Stop reason: HOSPADM

## 2023-06-24 RX ORDER — FLUTICASONE PROPIONATE 50 MCG
2 SPRAY, SUSPENSION (ML) NASAL DAILY
Status: DISCONTINUED | OUTPATIENT
Start: 2023-06-24 | End: 2023-06-28 | Stop reason: HOSPADM

## 2023-06-24 RX ORDER — IPRATROPIUM BROMIDE AND ALBUTEROL SULFATE 2.5; .5 MG/3ML; MG/3ML
1 SOLUTION RESPIRATORY (INHALATION) EVERY 4 HOURS
Status: DISCONTINUED | OUTPATIENT
Start: 2023-06-24 | End: 2023-06-24

## 2023-06-24 RX ORDER — IPRATROPIUM BROMIDE AND ALBUTEROL SULFATE 2.5; .5 MG/3ML; MG/3ML
1 SOLUTION RESPIRATORY (INHALATION)
Status: COMPLETED | OUTPATIENT
Start: 2023-06-24 | End: 2023-06-24

## 2023-06-24 RX ORDER — SODIUM CHLORIDE 0.9 % (FLUSH) 0.9 %
5-40 SYRINGE (ML) INJECTION PRN
Status: DISCONTINUED | OUTPATIENT
Start: 2023-06-24 | End: 2023-06-28 | Stop reason: HOSPADM

## 2023-06-24 RX ORDER — SODIUM CHLORIDE AND POTASSIUM CHLORIDE 150; 900 MG/100ML; MG/100ML
INJECTION, SOLUTION INTRAVENOUS CONTINUOUS
Status: DISCONTINUED | OUTPATIENT
Start: 2023-06-24 | End: 2023-06-25

## 2023-06-24 RX ORDER — AMLODIPINE BESYLATE 10 MG/1
10 TABLET ORAL DAILY
Status: DISCONTINUED | OUTPATIENT
Start: 2023-06-25 | End: 2023-06-25

## 2023-06-24 RX ORDER — POLYETHYLENE GLYCOL 3350 17 G/17G
17 POWDER, FOR SOLUTION ORAL DAILY PRN
Status: DISCONTINUED | OUTPATIENT
Start: 2023-06-24 | End: 2023-06-28 | Stop reason: HOSPADM

## 2023-06-24 RX ORDER — CODEINE PHOSPHATE AND GUAIFENESIN 10; 100 MG/5ML; MG/5ML
10 SOLUTION ORAL EVERY 4 HOURS PRN
Status: DISCONTINUED | OUTPATIENT
Start: 2023-06-24 | End: 2023-06-24

## 2023-06-24 RX ORDER — SODIUM CHLORIDE 9 MG/ML
INJECTION, SOLUTION INTRAVENOUS PRN
Status: DISCONTINUED | OUTPATIENT
Start: 2023-06-24 | End: 2023-06-28 | Stop reason: HOSPADM

## 2023-06-24 RX ORDER — ALPRAZOLAM 0.25 MG/1
0.25 TABLET ORAL EVERY 8 HOURS PRN
Status: ON HOLD | COMMUNITY
End: 2023-06-25

## 2023-06-24 RX ORDER — ONDANSETRON 2 MG/ML
4 INJECTION INTRAMUSCULAR; INTRAVENOUS EVERY 6 HOURS PRN
Status: DISCONTINUED | OUTPATIENT
Start: 2023-06-24 | End: 2023-06-28 | Stop reason: HOSPADM

## 2023-06-24 RX ORDER — GUAIFENESIN 600 MG/1
600 TABLET, EXTENDED RELEASE ORAL 2 TIMES DAILY
Status: DISCONTINUED | OUTPATIENT
Start: 2023-06-24 | End: 2023-06-24

## 2023-06-24 RX ORDER — ALPRAZOLAM 0.25 MG/1
0.25 TABLET ORAL 3 TIMES DAILY PRN
Status: DISCONTINUED | OUTPATIENT
Start: 2023-06-24 | End: 2023-06-28 | Stop reason: HOSPADM

## 2023-06-24 RX ORDER — PANTOPRAZOLE SODIUM 40 MG/1
40 TABLET, DELAYED RELEASE ORAL
Status: DISCONTINUED | OUTPATIENT
Start: 2023-06-25 | End: 2023-06-27

## 2023-06-24 RX ORDER — ONDANSETRON 4 MG/1
4 TABLET, ORALLY DISINTEGRATING ORAL EVERY 8 HOURS PRN
Status: DISCONTINUED | OUTPATIENT
Start: 2023-06-24 | End: 2023-06-28 | Stop reason: HOSPADM

## 2023-06-24 RX ORDER — IPRATROPIUM BROMIDE AND ALBUTEROL SULFATE 2.5; .5 MG/3ML; MG/3ML
1 SOLUTION RESPIRATORY (INHALATION) EVERY 4 HOURS
Status: DISCONTINUED | OUTPATIENT
Start: 2023-06-24 | End: 2023-06-25

## 2023-06-24 RX ORDER — BENZONATATE 100 MG/1
200 CAPSULE ORAL 3 TIMES DAILY
Status: DISCONTINUED | OUTPATIENT
Start: 2023-06-24 | End: 2023-06-28 | Stop reason: HOSPADM

## 2023-06-24 RX ORDER — ENOXAPARIN SODIUM 100 MG/ML
40 INJECTION SUBCUTANEOUS DAILY
Status: DISCONTINUED | OUTPATIENT
Start: 2023-06-24 | End: 2023-06-28 | Stop reason: HOSPADM

## 2023-06-24 RX ORDER — IPRATROPIUM BROMIDE AND ALBUTEROL SULFATE 2.5; .5 MG/3ML; MG/3ML
1 SOLUTION RESPIRATORY (INHALATION)
Status: DISPENSED | OUTPATIENT
Start: 2023-06-24 | End: 2023-06-24

## 2023-06-24 RX ORDER — GUAIFENESIN/DEXTROMETHORPHAN 100-10MG/5
10 SYRUP ORAL EVERY 6 HOURS
Status: COMPLETED | OUTPATIENT
Start: 2023-06-24 | End: 2023-06-25

## 2023-06-24 RX ORDER — SODIUM CHLORIDE 0.9 % (FLUSH) 0.9 %
5-40 SYRINGE (ML) INJECTION EVERY 12 HOURS SCHEDULED
Status: DISCONTINUED | OUTPATIENT
Start: 2023-06-24 | End: 2023-06-28 | Stop reason: HOSPADM

## 2023-06-24 RX ORDER — SODIUM CHLORIDE, SODIUM LACTATE, POTASSIUM CHLORIDE, CALCIUM CHLORIDE 600; 310; 30; 20 MG/100ML; MG/100ML; MG/100ML; MG/100ML
INJECTION, SOLUTION INTRAVENOUS CONTINUOUS
Status: DISCONTINUED | OUTPATIENT
Start: 2023-06-24 | End: 2023-06-24

## 2023-06-24 RX ORDER — ACETAMINOPHEN 650 MG/1
650 SUPPOSITORY RECTAL EVERY 6 HOURS PRN
Status: DISCONTINUED | OUTPATIENT
Start: 2023-06-24 | End: 2023-06-28 | Stop reason: HOSPADM

## 2023-06-24 RX ORDER — ACETAMINOPHEN 325 MG/1
650 TABLET ORAL EVERY 6 HOURS PRN
Status: DISCONTINUED | OUTPATIENT
Start: 2023-06-24 | End: 2023-06-28 | Stop reason: HOSPADM

## 2023-06-24 RX ORDER — DOXYCYCLINE 100 MG/1
100 CAPSULE ORAL EVERY 12 HOURS SCHEDULED
Status: DISCONTINUED | OUTPATIENT
Start: 2023-06-24 | End: 2023-06-28 | Stop reason: HOSPADM

## 2023-06-24 RX ADMIN — Medication 1 LOZENGE: at 20:21

## 2023-06-24 RX ADMIN — DOXYCYCLINE 100 MG: 100 CAPSULE ORAL at 20:20

## 2023-06-24 RX ADMIN — POTASSIUM CHLORIDE AND SODIUM CHLORIDE: 900; 150 INJECTION, SOLUTION INTRAVENOUS at 16:26

## 2023-06-24 RX ADMIN — IPRATROPIUM BROMIDE AND ALBUTEROL SULFATE 1 DOSE: .5; 3 SOLUTION RESPIRATORY (INHALATION) at 13:00

## 2023-06-24 RX ADMIN — MAGNESIUM SULFATE HEPTAHYDRATE 2000 MG: 40 INJECTION, SOLUTION INTRAVENOUS at 13:48

## 2023-06-24 RX ADMIN — GUAIFENESIN AND DEXTROMETHORPHAN 10 ML: 100; 10 SYRUP ORAL at 23:36

## 2023-06-24 RX ADMIN — CETIRIZINE HYDROCHLORIDE 10 MG: 10 TABLET, FILM COATED ORAL at 16:27

## 2023-06-24 RX ADMIN — BUDESONIDE 500 MCG: 0.5 SUSPENSION RESPIRATORY (INHALATION) at 16:24

## 2023-06-24 RX ADMIN — SODIUM CHLORIDE, PRESERVATIVE FREE 10 ML: 5 INJECTION INTRAVENOUS at 20:21

## 2023-06-24 RX ADMIN — BENZONATATE 200 MG: 100 CAPSULE ORAL at 20:21

## 2023-06-24 RX ADMIN — MONTELUKAST 10 MG: 10 TABLET, FILM COATED ORAL at 20:21

## 2023-06-24 RX ADMIN — ATORVASTATIN CALCIUM 20 MG: 20 TABLET, FILM COATED ORAL at 20:20

## 2023-06-24 RX ADMIN — IPRATROPIUM BROMIDE AND ALBUTEROL SULFATE 1 DOSE: .5; 3 SOLUTION RESPIRATORY (INHALATION) at 10:06

## 2023-06-24 RX ADMIN — GUAIFENESIN AND DEXTROMETHORPHAN 10 ML: 100; 10 SYRUP ORAL at 17:46

## 2023-06-24 RX ADMIN — ENOXAPARIN SODIUM 40 MG: 100 INJECTION SUBCUTANEOUS at 16:26

## 2023-06-24 RX ADMIN — METHYLPREDNISOLONE SODIUM SUCCINATE 125 MG: 125 INJECTION INTRAMUSCULAR; INTRAVENOUS at 10:10

## 2023-06-24 RX ADMIN — METOPROLOL TARTRATE 10 MG: 5 INJECTION INTRAVENOUS at 12:24

## 2023-06-24 RX ADMIN — SODIUM CHLORIDE 1000 ML: 9 INJECTION, SOLUTION INTRAVENOUS at 10:10

## 2023-06-24 RX ADMIN — IPRATROPIUM BROMIDE AND ALBUTEROL SULFATE 1 DOSE: .5; 3 SOLUTION RESPIRATORY (INHALATION) at 21:20

## 2023-06-24 RX ADMIN — METHYLPREDNISOLONE SODIUM SUCCINATE 80 MG: 125 INJECTION INTRAMUSCULAR; INTRAVENOUS at 16:27

## 2023-06-24 RX ADMIN — GUAIFENESIN AND CODEINE PHOSPHATE 10 ML: 100; 10 SOLUTION ORAL at 13:48

## 2023-06-24 RX ADMIN — METHYLPREDNISOLONE SODIUM SUCCINATE 60 MG: 125 INJECTION INTRAMUSCULAR; INTRAVENOUS at 23:35

## 2023-06-24 RX ADMIN — IPRATROPIUM BROMIDE AND ALBUTEROL SULFATE 1 DOSE: .5; 3 SOLUTION RESPIRATORY (INHALATION) at 16:24

## 2023-06-24 ASSESSMENT — PAIN SCALES - GENERAL
PAINLEVEL_OUTOF10: 2
PAINLEVEL_OUTOF10: 0

## 2023-06-24 ASSESSMENT — ENCOUNTER SYMPTOMS
WHEEZING: 1
GASTROINTESTINAL NEGATIVE: 1
COUGH: 1
EYES NEGATIVE: 1
SHORTNESS OF BREATH: 1
ALLERGIC/IMMUNOLOGIC NEGATIVE: 1

## 2023-06-24 ASSESSMENT — PAIN DESCRIPTION - LOCATION: LOCATION: THROAT

## 2023-06-24 ASSESSMENT — PAIN - FUNCTIONAL ASSESSMENT
PAIN_FUNCTIONAL_ASSESSMENT: 0-10
PAIN_FUNCTIONAL_ASSESSMENT: ACTIVITIES ARE NOT PREVENTED

## 2023-06-24 ASSESSMENT — PAIN DESCRIPTION - DESCRIPTORS: DESCRIPTORS: ACHING

## 2023-06-24 ASSESSMENT — PAIN DESCRIPTION - ORIENTATION: ORIENTATION: OTHER (COMMENT)

## 2023-06-25 PROBLEM — D64.9 ANEMIA: Status: ACTIVE | Noted: 2023-06-25

## 2023-06-25 LAB
ANION GAP SERPL CALC-SCNC: 7 MMOL/L (ref 5–15)
BASOPHILS # BLD: 0 K/UL (ref 0–0.1)
BASOPHILS NFR BLD: 0 % (ref 0–1)
BUN SERPL-MCNC: 6 MG/DL (ref 6–20)
BUN/CREAT SERPL: 10 (ref 12–20)
CA-I BLD-MCNC: 8.8 MG/DL (ref 8.5–10.1)
CHLORIDE SERPL-SCNC: 95 MMOL/L (ref 97–108)
CO2 SERPL-SCNC: 29 MMOL/L (ref 21–32)
CREAT SERPL-MCNC: 0.58 MG/DL (ref 0.55–1.02)
D DIMER PPP FEU-MCNC: 0.84 UG/ML(FEU)
DIFFERENTIAL METHOD BLD: ABNORMAL
EOSINOPHIL # BLD: 0 K/UL (ref 0–0.4)
EOSINOPHIL NFR BLD: 0 % (ref 0–7)
ERYTHROCYTE [DISTWIDTH] IN BLOOD BY AUTOMATED COUNT: 12.7 % (ref 11.5–14.5)
FERRITIN SERPL-MCNC: 373 NG/ML (ref 26–388)
GLUCOSE SERPL-MCNC: 166 MG/DL (ref 65–100)
HCT VFR BLD AUTO: 25.8 % (ref 35–47)
HGB BLD-MCNC: 8.9 G/DL (ref 11.5–16)
IMM GRANULOCYTES # BLD AUTO: 0 K/UL (ref 0–0.04)
IMM GRANULOCYTES NFR BLD AUTO: 1 % (ref 0–0.5)
IRON SATN MFR SERPL: 13 % (ref 20–50)
IRON SERPL-MCNC: 23 UG/DL (ref 35–150)
LYMPHOCYTES # BLD: 0.3 K/UL (ref 0.8–3.5)
LYMPHOCYTES NFR BLD: 5 % (ref 12–49)
MAGNESIUM SERPL-MCNC: 1.9 MG/DL (ref 1.6–2.4)
MCH RBC QN AUTO: 31.3 PG (ref 26–34)
MCHC RBC AUTO-ENTMCNC: 34.5 G/DL (ref 30–36.5)
MCV RBC AUTO: 90.8 FL (ref 80–99)
MONOCYTES # BLD: 0.1 K/UL (ref 0–1)
MONOCYTES NFR BLD: 2 % (ref 5–13)
NEUTS SEG # BLD: 6.2 K/UL (ref 1.8–8)
NEUTS SEG NFR BLD: 92 % (ref 32–75)
NRBC # BLD: 0 K/UL (ref 0–0.01)
NRBC BLD-RTO: 0 PER 100 WBC
PLATELET # BLD AUTO: 192 K/UL (ref 150–400)
PMV BLD AUTO: 9.4 FL (ref 8.9–12.9)
POTASSIUM SERPL-SCNC: 3.9 MMOL/L (ref 3.5–5.1)
RBC # BLD AUTO: 2.84 M/UL (ref 3.8–5.2)
SODIUM SERPL-SCNC: 131 MMOL/L (ref 136–145)
T4 FREE SERPL-MCNC: 1 NG/DL (ref 0.8–1.5)
TIBC SERPL-MCNC: 180 UG/DL (ref 250–450)
TSH SERPL DL<=0.05 MIU/L-ACNC: 0.24 UIU/ML (ref 0.36–3.74)
WBC # BLD AUTO: 6.6 K/UL (ref 3.6–11)

## 2023-06-25 PROCEDURE — 87077 CULTURE AEROBIC IDENTIFY: CPT

## 2023-06-25 PROCEDURE — 83735 ASSAY OF MAGNESIUM: CPT

## 2023-06-25 PROCEDURE — 94760 N-INVAS EAR/PLS OXIMETRY 1: CPT

## 2023-06-25 PROCEDURE — 83540 ASSAY OF IRON: CPT

## 2023-06-25 PROCEDURE — 85025 COMPLETE CBC W/AUTO DIFF WBC: CPT

## 2023-06-25 PROCEDURE — 96376 TX/PRO/DX INJ SAME DRUG ADON: CPT

## 2023-06-25 PROCEDURE — 1100000000 HC RM PRIVATE

## 2023-06-25 PROCEDURE — 80048 BASIC METABOLIC PNL TOTAL CA: CPT

## 2023-06-25 PROCEDURE — 6370000000 HC RX 637 (ALT 250 FOR IP): Performed by: HOSPITALIST

## 2023-06-25 PROCEDURE — 2580000003 HC RX 258: Performed by: HOSPITALIST

## 2023-06-25 PROCEDURE — 82728 ASSAY OF FERRITIN: CPT

## 2023-06-25 PROCEDURE — 94640 AIRWAY INHALATION TREATMENT: CPT

## 2023-06-25 PROCEDURE — G0378 HOSPITAL OBSERVATION PER HR: HCPCS

## 2023-06-25 PROCEDURE — 2700000000 HC OXYGEN THERAPY PER DAY

## 2023-06-25 PROCEDURE — 87070 CULTURE OTHR SPECIMN AEROBIC: CPT

## 2023-06-25 PROCEDURE — 6360000002 HC RX W HCPCS: Performed by: HOSPITALIST

## 2023-06-25 PROCEDURE — 87186 SC STD MICRODIL/AGAR DIL: CPT

## 2023-06-25 PROCEDURE — 85379 FIBRIN DEGRADATION QUANT: CPT

## 2023-06-25 PROCEDURE — 96372 THER/PROPH/DIAG INJ SC/IM: CPT

## 2023-06-25 PROCEDURE — 87205 SMEAR GRAM STAIN: CPT

## 2023-06-25 RX ORDER — MAGNESIUM OXIDE 400 MG/1
400 TABLET ORAL DAILY
Status: DISCONTINUED | OUTPATIENT
Start: 2023-06-25 | End: 2023-06-28 | Stop reason: HOSPADM

## 2023-06-25 RX ORDER — IPRATROPIUM BROMIDE AND ALBUTEROL SULFATE 2.5; .5 MG/3ML; MG/3ML
1 SOLUTION RESPIRATORY (INHALATION)
Status: DISCONTINUED | OUTPATIENT
Start: 2023-06-25 | End: 2023-06-28 | Stop reason: HOSPADM

## 2023-06-25 RX ORDER — GUAIFENESIN/DEXTROMETHORPHAN 100-10MG/5
10 SYRUP ORAL EVERY 6 HOURS
Status: COMPLETED | OUTPATIENT
Start: 2023-06-25 | End: 2023-06-26

## 2023-06-25 RX ORDER — PREDNISONE 10 MG/1
10 TABLET ORAL
Status: ON HOLD | COMMUNITY
End: 2023-06-28 | Stop reason: HOSPADM

## 2023-06-25 RX ADMIN — SALINE NASAL SPRAY 2 SPRAY: 1.5 SOLUTION NASAL at 20:14

## 2023-06-25 RX ADMIN — IPRATROPIUM BROMIDE AND ALBUTEROL SULFATE 1 DOSE: .5; 3 SOLUTION RESPIRATORY (INHALATION) at 00:30

## 2023-06-25 RX ADMIN — MONTELUKAST 10 MG: 10 TABLET, FILM COATED ORAL at 20:06

## 2023-06-25 RX ADMIN — BUDESONIDE 500 MCG: 0.5 SUSPENSION RESPIRATORY (INHALATION) at 19:04

## 2023-06-25 RX ADMIN — POTASSIUM CHLORIDE AND SODIUM CHLORIDE: 900; 150 INJECTION, SOLUTION INTRAVENOUS at 02:15

## 2023-06-25 RX ADMIN — BENZONATATE 200 MG: 100 CAPSULE ORAL at 08:32

## 2023-06-25 RX ADMIN — DILTIAZEM HYDROCHLORIDE 30 MG: 30 TABLET, FILM COATED ORAL at 11:36

## 2023-06-25 RX ADMIN — GUAIFENESIN AND DEXTROMETHORPHAN 10 ML: 100; 10 SYRUP ORAL at 06:10

## 2023-06-25 RX ADMIN — WATER 60 MG: 1 INJECTION INTRAMUSCULAR; INTRAVENOUS; SUBCUTANEOUS at 19:40

## 2023-06-25 RX ADMIN — Medication 1 LOZENGE: at 08:32

## 2023-06-25 RX ADMIN — BENZONATATE 200 MG: 100 CAPSULE ORAL at 14:57

## 2023-06-25 RX ADMIN — IPRATROPIUM BROMIDE AND ALBUTEROL SULFATE 1 DOSE: .5; 3 SOLUTION RESPIRATORY (INHALATION) at 11:38

## 2023-06-25 RX ADMIN — ATORVASTATIN CALCIUM 20 MG: 20 TABLET, FILM COATED ORAL at 20:06

## 2023-06-25 RX ADMIN — ENOXAPARIN SODIUM 40 MG: 100 INJECTION SUBCUTANEOUS at 08:31

## 2023-06-25 RX ADMIN — DILTIAZEM HYDROCHLORIDE 30 MG: 30 TABLET, FILM COATED ORAL at 23:55

## 2023-06-25 RX ADMIN — BUDESONIDE 500 MCG: 0.5 SUSPENSION RESPIRATORY (INHALATION) at 07:38

## 2023-06-25 RX ADMIN — GUAIFENESIN AND DEXTROMETHORPHAN 10 ML: 100; 10 SYRUP ORAL at 17:27

## 2023-06-25 RX ADMIN — IPRATROPIUM BROMIDE AND ALBUTEROL SULFATE 1 DOSE: .5; 3 SOLUTION RESPIRATORY (INHALATION) at 19:04

## 2023-06-25 RX ADMIN — SODIUM CHLORIDE, PRESERVATIVE FREE 10 ML: 5 INJECTION INTRAVENOUS at 08:31

## 2023-06-25 RX ADMIN — IPRATROPIUM BROMIDE AND ALBUTEROL SULFATE 1 DOSE: .5; 3 SOLUTION RESPIRATORY (INHALATION) at 07:38

## 2023-06-25 RX ADMIN — IPRATROPIUM BROMIDE AND ALBUTEROL SULFATE 1 DOSE: .5; 3 SOLUTION RESPIRATORY (INHALATION) at 15:35

## 2023-06-25 RX ADMIN — Medication 1 LOZENGE: at 20:06

## 2023-06-25 RX ADMIN — CETIRIZINE HYDROCHLORIDE 10 MG: 10 TABLET, FILM COATED ORAL at 08:32

## 2023-06-25 RX ADMIN — METHYLPREDNISOLONE SODIUM SUCCINATE 60 MG: 125 INJECTION INTRAMUSCULAR; INTRAVENOUS at 07:04

## 2023-06-25 RX ADMIN — MAGNESIUM OXIDE 400 MG: 400 TABLET ORAL at 08:32

## 2023-06-25 RX ADMIN — DILTIAZEM HYDROCHLORIDE 30 MG: 30 TABLET, FILM COATED ORAL at 17:27

## 2023-06-25 RX ADMIN — Medication 1 LOZENGE: at 14:57

## 2023-06-25 RX ADMIN — DOXYCYCLINE 100 MG: 100 CAPSULE ORAL at 20:13

## 2023-06-25 RX ADMIN — GUAIFENESIN AND DEXTROMETHORPHAN 10 ML: 100; 10 SYRUP ORAL at 11:36

## 2023-06-25 RX ADMIN — BENZONATATE 200 MG: 100 CAPSULE ORAL at 20:06

## 2023-06-25 RX ADMIN — GUAIFENESIN AND DEXTROMETHORPHAN 10 ML: 100; 10 SYRUP ORAL at 23:55

## 2023-06-25 RX ADMIN — PANTOPRAZOLE SODIUM 40 MG: 40 TABLET, DELAYED RELEASE ORAL at 07:04

## 2023-06-25 RX ADMIN — SALINE NASAL SPRAY 2 SPRAY: 1.5 SOLUTION NASAL at 08:32

## 2023-06-25 RX ADMIN — AMLODIPINE BESYLATE 10 MG: 10 TABLET ORAL at 08:32

## 2023-06-25 RX ADMIN — DOXYCYCLINE 100 MG: 100 CAPSULE ORAL at 08:32

## 2023-06-25 RX ADMIN — FLUTICASONE PROPIONATE 2 SPRAY: 50 SPRAY, METERED NASAL at 08:32

## 2023-06-25 RX ADMIN — SODIUM CHLORIDE, PRESERVATIVE FREE 10 ML: 5 INJECTION INTRAVENOUS at 20:14

## 2023-06-25 ASSESSMENT — PAIN SCALES - GENERAL
PAINLEVEL_OUTOF10: 0

## 2023-06-25 ASSESSMENT — ENCOUNTER SYMPTOMS
GASTROINTESTINAL NEGATIVE: 1
ALLERGIC/IMMUNOLOGIC NEGATIVE: 1
SHORTNESS OF BREATH: 1
EYES NEGATIVE: 1
COUGH: 1
WHEEZING: 1

## 2023-06-26 ENCOUNTER — APPOINTMENT (OUTPATIENT)
Facility: HOSPITAL | Age: 68
DRG: 140 | End: 2023-06-26
Attending: HOSPITALIST
Payer: MEDICAID

## 2023-06-26 LAB
ANION GAP SERPL CALC-SCNC: 5 MMOL/L (ref 5–15)
BASOPHILS # BLD: 0 K/UL (ref 0–0.1)
BASOPHILS NFR BLD: 0 % (ref 0–1)
BUN SERPL-MCNC: 7 MG/DL (ref 6–20)
BUN/CREAT SERPL: 12 (ref 12–20)
CA-I BLD-MCNC: 9.7 MG/DL (ref 8.5–10.1)
CHLORIDE SERPL-SCNC: 96 MMOL/L (ref 97–108)
CO2 SERPL-SCNC: 32 MMOL/L (ref 21–32)
CREAT SERPL-MCNC: 0.59 MG/DL (ref 0.55–1.02)
DIFFERENTIAL METHOD BLD: ABNORMAL
ECHO AO ROOT DIAM: 3.2 CM
ECHO AO ROOT INDEX: 1.83 CM/M2
ECHO AV AREA PEAK VELOCITY: 2.5 CM2
ECHO AV AREA VTI: 2.7 CM2
ECHO AV AREA/BSA PEAK VELOCITY: 1.4 CM2/M2
ECHO AV AREA/BSA VTI: 1.5 CM2/M2
ECHO AV MEAN GRADIENT: 6 MMHG
ECHO AV MEAN VELOCITY: 1.2 M/S
ECHO AV PEAK GRADIENT: 8 MMHG
ECHO AV PEAK VELOCITY: 1.4 M/S
ECHO AV VELOCITY RATIO: 0.79
ECHO AV VTI: 26.7 CM
ECHO BSA: 1.74 M2
ECHO EST RA PRESSURE: 3 MMHG
ECHO LA DIAMETER INDEX: 1.6 CM/M2
ECHO LA DIAMETER: 2.8 CM
ECHO LA TO AORTIC ROOT RATIO: 0.88
ECHO LA VOL 2C: 22 ML (ref 22–52)
ECHO LA VOL 4C: 22 ML (ref 22–52)
ECHO LA VOL BP: 22 ML (ref 22–52)
ECHO LA VOL/BSA BIPLANE: 13 ML/M2 (ref 16–34)
ECHO LA VOLUME AREA LENGTH: 23 ML
ECHO LA VOLUME INDEX A2C: 13 ML/M2 (ref 16–34)
ECHO LA VOLUME INDEX A4C: 13 ML/M2 (ref 16–34)
ECHO LA VOLUME INDEX AREA LENGTH: 13 ML/M2 (ref 16–34)
ECHO LV E' LATERAL VELOCITY: 9 CM/S
ECHO LV EDV A2C: 24 ML
ECHO LV EDV A4C: 37 ML
ECHO LV EDV BP: 29 ML (ref 56–104)
ECHO LV EDV INDEX A4C: 21 ML/M2
ECHO LV EDV INDEX BP: 17 ML/M2
ECHO LV EDV NDEX A2C: 14 ML/M2
ECHO LV EJECTION FRACTION A2C: 67 %
ECHO LV EJECTION FRACTION A4C: 78 %
ECHO LV EJECTION FRACTION BIPLANE: 72 % (ref 55–100)
ECHO LV ESV A2C: 8 ML
ECHO LV ESV A4C: 8 ML
ECHO LV ESV BP: 8 ML (ref 19–49)
ECHO LV ESV INDEX A2C: 5 ML/M2
ECHO LV ESV INDEX A4C: 5 ML/M2
ECHO LV ESV INDEX BP: 5 ML/M2
ECHO LV FRACTIONAL SHORTENING: 41 % (ref 28–44)
ECHO LV INTERNAL DIMENSION DIASTOLE INDEX: 2.51 CM/M2
ECHO LV INTERNAL DIMENSION DIASTOLIC: 4.4 CM (ref 3.9–5.3)
ECHO LV INTERNAL DIMENSION SYSTOLIC INDEX: 1.49 CM/M2
ECHO LV INTERNAL DIMENSION SYSTOLIC: 2.6 CM
ECHO LV IVSD: 0.8 CM (ref 0.6–0.9)
ECHO LV MASS 2D: 109.4 G (ref 67–162)
ECHO LV MASS INDEX 2D: 62.5 G/M2 (ref 43–95)
ECHO LV POSTERIOR WALL DIASTOLIC: 0.8 CM (ref 0.6–0.9)
ECHO LV RELATIVE WALL THICKNESS RATIO: 0.36
ECHO LVOT AREA: 3.5 CM2
ECHO LVOT AV VTI INDEX: 0.8
ECHO LVOT DIAM: 2.1 CM
ECHO LVOT MEAN GRADIENT: 2 MMHG
ECHO LVOT PEAK GRADIENT: 5 MMHG
ECHO LVOT PEAK VELOCITY: 1.1 M/S
ECHO LVOT STROKE VOLUME INDEX: 42.3 ML/M2
ECHO LVOT SV: 74.1 ML
ECHO LVOT VTI: 21.4 CM
ECHO MV A VELOCITY: 1 M/S
ECHO MV E DECELERATION TIME (DT): 134.7 MS
ECHO MV E VELOCITY: 0.7 M/S
ECHO MV E/A RATIO: 0.7
ECHO MV E/E' LATERAL: 7.78
ECHO RA AREA 4C: 7.4 CM2
ECHO RIGHT VENTRICULAR SYSTOLIC PRESSURE (RVSP): 23 MMHG
ECHO RV FREE WALL PEAK S': 16 CM/S
ECHO RV INTERNAL DIMENSION: 2.2 CM
ECHO RV TAPSE: 1.3 CM (ref 1.7–?)
ECHO TV REGURGITANT MAX VELOCITY: 2.21 M/S
ECHO TV REGURGITANT PEAK GRADIENT: 20 MMHG
EKG ATRIAL RATE: 107 BPM
EKG ATRIAL RATE: 130 BPM
EKG DIAGNOSIS: NORMAL
EKG DIAGNOSIS: NORMAL
EKG P AXIS: 72 DEGREES
EKG P AXIS: 88 DEGREES
EKG P-R INTERVAL: 160 MS
EKG P-R INTERVAL: 183 MS
EKG Q-T INTERVAL: 270 MS
EKG Q-T INTERVAL: 308 MS
EKG QRS DURATION: 75 MS
EKG QRS DURATION: 76 MS
EKG QTC CALCULATION (BAZETT): 377 MS
EKG QTC CALCULATION (BAZETT): 411 MS
EKG R AXIS: 57 DEGREES
EKG R AXIS: 59 DEGREES
EKG T AXIS: 34 DEGREES
EKG T AXIS: 59 DEGREES
EKG VENTRICULAR RATE: 107 BPM
EKG VENTRICULAR RATE: 117 BPM
EOSINOPHIL # BLD: 0 K/UL (ref 0–0.4)
EOSINOPHIL NFR BLD: 0 % (ref 0–7)
ERYTHROCYTE [DISTWIDTH] IN BLOOD BY AUTOMATED COUNT: 12.7 % (ref 11.5–14.5)
GLUCOSE SERPL-MCNC: 141 MG/DL (ref 65–100)
HCT VFR BLD AUTO: 29.1 % (ref 35–47)
HGB BLD-MCNC: 9.8 G/DL (ref 11.5–16)
IMM GRANULOCYTES # BLD AUTO: 0.1 K/UL (ref 0–0.04)
IMM GRANULOCYTES NFR BLD AUTO: 1 % (ref 0–0.5)
LYMPHOCYTES # BLD: 0.5 K/UL (ref 0.8–3.5)
LYMPHOCYTES NFR BLD: 4 % (ref 12–49)
MCH RBC QN AUTO: 31 PG (ref 26–34)
MCHC RBC AUTO-ENTMCNC: 33.7 G/DL (ref 30–36.5)
MCV RBC AUTO: 92.1 FL (ref 80–99)
MONOCYTES # BLD: 0.4 K/UL (ref 0–1)
MONOCYTES NFR BLD: 3 % (ref 5–13)
NEUTS SEG # BLD: 10.7 K/UL (ref 1.8–8)
NEUTS SEG NFR BLD: 92 % (ref 32–75)
NRBC # BLD: 0 K/UL (ref 0–0.01)
NRBC BLD-RTO: 0 PER 100 WBC
PLATELET # BLD AUTO: 254 K/UL (ref 150–400)
PMV BLD AUTO: 9.9 FL (ref 8.9–12.9)
POTASSIUM SERPL-SCNC: 3.6 MMOL/L (ref 3.5–5.1)
RBC # BLD AUTO: 3.16 M/UL (ref 3.8–5.2)
RBC MORPH BLD: ABNORMAL
SODIUM SERPL-SCNC: 133 MMOL/L (ref 136–145)
WBC # BLD AUTO: 11.7 K/UL (ref 3.6–11)

## 2023-06-26 PROCEDURE — 6360000002 HC RX W HCPCS: Performed by: HOSPITALIST

## 2023-06-26 PROCEDURE — 85025 COMPLETE CBC W/AUTO DIFF WBC: CPT

## 2023-06-26 PROCEDURE — 71275 CT ANGIOGRAPHY CHEST: CPT

## 2023-06-26 PROCEDURE — 93306 TTE W/DOPPLER COMPLETE: CPT

## 2023-06-26 PROCEDURE — 36415 COLL VENOUS BLD VENIPUNCTURE: CPT

## 2023-06-26 PROCEDURE — 94640 AIRWAY INHALATION TREATMENT: CPT

## 2023-06-26 PROCEDURE — 1100000000 HC RM PRIVATE

## 2023-06-26 PROCEDURE — 2580000003 HC RX 258: Performed by: HOSPITALIST

## 2023-06-26 PROCEDURE — 6370000000 HC RX 637 (ALT 250 FOR IP): Performed by: HOSPITALIST

## 2023-06-26 PROCEDURE — 2700000000 HC OXYGEN THERAPY PER DAY

## 2023-06-26 PROCEDURE — 94760 N-INVAS EAR/PLS OXIMETRY 1: CPT

## 2023-06-26 PROCEDURE — 6360000004 HC RX CONTRAST MEDICATION: Performed by: HOSPITALIST

## 2023-06-26 PROCEDURE — 6370000000 HC RX 637 (ALT 250 FOR IP): Performed by: INTERNAL MEDICINE

## 2023-06-26 PROCEDURE — 80048 BASIC METABOLIC PNL TOTAL CA: CPT

## 2023-06-26 RX ADMIN — BUDESONIDE 500 MCG: 0.5 SUSPENSION RESPIRATORY (INHALATION) at 07:35

## 2023-06-26 RX ADMIN — Medication 1 LOZENGE: at 15:37

## 2023-06-26 RX ADMIN — GUAIFENESIN AND DEXTROMETHORPHAN 10 ML: 100; 10 SYRUP ORAL at 12:50

## 2023-06-26 RX ADMIN — BENZONATATE 200 MG: 100 CAPSULE ORAL at 20:34

## 2023-06-26 RX ADMIN — DILTIAZEM HYDROCHLORIDE 60 MG: 30 TABLET, FILM COATED ORAL at 12:49

## 2023-06-26 RX ADMIN — IPRATROPIUM BROMIDE AND ALBUTEROL SULFATE 1 DOSE: .5; 3 SOLUTION RESPIRATORY (INHALATION) at 07:35

## 2023-06-26 RX ADMIN — PANTOPRAZOLE SODIUM 40 MG: 40 TABLET, DELAYED RELEASE ORAL at 07:30

## 2023-06-26 RX ADMIN — DOXYCYCLINE 100 MG: 100 CAPSULE ORAL at 09:42

## 2023-06-26 RX ADMIN — CETIRIZINE HYDROCHLORIDE 10 MG: 10 TABLET, FILM COATED ORAL at 09:41

## 2023-06-26 RX ADMIN — GUAIFENESIN AND DEXTROMETHORPHAN 10 ML: 100; 10 SYRUP ORAL at 05:50

## 2023-06-26 RX ADMIN — BUDESONIDE 500 MCG: 0.5 SUSPENSION RESPIRATORY (INHALATION) at 19:31

## 2023-06-26 RX ADMIN — Medication 1 LOZENGE: at 09:42

## 2023-06-26 RX ADMIN — IOPAMIDOL 100 ML: 755 INJECTION, SOLUTION INTRAVENOUS at 09:23

## 2023-06-26 RX ADMIN — DILTIAZEM HYDROCHLORIDE 60 MG: 30 TABLET, FILM COATED ORAL at 18:04

## 2023-06-26 RX ADMIN — MAGNESIUM OXIDE 400 MG: 400 TABLET ORAL at 09:42

## 2023-06-26 RX ADMIN — IPRATROPIUM BROMIDE AND ALBUTEROL SULFATE 1 DOSE: .5; 3 SOLUTION RESPIRATORY (INHALATION) at 19:30

## 2023-06-26 RX ADMIN — BENZONATATE 200 MG: 100 CAPSULE ORAL at 09:40

## 2023-06-26 RX ADMIN — SALINE NASAL SPRAY 2 SPRAY: 1.5 SOLUTION NASAL at 16:58

## 2023-06-26 RX ADMIN — SODIUM CHLORIDE, PRESERVATIVE FREE 10 ML: 5 INJECTION INTRAVENOUS at 20:34

## 2023-06-26 RX ADMIN — DILTIAZEM HYDROCHLORIDE 30 MG: 30 TABLET, FILM COATED ORAL at 05:50

## 2023-06-26 RX ADMIN — DOXYCYCLINE 100 MG: 100 CAPSULE ORAL at 20:34

## 2023-06-26 RX ADMIN — WATER 60 MG: 1 INJECTION INTRAMUSCULAR; INTRAVENOUS; SUBCUTANEOUS at 07:27

## 2023-06-26 RX ADMIN — DILTIAZEM HYDROCHLORIDE 60 MG: 30 TABLET, FILM COATED ORAL at 23:56

## 2023-06-26 RX ADMIN — SALINE NASAL SPRAY 2 SPRAY: 1.5 SOLUTION NASAL at 08:33

## 2023-06-26 RX ADMIN — IPRATROPIUM BROMIDE AND ALBUTEROL SULFATE 1 DOSE: .5; 3 SOLUTION RESPIRATORY (INHALATION) at 15:53

## 2023-06-26 RX ADMIN — SALINE NASAL SPRAY 2 SPRAY: 1.5 SOLUTION NASAL at 12:51

## 2023-06-26 RX ADMIN — WATER 60 MG: 1 INJECTION INTRAMUSCULAR; INTRAVENOUS; SUBCUTANEOUS at 19:35

## 2023-06-26 RX ADMIN — SODIUM CHLORIDE, PRESERVATIVE FREE 10 ML: 5 INJECTION INTRAVENOUS at 07:28

## 2023-06-26 RX ADMIN — ACETAMINOPHEN 650 MG: 325 TABLET, FILM COATED ORAL at 20:43

## 2023-06-26 RX ADMIN — MONTELUKAST 10 MG: 10 TABLET, FILM COATED ORAL at 20:34

## 2023-06-26 RX ADMIN — FLUTICASONE PROPIONATE 2 SPRAY: 50 SPRAY, METERED NASAL at 08:32

## 2023-06-26 RX ADMIN — ATORVASTATIN CALCIUM 20 MG: 20 TABLET, FILM COATED ORAL at 20:34

## 2023-06-26 RX ADMIN — BENZONATATE 200 MG: 100 CAPSULE ORAL at 15:37

## 2023-06-26 RX ADMIN — IPRATROPIUM BROMIDE AND ALBUTEROL SULFATE 1 DOSE: .5; 3 SOLUTION RESPIRATORY (INHALATION) at 11:26

## 2023-06-26 ASSESSMENT — PAIN SCALES - GENERAL
PAINLEVEL_OUTOF10: 0
PAINLEVEL_OUTOF10: 0

## 2023-06-27 PROCEDURE — 94760 N-INVAS EAR/PLS OXIMETRY 1: CPT

## 2023-06-27 PROCEDURE — 2700000000 HC OXYGEN THERAPY PER DAY

## 2023-06-27 PROCEDURE — 94640 AIRWAY INHALATION TREATMENT: CPT

## 2023-06-27 PROCEDURE — 6360000002 HC RX W HCPCS: Performed by: HOSPITALIST

## 2023-06-27 PROCEDURE — 6370000000 HC RX 637 (ALT 250 FOR IP): Performed by: HOSPITALIST

## 2023-06-27 PROCEDURE — 6370000000 HC RX 637 (ALT 250 FOR IP): Performed by: INTERNAL MEDICINE

## 2023-06-27 PROCEDURE — 1100000000 HC RM PRIVATE

## 2023-06-27 PROCEDURE — 2580000003 HC RX 258: Performed by: HOSPITALIST

## 2023-06-27 RX ORDER — AMLODIPINE BESYLATE 10 MG/1
10 TABLET ORAL DAILY
Status: DISCONTINUED | OUTPATIENT
Start: 2023-06-27 | End: 2023-06-28 | Stop reason: HOSPADM

## 2023-06-27 RX ORDER — LISINOPRIL 20 MG/1
20 TABLET ORAL DAILY
Status: DISCONTINUED | OUTPATIENT
Start: 2023-06-27 | End: 2023-06-28 | Stop reason: HOSPADM

## 2023-06-27 RX ORDER — PANTOPRAZOLE SODIUM 40 MG/1
40 TABLET, DELAYED RELEASE ORAL
Status: DISCONTINUED | OUTPATIENT
Start: 2023-06-27 | End: 2023-06-28 | Stop reason: HOSPADM

## 2023-06-27 RX ORDER — ATORVASTATIN CALCIUM 20 MG/1
20 TABLET, FILM COATED ORAL DAILY
Status: DISCONTINUED | OUTPATIENT
Start: 2023-06-27 | End: 2023-06-28 | Stop reason: HOSPADM

## 2023-06-27 RX ADMIN — WATER 60 MG: 1 INJECTION INTRAMUSCULAR; INTRAVENOUS; SUBCUTANEOUS at 08:18

## 2023-06-27 RX ADMIN — PANTOPRAZOLE SODIUM 40 MG: 40 TABLET, DELAYED RELEASE ORAL at 16:46

## 2023-06-27 RX ADMIN — Medication 1 LOZENGE: at 15:40

## 2023-06-27 RX ADMIN — IPRATROPIUM BROMIDE AND ALBUTEROL SULFATE 1 DOSE: .5; 3 SOLUTION RESPIRATORY (INHALATION) at 11:51

## 2023-06-27 RX ADMIN — IPRATROPIUM BROMIDE AND ALBUTEROL SULFATE 1 DOSE: .5; 3 SOLUTION RESPIRATORY (INHALATION) at 16:03

## 2023-06-27 RX ADMIN — DILTIAZEM HYDROCHLORIDE 60 MG: 30 TABLET, FILM COATED ORAL at 12:07

## 2023-06-27 RX ADMIN — IPRATROPIUM BROMIDE AND ALBUTEROL SULFATE 1 DOSE: .5; 3 SOLUTION RESPIRATORY (INHALATION) at 07:38

## 2023-06-27 RX ADMIN — SALINE NASAL SPRAY 2 SPRAY: 1.5 SOLUTION NASAL at 12:08

## 2023-06-27 RX ADMIN — MAGNESIUM OXIDE 400 MG: 400 TABLET ORAL at 08:17

## 2023-06-27 RX ADMIN — Medication 1 LOZENGE: at 08:17

## 2023-06-27 RX ADMIN — DOXYCYCLINE 100 MG: 100 CAPSULE ORAL at 20:44

## 2023-06-27 RX ADMIN — MONTELUKAST 10 MG: 10 TABLET, FILM COATED ORAL at 20:44

## 2023-06-27 RX ADMIN — SALINE NASAL SPRAY 2 SPRAY: 1.5 SOLUTION NASAL at 16:48

## 2023-06-27 RX ADMIN — FLUTICASONE PROPIONATE 2 SPRAY: 50 SPRAY, METERED NASAL at 08:19

## 2023-06-27 RX ADMIN — BUDESONIDE 500 MCG: 0.5 SUSPENSION RESPIRATORY (INHALATION) at 07:38

## 2023-06-27 RX ADMIN — SODIUM CHLORIDE, PRESERVATIVE FREE 10 ML: 5 INJECTION INTRAVENOUS at 20:45

## 2023-06-27 RX ADMIN — DOXYCYCLINE 100 MG: 100 CAPSULE ORAL at 08:17

## 2023-06-27 RX ADMIN — DILTIAZEM HYDROCHLORIDE 60 MG: 30 TABLET, FILM COATED ORAL at 06:33

## 2023-06-27 RX ADMIN — ATORVASTATIN CALCIUM 20 MG: 20 TABLET, FILM COATED ORAL at 16:46

## 2023-06-27 RX ADMIN — BENZONATATE 200 MG: 100 CAPSULE ORAL at 08:17

## 2023-06-27 RX ADMIN — WATER 60 MG: 1 INJECTION INTRAMUSCULAR; INTRAVENOUS; SUBCUTANEOUS at 19:25

## 2023-06-27 RX ADMIN — IPRATROPIUM BROMIDE AND ALBUTEROL SULFATE 1 DOSE: .5; 3 SOLUTION RESPIRATORY (INHALATION) at 19:32

## 2023-06-27 RX ADMIN — LISINOPRIL 20 MG: 20 TABLET ORAL at 16:46

## 2023-06-27 RX ADMIN — PANTOPRAZOLE SODIUM 40 MG: 40 TABLET, DELAYED RELEASE ORAL at 08:17

## 2023-06-27 RX ADMIN — BENZONATATE 200 MG: 100 CAPSULE ORAL at 15:40

## 2023-06-27 RX ADMIN — ENOXAPARIN SODIUM 40 MG: 100 INJECTION SUBCUTANEOUS at 08:18

## 2023-06-27 RX ADMIN — BENZONATATE 200 MG: 100 CAPSULE ORAL at 20:44

## 2023-06-27 RX ADMIN — CETIRIZINE HYDROCHLORIDE 10 MG: 10 TABLET, FILM COATED ORAL at 08:17

## 2023-06-27 RX ADMIN — SALINE NASAL SPRAY 2 SPRAY: 1.5 SOLUTION NASAL at 08:19

## 2023-06-27 RX ADMIN — AMLODIPINE BESYLATE 10 MG: 10 TABLET ORAL at 16:46

## 2023-06-27 RX ADMIN — SODIUM CHLORIDE, PRESERVATIVE FREE 10 ML: 5 INJECTION INTRAVENOUS at 08:18

## 2023-06-27 RX ADMIN — BUDESONIDE 500 MCG: 0.5 SUSPENSION RESPIRATORY (INHALATION) at 19:32

## 2023-06-27 ASSESSMENT — PAIN SCALES - GENERAL
PAINLEVEL_OUTOF10: 0

## 2023-06-28 VITALS
OXYGEN SATURATION: 99 % | BODY MASS INDEX: 22.29 KG/M2 | HEIGHT: 67 IN | WEIGHT: 142 LBS | DIASTOLIC BLOOD PRESSURE: 79 MMHG | HEART RATE: 96 BPM | RESPIRATION RATE: 20 BRPM | SYSTOLIC BLOOD PRESSURE: 141 MMHG | TEMPERATURE: 97.9 F

## 2023-06-28 PROCEDURE — 6370000000 HC RX 637 (ALT 250 FOR IP): Performed by: HOSPITALIST

## 2023-06-28 PROCEDURE — 2580000003 HC RX 258: Performed by: HOSPITALIST

## 2023-06-28 PROCEDURE — 6360000002 HC RX W HCPCS: Performed by: HOSPITALIST

## 2023-06-28 PROCEDURE — 2700000000 HC OXYGEN THERAPY PER DAY

## 2023-06-28 PROCEDURE — 6370000000 HC RX 637 (ALT 250 FOR IP): Performed by: INTERNAL MEDICINE

## 2023-06-28 PROCEDURE — 94640 AIRWAY INHALATION TREATMENT: CPT

## 2023-06-28 RX ORDER — PREDNISONE 20 MG/1
20 TABLET ORAL 2 TIMES DAILY
Status: DISCONTINUED | OUTPATIENT
Start: 2023-06-28 | End: 2023-06-28 | Stop reason: HOSPADM

## 2023-06-28 RX ORDER — FUROSEMIDE 20 MG/1
20 TABLET ORAL DAILY
Qty: 60 TABLET | Refills: 3 | Status: SHIPPED | OUTPATIENT
Start: 2023-06-28

## 2023-06-28 RX ORDER — ALPRAZOLAM 0.25 MG/1
0.25 TABLET ORAL 3 TIMES DAILY PRN
Qty: 15 TABLET | Refills: 0 | Status: SHIPPED | OUTPATIENT
Start: 2023-06-28 | End: 2023-07-08

## 2023-06-28 RX ORDER — PREDNISONE 20 MG/1
20 TABLET ORAL 2 TIMES DAILY
Qty: 5 TABLET | Refills: 0 | Status: SHIPPED | OUTPATIENT
Start: 2023-06-28 | End: 2023-07-01

## 2023-06-28 RX ORDER — DOXYCYCLINE 100 MG/1
100 CAPSULE ORAL EVERY 12 HOURS SCHEDULED
Qty: 3 CAPSULE | Refills: 0 | Status: SHIPPED | OUTPATIENT
Start: 2023-06-28 | End: 2023-06-30

## 2023-06-28 RX ORDER — CETIRIZINE HYDROCHLORIDE 10 MG/1
10 TABLET ORAL DAILY
Qty: 30 TABLET | Refills: 0 | Status: SHIPPED | OUTPATIENT
Start: 2023-06-28 | End: 2023-07-28

## 2023-06-28 RX ADMIN — DOXYCYCLINE 100 MG: 100 CAPSULE ORAL at 08:49

## 2023-06-28 RX ADMIN — BENZONATATE 200 MG: 100 CAPSULE ORAL at 08:48

## 2023-06-28 RX ADMIN — SODIUM CHLORIDE, PRESERVATIVE FREE 10 ML: 5 INJECTION INTRAVENOUS at 08:00

## 2023-06-28 RX ADMIN — CETIRIZINE HYDROCHLORIDE 10 MG: 10 TABLET, FILM COATED ORAL at 08:49

## 2023-06-28 RX ADMIN — AMLODIPINE BESYLATE 10 MG: 10 TABLET ORAL at 08:50

## 2023-06-28 RX ADMIN — Medication 1 LOZENGE: at 08:50

## 2023-06-28 RX ADMIN — ENOXAPARIN SODIUM 40 MG: 100 INJECTION SUBCUTANEOUS at 08:48

## 2023-06-28 RX ADMIN — BUDESONIDE 500 MCG: 0.5 SUSPENSION RESPIRATORY (INHALATION) at 08:13

## 2023-06-28 RX ADMIN — LISINOPRIL 20 MG: 20 TABLET ORAL at 08:59

## 2023-06-28 RX ADMIN — MAGNESIUM OXIDE 400 MG: 400 TABLET ORAL at 08:49

## 2023-06-28 RX ADMIN — ATORVASTATIN CALCIUM 20 MG: 20 TABLET, FILM COATED ORAL at 08:49

## 2023-06-28 RX ADMIN — FLUTICASONE PROPIONATE 2 SPRAY: 50 SPRAY, METERED NASAL at 08:55

## 2023-06-28 RX ADMIN — IPRATROPIUM BROMIDE AND ALBUTEROL SULFATE 1 DOSE: .5; 3 SOLUTION RESPIRATORY (INHALATION) at 08:13

## 2023-06-28 RX ADMIN — PANTOPRAZOLE SODIUM 40 MG: 40 TABLET, DELAYED RELEASE ORAL at 07:56

## 2023-06-28 RX ADMIN — WATER 60 MG: 1 INJECTION INTRAMUSCULAR; INTRAVENOUS; SUBCUTANEOUS at 07:57

## 2023-06-28 RX ADMIN — SALINE NASAL SPRAY 2 SPRAY: 1.5 SOLUTION NASAL at 08:55

## 2023-06-28 ASSESSMENT — PAIN SCALES - GENERAL
PAINLEVEL_OUTOF10: 0

## 2023-06-29 ENCOUNTER — TELEPHONE (OUTPATIENT)
Facility: CLINIC | Age: 68
End: 2023-06-29

## 2023-06-29 LAB
BACTERIA SPEC CULT: ABNORMAL
GRAM STN SPEC: ABNORMAL
Lab: ABNORMAL

## 2023-07-05 ENCOUNTER — TELEPHONE (OUTPATIENT)
Facility: CLINIC | Age: 68
End: 2023-07-05

## 2023-07-05 NOTE — TELEPHONE ENCOUNTER
----- Message from Obinna Walters sent at 7/5/2023  8:44 AM EDT -----  Subject: Message to Provider    QUESTIONS  Information for Provider? Patient received a call about her appt tomorrow   and the times changing she said that time does not work for her because of   her ride she would like a call back please   ---------------------------------------------------------------------------  --------------  Terrance Gibson Neftali  [phone]; OK to leave message on voicemail  ---------------------------------------------------------------------------  --------------  SCRIPT ANSWERS  Relationship to Patient?  Self

## 2023-07-17 ENCOUNTER — OFFICE VISIT (OUTPATIENT)
Age: 68
End: 2023-07-17
Payer: MEDICARE

## 2023-07-17 VITALS
RESPIRATION RATE: 14 BRPM | TEMPERATURE: 97.6 F | BODY MASS INDEX: 23.13 KG/M2 | HEIGHT: 67 IN | HEART RATE: 103 BPM | OXYGEN SATURATION: 98 % | SYSTOLIC BLOOD PRESSURE: 90 MMHG | WEIGHT: 147.4 LBS | DIASTOLIC BLOOD PRESSURE: 70 MMHG

## 2023-07-17 DIAGNOSIS — K21.00 GASTROESOPHAGEAL REFLUX DISEASE WITH ESOPHAGITIS WITHOUT HEMORRHAGE: ICD-10-CM

## 2023-07-17 DIAGNOSIS — N28.89 KIDNEY MASS: ICD-10-CM

## 2023-07-17 DIAGNOSIS — K59.04 CHRONIC IDIOPATHIC CONSTIPATION: ICD-10-CM

## 2023-07-17 DIAGNOSIS — K57.30 DIVERTICULAR DISEASE OF COLON: Primary | ICD-10-CM

## 2023-07-17 DIAGNOSIS — S32.000G COMPRESSION FRACTURE OF LUMBAR VERTEBRA WITH DELAYED HEALING, UNSPECIFIED LUMBAR VERTEBRAL LEVEL, SUBSEQUENT ENCOUNTER: ICD-10-CM

## 2023-07-17 PROCEDURE — G8399 PT W/DXA RESULTS DOCUMENT: HCPCS | Performed by: INTERNAL MEDICINE

## 2023-07-17 PROCEDURE — 3017F COLORECTAL CA SCREEN DOC REV: CPT | Performed by: INTERNAL MEDICINE

## 2023-07-17 PROCEDURE — 99213 OFFICE O/P EST LOW 20 MIN: CPT | Performed by: INTERNAL MEDICINE

## 2023-07-17 PROCEDURE — G8427 DOCREV CUR MEDS BY ELIG CLIN: HCPCS | Performed by: INTERNAL MEDICINE

## 2023-07-17 PROCEDURE — 1111F DSCHRG MED/CURRENT MED MERGE: CPT | Performed by: INTERNAL MEDICINE

## 2023-07-17 PROCEDURE — 3078F DIAST BP <80 MM HG: CPT | Performed by: INTERNAL MEDICINE

## 2023-07-17 PROCEDURE — 1036F TOBACCO NON-USER: CPT | Performed by: INTERNAL MEDICINE

## 2023-07-17 PROCEDURE — 3074F SYST BP LT 130 MM HG: CPT | Performed by: INTERNAL MEDICINE

## 2023-07-17 PROCEDURE — 1123F ACP DISCUSS/DSCN MKR DOCD: CPT | Performed by: INTERNAL MEDICINE

## 2023-07-17 PROCEDURE — 1090F PRES/ABSN URINE INCON ASSESS: CPT | Performed by: INTERNAL MEDICINE

## 2023-07-17 PROCEDURE — G8420 CALC BMI NORM PARAMETERS: HCPCS | Performed by: INTERNAL MEDICINE

## 2023-07-17 RX ORDER — PREDNISONE 20 MG/1
20 TABLET ORAL EVERY OTHER DAY
COMMUNITY
End: 2023-07-19 | Stop reason: ALTCHOICE

## 2023-07-17 RX ORDER — ASPIRIN 81 MG/1
81 TABLET ORAL DAILY
COMMUNITY

## 2023-07-17 ASSESSMENT — PATIENT HEALTH QUESTIONNAIRE - PHQ9
SUM OF ALL RESPONSES TO PHQ QUESTIONS 1-9: 0
SUM OF ALL RESPONSES TO PHQ9 QUESTIONS 1 & 2: 0
1. LITTLE INTEREST OR PLEASURE IN DOING THINGS: 0
SUM OF ALL RESPONSES TO PHQ QUESTIONS 1-9: 0
2. FEELING DOWN, DEPRESSED OR HOPELESS: 0
SUM OF ALL RESPONSES TO PHQ QUESTIONS 1-9: 0
SUM OF ALL RESPONSES TO PHQ QUESTIONS 1-9: 0

## 2023-07-19 ENCOUNTER — OFFICE VISIT (OUTPATIENT)
Facility: CLINIC | Age: 68
End: 2023-07-19
Payer: MEDICARE

## 2023-07-19 VITALS
SYSTOLIC BLOOD PRESSURE: 100 MMHG | BODY MASS INDEX: 23.1 KG/M2 | RESPIRATION RATE: 20 BRPM | HEIGHT: 67 IN | TEMPERATURE: 98.2 F | OXYGEN SATURATION: 100 % | DIASTOLIC BLOOD PRESSURE: 56 MMHG | WEIGHT: 147.2 LBS | HEART RATE: 102 BPM

## 2023-07-19 DIAGNOSIS — R00.0 TACHYCARDIA: ICD-10-CM

## 2023-07-19 DIAGNOSIS — S32.000A WEDGE COMPRESSION FRACTURE OF UNSPECIFIED LUMBAR VERTEBRA, INITIAL ENCOUNTER FOR CLOSED FRACTURE (HCC): ICD-10-CM

## 2023-07-19 DIAGNOSIS — E87.1 HYPONATREMIA: ICD-10-CM

## 2023-07-19 DIAGNOSIS — Z09 HOSPITAL DISCHARGE FOLLOW-UP: Primary | ICD-10-CM

## 2023-07-19 DIAGNOSIS — J30.9 ALLERGIC RHINITIS, UNSPECIFIED SEASONALITY, UNSPECIFIED TRIGGER: ICD-10-CM

## 2023-07-19 DIAGNOSIS — Z99.81 DEPENDENCE ON SUPPLEMENTAL OXYGEN: ICD-10-CM

## 2023-07-19 DIAGNOSIS — Z12.31 SCREENING MAMMOGRAM, ENCOUNTER FOR: ICD-10-CM

## 2023-07-19 DIAGNOSIS — J44.1 COPD EXACERBATION (HCC): ICD-10-CM

## 2023-07-19 DIAGNOSIS — E11.69 TYPE 2 DIABETES MELLITUS WITH OTHER SPECIFIED COMPLICATION, WITHOUT LONG-TERM CURRENT USE OF INSULIN (HCC): ICD-10-CM

## 2023-07-19 DIAGNOSIS — Z74.09 OTHER REDUCED MOBILITY: ICD-10-CM

## 2023-07-19 DIAGNOSIS — N28.89 RENAL MASS: ICD-10-CM

## 2023-07-19 DIAGNOSIS — K21.9 GASTRO-ESOPHAGEAL REFLUX DISEASE WITHOUT ESOPHAGITIS: ICD-10-CM

## 2023-07-19 DIAGNOSIS — E78.2 MIXED HYPERLIPIDEMIA: ICD-10-CM

## 2023-07-19 DIAGNOSIS — I10 ESSENTIAL (PRIMARY) HYPERTENSION: ICD-10-CM

## 2023-07-19 LAB — HBA1C MFR BLD: 5.1 %

## 2023-07-19 PROCEDURE — G8420 CALC BMI NORM PARAMETERS: HCPCS | Performed by: NURSE PRACTITIONER

## 2023-07-19 PROCEDURE — 83036 HEMOGLOBIN GLYCOSYLATED A1C: CPT | Performed by: NURSE PRACTITIONER

## 2023-07-19 PROCEDURE — 3078F DIAST BP <80 MM HG: CPT | Performed by: NURSE PRACTITIONER

## 2023-07-19 PROCEDURE — G8427 DOCREV CUR MEDS BY ELIG CLIN: HCPCS | Performed by: NURSE PRACTITIONER

## 2023-07-19 PROCEDURE — 1111F DSCHRG MED/CURRENT MED MERGE: CPT | Performed by: NURSE PRACTITIONER

## 2023-07-19 PROCEDURE — 2022F DILAT RTA XM EVC RTNOPTHY: CPT | Performed by: NURSE PRACTITIONER

## 2023-07-19 PROCEDURE — 99214 OFFICE O/P EST MOD 30 MIN: CPT | Performed by: NURSE PRACTITIONER

## 2023-07-19 PROCEDURE — 3074F SYST BP LT 130 MM HG: CPT | Performed by: NURSE PRACTITIONER

## 2023-07-19 PROCEDURE — G8399 PT W/DXA RESULTS DOCUMENT: HCPCS | Performed by: NURSE PRACTITIONER

## 2023-07-19 PROCEDURE — 3017F COLORECTAL CA SCREEN DOC REV: CPT | Performed by: NURSE PRACTITIONER

## 2023-07-19 PROCEDURE — 3023F SPIROM DOC REV: CPT | Performed by: NURSE PRACTITIONER

## 2023-07-19 PROCEDURE — 1090F PRES/ABSN URINE INCON ASSESS: CPT | Performed by: NURSE PRACTITIONER

## 2023-07-19 PROCEDURE — 3046F HEMOGLOBIN A1C LEVEL >9.0%: CPT | Performed by: NURSE PRACTITIONER

## 2023-07-19 PROCEDURE — 1036F TOBACCO NON-USER: CPT | Performed by: NURSE PRACTITIONER

## 2023-07-19 PROCEDURE — 1123F ACP DISCUSS/DSCN MKR DOCD: CPT | Performed by: NURSE PRACTITIONER

## 2023-07-19 RX ORDER — TRIAMCINOLONE ACETONIDE 1 MG/G
CREAM TOPICAL
COMMUNITY
Start: 2023-06-30

## 2023-07-19 RX ORDER — PREDNISONE 10 MG/1
10 TABLET ORAL EVERY OTHER DAY
COMMUNITY

## 2023-07-19 RX ORDER — ALPRAZOLAM 0.25 MG/1
1 TABLET ORAL EVERY 8 HOURS PRN
COMMUNITY

## 2023-07-19 ASSESSMENT — ENCOUNTER SYMPTOMS
CONSTIPATION: 0
EYE PAIN: 0
BLOOD IN STOOL: 0
ABDOMINAL PAIN: 0
ABDOMINAL DISTENTION: 0
COLOR CHANGE: 0
WHEEZING: 0
TROUBLE SWALLOWING: 0
EYE ITCHING: 0
CHEST TIGHTNESS: 0
STRIDOR: 0
PHOTOPHOBIA: 0
NAUSEA: 0
SHORTNESS OF BREATH: 1
EYE DISCHARGE: 0
SORE THROAT: 0
SINUS PAIN: 0
EYE REDNESS: 0
FACIAL SWELLING: 0
COUGH: 1
APNEA: 0
VOMITING: 0
CHOKING: 0
DIARRHEA: 0

## 2023-07-19 NOTE — PROGRESS NOTES
Chief Complaint   Patient presents with    Follow-Up from Hospital     Pt brought a list of the meds, went over list in chart, pt confirmed      No other c/o    1. Have you been to the ER, urgent care clinic since your last visit? Hospitalized since your last visit? In chart     2. Have you seen or consulted any other health care providers outside of the [address] [place] [address] since your last visit? Include any pap smears or colon screening.  No
stable centrilobular emphysema without lung nodule, mass, or  consolidation. There is no pleural effusion or pneumothorax. The central airways  are clear. A 2.7 cm right kidney mass is unchanged. There is no new acute abnormality in  the visualized upper abdomen. There are stable vertebral compression fractures at T12 and L2. There is no  aggressive bony lesion. Impression  1. No acute pulmonary embolism or other acute abnormality in the chest.  Centrilobular emphysema. 2. Stable right kidney mass suspicious for renal cell carcinoma. Improved  Taking prednisone 10mg every other day, trelegy daily and albuterol inhaler prn    3. Type 2 diabetes mellitus with other specified complication, without long-term current use of insulin ([facility])  Hemoglobin A1C, POC   Date Value Ref Range Status   07/19/2023 5.1 % Final   Currently off metformin and has been controlled with diet  Continue to encourage following diabetic diet and staying active as possible  Check fasting glucose 1-2 times weekly and notify provider if staying above 200 or less than 70   Continue regular eye exams and follows with Dr. Allen Goyal in [place]  Check feet daily and notify provider immediately if wound develops  Continue follow up with pulmonology  -     AMB POC HEMOGLOBIN A1C    4. Wedge compression fracture of unspecified lumbar vertebra, initial encounter for closed fracture ([address])  Stable per CT 6/26/23  No current pain and was evaluated by Dr. Carmen Lindsey    5. Mixed hyperlipidemia  Lab Results   Component Value Date    LDLCALC 99 11/09/2021   Continues atorvastatin 20mg daily     6. Essential (primary) hypertension  Blood pressure is at goal  Continues amlodipine 10mg daily, lasix 20mg daily, lisinopril 20mg daily  Monitor readings at home and notify provider if > 140/90    7. Gastro-esophageal reflux disease without esophagitis  Stable  Continues protonix 40mg daily as directed     8.  Dependence on supplemental oxygen  Continues oxygen

## 2023-07-23 ASSESSMENT — ENCOUNTER SYMPTOMS
ALLERGIC/IMMUNOLOGIC NEGATIVE: 1
NAUSEA: 0
BACK PAIN: 1
BLOOD IN STOOL: 0
ANAL BLEEDING: 0
RESPIRATORY NEGATIVE: 1
ABDOMINAL PAIN: 0
CONSTIPATION: 0
RECTAL PAIN: 0
ABDOMINAL DISTENTION: 0
DIARRHEA: 0
VOMITING: 0

## 2023-07-24 ENCOUNTER — TELEPHONE (OUTPATIENT)
Facility: HOSPITAL | Age: 68
End: 2023-07-24

## 2023-07-26 DIAGNOSIS — E55.9 VITAMIN D DEFICIENCY: Primary | ICD-10-CM

## 2023-07-26 RX ORDER — ERGOCALCIFEROL 1.25 MG/1
50000 CAPSULE ORAL WEEKLY
Qty: 4 CAPSULE | Refills: 1 | Status: SHIPPED | OUTPATIENT
Start: 2023-07-26

## 2023-08-03 ENCOUNTER — TRANSCRIBE ORDERS (OUTPATIENT)
Facility: HOSPITAL | Age: 68
End: 2023-08-03

## 2023-08-03 DIAGNOSIS — N28.89 RENAL MASS: Primary | ICD-10-CM

## 2023-08-07 DIAGNOSIS — I10 ESSENTIAL (PRIMARY) HYPERTENSION: ICD-10-CM

## 2023-08-08 RX ORDER — AMLODIPINE BESYLATE 10 MG/1
TABLET ORAL
Qty: 90 TABLET | Refills: 1 | Status: SHIPPED | OUTPATIENT
Start: 2023-08-08

## 2023-08-10 DIAGNOSIS — E55.9 VITAMIN D DEFICIENCY: ICD-10-CM

## 2023-08-10 NOTE — TELEPHONE ENCOUNTER
Patient called in and stated she need a refill on ergocalciferol (ERGOCALCIFEROL) 1.25 MG (90651 UT) capsule. She stated she spoke with [facility] and they told her it was to early for a refill. She stated she is out.  Please advise

## 2023-08-11 RX ORDER — ERGOCALCIFEROL 1.25 MG/1
50000 CAPSULE ORAL WEEKLY
Qty: 4 CAPSULE | Refills: 1 | Status: SHIPPED | OUTPATIENT
Start: 2023-08-11

## 2023-08-21 ENCOUNTER — TELEPHONE (OUTPATIENT)
Facility: CLINIC | Age: 68
End: 2023-08-21

## 2023-08-21 RX ORDER — CETIRIZINE HYDROCHLORIDE 10 MG/1
10 TABLET ORAL DAILY
Qty: 90 TABLET | Refills: 0 | Status: SHIPPED | OUTPATIENT
Start: 2023-08-21 | End: 2023-11-19

## 2023-09-05 DIAGNOSIS — E55.9 VITAMIN D DEFICIENCY: ICD-10-CM

## 2023-09-05 RX ORDER — ERGOCALCIFEROL 1.25 MG/1
CAPSULE ORAL
Qty: 4 CAPSULE | Refills: 1 | Status: SHIPPED | OUTPATIENT
Start: 2023-09-05

## 2023-09-25 DIAGNOSIS — E78.2 MIXED HYPERLIPIDEMIA: ICD-10-CM

## 2023-09-25 RX ORDER — ATORVASTATIN CALCIUM 20 MG/1
20 TABLET, FILM COATED ORAL DAILY
Qty: 90 TABLET | Refills: 1 | Status: SHIPPED | OUTPATIENT
Start: 2023-09-25

## 2023-10-19 ENCOUNTER — OFFICE VISIT (OUTPATIENT)
Facility: CLINIC | Age: 68
End: 2023-10-19

## 2023-10-19 ENCOUNTER — TELEPHONE (OUTPATIENT)
Facility: CLINIC | Age: 68
End: 2023-10-19

## 2023-10-19 VITALS
RESPIRATION RATE: 18 BRPM | TEMPERATURE: 98.1 F | WEIGHT: 147.6 LBS | DIASTOLIC BLOOD PRESSURE: 60 MMHG | BODY MASS INDEX: 23.17 KG/M2 | OXYGEN SATURATION: 98 % | HEIGHT: 67 IN | SYSTOLIC BLOOD PRESSURE: 108 MMHG | HEART RATE: 112 BPM

## 2023-10-19 DIAGNOSIS — E11.69 TYPE 2 DIABETES MELLITUS WITH OTHER SPECIFIED COMPLICATION, WITHOUT LONG-TERM CURRENT USE OF INSULIN (HCC): Primary | ICD-10-CM

## 2023-10-19 DIAGNOSIS — I10 ESSENTIAL (PRIMARY) HYPERTENSION: ICD-10-CM

## 2023-10-19 DIAGNOSIS — J30.9 ALLERGIC RHINITIS, UNSPECIFIED SEASONALITY, UNSPECIFIED TRIGGER: ICD-10-CM

## 2023-10-19 DIAGNOSIS — D64.9 ANEMIA, UNSPECIFIED TYPE: ICD-10-CM

## 2023-10-19 DIAGNOSIS — Z74.09 OTHER REDUCED MOBILITY: ICD-10-CM

## 2023-10-19 DIAGNOSIS — S32.000K: ICD-10-CM

## 2023-10-19 DIAGNOSIS — J41.1 MUCOPURULENT CHRONIC BRONCHITIS (HCC): ICD-10-CM

## 2023-10-19 DIAGNOSIS — Z99.81 DEPENDENCE ON SUPPLEMENTAL OXYGEN: ICD-10-CM

## 2023-10-19 DIAGNOSIS — E78.2 MIXED HYPERLIPIDEMIA: ICD-10-CM

## 2023-10-19 DIAGNOSIS — Z12.31 SCREENING MAMMOGRAM, ENCOUNTER FOR: ICD-10-CM

## 2023-10-19 DIAGNOSIS — N28.89 RENAL MASS: ICD-10-CM

## 2023-10-19 DIAGNOSIS — K21.9 GASTRO-ESOPHAGEAL REFLUX DISEASE WITHOUT ESOPHAGITIS: ICD-10-CM

## 2023-10-19 LAB — HBA1C MFR BLD: 4.5 %

## 2023-10-19 RX ORDER — MONTELUKAST SODIUM 10 MG/1
10 TABLET ORAL
COMMUNITY
Start: 2023-09-03

## 2023-10-19 ASSESSMENT — ENCOUNTER SYMPTOMS
SHORTNESS OF BREATH: 1
SINUS PAIN: 0
CHEST TIGHTNESS: 0
PHOTOPHOBIA: 0
EYE REDNESS: 0
COLOR CHANGE: 0
COUGH: 1
NAUSEA: 0
WHEEZING: 0
STRIDOR: 0
ABDOMINAL PAIN: 0
DIARRHEA: 0
ABDOMINAL DISTENTION: 0
EYE PAIN: 0
EYE ITCHING: 0
TROUBLE SWALLOWING: 0
BLOOD IN STOOL: 0
CONSTIPATION: 0
CHOKING: 0
VOMITING: 0
APNEA: 0
EYE DISCHARGE: 0
FACIAL SWELLING: 0
SORE THROAT: 0

## 2023-10-20 LAB
BASOPHILS # BLD AUTO: 0 X10E3/UL (ref 0–0.2)
BASOPHILS NFR BLD AUTO: 1 %
EOSINOPHIL # BLD AUTO: 0 X10E3/UL (ref 0–0.4)
EOSINOPHIL NFR BLD AUTO: 0 %
ERYTHROCYTE [DISTWIDTH] IN BLOOD BY AUTOMATED COUNT: 12 % (ref 11.7–15.4)
HCT VFR BLD AUTO: 33.3 % (ref 34–46.6)
HGB BLD-MCNC: 11.6 G/DL (ref 11.1–15.9)
IMM GRANULOCYTES # BLD AUTO: 0 X10E3/UL (ref 0–0.1)
IMM GRANULOCYTES NFR BLD AUTO: 0 %
LYMPHOCYTES # BLD AUTO: 0.6 X10E3/UL (ref 0.7–3.1)
LYMPHOCYTES NFR BLD AUTO: 9 %
MCH RBC QN AUTO: 31.6 PG (ref 26.6–33)
MCHC RBC AUTO-ENTMCNC: 34.8 G/DL (ref 31.5–35.7)
MCV RBC AUTO: 91 FL (ref 79–97)
MONOCYTES # BLD AUTO: 0.2 X10E3/UL (ref 0.1–0.9)
MONOCYTES NFR BLD AUTO: 3 %
NEUTROPHILS # BLD AUTO: 5.9 X10E3/UL (ref 1.4–7)
NEUTROPHILS NFR BLD AUTO: 87 %
PLATELET # BLD AUTO: 336 X10E3/UL (ref 150–450)
RBC # BLD AUTO: 3.67 X10E6/UL (ref 3.77–5.28)
TSH SERPL DL<=0.005 MIU/L-ACNC: 0.72 UIU/ML (ref 0.45–4.5)
WBC # BLD AUTO: 6.7 X10E3/UL (ref 3.4–10.8)

## 2023-10-21 LAB
ALBUMIN/CREAT UR: <5 MG/G CREAT (ref 0–29)
CREAT UR-MCNC: 58 MG/DL
MICROALBUMIN UR-MCNC: <3 UG/ML

## 2023-10-25 ENCOUNTER — TELEPHONE (OUTPATIENT)
Facility: CLINIC | Age: 68
End: 2023-10-25

## 2023-10-25 DIAGNOSIS — I10 ESSENTIAL (PRIMARY) HYPERTENSION: Primary | ICD-10-CM

## 2023-10-25 DIAGNOSIS — E55.9 VITAMIN D DEFICIENCY: ICD-10-CM

## 2023-10-25 RX ORDER — ERGOCALCIFEROL 1.25 MG/1
CAPSULE ORAL
Qty: 4 CAPSULE | Refills: 1 | Status: SHIPPED | OUTPATIENT
Start: 2023-10-25

## 2023-10-25 RX ORDER — LISINOPRIL 20 MG/1
20 TABLET ORAL DAILY
Qty: 90 TABLET | Refills: 1 | Status: SHIPPED | OUTPATIENT
Start: 2023-10-25

## 2023-10-26 ENCOUNTER — TELEPHONE (OUTPATIENT)
Facility: CLINIC | Age: 68
End: 2023-10-26

## 2023-10-26 DIAGNOSIS — K21.9 GASTRO-ESOPHAGEAL REFLUX DISEASE WITHOUT ESOPHAGITIS: Primary | ICD-10-CM

## 2023-10-26 RX ORDER — PANTOPRAZOLE SODIUM 40 MG/1
40 TABLET, DELAYED RELEASE ORAL
Qty: 180 TABLET | Refills: 1 | Status: SHIPPED | OUTPATIENT
Start: 2023-10-26

## 2023-10-26 NOTE — TELEPHONE ENCOUNTER
Patient is requesting a refill on pantoprazole (PROTONIX) 40 MG tablet    Pt would like for all refills to go to [address] [place], [place] [address]

## 2023-10-26 NOTE — TELEPHONE ENCOUNTER
Patient is requesting a refill on pantoprazole (PROTONIX) 40 MG tablet     Pt would like for all refills to go to [facility] DR LINDSAY LEAL

## 2023-10-27 LAB
ALBUMIN SERPL-MCNC: 5.2 G/DL (ref 3.9–4.9)
ALBUMIN/GLOB SERPL: 2.3 {RATIO} (ref 1.2–2.2)
ALP SERPL-CCNC: 84 IU/L (ref 44–121)
ALT SERPL-CCNC: 20 IU/L (ref 0–32)
AST SERPL-CCNC: 28 IU/L (ref 0–40)
BILIRUB SERPL-MCNC: 0.4 MG/DL (ref 0–1.2)
BUN SERPL-MCNC: 15 MG/DL (ref 8–27)
BUN/CREAT SERPL: 17 (ref 12–28)
CALCIUM SERPL-MCNC: 10.3 MG/DL (ref 8.7–10.3)
CHLORIDE SERPL-SCNC: 91 MMOL/L (ref 96–106)
CO2 SERPL-SCNC: 20 MMOL/L (ref 20–29)
CREAT SERPL-MCNC: 0.89 MG/DL (ref 0.57–1)
EGFRCR SERPLBLD CKD-EPI 2021: 71 ML/MIN/1.73
GLOBULIN SER CALC-MCNC: 2.3 G/DL (ref 1.5–4.5)
GLUCOSE SERPL-MCNC: 124 MG/DL (ref 70–99)
POTASSIUM SERPL-SCNC: 4.9 MMOL/L (ref 3.5–5.2)
PROT SERPL-MCNC: 7.5 G/DL (ref 6–8.5)
SODIUM SERPL-SCNC: 135 MMOL/L (ref 134–144)

## 2023-11-01 DIAGNOSIS — Z99.81 DEPENDENCE ON SUPPLEMENTAL OXYGEN: Primary | ICD-10-CM

## 2023-11-01 DIAGNOSIS — J41.1 MUCOPURULENT CHRONIC BRONCHITIS (HCC): ICD-10-CM

## 2023-11-01 DIAGNOSIS — J30.9 ALLERGIC RHINITIS, UNSPECIFIED SEASONALITY, UNSPECIFIED TRIGGER: ICD-10-CM

## 2023-11-01 RX ORDER — FLUTICASONE PROPIONATE 50 MCG
2 SPRAY, SUSPENSION (ML) NASAL DAILY
Qty: 16 G | Refills: 5 | Status: SHIPPED | OUTPATIENT
Start: 2023-11-01

## 2023-11-01 RX ORDER — ALBUTEROL SULFATE 90 UG/1
2 AEROSOL, METERED RESPIRATORY (INHALATION) ONCE
Status: CANCELLED | OUTPATIENT
Start: 2023-11-01 | End: 2023-11-01

## 2023-11-01 RX ORDER — ALBUTEROL SULFATE 90 UG/1
2 AEROSOL, METERED RESPIRATORY (INHALATION) 4 TIMES DAILY PRN
Qty: 18 G | Refills: 5 | Status: SHIPPED | OUTPATIENT
Start: 2023-11-01

## 2023-11-01 NOTE — TELEPHONE ENCOUNTER
Patient requesting refill Flonase and Ventolin inhaler HFA to [facility]. Patient changing from [facility] to [facility].

## 2023-11-06 ENCOUNTER — HOSPITAL ENCOUNTER (OUTPATIENT)
Facility: HOSPITAL | Age: 68
Discharge: HOME | End: 2023-11-09
Payer: MEDICARE

## 2023-11-06 DIAGNOSIS — N28.89 URETERAL FISTULA: ICD-10-CM

## 2023-11-06 DIAGNOSIS — N28.89 RENAL MASS: ICD-10-CM

## 2023-11-06 PROCEDURE — 74170 CT ABD WO CNTRST FLWD CNTRST: CPT

## 2023-11-06 PROCEDURE — 6360000004 HC RX CONTRAST MEDICATION: Performed by: UROLOGY

## 2023-11-06 RX ADMIN — IOPAMIDOL 100 ML: 755 INJECTION, SOLUTION INTRAVENOUS at 11:13

## 2023-11-09 ENCOUNTER — TRANSCRIBE ORDERS (OUTPATIENT)
Facility: HOSPITAL | Age: 68
End: 2023-11-09

## 2023-11-09 DIAGNOSIS — N28.89 KIDNEY MASS: Primary | ICD-10-CM

## 2023-12-26 ENCOUNTER — TELEPHONE (OUTPATIENT)
Facility: CLINIC | Age: 68
End: 2023-12-26

## 2023-12-26 DIAGNOSIS — K57.92 DIVERTICULITIS OF INTESTINE, PART UNSPECIFIED, WITHOUT PERFORATION OR ABSCESS WITHOUT BLEEDING: ICD-10-CM

## 2023-12-26 DIAGNOSIS — I10 ESSENTIAL (PRIMARY) HYPERTENSION: Primary | ICD-10-CM

## 2023-12-26 DIAGNOSIS — J06.9 ACUTE UPPER RESPIRATORY INFECTION, UNSPECIFIED: ICD-10-CM

## 2023-12-26 RX ORDER — FUROSEMIDE 20 MG/1
20 TABLET ORAL DAILY
Qty: 90 TABLET | Refills: 0 | Status: SHIPPED | OUTPATIENT
Start: 2023-12-26

## 2023-12-26 RX ORDER — ASPIRIN 325 MG
600 TABLET ORAL 2 TIMES DAILY
Qty: 30 TABLET | Refills: 1 | Status: SHIPPED | OUTPATIENT
Start: 2023-12-26

## 2023-12-26 RX ORDER — CIPROFLOXACIN 500 MG/1
TABLET, FILM COATED ORAL
Qty: 30 TABLET | OUTPATIENT
Start: 2023-12-26

## 2023-12-26 NOTE — TELEPHONE ENCOUNTER
Patient is requesting a refill on furosemide (LASIX) 20 MG tablet, advised her it was sent to [facility] on 12/21, she stated she does not use [facility] pharmacy anymore and will like for it to be sent to [facility] instead.

## 2023-12-26 NOTE — TELEPHONE ENCOUNTER
Patient is requesting a refill on furosemide (LASIX) 20 MG tablet, advised her it was sent to [facility] on 12/21, she stated she does not use [facility] pharmacy anymore and will like for it to be sent to [facility] instead.

## 2024-01-18 DIAGNOSIS — I10 ESSENTIAL (PRIMARY) HYPERTENSION: ICD-10-CM

## 2024-01-18 DIAGNOSIS — J30.9 ALLERGIC RHINITIS, UNSPECIFIED SEASONALITY, UNSPECIFIED TRIGGER: Primary | ICD-10-CM

## 2024-01-18 RX ORDER — FUROSEMIDE 20 MG/1
20 TABLET ORAL DAILY
Qty: 90 TABLET | Refills: 0 | Status: SHIPPED | OUTPATIENT
Start: 2024-01-18

## 2024-01-18 RX ORDER — MONTELUKAST SODIUM 10 MG/1
10 TABLET ORAL NIGHTLY
Qty: 90 TABLET | Refills: 1 | Status: SHIPPED | OUTPATIENT
Start: 2024-01-18

## 2024-02-12 ENCOUNTER — OFFICE VISIT (OUTPATIENT)
Facility: CLINIC | Age: 69
End: 2024-02-12
Payer: MEDICARE

## 2024-02-12 VITALS
SYSTOLIC BLOOD PRESSURE: 123 MMHG | OXYGEN SATURATION: 98 % | RESPIRATION RATE: 22 BRPM | HEIGHT: 67 IN | TEMPERATURE: 97.7 F | DIASTOLIC BLOOD PRESSURE: 62 MMHG | BODY MASS INDEX: 23.92 KG/M2 | WEIGHT: 152.38 LBS | HEART RATE: 104 BPM

## 2024-02-12 DIAGNOSIS — J30.9 ALLERGIC RHINITIS, UNSPECIFIED SEASONALITY, UNSPECIFIED TRIGGER: Primary | ICD-10-CM

## 2024-02-12 DIAGNOSIS — J30.9 ALLERGIC RHINITIS, UNSPECIFIED SEASONALITY, UNSPECIFIED TRIGGER: ICD-10-CM

## 2024-02-12 DIAGNOSIS — E55.9 VITAMIN D DEFICIENCY: ICD-10-CM

## 2024-02-12 PROBLEM — J06.9 UPPER RESPIRATORY TRACT INFECTION: Status: RESOLVED | Noted: 2023-04-20 | Resolved: 2024-02-12

## 2024-02-12 PROCEDURE — G8427 DOCREV CUR MEDS BY ELIG CLIN: HCPCS

## 2024-02-12 PROCEDURE — 1036F TOBACCO NON-USER: CPT

## 2024-02-12 PROCEDURE — 99213 OFFICE O/P EST LOW 20 MIN: CPT

## 2024-02-12 PROCEDURE — 3078F DIAST BP <80 MM HG: CPT

## 2024-02-12 PROCEDURE — 1090F PRES/ABSN URINE INCON ASSESS: CPT

## 2024-02-12 PROCEDURE — G8399 PT W/DXA RESULTS DOCUMENT: HCPCS

## 2024-02-12 PROCEDURE — 3017F COLORECTAL CA SCREEN DOC REV: CPT

## 2024-02-12 PROCEDURE — 1123F ACP DISCUSS/DSCN MKR DOCD: CPT

## 2024-02-12 PROCEDURE — G8420 CALC BMI NORM PARAMETERS: HCPCS

## 2024-02-12 PROCEDURE — G8484 FLU IMMUNIZE NO ADMIN: HCPCS

## 2024-02-12 PROCEDURE — 3074F SYST BP LT 130 MM HG: CPT

## 2024-02-12 RX ORDER — ERGOCALCIFEROL 1.25 MG/1
CAPSULE ORAL
Qty: 4 CAPSULE | Refills: 1 | Status: SHIPPED | OUTPATIENT
Start: 2024-02-12

## 2024-02-12 RX ORDER — FLUTICASONE PROPIONATE 50 MCG
2 SPRAY, SUSPENSION (ML) NASAL DAILY
Qty: 1 EACH | Refills: 1 | Status: SHIPPED | OUTPATIENT
Start: 2024-02-12

## 2024-02-12 RX ORDER — CETIRIZINE HYDROCHLORIDE 10 MG/1
10 TABLET ORAL DAILY
Qty: 30 TABLET | Refills: 1 | Status: SHIPPED | OUTPATIENT
Start: 2024-02-12

## 2024-02-12 NOTE — PROGRESS NOTES
Chief Complaint   Patient presents with    Sinus Problem     States has sinus drainage that causes her to cough for approximately 3 weeks. No fever.  Only runny nose, drainage and cough per patient.     1. Have you been to the ER, urgent care clinic since your last visit?  Hospitalized since your last visit?No    2. Have you seen or consulted any other health care providers outside of the [facility] since your last visit?  Include any pap smears or colon screening. No    
69.1 kg (152 lb 6 oz)   SpO2 98%   BMI 23.87 kg/m²     Last Point of Care HGB A1C  Hemoglobin A1C, POC   Date Value Ref Range Status   10/19/2023 4.5 % Final         Physical Exam  Constitutional:       Appearance: Normal appearance.   HENT:      Head: Normocephalic and atraumatic.      Right Ear: Tympanic membrane, ear canal and external ear normal.      Left Ear: Tympanic membrane, ear canal and external ear normal.      Nose: Rhinorrhea present.      Mouth/Throat:      Mouth: Mucous membranes are moist.      Pharynx: Oropharyngeal exudate present. No posterior oropharyngeal erythema.   Eyes:      Extraocular Movements: Extraocular movements intact.      Conjunctiva/sclera: Conjunctivae normal.      Pupils: Pupils are equal, round, and reactive to light.   Cardiovascular:      Rate and Rhythm: Normal rate and regular rhythm.      Heart sounds: Normal heart sounds.   Pulmonary:      Effort: Pulmonary effort is normal.      Breath sounds: Normal breath sounds.      Comments: 2 L O2 noted  Abdominal:      General: Abdomen is flat. Bowel sounds are normal.      Palpations: Abdomen is soft.   Skin:     General: Skin is warm and dry.   Neurological:      General: No focal deficit present.      Mental Status: She is alert and oriented to person, place, and time.   Psychiatric:         Mood and Affect: Mood normal.         Behavior: Behavior normal.         Thought Content: Thought content normal.         Judgment: Judgment normal.          1. Allergic rhinitis, unspecified seasonality, unspecified trigger  -     cetirizine (ZYRTEC) 10 MG tablet; Take 1 tablet by mouth daily, Disp-30 tablet, R-1Normal  This is not at goal.  Continue Singulair and Flonase as directed.  Will add cetirizine now.  Take as directed.  Advise antibiotics are not necessary at runny nose consists of clear thin secretions.  Avoid known triggers when possible.  Follow-up with pulmonology.  Contact provider if no improvement in 1 to 2 weeks or

## 2024-02-19 ENCOUNTER — TELEPHONE (OUTPATIENT)
Facility: CLINIC | Age: 69
End: 2024-02-19

## 2024-02-19 NOTE — TELEPHONE ENCOUNTER
Pt was seen for nasal drip and was given medication but it is not working and she is still having the problem and can not sleep. Please review and contact pt       Pt would like to know how long does it take for the medicine to start working

## 2024-02-20 ENCOUNTER — TELEPHONE (OUTPATIENT)
Facility: CLINIC | Age: 69
End: 2024-02-20

## 2024-02-20 RX ORDER — CIPROFLOXACIN 500 MG/1
TABLET, FILM COATED ORAL
Qty: 30 TABLET | Refills: 0 | Status: SHIPPED | OUTPATIENT
Start: 2024-02-20

## 2024-02-20 NOTE — TELEPHONE ENCOUNTER
LVM that patient can try Claritin OTC if no improvement with other medication per LARON Castro NP.

## 2024-02-22 ENCOUNTER — TELEPHONE (OUTPATIENT)
Facility: CLINIC | Age: 69
End: 2024-02-22

## 2024-02-22 NOTE — TELEPHONE ENCOUNTER
Patient states she is still having a cough.  Coughing up phlegm but it is clear.  What do you recommend that she take?

## 2024-02-23 NOTE — TELEPHONE ENCOUNTER
Patient contacted and message given.  She understood.    She can take Mucinex for this but otherwise she may actually need to follow up with her pulmonologist. It does not appear to be infectious with clear color.

## 2024-03-07 RX ORDER — CIPROFLOXACIN 500 MG/1
TABLET, FILM COATED ORAL
Qty: 30 TABLET | Refills: 0 | OUTPATIENT
Start: 2024-03-07

## 2024-03-14 DIAGNOSIS — J30.9 ALLERGIC RHINITIS, UNSPECIFIED SEASONALITY, UNSPECIFIED TRIGGER: ICD-10-CM

## 2024-03-14 RX ORDER — FLUTICASONE PROPIONATE 50 MCG
2 SPRAY, SUSPENSION (ML) NASAL DAILY
Qty: 16 G | Refills: 5 | Status: SHIPPED | OUTPATIENT
Start: 2024-03-14

## 2024-03-26 ENCOUNTER — OFFICE VISIT (OUTPATIENT)
Facility: CLINIC | Age: 69
End: 2024-03-26
Payer: MEDICARE

## 2024-03-26 VITALS
SYSTOLIC BLOOD PRESSURE: 101 MMHG | HEIGHT: 67 IN | RESPIRATION RATE: 18 BRPM | WEIGHT: 155.4 LBS | OXYGEN SATURATION: 99 % | TEMPERATURE: 97.1 F | HEART RATE: 100 BPM | BODY MASS INDEX: 24.39 KG/M2 | DIASTOLIC BLOOD PRESSURE: 57 MMHG

## 2024-03-26 DIAGNOSIS — Z20.822 CLOSE EXPOSURE TO COVID-19 VIRUS: Primary | ICD-10-CM

## 2024-03-26 DIAGNOSIS — J30.9 ALLERGIC RHINITIS, UNSPECIFIED SEASONALITY, UNSPECIFIED TRIGGER: ICD-10-CM

## 2024-03-26 LAB
EXP DATE SOLUTION: NORMAL
EXP DATE SWAB: NORMAL
EXPIRATION DATE: NORMAL
LOT NUMBER POC: NORMAL
LOT NUMBER SOLUTION: NORMAL
LOT NUMBER SWAB: NORMAL
SARS-COV-2 RNA, POC: NEGATIVE

## 2024-03-26 PROCEDURE — 1036F TOBACCO NON-USER: CPT | Performed by: NURSE PRACTITIONER

## 2024-03-26 PROCEDURE — 3078F DIAST BP <80 MM HG: CPT | Performed by: NURSE PRACTITIONER

## 2024-03-26 PROCEDURE — 87635 SARS-COV-2 COVID-19 AMP PRB: CPT | Performed by: NURSE PRACTITIONER

## 2024-03-26 PROCEDURE — G8427 DOCREV CUR MEDS BY ELIG CLIN: HCPCS | Performed by: NURSE PRACTITIONER

## 2024-03-26 PROCEDURE — 1123F ACP DISCUSS/DSCN MKR DOCD: CPT | Performed by: NURSE PRACTITIONER

## 2024-03-26 PROCEDURE — G8420 CALC BMI NORM PARAMETERS: HCPCS | Performed by: NURSE PRACTITIONER

## 2024-03-26 PROCEDURE — G8399 PT W/DXA RESULTS DOCUMENT: HCPCS | Performed by: NURSE PRACTITIONER

## 2024-03-26 PROCEDURE — 99213 OFFICE O/P EST LOW 20 MIN: CPT | Performed by: NURSE PRACTITIONER

## 2024-03-26 PROCEDURE — 3074F SYST BP LT 130 MM HG: CPT | Performed by: NURSE PRACTITIONER

## 2024-03-26 PROCEDURE — 3017F COLORECTAL CA SCREEN DOC REV: CPT | Performed by: NURSE PRACTITIONER

## 2024-03-26 PROCEDURE — 1090F PRES/ABSN URINE INCON ASSESS: CPT | Performed by: NURSE PRACTITIONER

## 2024-03-26 PROCEDURE — G8484 FLU IMMUNIZE NO ADMIN: HCPCS | Performed by: NURSE PRACTITIONER

## 2024-03-26 ASSESSMENT — ENCOUNTER SYMPTOMS
SINUS PRESSURE: 0
SINUS PAIN: 0
SORE THROAT: 0
EYES NEGATIVE: 1
GASTROINTESTINAL NEGATIVE: 1
TROUBLE SWALLOWING: 0
RHINORRHEA: 1
COUGH: 1
WHEEZING: 0

## 2024-03-26 NOTE — PROGRESS NOTES
Chief Complaint   Patient presents with    Concern For COVID-19     Pt family had Covid and just wants to make sure she does not, pt does not have any symptoms      Pt denies any symptoms just wants to be checked     1. Have you been to the ER, urgent care clinic since your last visit?  Hospitalized since your last visit?No    2. Have you seen or consulted any other health care providers outside of the [facility] since your last visit?  Include any pap smears or colon screening. No

## 2024-03-26 NOTE — PROGRESS NOTES
Subjective  Chief Complaint   Patient presents with    Concern For COVID-19     Pt family had Covid and just wants to make sure she does not, pt does not have any symptoms      HPI:  Virgen Hernandez is a 68 y.o. female with PMH of hypertension, chronic bronchitis, allergic rhinitis, GERD, Vitamin D deficiency, oxygen dependence 2lpm nc, eczema, compression fracture spine,anemia, osteoarthritis and type 2 diabetes  who presents with concerns for covid 19. States that she was exposed about 6 days ago to family who is positive for covid. Denies any fever, sore throat or persistent cough. Does experience chronic postnasal drip due to rhinitis along with intermittent headaches but no sinus pain or productive mucus.  Taking flonase, zyrtec and singulair. Did a home covid test which was negative but wanted to have test here as well.       Past Medical History:   Diagnosis Date    Arthritis     Bronchitis 9/1/2020    Cholelithiasis     as noted on CT abdomen in 3/2023    Chronic bronchitis (HCC)     Chronic obstructive pulmonary disease (HCC)     Chronic pain     Chronic respiratory failure with hypoxia, on home O2 therapy (HCC)     secondary to copd / emphysema    Diabetes (HCC)     Diverticulosis     Gastroesophageal reflux disease without esophagitis 9/1/2020    GERD (gastroesophageal reflux disease)     GERD (gastroesophageal reflux disease)     History of multiple allergies     HTN (hypertension) 9/1/2020    Hypertension     Intermittent asthma 9/1/2020    Lung disorder     PATIENT IS ON OXYGEN    Menopause     Pain of upper abdomen 9/1/2020    Renal mass, right 04/2023    Seasonal allergic rhinitis 9/1/2020    Sinus problem     Vitamin D deficiency 9/1/2020        Past Surgical History:   Procedure Laterality Date    COLONOSCOPY      COLONOSCOPY N/A 5/25/2020    COLONOSCOPY performed by Graham Quinones MD at [facility] ENDOSCOPY    GI      colonscopy    ORTHOPEDIC SURGERY  10/24/2018    Total Right Hip Arthroplasty

## 2024-03-29 ENCOUNTER — TELEPHONE (OUTPATIENT)
Facility: CLINIC | Age: 69
End: 2024-03-29

## 2024-04-02 ENCOUNTER — OFFICE VISIT (OUTPATIENT)
Facility: CLINIC | Age: 69
End: 2024-04-02
Payer: MEDICARE

## 2024-04-02 VITALS
RESPIRATION RATE: 18 BRPM | OXYGEN SATURATION: 97 % | WEIGHT: 157.38 LBS | HEIGHT: 67 IN | SYSTOLIC BLOOD PRESSURE: 135 MMHG | BODY MASS INDEX: 24.7 KG/M2 | HEART RATE: 104 BPM | DIASTOLIC BLOOD PRESSURE: 71 MMHG | TEMPERATURE: 98.2 F

## 2024-04-02 DIAGNOSIS — E11.9 TYPE 2 DIABETES MELLITUS WITHOUT COMPLICATION, WITHOUT LONG-TERM CURRENT USE OF INSULIN (HCC): Primary | ICD-10-CM

## 2024-04-02 DIAGNOSIS — J30.9 ALLERGIC RHINITIS, UNSPECIFIED SEASONALITY, UNSPECIFIED TRIGGER: ICD-10-CM

## 2024-04-02 DIAGNOSIS — J06.9 UPPER RESPIRATORY TRACT INFECTION, UNSPECIFIED TYPE: ICD-10-CM

## 2024-04-02 LAB — HBA1C MFR BLD: 4.8 %

## 2024-04-02 PROCEDURE — 3017F COLORECTAL CA SCREEN DOC REV: CPT

## 2024-04-02 PROCEDURE — 99214 OFFICE O/P EST MOD 30 MIN: CPT

## 2024-04-02 PROCEDURE — 3075F SYST BP GE 130 - 139MM HG: CPT

## 2024-04-02 PROCEDURE — 83036 HEMOGLOBIN GLYCOSYLATED A1C: CPT

## 2024-04-02 PROCEDURE — 1090F PRES/ABSN URINE INCON ASSESS: CPT

## 2024-04-02 PROCEDURE — 3046F HEMOGLOBIN A1C LEVEL >9.0%: CPT

## 2024-04-02 PROCEDURE — 1123F ACP DISCUSS/DSCN MKR DOCD: CPT

## 2024-04-02 PROCEDURE — G8420 CALC BMI NORM PARAMETERS: HCPCS

## 2024-04-02 PROCEDURE — G8399 PT W/DXA RESULTS DOCUMENT: HCPCS

## 2024-04-02 PROCEDURE — 3078F DIAST BP <80 MM HG: CPT

## 2024-04-02 PROCEDURE — 1036F TOBACCO NON-USER: CPT

## 2024-04-02 PROCEDURE — 2022F DILAT RTA XM EVC RTNOPTHY: CPT

## 2024-04-02 PROCEDURE — G8427 DOCREV CUR MEDS BY ELIG CLIN: HCPCS

## 2024-04-02 RX ORDER — DOXYCYCLINE HYCLATE 100 MG
100 TABLET ORAL 2 TIMES DAILY
Qty: 14 TABLET | Refills: 0 | Status: SHIPPED | OUTPATIENT
Start: 2024-04-02 | End: 2024-04-09

## 2024-04-02 RX ORDER — GUAIFENESIN 600 MG/1
600 TABLET, EXTENDED RELEASE ORAL 2 TIMES DAILY
Qty: 30 TABLET | Refills: 0 | Status: SHIPPED | OUTPATIENT
Start: 2024-04-02 | End: 2024-04-17

## 2024-04-02 ASSESSMENT — ENCOUNTER SYMPTOMS
COUGH: 1
GASTROINTESTINAL NEGATIVE: 1
SINUS PAIN: 1
SINUS PRESSURE: 1
EYE DISCHARGE: 1

## 2024-04-02 ASSESSMENT — PATIENT HEALTH QUESTIONNAIRE - PHQ9
1. LITTLE INTEREST OR PLEASURE IN DOING THINGS: NOT AT ALL
SUM OF ALL RESPONSES TO PHQ QUESTIONS 1-9: 0
SUM OF ALL RESPONSES TO PHQ9 QUESTIONS 1 & 2: 0
SUM OF ALL RESPONSES TO PHQ QUESTIONS 1-9: 0
SUM OF ALL RESPONSES TO PHQ QUESTIONS 1-9: 0
2. FEELING DOWN, DEPRESSED OR HOPELESS: NOT AT ALL
SUM OF ALL RESPONSES TO PHQ QUESTIONS 1-9: 0

## 2024-04-02 NOTE — PROGRESS NOTES
Virgen Hernandez is a 68 y.o. female who presents to the office today for the following:    Chief Complaint   Patient presents with    Sinus Problem     States has facial tenderness, throat itchiness, sinus drainage,sneezing, nasal drainage w/blood tinged mucous, watery eyes and dry mouth.       Past Medical History:   Diagnosis Date    Arthritis     Bronchitis 2020    Cholelithiasis     as noted on CT abdomen in 3/2023    Chronic bronchitis (HCC)     Chronic obstructive pulmonary disease (HCC)     Chronic pain     Chronic respiratory failure with hypoxia, on home O2 therapy (HCC)     secondary to copd / emphysema    Diabetes (HCC)     Diverticulosis     Gastroesophageal reflux disease without esophagitis 2020    GERD (gastroesophageal reflux disease)     GERD (gastroesophageal reflux disease)     History of multiple allergies     HTN (hypertension) 2020    Hypertension     Intermittent asthma 2020    Lung disorder     PATIENT IS ON OXYGEN    Menopause     Pain of upper abdomen 2020    Renal mass, right 2023    Seasonal allergic rhinitis 2020    Sinus problem     Vitamin D deficiency 2020        Past Surgical History:   Procedure Laterality Date    COLONOSCOPY      COLONOSCOPY N/A 2020    COLONOSCOPY performed by Graham Quinones MD at [facility] ENDOSCOPY    GI      colonscopy    ORTHOPEDIC SURGERY  10/24/2018    Total Right Hip Arthroplasty Dr. Jonnie Osorio [place], [place].        Family History   Problem Relation Age of Onset    Cancer Sister     Hypertension Mother         Social History     Tobacco Use    Smoking status: Former     Current packs/day: 0.00     Types: Cigarettes     Quit date: 2000     Years since quittin.2    Smokeless tobacco: Never   Vaping Use    Vaping Use: Never used   Substance Use Topics    Alcohol use: Yes     Alcohol/week: 3.0 standard drinks of alcohol    Drug use: No        HPI  Patient here for facial pain, sinus pain, throat drainage,

## 2024-04-02 NOTE — PROGRESS NOTES
Chief Complaint   Patient presents with    Sinus Problem     States has facial tenderness, throat itchiness, sinus drainage,sneezing, nasal drainage w/blood tinged mucous, watery eyes and dry mouth.     \"Have you been to the ER, urgent care clinic since your last visit?  Hospitalized since your last visit?\"    NO    “Have you seen or consulted any other health care providers outside of [facility] since your last visit?”    NO    Have you had a mammogram?”   NO    Date of last Mammogram: 9/29/2020     HOLLEY Felipe NP ordered one back on 10/19/2023 but patient stated she did not have it done.        Click Here for Release of Records Request

## 2024-04-08 DIAGNOSIS — J30.9 ALLERGIC RHINITIS, UNSPECIFIED SEASONALITY, UNSPECIFIED TRIGGER: ICD-10-CM

## 2024-04-08 DIAGNOSIS — E55.9 VITAMIN D DEFICIENCY: ICD-10-CM

## 2024-04-08 RX ORDER — CETIRIZINE HYDROCHLORIDE 10 MG/1
10 TABLET ORAL DAILY
Qty: 90 TABLET | Refills: 1 | Status: SHIPPED | OUTPATIENT
Start: 2024-04-08

## 2024-04-08 RX ORDER — ERGOCALCIFEROL 1.25 MG/1
50000 CAPSULE ORAL WEEKLY
Qty: 12 CAPSULE | Refills: 1 | Status: SHIPPED | OUTPATIENT
Start: 2024-04-08

## 2024-04-09 ENCOUNTER — TELEPHONE (OUTPATIENT)
Facility: CLINIC | Age: 69
End: 2024-04-09

## 2024-04-09 NOTE — TELEPHONE ENCOUNTER
Called pt and gave her the number to call [facility] to make an appt or Mammo     ----- Message from Daisy Georges sent at 4/9/2024  3:41 PM EDT -----  Subject: Referral Request    Reason for referral request? Virgen Hernandez wants to get her mammogram.   Please call her to advise if she can use the order in there, or if she   needs a new order.  Provider patient wants to be referred to(if known):     Provider Phone Number(if known):    Additional Information for Provider?   ---------------------------------------------------------------------------  --------------  CALL BACK INFO    [phone]; OK to leave message on voicemail  ---------------------------------------------------------------------------  --------------

## 2024-04-23 DIAGNOSIS — E55.9 VITAMIN D DEFICIENCY: ICD-10-CM

## 2024-04-23 DIAGNOSIS — E11.9 TYPE 2 DIABETES MELLITUS WITHOUT COMPLICATION, WITHOUT LONG-TERM CURRENT USE OF INSULIN (HCC): ICD-10-CM

## 2024-04-24 ENCOUNTER — TELEPHONE (OUTPATIENT)
Facility: CLINIC | Age: 69
End: 2024-04-24

## 2024-04-24 LAB
25(OH)D3+25(OH)D2 SERPL-MCNC: 123 NG/ML (ref 30–100)
ALBUMIN SERPL-MCNC: 4.7 G/DL (ref 3.9–4.9)
ALBUMIN/CREAT UR: 26 MG/G CREAT (ref 0–29)
ALBUMIN/GLOB SERPL: 2.2 {RATIO} (ref 1.2–2.2)
ALP SERPL-CCNC: 76 IU/L (ref 44–121)
ALT SERPL-CCNC: 15 IU/L (ref 0–32)
APPEARANCE UR: CLEAR
AST SERPL-CCNC: 23 IU/L (ref 0–40)
BACTERIA #/AREA URNS HPF: NORMAL /[HPF]
BASOPHILS # BLD AUTO: 0 X10E3/UL (ref 0–0.2)
BASOPHILS NFR BLD AUTO: 1 %
BILIRUB SERPL-MCNC: 0.5 MG/DL (ref 0–1.2)
BILIRUB UR QL STRIP: NEGATIVE
BUN SERPL-MCNC: 9 MG/DL (ref 8–27)
BUN/CREAT SERPL: 11 (ref 12–28)
CALCIUM SERPL-MCNC: 10 MG/DL (ref 8.7–10.3)
CASTS URNS QL MICRO: NORMAL /LPF
CHLORIDE SERPL-SCNC: 87 MMOL/L (ref 96–106)
CHOLEST SERPL-MCNC: 210 MG/DL (ref 100–199)
CO2 SERPL-SCNC: 24 MMOL/L (ref 20–29)
COLOR UR: YELLOW
CREAT SERPL-MCNC: 0.82 MG/DL (ref 0.57–1)
CREAT UR-MCNC: 28.7 MG/DL
EGFRCR SERPLBLD CKD-EPI 2021: 78 ML/MIN/1.73
EOSINOPHIL # BLD AUTO: 0.1 X10E3/UL (ref 0–0.4)
EOSINOPHIL NFR BLD AUTO: 2 %
EPI CELLS #/AREA URNS HPF: NORMAL /HPF (ref 0–10)
ERYTHROCYTE [DISTWIDTH] IN BLOOD BY AUTOMATED COUNT: 12.8 % (ref 11.7–15.4)
GLOBULIN SER CALC-MCNC: 2.1 G/DL (ref 1.5–4.5)
GLUCOSE SERPL-MCNC: 95 MG/DL (ref 70–99)
GLUCOSE UR QL STRIP: NEGATIVE
HBA1C MFR BLD: 4.9 % (ref 4.8–5.6)
HCT VFR BLD AUTO: 29.3 % (ref 34–46.6)
HDLC SERPL-MCNC: 132 MG/DL
HGB BLD-MCNC: 9.8 G/DL (ref 11.1–15.9)
HGB UR QL STRIP: NEGATIVE
IMM GRANULOCYTES # BLD AUTO: 0 X10E3/UL (ref 0–0.1)
IMM GRANULOCYTES NFR BLD AUTO: 0 %
KETONES UR QL STRIP: NEGATIVE
LDLC SERPL CALC-MCNC: 67 MG/DL (ref 0–99)
LEUKOCYTE ESTERASE UR QL STRIP: ABNORMAL
LYMPHOCYTES # BLD AUTO: 1.5 X10E3/UL (ref 0.7–3.1)
LYMPHOCYTES NFR BLD AUTO: 26 %
MCH RBC QN AUTO: 30.8 PG (ref 26.6–33)
MCHC RBC AUTO-ENTMCNC: 33.4 G/DL (ref 31.5–35.7)
MCV RBC AUTO: 92 FL (ref 79–97)
MICRO URNS: ABNORMAL
MICROALBUMIN UR-MCNC: 7.6 UG/ML
MONOCYTES # BLD AUTO: 0.5 X10E3/UL (ref 0.1–0.9)
MONOCYTES NFR BLD AUTO: 9 %
NEUTROPHILS # BLD AUTO: 3.7 X10E3/UL (ref 1.4–7)
NEUTROPHILS NFR BLD AUTO: 62 %
NITRITE UR QL STRIP: NEGATIVE
PH UR STRIP: 6.5 [PH] (ref 5–7.5)
PLATELET # BLD AUTO: 272 X10E3/UL (ref 150–450)
POTASSIUM SERPL-SCNC: 4.8 MMOL/L (ref 3.5–5.2)
PROT SERPL-MCNC: 6.8 G/DL (ref 6–8.5)
PROT UR QL STRIP: NEGATIVE
RBC # BLD AUTO: 3.18 X10E6/UL (ref 3.77–5.28)
RBC #/AREA URNS HPF: NORMAL /HPF (ref 0–2)
SODIUM SERPL-SCNC: 129 MMOL/L (ref 134–144)
SP GR UR STRIP: 1.01 (ref 1–1.03)
TRIGL SERPL-MCNC: 60 MG/DL (ref 0–149)
TSH SERPL DL<=0.005 MIU/L-ACNC: 1.17 UIU/ML (ref 0.45–4.5)
UROBILINOGEN UR STRIP-MCNC: 0.2 MG/DL (ref 0.2–1)
VLDLC SERPL CALC-MCNC: 11 MG/DL (ref 5–40)
WBC # BLD AUTO: 5.9 X10E3/UL (ref 3.4–10.8)
WBC #/AREA URNS HPF: NORMAL /HPF (ref 0–5)

## 2024-04-24 NOTE — TELEPHONE ENCOUNTER
Attempted to contact patient regarding scheduling of mammogram. LVM for patient to please return call at [phone] or [phone] to schedule at her earliest convenience.

## 2024-05-03 ENCOUNTER — OFFICE VISIT (OUTPATIENT)
Facility: CLINIC | Age: 69
End: 2024-05-03

## 2024-05-03 VITALS
BODY MASS INDEX: 24.83 KG/M2 | HEIGHT: 67 IN | HEART RATE: 100 BPM | SYSTOLIC BLOOD PRESSURE: 113 MMHG | TEMPERATURE: 97.4 F | RESPIRATION RATE: 18 BRPM | WEIGHT: 158.2 LBS | DIASTOLIC BLOOD PRESSURE: 67 MMHG | OXYGEN SATURATION: 98 %

## 2024-05-03 DIAGNOSIS — Z99.81 DEPENDENCE ON SUPPLEMENTAL OXYGEN: ICD-10-CM

## 2024-05-03 DIAGNOSIS — J30.9 ALLERGIC RHINITIS, UNSPECIFIED SEASONALITY, UNSPECIFIED TRIGGER: ICD-10-CM

## 2024-05-03 DIAGNOSIS — J41.1 MUCOPURULENT CHRONIC BRONCHITIS (HCC): Primary | ICD-10-CM

## 2024-05-03 DIAGNOSIS — E11.9 TYPE 2 DIABETES MELLITUS WITHOUT COMPLICATION, WITHOUT LONG-TERM CURRENT USE OF INSULIN (HCC): ICD-10-CM

## 2024-05-03 DIAGNOSIS — Z78.0 ENCOUNTER FOR OSTEOPOROSIS SCREENING IN ASYMPTOMATIC POSTMENOPAUSAL PATIENT: ICD-10-CM

## 2024-05-03 DIAGNOSIS — D64.9 ANEMIA, UNSPECIFIED TYPE: ICD-10-CM

## 2024-05-03 DIAGNOSIS — M67.431 GANGLION CYST OF WRIST, RIGHT: ICD-10-CM

## 2024-05-03 DIAGNOSIS — Z74.09 OTHER REDUCED MOBILITY: ICD-10-CM

## 2024-05-03 DIAGNOSIS — Z12.31 SCREENING MAMMOGRAM, ENCOUNTER FOR: ICD-10-CM

## 2024-05-03 DIAGNOSIS — I10 ESSENTIAL (PRIMARY) HYPERTENSION: ICD-10-CM

## 2024-05-03 DIAGNOSIS — Z13.820 ENCOUNTER FOR OSTEOPOROSIS SCREENING IN ASYMPTOMATIC POSTMENOPAUSAL PATIENT: ICD-10-CM

## 2024-05-03 DIAGNOSIS — S32.000K: ICD-10-CM

## 2024-05-03 DIAGNOSIS — K21.9 GASTRO-ESOPHAGEAL REFLUX DISEASE WITHOUT ESOPHAGITIS: ICD-10-CM

## 2024-05-03 DIAGNOSIS — N28.89 RENAL MASS: ICD-10-CM

## 2024-05-03 DIAGNOSIS — Z00.00 MEDICARE ANNUAL WELLNESS VISIT, SUBSEQUENT: ICD-10-CM

## 2024-05-03 DIAGNOSIS — E55.9 VITAMIN D DEFICIENCY: ICD-10-CM

## 2024-05-03 DIAGNOSIS — E78.2 MIXED HYPERLIPIDEMIA: ICD-10-CM

## 2024-05-03 PROBLEM — J96.21 ACUTE ON CHRONIC RESPIRATORY FAILURE WITH HYPOXIA (HCC): Status: RESOLVED | Noted: 2023-06-24 | Resolved: 2024-05-03

## 2024-05-03 SDOH — ECONOMIC STABILITY: FOOD INSECURITY: WITHIN THE PAST 12 MONTHS, THE FOOD YOU BOUGHT JUST DIDN'T LAST AND YOU DIDN'T HAVE MONEY TO GET MORE.: NEVER TRUE

## 2024-05-03 SDOH — ECONOMIC STABILITY: INCOME INSECURITY: HOW HARD IS IT FOR YOU TO PAY FOR THE VERY BASICS LIKE FOOD, HOUSING, MEDICAL CARE, AND HEATING?: NOT VERY HARD

## 2024-05-03 SDOH — ECONOMIC STABILITY: FOOD INSECURITY: WITHIN THE PAST 12 MONTHS, YOU WORRIED THAT YOUR FOOD WOULD RUN OUT BEFORE YOU GOT MONEY TO BUY MORE.: NEVER TRUE

## 2024-05-03 SDOH — ECONOMIC STABILITY: HOUSING INSECURITY
IN THE LAST 12 MONTHS, WAS THERE A TIME WHEN YOU DID NOT HAVE A STEADY PLACE TO SLEEP OR SLEPT IN A SHELTER (INCLUDING NOW)?: NO

## 2024-05-03 ASSESSMENT — ENCOUNTER SYMPTOMS
PHOTOPHOBIA: 0
COUGH: 1
COLOR CHANGE: 0
SHORTNESS OF BREATH: 1
ABDOMINAL PAIN: 0
CHEST TIGHTNESS: 0
APNEA: 0
VOMITING: 0
CONSTIPATION: 0
EYE REDNESS: 0
FACIAL SWELLING: 0
CHOKING: 0
ABDOMINAL DISTENTION: 0
SINUS PAIN: 0
WHEEZING: 0
EYE DISCHARGE: 0
TROUBLE SWALLOWING: 0
BLOOD IN STOOL: 0
STRIDOR: 0
SORE THROAT: 0
NAUSEA: 0
DIARRHEA: 0
EYE PAIN: 0
EYE ITCHING: 0

## 2024-05-03 ASSESSMENT — PATIENT HEALTH QUESTIONNAIRE - PHQ9
SUM OF ALL RESPONSES TO PHQ QUESTIONS 1-9: 0
1. LITTLE INTEREST OR PLEASURE IN DOING THINGS: NOT AT ALL
SUM OF ALL RESPONSES TO PHQ9 QUESTIONS 1 & 2: 0
SUM OF ALL RESPONSES TO PHQ QUESTIONS 1-9: 0
2. FEELING DOWN, DEPRESSED OR HOPELESS: NOT AT ALL

## 2024-05-03 ASSESSMENT — LIFESTYLE VARIABLES
HOW MANY STANDARD DRINKS CONTAINING ALCOHOL DO YOU HAVE ON A TYPICAL DAY: PATIENT DOES NOT DRINK
HOW OFTEN DO YOU HAVE A DRINK CONTAINING ALCOHOL: NEVER

## 2024-05-03 NOTE — PROGRESS NOTES
Chief Complaint   Patient presents with    Medicare AWV    Diabetes     113/67     Wellness    Pt did not bring meds, went over list, pt confirmed     No other c/o    \"Have you been to the ER, urgent care clinic since your last visit?  Hospitalized since your last visit?\"    NO    “Have you seen or consulted any other health care providers outside of [facility] since your last visit?”    NO    Have you had a mammogram?”   NO has not had one since 2020    Date of last Mammogram: 9/29/2020             Click Here for Release of Records Request      
major  hepatic vascular structures.  BILIARY TREE: Gallbladder contains dependent intraluminal small calcified  gallstones but is otherwise unremarkable per CBD is not dilated.  SPLEEN: Unremarkable.  PANCREAS: No mass or ductal dilatation.  ADRENALS: Unremarkable.  KIDNEYS: In the posterior medial interpolar right kidney there is a  heterogeneously enhancing 2.6 x 2.8 x 2.6 cm. There is no evident collecting  system or vascular invasion. No other focal renal lesions and no calculus or  hydronephrosis.  STOMACH: Unremarkable.  BOWEL: No dilatation or wall thickening. No dilation or wall thickening.  Noninflamed appearing diverticula.  PERITONEUM: No ascites or pneumoperitoneum.  RETROPERITONEUM: Atherosclerotic calcification without aneurysm or dissection.  No enlarged lymphadenopathy.  BONES: Degenerative spine change. Anterior compression fracture deformities of  T12, L2, and L5 with no evident fracture lucency, patient sclerosis, or  paraspinous hematoma. No significant retropulsed fragments.  ABDOMINAL WALL: No mass or hernia.  ADDITIONAL COMMENTS: N/A    Impression  1. 2.6 x 2.8 x 2.6 cm heterogeneously enhancing mass of the right kidney  suspicious for neoplasm such as renal cell carcinoma with no typical evidence of  metastatic disease in the abdomen.  2. Age-indeterminate compression deformities of T12, L2, and L5.  3. Additional incidentals as above including too small to characterize hepatic  hypodensities to which attention is directed on future follow-up.    23X  Stable per CT 11/2023.  Current plan is surveillance due to co-morbid conditions and has upcoming scan.  Continue care with urology.    11. Screening mammogram, encounter for  Check mammogram and provided contact information to schedule as not done when ordered 11/2023.  -     JOHN ERNIE DIGITAL SCREEN BILATERAL; Future    12. Anemia, unspecified type  Lab Results   Component Value Date    WBC 5.9 04/23/2024    HGB 9.8 (L) 04/23/2024    HCT 29.3

## 2024-05-03 NOTE — PATIENT INSTRUCTIONS
programs and medicines. These can increase your chances of quitting for good. Quitting is one of the most important things you can do to protect your heart. It is never too late to quit. Try to avoid secondhand smoke too.     Stay at a weight that's healthy for you. Talk to your doctor if you need help losing weight.     Try to get 7 to 9 hours of sleep each night.     Limit alcohol to 2 drinks a day for men and 1 drink a day for women. Too much alcohol can cause health problems.     Manage other health problems such as diabetes, high blood pressure, and high cholesterol. If you think you may have a problem with alcohol or drug use, talk to your doctor.   Medicines    Take your medicines exactly as prescribed. Call your doctor if you think you are having a problem with your medicine.     If your doctor recommends aspirin, take the amount directed each day. Make sure you take aspirin and not another kind of pain reliever, such as acetaminophen (Tylenol).   When should you call for help?   Call 911 if you have symptoms of a heart attack. These may include:    Chest pain or pressure, or a strange feeling in the chest.     Sweating.     Shortness of breath.     Pain, pressure, or a strange feeling in the back, neck, jaw, or upper belly or in one or both shoulders or arms.     Lightheadedness or sudden weakness.     A fast or irregular heartbeat.   After you call 911, the  may tell you to chew 1 adult-strength or 2 to 4 low-dose aspirin. Wait for an ambulance. Do not try to drive yourself.  Watch closely for changes in your health, and be sure to contact your doctor if you have any problems.  Where can you learn more?  Go to https://www.[employer].net/patientEd and enter F075 to learn more about \"A Healthy Heart: Care Instructions.\"  Current as of: June 24, 2023               Content Version: 14.0  © 8577-5183 Healthwise, Incorporated.   Care instructions adapted under license by [employer]. If you have

## 2024-05-07 ENCOUNTER — HOSPITAL ENCOUNTER (OUTPATIENT)
Facility: HOSPITAL | Age: 69
Discharge: HOME | End: 2024-05-10
Payer: MEDICARE

## 2024-05-07 DIAGNOSIS — N28.89 KIDNEY MASS: ICD-10-CM

## 2024-05-07 DIAGNOSIS — N28.89 URETERAL FISTULA: ICD-10-CM

## 2024-05-07 PROCEDURE — 74170 CT ABD WO CNTRST FLWD CNTRST: CPT

## 2024-05-07 PROCEDURE — 6360000004 HC RX CONTRAST MEDICATION: Performed by: NURSE PRACTITIONER

## 2024-05-07 RX ADMIN — IOPAMIDOL 100 ML: 755 INJECTION, SOLUTION INTRAVENOUS at 10:24

## 2024-05-13 ENCOUNTER — OFFICE VISIT (OUTPATIENT)
Age: 69
End: 2024-05-13
Payer: MEDICARE

## 2024-05-13 VITALS
OXYGEN SATURATION: 93 % | SYSTOLIC BLOOD PRESSURE: 134 MMHG | BODY MASS INDEX: 24.96 KG/M2 | WEIGHT: 159 LBS | HEIGHT: 67 IN | HEART RATE: 116 BPM | DIASTOLIC BLOOD PRESSURE: 76 MMHG | TEMPERATURE: 98 F | RESPIRATION RATE: 18 BRPM

## 2024-05-13 DIAGNOSIS — N28.89 KIDNEY MASS: ICD-10-CM

## 2024-05-13 DIAGNOSIS — R10.31 RLQ ABDOMINAL PAIN: Primary | ICD-10-CM

## 2024-05-13 DIAGNOSIS — K21.9 GASTROESOPHAGEAL REFLUX DISEASE WITHOUT ESOPHAGITIS: ICD-10-CM

## 2024-05-13 PROCEDURE — 99214 OFFICE O/P EST MOD 30 MIN: CPT | Performed by: INTERNAL MEDICINE

## 2024-05-13 PROCEDURE — G8427 DOCREV CUR MEDS BY ELIG CLIN: HCPCS | Performed by: INTERNAL MEDICINE

## 2024-05-13 PROCEDURE — 1090F PRES/ABSN URINE INCON ASSESS: CPT | Performed by: INTERNAL MEDICINE

## 2024-05-13 PROCEDURE — 1123F ACP DISCUSS/DSCN MKR DOCD: CPT | Performed by: INTERNAL MEDICINE

## 2024-05-13 PROCEDURE — 3078F DIAST BP <80 MM HG: CPT | Performed by: INTERNAL MEDICINE

## 2024-05-13 PROCEDURE — G8399 PT W/DXA RESULTS DOCUMENT: HCPCS | Performed by: INTERNAL MEDICINE

## 2024-05-13 PROCEDURE — 3075F SYST BP GE 130 - 139MM HG: CPT | Performed by: INTERNAL MEDICINE

## 2024-05-13 PROCEDURE — 1036F TOBACCO NON-USER: CPT | Performed by: INTERNAL MEDICINE

## 2024-05-13 PROCEDURE — G8420 CALC BMI NORM PARAMETERS: HCPCS | Performed by: INTERNAL MEDICINE

## 2024-05-13 PROCEDURE — 3017F COLORECTAL CA SCREEN DOC REV: CPT | Performed by: INTERNAL MEDICINE

## 2024-05-13 ASSESSMENT — PATIENT HEALTH QUESTIONNAIRE - PHQ9
SUM OF ALL RESPONSES TO PHQ QUESTIONS 1-9: 0
SUM OF ALL RESPONSES TO PHQ9 QUESTIONS 1 & 2: 0
1. LITTLE INTEREST OR PLEASURE IN DOING THINGS: NOT AT ALL
SUM OF ALL RESPONSES TO PHQ QUESTIONS 1-9: 0
2. FEELING DOWN, DEPRESSED OR HOPELESS: NOT AT ALL

## 2024-05-13 NOTE — PROGRESS NOTES
Chief Complaint   Patient presents with    Follow-up    Abdominal Pain     Pt cont to have pain on the right abd side    Constipation     \"Have you been to the ER, urgent care clinic since your last visit?  Hospitalized since your last visit?\"    NO    “Have you seen or consulted any other health care providers outside of [facility] since your last visit?”    NO    Have you had a mammogram?”   NO    Date of last Mammogram: 9/29/2020             Click Here for Release of Records Request

## 2024-05-16 ENCOUNTER — TRANSCRIBE ORDERS (OUTPATIENT)
Facility: HOSPITAL | Age: 69
End: 2024-05-16

## 2024-05-16 DIAGNOSIS — N28.89 RENAL MASS: Primary | ICD-10-CM

## 2024-05-16 DIAGNOSIS — E78.2 MIXED HYPERLIPIDEMIA: ICD-10-CM

## 2024-05-16 RX ORDER — ATORVASTATIN CALCIUM 20 MG/1
20 TABLET, FILM COATED ORAL DAILY
Qty: 90 TABLET | Refills: 1 | Status: SHIPPED | OUTPATIENT
Start: 2024-05-16

## 2024-05-21 DIAGNOSIS — I10 ESSENTIAL (PRIMARY) HYPERTENSION: ICD-10-CM

## 2024-05-21 RX ORDER — AMLODIPINE BESYLATE 10 MG/1
10 TABLET ORAL DAILY
Qty: 90 TABLET | Refills: 1 | Status: SHIPPED | OUTPATIENT
Start: 2024-05-21

## 2024-06-05 DIAGNOSIS — I10 ESSENTIAL (PRIMARY) HYPERTENSION: ICD-10-CM

## 2024-06-05 RX ORDER — LISINOPRIL 20 MG/1
20 TABLET ORAL DAILY
Qty: 90 TABLET | Refills: 1 | Status: SHIPPED | OUTPATIENT
Start: 2024-06-05

## 2024-06-08 LAB
BASOPHILS # BLD AUTO: 0 X10E3/UL (ref 0–0.2)
BASOPHILS NFR BLD AUTO: 1 %
EOSINOPHIL # BLD AUTO: 0.1 X10E3/UL (ref 0–0.4)
EOSINOPHIL NFR BLD AUTO: 1 %
ERYTHROCYTE [DISTWIDTH] IN BLOOD BY AUTOMATED COUNT: 12.8 % (ref 11.7–15.4)
FERRITIN SERPL-MCNC: 201 NG/ML (ref 15–150)
HCT VFR BLD AUTO: 31.6 % (ref 34–46.6)
HGB BLD-MCNC: 10.7 G/DL (ref 11.1–15.9)
IMM GRANULOCYTES # BLD AUTO: 0 X10E3/UL (ref 0–0.1)
IMM GRANULOCYTES NFR BLD AUTO: 1 %
LYMPHOCYTES # BLD AUTO: 0.9 X10E3/UL (ref 0.7–3.1)
LYMPHOCYTES NFR BLD AUTO: 14 %
MCH RBC QN AUTO: 30.7 PG (ref 26.6–33)
MCHC RBC AUTO-ENTMCNC: 33.9 G/DL (ref 31.5–35.7)
MCV RBC AUTO: 91 FL (ref 79–97)
MONOCYTES # BLD AUTO: 0.3 X10E3/UL (ref 0.1–0.9)
MONOCYTES NFR BLD AUTO: 5 %
NEUTROPHILS # BLD AUTO: 4.7 X10E3/UL (ref 1.4–7)
NEUTROPHILS NFR BLD AUTO: 78 %
PLATELET # BLD AUTO: 307 X10E3/UL (ref 150–450)
RBC # BLD AUTO: 3.48 X10E6/UL (ref 3.77–5.28)
WBC # BLD AUTO: 6 X10E3/UL (ref 3.4–10.8)

## 2024-06-14 RX ORDER — CIPROFLOXACIN 500 MG/1
TABLET, FILM COATED ORAL
Qty: 30 TABLET | Refills: 0 | OUTPATIENT
Start: 2024-06-14

## 2024-06-17 ENCOUNTER — HOSPITAL ENCOUNTER (OUTPATIENT)
Facility: HOSPITAL | Age: 69
Discharge: HOME | End: 2024-06-20
Payer: MEDICARE

## 2024-06-17 DIAGNOSIS — J44.9 CHRONIC OBSTRUCTIVE PULMONARY DISEASE, UNSPECIFIED COPD TYPE (HCC): ICD-10-CM

## 2024-06-17 PROCEDURE — 71046 X-RAY EXAM CHEST 2 VIEWS: CPT

## 2024-06-18 DIAGNOSIS — I10 ESSENTIAL (PRIMARY) HYPERTENSION: ICD-10-CM

## 2024-06-18 RX ORDER — FUROSEMIDE 20 MG/1
20 TABLET ORAL DAILY
Qty: 90 TABLET | Refills: 1 | Status: SHIPPED | OUTPATIENT
Start: 2024-06-18

## 2024-06-27 ENCOUNTER — TELEPHONE (OUTPATIENT)
Facility: CLINIC | Age: 69
End: 2024-06-27

## 2024-06-27 DIAGNOSIS — E55.9 VITAMIN D DEFICIENCY: ICD-10-CM

## 2024-06-27 NOTE — TELEPHONE ENCOUNTER
Patient following up on results of recent labs from 6/7.   I did not see that they had been reviewed.

## 2024-06-28 ENCOUNTER — TELEPHONE (OUTPATIENT)
Facility: CLINIC | Age: 69
End: 2024-06-28

## 2024-06-28 DIAGNOSIS — E55.9 VITAMIN D DEFICIENCY: ICD-10-CM

## 2024-06-28 RX ORDER — ERGOCALCIFEROL 1.25 MG/1
CAPSULE, LIQUID FILLED ORAL
Qty: 4 CAPSULE | Refills: 1 | OUTPATIENT
Start: 2024-06-28

## 2024-06-28 NOTE — TELEPHONE ENCOUNTER
Patient is requesting a refill on the medication;    vitamin D (CHOLECALCIFEROL) 25 MCG (1000 UT) TABS tablet     Pt's pharmacy is [facility]

## 2024-08-06 ENCOUNTER — OFFICE VISIT (OUTPATIENT)
Facility: CLINIC | Age: 69
End: 2024-08-06
Payer: MEDICARE

## 2024-08-06 VITALS
RESPIRATION RATE: 18 BRPM | BODY MASS INDEX: 21.97 KG/M2 | HEART RATE: 98 BPM | HEIGHT: 67 IN | OXYGEN SATURATION: 97 % | TEMPERATURE: 97.8 F | SYSTOLIC BLOOD PRESSURE: 110 MMHG | DIASTOLIC BLOOD PRESSURE: 53 MMHG | WEIGHT: 140 LBS

## 2024-08-06 DIAGNOSIS — J30.9 ALLERGIC RHINITIS, UNSPECIFIED SEASONALITY, UNSPECIFIED TRIGGER: ICD-10-CM

## 2024-08-06 DIAGNOSIS — E11.9 TYPE 2 DIABETES MELLITUS WITHOUT COMPLICATION, WITHOUT LONG-TERM CURRENT USE OF INSULIN (HCC): Primary | ICD-10-CM

## 2024-08-06 DIAGNOSIS — E55.9 VITAMIN D DEFICIENCY: ICD-10-CM

## 2024-08-06 DIAGNOSIS — Z99.81 DEPENDENCE ON SUPPLEMENTAL OXYGEN: ICD-10-CM

## 2024-08-06 DIAGNOSIS — S32.000K: ICD-10-CM

## 2024-08-06 DIAGNOSIS — D64.9 ANEMIA, UNSPECIFIED TYPE: ICD-10-CM

## 2024-08-06 DIAGNOSIS — Z74.09 OTHER REDUCED MOBILITY: ICD-10-CM

## 2024-08-06 DIAGNOSIS — J41.1 MUCOPURULENT CHRONIC BRONCHITIS (HCC): ICD-10-CM

## 2024-08-06 DIAGNOSIS — M67.431 GANGLION CYST OF WRIST, RIGHT: ICD-10-CM

## 2024-08-06 DIAGNOSIS — K21.9 GASTRO-ESOPHAGEAL REFLUX DISEASE WITHOUT ESOPHAGITIS: ICD-10-CM

## 2024-08-06 DIAGNOSIS — I10 ESSENTIAL (PRIMARY) HYPERTENSION: ICD-10-CM

## 2024-08-06 DIAGNOSIS — N28.89 RENAL MASS: ICD-10-CM

## 2024-08-06 DIAGNOSIS — E78.2 MIXED HYPERLIPIDEMIA: ICD-10-CM

## 2024-08-06 DIAGNOSIS — Z12.31 SCREENING MAMMOGRAM, ENCOUNTER FOR: ICD-10-CM

## 2024-08-06 LAB — HBA1C MFR BLD: 4.8 %

## 2024-08-06 PROCEDURE — 3023F SPIROM DOC REV: CPT | Performed by: NURSE PRACTITIONER

## 2024-08-06 PROCEDURE — 3044F HG A1C LEVEL LT 7.0%: CPT | Performed by: NURSE PRACTITIONER

## 2024-08-06 PROCEDURE — G8420 CALC BMI NORM PARAMETERS: HCPCS | Performed by: NURSE PRACTITIONER

## 2024-08-06 PROCEDURE — 3074F SYST BP LT 130 MM HG: CPT | Performed by: NURSE PRACTITIONER

## 2024-08-06 PROCEDURE — G8399 PT W/DXA RESULTS DOCUMENT: HCPCS | Performed by: NURSE PRACTITIONER

## 2024-08-06 PROCEDURE — 1123F ACP DISCUSS/DSCN MKR DOCD: CPT | Performed by: NURSE PRACTITIONER

## 2024-08-06 PROCEDURE — 1090F PRES/ABSN URINE INCON ASSESS: CPT | Performed by: NURSE PRACTITIONER

## 2024-08-06 PROCEDURE — 1036F TOBACCO NON-USER: CPT | Performed by: NURSE PRACTITIONER

## 2024-08-06 PROCEDURE — 99214 OFFICE O/P EST MOD 30 MIN: CPT | Performed by: NURSE PRACTITIONER

## 2024-08-06 PROCEDURE — 3078F DIAST BP <80 MM HG: CPT | Performed by: NURSE PRACTITIONER

## 2024-08-06 PROCEDURE — G8427 DOCREV CUR MEDS BY ELIG CLIN: HCPCS | Performed by: NURSE PRACTITIONER

## 2024-08-06 PROCEDURE — 83036 HEMOGLOBIN GLYCOSYLATED A1C: CPT | Performed by: NURSE PRACTITIONER

## 2024-08-06 PROCEDURE — 3017F COLORECTAL CA SCREEN DOC REV: CPT | Performed by: NURSE PRACTITIONER

## 2024-08-06 PROCEDURE — 2022F DILAT RTA XM EVC RTNOPTHY: CPT | Performed by: NURSE PRACTITIONER

## 2024-08-06 RX ORDER — AMOXICILLIN AND CLAVULANATE POTASSIUM 875; 125 MG/1; MG/1
1 TABLET, FILM COATED ORAL 2 TIMES DAILY
Qty: 14 TABLET | Refills: 0 | Status: SHIPPED | OUTPATIENT
Start: 2024-08-06 | End: 2024-08-13

## 2024-08-06 NOTE — PROGRESS NOTES
Follow Up    ASSESSMENT/PLAN:     1. Mucopurulent chronic bronchitis (HCC)  Increased productive cough > 10 days.  Will start on antibiotic as directed.  Taking prednisone 10mg every other day, trelegy daily and albuterol inhaler prn.  If not improving over next 3-4 days or worsening symptoms, follow up with provider or seek care.  Continue care with pulmonology who manages.  - amoxicillin-clavulanate (AUGMENTIN) 875-125 MG per tablet; Take 1 tablet by mouth 2 times daily for 7 days  Dispense: 14 tablet; Refill: 0    2. Type 2 diabetes mellitus with other specified complication, without long-term current use of insulin ([facility])  Hemoglobin A1C, POC   Date Value Ref Range Status   08/06/2024 4.8 % Final   Remains off metformin and has been controlled with diet.  Continue to encourage following diabetic diet and staying active as possible.  Check fasting glucose 1-2 times weekly and notify provider if staying above 200 or less than 70.   Continue regular eye exams and follows with Dr. Diaz in [place]  Check feet daily and notify provider immediately if wound develops.  - AMB POC HEMOGLOBIN A1C  - Lipid Panel  - Thyroid Cascade Profile  - Comprehensive Metabolic Panel  - Microalbumin / Creatinine Urine Ratio  - CBC with Auto Differential  - Urinalysis with Microscopic    3. Wedge compression fracture of unspecified lumbar vertebra, initial encounter for closed fracture (HCC)  Stable per CT 6/26/23.  No current pain and was evaluated by Dr. Gautam.    4. Mixed hyperlipidemia  Lab Results   Component Value Date    CHOL 195 08/13/2024    TRIG 65 08/13/2024    HDL 83 08/13/2024     (H) 08/13/2024    VLDL 12 08/13/2024    CHOLHDLRATIO 1.8 11/17/2020     Continues atorvastatin 20mg daily.    5. Essential (primary) hypertension  Lab Results   Component Value Date     08/13/2024    K 4.2 08/13/2024    CL 94 (L) 08/13/2024    CO2 25 08/13/2024    BUN 12

## 2024-08-06 NOTE — PROGRESS NOTES
Chief Complaint   Patient presents with    Diabetes     Follow up      Pt did not bring meds, went over list, pt confirmed       \"Have you been to the ER, urgent care clinic since your last visit?  Hospitalized since your last visit?\"    NO    “Have you seen or consulted any other health care providers outside of [facility] since your last visit?”    NO    Have you had a mammogram?”   NO    Date of last Mammogram: 9/29/2020             Click Here for Release of Records Request

## 2024-08-14 ENCOUNTER — TELEPHONE (OUTPATIENT)
Facility: CLINIC | Age: 69
End: 2024-08-14

## 2024-08-14 DIAGNOSIS — J41.1 MUCOPURULENT CHRONIC BRONCHITIS (HCC): Primary | ICD-10-CM

## 2024-08-14 LAB
ALBUMIN SERPL-MCNC: 4.8 G/DL (ref 3.9–4.9)
ALBUMIN/CREAT UR: <7 MG/G CREAT (ref 0–29)
ALP SERPL-CCNC: 72 IU/L (ref 44–121)
ALT SERPL-CCNC: 31 IU/L (ref 0–32)
APPEARANCE UR: CLEAR
AST SERPL-CCNC: 36 IU/L (ref 0–40)
BACTERIA #/AREA URNS HPF: NORMAL /[HPF]
BASOPHILS # BLD AUTO: 0 X10E3/UL (ref 0–0.2)
BASOPHILS NFR BLD AUTO: 1 %
BILIRUB SERPL-MCNC: 0.4 MG/DL (ref 0–1.2)
BILIRUB UR QL STRIP: NEGATIVE
BUN SERPL-MCNC: 12 MG/DL (ref 8–27)
BUN/CREAT SERPL: 15 (ref 12–28)
CALCIUM SERPL-MCNC: 9.9 MG/DL (ref 8.7–10.3)
CASTS URNS QL MICRO: NORMAL /LPF
CHLORIDE SERPL-SCNC: 94 MMOL/L (ref 96–106)
CHOLEST SERPL-MCNC: 195 MG/DL (ref 100–199)
CO2 SERPL-SCNC: 25 MMOL/L (ref 20–29)
COLOR UR: YELLOW
CREAT SERPL-MCNC: 0.81 MG/DL (ref 0.57–1)
CREAT UR-MCNC: 45 MG/DL
EGFRCR SERPLBLD CKD-EPI 2021: 79 ML/MIN/1.73
EOSINOPHIL # BLD AUTO: 0.1 X10E3/UL (ref 0–0.4)
EOSINOPHIL NFR BLD AUTO: 1 %
EPI CELLS #/AREA URNS HPF: NORMAL /HPF (ref 0–10)
ERYTHROCYTE [DISTWIDTH] IN BLOOD BY AUTOMATED COUNT: 11.7 % (ref 11.7–15.4)
GLOBULIN SER CALC-MCNC: 2.1 G/DL (ref 1.5–4.5)
GLUCOSE SERPL-MCNC: 136 MG/DL (ref 70–99)
GLUCOSE UR QL STRIP: NEGATIVE
HCT VFR BLD AUTO: 31.2 % (ref 34–46.6)
HDLC SERPL-MCNC: 83 MG/DL
HGB BLD-MCNC: 10.4 G/DL (ref 11.1–15.9)
HGB UR QL STRIP: NEGATIVE
IMM GRANULOCYTES # BLD AUTO: 0 X10E3/UL (ref 0–0.1)
IMM GRANULOCYTES NFR BLD AUTO: 0 %
KETONES UR QL STRIP: NEGATIVE
LDLC SERPL CALC-MCNC: 100 MG/DL (ref 0–99)
LEUKOCYTE ESTERASE UR QL STRIP: NEGATIVE
LYMPHOCYTES # BLD AUTO: 0.7 X10E3/UL (ref 0.7–3.1)
LYMPHOCYTES NFR BLD AUTO: 12 %
MCH RBC QN AUTO: 30.7 PG (ref 26.6–33)
MCHC RBC AUTO-ENTMCNC: 33.3 G/DL (ref 31.5–35.7)
MCV RBC AUTO: 92 FL (ref 79–97)
MICRO URNS: NORMAL
MICRO URNS: NORMAL
MICROALBUMIN UR-MCNC: <3 UG/ML
MONOCYTES # BLD AUTO: 0.2 X10E3/UL (ref 0.1–0.9)
MONOCYTES NFR BLD AUTO: 4 %
NEUTROPHILS # BLD AUTO: 4.9 X10E3/UL (ref 1.4–7)
NEUTROPHILS NFR BLD AUTO: 82 %
NITRITE UR QL STRIP: NEGATIVE
PH UR STRIP: 6 [PH] (ref 5–7.5)
PLATELET # BLD AUTO: 270 X10E3/UL (ref 150–450)
POTASSIUM SERPL-SCNC: 4.2 MMOL/L (ref 3.5–5.2)
PROT SERPL-MCNC: 6.9 G/DL (ref 6–8.5)
PROT UR QL STRIP: NEGATIVE
RBC # BLD AUTO: 3.39 X10E6/UL (ref 3.77–5.28)
RBC #/AREA URNS HPF: NORMAL /HPF (ref 0–2)
SODIUM SERPL-SCNC: 138 MMOL/L (ref 134–144)
SP GR UR STRIP: 1.01 (ref 1–1.03)
TRIGL SERPL-MCNC: 65 MG/DL (ref 0–149)
TSH SERPL DL<=0.005 MIU/L-ACNC: 0.79 UIU/ML (ref 0.45–4.5)
UROBILINOGEN UR STRIP-MCNC: 0.2 MG/DL (ref 0.2–1)
VLDLC SERPL CALC-MCNC: 12 MG/DL (ref 5–40)
WBC # BLD AUTO: 6 X10E3/UL (ref 3.4–10.8)
WBC #/AREA URNS HPF: NORMAL /HPF (ref 0–5)

## 2024-08-14 RX ORDER — BENZONATATE 200 MG/1
200 CAPSULE ORAL 3 TIMES DAILY PRN
Qty: 30 CAPSULE | Refills: 0 | Status: SHIPPED | OUTPATIENT
Start: 2024-08-14 | End: 2024-08-21

## 2024-08-14 NOTE — TELEPHONE ENCOUNTER
Patient left message states that she is still coughing a lot with scratchy throat.  Has completed antibiotic.  Requesting something for cough.  Please advise

## 2024-08-15 NOTE — TELEPHONE ENCOUNTER
Patient contacted, message given, she understood.    Sent tessalon pearls but if her symptoms are not improving, she should be seen again. If develops any fever, chest pain or breathing difficulty, needs immediate evaluation

## 2024-08-19 RX ORDER — PANTOPRAZOLE SODIUM 40 MG/1
40 TABLET, DELAYED RELEASE ORAL DAILY
Qty: 90 TABLET | Refills: 1 | Status: SHIPPED | OUTPATIENT
Start: 2024-08-19

## 2024-08-20 ENCOUNTER — HOSPITAL ENCOUNTER (OUTPATIENT)
Facility: HOSPITAL | Age: 69
Discharge: HOME | End: 2024-08-23
Payer: MEDICARE

## 2024-08-20 VITALS — BODY MASS INDEX: 24.48 KG/M2 | HEIGHT: 67 IN | WEIGHT: 156 LBS

## 2024-08-20 PROCEDURE — 77067 SCR MAMMO BI INCL CAD: CPT

## 2024-09-19 NOTE — PROGRESS NOTES
Chief complaint: follow up of chronic cardiovascular conditions    Pedrito Negron is a 70 year old male with history of CAD s/p multiple PCIs (inferior STEMI on 12/17/2018 s/p DIMITRI to left circumflex/OM and DIMITRI*2 to the proximal and distal RCA,  PCI to LAD in 2010 and another PCI in 2003) with CCS2 stable angina, ICM LVEF=51% CMR 9/2020, HTN, and HL, who presents for follow up of chronic cardiovascular conditions.     He was previously followed by Dr. Shields, whom he last saw 8/4/2023. At that time he was doing well, but had stopped his atorvastatin, which he was told to resume.     His anginal equivalents have been left subpectoral/parasternal chest discomfort on several occasions and dyspnea without pain on other occasions. He has not experienced this recently.    He has felt generally well. He has not had any chest pain since his last follow up. He had a TTE 8/2024 which was normal but which noted abnormal rhythm during the exam (PVCs per my review) prompting ZioPatch which showed frequent PVCs (8%) and brief SVT (357 runs, up to 17 beats.) He does notice occasional palpitations while lying in bed at night (did not trigger ZioPatch for this), but reports that this has been ongoing for several years.    He denies any chest pain, dyspnea at rest or with exertion, PND, orthopnea, peripheral edema, lightheadedness or syncope.     CURRENT MEDICATIONS:  Current Outpatient Medications   Medication Sig Dispense Refill    allopurinol (ZYLOPRIM) 100 MG tablet Take 1 tablet (100 mg) by mouth daily. 90 tablet 0    aspirin (ASA) 81 MG EC tablet Take 81 mg by mouth daily      atorvastatin (LIPITOR) 80 MG tablet TAKE 1 TABLET(80 MG) BY MOUTH DAILY 90 tablet 3    Cholecalciferol (VITAMIN D3 PO) Take by mouth daily      ezetimibe (ZETIA) 10 MG tablet Take 1 tablet (10 mg) by mouth daily 90 tablet 0    isosorbide mononitrate (IMDUR) 30 MG 24 hr tablet Take 1 tablet (30 mg) by mouth daily 90 tablet 3    losartan (COZAAR) 25 MG  Name: Rahul Owens    : 1955  Service Dept: [address] MEDICINE       Chief Complaint   Patient presents with    Elbow Pain     Right        Patient's Pharmacies:    [facility]/pharmacy #[id number]  [place]  [address] [place] [id number]  Phone: [phone] Fax: [phone]       Visit Vitals  Ht 5' 6\" (1.676 m)   Wt 175 lb (79.4 kg)   BMI 28.25 kg/m²        No Known Allergies     Current Outpatient Medications   Medication Sig Dispense Refill    fluticasone propionate (FLONASE) 50 mcg/actuation nasal spray SPRAY 2 SPRAYS INTO EACH NOSTRIL EVERY DAY 1 Bottle 2    ergocalciferol (ERGOCALCIFEROL) 1,250 mcg (50,000 unit) capsule TAKE ONE CAPSULE BY MOUTH ONCE A WEEK 4 Cap 2    atorvastatin (LIPITOR) 10 mg tablet TAKE 1 TABLET BY MOUTH EVERY DAY 30 Tab 1    budesonide-formoteroL (Symbicort) 80-4.5 mcg/actuation HFAA Take 2 Puffs by inhalation two (2) times a day.  metFORMIN (GLUCOPHAGE) 500 mg tablet Take 500 mg by mouth two (2) times daily (with meals). Indications: type 2 diabetes mellitus      ciprofloxacin HCl (CIPRO) 500 mg tablet Take 1 Tab by mouth two (2) times a day. Indications: diverticulitis 30 Tab 0    predniSONE (DELTASONE) 10 mg tablet USE AS DIRECTED IN PRIOR RX 40 Tab 1    loratadine (CLARITIN) 10 mg tablet TAKE 1 TABLET BY MOUTH EVERY DAY 30 Tab 2    lisinopriL (PRINIVIL, ZESTRIL) 20 mg tablet Take 1 Tab by mouth daily. 30 Tab 0    metroNIDAZOLE (FlagyL) 500 mg tablet Take 1 Tab by mouth three (3) times daily. 15 Tab 0    albuterol-ipratropium (DUO-NEB) 2.5 mg-0.5 mg/3 ml nebu 3 mL by Nebulization route every four (4) hours as needed for Wheezing.  albuterol (PROVENTIL HFA, VENTOLIN HFA, PROAIR HFA) 90 mcg/actuation inhaler Take 2 Puffs by inhalation every four (4) hours as needed for Wheezing.       pantoprazole (PROTONIX) 40 mg tablet Take 40 mg by mouth Before breakfast and dinner.  fluticasone furoate-vilanteroL (Breo Ellipta) 100-25 mcg/dose inhaler Take 1 Puff by inhalation daily.  amLODIPine (NORVASC) 10 mg tablet Take 10 mg by mouth daily. 3    azelastine (ASTELIN) 137 mcg (0.1 %) nasal spray 2 Sprays by Both Nostrils route nightly. 5    montelukast (SINGULAIR) 10 mg tablet TAKE 1 TABLET BY MOUTH EVERY DAY FOR ALLERGY SYMPTOMS  5        Patient Active Problem List   Diagnosis Code    Arthritis of right hip M16.11    COPD exacerbation (HCC) J44.1    Cecum mass K63.89    Pain of upper abdomen R10.10    Seasonal allergic rhinitis J30.2    Intermittent asthma J45.20    Vitamin D deficiency E55.9    Gastroesophageal reflux disease without esophagitis K21.9    HTN (hypertension) I10    Bronchitis J40        Family History   Problem Relation Age of Onset    Hypertension Mother     Cancer Sister         Social History     Socioeconomic History    Marital status: SINGLE     Spouse name: Not on file    Number of children: Not on file    Years of education: Not on file    Highest education level: Not on file   Tobacco Use    Smoking status: Former Smoker    Smokeless tobacco: Never Used   Substance and Sexual Activity    Alcohol use: Yes     Alcohol/week: 3.0 standard drinks     Types: 3 Cans of beer per week    Drug use: No        Past Surgical History:   Procedure Laterality Date    COLONOSCOPY N/A 5/25/2020    COLON[facility]res Edmonds [facility] ENDOSCOPY    HX COLONOSCOPY      HX GI      colonscopy    HX ORTHOPAEDIC  10/24/2018    Total Right Hip Arthroplasty Dr. Casie Arguelles.         Past Medical History:   Diagnosis Date    Arthritis     Bronchitis 9/1/2020    Chronic obstructive pulmonary disease (HCC)     Chronic pain     Diabetes ([facility] [address]     Gastroesophageal reflux disease without esophagitis 9/1/2020    GERD (gastroesophageal reflux disease)     HTN (hypertension) 9/1/2020    tablet Take 1 tablet (25 mg) by mouth daily 90 tablet 1    metoprolol succinate ER (TOPROL XL) 50 MG 24 hr tablet Take 1 tablet (50 mg) by mouth daily 90 tablet 3    Multiple Vitamin (MULTI-VITAMINS) TABS Take 1 tablet by mouth daily Centrum Men's 50 +      nitroGLYcerin (NITROSTAT) 0.4 MG sublingual tablet ONE TABLET UNDER TONGUE AS NEEDED FOR CHEST PAIN- IF NO RELIEF AFTER 5 MINUTES CALL 911; USE 1 TABLET EVERY 5 MINUTES- MAX OF 3 TABLETS 25 tablet 1    nortriptyline (PAMELOR) 50 MG capsule Take 1 capsule (50 mg) by mouth at bedtime 90 capsule 3    omeprazole (PRILOSEC) 20 MG DR capsule Take 20 mg by mouth 2 times daily      pregabalin (LYRICA) 50 MG capsule Take 1 capsule (50 mg) by mouth 3 times daily 90 capsule 5       Past Medical, social, family histories, medications, and allergies reviewed and updated     Exam:  /73 (BP Location: Right arm, Patient Position: Sitting, Cuff Size: Adult Large)   Pulse 68   Wt 103 kg (227 lb)   SpO2 94%   BMI 32.57 kg/m    GENERAL APPEARANCE: healthy, alert and no distress  EYES: no icterus, no xanthelasmas  ENT: normal palate, mucosa moist, no central cyanosis  NECK: JVP not elevated  RESPIRATORY: lungs clear to auscultation - no rales, rhonchi or wheezes, no use of accessory muscles, no retractions, respirations are unlabored, normal respiratory rate  CARDIOVASCULAR: regular rhythm, normal S1/S2, no murmur/s3/s4.  GI: soft, non tender, bowel sounds normal,no abdominal bruits  EXTREMITIES: no edema, no bruits  NEURO: alert and oriented to person/place/time, normal speech, gait and affect  VASC: Radial, dorsalis pedis and posterior tibialis pulses 2+ bilaterally.  SKIN: no ecchymoses, no rashes.  PSYCH: cooperative, affect appropriate.     Labs:  Reviewed.       Testing/Procedures:  I personally visualized and interpreted:  TTE 8/2/24: Normal LV/RV size/function, LVEF=55-60%. No significant valvular abnormalities. Frequent PVCs were present throughout the exam.      [facility] 9/2024: Frequent (8%) PVCs and 357 brief runs of SVT (longest 17 beats)      Assessment and Plan:   CAD s/p multiple PCIs (inferior STEMI on 12/17/2018 s/p DIMITRI to left circumflex/OM and DIMITRI*2 to the proximal and distal RCA with stable angina, well-controlled  CCS1-2 stable angina  HL, controlled  -continue ASA 81 mg daily  -continue statin (goal LDL<70)  -continue ISMN at present dose  -he plans to increase his exercise gradually (joining [employer]) which I encouraged  -he will notify us immediately for recurrence of either of his previous anginal equivalents (left-sided chest discomfort or disproportionate/resting dyspnea)    4. HTN, controlled: continue present therapy    5. PVCs, frequent (8%), asymptomatic 9/2024  6. SVT, brief, asymptomatic on oPatch 9/2024  Counseled regarding the generally benign nature of both of these rhythms and that the management of both (barring PVCs >10% with PVC-mediated cardiomyopathy) is symptom-driven. He is asymptomatic and has LVEF>55% on TTE 8/2024.   -no further workup or changes to therapy at present   -periodically reassess LVEF    The patient states understanding and is agreeable with plan.   I spent a total of 33 minutes on the day of the visit.   Time spent by me doing chart review, history and exam, documentation and further activities per the note        Sohan Agosto MD  Cardiology    CC           Hypertension     Intermittent asthma 9/1/2020    Pain of upper abdomen 9/1/2020    Seasonal allergic rhinitis 9/1/2020    Vitamin D deficiency 9/1/2020        I have reviewed and agree with PFSH and ROS and intake form in chart and the record. Review of Systems:   Patient is a pleasant appearing individual, appropriately dressed, well hydrated, well nourished, who is alert, appropriately oriented for age, and in no acute distress with a wheelchair bound gait and normal affect who does not appear to be in any significant pain. Physical Exam:  Right elbow - grossly neurovascularly intact, good cap refill, positive for swelling at the olecranon, slight erythema, positive tenderness to palpation, skin is intact. Left elbow - Full Range of motion, No point tenderness, No instability, Normal Strength, No skin lesions, No swelling, Grossly neurovascularly intact. Encounter Diagnoses     ICD-10-CM ICD-9-CM   1. Right elbow pain  M25.521 719.42   2. Olecranon bursitis of right elbow  M70.21 726.33          Scribed by Chencho Britton LPN as dictated by [facility] Eloy BARBI Martínez MD.    HPI:  The patient is here with a chief complaint of right elbow pain since 8/27/2020. Nothing is making it worse. ROS:  10-point review of systems is positive for joint swelling and weight loss. X-rays of the right elbow are unremarkable. Assessment/Plan:  1. Right elbow olecranon bursitis. Plan at this point activities as tolerated, no restrictions from my standpoint. We will see the patient back on an as-needed basis. If it gets really bad we may consider aspiration and/or excision but for right now consider conservative treatment and go from there. Return to Office: Follow-up Information    None             Documentation True and Accepted Wero Martínez MD

## 2024-09-25 ENCOUNTER — TELEPHONE (OUTPATIENT)
Facility: CLINIC | Age: 69
End: 2024-09-25

## 2024-09-25 ENCOUNTER — OFFICE VISIT (OUTPATIENT)
Facility: CLINIC | Age: 69
End: 2024-09-25
Payer: MEDICARE

## 2024-09-25 VITALS
OXYGEN SATURATION: 90 % | HEIGHT: 67 IN | WEIGHT: 158 LBS | SYSTOLIC BLOOD PRESSURE: 115 MMHG | RESPIRATION RATE: 22 BRPM | DIASTOLIC BLOOD PRESSURE: 67 MMHG | HEART RATE: 99 BPM | BODY MASS INDEX: 24.8 KG/M2 | TEMPERATURE: 98.1 F

## 2024-09-25 DIAGNOSIS — M25.512 ACUTE PAIN OF LEFT SHOULDER: Primary | ICD-10-CM

## 2024-09-25 PROBLEM — L29.9 PRURITUS: Status: ACTIVE | Noted: 2024-09-25

## 2024-09-25 PROCEDURE — 1036F TOBACCO NON-USER: CPT | Performed by: NURSE PRACTITIONER

## 2024-09-25 PROCEDURE — 3078F DIAST BP <80 MM HG: CPT | Performed by: NURSE PRACTITIONER

## 2024-09-25 PROCEDURE — 1090F PRES/ABSN URINE INCON ASSESS: CPT | Performed by: NURSE PRACTITIONER

## 2024-09-25 PROCEDURE — 3017F COLORECTAL CA SCREEN DOC REV: CPT | Performed by: NURSE PRACTITIONER

## 2024-09-25 PROCEDURE — G8427 DOCREV CUR MEDS BY ELIG CLIN: HCPCS | Performed by: NURSE PRACTITIONER

## 2024-09-25 PROCEDURE — 3074F SYST BP LT 130 MM HG: CPT | Performed by: NURSE PRACTITIONER

## 2024-09-25 PROCEDURE — 1123F ACP DISCUSS/DSCN MKR DOCD: CPT | Performed by: NURSE PRACTITIONER

## 2024-09-25 PROCEDURE — G8399 PT W/DXA RESULTS DOCUMENT: HCPCS | Performed by: NURSE PRACTITIONER

## 2024-09-25 PROCEDURE — 99213 OFFICE O/P EST LOW 20 MIN: CPT | Performed by: NURSE PRACTITIONER

## 2024-09-25 PROCEDURE — G8420 CALC BMI NORM PARAMETERS: HCPCS | Performed by: NURSE PRACTITIONER

## 2024-09-25 RX ORDER — LIDOCAINE 4 G/G
1 PATCH TOPICAL DAILY
Qty: 30 PATCH | Refills: 0 | Status: SHIPPED | OUTPATIENT
Start: 2024-09-25 | End: 2024-10-25

## 2024-09-25 ASSESSMENT — ENCOUNTER SYMPTOMS
RESPIRATORY NEGATIVE: 1
GASTROINTESTINAL NEGATIVE: 1
BACK PAIN: 0

## 2024-09-27 ENCOUNTER — HOSPITAL ENCOUNTER (EMERGENCY)
Facility: HOSPITAL | Age: 69
Discharge: HOME | End: 2024-09-27
Attending: EMERGENCY MEDICINE
Payer: MEDICARE

## 2024-09-27 ENCOUNTER — HOSPITAL ENCOUNTER (INPATIENT)
Facility: HOSPITAL | Age: 69
Discharge: HOME | End: 2024-09-30
Payer: MEDICARE

## 2024-09-27 VITALS
OXYGEN SATURATION: 100 % | SYSTOLIC BLOOD PRESSURE: 120 MMHG | RESPIRATION RATE: 20 BRPM | HEART RATE: 94 BPM | DIASTOLIC BLOOD PRESSURE: 81 MMHG | TEMPERATURE: 97 F

## 2024-09-27 DIAGNOSIS — R11.0 NAUSEA: Primary | ICD-10-CM

## 2024-09-27 DIAGNOSIS — D50.9 IRON DEFICIENCY ANEMIA, UNSPECIFIED IRON DEFICIENCY ANEMIA TYPE: ICD-10-CM

## 2024-09-27 LAB
ALBUMIN SERPL-MCNC: 4 G/DL (ref 3.5–5)
ALBUMIN/GLOB SERPL: 1.1 (ref 1.1–2.2)
ALP SERPL-CCNC: 76 U/L (ref 45–117)
ALT SERPL-CCNC: 24 U/L (ref 12–78)
ANION GAP SERPL CALC-SCNC: 11 MMOL/L (ref 2–12)
APPEARANCE UR: ABNORMAL
AST SERPL W P-5'-P-CCNC: 27 U/L (ref 15–37)
BACTERIA URNS QL MICRO: ABNORMAL /HPF
BASOPHILS # BLD: 0 K/UL (ref 0–0.1)
BASOPHILS NFR BLD: 0 % (ref 0–1)
BILIRUB SERPL-MCNC: 0.6 MG/DL (ref 0.2–1)
BILIRUB UR QL: NEGATIVE
BUN SERPL-MCNC: 11 MG/DL (ref 6–20)
BUN/CREAT SERPL: 12 (ref 12–20)
CA-I BLD-MCNC: 9.6 MG/DL (ref 8.5–10.1)
CHLORIDE SERPL-SCNC: 91 MMOL/L (ref 97–108)
CO2 SERPL-SCNC: 29 MMOL/L (ref 21–32)
COLOR UR: ABNORMAL
CREAT SERPL-MCNC: 0.9 MG/DL (ref 0.55–1.02)
DIFFERENTIAL METHOD BLD: ABNORMAL
EOSINOPHIL # BLD: 0 K/UL (ref 0–0.4)
EOSINOPHIL NFR BLD: 1 % (ref 0–7)
ERYTHROCYTE [DISTWIDTH] IN BLOOD BY AUTOMATED COUNT: 13.5 % (ref 11.5–14.5)
GLOBULIN SER CALC-MCNC: 3.5 G/DL (ref 2–4)
GLUCOSE SERPL-MCNC: 89 MG/DL (ref 65–100)
GLUCOSE UR STRIP.AUTO-MCNC: NEGATIVE MG/DL
HCT VFR BLD AUTO: 28.4 % (ref 35–47)
HGB BLD-MCNC: 9.8 G/DL (ref 11.5–16)
HGB UR QL STRIP: NEGATIVE
IMM GRANULOCYTES # BLD AUTO: 0 K/UL (ref 0–0.04)
IMM GRANULOCYTES NFR BLD AUTO: 1 % (ref 0–0.5)
KETONES UR QL STRIP.AUTO: NEGATIVE MG/DL
LEUKOCYTE ESTERASE UR QL STRIP.AUTO: ABNORMAL
LIPASE SERPL-CCNC: 25 U/L (ref 13–75)
LYMPHOCYTES # BLD: 0.3 K/UL (ref 0.8–3.5)
LYMPHOCYTES NFR BLD: 7 % (ref 12–49)
MAGNESIUM SERPL-MCNC: 1.8 MG/DL (ref 1.6–2.4)
MCH RBC QN AUTO: 31.7 PG (ref 26–34)
MCHC RBC AUTO-ENTMCNC: 34.5 G/DL (ref 30–36.5)
MCV RBC AUTO: 91.9 FL (ref 80–99)
MONOCYTES # BLD: 0.3 K/UL (ref 0–1)
MONOCYTES NFR BLD: 6 % (ref 5–13)
NEUTS SEG # BLD: 4 K/UL (ref 1.8–8)
NEUTS SEG NFR BLD: 85 % (ref 32–75)
NITRITE UR QL STRIP.AUTO: NEGATIVE
NRBC # BLD: 0 K/UL (ref 0–0.01)
NRBC BLD-RTO: 0 PER 100 WBC
PH UR STRIP: 6.5 (ref 5–8)
PLATELET # BLD AUTO: 258 K/UL (ref 150–400)
PMV BLD AUTO: 9.4 FL (ref 8.9–12.9)
POTASSIUM SERPL-SCNC: 4.5 MMOL/L (ref 3.5–5.1)
PROT SERPL-MCNC: 7.5 G/DL (ref 6.4–8.2)
PROT UR STRIP-MCNC: NEGATIVE MG/DL
RBC # BLD AUTO: 3.09 M/UL (ref 3.8–5.2)
RBC #/AREA URNS HPF: ABNORMAL /HPF (ref 0–3)
SODIUM SERPL-SCNC: 131 MMOL/L (ref 136–145)
SP GR UR REFRACTOMETRY: 1.01 (ref 1–1.03)
UROBILINOGEN UR QL STRIP.AUTO: 0.2 EU/DL (ref 0.2–1)
WBC # BLD AUTO: 4.7 K/UL (ref 3.6–11)
WBC URNS QL MICRO: ABNORMAL /HPF (ref 0–5)

## 2024-09-27 PROCEDURE — 85025 COMPLETE CBC W/AUTO DIFF WBC: CPT

## 2024-09-27 PROCEDURE — 81001 URINALYSIS AUTO W/SCOPE: CPT

## 2024-09-27 PROCEDURE — 73030 X-RAY EXAM OF SHOULDER: CPT

## 2024-09-27 PROCEDURE — 96375 TX/PRO/DX INJ NEW DRUG ADDON: CPT

## 2024-09-27 PROCEDURE — 96361 HYDRATE IV INFUSION ADD-ON: CPT

## 2024-09-27 PROCEDURE — 6360000002 HC RX W HCPCS: Performed by: EMERGENCY MEDICINE

## 2024-09-27 PROCEDURE — 2580000003 HC RX 258: Performed by: EMERGENCY MEDICINE

## 2024-09-27 PROCEDURE — 99284 EMERGENCY DEPT VISIT MOD MDM: CPT

## 2024-09-27 PROCEDURE — 36415 COLL VENOUS BLD VENIPUNCTURE: CPT

## 2024-09-27 PROCEDURE — 80053 COMPREHEN METABOLIC PANEL: CPT

## 2024-09-27 PROCEDURE — 96374 THER/PROPH/DIAG INJ IV PUSH: CPT

## 2024-09-27 PROCEDURE — 83690 ASSAY OF LIPASE: CPT

## 2024-09-27 PROCEDURE — 83735 ASSAY OF MAGNESIUM: CPT

## 2024-09-27 RX ORDER — 0.9 % SODIUM CHLORIDE 0.9 %
999 INTRAVENOUS SOLUTION INTRAVENOUS ONCE
Status: COMPLETED | OUTPATIENT
Start: 2024-09-27 | End: 2024-09-27

## 2024-09-27 RX ORDER — ONDANSETRON 2 MG/ML
4 INJECTION INTRAMUSCULAR; INTRAVENOUS ONCE
Status: COMPLETED | OUTPATIENT
Start: 2024-09-27 | End: 2024-09-27

## 2024-09-27 RX ORDER — ONDANSETRON 4 MG/1
4 TABLET, FILM COATED ORAL 3 TIMES DAILY PRN
Qty: 15 TABLET | Refills: 0 | Status: SHIPPED | OUTPATIENT
Start: 2024-09-27

## 2024-09-27 RX ADMIN — ONDANSETRON 4 MG: 2 INJECTION INTRAMUSCULAR; INTRAVENOUS at 13:31

## 2024-09-27 RX ADMIN — SODIUM CHLORIDE 1000 ML: 9 INJECTION, SOLUTION INTRAVENOUS at 13:31

## 2024-09-27 RX ADMIN — PANTOPRAZOLE SODIUM 40 MG: 40 INJECTION, POWDER, FOR SOLUTION INTRAVENOUS at 13:30

## 2024-09-27 ASSESSMENT — LIFESTYLE VARIABLES
HOW OFTEN DO YOU HAVE A DRINK CONTAINING ALCOHOL: NEVER
HOW MANY STANDARD DRINKS CONTAINING ALCOHOL DO YOU HAVE ON A TYPICAL DAY: PATIENT DOES NOT DRINK

## 2024-09-27 ASSESSMENT — PAIN SCALES - GENERAL: PAINLEVEL_OUTOF10: 0

## 2024-09-27 NOTE — ED PROVIDER NOTES
[facility] EMERGENCY DEPT  EMERGENCY DEPARTMENT HISTORY AND PHYSICAL EXAM      Date: 2024  Patient Name: Virgen Hernandez  MRN: [id number]  YOB: 1955  Date of evaluation: 2024  Provider: Sherrie Anna MD   Note Started: 1:21 PM EDT 24    HISTORY OF PRESENT ILLNESS     Chief Complaint   Patient presents with    Nausea       History Provided By: Patient    HPI: Virgen Hernandez is a 69 y.o. female     PAST MEDICAL HISTORY   Past Medical History:  Past Medical History:   Diagnosis Date    Arthritis     Bronchitis 2020    Cholelithiasis     as noted on CT abdomen in 3/2023    Chronic bronchitis (HCC)     Chronic obstructive pulmonary disease (HCC)     Chronic pain     Chronic respiratory failure with hypoxia, on home O2 therapy     secondary to copd / emphysema    Diabetes (HCC)     Diverticulosis     Gastroesophageal reflux disease without esophagitis 2020    GERD (gastroesophageal reflux disease)     GERD (gastroesophageal reflux disease)     History of multiple allergies     HTN (hypertension) 2020    Hypertension     Intermittent asthma 2020    Lung disorder     PATIENT IS ON OXYGEN    Menopause     Pain of upper abdomen 2020    Renal mass, right 2023    Seasonal allergic rhinitis 2020    Sinus problem     Vitamin D deficiency 2020       Past Surgical History:  Past Surgical History:   Procedure Laterality Date    COLONOSCOPY      COLONOSCOPY N/A 2020    COLONOSCOPY performed by Graham Quinones MD at [facility] ENDOSCOPY    GI      colonscopy    ORTHOPEDIC SURGERY  10/24/2018    Total Right Hip Arthroplasty Dr. Jonnie Osorio [place].       Family History:  Family History   Problem Relation Age of Onset    Cancer Sister     Hypertension Mother        Social History:  Social History     Tobacco Use    Smoking status: Former     Current packs/day: 0.00     Types: Cigarettes     Quit date: 2000     Years since quittin.7    Smokeless

## 2024-09-27 NOTE — ED NOTES
Patient stable at time of discharge. Education and discharge paperwork is reviewed with patient who verbalizes understanding of same. Patient pivots to wheelchair and waits for ride outside of ED lobby.

## 2024-09-27 NOTE — ED TRIAGE NOTES
Patient arrives to ED with complaint of nausea x1 day without vomiting. Reports she got her covid and flu vaccines yesterday. Denies vomiting, diarrhea, fever. Patient on 2L O2 NC at baseline, denies shortness of breath.

## 2024-09-30 ENCOUNTER — TELEPHONE (OUTPATIENT)
Facility: CLINIC | Age: 69
End: 2024-09-30

## 2024-09-30 NOTE — TELEPHONE ENCOUNTER
Patient contacted.  Stated that she had received a reminder call from Radiology and they explained to her that the CT scan was open and not enclosed.

## 2024-09-30 NOTE — TELEPHONE ENCOUNTER
Patient is scheduled for CT Scan of Shoulder on 10/2.  She is requesting something to help with the anxiety so that she can have the test done.

## 2024-10-02 ENCOUNTER — HOSPITAL ENCOUNTER (OUTPATIENT)
Facility: HOSPITAL | Age: 69
Discharge: HOME | End: 2024-10-05
Payer: MEDICARE

## 2024-10-02 ENCOUNTER — TRANSCRIBE ORDERS (OUTPATIENT)
Facility: HOSPITAL | Age: 69
End: 2024-10-02

## 2024-10-02 DIAGNOSIS — M79.672 LEFT FOOT PAIN: ICD-10-CM

## 2024-10-02 DIAGNOSIS — M79.672 LEFT FOOT PAIN: Primary | ICD-10-CM

## 2024-10-02 PROCEDURE — 73630 X-RAY EXAM OF FOOT: CPT

## 2024-10-02 PROCEDURE — 73200 CT UPPER EXTREMITY W/O DYE: CPT

## 2024-10-07 ENCOUNTER — TELEPHONE (OUTPATIENT)
Facility: CLINIC | Age: 69
End: 2024-10-07

## 2024-10-07 NOTE — TELEPHONE ENCOUNTER
Patient is requesting a refill on the medication;    albuterol sulfate HFA (VENTOLIN HFA) 108 (90 Base) MCG/ACT inhaler     [facility]

## 2024-10-08 ENCOUNTER — TELEPHONE (OUTPATIENT)
Facility: CLINIC | Age: 69
End: 2024-10-08

## 2024-10-08 DIAGNOSIS — J44.1 COPD EXACERBATION (HCC): ICD-10-CM

## 2024-10-08 DIAGNOSIS — J30.9 ALLERGIC RHINITIS, UNSPECIFIED SEASONALITY, UNSPECIFIED TRIGGER: ICD-10-CM

## 2024-10-08 DIAGNOSIS — Z99.81 DEPENDENCE ON SUPPLEMENTAL OXYGEN: Primary | ICD-10-CM

## 2024-10-08 RX ORDER — ALBUTEROL SULFATE 90 UG/1
2 INHALANT RESPIRATORY (INHALATION) 4 TIMES DAILY PRN
Qty: 18 G | Refills: 5 | Status: SHIPPED | OUTPATIENT
Start: 2024-10-08

## 2024-10-08 RX ORDER — FLUTICASONE PROPIONATE 50 MCG
2 SPRAY, SUSPENSION (ML) NASAL DAILY
Qty: 1 EACH | Refills: 1 | Status: SHIPPED | OUTPATIENT
Start: 2024-10-08

## 2024-10-24 ENCOUNTER — OFFICE VISIT (OUTPATIENT)
Age: 69
End: 2024-10-24
Payer: MEDICARE

## 2024-10-24 VITALS — HEART RATE: 112 BPM | SYSTOLIC BLOOD PRESSURE: 122 MMHG | DIASTOLIC BLOOD PRESSURE: 62 MMHG | OXYGEN SATURATION: 98 %

## 2024-10-24 DIAGNOSIS — J30.0 VASOMOTOR RHINITIS: Primary | ICD-10-CM

## 2024-10-24 DIAGNOSIS — R04.0 EPISTAXIS: ICD-10-CM

## 2024-10-24 PROCEDURE — 99204 OFFICE O/P NEW MOD 45 MIN: CPT | Performed by: STUDENT IN AN ORGANIZED HEALTH CARE EDUCATION/TRAINING PROGRAM

## 2024-10-24 PROCEDURE — 3074F SYST BP LT 130 MM HG: CPT | Performed by: STUDENT IN AN ORGANIZED HEALTH CARE EDUCATION/TRAINING PROGRAM

## 2024-10-24 PROCEDURE — G8427 DOCREV CUR MEDS BY ELIG CLIN: HCPCS | Performed by: STUDENT IN AN ORGANIZED HEALTH CARE EDUCATION/TRAINING PROGRAM

## 2024-10-24 PROCEDURE — G8420 CALC BMI NORM PARAMETERS: HCPCS | Performed by: STUDENT IN AN ORGANIZED HEALTH CARE EDUCATION/TRAINING PROGRAM

## 2024-10-24 PROCEDURE — 3078F DIAST BP <80 MM HG: CPT | Performed by: STUDENT IN AN ORGANIZED HEALTH CARE EDUCATION/TRAINING PROGRAM

## 2024-10-24 PROCEDURE — 1159F MED LIST DOCD IN RCRD: CPT | Performed by: STUDENT IN AN ORGANIZED HEALTH CARE EDUCATION/TRAINING PROGRAM

## 2024-10-24 PROCEDURE — 1036F TOBACCO NON-USER: CPT | Performed by: STUDENT IN AN ORGANIZED HEALTH CARE EDUCATION/TRAINING PROGRAM

## 2024-10-24 PROCEDURE — 1123F ACP DISCUSS/DSCN MKR DOCD: CPT | Performed by: STUDENT IN AN ORGANIZED HEALTH CARE EDUCATION/TRAINING PROGRAM

## 2024-10-24 PROCEDURE — G8399 PT W/DXA RESULTS DOCUMENT: HCPCS | Performed by: STUDENT IN AN ORGANIZED HEALTH CARE EDUCATION/TRAINING PROGRAM

## 2024-10-24 PROCEDURE — 1090F PRES/ABSN URINE INCON ASSESS: CPT | Performed by: STUDENT IN AN ORGANIZED HEALTH CARE EDUCATION/TRAINING PROGRAM

## 2024-10-24 PROCEDURE — 3017F COLORECTAL CA SCREEN DOC REV: CPT | Performed by: STUDENT IN AN ORGANIZED HEALTH CARE EDUCATION/TRAINING PROGRAM

## 2024-10-24 PROCEDURE — G8484 FLU IMMUNIZE NO ADMIN: HCPCS | Performed by: STUDENT IN AN ORGANIZED HEALTH CARE EDUCATION/TRAINING PROGRAM

## 2024-10-24 RX ORDER — IPRATROPIUM BROMIDE 42 UG/1
1 SPRAY, METERED NASAL 4 TIMES DAILY
Qty: 15 ML | Refills: 3 | Status: SHIPPED | OUTPATIENT
Start: 2024-10-24

## 2024-10-24 NOTE — PROGRESS NOTES
Subjective:   Virgen Hernandez   69 y.o.   1955     Refered by: No referring provider defined for this encounter.     New Patient Visit  Chief Compliant: rhinitis    History of Present Illness:  Virgen Hernandez is a 69 y.o. female with past medical history of GERD, COPD, arthritis, HTN, who presents today for evaluation of chronic rhinitis and epistaxis.     Patient is complaining of chronic rhinitis. No known triggers. Worse at night and in the morning. No known allergies.     Additionally had 1 episode of epistaxis - which self resolved.     ED, primary care, and GI notes reviewed.     Review of Systems  Consitutional: denies fever, excessive weight gain or loss.  Eyes: denies diplopia, eye pain.  Integumentary: denies new concerning skin lesions.  Ears, Nose, Mouth, Throat: denies except as per HPI.  Endocrine: denies hot or cold intolerance, increased thirst.  Respiratory: denies cough, hemoptysis, wheezing  Gastrointestinal: denies trouble swallowing, nausea, emesis, regurgitation  Musculoskeletal: denies muscle weakness or wasting  Cardiovascular: denies chest pain, shortness of breath  Neurologic: denies seizures, numbness or tingling, syncope  Hematologic: denies easy bleeding or bruising       Past Medical History:   Diagnosis Date    Arthritis     Bronchitis 9/1/2020    Cholelithiasis     as noted on CT abdomen in 3/2023    Chronic bronchitis (HCC)     Chronic obstructive pulmonary disease (HCC)     Chronic pain     Chronic respiratory failure with hypoxia, on home O2 therapy     secondary to copd / emphysema    Diabetes (HCC)     Diverticulosis     Gastroesophageal reflux disease without esophagitis 9/1/2020    GERD (gastroesophageal reflux disease)     GERD (gastroesophageal reflux disease)     History of multiple allergies     HTN (hypertension) 9/1/2020    Hypertension     Intermittent asthma 9/1/2020    Lung disorder     PATIENT IS ON OXYGEN    Menopause     Pain of upper abdomen 9/1/2020

## 2024-10-26 DIAGNOSIS — I10 ESSENTIAL (PRIMARY) HYPERTENSION: ICD-10-CM

## 2024-10-28 RX ORDER — LISINOPRIL 20 MG/1
20 TABLET ORAL DAILY
Qty: 90 TABLET | Refills: 1 | Status: SHIPPED | OUTPATIENT
Start: 2024-10-28

## 2024-10-28 RX ORDER — FUROSEMIDE 20 MG/1
20 TABLET ORAL DAILY
Qty: 90 TABLET | Refills: 1 | Status: SHIPPED | OUTPATIENT
Start: 2024-10-28

## 2024-11-04 ENCOUNTER — OFFICE VISIT (OUTPATIENT)
Age: 69
End: 2024-11-04
Payer: MEDICARE

## 2024-11-04 VITALS
OXYGEN SATURATION: 99 % | BODY MASS INDEX: 23.54 KG/M2 | HEIGHT: 67 IN | DIASTOLIC BLOOD PRESSURE: 79 MMHG | SYSTOLIC BLOOD PRESSURE: 121 MMHG | WEIGHT: 150 LBS | HEART RATE: 118 BPM

## 2024-11-04 DIAGNOSIS — M19.012 OSTEOARTHRITIS OF LEFT ACROMIOCLAVICULAR JOINT: ICD-10-CM

## 2024-11-04 DIAGNOSIS — M75.82 ROTATOR CUFF TENDINITIS, LEFT: Primary | ICD-10-CM

## 2024-11-04 DIAGNOSIS — M75.22 BICEPS TENDINITIS OF LEFT UPPER EXTREMITY: ICD-10-CM

## 2024-11-04 PROCEDURE — G8427 DOCREV CUR MEDS BY ELIG CLIN: HCPCS | Performed by: ORTHOPAEDIC SURGERY

## 2024-11-04 PROCEDURE — 3074F SYST BP LT 130 MM HG: CPT | Performed by: ORTHOPAEDIC SURGERY

## 2024-11-04 PROCEDURE — 1123F ACP DISCUSS/DSCN MKR DOCD: CPT | Performed by: ORTHOPAEDIC SURGERY

## 2024-11-04 PROCEDURE — 1090F PRES/ABSN URINE INCON ASSESS: CPT | Performed by: ORTHOPAEDIC SURGERY

## 2024-11-04 PROCEDURE — 3017F COLORECTAL CA SCREEN DOC REV: CPT | Performed by: ORTHOPAEDIC SURGERY

## 2024-11-04 PROCEDURE — G8399 PT W/DXA RESULTS DOCUMENT: HCPCS | Performed by: ORTHOPAEDIC SURGERY

## 2024-11-04 PROCEDURE — 1036F TOBACCO NON-USER: CPT | Performed by: ORTHOPAEDIC SURGERY

## 2024-11-04 PROCEDURE — 20610 DRAIN/INJ JOINT/BURSA W/O US: CPT | Performed by: ORTHOPAEDIC SURGERY

## 2024-11-04 PROCEDURE — 99203 OFFICE O/P NEW LOW 30 MIN: CPT | Performed by: ORTHOPAEDIC SURGERY

## 2024-11-04 PROCEDURE — 1159F MED LIST DOCD IN RCRD: CPT | Performed by: ORTHOPAEDIC SURGERY

## 2024-11-04 PROCEDURE — 3078F DIAST BP <80 MM HG: CPT | Performed by: ORTHOPAEDIC SURGERY

## 2024-11-04 PROCEDURE — G8420 CALC BMI NORM PARAMETERS: HCPCS | Performed by: ORTHOPAEDIC SURGERY

## 2024-11-04 PROCEDURE — G8484 FLU IMMUNIZE NO ADMIN: HCPCS | Performed by: ORTHOPAEDIC SURGERY

## 2024-11-04 RX ORDER — LIDOCAINE HYDROCHLORIDE 10 MG/ML
2 INJECTION, SOLUTION EPIDURAL; INFILTRATION; INTRACAUDAL; PERINEURAL ONCE
Status: COMPLETED | OUTPATIENT
Start: 2024-11-04 | End: 2024-11-04

## 2024-11-04 RX ORDER — TRIAMCINOLONE ACETONIDE 40 MG/ML
40 INJECTION, SUSPENSION INTRA-ARTICULAR; INTRAMUSCULAR ONCE
Status: COMPLETED | OUTPATIENT
Start: 2024-11-04 | End: 2024-11-04

## 2024-11-04 RX ADMIN — TRIAMCINOLONE ACETONIDE 40 MG: 40 INJECTION, SUSPENSION INTRA-ARTICULAR; INTRAMUSCULAR at 14:30

## 2024-11-04 RX ADMIN — LIDOCAINE HYDROCHLORIDE 2 ML: 10 INJECTION, SOLUTION EPIDURAL; INFILTRATION; INTRACAUDAL; PERINEURAL at 14:30

## 2024-11-04 NOTE — PATIENT INSTRUCTIONS
Learning About Joint Injections  What are joint injections?     Joint injections are shots into a joint, such as the knee or shoulder. They are used to put in medicines, such as pain relievers and steroid medicines. Steroids can help reduce inflammation. A steroid shot can sometimes help with short-term pain relief when other treatments haven't worked.  How are they done?  First, the area over the joint will be cleaned. Your doctor may then use a tiny needle to numb the skin in the area where you will get the joint injection.  If a tiny needle is used to numb the area, your doctor will use another needle to inject the medicine. Your doctor may use a pain reliever, a steroid, or both. You may feel some pressure or discomfort.  Your doctor may put ice on the area before you go home.  What can you expect after a joint injection?  You will probably go home soon after your shot. You may have numbness around the joint for a few hours.  If your shot included both a pain reliever and a steroid, then the pain will probably go away right away. But it might come back after a few hours. This might happen if the pain reliever wears off and the steroid hasn't started to work yet. Steroids don't always work. But when they do, the pain relief can last for several days to a few months or longer.  Your doctor may tell you to use ice on the area. You can also use ice if the pain comes back. Put ice or a cold pack on your joint for 10 to 20 minutes at a time. Put a thin cloth between the ice and your skin.  Follow your doctor's instructions carefully.  Follow-up care is a key part of your treatment and safety. Be sure to make and go to all appointments, and call your doctor if you are having problems. It's also a good idea to know your test results and keep a list of the medicines you take.  Current as of: July 17, 2023  Content Version: 14.2  © 2024 [employer].   Care instructions adapted under license by [facility]

## 2024-11-04 NOTE — PROGRESS NOTES
Patient identified by name and date of birth  Virgen Hernandez is a 69 y.o. female   Chief Complaint   Patient presents with    Shoulder Pain    New Patient      Vitals:    11/04/24 1304   BP: 121/79   Site: Left Upper Arm   Pulse: (!) 118   SpO2: 99%   Weight: 68 kg (150 lb)   Height: 1.702 m (5' 7\")       No LMP recorded. Patient is postmenopausal.         \"Have you been to the ER, urgent care clinic since your last visit?  Hospitalized since your last visit?\"    NO    “Have you seen or consulted any other health care providers outside of  since your last visit?”    NO        
upper extremity  3. Osteoarthritis of left acromioclavicular joint      Return in 6 weeks (on 2024).       SUBJECTIVE/OBJECTIVE:  Virgen Hernandez (: 1955) is a 69 y.o. female.    She is right hand dominant coming in with left shoulder pain for 3- 4 months.  She denies any known injury. She localizes pain to greater tuberosity and reports it is worse with ROM and overhead motions.  She denies weakness, numbness/paresthesias.  She is not taking any pain meds.  She reports prior steroid injection in left shoulder which helped a couple months ago.        No Known Allergies    Current Outpatient Medications   Medication Sig Dispense Refill    furosemide (LASIX) 20 MG tablet TAKE 1 TABLET BY MOUTH EVERY DAY 90 tablet 1    lisinopril (PRINIVIL;ZESTRIL) 20 MG tablet TAKE 1 TABLET BY MOUTH EVERY DAY 90 tablet 1    ipratropium (ATROVENT) 0.06 % nasal spray 1 spray by Each Nostril route 4 times daily 15 mL 3    fluticasone (FLONASE) 50 MCG/ACT nasal spray SPRAY 2 SPRAYS INTO EACH NOSTRIL EVERY DAY 1 each 1    albuterol sulfate HFA (VENTOLIN HFA) 108 (90 Base) MCG/ACT inhaler Inhale 2 puffs into the lungs 4 times daily as needed for Wheezing 18 g 5    pantoprazole (PROTONIX) 40 MG tablet Take 1 tablet by mouth daily 90 tablet 1    amLODIPine (NORVASC) 10 MG tablet Take 1 tablet by mouth daily 90 tablet 1    atorvastatin (LIPITOR) 20 MG tablet Take 1 tablet by mouth daily 90 tablet 1    montelukast (SINGULAIR) 10 MG tablet Take 1 tablet by mouth nightly 90 tablet 1    Alcohol Swabs (CVS PREP) 70 % PADS 1 PAD BY APPLY EXTERNALLY ROUTE DAILY. USE TO CHECK BLOOD SUGAR DAILY. 100 each 5    triamcinolone (KENALOG) 0.1 % cream       predniSONE (DELTASONE) 10 MG tablet Take 1 tablet by mouth every other day      aspirin 81 MG EC tablet Take 1 tablet by mouth daily      ipratropium 0.5 mg-albuterol 2.5 mg (DUONEB) 0.5-2.5 (3) MG/3ML SOLN nebulizer solution USE 1 VIAL EVERY 4 6 HOURS AS NEEDED. DX J44.9. NEED MED B INFO

## 2024-11-05 ENCOUNTER — TELEPHONE (OUTPATIENT)
Facility: CLINIC | Age: 69
End: 2024-11-05

## 2024-11-05 ENCOUNTER — TELEPHONE (OUTPATIENT)
Age: 69
End: 2024-11-05

## 2024-11-05 DIAGNOSIS — I10 ESSENTIAL (PRIMARY) HYPERTENSION: ICD-10-CM

## 2024-11-05 RX ORDER — AMLODIPINE BESYLATE 10 MG/1
10 TABLET ORAL DAILY
Qty: 90 TABLET | Refills: 1 | Status: SHIPPED | OUTPATIENT
Start: 2024-11-05

## 2024-11-05 NOTE — TELEPHONE ENCOUNTER
PT is requesting a CB from MA regarding how long it may take for injection of KENALOG-40 to begin working as the PT is still in pain following injection on 11/4/24. PT can be reached at [phone].

## 2024-11-05 NOTE — TELEPHONE ENCOUNTER
Pt. LVM stating nose spray that was given is not working.  Pt. Wants to know what else can be done or if another medication can be sent in.

## 2024-11-06 NOTE — TELEPHONE ENCOUNTER
Identified pt with two pt identifiers (name and ). Reviewed chart in preparation for visit and have obtained necessary documentation.    Returned patient call . Patient stated that she is feeling better now . Patient also remembered what  said. Patient had no other questions at this time.

## 2024-11-11 ENCOUNTER — OFFICE VISIT (OUTPATIENT)
Age: 69
End: 2024-11-11
Payer: MEDICARE

## 2024-11-11 VITALS
DIASTOLIC BLOOD PRESSURE: 60 MMHG | OXYGEN SATURATION: 100 % | BODY MASS INDEX: 23.64 KG/M2 | RESPIRATION RATE: 14 BRPM | WEIGHT: 150.6 LBS | SYSTOLIC BLOOD PRESSURE: 120 MMHG | HEIGHT: 67 IN | TEMPERATURE: 97.5 F | HEART RATE: 110 BPM

## 2024-11-11 DIAGNOSIS — R10.30 LOWER ABDOMINAL PAIN: ICD-10-CM

## 2024-11-11 DIAGNOSIS — K21.00 GASTROESOPHAGEAL REFLUX DISEASE WITH ESOPHAGITIS WITHOUT HEMORRHAGE: ICD-10-CM

## 2024-11-11 DIAGNOSIS — K57.30 DIVERTICULAR DISEASE OF COLON: Primary | ICD-10-CM

## 2024-11-11 PROCEDURE — 3074F SYST BP LT 130 MM HG: CPT | Performed by: INTERNAL MEDICINE

## 2024-11-11 PROCEDURE — 3078F DIAST BP <80 MM HG: CPT | Performed by: INTERNAL MEDICINE

## 2024-11-11 PROCEDURE — 1090F PRES/ABSN URINE INCON ASSESS: CPT | Performed by: INTERNAL MEDICINE

## 2024-11-11 PROCEDURE — G8484 FLU IMMUNIZE NO ADMIN: HCPCS | Performed by: INTERNAL MEDICINE

## 2024-11-11 PROCEDURE — G8420 CALC BMI NORM PARAMETERS: HCPCS | Performed by: INTERNAL MEDICINE

## 2024-11-11 PROCEDURE — 3017F COLORECTAL CA SCREEN DOC REV: CPT | Performed by: INTERNAL MEDICINE

## 2024-11-11 PROCEDURE — 1125F AMNT PAIN NOTED PAIN PRSNT: CPT | Performed by: INTERNAL MEDICINE

## 2024-11-11 PROCEDURE — G8399 PT W/DXA RESULTS DOCUMENT: HCPCS | Performed by: INTERNAL MEDICINE

## 2024-11-11 PROCEDURE — 99214 OFFICE O/P EST MOD 30 MIN: CPT | Performed by: INTERNAL MEDICINE

## 2024-11-11 PROCEDURE — 1123F ACP DISCUSS/DSCN MKR DOCD: CPT | Performed by: INTERNAL MEDICINE

## 2024-11-11 PROCEDURE — 1036F TOBACCO NON-USER: CPT | Performed by: INTERNAL MEDICINE

## 2024-11-11 PROCEDURE — G8427 DOCREV CUR MEDS BY ELIG CLIN: HCPCS | Performed by: INTERNAL MEDICINE

## 2024-11-11 RX ORDER — LOPERAMIDE HYDROCHLORIDE 2 MG/1
2 CAPSULE ORAL 2 TIMES DAILY PRN
COMMUNITY

## 2024-11-11 RX ORDER — METRONIDAZOLE 500 MG/1
500 TABLET ORAL 3 TIMES DAILY
Qty: 42 TABLET | Refills: 0 | Status: SHIPPED | OUTPATIENT
Start: 2024-11-11

## 2024-11-11 ASSESSMENT — PATIENT HEALTH QUESTIONNAIRE - PHQ9
SUM OF ALL RESPONSES TO PHQ QUESTIONS 1-9: 0
1. LITTLE INTEREST OR PLEASURE IN DOING THINGS: NOT AT ALL
SUM OF ALL RESPONSES TO PHQ QUESTIONS 1-9: 0
SUM OF ALL RESPONSES TO PHQ QUESTIONS 1-9: 0
2. FEELING DOWN, DEPRESSED OR HOPELESS: NOT AT ALL
SUM OF ALL RESPONSES TO PHQ9 QUESTIONS 1 & 2: 0
SUM OF ALL RESPONSES TO PHQ QUESTIONS 1-9: 0

## 2024-11-14 ENCOUNTER — OFFICE VISIT (OUTPATIENT)
Facility: CLINIC | Age: 69
End: 2024-11-14

## 2024-11-14 VITALS
HEIGHT: 67 IN | RESPIRATION RATE: 18 BRPM | BODY MASS INDEX: 23.39 KG/M2 | SYSTOLIC BLOOD PRESSURE: 127 MMHG | TEMPERATURE: 98.3 F | HEART RATE: 99 BPM | OXYGEN SATURATION: 98 % | WEIGHT: 149 LBS | DIASTOLIC BLOOD PRESSURE: 66 MMHG

## 2024-11-14 DIAGNOSIS — E78.2 MIXED HYPERLIPIDEMIA: ICD-10-CM

## 2024-11-14 DIAGNOSIS — E55.9 VITAMIN D DEFICIENCY: ICD-10-CM

## 2024-11-14 DIAGNOSIS — S32.000K: ICD-10-CM

## 2024-11-14 DIAGNOSIS — Z74.09 OTHER REDUCED MOBILITY: ICD-10-CM

## 2024-11-14 DIAGNOSIS — J44.9 CHRONIC OBSTRUCTIVE PULMONARY DISEASE, UNSPECIFIED COPD TYPE (HCC): ICD-10-CM

## 2024-11-14 DIAGNOSIS — I10 ESSENTIAL (PRIMARY) HYPERTENSION: ICD-10-CM

## 2024-11-14 DIAGNOSIS — K57.90 DIVERTICULAR DISEASE: ICD-10-CM

## 2024-11-14 DIAGNOSIS — J30.9 ALLERGIC RHINITIS, UNSPECIFIED SEASONALITY, UNSPECIFIED TRIGGER: ICD-10-CM

## 2024-11-14 DIAGNOSIS — E11.9 TYPE 2 DIABETES MELLITUS WITHOUT COMPLICATION, WITHOUT LONG-TERM CURRENT USE OF INSULIN (HCC): Primary | ICD-10-CM

## 2024-11-14 DIAGNOSIS — N28.89 RENAL MASS: ICD-10-CM

## 2024-11-14 DIAGNOSIS — K21.9 GASTRO-ESOPHAGEAL REFLUX DISEASE WITHOUT ESOPHAGITIS: ICD-10-CM

## 2024-11-14 DIAGNOSIS — D64.9 ANEMIA, UNSPECIFIED TYPE: ICD-10-CM

## 2024-11-14 DIAGNOSIS — Z99.81 DEPENDENCE ON SUPPLEMENTAL OXYGEN: ICD-10-CM

## 2024-11-14 PROBLEM — R33.8 ACUTE URINARY RETENTION: Status: RESOLVED | Noted: 2022-10-04 | Resolved: 2024-11-14

## 2024-11-14 PROBLEM — M87.052 AVASCULAR NECROSIS OF BONE OF LEFT HIP: Status: RESOLVED | Noted: 2023-01-09 | Resolved: 2024-11-14

## 2024-11-14 PROBLEM — L29.9 PRURITUS: Status: RESOLVED | Noted: 2024-09-25 | Resolved: 2024-11-14

## 2024-11-14 PROBLEM — R26.2 AMBULATORY DYSFUNCTION: Status: RESOLVED | Noted: 2022-10-02 | Resolved: 2024-11-14

## 2024-11-14 LAB — HBA1C MFR BLD: 4.8 %

## 2024-11-14 ASSESSMENT — ENCOUNTER SYMPTOMS
ABDOMINAL PAIN: 0
WHEEZING: 0
CONSTIPATION: 0
PHOTOPHOBIA: 0
EYE PAIN: 0
EYE REDNESS: 0
NAUSEA: 0
TROUBLE SWALLOWING: 0
SINUS PAIN: 0
EYE ITCHING: 0
CHOKING: 0
APNEA: 0
FACIAL SWELLING: 0
COUGH: 0
VOMITING: 0
STRIDOR: 0
CHEST TIGHTNESS: 0
SHORTNESS OF BREATH: 0
DIARRHEA: 0
EYE DISCHARGE: 0
COLOR CHANGE: 0
SORE THROAT: 0
BLOOD IN STOOL: 0
ABDOMINAL DISTENTION: 0

## 2024-11-14 NOTE — PROGRESS NOTES
Chief Complaint   Patient presents with    Diabetes     Follow up     Pt did not bring meds, went over list, pt confirmed     No other concerns     \"Have you been to the ER, urgent care clinic since your last visit?  Hospitalized since your last visit?\"    NO    “Have you seen or consulted any other health care providers outside our system since your last visit?”    NO      “Have you had a diabetic eye exam?”    NO     Date of last diabetic eye exam: 6/13/2022            
10 MG tablet  Commonly known as: SINGULAIR  Stopped by: VERONICA Acevedo NP                Medications marked \"taking\" at this time  Outpatient Medications Marked as Taking for the 11/14/24 encounter (Office Visit) with Tamanna Felipe APRN - NP   Medication Sig Dispense Refill    ondansetron (ZOFRAN) 4 MG tablet Take 1 tablet by mouth 3 times daily as needed for Nausea or Vomiting 15 tablet 0    vitamin D (CHOLECALCIFEROL) 25 MCG (1000 UT) TABS tablet Take 1 tablet by mouth daily 90 tablet 2        Medications patient taking as of now reconciled against medications ordered at time of hospital discharge: Yes    Review of Systems   Constitutional:  Negative for activity change, appetite change, chills, diaphoresis, fatigue and fever.   HENT:  Negative for congestion, dental problem, ear pain, facial swelling, nosebleeds, sinus pain, sore throat and trouble swallowing.    Eyes:  Negative for photophobia, pain, discharge, redness and itching.   Respiratory:  Negative for apnea, cough, choking, chest tightness, shortness of breath, wheezing and stridor.    Cardiovascular:  Negative for chest pain, palpitations and leg swelling.   Gastrointestinal:  Negative for abdominal distention, abdominal pain, blood in stool, constipation, diarrhea, nausea and vomiting.   Endocrine: Negative for polydipsia, polyphagia and polyuria.   Genitourinary:  Negative for difficulty urinating, dysuria, flank pain, frequency, genital sores, hematuria and urgency.   Musculoskeletal:  Positive for arthralgias and gait problem.   Skin:  Negative for color change.   Neurological:  Negative for dizziness, weakness, light-headedness and headaches.   Hematological:  Negative for adenopathy. Does not bruise/bleed easily.   Psychiatric/Behavioral:  Negative for agitation, behavioral problems, confusion, decreased concentration, dysphoric mood and suicidal ideas. The patient is not hyperactive.         Objective:    /66 (Site: Left

## 2024-11-19 ENCOUNTER — TELEPHONE (OUTPATIENT)
Age: 69
End: 2024-11-19

## 2024-11-19 NOTE — TELEPHONE ENCOUNTER
Patient states that her nose is running a lot.  Wants to know if it is ok for her to use the Frank's nasal inhaler.  Please advise....

## 2024-11-21 ENCOUNTER — TELEPHONE (OUTPATIENT)
Facility: CLINIC | Age: 69
End: 2024-11-21

## 2024-11-21 ASSESSMENT — ENCOUNTER SYMPTOMS
ABDOMINAL DISTENTION: 0
VOMITING: 0
ABDOMINAL PAIN: 1
RESPIRATORY NEGATIVE: 1
ALLERGIC/IMMUNOLOGIC NEGATIVE: 1
NAUSEA: 0
CONSTIPATION: 0
BLOOD IN STOOL: 0
ANAL BLEEDING: 0
BACK PAIN: 1
RECTAL PAIN: 0
DIARRHEA: 1

## 2024-12-28 DIAGNOSIS — E11.9 TYPE 2 DIABETES MELLITUS WITHOUT COMPLICATIONS (HCC): ICD-10-CM

## 2024-12-29 RX ORDER — LORATADINE 10 MG
TABLET ORAL
Qty: 200 EACH | Refills: 5 | Status: SHIPPED | OUTPATIENT
Start: 2024-12-29

## 2025-01-16 ENCOUNTER — TELEPHONE (OUTPATIENT)
Age: 70
End: 2025-01-16

## 2025-01-16 ENCOUNTER — TELEPHONE (OUTPATIENT)
Facility: CLINIC | Age: 70
End: 2025-01-16

## 2025-01-16 DIAGNOSIS — J30.9 ALLERGIC RHINITIS, UNSPECIFIED SEASONALITY, UNSPECIFIED TRIGGER: ICD-10-CM

## 2025-01-16 RX ORDER — FLUTICASONE PROPIONATE 50 MCG
2 SPRAY, SUSPENSION (ML) NASAL DAILY
Qty: 1 EACH | Refills: 5 | Status: SHIPPED | OUTPATIENT
Start: 2025-01-16

## 2025-01-16 NOTE — TELEPHONE ENCOUNTER
Pt cld & is nauseated and can only eat dry crackers and ginger ale and wants to get a call from you  to see what you suggest until she can get in to see Dr. COY on 2/11.  She already has appt that day. Do we need to work her in??

## 2025-01-17 ENCOUNTER — HOSPITAL ENCOUNTER (EMERGENCY)
Facility: HOSPITAL | Age: 70
Discharge: HOME | End: 2025-01-17
Attending: EMERGENCY MEDICINE
Payer: MEDICARE

## 2025-01-17 VITALS
DIASTOLIC BLOOD PRESSURE: 76 MMHG | RESPIRATION RATE: 23 BRPM | SYSTOLIC BLOOD PRESSURE: 130 MMHG | HEART RATE: 96 BPM | BODY MASS INDEX: 24.91 KG/M2 | WEIGHT: 155 LBS | HEIGHT: 66 IN | TEMPERATURE: 98.1 F | OXYGEN SATURATION: 100 %

## 2025-01-17 DIAGNOSIS — R10.30 LOWER ABDOMINAL PAIN: Primary | ICD-10-CM

## 2025-01-17 LAB
ALBUMIN SERPL-MCNC: 4 G/DL (ref 3.5–5)
ALBUMIN/GLOB SERPL: 1.1 (ref 1.1–2.2)
ALP SERPL-CCNC: 75 U/L (ref 45–117)
ALT SERPL-CCNC: 25 U/L (ref 12–78)
ANION GAP SERPL CALC-SCNC: 4 MMOL/L (ref 2–12)
APPEARANCE UR: CLEAR
AST SERPL W P-5'-P-CCNC: 23 U/L (ref 15–37)
BACTERIA URNS QL MICRO: NEGATIVE /HPF
BASOPHILS # BLD: 0.03 K/UL (ref 0–0.1)
BASOPHILS NFR BLD: 0.5 % (ref 0–1)
BILIRUB SERPL-MCNC: 0.7 MG/DL (ref 0.2–1)
BILIRUB UR QL: NEGATIVE
BUN SERPL-MCNC: 10 MG/DL (ref 6–20)
BUN/CREAT SERPL: 10 (ref 12–20)
CA-I BLD-MCNC: 9.5 MG/DL (ref 8.5–10.1)
CHLORIDE SERPL-SCNC: 95 MMOL/L (ref 97–108)
CO2 SERPL-SCNC: 36 MMOL/L (ref 21–32)
COLOR UR: NORMAL
CREAT SERPL-MCNC: 1.02 MG/DL (ref 0.55–1.02)
DIFFERENTIAL METHOD BLD: ABNORMAL
EOSINOPHIL # BLD: 0.1 K/UL (ref 0–0.4)
EOSINOPHIL NFR BLD: 1.6 % (ref 0–7)
ERYTHROCYTE [DISTWIDTH] IN BLOOD BY AUTOMATED COUNT: 13.6 % (ref 11.5–14.5)
GLOBULIN SER CALC-MCNC: 3.5 G/DL (ref 2–4)
GLUCOSE SERPL-MCNC: 89 MG/DL (ref 65–100)
GLUCOSE UR STRIP.AUTO-MCNC: NEGATIVE MG/DL
HCT VFR BLD AUTO: 30 % (ref 35–47)
HGB BLD-MCNC: 10.4 G/DL (ref 11.5–16)
HGB UR QL STRIP: NEGATIVE
IMM GRANULOCYTES # BLD AUTO: 0.02 K/UL (ref 0–0.04)
IMM GRANULOCYTES NFR BLD AUTO: 0.3 % (ref 0–0.5)
KETONES UR QL STRIP.AUTO: NEGATIVE MG/DL
LEUKOCYTE ESTERASE UR QL STRIP.AUTO: NEGATIVE
LYMPHOCYTES # BLD: 1.26 K/UL (ref 0.8–3.5)
LYMPHOCYTES NFR BLD: 20.2 % (ref 12–49)
MAGNESIUM SERPL-MCNC: 1.9 MG/DL (ref 1.6–2.4)
MCH RBC QN AUTO: 30.9 PG (ref 26–34)
MCHC RBC AUTO-ENTMCNC: 34.7 G/DL (ref 30–36.5)
MCV RBC AUTO: 89 FL (ref 80–99)
MONOCYTES # BLD: 0.48 K/UL (ref 0–1)
MONOCYTES NFR BLD: 7.7 % (ref 5–13)
NEUTS SEG # BLD: 4.36 K/UL (ref 1.8–8)
NEUTS SEG NFR BLD: 69.7 % (ref 32–75)
NITRITE UR QL STRIP.AUTO: NEGATIVE
NRBC # BLD: 0 K/UL (ref 0–0.01)
NRBC BLD-RTO: 0 PER 100 WBC
PH UR STRIP: 6.5 (ref 5–8)
PLATELET # BLD AUTO: 282 K/UL (ref 150–400)
PMV BLD AUTO: 9.4 FL (ref 8.9–12.9)
POTASSIUM SERPL-SCNC: 3.8 MMOL/L (ref 3.5–5.1)
PROT SERPL-MCNC: 7.5 G/DL (ref 6.4–8.2)
PROT UR STRIP-MCNC: NEGATIVE MG/DL
RBC # BLD AUTO: 3.37 M/UL (ref 3.8–5.2)
RBC #/AREA URNS HPF: NORMAL /HPF (ref 0–3)
SODIUM SERPL-SCNC: 135 MMOL/L (ref 136–145)
SP GR UR REFRACTOMETRY: 1.01 (ref 1–1.03)
UROBILINOGEN UR QL STRIP.AUTO: 0.2 EU/DL (ref 0.2–1)
WBC # BLD AUTO: 6.3 K/UL (ref 3.6–11)
WBC URNS QL MICRO: NORMAL /HPF (ref 0–5)

## 2025-01-17 PROCEDURE — 81001 URINALYSIS AUTO W/SCOPE: CPT

## 2025-01-17 PROCEDURE — 36415 COLL VENOUS BLD VENIPUNCTURE: CPT

## 2025-01-17 PROCEDURE — 99284 EMERGENCY DEPT VISIT MOD MDM: CPT

## 2025-01-17 PROCEDURE — 96374 THER/PROPH/DIAG INJ IV PUSH: CPT

## 2025-01-17 PROCEDURE — 96361 HYDRATE IV INFUSION ADD-ON: CPT

## 2025-01-17 PROCEDURE — 80053 COMPREHEN METABOLIC PANEL: CPT

## 2025-01-17 PROCEDURE — 96375 TX/PRO/DX INJ NEW DRUG ADDON: CPT

## 2025-01-17 PROCEDURE — 83735 ASSAY OF MAGNESIUM: CPT

## 2025-01-17 PROCEDURE — 85025 COMPLETE CBC W/AUTO DIFF WBC: CPT

## 2025-01-17 PROCEDURE — 2580000003 HC RX 258: Performed by: EMERGENCY MEDICINE

## 2025-01-17 PROCEDURE — 6360000002 HC RX W HCPCS: Performed by: EMERGENCY MEDICINE

## 2025-01-17 RX ORDER — ONDANSETRON 2 MG/ML
4 INJECTION INTRAMUSCULAR; INTRAVENOUS ONCE
Status: COMPLETED | OUTPATIENT
Start: 2025-01-17 | End: 2025-01-17

## 2025-01-17 RX ORDER — ONDANSETRON 4 MG/1
4 TABLET, FILM COATED ORAL 3 TIMES DAILY PRN
Qty: 15 TABLET | Refills: 0 | Status: SHIPPED | OUTPATIENT
Start: 2025-01-17

## 2025-01-17 RX ORDER — 0.9 % SODIUM CHLORIDE 0.9 %
1000 INTRAVENOUS SOLUTION INTRAVENOUS ONCE
Status: COMPLETED | OUTPATIENT
Start: 2025-01-17 | End: 2025-01-17

## 2025-01-17 RX ADMIN — ONDANSETRON 4 MG: 2 INJECTION, SOLUTION INTRAMUSCULAR; INTRAVENOUS at 12:00

## 2025-01-17 RX ADMIN — SODIUM CHLORIDE 1000 ML: 9 INJECTION, SOLUTION INTRAVENOUS at 11:59

## 2025-01-17 RX ADMIN — SODIUM CHLORIDE 40 MG: 9 INJECTION, SOLUTION INTRAMUSCULAR; INTRAVENOUS; SUBCUTANEOUS at 11:58

## 2025-01-17 ASSESSMENT — PAIN - FUNCTIONAL ASSESSMENT
PAIN_FUNCTIONAL_ASSESSMENT: ACTIVITIES ARE NOT PREVENTED
PAIN_FUNCTIONAL_ASSESSMENT: 0-10

## 2025-01-17 ASSESSMENT — PAIN SCALES - GENERAL: PAINLEVEL_OUTOF10: 0

## 2025-01-17 NOTE — ED NOTES
Pt given discharge and follow up instructions. Education on prescription given. Pt has no further questions at this time. Pt has GI appt next month, pt states she will call office today regarding same.

## 2025-01-17 NOTE — ED PROVIDER NOTES
[facility] EMERGENCY DEPT  EMERGENCY DEPARTMENT HISTORY AND PHYSICAL EXAM      Date: 2025  Patient Name: Virgen Hernandez  MRN: [id number]  YOB: 1955  Date of evaluation: 2025  Provider: Sherrie Anna MD   Note Started: 11:03 AM EST 25    HISTORY OF PRESENT ILLNESS     Chief Complaint   Patient presents with    Nausea       History Provided By: Patient    HPI: Virgen Hernandez is a 69 y.o. female     PAST MEDICAL HISTORY   Past Medical History:  Past Medical History:   Diagnosis Date    Arthritis     Bronchitis 2020    Cholelithiasis     as noted on CT abdomen in 3/2023    Chronic bronchitis (HCC)     Chronic obstructive pulmonary disease (HCC)     Chronic pain     Chronic respiratory failure with hypoxia, on home O2 therapy     secondary to copd / emphysema    Diabetes (HCC)     Diverticulosis     Gastroesophageal reflux disease without esophagitis 2020    GERD (gastroesophageal reflux disease)     GERD (gastroesophageal reflux disease)     History of multiple allergies     HTN (hypertension) 2020    Hypertension     Intermittent asthma 2020    Lung disorder     PATIENT IS ON OXYGEN    Menopause     Pain of upper abdomen 2020    Renal mass, right 2023    Seasonal allergic rhinitis 2020    Sinus problem     Vitamin D deficiency 2020       Past Surgical History:  Past Surgical History:   Procedure Laterality Date    COLONOSCOPY      COLONOSCOPY N/A 2020    COLONOSCOPY performed by Graham Quinones MD at [facility] ENDOSCOPY    GI      colonscopy    ORTHOPEDIC SURGERY  10/24/2018    Total Right Hip Arthroplasty Dr. Jonnie Osorio [place].       Family History:  Family History   Problem Relation Age of Onset    Cancer Sister     Hypertension Mother        Social History:  Social History     Tobacco Use    Smoking status: Former     Current packs/day: 0.00     Types: Cigarettes     Quit date: 2000     Years since quittin.0    Smokeless  patient stabilized for discharge patient has appropriate follow-up between GI and nephrology no further radiological studies warranted at this time    Patient was given the following medications:  Medications - No data to display    CONSULTS: (Who and What was discussed)  None     Social Determinants affecting Dx or Tx: None    Smoking Cessation: Not Applicable    PROCEDURES   Unless otherwise noted above, none.  Procedures      CRITICAL CARE TIME   Patient does not meet Critical Care Time, 0 minutes    FINAL IMPRESSION   No diagnosis found.      DISPOSITION/PLAN   DISPOSITION               Discharge Note: The patient is stable for discharge home. The signs, symptoms, diagnosis, and discharge instructions have been discussed, understanding conveyed, and agreed upon. The patient is to follow up as recommended or return to ER should their symptoms worsen.      PATIENT REFERRED TO:  No follow-up provider specified.      DISCHARGE MEDICATIONS:     Medication List        CONTINUE taking these medications      CVS Prep 70 % Pads  1 PAD BY APPLY EXTERNALLY ROUTE DAILY. USE TO CHECK BLOOD SUGAR DAILY.     Lancets Ultra Thin 30G Misc            ASK your doctor about these medications      albuterol sulfate  (90 Base) MCG/ACT inhaler  Commonly known as: Ventolin HFA  Inhale 2 puffs into the lungs 4 times daily as needed for Wheezing     amLODIPine 10 MG tablet  Commonly known as: NORVASC  TAKE 1 TABLET BY MOUTH EVERY DAY     aspirin 81 MG EC tablet     atorvastatin 20 MG tablet  Commonly known as: LIPITOR  Take 1 tablet by mouth daily     Azelastine-Fluticasone 137-50 MCG/ACT Susp  1 spray by Nasal route in the morning and at bedtime     Blood Glucose Test Strips 333 Strp     fluticasone 50 MCG/ACT nasal spray  Commonly known as: FLONASE  2 sprays by Each Nostril route daily     furosemide 20 MG tablet  Commonly known as: LASIX  TAKE 1 TABLET BY MOUTH EVERY DAY     ipratropium 0.06 % nasal spray  Commonly known as:

## 2025-01-17 NOTE — ED TRIAGE NOTES
Patient presents for complaint of nausea and decreased PO intake x2 days.  Presents on 02 @ 2L nasal cannula via concentrator which she states she wears continuously.  Patient changed to hospital oxygen source in triage.

## 2025-02-06 ENCOUNTER — TELEPHONE (OUTPATIENT)
Facility: CLINIC | Age: 70
End: 2025-02-06

## 2025-02-06 DIAGNOSIS — E78.2 MIXED HYPERLIPIDEMIA: ICD-10-CM

## 2025-02-06 RX ORDER — ATORVASTATIN CALCIUM 20 MG/1
20 TABLET, FILM COATED ORAL DAILY
Qty: 90 TABLET | Refills: 1 | Status: SHIPPED | OUTPATIENT
Start: 2025-02-06

## 2025-02-11 ENCOUNTER — OFFICE VISIT (OUTPATIENT)
Age: 70
End: 2025-02-11
Payer: MEDICARE

## 2025-02-11 VITALS
HEART RATE: 110 BPM | HEIGHT: 66 IN | TEMPERATURE: 97.6 F | WEIGHT: 148.6 LBS | BODY MASS INDEX: 23.88 KG/M2 | RESPIRATION RATE: 18 BRPM | OXYGEN SATURATION: 97 %

## 2025-02-11 DIAGNOSIS — K59.04 CHRONIC IDIOPATHIC CONSTIPATION: ICD-10-CM

## 2025-02-11 DIAGNOSIS — D64.9 ANEMIA, UNSPECIFIED TYPE: ICD-10-CM

## 2025-02-11 DIAGNOSIS — K21.00 GASTROESOPHAGEAL REFLUX DISEASE WITH ESOPHAGITIS WITHOUT HEMORRHAGE: ICD-10-CM

## 2025-02-11 DIAGNOSIS — K57.30 DIVERTICULAR DISEASE OF COLON: Primary | ICD-10-CM

## 2025-02-11 PROCEDURE — 3017F COLORECTAL CA SCREEN DOC REV: CPT | Performed by: INTERNAL MEDICINE

## 2025-02-11 PROCEDURE — G8399 PT W/DXA RESULTS DOCUMENT: HCPCS | Performed by: INTERNAL MEDICINE

## 2025-02-11 PROCEDURE — 1123F ACP DISCUSS/DSCN MKR DOCD: CPT | Performed by: INTERNAL MEDICINE

## 2025-02-11 PROCEDURE — 1090F PRES/ABSN URINE INCON ASSESS: CPT | Performed by: INTERNAL MEDICINE

## 2025-02-11 PROCEDURE — G8420 CALC BMI NORM PARAMETERS: HCPCS | Performed by: INTERNAL MEDICINE

## 2025-02-11 PROCEDURE — 99214 OFFICE O/P EST MOD 30 MIN: CPT | Performed by: INTERNAL MEDICINE

## 2025-02-11 PROCEDURE — G8427 DOCREV CUR MEDS BY ELIG CLIN: HCPCS | Performed by: INTERNAL MEDICINE

## 2025-02-11 PROCEDURE — 1036F TOBACCO NON-USER: CPT | Performed by: INTERNAL MEDICINE

## 2025-02-15 ENCOUNTER — HOSPITAL ENCOUNTER (EMERGENCY)
Facility: HOSPITAL | Age: 70
Discharge: HOME | End: 2025-02-15
Attending: EMERGENCY MEDICINE
Payer: MEDICARE

## 2025-02-15 ENCOUNTER — APPOINTMENT (OUTPATIENT)
Facility: HOSPITAL | Age: 70
End: 2025-02-15
Payer: MEDICARE

## 2025-02-15 VITALS
RESPIRATION RATE: 25 BRPM | HEART RATE: 91 BPM | BODY MASS INDEX: 23.78 KG/M2 | HEIGHT: 66 IN | OXYGEN SATURATION: 100 % | TEMPERATURE: 98.8 F | DIASTOLIC BLOOD PRESSURE: 71 MMHG | SYSTOLIC BLOOD PRESSURE: 114 MMHG | WEIGHT: 148 LBS

## 2025-02-15 DIAGNOSIS — R07.9 CHEST PAIN, UNSPECIFIED TYPE: ICD-10-CM

## 2025-02-15 DIAGNOSIS — J44.1 COPD EXACERBATION (HCC): ICD-10-CM

## 2025-02-15 DIAGNOSIS — B34.9 VIRAL SYNDROME: Primary | ICD-10-CM

## 2025-02-15 LAB
ALBUMIN SERPL-MCNC: 3.7 G/DL (ref 3.5–5)
ALBUMIN/GLOB SERPL: 1.1 (ref 1.1–2.2)
ALP SERPL-CCNC: 72 U/L (ref 45–117)
ALT SERPL-CCNC: 26 U/L (ref 12–78)
ANION GAP SERPL CALC-SCNC: 4 MMOL/L (ref 2–12)
AST SERPL W P-5'-P-CCNC: 22 U/L (ref 15–37)
BASOPHILS # BLD: 0.04 K/UL (ref 0–0.1)
BASOPHILS NFR BLD: 0.5 % (ref 0–1)
BILIRUB SERPL-MCNC: 0.5 MG/DL (ref 0.2–1)
BNP SERPL-MCNC: 63 PG/ML
BUN SERPL-MCNC: 13 MG/DL (ref 6–20)
BUN/CREAT SERPL: 12 (ref 12–20)
CA-I BLD-MCNC: 9.4 MG/DL (ref 8.5–10.1)
CHLORIDE SERPL-SCNC: 92 MMOL/L (ref 97–108)
CO2 SERPL-SCNC: 36 MMOL/L (ref 21–32)
CREAT SERPL-MCNC: 1.06 MG/DL (ref 0.55–1.02)
DIFFERENTIAL METHOD BLD: ABNORMAL
EKG ATRIAL RATE: 103 BPM
EKG DIAGNOSIS: NORMAL
EKG P AXIS: 78 DEGREES
EKG P-R INTERVAL: 183 MS
EKG Q-T INTERVAL: 318 MS
EKG QRS DURATION: 85 MS
EKG QTC CALCULATION (BAZETT): 415 MS
EKG R AXIS: 68 DEGREES
EKG T AXIS: 67 DEGREES
EKG VENTRICULAR RATE: 102 BPM
EOSINOPHIL # BLD: 0.1 K/UL (ref 0–0.4)
EOSINOPHIL NFR BLD: 1.2 % (ref 0–7)
ERYTHROCYTE [DISTWIDTH] IN BLOOD BY AUTOMATED COUNT: 13.6 % (ref 11.5–14.5)
FLUAV RNA SPEC QL NAA+PROBE: NOT DETECTED
FLUBV RNA SPEC QL NAA+PROBE: NOT DETECTED
GLOBULIN SER CALC-MCNC: 3.3 G/DL (ref 2–4)
GLUCOSE SERPL-MCNC: 106 MG/DL (ref 65–100)
HCT VFR BLD AUTO: 28.4 % (ref 35–47)
HGB BLD-MCNC: 9.7 G/DL (ref 11.5–16)
IMM GRANULOCYTES # BLD AUTO: 0.03 K/UL (ref 0–0.04)
IMM GRANULOCYTES NFR BLD AUTO: 0.4 % (ref 0–0.5)
LYMPHOCYTES # BLD: 0.72 K/UL (ref 0.8–3.5)
LYMPHOCYTES NFR BLD: 8.9 % (ref 12–49)
MAGNESIUM SERPL-MCNC: 1.8 MG/DL (ref 1.6–2.4)
MCH RBC QN AUTO: 31 PG (ref 26–34)
MCHC RBC AUTO-ENTMCNC: 34.2 G/DL (ref 30–36.5)
MCV RBC AUTO: 90.7 FL (ref 80–99)
MONOCYTES # BLD: 0.49 K/UL (ref 0–1)
MONOCYTES NFR BLD: 6 % (ref 5–13)
NEUTS SEG # BLD: 6.75 K/UL (ref 1.8–8)
NEUTS SEG NFR BLD: 83 % (ref 32–75)
NRBC # BLD: 0 K/UL (ref 0–0.01)
NRBC BLD-RTO: 0 PER 100 WBC
PLATELET # BLD AUTO: 253 K/UL (ref 150–400)
PMV BLD AUTO: 9.4 FL (ref 8.9–12.9)
POTASSIUM SERPL-SCNC: 4.1 MMOL/L (ref 3.5–5.1)
PROT SERPL-MCNC: 7 G/DL (ref 6.4–8.2)
RBC # BLD AUTO: 3.13 M/UL (ref 3.8–5.2)
SARS-COV-2 RNA RESP QL NAA+PROBE: NOT DETECTED
SODIUM SERPL-SCNC: 132 MMOL/L (ref 136–145)
TROPONIN I SERPL HS-MCNC: 4 NG/L (ref 0–51)
WBC # BLD AUTO: 8.1 K/UL (ref 3.6–11)

## 2025-02-15 PROCEDURE — 6360000002 HC RX W HCPCS: Performed by: EMERGENCY MEDICINE

## 2025-02-15 PROCEDURE — 87636 SARSCOV2 & INF A&B AMP PRB: CPT

## 2025-02-15 PROCEDURE — 6370000000 HC RX 637 (ALT 250 FOR IP): Performed by: EMERGENCY MEDICINE

## 2025-02-15 PROCEDURE — 83880 ASSAY OF NATRIURETIC PEPTIDE: CPT

## 2025-02-15 PROCEDURE — 84484 ASSAY OF TROPONIN QUANT: CPT

## 2025-02-15 PROCEDURE — 80053 COMPREHEN METABOLIC PANEL: CPT

## 2025-02-15 PROCEDURE — 94640 AIRWAY INHALATION TREATMENT: CPT

## 2025-02-15 PROCEDURE — 93005 ELECTROCARDIOGRAM TRACING: CPT | Performed by: EMERGENCY MEDICINE

## 2025-02-15 PROCEDURE — 36415 COLL VENOUS BLD VENIPUNCTURE: CPT

## 2025-02-15 PROCEDURE — 85025 COMPLETE CBC W/AUTO DIFF WBC: CPT

## 2025-02-15 PROCEDURE — 99285 EMERGENCY DEPT VISIT HI MDM: CPT

## 2025-02-15 PROCEDURE — 96374 THER/PROPH/DIAG INJ IV PUSH: CPT

## 2025-02-15 PROCEDURE — 83735 ASSAY OF MAGNESIUM: CPT

## 2025-02-15 PROCEDURE — 71045 X-RAY EXAM CHEST 1 VIEW: CPT

## 2025-02-15 PROCEDURE — 2500000003 HC RX 250 WO HCPCS: Performed by: EMERGENCY MEDICINE

## 2025-02-15 RX ORDER — IPRATROPIUM BROMIDE AND ALBUTEROL SULFATE 2.5; .5 MG/3ML; MG/3ML
1 SOLUTION RESPIRATORY (INHALATION)
Status: DISCONTINUED | OUTPATIENT
Start: 2025-02-15 | End: 2025-02-15

## 2025-02-15 RX ORDER — ACETAMINOPHEN 500 MG
1000 TABLET ORAL
Status: COMPLETED | OUTPATIENT
Start: 2025-02-15 | End: 2025-02-15

## 2025-02-15 RX ORDER — AZITHROMYCIN 250 MG/1
TABLET, FILM COATED ORAL
Qty: 6 TABLET | Refills: 0 | Status: SHIPPED | OUTPATIENT
Start: 2025-02-15 | End: 2025-02-25

## 2025-02-15 RX ORDER — PREDNISONE 20 MG/1
40 TABLET ORAL DAILY
Qty: 10 TABLET | Refills: 0 | Status: SHIPPED | OUTPATIENT
Start: 2025-02-15 | End: 2025-02-20

## 2025-02-15 RX ORDER — ALBUTEROL SULFATE 90 UG/1
4 INHALANT RESPIRATORY (INHALATION) ONCE
Status: COMPLETED | OUTPATIENT
Start: 2025-02-15 | End: 2025-02-15

## 2025-02-15 RX ORDER — AZITHROMYCIN 250 MG/1
500 TABLET, FILM COATED ORAL
Status: COMPLETED | OUTPATIENT
Start: 2025-02-15 | End: 2025-02-15

## 2025-02-15 RX ADMIN — AZITHROMYCIN DIHYDRATE 500 MG: 250 TABLET ORAL at 12:07

## 2025-02-15 RX ADMIN — ACETAMINOPHEN 1000 MG: 500 TABLET ORAL at 12:07

## 2025-02-15 RX ADMIN — Medication 4 PUFF: at 12:16

## 2025-02-15 RX ADMIN — METHYLPREDNISOLONE SODIUM SUCCINATE 40 MG: 40 INJECTION INTRAMUSCULAR; INTRAVENOUS at 12:07

## 2025-02-15 ASSESSMENT — HEART SCORE: ECG: NORMAL

## 2025-02-15 ASSESSMENT — PAIN SCALES - GENERAL: PAINLEVEL_OUTOF10: 0

## 2025-02-15 ASSESSMENT — PAIN - FUNCTIONAL ASSESSMENT: PAIN_FUNCTIONAL_ASSESSMENT: 0-10

## 2025-02-15 NOTE — DISCHARGE INSTRUCTIONS
You were seen in the ER for your COVID/Flu-like symptoms. You should act as though you are positive even if you have had negative swabs.     We did not find any signs of a heart attack, heart failure, pneumonia, a collapsed lung, severe anemia, or any other dangerous emergency causes of chest pain or difficulty breathing.    You may be having a slight exacerbation of your COPD, so we are prescribing you a steroid and a mild antibiotic to use for the next few days. Continue using your albuterol as needed for difficulty breathing.    You should take tylenol or ibuprofen for fever, aches and pains for the next few days. You can alternate these every 3 hours so that you always have something on board. For instance, you can take tylenol at 9am, ibuprofen at noon, tylenol again at 3pm and ibuprofen again at 6pm. Take in plenty of water to stay hydrated and follow up with your primary care doctor in the next few days. Return to the ER for any uncontrollable vomiting or diarrhea, difficulty breathing, or any other new or concerning symptoms.          Thank you for choosing our Emergency Department for your care.  It is our privilege to care for you in your time of need.  In the next several days, you may receive a survey via email or mailed to your home about your experience with our team.  We would greatly appreciate you taking a few minutes to complete the survey, as we use this information to learn what we have done well and what we could be doing better. Thank you for trusting us with your care!    Below you will find a list of your tests from today's visit.   Labs and Radiology Studies  Recent Results (from the past 12 hour(s))   EKG 12 Lead    Collection Time: 02/15/25 11:42 AM   Result Value Ref Range    Ventricular Rate 102 BPM    Atrial Rate 103 BPM    P-R Interval 183 ms    QRS Duration 85 ms    Q-T Interval 318 ms    QTc Calculation (Bazett) 415 ms    P Axis 78 degrees    R Axis 68 degrees    T Axis 67 degrees     Time: 02/15/25 12:00 PM   Result Value Ref Range    NT Pro-BNP 63 <125 pg/mL   Troponin    Collection Time: 02/15/25 12:00 PM   Result Value Ref Range    Troponin, High Sensitivity 4 0 - 51 ng/L   COVID-19 & Influenza Combo    Collection Time: 02/15/25 12:00 PM    Specimen: Nasopharyngeal   Result Value Ref Range    SARS-CoV-2, PCR Not Detected Not Detected      Rapid Influenza A By PCR Not Detected Not Detected      Rapid Influenza B By PCR Not Detected Not Detected       XR CHEST PORTABLE    Result Date: 2/15/2025  EXAM:  XR CHEST PORTABLE INDICATION: Shortness of Breath COMPARISON: June 2024 TECHNIQUE: portable chest AP view FINDINGS: The cardiac silhouette is within normal limits. The pulmonary vasculature is within normal limits. The lungs and pleural spaces are clear. The visualized bones and upper abdomen are age-appropriate.     No acute process on portable chest. Electronically signed by Tomás Gramajo MD    ------------------------------------------------------------------------------------------------------------  The evaluation and treatment you received in the Emergency Department were for an urgent problem. It is important that you follow-up with a doctor, nurse practitioner, or physician assistant to:  (1) confirm your diagnosis,  (2) re-evaluation of changes in your illness and treatment, and (3) for ongoing care. Please take your discharge instructions with you when you go to your follow-up appointment.     If you have any problem arranging a follow-up appointment, contact us!  If your symptoms become worse or you do not improve as expected, please return to us. We are available 24 hours a day.     If a prescription has been provided, please fill it as soon as possible to prevent a delay in treatment. If you have any questions or reservations about taking the medication due to side effects or interactions with other medications, please call your primary care provider or contact us directly.  Again,

## 2025-02-15 NOTE — ED TRIAGE NOTES
Pt reports SOB with painful cough since Thursday, reports hx of COPD and is on 2L of 02 via NC chronically

## 2025-02-15 NOTE — ED PROVIDER NOTES
[facility] EMERGENCY DEPT  EMERGENCY DEPARTMENT HISTORY AND PHYSICAL EXAM      Date: 2/15/2025  Patient Name: Virgen Hernandez  MRN: [id number]  Birthdate 1955  Date of evaluation: 2/15/2025  Provider: Beatrice Winn MD   Note Started: 11:48 AM EST 2/15/25    HISTORY OF PRESENT ILLNESS     Chief Complaint   Patient presents with    Shortness of Breath       History Provided By: patient    HPI: Virgen Hernandez is a 69 y.o. female with PMH COPD on 2LNC presenting with SOB, intermittent CP. Pt endorses 2d of postnasal drip, congestion, cough, increased SOB. Intermittent chills. Did not try her albuterol as it does not feel like COPD to her. Denies fevers, N/V/D, diarrhea.    PAST MEDICAL HISTORY   Past Medical History:  Past Medical History:   Diagnosis Date    Arthritis     Bronchitis 9/1/2020    Cholelithiasis     as noted on CT abdomen in 3/2023    Chronic bronchitis (HCC)     Chronic obstructive pulmonary disease (HCC)     Chronic pain     Chronic respiratory failure with hypoxia, on home O2 therapy     secondary to copd / emphysema    Diabetes (HCC)     Diverticulosis     Gastroesophageal reflux disease without esophagitis 9/1/2020    GERD (gastroesophageal reflux disease)     GERD (gastroesophageal reflux disease)     History of multiple allergies     HTN (hypertension) 9/1/2020    Hypertension     Intermittent asthma 9/1/2020    Lung disorder     PATIENT IS ON OXYGEN    Menopause     Pain of upper abdomen 9/1/2020    Renal mass, right 04/2023    Seasonal allergic rhinitis 9/1/2020    Sinus problem     Vitamin D deficiency 9/1/2020       Past Surgical History:  Past Surgical History:   Procedure Laterality Date    COLONOSCOPY      COLONOSCOPY N/A 5/25/2020    COLONOSCOPY performed by Graham Quinones MD at [facility] ENDOSCOPY    GI      colonscopy    ORTHOPEDIC SURGERY  10/24/2018    Total Right Hip Arthroplasty Dr. Jonnie Nielsen [place], [place].       Family History:  Family History   Problem Relation Age of  Time: 02/15/25 12:00 PM   Result Value Ref Range    NT Pro-BNP 63 <125 pg/mL   Troponin    Collection Time: 02/15/25 12:00 PM   Result Value Ref Range    Troponin, High Sensitivity 4 0 - 51 ng/L   COVID-19 & Influenza Combo    Collection Time: 02/15/25 12:00 PM    Specimen: Nasopharyngeal   Result Value Ref Range    SARS-CoV-2, PCR Not Detected Not Detected      Rapid Influenza A By PCR Not Detected Not Detected      Rapid Influenza B By PCR Not Detected Not Detected           EKG interpretation by me: NSR@102bpm, nl QRS/Qtc/axis, no COLLINS/STD or nonanatomic TWI.      Radiologic Studies:  Non-plain film images such as CT, Ultrasound and MRI are read by the radiologist. Plain radiographic images are visualized and preliminarily interpreted by the ED Provider with the following findings: (SEE ED COURSE)    Interpretation per the Radiologist below, if available at the time of this note:  XR CHEST PORTABLE   Final Result      No acute process on portable chest.         Electronically signed by Tomás Gramajo MD                       ED COURSE and DIFFERENTIAL DIAGNOSIS/MDM         11:48 AM Differential and Considerations: 69F w/CP, SOB, congestion. Suspect viral syndrome, possibly w/superimposed COPD exacerbation. Will get CXR to eval for PNA/PTX and treat empirically w/solumedrol, azithro, albuterol, tylenol. EKG nonischemic, will check troponin to r/o ACS. Dispo pending workup, tx response.        Records Reviewed (source and summary of external notes): Prior medical records and Nursing notes.    Vitals:    Vitals:    02/15/25 1145   BP: 132/71   Pulse: (!) 104   Resp: 25   Temp: 98.8 °F (37.1 °C)   SpO2: 99%   Weight: 67.1 kg (148 lb)   Height: 1.676 m (5' 6\")        ED COURSE  ED Course as of 02/15/25 1249   Sat Feb 15, 2025   1223 Hemoglobin Quant(!): 9.7  stable [YA]   1238 SARS-CoV-2, PCR: Not Detected [YA]   1238 Rapid Influenza A By PCR: Not Detected [YA]   1238 Rapid Influenza B By PCR: Not Detected [YA]   1243

## 2025-02-17 ENCOUNTER — HOSPITAL ENCOUNTER (OUTPATIENT)
Facility: HOSPITAL | Age: 70
Setting detail: SPECIMEN
Discharge: HOME | End: 2025-02-20
Payer: MEDICARE

## 2025-02-17 ENCOUNTER — HOSPITAL ENCOUNTER (OUTPATIENT)
Facility: HOSPITAL | Age: 70
Discharge: HOME | End: 2025-02-20
Payer: MEDICARE

## 2025-02-17 DIAGNOSIS — N28.89 RENAL MASS: ICD-10-CM

## 2025-02-17 DIAGNOSIS — N28.89 URETERAL FISTULA: ICD-10-CM

## 2025-02-17 PROCEDURE — 74170 CT ABD WO CNTRST FLWD CNTRST: CPT

## 2025-02-17 PROCEDURE — 6360000004 HC RX CONTRAST MEDICATION: Performed by: NURSE PRACTITIONER

## 2025-02-17 RX ORDER — IOPAMIDOL 755 MG/ML
100 INJECTION, SOLUTION INTRAVASCULAR
Status: COMPLETED | OUTPATIENT
Start: 2025-02-17 | End: 2025-02-17

## 2025-02-17 RX ADMIN — IOPAMIDOL 100 ML: 755 INJECTION, SOLUTION INTRAVENOUS at 11:28

## 2025-02-18 LAB
EKG ATRIAL RATE: 103 BPM
EKG DIAGNOSIS: NORMAL
EKG P AXIS: 78 DEGREES
EKG P-R INTERVAL: 183 MS
EKG Q-T INTERVAL: 318 MS
EKG QRS DURATION: 85 MS
EKG QTC CALCULATION (BAZETT): 415 MS
EKG R AXIS: 68 DEGREES
EKG T AXIS: 67 DEGREES
EKG VENTRICULAR RATE: 102 BPM

## 2025-02-19 ASSESSMENT — ENCOUNTER SYMPTOMS
BACK PAIN: 1
ABDOMINAL DISTENTION: 0
RESPIRATORY NEGATIVE: 1
BLOOD IN STOOL: 0
DIARRHEA: 0
RECTAL PAIN: 0
VOMITING: 0
ANAL BLEEDING: 0
CONSTIPATION: 1
ABDOMINAL PAIN: 1
ALLERGIC/IMMUNOLOGIC NEGATIVE: 1
NAUSEA: 0

## 2025-02-19 NOTE — PROGRESS NOTES
Chief Complaint   Patient presents with    Follow-up     3 month     \"Have you been to the ER, urgent care clinic since your last visit?  Hospitalized since your last visit?\"    NO    “Have you seen or consulted any other health care providers outside our system since your last visit?”    NO      “Have you had a diabetic eye exam?”    YES - Where:     Date of last diabetic eye exam: 6/13/2022           
file   Social History Narrative    Not on file     Social Determinants of Health     Financial Resource Strain: Low Risk  (5/3/2024)    Overall Financial Resource Strain (CARDIA)     Difficulty of Paying Living Expenses: Not very hard   Food Insecurity: No Food Insecurity (5/3/2024)    Hunger Vital Sign     Worried About Running Out of Food in the Last Year: Never true     Ran Out of Food in the Last Year: Never true   Transportation Needs: Unknown (5/3/2024)    PRAPARE - Transportation     Lack of Transportation (Medical): Not on file     Lack of Transportation (Non-Medical): No   Physical Activity: Sufficiently Active (5/3/2024)    Exercise Vital Sign     Days of Exercise per Week: 5 days     Minutes of Exercise per Session: 30 min   Stress: Not on file   Social Connections: Not on file   Intimate Partner Violence: Not on file   Housing Stability: Unknown (5/3/2024)    Housing Stability Vital Sign     Unable to Pay for Housing in the Last Year: Not on file     Number of Places Lived in the Last Year: Not on file     Unstable Housing in the Last Year: No         69-year-old female with history of hypertension, hyperlipidemia, COPD on oxygen therapy, arthritis, diverticular disease, gastroesophageal reflux disease, hiatal hernia, anxiety disorder, chronic gastritis, diabetes mellitus type 2, kidney mass, gallstones, and colon polyps who comes in for follow-up visit.  Patient states she is doing okay.  She is eating well and has good appetite.  She has lost some weight.  No more nausea or vomiting.  Her bowel movements are doing on the fair but not hard or soft.  No more abdominal pain.          Review of Systems   Constitutional: Negative.    HENT:  Negative for nosebleeds.    Respiratory: Negative.     Cardiovascular: Negative.    Gastrointestinal:  Positive for abdominal pain and constipation. Negative for abdominal distention, anal bleeding, blood in stool, diarrhea, nausea, rectal pain and vomiting.

## 2025-03-06 ENCOUNTER — TRANSCRIBE ORDERS (OUTPATIENT)
Facility: HOSPITAL | Age: 70
End: 2025-03-06

## 2025-03-06 DIAGNOSIS — N28.89 OTHER SPECIFIED DISORDERS OF KIDNEY AND URETER: Primary | ICD-10-CM

## 2025-03-14 ENCOUNTER — OFFICE VISIT (OUTPATIENT)
Facility: CLINIC | Age: 70
End: 2025-03-14

## 2025-03-14 VITALS
HEART RATE: 99 BPM | HEIGHT: 66 IN | BODY MASS INDEX: 23.46 KG/M2 | WEIGHT: 146 LBS | SYSTOLIC BLOOD PRESSURE: 110 MMHG | DIASTOLIC BLOOD PRESSURE: 67 MMHG | TEMPERATURE: 98.1 F | OXYGEN SATURATION: 96 % | RESPIRATION RATE: 20 BRPM

## 2025-03-14 DIAGNOSIS — D64.9 ANEMIA, UNSPECIFIED TYPE: ICD-10-CM

## 2025-03-14 DIAGNOSIS — J44.9 CHRONIC OBSTRUCTIVE PULMONARY DISEASE, UNSPECIFIED COPD TYPE (HCC): Primary | ICD-10-CM

## 2025-03-14 DIAGNOSIS — Z74.09 OTHER REDUCED MOBILITY: ICD-10-CM

## 2025-03-14 DIAGNOSIS — E78.2 MIXED HYPERLIPIDEMIA: ICD-10-CM

## 2025-03-14 DIAGNOSIS — N28.89 RENAL MASS: ICD-10-CM

## 2025-03-14 DIAGNOSIS — S32.000K: ICD-10-CM

## 2025-03-14 DIAGNOSIS — K21.9 GASTRO-ESOPHAGEAL REFLUX DISEASE WITHOUT ESOPHAGITIS: ICD-10-CM

## 2025-03-14 DIAGNOSIS — I10 ESSENTIAL (PRIMARY) HYPERTENSION: ICD-10-CM

## 2025-03-14 DIAGNOSIS — K57.90 DIVERTICULAR DISEASE: ICD-10-CM

## 2025-03-14 DIAGNOSIS — J30.9 ALLERGIC RHINITIS, UNSPECIFIED SEASONALITY, UNSPECIFIED TRIGGER: ICD-10-CM

## 2025-03-14 DIAGNOSIS — L30.4 INTERTRIGO: ICD-10-CM

## 2025-03-14 DIAGNOSIS — E11.9 TYPE 2 DIABETES MELLITUS WITHOUT COMPLICATION, WITHOUT LONG-TERM CURRENT USE OF INSULIN (HCC): ICD-10-CM

## 2025-03-14 DIAGNOSIS — E55.9 VITAMIN D DEFICIENCY: ICD-10-CM

## 2025-03-14 DIAGNOSIS — Z99.81 DEPENDENCE ON SUPPLEMENTAL OXYGEN: ICD-10-CM

## 2025-03-14 LAB — HBA1C MFR BLD: 4.6 %

## 2025-03-14 RX ORDER — NYSTATIN 100000 [USP'U]/G
POWDER TOPICAL 4 TIMES DAILY
Qty: 60 G | Refills: 1 | Status: SHIPPED | OUTPATIENT
Start: 2025-03-14

## 2025-03-14 SDOH — ECONOMIC STABILITY: FOOD INSECURITY: WITHIN THE PAST 12 MONTHS, YOU WORRIED THAT YOUR FOOD WOULD RUN OUT BEFORE YOU GOT MONEY TO BUY MORE.: NEVER TRUE

## 2025-03-14 SDOH — ECONOMIC STABILITY: FOOD INSECURITY: WITHIN THE PAST 12 MONTHS, THE FOOD YOU BOUGHT JUST DIDN'T LAST AND YOU DIDN'T HAVE MONEY TO GET MORE.: NEVER TRUE

## 2025-03-14 ASSESSMENT — ENCOUNTER SYMPTOMS
EYE PAIN: 0
APNEA: 0
ABDOMINAL DISTENTION: 0
WHEEZING: 0
CONSTIPATION: 0
FACIAL SWELLING: 0
DIARRHEA: 0
PHOTOPHOBIA: 0
NAUSEA: 0
VOMITING: 0
ABDOMINAL PAIN: 0
TROUBLE SWALLOWING: 0
SHORTNESS OF BREATH: 0
SINUS PAIN: 0
COUGH: 0
EYE REDNESS: 0
EYE ITCHING: 0
SORE THROAT: 0
STRIDOR: 0
CHOKING: 0
EYE DISCHARGE: 0
COLOR CHANGE: 0
CHEST TIGHTNESS: 0
BLOOD IN STOOL: 0

## 2025-03-14 ASSESSMENT — PATIENT HEALTH QUESTIONNAIRE - PHQ9
1. LITTLE INTEREST OR PLEASURE IN DOING THINGS: NOT AT ALL
SUM OF ALL RESPONSES TO PHQ QUESTIONS 1-9: 0
2. FEELING DOWN, DEPRESSED OR HOPELESS: NOT AT ALL
SUM OF ALL RESPONSES TO PHQ QUESTIONS 1-9: 0

## 2025-03-14 NOTE — PROGRESS NOTES
Chief Complaint   Patient presents with    Diabetes     Follow up    Pt did not bring meds, went over list, pt confirmed     \"Have you been to the ER, urgent care clinic since your last visit?  Hospitalized since your last visit?\"    NO    “Have you seen or consulted any other health care providers outside our system since your last visit?”    NO      “Have you had a diabetic eye exam?”    NO     Date of last diabetic eye exam: 6/13/2022

## 2025-03-14 NOTE — PROGRESS NOTES
Subjective  Chief Complaint   Patient presents with    Diabetes     HPI:  Virgen Hernandez is a 69 y.o. female with PMH of hypertension, chronic bronchitis, allergic rhinitis, GERD, Vitamin D deficiency, oxygen dependence 2lpm nc, eczema, compression fracture spine,anemia, osteoarthritis and type 2 diabetes who presents for follow up of chronic conditions. Followed by pulmonology, ophthalmology and urology. Reports taking medicines as noted in chart. States that she has been experiencing a rash under her right abdomen for the past several weeks. Some itching associated. Also reports mild odor. Denies any drainage from area or significant pain. Tries to wash and dry well under area but does sweat often.      Past Medical History:   Diagnosis Date    Arthritis     Bronchitis 9/1/2020    Cholelithiasis     as noted on CT abdomen in 3/2023    Chronic bronchitis (HCC)     Chronic obstructive pulmonary disease (HCC)     Chronic pain     Chronic respiratory failure with hypoxia, on home O2 therapy (HCC)     secondary to copd / emphysema    Diabetes (HCC)     Diverticulosis     Gastroesophageal reflux disease without esophagitis 9/1/2020    GERD (gastroesophageal reflux disease)     GERD (gastroesophageal reflux disease)     History of multiple allergies     HTN (hypertension) 9/1/2020    Hypertension     Intermittent asthma 9/1/2020    Lung disorder     PATIENT IS ON OXYGEN    Menopause     Pain of upper abdomen 9/1/2020    Renal mass, right 04/2023    Seasonal allergic rhinitis 9/1/2020    Sinus problem     Vitamin D deficiency 9/1/2020        Past Surgical History:   Procedure Laterality Date    COLONOSCOPY      COLONOSCOPY N/A 5/25/2020    COLONOSCOPY performed by Graham Quinones MD at [facility] ENDOSCOPY    GI      colonscopy    ORTHOPEDIC SURGERY  10/24/2018    Total Right Hip Arthroplasty Dr. Jonnie Osorio [place]        Family History   Problem Relation Age of Onset    Cancer Sister     Hypertension

## 2025-03-15 LAB
BASOPHILS # BLD AUTO: 0 X10E3/UL (ref 0–0.2)
BASOPHILS NFR BLD AUTO: 1 %
BUN SERPL-MCNC: 10 MG/DL (ref 8–27)
BUN/CREAT SERPL: 12 (ref 12–28)
CALCIUM SERPL-MCNC: 9.9 MG/DL (ref 8.7–10.3)
CHLORIDE SERPL-SCNC: 90 MMOL/L (ref 96–106)
CO2 SERPL-SCNC: 27 MMOL/L (ref 20–29)
CREAT SERPL-MCNC: 0.84 MG/DL (ref 0.57–1)
EGFRCR SERPLBLD CKD-EPI 2021: 76 ML/MIN/1.73
EOSINOPHIL # BLD AUTO: 0.1 X10E3/UL (ref 0–0.4)
EOSINOPHIL NFR BLD AUTO: 1 %
ERYTHROCYTE [DISTWIDTH] IN BLOOD BY AUTOMATED COUNT: 12.8 % (ref 11.7–15.4)
FERRITIN SERPL-MCNC: 171 NG/ML (ref 15–150)
GLUCOSE SERPL-MCNC: 88 MG/DL (ref 70–99)
HCT VFR BLD AUTO: 32.5 % (ref 34–46.6)
HGB BLD-MCNC: 11.1 G/DL (ref 11.1–15.9)
IMM GRANULOCYTES # BLD AUTO: 0 X10E3/UL (ref 0–0.1)
IMM GRANULOCYTES NFR BLD AUTO: 0 %
LYMPHOCYTES # BLD AUTO: 0.6 X10E3/UL (ref 0.7–3.1)
LYMPHOCYTES NFR BLD AUTO: 11 %
MCH RBC QN AUTO: 31.2 PG (ref 26.6–33)
MCHC RBC AUTO-ENTMCNC: 34.2 G/DL (ref 31.5–35.7)
MCV RBC AUTO: 91 FL (ref 79–97)
MONOCYTES # BLD AUTO: 0.2 X10E3/UL (ref 0.1–0.9)
MONOCYTES NFR BLD AUTO: 3 %
NEUTROPHILS # BLD AUTO: 4.8 X10E3/UL (ref 1.4–7)
NEUTROPHILS NFR BLD AUTO: 84 %
PLATELET # BLD AUTO: 303 X10E3/UL (ref 150–450)
POTASSIUM SERPL-SCNC: 4.9 MMOL/L (ref 3.5–5.2)
RBC # BLD AUTO: 3.56 X10E6/UL (ref 3.77–5.28)
SODIUM SERPL-SCNC: 134 MMOL/L (ref 134–144)
WBC # BLD AUTO: 5.7 X10E3/UL (ref 3.4–10.8)

## 2025-03-26 ENCOUNTER — TELEPHONE (OUTPATIENT)
Facility: CLINIC | Age: 70
End: 2025-03-26

## 2025-03-26 DIAGNOSIS — R21 RASH: Primary | ICD-10-CM

## 2025-04-02 ENCOUNTER — OFFICE VISIT (OUTPATIENT)
Age: 70
End: 2025-04-02

## 2025-04-02 DIAGNOSIS — M75.22 BICEPS TENDINITIS OF LEFT UPPER EXTREMITY: ICD-10-CM

## 2025-04-02 DIAGNOSIS — M19.012 OSTEOARTHRITIS OF LEFT ACROMIOCLAVICULAR JOINT: ICD-10-CM

## 2025-04-02 DIAGNOSIS — M75.82 ROTATOR CUFF TENDINITIS, LEFT: Primary | ICD-10-CM

## 2025-04-02 RX ORDER — TRIAMCINOLONE ACETONIDE 40 MG/ML
40 INJECTION, SUSPENSION INTRA-ARTICULAR; INTRAMUSCULAR ONCE
Status: COMPLETED | OUTPATIENT
Start: 2025-04-02 | End: 2025-04-02

## 2025-04-02 RX ORDER — LIDOCAINE HYDROCHLORIDE 10 MG/ML
2 INJECTION, SOLUTION EPIDURAL; INFILTRATION; INTRACAUDAL; PERINEURAL ONCE
Status: COMPLETED | OUTPATIENT
Start: 2025-04-02 | End: 2025-04-02

## 2025-04-02 RX ADMIN — LIDOCAINE HYDROCHLORIDE 2 ML: 10 INJECTION, SOLUTION EPIDURAL; INFILTRATION; INTRACAUDAL; PERINEURAL at 14:11

## 2025-04-02 RX ADMIN — TRIAMCINOLONE ACETONIDE 40 MG: 40 INJECTION, SUSPENSION INTRA-ARTICULAR; INTRAMUSCULAR at 14:11

## 2025-04-02 ASSESSMENT — PATIENT HEALTH QUESTIONNAIRE - PHQ9
1. LITTLE INTEREST OR PLEASURE IN DOING THINGS: NOT AT ALL
SUM OF ALL RESPONSES TO PHQ QUESTIONS 1-9: 0
2. FEELING DOWN, DEPRESSED OR HOPELESS: NOT AT ALL

## 2025-04-02 NOTE — PROGRESS NOTES
Identified pt with two pt identifiers (name and ). Reviewed chart in preparation for visit and have obtained necessary documentation.    Virgen Hernandez is a 69 y.o. female Follow-up (Left shoulder pain- Requesting injection (2024))        1. Have you been to the ER, urgent care clinic since your last visit?  Hospitalized since your last visit?  no     2. Have you seen or consulted any other health care providers outside of the [facility] since your last visit?  Include any pap smears or colon screening.  no

## 2025-04-02 NOTE — PATIENT INSTRUCTIONS
Learning About Joint Injections  What are joint injections?     Joint injections are shots into a joint, such as the knee or shoulder. They are used to put in medicines, such as pain relievers and steroid medicines. Steroids can help reduce inflammation. A steroid shot can sometimes help with short-term pain relief when other treatments haven't worked.  How are they done?  First, the area over the joint will be cleaned. Your doctor may then use a tiny needle to numb the skin in the area where you will get the joint injection.  If a tiny needle is used to numb the area, your doctor will use another needle to inject the medicine. Your doctor may use a pain reliever, a steroid, or both. You may feel some pressure or discomfort.  Your doctor may put ice on the area before you go home.  What can you expect after a joint injection?  You will probably go home soon after your shot. You may have numbness around the joint for a few hours.  If your shot included both a pain reliever and a steroid, then the pain will probably go away right away. But it might come back after a few hours. This might happen if the pain reliever wears off and the steroid hasn't started to work yet. Steroids don't always work. But when they do, the pain relief can last for several days to a few months or longer.  Your doctor may tell you to use ice on the area. You can also use ice if the pain comes back. Put ice or a cold pack on your joint for 10 to 20 minutes at a time. Put a thin cloth between the ice and your skin.  Follow your doctor's instructions carefully.  Follow-up care is a key part of your treatment and safety. Be sure to make and go to all appointments, and call your doctor if you are having problems. It's also a good idea to know your test results and keep a list of the medicines you take.  Current as of: July 31, 2024  Content Version: 14.4  © 2024-2025 [employer].   Care instructions adapted under license by [facility]

## 2025-04-02 NOTE — PROGRESS NOTES
is here for a follow up visit for left shoulder rotator cuff tendinitis, possible tear, biceps tendinitis, ACJ and GHJ OA.   AT her last visit on 11/4/24 she underwent left shoulder SA CSI.   She reports relief in her symptoms with this injection until recently.  She wants to consider repeat injection today.    She is right hand dominant coming in with left shoulder pain.  She denies any known injury. She localizes pain to greater tuberosity and reports it is worse with ROM and overhead motions.  She denies weakness, numbness/paresthesias.  She is not taking any pain meds.  She reports prior steroid injection in left shoulder which helped.     Current Outpatient Medications on File Prior to Visit   Medication Sig Dispense Refill    atorvastatin (LIPITOR) 20 MG tablet Take 1 tablet by mouth daily 90 tablet 1    fluticasone (FLONASE) 50 MCG/ACT nasal spray 2 sprays by Each Nostril route daily 1 each 5    Alcohol Swabs ([employer] PREP) 70 % PADS 1 PAD BY APPLY EXTERNALLY ROUTE DAILY. USE TO CHECK BLOOD SUGAR DAILY. 200 each 5    amLODIPine (NORVASC) 10 MG tablet TAKE 1 TABLET BY MOUTH EVERY DAY 90 tablet 1    furosemide (LASIX) 20 MG tablet TAKE 1 TABLET BY MOUTH EVERY DAY 90 tablet 1    lisinopril (PRINIVIL;ZESTRIL) 20 MG tablet TAKE 1 TABLET BY MOUTH EVERY DAY 90 tablet 1    albuterol sulfate HFA (VENTOLIN HFA) 108 (90 Base) MCG/ACT inhaler Inhale 2 puffs into the lungs 4 times daily as needed for Wheezing 18 g 5    pantoprazole (PROTONIX) 40 MG tablet Take 1 tablet by mouth daily 90 tablet 1    predniSONE (DELTASONE) 10 MG tablet Take 1 tablet by mouth every other day      aspirin 81 MG EC tablet Take 1 tablet by mouth daily      ipratropium 0.5 mg-albuterol 2.5 mg (DUONEB) 0.5-2.5 (3) MG/3ML SOLN nebulizer solution USE 1 VIAL EVERY 4 6 HOURS AS NEEDED. DX J44.9. NEED MED B INFO      fluticasone-umeclidin-vilant (TRELEGY ELLIPTA) 100-62.5-25 MCG/ACT AEPB inhaler Inhale 1 puff into the lungs daily      Lancets

## 2025-04-04 ENCOUNTER — OFFICE VISIT (OUTPATIENT)
Facility: CLINIC | Age: 70
End: 2025-04-04
Payer: MEDICARE

## 2025-04-04 VITALS
HEIGHT: 66 IN | HEART RATE: 67 BPM | DIASTOLIC BLOOD PRESSURE: 60 MMHG | OXYGEN SATURATION: 97 % | BODY MASS INDEX: 23.46 KG/M2 | TEMPERATURE: 98.2 F | SYSTOLIC BLOOD PRESSURE: 94 MMHG | RESPIRATION RATE: 20 BRPM | WEIGHT: 146 LBS

## 2025-04-04 DIAGNOSIS — M54.50 ACUTE MIDLINE LOW BACK PAIN WITHOUT SCIATICA: Primary | ICD-10-CM

## 2025-04-04 DIAGNOSIS — L30.4 INTERTRIGO: ICD-10-CM

## 2025-04-04 PROCEDURE — 1123F ACP DISCUSS/DSCN MKR DOCD: CPT | Performed by: NURSE PRACTITIONER

## 2025-04-04 PROCEDURE — G8420 CALC BMI NORM PARAMETERS: HCPCS | Performed by: NURSE PRACTITIONER

## 2025-04-04 PROCEDURE — 3074F SYST BP LT 130 MM HG: CPT | Performed by: NURSE PRACTITIONER

## 2025-04-04 PROCEDURE — 1090F PRES/ABSN URINE INCON ASSESS: CPT | Performed by: NURSE PRACTITIONER

## 2025-04-04 PROCEDURE — 1160F RVW MEDS BY RX/DR IN RCRD: CPT | Performed by: NURSE PRACTITIONER

## 2025-04-04 PROCEDURE — 1036F TOBACCO NON-USER: CPT | Performed by: NURSE PRACTITIONER

## 2025-04-04 PROCEDURE — G8427 DOCREV CUR MEDS BY ELIG CLIN: HCPCS | Performed by: NURSE PRACTITIONER

## 2025-04-04 PROCEDURE — 99213 OFFICE O/P EST LOW 20 MIN: CPT | Performed by: NURSE PRACTITIONER

## 2025-04-04 PROCEDURE — 3017F COLORECTAL CA SCREEN DOC REV: CPT | Performed by: NURSE PRACTITIONER

## 2025-04-04 PROCEDURE — 1159F MED LIST DOCD IN RCRD: CPT | Performed by: NURSE PRACTITIONER

## 2025-04-04 PROCEDURE — G8399 PT W/DXA RESULTS DOCUMENT: HCPCS | Performed by: NURSE PRACTITIONER

## 2025-04-04 PROCEDURE — 3078F DIAST BP <80 MM HG: CPT | Performed by: NURSE PRACTITIONER

## 2025-04-04 ASSESSMENT — ENCOUNTER SYMPTOMS
BLOOD IN STOOL: 0
ABDOMINAL DISTENTION: 0
CHEST TIGHTNESS: 0
EYE ITCHING: 0
EYE DISCHARGE: 0
CHOKING: 0
COUGH: 0
COLOR CHANGE: 0
TROUBLE SWALLOWING: 0
ABDOMINAL PAIN: 0
STRIDOR: 0
EYE REDNESS: 0
FACIAL SWELLING: 0
SHORTNESS OF BREATH: 0
VOMITING: 0
EYE PAIN: 0
CONSTIPATION: 0
PHOTOPHOBIA: 0
APNEA: 0
NAUSEA: 0
WHEEZING: 0
SINUS PAIN: 0
SORE THROAT: 0
DIARRHEA: 0

## 2025-04-04 NOTE — PROGRESS NOTES
Subjective  Chief Complaint   Patient presents with    Back Pain    Referral - General     Pt wants referral for dermatology and spine doctor     HPI:  Virgen Hernandez is a 69 y.o. female with PMH of hypertension, chronic bronchitis, allergic rhinitis, GERD, Vitamin D deficiency, oxygen dependence 2lpm nc, eczema, compression fracture spine,anemia, osteoarthritis and type 2 diabetes who presents for follow up regarding rash under breasts as well as back pain. States that she has been using the nystatin powder which has helped but still experiencing itching and moisture due to sweating. Trying to allow air to affected area. Had requested a new referral to dermatology as previous provider left. Told that referral provider was not in her network.     Also endoreses recent increase in low back pain compared to baseline. Denies any numbness or weakness in legs but hurts to sit or walk prolonged. No bowel/bladder changes. Did see Dr. Gautam in 2023 but has not been back since. Uses tylenol to help with pain.     Past Medical History:   Diagnosis Date    Arthritis     Bronchitis 9/1/2020    Cholelithiasis     as noted on CT abdomen in 3/2023    Chronic bronchitis (HCC)     Chronic obstructive pulmonary disease (HCC)     Chronic pain     Chronic respiratory failure with hypoxia, on home O2 therapy     secondary to copd / emphysema    Diabetes (HCC)     Diverticulosis     Gastroesophageal reflux disease without esophagitis 9/1/2020    GERD (gastroesophageal reflux disease)     GERD (gastroesophageal reflux disease)     History of multiple allergies     HTN (hypertension) 9/1/2020    Hypertension     Intermittent asthma 9/1/2020    Lung disorder     PATIENT IS ON OXYGEN    Menopause     Pain of upper abdomen 9/1/2020    Renal mass, right 04/2023    Seasonal allergic rhinitis 9/1/2020    Sinus problem     Vitamin D deficiency 9/1/2020        Past Surgical History:   Procedure Laterality Date    COLONOSCOPY      COLONOSCOPY

## 2025-04-04 NOTE — PROGRESS NOTES
Chief Complaint   Patient presents with    Back Pain    Referral - General     Pt wants referral for dermatology and spine doctor     1. Have you been to the ER, urgent care clinic since your last visit?  Hospitalized since your last visit?No    2. Have you seen or consulted any other health care providers outside of the [facility] since your last visit?  Include any pap smears or colon screening. No

## 2025-04-17 NOTE — PROGRESS NOTES
OCCUPATIONAL THERAPY EVALUATION  Patient: Zoe Pang (89 y.o. female)  Date: 10/3/2022  Primary Diagnosis: Pubic bone fracture ([address] [S32.509A]       Precautions: fall risk, Ax2 tolerance, WBAT LLE       ASSESSMENT  Pt is a 79 y/o F with PMH of COPD, HTN, GERD, arthritis, chronic pain, intermittent asthma, and DM  presenting to [facility] with c/o hip pain, admitted 10/1/22  and being treated for acute comminuted L parasymphyseal pubic bone and inferior pubic ramus fractures per CT completed 10/1, hyponatremia, COPD on 2L O2 via NC at baseline, benign essential HTN. Ortho recommending non-surgical management at this time and pt is WBAT LLE. Pt received semi-supine in bed upon arrival, AXO x4, and agreeable to OT evaluation at this time. Per pt report, pt lives with a friend in a one-story home with 5 CLAIRE and B HR, was IND with ADLs, with A to wash back from friend, and Mod I using rollator and SPC for mobility at [facility]. Other DME owned includes: grab bar, BSC, shower chair. Pt has a tub/shower combination. Based on current observations, pt presents with deficits in LE strength, balance, functional activity tolerance, and increased pain impacting overall performance of ADLs and functional transfers/mobility. See below for objective details regarding functional mobility/transfers and ADLs. Pt reporting significant pain 9/10 LLE with activity so pt req'd additional time for mobility today and further activity deferred following sit>stand transfer. Pt declined grooming at this time stating she completed prior to OT entry. Pt req's total A to doff/don socks. Cuing provided to push from bed during sit>stand rather than pull on RW. Overall, pt tolerates session fair. Pt would benefit from continued skilled OT services to address current impairments and improve IND and safety with self cares and functional transfers/mobility. Recommend discharge to IRF once medically appropriate.     Other factors to consider for discharge: home support, PLOF, severity of deficits     Patient will benefit from skilled therapy intervention to address the above noted impairments. PLAN :  Recommendations and Planned Interventions: self care training, functional mobility training, therapeutic exercise, balance training, therapeutic activities, endurance activities, patient education, and home safety training    Frequency/Duration: Patient will be followed by occupational therapy:  3-5x/week to address goals. Recommendation for discharge: (in order for the patient to meet his/her long term goals)  [address] [facility]     This discharge recommendation:  Has been made in collaboration with the attending provider and/or case management    IF patient discharges home will need the following DME: TBD       SUBJECTIVE:   Patient stated I can't walk anymore today.     OBJECTIVE DATA SUMMARY:   HISTORY:   Past Medical History:   Diagnosis Date    Arthritis     Bronchitis 9/1/2020    Chronic obstructive pulmonary disease (HCC)     Chronic pain     Diabetes ([address]     Gastroesophageal reflux disease without esophagitis 9/1/2020    GERD (gastroesophageal reflux disease)     GERD (gastroesophageal reflux disease)     History of multiple allergies     HTN (hypertension) 9/1/2020    Hypertension     Intermittent asthma 9/1/2020    Lung disorder     PATIENT IS ON OXYGEN    Menopause     Pain of upper abdomen 9/1/2020    Seasonal allergic rhinitis 9/1/2020    Sinus problem     Vitamin D deficiency 9/1/2020     Past Surgical History:   Procedure Laterality Date    COLONOSCOPY N/A 5/25/2020    COLONOSCOPY performed by Thais Lowe MD at [facility] ENDOSCOPY    HX COLONOSCOPY      HX GI      colonscopy    HX ORTHOPAEDIC  10/24/2018    Total Right Hip Arthroplasty Dr. Marci Andrew.        Expanded or extensive additional review of patient history:     Home Situation  Home Environment: Private residence  # Steps to Enter: 5  Rails to Enter: Yes  Hand Rails : Bilateral  One/Two Story Residence: One story  Living Alone: No  Support Systems: Friend/Neighbor  Patient Expects to be Discharged to[de-identified] Home with home health  Current DME Used/Available at Home: Walker, rollator, Shower chair, Grab bars, Cane, straight, Commode, bedside  Tub or Shower Type: Tub/Shower combination    Hand dominance: Right    EXAMINATION OF PERFORMANCE DEFICITS:  Cognitive/Behavioral Status:  Neurologic State: Alert  Orientation Level: Oriented X4  Cognition: Appropriate decision making; Appropriate for age attention/concentration; Appropriate safety awareness; Follows commands        Safety/Judgement: Awareness of environment; Insight into deficits    Hearing: Auditory  Auditory Impairment: None    Range of Motion:  AROM: Within functional limits    Strength:  Strength: Within functional limits    Coordination:  Coordination: Within functional limits  Fine Motor Skills-Upper: Left Intact; Right Intact    Gross Motor Skills-Upper: Left Intact; Right Intact    Tone & Sensation:  Tone: Normal    Balance:  Sitting: Impaired  Sitting - Static: Good (unsupported)  Sitting - Dynamic: Fair (occasional)  Standing: Impaired; With support  Standing - Static: Fair;Constant support  Standing - Dynamic : Poor;Constant support    Functional Mobility and Transfers for ADLs:  Bed Mobility:  Supine to Sit: Minimum assistance; Additional time  Sit to Supine: Minimum assistance; Additional time  Scooting: Minimum assistance; Additional time    Transfers:  Sit to Stand: Minimum assistance; Moderate assistance  Stand to Sit: Minimum assistance; Moderate assistance    ADL Intervention and task modifications:  Grooming  Grooming Assistance:  (Pt declined)    Lower Body Dressing Assistance  Socks: Total assistance (dependent)  Position Performed: Supine    Cognitive Retraining  Safety/Judgement: Awareness of environment; Insight into deficits    Therapeutic Exercise:  Pt would benefit  from UE HEP in prep for ADLs and functional mobility/transfers. Functional Measure:     [employer] AM-PACTM \"6 Clicks\"                                                       Daily Activity Inpatient Short Form  How much help from another person does the patient currently need. .. Total; A Lot A Little None   1. Putting on and taking off regular lower body clothing? [x]  1 []  2 []  3 []  4   2. Bathing (including washing, rinsing, drying)? []  1 [x]  2 []  3 []  4   3. Toileting, which includes using toilet, bedpan or urinal? [] 1 [x]  2 []  3 []  4   4. Putting on and taking off regular upper body clothing? []  1 []  2 [x]  3 []  4   5. Taking care of personal grooming such as brushing teeth? []  1 []  2 [x]  3 []  4   6. Eating meals? []  1 []  2 []  3 [x]  4   © , Trustees of [facility] [employer], under license to [employer]. All rights reserved     Score: 15/24     Interpretation of Tool:  Represents clinically-significant functional categories (i.e. Activities of daily living). Percentage of Impairment CH    0%   CI    1-19% CJ    20-39% CK    40-59% CL    60-79% CM    80-99% CN     100%   [facility]  Score 6-24 24 23 20-22 15-19 10-14 7-9 6         Occupational Therapy Evaluation Charge Determination   History Examination Decision-Making   LOW Complexity : Brief history review  LOW Complexity : 1-3 performance deficits relating to physical, cognitive , or psychosocial skils that result in activity limitations and / or participation restrictions  LOW Complexity : No comorbidities that affect functional and no verbal or physical assistance needed to complete eval tasks       Based on the above components, the patient evaluation is determined to be of the following complexity level: LOW   Pain Ratin/10 LLE    Activity Tolerance:   Fair and requires rest breaks  Please refer to the flowsheet for vital signs taken during this treatment.     After treatment patient left in no apparent distress:    Supine in bed, Call bell within reach, Bed / chair alarm activated, and Side rails x 3    COMMUNICATION/EDUCATION:   The patients plan of care was discussed with: Physical therapist and Certified nursing assistant/patient care technician. Patient/family agree to work toward stated goals and plan of care. This patients plan of care is appropriate for delegation to [facility]. Thank you for this referral.  Crystal José OT  Time Calculation: 35 mins    Problem: Self Care Deficits Care Plan (Adult)  Goal: *Acute Goals and Plan of Care (Insert Text)  Description: Patient stated goal: Get to the toilet and move with less pain. 1. Pt will be IND sup <> sit in prep for EOB ADLs  2. Pt will be IND grooming sitting/standing at sink  3. Pt will be mod I LE dressing sitting EOB/long sit  4. Pt will be IND sit <>  prep for toileting LRAD  5. Pt will be IND toileting/toilet transfer/cloth mgmt LRAD  6.  Pt will be IND following UE HEP in prep for self care tasks      Outcome: Not Met 17-Apr-2025 20:56

## 2025-04-24 ENCOUNTER — OFFICE VISIT (OUTPATIENT)
Age: 70
End: 2025-04-24

## 2025-04-24 VITALS
SYSTOLIC BLOOD PRESSURE: 108 MMHG | DIASTOLIC BLOOD PRESSURE: 66 MMHG | HEIGHT: 66 IN | RESPIRATION RATE: 18 BRPM | BODY MASS INDEX: 23.57 KG/M2 | OXYGEN SATURATION: 99 % | HEART RATE: 83 BPM

## 2025-04-24 DIAGNOSIS — J30.9 ALLERGIC RHINITIS, UNSPECIFIED SEASONALITY, UNSPECIFIED TRIGGER: Primary | ICD-10-CM

## 2025-04-24 DIAGNOSIS — R09.82 POST-NASAL DRIP: ICD-10-CM

## 2025-04-24 DIAGNOSIS — H60.8X3 CHRONIC ECZEMATOUS OTITIS EXTERNA OF BOTH EARS: ICD-10-CM

## 2025-04-24 RX ORDER — FLUOCINOLONE ACETONIDE 0.11 MG/ML
4 OIL AURICULAR (OTIC) DAILY
Qty: 20 ML | Refills: 2 | Status: SHIPPED | OUTPATIENT
Start: 2025-04-24

## 2025-04-24 RX ORDER — LORATADINE 10 MG/1
10 TABLET ORAL DAILY
Qty: 90 TABLET | Refills: 2 | Status: SHIPPED | OUTPATIENT
Start: 2025-04-24

## 2025-04-24 RX ORDER — AZELASTINE HYDROCHLORIDE, FLUTICASONE PROPIONATE 137; 50 UG/1; UG/1
1 SPRAY, METERED NASAL 2 TIMES DAILY
Qty: 23 G | Refills: 5 | Status: SHIPPED | OUTPATIENT
Start: 2025-04-24

## 2025-04-24 NOTE — PROGRESS NOTES
Subjective:   Virgen Hernandez   69 y.o.   1955     Refered by: No referring provider defined for this encounter.     New Patient Visit  Chief Compliant: rhinitis    History of Present Illness:  Virgen Hernandez is a 69 y.o. female with past medical history of GERD, COPD, arthritis, HTN, who presents today for evaluation of chronic rhinitis and epistaxis.     Patient is complaining of chronic rhinitis. No known triggers. Worse at night and in the morning. No known allergies.     Additionally had 1 episode of epistaxis - which self resolved.     Interval Hx:   4/24/25:   Epistaxis resolved.  Patient tried ipratropium nasal spray without significant relief.  Was switched to Dymista, she never started the medication however.  Has been taking Flonase per her PCP.  Helps a little bit, but is still complaining of significant postnasal drip.  Does not take oral antihistamines.  Has a history of COPD, she is on continuous oxygen.    Review of Systems  Consitutional: denies fever, excessive weight gain or loss.  Eyes: denies diplopia, eye pain.  Integumentary: denies new concerning skin lesions.  Ears, Nose, Mouth, Throat: denies except as per HPI.  Endocrine: denies hot or cold intolerance, increased thirst.  Respiratory: denies cough, hemoptysis, wheezing  Gastrointestinal: denies trouble swallowing, nausea, emesis, regurgitation  Musculoskeletal: denies muscle weakness or wasting  Cardiovascular: denies chest pain, shortness of breath  Neurologic: denies seizures, numbness or tingling, syncope  Hematologic: denies easy bleeding or bruising       Past Medical History:   Diagnosis Date    Arthritis     Bronchitis 9/1/2020    Cholelithiasis     as noted on CT abdomen in 3/2023    Chronic bronchitis (HCC)     Chronic obstructive pulmonary disease (HCC)     Chronic pain     Chronic respiratory failure with hypoxia, on home O2 therapy (HCC)     secondary to copd / emphysema    Diabetes (HCC)     Diverticulosis

## 2025-05-03 ENCOUNTER — APPOINTMENT (OUTPATIENT)
Facility: HOSPITAL | Age: 70
End: 2025-05-03
Attending: EMERGENCY MEDICINE
Payer: MEDICARE

## 2025-05-03 ENCOUNTER — HOSPITAL ENCOUNTER (EMERGENCY)
Facility: HOSPITAL | Age: 70
Discharge: HOME | End: 2025-05-03
Attending: EMERGENCY MEDICINE
Payer: MEDICARE

## 2025-05-03 VITALS
BODY MASS INDEX: 23.64 KG/M2 | OXYGEN SATURATION: 100 % | SYSTOLIC BLOOD PRESSURE: 109 MMHG | TEMPERATURE: 97.3 F | HEIGHT: 66 IN | RESPIRATION RATE: 24 BRPM | WEIGHT: 147.1 LBS | HEART RATE: 103 BPM | DIASTOLIC BLOOD PRESSURE: 70 MMHG

## 2025-05-03 DIAGNOSIS — R51.9 ACUTE NONINTRACTABLE HEADACHE, UNSPECIFIED HEADACHE TYPE: Primary | ICD-10-CM

## 2025-05-03 LAB
ALBUMIN SERPL-MCNC: 4 G/DL (ref 3.5–5)
ALBUMIN/GLOB SERPL: 1.2 (ref 1.1–2.2)
ALP SERPL-CCNC: 77 U/L (ref 45–117)
ALT SERPL-CCNC: 23 U/L (ref 12–78)
ANION GAP SERPL CALC-SCNC: 10 MMOL/L (ref 2–12)
APPEARANCE UR: CLEAR
AST SERPL W P-5'-P-CCNC: 23 U/L (ref 15–37)
BACTERIA URNS QL MICRO: NEGATIVE /HPF
BASOPHILS # BLD: 0.03 K/UL (ref 0–0.1)
BASOPHILS NFR BLD: 0.4 % (ref 0–1)
BILIRUB SERPL-MCNC: 0.5 MG/DL (ref 0.2–1)
BILIRUB UR QL: NEGATIVE
BNP SERPL-MCNC: 40 PG/ML
BUN SERPL-MCNC: 13 MG/DL (ref 6–20)
BUN/CREAT SERPL: 13 (ref 12–20)
CA-I BLD-MCNC: 9.3 MG/DL (ref 8.5–10.1)
CHLORIDE SERPL-SCNC: 91 MMOL/L (ref 97–108)
CO2 SERPL-SCNC: 28 MMOL/L (ref 21–32)
COLOR UR: ABNORMAL
CREAT SERPL-MCNC: 0.97 MG/DL (ref 0.55–1.02)
DIFFERENTIAL METHOD BLD: ABNORMAL
EKG ATRIAL RATE: 103 BPM
EKG DIAGNOSIS: NORMAL
EKG P AXIS: 80 DEGREES
EKG P-R INTERVAL: 173 MS
EKG Q-T INTERVAL: 324 MS
EKG QRS DURATION: 87 MS
EKG QTC CALCULATION (BAZETT): 424 MS
EKG R AXIS: 59 DEGREES
EKG T AXIS: 65 DEGREES
EKG VENTRICULAR RATE: 103 BPM
EOSINOPHIL # BLD: 0.05 K/UL (ref 0–0.4)
EOSINOPHIL NFR BLD: 0.7 % (ref 0–7)
ERYTHROCYTE [DISTWIDTH] IN BLOOD BY AUTOMATED COUNT: 13.2 % (ref 11.5–14.5)
GLOBULIN SER CALC-MCNC: 3.3 G/DL (ref 2–4)
GLUCOSE SERPL-MCNC: 98 MG/DL (ref 65–100)
GLUCOSE UR STRIP.AUTO-MCNC: NEGATIVE MG/DL
HCT VFR BLD AUTO: 30.6 % (ref 35–47)
HGB BLD-MCNC: 10.4 G/DL (ref 11.5–16)
HGB UR QL STRIP: NEGATIVE
IMM GRANULOCYTES # BLD AUTO: 0.03 K/UL (ref 0–0.04)
IMM GRANULOCYTES NFR BLD AUTO: 0.4 % (ref 0–0.5)
KETONES UR QL STRIP.AUTO: NEGATIVE MG/DL
LEUKOCYTE ESTERASE UR QL STRIP.AUTO: ABNORMAL
LYMPHOCYTES # BLD: 0.39 K/UL (ref 0.8–3.5)
LYMPHOCYTES NFR BLD: 5.5 % (ref 12–49)
MCH RBC QN AUTO: 30.9 PG (ref 26–34)
MCHC RBC AUTO-ENTMCNC: 34 G/DL (ref 30–36.5)
MCV RBC AUTO: 90.8 FL (ref 80–99)
MONOCYTES # BLD: 0.14 K/UL (ref 0–1)
MONOCYTES NFR BLD: 2 % (ref 5–13)
NEUTS SEG # BLD: 6.46 K/UL (ref 1.8–8)
NEUTS SEG NFR BLD: 91 % (ref 32–75)
NITRITE UR QL STRIP.AUTO: NEGATIVE
NRBC # BLD: 0 K/UL (ref 0–0.01)
NRBC BLD-RTO: 0 PER 100 WBC
PH UR STRIP: 6 (ref 5–8)
PLATELET # BLD AUTO: 305 K/UL (ref 150–400)
PMV BLD AUTO: 9.2 FL (ref 8.9–12.9)
POTASSIUM SERPL-SCNC: 3.8 MMOL/L (ref 3.5–5.1)
PROT SERPL-MCNC: 7.3 G/DL (ref 6.4–8.2)
PROT UR STRIP-MCNC: NEGATIVE MG/DL
RBC # BLD AUTO: 3.37 M/UL (ref 3.8–5.2)
RBC #/AREA URNS HPF: ABNORMAL /HPF (ref 0–3)
RBC MORPH BLD: ABNORMAL
SODIUM SERPL-SCNC: 129 MMOL/L (ref 136–145)
SP GR UR REFRACTOMETRY: 1.01 (ref 1–1.03)
TROPONIN I SERPL HS-MCNC: 4 NG/L (ref 0–51)
URINE CULTURE IF INDICATED: ABNORMAL
UROBILINOGEN UR QL STRIP.AUTO: 0.2 EU/DL (ref 0.2–1)
WBC # BLD AUTO: 7.1 K/UL (ref 3.6–11)
WBC URNS QL MICRO: ABNORMAL /HPF (ref 0–5)

## 2025-05-03 PROCEDURE — 81001 URINALYSIS AUTO W/SCOPE: CPT

## 2025-05-03 PROCEDURE — 36415 COLL VENOUS BLD VENIPUNCTURE: CPT

## 2025-05-03 PROCEDURE — 6370000000 HC RX 637 (ALT 250 FOR IP): Performed by: EMERGENCY MEDICINE

## 2025-05-03 PROCEDURE — 85025 COMPLETE CBC W/AUTO DIFF WBC: CPT

## 2025-05-03 PROCEDURE — 84484 ASSAY OF TROPONIN QUANT: CPT

## 2025-05-03 PROCEDURE — 70450 CT HEAD/BRAIN W/O DYE: CPT

## 2025-05-03 PROCEDURE — 93005 ELECTROCARDIOGRAM TRACING: CPT | Performed by: EMERGENCY MEDICINE

## 2025-05-03 PROCEDURE — 71045 X-RAY EXAM CHEST 1 VIEW: CPT

## 2025-05-03 PROCEDURE — 80053 COMPREHEN METABOLIC PANEL: CPT

## 2025-05-03 PROCEDURE — 99285 EMERGENCY DEPT VISIT HI MDM: CPT

## 2025-05-03 PROCEDURE — 83880 ASSAY OF NATRIURETIC PEPTIDE: CPT

## 2025-05-03 RX ORDER — ACETAMINOPHEN 500 MG
1000 TABLET ORAL
Status: COMPLETED | OUTPATIENT
Start: 2025-05-03 | End: 2025-05-03

## 2025-05-03 RX ADMIN — ACETAMINOPHEN 1000 MG: 500 TABLET ORAL at 13:38

## 2025-05-03 ASSESSMENT — PAIN DESCRIPTION - DESCRIPTORS: DESCRIPTORS: ACHING

## 2025-05-03 ASSESSMENT — PAIN - FUNCTIONAL ASSESSMENT
PAIN_FUNCTIONAL_ASSESSMENT: 0-10
PAIN_FUNCTIONAL_ASSESSMENT: ACTIVITIES ARE NOT PREVENTED

## 2025-05-03 ASSESSMENT — PAIN SCALES - GENERAL: PAINLEVEL_OUTOF10: 4

## 2025-05-03 ASSESSMENT — PAIN DESCRIPTION - LOCATION: LOCATION: HEAD

## 2025-05-03 NOTE — DISCHARGE INSTRUCTIONS
You were seen in the ER for your headache and elevated heart rate.  Thankfully, we did not find any dangerous causes for this headache such as bleeding on the brain or mass or stroke.  You did not have any obvious causes of your slightly elevated heart rate like a dangerous heart rhythm, a pneumonia or urinary tract infection or heart attack or electrolyte abnormality.  You may have a virus that is causing both of the symptoms, but we cannot say for sure at this time.    Take Tylenol or ibuprofen as needed for pain and follow-up with your primary care doctor if this continues to be a problem.  Return to the emergency room for any new headache, faster heart rate, weakness, vomiting, numbness or tingling on one side, or any other new or concerning symptoms.

## 2025-05-03 NOTE — ED NOTES
Attempted to collect a urine specimen. Patient unable to void at this time. Provided patient with a UA specimen container for when they are able to. Provided patient with a warm blanket per request. Patient has no further needs at this time. Call bell within reach.

## 2025-05-03 NOTE — ED TRIAGE NOTES
Patient presents for complaint of headache since this morning.  Patient also states pulse rate high at home.  Pulse 111 in triage.

## 2025-05-03 NOTE — ED PROVIDER NOTES
[facility] EMERGENCY DEPT  EMERGENCY DEPARTMENT HISTORY AND PHYSICAL EXAM      Date: 5/3/2025  Patient Name: Virgen Hernandez  MRN: [id number]  Birthdate 1955  Date of evaluation: 5/3/2025  Provider: Beatrice Winn MD   Note Started: 1:22 PM EDT 5/3/25    HISTORY OF PRESENT ILLNESS     Chief Complaint   Patient presents with    Headache       History Provided By: patient    HPI: Virgen Hernandez is a 69 y.o. female with PMH COPD on 2LNC presenting with headache. Pt reports intermittent HA today. Further states pulse was slightly high at home. Called nursing line who recommended ER evaluation. Denies focal weakness, numbness, tingling. Denies F/C/N/V/D, cough, CP, SOB.    PAST MEDICAL HISTORY   Past Medical History:  Past Medical History:   Diagnosis Date    Arthritis     Bronchitis 9/1/2020    Cholelithiasis     as noted on CT abdomen in 3/2023    Chronic bronchitis (HCC)     Chronic obstructive pulmonary disease (HCC)     Chronic pain     Chronic respiratory failure with hypoxia, on home O2 therapy (HCC)     secondary to copd / emphysema    Diabetes (HCC)     Diverticulosis     Gastroesophageal reflux disease without esophagitis 9/1/2020    GERD (gastroesophageal reflux disease)     GERD (gastroesophageal reflux disease)     History of multiple allergies     HTN (hypertension) 9/1/2020    Hypertension     Intermittent asthma 9/1/2020    Lung disorder     PATIENT IS ON OXYGEN    Menopause     Pain of upper abdomen 9/1/2020    Renal mass, right 04/2023    Seasonal allergic rhinitis 9/1/2020    Sinus problem     Vitamin D deficiency 9/1/2020       Past Surgical History:  Past Surgical History:   Procedure Laterality Date    COLONOSCOPY      COLONOSCOPY N/A 5/25/2020    COLONOSCOPY performed by Graham Quinones MD at [facility] ENDOSCOPY    GI      colonscopy    ORTHOPEDIC SURGERY  10/24/2018    Total Right Hip Arthroplasty Dr. Jonnie Nielsen [place]       Family History:  Family History   Problem Relation Age  proofreading.)     Beatrice Winn MD  05/03/25 5233

## 2025-05-05 LAB
EKG ATRIAL RATE: 103 BPM
EKG DIAGNOSIS: NORMAL
EKG P AXIS: 80 DEGREES
EKG P-R INTERVAL: 173 MS
EKG Q-T INTERVAL: 324 MS
EKG QRS DURATION: 87 MS
EKG QTC CALCULATION (BAZETT): 424 MS
EKG R AXIS: 59 DEGREES
EKG T AXIS: 65 DEGREES
EKG VENTRICULAR RATE: 103 BPM

## 2025-05-20 ENCOUNTER — APPOINTMENT (OUTPATIENT)
Facility: HOSPITAL | Age: 70
DRG: 140 | End: 2025-05-20
Payer: MEDICAID

## 2025-05-20 ENCOUNTER — HOSPITAL ENCOUNTER (INPATIENT)
Facility: HOSPITAL | Age: 70
LOS: 2 days | Discharge: HOME HEALTH | DRG: 140 | End: 2025-05-22
Attending: EMERGENCY MEDICINE | Admitting: HOSPITALIST
Payer: MEDICAID

## 2025-05-20 ENCOUNTER — APPOINTMENT (OUTPATIENT)
Facility: HOSPITAL | Age: 70
DRG: 140 | End: 2025-05-20
Attending: EMERGENCY MEDICINE
Payer: MEDICAID

## 2025-05-20 DIAGNOSIS — I10 ESSENTIAL (PRIMARY) HYPERTENSION: ICD-10-CM

## 2025-05-20 DIAGNOSIS — J96.21 ACUTE ON CHRONIC HYPOXIC RESPIRATORY FAILURE (HCC): ICD-10-CM

## 2025-05-20 DIAGNOSIS — J44.1 COPD EXACERBATION (HCC): Primary | ICD-10-CM

## 2025-05-20 PROBLEM — E87.6 HYPOKALEMIA: Status: ACTIVE | Noted: 2025-05-20

## 2025-05-20 PROBLEM — D72.829 LEUKOCYTOSIS: Status: ACTIVE | Noted: 2025-05-20

## 2025-05-20 LAB
ALBUMIN SERPL-MCNC: 4.2 G/DL (ref 3.5–5)
ALBUMIN/GLOB SERPL: 1.2 (ref 1.1–2.2)
ALP SERPL-CCNC: 82 U/L (ref 45–117)
ALT SERPL-CCNC: 19 U/L (ref 12–78)
ANION GAP SERPL CALC-SCNC: 10 MMOL/L (ref 2–12)
APPEARANCE UR: CLEAR
ARTERIAL PATENCY WRIST A: ABNORMAL
AST SERPL W P-5'-P-CCNC: 19 U/L (ref 15–37)
BACTERIA URNS QL MICRO: ABNORMAL /HPF
BASE DEFICIT BLDA-SCNC: 3.1 MMOL/L
BASOPHILS # BLD: 0.07 K/UL (ref 0–0.1)
BASOPHILS NFR BLD: 0.5 % (ref 0–1)
BDY SITE: ABNORMAL
BILIRUB SERPL-MCNC: 0.4 MG/DL (ref 0.2–1)
BILIRUB UR QL: NEGATIVE
BNP SERPL-MCNC: 78 PG/ML
BUN SERPL-MCNC: 14 MG/DL (ref 6–20)
BUN/CREAT SERPL: 13 (ref 12–20)
CA-I BLD-MCNC: 9.6 MG/DL (ref 8.5–10.1)
CHLORIDE SERPL-SCNC: 92 MMOL/L (ref 97–108)
CO2 SERPL-SCNC: 31 MMOL/L (ref 21–32)
COHGB MFR BLD: 0.1 % (ref 1–2)
COLOR UR: ABNORMAL
CREAT SERPL-MCNC: 1.09 MG/DL (ref 0.55–1.02)
DIFFERENTIAL METHOD BLD: ABNORMAL
EKG ATRIAL RATE: 125 BPM
EKG DIAGNOSIS: NORMAL
EKG P AXIS: 80 DEGREES
EKG P-R INTERVAL: 168 MS
EKG Q-T INTERVAL: 298 MS
EKG QRS DURATION: 84 MS
EKG QTC CALCULATION (BAZETT): 428 MS
EKG R AXIS: 65 DEGREES
EKG T AXIS: 57 DEGREES
EKG VENTRICULAR RATE: 124 BPM
EOSINOPHIL # BLD: 0.27 K/UL (ref 0–0.4)
EOSINOPHIL NFR BLD: 2.1 % (ref 0–7)
ERYTHROCYTE [DISTWIDTH] IN BLOOD BY AUTOMATED COUNT: 13.2 % (ref 11.5–14.5)
FIO2 ON VENT: 0.32 %
FOLATE SERPL-MCNC: 19.9 NG/ML (ref 5–21)
GAS FLOW.O2 SETTING OXYMISER: 14
GLOBULIN SER CALC-MCNC: 3.5 G/DL (ref 2–4)
GLUCOSE BLD STRIP.AUTO-MCNC: 164 MG/DL (ref 65–100)
GLUCOSE BLD STRIP.AUTO-MCNC: 247 MG/DL (ref 65–100)
GLUCOSE SERPL-MCNC: 194 MG/DL (ref 65–100)
GLUCOSE UR STRIP.AUTO-MCNC: 250 MG/DL
HCO3 BLDA-SCNC: 24 MMOL/L (ref 22–26)
HCT VFR BLD AUTO: 32.5 % (ref 35–47)
HGB BLD-MCNC: 10.9 G/DL (ref 11.5–16)
HGB UR QL STRIP: NEGATIVE
IMM GRANULOCYTES # BLD AUTO: 0.1 K/UL (ref 0–0.04)
IMM GRANULOCYTES NFR BLD AUTO: 0.8 % (ref 0–0.5)
IPAP/PIP: 15
IRON SATN MFR SERPL: 22 % (ref 20–50)
IRON SERPL-MCNC: 57 UG/DL (ref 35–150)
KETONES UR QL STRIP.AUTO: NEGATIVE MG/DL
LEUKOCYTE ESTERASE UR QL STRIP.AUTO: NEGATIVE
LYMPHOCYTES # BLD: 4.06 K/UL (ref 0.8–3.5)
LYMPHOCYTES NFR BLD: 31.4 % (ref 12–49)
MAGNESIUM SERPL-MCNC: 1.9 MG/DL (ref 1.6–2.4)
MCH RBC QN AUTO: 30.7 PG (ref 26–34)
MCHC RBC AUTO-ENTMCNC: 33.5 G/DL (ref 30–36.5)
MCV RBC AUTO: 91.5 FL (ref 80–99)
METHGB MFR BLD: 0.3 % (ref 0–1.4)
MONOCYTES # BLD: 1.05 K/UL (ref 0–1)
MONOCYTES NFR BLD: 8.1 % (ref 5–13)
NEUTS SEG # BLD: 7.36 K/UL (ref 1.8–8)
NEUTS SEG NFR BLD: 57.1 % (ref 32–75)
NITRITE UR QL STRIP.AUTO: NEGATIVE
NRBC # BLD: 0 K/UL (ref 0–0.01)
NRBC BLD-RTO: 0 PER 100 WBC
OXYHGB MFR BLD: 97.7 % (ref 95–99)
PCO2 BLDA: 54 MMHG (ref 35–45)
PEEP RESPIRATORY: 8
PERFORMED BY:: ABNORMAL
PH BLDA: 7.27 (ref 7.35–7.45)
PH UR STRIP: 6 (ref 5–8)
PLATELET # BLD AUTO: 408 K/UL (ref 150–400)
PMV BLD AUTO: 9.6 FL (ref 8.9–12.9)
PO2 BLDA: 139 MMHG (ref 80–100)
POTASSIUM SERPL-SCNC: 3.4 MMOL/L (ref 3.5–5.1)
PROT SERPL-MCNC: 7.7 G/DL (ref 6.4–8.2)
PROT UR STRIP-MCNC: NEGATIVE MG/DL
RBC # BLD AUTO: 3.55 M/UL (ref 3.8–5.2)
RBC #/AREA URNS HPF: ABNORMAL /HPF (ref 0–3)
SAO2% DEVICE SAO2% SENSOR NAME: ABNORMAL
SODIUM SERPL-SCNC: 133 MMOL/L (ref 136–145)
SP GR UR REFRACTOMETRY: <1.005 (ref 1–1.03)
SPECIMEN SITE: ABNORMAL
TIBC SERPL-MCNC: 255 UG/DL (ref 250–450)
TROPONIN I SERPL HS-MCNC: 19 NG/L (ref 0–51)
TROPONIN I SERPL HS-MCNC: 19 NG/L (ref 0–51)
TROPONIN I SERPL HS-MCNC: <4 NG/L (ref 0–51)
TSH SERPL DL<=0.05 MIU/L-ACNC: 0.83 UIU/ML (ref 0.36–3.74)
URINE CULTURE IF INDICATED: ABNORMAL
UROBILINOGEN UR QL STRIP.AUTO: 0.2 EU/DL (ref 0.2–1)
VENTILATION MODE VENT: ABNORMAL
VIT B12 SERPL-MCNC: 296 PG/ML (ref 193–986)
WBC # BLD AUTO: 12.9 K/UL (ref 3.6–11)
WBC URNS QL MICRO: ABNORMAL /HPF (ref 0–5)

## 2025-05-20 PROCEDURE — 94640 AIRWAY INHALATION TREATMENT: CPT

## 2025-05-20 PROCEDURE — 94660 CPAP INITIATION&MGMT: CPT

## 2025-05-20 PROCEDURE — 96374 THER/PROPH/DIAG INJ IV PUSH: CPT

## 2025-05-20 PROCEDURE — 6360000004 HC RX CONTRAST MEDICATION: Performed by: EMERGENCY MEDICINE

## 2025-05-20 PROCEDURE — 94760 N-INVAS EAR/PLS OXIMETRY 1: CPT

## 2025-05-20 PROCEDURE — 83880 ASSAY OF NATRIURETIC PEPTIDE: CPT

## 2025-05-20 PROCEDURE — 83735 ASSAY OF MAGNESIUM: CPT

## 2025-05-20 PROCEDURE — 82607 VITAMIN B-12: CPT

## 2025-05-20 PROCEDURE — 82803 BLOOD GASES ANY COMBINATION: CPT

## 2025-05-20 PROCEDURE — 93010 ELECTROCARDIOGRAM REPORT: CPT | Performed by: SPECIALIST

## 2025-05-20 PROCEDURE — 2580000003 HC RX 258: Performed by: HOSPITALIST

## 2025-05-20 PROCEDURE — 83540 ASSAY OF IRON: CPT

## 2025-05-20 PROCEDURE — 99285 EMERGENCY DEPT VISIT HI MDM: CPT

## 2025-05-20 PROCEDURE — 80053 COMPREHEN METABOLIC PANEL: CPT

## 2025-05-20 PROCEDURE — 85025 COMPLETE CBC W/AUTO DIFF WBC: CPT

## 2025-05-20 PROCEDURE — 83036 HEMOGLOBIN GLYCOSYLATED A1C: CPT

## 2025-05-20 PROCEDURE — 81001 URINALYSIS AUTO W/SCOPE: CPT

## 2025-05-20 PROCEDURE — 6370000000 HC RX 637 (ALT 250 FOR IP): Performed by: HOSPITALIST

## 2025-05-20 PROCEDURE — 6370000000 HC RX 637 (ALT 250 FOR IP)

## 2025-05-20 PROCEDURE — 82962 GLUCOSE BLOOD TEST: CPT

## 2025-05-20 PROCEDURE — 1100000000 HC RM PRIVATE

## 2025-05-20 PROCEDURE — 71045 X-RAY EXAM CHEST 1 VIEW: CPT

## 2025-05-20 PROCEDURE — 93005 ELECTROCARDIOGRAM TRACING: CPT | Performed by: EMERGENCY MEDICINE

## 2025-05-20 PROCEDURE — 84484 ASSAY OF TROPONIN QUANT: CPT

## 2025-05-20 PROCEDURE — 2700000000 HC OXYGEN THERAPY PER DAY

## 2025-05-20 PROCEDURE — 96375 TX/PRO/DX INJ NEW DRUG ADDON: CPT

## 2025-05-20 PROCEDURE — 6360000002 HC RX W HCPCS: Performed by: HOSPITALIST

## 2025-05-20 PROCEDURE — 71275 CT ANGIOGRAPHY CHEST: CPT

## 2025-05-20 PROCEDURE — 84443 ASSAY THYROID STIM HORMONE: CPT

## 2025-05-20 PROCEDURE — 82746 ASSAY OF FOLIC ACID SERUM: CPT

## 2025-05-20 PROCEDURE — 6360000002 HC RX W HCPCS: Performed by: EMERGENCY MEDICINE

## 2025-05-20 PROCEDURE — 36600 WITHDRAWAL OF ARTERIAL BLOOD: CPT

## 2025-05-20 PROCEDURE — 2500000003 HC RX 250 WO HCPCS: Performed by: HOSPITALIST

## 2025-05-20 PROCEDURE — 5A09357 ASSISTANCE WITH RESPIRATORY VENTILATION, LESS THAN 24 CONSECUTIVE HOURS, CONTINUOUS POSITIVE AIRWAY PRESSURE: ICD-10-PCS | Performed by: HOSPITALIST

## 2025-05-20 RX ORDER — POTASSIUM CHLORIDE 1500 MG/1
40 TABLET, EXTENDED RELEASE ORAL PRN
Status: DISCONTINUED | OUTPATIENT
Start: 2025-05-20 | End: 2025-05-22 | Stop reason: HOSPADM

## 2025-05-20 RX ORDER — HYDRALAZINE HYDROCHLORIDE 20 MG/ML
10 INJECTION INTRAMUSCULAR; INTRAVENOUS EVERY 6 HOURS PRN
Status: DISCONTINUED | OUTPATIENT
Start: 2025-05-20 | End: 2025-05-22 | Stop reason: HOSPADM

## 2025-05-20 RX ORDER — MECOBALAMIN 5000 MCG
5 TABLET,DISINTEGRATING ORAL NIGHTLY
Status: DISCONTINUED | OUTPATIENT
Start: 2025-05-20 | End: 2025-05-22 | Stop reason: HOSPADM

## 2025-05-20 RX ORDER — POLYETHYLENE GLYCOL 3350 17 G/17G
17 POWDER, FOR SOLUTION ORAL DAILY PRN
Status: DISCONTINUED | OUTPATIENT
Start: 2025-05-20 | End: 2025-05-22 | Stop reason: HOSPADM

## 2025-05-20 RX ORDER — IPRATROPIUM BROMIDE AND ALBUTEROL SULFATE 2.5; .5 MG/3ML; MG/3ML
1 SOLUTION RESPIRATORY (INHALATION)
Status: DISCONTINUED | OUTPATIENT
Start: 2025-05-20 | End: 2025-05-22 | Stop reason: HOSPADM

## 2025-05-20 RX ORDER — SODIUM CHLORIDE 0.9 % (FLUSH) 0.9 %
5-40 SYRINGE (ML) INJECTION EVERY 12 HOURS SCHEDULED
Status: DISCONTINUED | OUTPATIENT
Start: 2025-05-20 | End: 2025-05-22 | Stop reason: HOSPADM

## 2025-05-20 RX ORDER — BENZONATATE 100 MG/1
200 CAPSULE ORAL 3 TIMES DAILY PRN
Status: DISCONTINUED | OUTPATIENT
Start: 2025-05-20 | End: 2025-05-22 | Stop reason: HOSPADM

## 2025-05-20 RX ORDER — DEXTROSE MONOHYDRATE 100 MG/ML
INJECTION, SOLUTION INTRAVENOUS CONTINUOUS PRN
Status: DISCONTINUED | OUTPATIENT
Start: 2025-05-20 | End: 2025-05-22 | Stop reason: HOSPADM

## 2025-05-20 RX ORDER — DEXAMETHASONE SODIUM PHOSPHATE 10 MG/ML
8 INJECTION, SOLUTION INTRAMUSCULAR; INTRAVENOUS ONCE
Status: COMPLETED | OUTPATIENT
Start: 2025-05-20 | End: 2025-05-20

## 2025-05-20 RX ORDER — IPRATROPIUM BROMIDE AND ALBUTEROL SULFATE 2.5; .5 MG/3ML; MG/3ML
1 SOLUTION RESPIRATORY (INHALATION)
Status: COMPLETED | OUTPATIENT
Start: 2025-05-20 | End: 2025-05-20

## 2025-05-20 RX ORDER — IPRATROPIUM BROMIDE AND ALBUTEROL SULFATE 2.5; .5 MG/3ML; MG/3ML
SOLUTION RESPIRATORY (INHALATION)
Status: COMPLETED
Start: 2025-05-20 | End: 2025-05-20

## 2025-05-20 RX ORDER — ONDANSETRON 2 MG/ML
4 INJECTION INTRAMUSCULAR; INTRAVENOUS
Status: COMPLETED | OUTPATIENT
Start: 2025-05-20 | End: 2025-05-20

## 2025-05-20 RX ORDER — MAGNESIUM SULFATE IN WATER 40 MG/ML
2000 INJECTION, SOLUTION INTRAVENOUS PRN
Status: DISCONTINUED | OUTPATIENT
Start: 2025-05-20 | End: 2025-05-22 | Stop reason: HOSPADM

## 2025-05-20 RX ORDER — GUAIFENESIN 600 MG/1
600 TABLET, EXTENDED RELEASE ORAL 2 TIMES DAILY
Status: DISCONTINUED | OUTPATIENT
Start: 2025-05-20 | End: 2025-05-22 | Stop reason: HOSPADM

## 2025-05-20 RX ORDER — SODIUM CHLORIDE 9 MG/ML
INJECTION, SOLUTION INTRAVENOUS PRN
Status: DISCONTINUED | OUTPATIENT
Start: 2025-05-20 | End: 2025-05-22 | Stop reason: HOSPADM

## 2025-05-20 RX ORDER — HYDROXYZINE HYDROCHLORIDE 25 MG/1
25 TABLET, FILM COATED ORAL 3 TIMES DAILY PRN
Status: DISCONTINUED | OUTPATIENT
Start: 2025-05-20 | End: 2025-05-22 | Stop reason: HOSPADM

## 2025-05-20 RX ORDER — 0.9 % SODIUM CHLORIDE 0.9 %
500 INTRAVENOUS SOLUTION INTRAVENOUS ONCE
Status: COMPLETED | OUTPATIENT
Start: 2025-05-20 | End: 2025-05-20

## 2025-05-20 RX ORDER — INSULIN LISPRO 100 [IU]/ML
0-8 INJECTION, SOLUTION INTRAVENOUS; SUBCUTANEOUS
Status: DISCONTINUED | OUTPATIENT
Start: 2025-05-20 | End: 2025-05-22 | Stop reason: HOSPADM

## 2025-05-20 RX ORDER — LORAZEPAM 2 MG/ML
1 INJECTION INTRAMUSCULAR ONCE
Status: COMPLETED | OUTPATIENT
Start: 2025-05-20 | End: 2025-05-20

## 2025-05-20 RX ORDER — SODIUM CHLORIDE AND POTASSIUM CHLORIDE 150; 900 MG/100ML; MG/100ML
INJECTION, SOLUTION INTRAVENOUS CONTINUOUS
Status: DISCONTINUED | OUTPATIENT
Start: 2025-05-20 | End: 2025-05-21

## 2025-05-20 RX ORDER — POTASSIUM CHLORIDE 7.45 MG/ML
10 INJECTION INTRAVENOUS PRN
Status: DISCONTINUED | OUTPATIENT
Start: 2025-05-20 | End: 2025-05-22 | Stop reason: HOSPADM

## 2025-05-20 RX ORDER — GLUCAGON 1 MG/ML
1 KIT INJECTION PRN
Status: DISCONTINUED | OUTPATIENT
Start: 2025-05-20 | End: 2025-05-22 | Stop reason: HOSPADM

## 2025-05-20 RX ORDER — MAGNESIUM HYDROXIDE/ALUMINUM HYDROXICE/SIMETHICONE 120; 1200; 1200 MG/30ML; MG/30ML; MG/30ML
30 SUSPENSION ORAL
Status: COMPLETED | OUTPATIENT
Start: 2025-05-20 | End: 2025-05-21

## 2025-05-20 RX ORDER — CETIRIZINE HYDROCHLORIDE 10 MG/1
10 TABLET ORAL DAILY
Status: DISCONTINUED | OUTPATIENT
Start: 2025-05-20 | End: 2025-05-22 | Stop reason: HOSPADM

## 2025-05-20 RX ORDER — ACETAMINOPHEN 650 MG/1
650 SUPPOSITORY RECTAL EVERY 6 HOURS PRN
Status: DISCONTINUED | OUTPATIENT
Start: 2025-05-20 | End: 2025-05-22 | Stop reason: HOSPADM

## 2025-05-20 RX ORDER — ONDANSETRON 4 MG/1
4 TABLET, ORALLY DISINTEGRATING ORAL EVERY 8 HOURS PRN
Status: DISCONTINUED | OUTPATIENT
Start: 2025-05-20 | End: 2025-05-22 | Stop reason: HOSPADM

## 2025-05-20 RX ORDER — ONDANSETRON 2 MG/ML
4 INJECTION INTRAMUSCULAR; INTRAVENOUS EVERY 6 HOURS PRN
Status: DISCONTINUED | OUTPATIENT
Start: 2025-05-20 | End: 2025-05-22 | Stop reason: HOSPADM

## 2025-05-20 RX ORDER — BUDESONIDE AND FORMOTEROL FUMARATE DIHYDRATE 160; 4.5 UG/1; UG/1
2 AEROSOL RESPIRATORY (INHALATION)
Status: DISCONTINUED | OUTPATIENT
Start: 2025-05-20 | End: 2025-05-22 | Stop reason: HOSPADM

## 2025-05-20 RX ORDER — ENOXAPARIN SODIUM 100 MG/ML
40 INJECTION SUBCUTANEOUS DAILY
Status: DISCONTINUED | OUTPATIENT
Start: 2025-05-20 | End: 2025-05-22 | Stop reason: HOSPADM

## 2025-05-20 RX ORDER — ACETAMINOPHEN 325 MG/1
650 TABLET ORAL EVERY 6 HOURS PRN
Status: DISCONTINUED | OUTPATIENT
Start: 2025-05-20 | End: 2025-05-22 | Stop reason: HOSPADM

## 2025-05-20 RX ORDER — IOPAMIDOL 755 MG/ML
100 INJECTION, SOLUTION INTRAVASCULAR
Status: COMPLETED | OUTPATIENT
Start: 2025-05-20 | End: 2025-05-20

## 2025-05-20 RX ORDER — DOXYCYCLINE 100 MG/1
100 CAPSULE ORAL EVERY 12 HOURS SCHEDULED
Status: DISCONTINUED | OUTPATIENT
Start: 2025-05-20 | End: 2025-05-22 | Stop reason: HOSPADM

## 2025-05-20 RX ORDER — SODIUM CHLORIDE 0.9 % (FLUSH) 0.9 %
5-40 SYRINGE (ML) INJECTION PRN
Status: DISCONTINUED | OUTPATIENT
Start: 2025-05-20 | End: 2025-05-22 | Stop reason: HOSPADM

## 2025-05-20 RX ORDER — HYDROXYZINE HYDROCHLORIDE 25 MG/1
25 TABLET, FILM COATED ORAL ONCE
Status: DISCONTINUED | OUTPATIENT
Start: 2025-05-20 | End: 2025-05-22 | Stop reason: HOSPADM

## 2025-05-20 RX ADMIN — DEXAMETHASONE SODIUM PHOSPHATE 8 MG: 10 INJECTION, SOLUTION INTRAMUSCULAR; INTRAVENOUS at 11:57

## 2025-05-20 RX ADMIN — INSULIN LISPRO 2 UNITS: 100 INJECTION, SOLUTION INTRAVENOUS; SUBCUTANEOUS at 21:23

## 2025-05-20 RX ADMIN — IPRATROPIUM BROMIDE AND ALBUTEROL SULFATE 1 DOSE: .5; 3 SOLUTION RESPIRATORY (INHALATION) at 19:44

## 2025-05-20 RX ADMIN — DOXYCYCLINE 100 MG: 100 CAPSULE ORAL at 21:21

## 2025-05-20 RX ADMIN — POTASSIUM CHLORIDE AND SODIUM CHLORIDE: 900; 150 INJECTION, SOLUTION INTRAVENOUS at 18:15

## 2025-05-20 RX ADMIN — GUAIFENESIN 600 MG: 600 TABLET, EXTENDED RELEASE ORAL at 21:21

## 2025-05-20 RX ADMIN — SODIUM CHLORIDE 40 MG: 9 INJECTION INTRAMUSCULAR; INTRAVENOUS; SUBCUTANEOUS at 17:59

## 2025-05-20 RX ADMIN — ENOXAPARIN SODIUM 40 MG: 100 INJECTION SUBCUTANEOUS at 17:58

## 2025-05-20 RX ADMIN — IPRATROPIUM BROMIDE AND ALBUTEROL SULFATE 1 DOSE: .5; 2.5 SOLUTION RESPIRATORY (INHALATION) at 11:56

## 2025-05-20 RX ADMIN — ONDANSETRON 4 MG: 2 INJECTION, SOLUTION INTRAMUSCULAR; INTRAVENOUS at 11:57

## 2025-05-20 RX ADMIN — IOPAMIDOL 100 ML: 755 INJECTION, SOLUTION INTRAVENOUS at 15:37

## 2025-05-20 RX ADMIN — IPRATROPIUM BROMIDE AND ALBUTEROL SULFATE 1 DOSE: .5; 3 SOLUTION RESPIRATORY (INHALATION) at 23:54

## 2025-05-20 RX ADMIN — LORAZEPAM 1 MG: 2 INJECTION INTRAMUSCULAR; INTRAVENOUS at 11:59

## 2025-05-20 RX ADMIN — Medication 5 MG: at 21:22

## 2025-05-20 RX ADMIN — SODIUM CHLORIDE, PRESERVATIVE FREE 10 ML: 5 INJECTION INTRAVENOUS at 21:22

## 2025-05-20 RX ADMIN — ALUMINUM HYDROXIDE, MAGNESIUM HYDROXIDE, AND SIMETHICONE 30 ML: 200; 200; 20 SUSPENSION ORAL at 17:58

## 2025-05-20 RX ADMIN — Medication 2 PUFF: at 19:44

## 2025-05-20 RX ADMIN — SODIUM CHLORIDE, PRESERVATIVE FREE 10 ML: 5 INJECTION INTRAVENOUS at 23:40

## 2025-05-20 RX ADMIN — WATER 60 MG: 1 INJECTION INTRAMUSCULAR; INTRAVENOUS; SUBCUTANEOUS at 17:59

## 2025-05-20 RX ADMIN — IPRATROPIUM BROMIDE AND ALBUTEROL SULFATE 1 DOSE: .5; 3 SOLUTION RESPIRATORY (INHALATION) at 16:57

## 2025-05-20 RX ADMIN — IPRATROPIUM BROMIDE AND ALBUTEROL SULFATE 1 DOSE: 2.5; .5 SOLUTION RESPIRATORY (INHALATION) at 11:56

## 2025-05-20 RX ADMIN — WATER 60 MG: 1 INJECTION INTRAMUSCULAR; INTRAVENOUS; SUBCUTANEOUS at 23:39

## 2025-05-20 RX ADMIN — CETIRIZINE HYDROCHLORIDE 10 MG: 10 TABLET, FILM COATED ORAL at 17:58

## 2025-05-20 RX ADMIN — SODIUM CHLORIDE 500 ML: 9 INJECTION, SOLUTION INTRAVENOUS at 18:31

## 2025-05-20 ASSESSMENT — ENCOUNTER SYMPTOMS
EYES NEGATIVE: 1
SHORTNESS OF BREATH: 1
GASTROINTESTINAL NEGATIVE: 1
COUGH: 1
ALLERGIC/IMMUNOLOGIC NEGATIVE: 1

## 2025-05-20 ASSESSMENT — PAIN - FUNCTIONAL ASSESSMENT: PAIN_FUNCTIONAL_ASSESSMENT: 0-10

## 2025-05-20 ASSESSMENT — PAIN SCALES - GENERAL
PAINLEVEL_OUTOF10: 0

## 2025-05-20 ASSESSMENT — LIFESTYLE VARIABLES: HOW OFTEN DO YOU HAVE A DRINK CONTAINING ALCOHOL: PATIENT DECLINED

## 2025-05-20 NOTE — H&P
History and Physical  Dr uLcas Boyd MD      Chief Complaints:     Chief Complaint   Patient presents with    Shortness of Breath         Subjective:     Virgen Hernandez is a 70 y.o. female followed by Tamanna Felipe APRN - NP and Pulmonologist Dr. Paola GARCIA  has a past medical history of Arthritis, Bronchitis, Cholelithiasis, Chronic bronchitis (HCC), Chronic obstructive pulmonary disease (HCC), Chronic pain, Chronic respiratory failure with hypoxia, on home O2 therapy (HCC), Diabetes (HCC), Diverticulosis, Gastroesophageal reflux disease without esophagitis, GERD (gastroesophageal reflux disease), GERD (gastroesophageal reflux disease), History of multiple allergies, HTN (hypertension), Hypertension, Intermittent asthma, Lung disorder, Menopause, Pain of upper abdomen, Renal mass, right, Seasonal allergic rhinitis, Sinus problem, and Vitamin D deficiency.    Patient states that she was in her normal state of health yesterday morning but around 2 PM she suddenly got short of breath diaphoretic and even noted some tachycardia she took her nebulizer treatment and resolved initial symptoms.  She got up this morning and she went to the bathroom and was washing her face but upon exiting and going back to the living room she started having worsening shortness of breath along with chest tightness nonproductive cough and diaphoresis she became panicky and anxious and also noted once again to be tachycardic.  She it was noted that O2 on 2 L but for short few minute timeframe the O2 was caught under the door and kinked.  She denies any sick contacts or fever or chills.    On arrival her blood pressure 124/76 with a heart rate of 125 respiratory 28 afebrile at 98.3 O2 saturation as per EMS was 84% upon arrival on her chronic 2 L was initially attempted BiPAP 15 set rate of 14 and ABG on those settings was 7.27 with a pCO2 of 54P O2 of 139 and bicarb of 24.  Initial chest x-ray did not show any acute

## 2025-05-20 NOTE — ED TRIAGE NOTES
Pt brought in via EMS with a hx of COPD and CHF on 2L via NC chronically. Pt reports sudden onset SOB with noted grunting that began PTA. Pt denies any pain but endorses nausea. Pt unable to tolerate bipap and is noted to be sating 97% on 6L. Per ems pt was 84% upon arrival on her normal 2L. RT at bedside

## 2025-05-20 NOTE — ED PROVIDER NOTES
Wounds serious exam talking to every second time [facility] EMERGENCY DEPT  EMERGENCY DEPARTMENT HISTORY AND PHYSICAL EXAM      Date: 5/20/2025  Patient Name: Virgen Hernandez  MRN: [id number]  YOB: 1955  Date of evaluation: 5/20/2025  Provider: Sherrie Anna MD   Note Started: 11:55 AM EDT 5/20/25    HISTORY OF PRESENT ILLNESS     Chief Complaint   Patient presents with    Shortness of Breath       History Provided By: Patient    HPI: Virgen Hernandez is a 70 y.o. female     PAST MEDICAL HISTORY   Past Medical History:  Past Medical History:   Diagnosis Date    Arthritis     Bronchitis 9/1/2020    Cholelithiasis     as noted on CT abdomen in 3/2023    Chronic bronchitis (HCC)     Chronic obstructive pulmonary disease (HCC)     Chronic pain     Chronic respiratory failure with hypoxia, on home O2 therapy (HCC)     secondary to copd / emphysema    Diabetes (HCC)     Diverticulosis     Gastroesophageal reflux disease without esophagitis 9/1/2020    GERD (gastroesophageal reflux disease)     GERD (gastroesophageal reflux disease)     History of multiple allergies     HTN (hypertension) 9/1/2020    Hypertension     Intermittent asthma 9/1/2020    Lung disorder     PATIENT IS ON OXYGEN    Menopause     Pain of upper abdomen 9/1/2020    Renal mass, right 04/2023    Seasonal allergic rhinitis 9/1/2020    Sinus problem     Vitamin D deficiency 9/1/2020       Past Surgical History:  Past Surgical History:   Procedure Laterality Date    COLONOSCOPY      COLONOSCOPY N/A 5/25/2020    COLONOSCOPY performed by Graham Quinones MD at [facility] ENDOSCOPY    GI      colonscopy    ORTHOPEDIC SURGERY  10/24/2018    Total Right Hip Arthroplasty Dr. Jonnie Osorio [place]       Family History:  Family History   Problem Relation Age of Onset    Cancer Sister     Hypertension Mother        Social History:  Social History     Tobacco Use    Smoking status: Former     Current packs/day: 0.00     Types: Cigarettes

## 2025-05-21 ENCOUNTER — APPOINTMENT (OUTPATIENT)
Facility: HOSPITAL | Age: 70
DRG: 140 | End: 2025-05-21
Attending: HOSPITALIST
Payer: MEDICAID

## 2025-05-21 PROBLEM — F41.9 ANXIETY: Status: ACTIVE | Noted: 2025-05-21

## 2025-05-21 LAB
ANION GAP SERPL CALC-SCNC: 13 MMOL/L (ref 2–12)
BASOPHILS # BLD: 0.01 K/UL (ref 0–0.1)
BASOPHILS NFR BLD: 0.1 % (ref 0–1)
BUN SERPL-MCNC: 11 MG/DL (ref 6–20)
BUN/CREAT SERPL: 10 (ref 12–20)
CA-I BLD-MCNC: 9 MG/DL (ref 8.5–10.1)
CHLORIDE SERPL-SCNC: 99 MMOL/L (ref 97–108)
CO2 SERPL-SCNC: 25 MMOL/L (ref 21–32)
CREAT SERPL-MCNC: 1.05 MG/DL (ref 0.55–1.02)
DIFFERENTIAL METHOD BLD: ABNORMAL
ECHO AO ROOT DIAM: 3 CM
ECHO AO ROOT INDEX: 1.72 CM/M2
ECHO AV AREA PEAK VELOCITY: 2.5 CM2
ECHO AV AREA VTI: 2.4 CM2
ECHO AV AREA/BSA PEAK VELOCITY: 1.4 CM2/M2
ECHO AV AREA/BSA VTI: 1.4 CM2/M2
ECHO AV MEAN GRADIENT: 5 MMHG
ECHO AV MEAN VELOCITY: 1.1 M/S
ECHO AV PEAK GRADIENT: 11 MMHG
ECHO AV PEAK VELOCITY: 1.6 M/S
ECHO AV VELOCITY RATIO: 0.88
ECHO AV VTI: 31 CM
ECHO BSA: 1.77 M2
ECHO EST RA PRESSURE: 3 MMHG
ECHO LA DIAMETER INDEX: 1.55 CM/M2
ECHO LA DIAMETER: 2.7 CM
ECHO LA TO AORTIC ROOT RATIO: 0.9
ECHO LA VOL A-L A2C: 28 ML (ref 22–52)
ECHO LA VOL A-L A4C: 18 ML (ref 22–52)
ECHO LA VOL BP: 21 ML (ref 22–52)
ECHO LA VOL MOD A2C: 25 ML (ref 22–52)
ECHO LA VOL MOD A4C: 17 ML (ref 22–52)
ECHO LA VOL/BSA BIPLANE: 12 ML/M2 (ref 16–34)
ECHO LA VOLUME AREA LENGTH: 22 ML
ECHO LA VOLUME INDEX A-L A2C: 16 ML/M2 (ref 16–34)
ECHO LA VOLUME INDEX A-L A4C: 10 ML/M2 (ref 16–34)
ECHO LA VOLUME INDEX AREA LENGTH: 13 ML/M2 (ref 16–34)
ECHO LA VOLUME INDEX MOD A2C: 14 ML/M2 (ref 16–34)
ECHO LA VOLUME INDEX MOD A4C: 10 ML/M2 (ref 16–34)
ECHO LV E' LATERAL VELOCITY: 9.54 CM/S
ECHO LV E' SEPTAL VELOCITY: 15.49 CM/S
ECHO LV EDV A2C: 32 ML
ECHO LV EDV A4C: 34 ML
ECHO LV EDV BP: 33 ML (ref 56–104)
ECHO LV EDV INDEX A4C: 20 ML/M2
ECHO LV EDV INDEX BP: 19 ML/M2
ECHO LV EDV NDEX A2C: 18 ML/M2
ECHO LV EF PHYSICIAN: 60 %
ECHO LV EJECTION FRACTION A2C: 65 %
ECHO LV EJECTION FRACTION A4C: 55 %
ECHO LV EJECTION FRACTION BIPLANE: 61 % (ref 55–100)
ECHO LV ESV A2C: 11 ML
ECHO LV ESV A4C: 15 ML
ECHO LV ESV BP: 13 ML (ref 19–49)
ECHO LV ESV INDEX A2C: 6 ML/M2
ECHO LV ESV INDEX A4C: 9 ML/M2
ECHO LV ESV INDEX BP: 7 ML/M2
ECHO LV FRACTIONAL SHORTENING: 40 % (ref 28–44)
ECHO LV GLOBAL LONGITUDINAL STRAIN (GLS): -19 %
ECHO LV INTERNAL DIMENSION DIASTOLE INDEX: 2.59 CM/M2
ECHO LV INTERNAL DIMENSION DIASTOLIC: 4.5 CM (ref 3.9–5.3)
ECHO LV INTERNAL DIMENSION SYSTOLIC INDEX: 1.55 CM/M2
ECHO LV INTERNAL DIMENSION SYSTOLIC: 2.7 CM
ECHO LV IVSD: 0.6 CM (ref 0.6–0.9)
ECHO LV MASS 2D: 95.7 G (ref 67–162)
ECHO LV MASS INDEX 2D: 55 G/M2 (ref 43–95)
ECHO LV POSTERIOR WALL DIASTOLIC: 0.8 CM (ref 0.6–0.9)
ECHO LV RELATIVE WALL THICKNESS RATIO: 0.36
ECHO LVOT AREA: 3.1 CM2
ECHO LVOT AV VTI INDEX: 0.78
ECHO LVOT DIAM: 2 CM
ECHO LVOT MEAN GRADIENT: 4 MMHG
ECHO LVOT PEAK GRADIENT: 8 MMHG
ECHO LVOT PEAK VELOCITY: 1.4 M/S
ECHO LVOT STROKE VOLUME INDEX: 43.9 ML/M2
ECHO LVOT SV: 76.3 ML
ECHO LVOT VTI: 24.3 CM
ECHO MV A VELOCITY: 1.27 M/S
ECHO MV E DECELERATION TIME (DT): 97.5 MS
ECHO MV E VELOCITY: 0.92 M/S
ECHO MV E/A RATIO: 0.72
ECHO MV E/E' LATERAL: 9.64
ECHO MV E/E' RATIO (AVERAGED): 7.79
ECHO MV E/E' SEPTAL: 5.94
ECHO RIGHT VENTRICULAR SYSTOLIC PRESSURE (RVSP): 28 MMHG
ECHO RV FREE WALL PEAK S': 14.6 CM/S
ECHO RV INTERNAL DIMENSION: 2.4 CM
ECHO RV TAPSE: 2 CM (ref 1.7–?)
ECHO TV REGURGITANT MAX VELOCITY: 2.5 M/S
ECHO TV REGURGITANT PEAK GRADIENT: 25 MMHG
EKG ATRIAL RATE: 114 BPM
EKG DIAGNOSIS: NORMAL
EKG P AXIS: 79 DEGREES
EKG P-R INTERVAL: 165 MS
EKG Q-T INTERVAL: 319 MS
EKG QRS DURATION: 80 MS
EKG QTC CALCULATION (BAZETT): 440 MS
EKG R AXIS: 79 DEGREES
EKG T AXIS: 76 DEGREES
EKG VENTRICULAR RATE: 114 BPM
EOSINOPHIL # BLD: 0 K/UL (ref 0–0.4)
EOSINOPHIL NFR BLD: 0 % (ref 0–7)
ERYTHROCYTE [DISTWIDTH] IN BLOOD BY AUTOMATED COUNT: 13.4 % (ref 11.5–14.5)
EST. AVERAGE GLUCOSE BLD GHB EST-MCNC: 88 MG/DL
GLUCOSE BLD STRIP.AUTO-MCNC: 154 MG/DL (ref 65–100)
GLUCOSE BLD STRIP.AUTO-MCNC: 169 MG/DL (ref 65–100)
GLUCOSE BLD STRIP.AUTO-MCNC: 177 MG/DL (ref 65–100)
GLUCOSE BLD STRIP.AUTO-MCNC: 181 MG/DL (ref 65–100)
GLUCOSE SERPL-MCNC: 174 MG/DL (ref 65–100)
HBA1C MFR BLD: 4.7 % (ref 4–5.6)
HCT VFR BLD AUTO: 25.5 % (ref 35–47)
HCT VFR BLD AUTO: 26.1 % (ref 35–47)
HGB BLD-MCNC: 8.7 G/DL (ref 11.5–16)
HGB BLD-MCNC: 9 G/DL (ref 11.5–16)
IMM GRANULOCYTES # BLD AUTO: 0.1 K/UL (ref 0–0.04)
IMM GRANULOCYTES NFR BLD AUTO: 1 % (ref 0–0.5)
LYMPHOCYTES # BLD: 0.42 K/UL (ref 0.8–3.5)
LYMPHOCYTES NFR BLD: 4.3 % (ref 12–49)
MAGNESIUM SERPL-MCNC: 1.6 MG/DL (ref 1.6–2.4)
MCH RBC QN AUTO: 31.1 PG (ref 26–34)
MCHC RBC AUTO-ENTMCNC: 34.1 G/DL (ref 30–36.5)
MCV RBC AUTO: 91.1 FL (ref 80–99)
MONOCYTES # BLD: 0.09 K/UL (ref 0–1)
MONOCYTES NFR BLD: 0.9 % (ref 5–13)
NEUTS SEG # BLD: 9.14 K/UL (ref 1.8–8)
NEUTS SEG NFR BLD: 93.7 % (ref 32–75)
NRBC # BLD: 0 K/UL (ref 0–0.01)
NRBC BLD-RTO: 0 PER 100 WBC
PERFORMED BY:: ABNORMAL
PLATELET # BLD AUTO: 254 K/UL (ref 150–400)
PMV BLD AUTO: 9.6 FL (ref 8.9–12.9)
POTASSIUM SERPL-SCNC: 4.5 MMOL/L (ref 3.5–5.1)
RBC # BLD AUTO: 2.8 M/UL (ref 3.8–5.2)
SODIUM SERPL-SCNC: 137 MMOL/L (ref 136–145)
TROPONIN I SERPL HS-MCNC: 8 NG/L (ref 0–51)
WBC # BLD AUTO: 9.8 K/UL (ref 3.6–11)

## 2025-05-21 PROCEDURE — 1100000000 HC RM PRIVATE

## 2025-05-21 PROCEDURE — 6360000002 HC RX W HCPCS: Performed by: HOSPITALIST

## 2025-05-21 PROCEDURE — 93005 ELECTROCARDIOGRAM TRACING: CPT | Performed by: HOSPITALIST

## 2025-05-21 PROCEDURE — 84484 ASSAY OF TROPONIN QUANT: CPT

## 2025-05-21 PROCEDURE — 6370000000 HC RX 637 (ALT 250 FOR IP): Performed by: HOSPITALIST

## 2025-05-21 PROCEDURE — 85025 COMPLETE CBC W/AUTO DIFF WBC: CPT

## 2025-05-21 PROCEDURE — 80048 BASIC METABOLIC PNL TOTAL CA: CPT

## 2025-05-21 PROCEDURE — 82962 GLUCOSE BLOOD TEST: CPT

## 2025-05-21 PROCEDURE — 85018 HEMOGLOBIN: CPT

## 2025-05-21 PROCEDURE — 36415 COLL VENOUS BLD VENIPUNCTURE: CPT

## 2025-05-21 PROCEDURE — 94760 N-INVAS EAR/PLS OXIMETRY 1: CPT

## 2025-05-21 PROCEDURE — 94640 AIRWAY INHALATION TREATMENT: CPT

## 2025-05-21 PROCEDURE — 2500000003 HC RX 250 WO HCPCS: Performed by: HOSPITALIST

## 2025-05-21 PROCEDURE — 83735 ASSAY OF MAGNESIUM: CPT

## 2025-05-21 PROCEDURE — 2580000003 HC RX 258: Performed by: HOSPITALIST

## 2025-05-21 PROCEDURE — 2700000000 HC OXYGEN THERAPY PER DAY

## 2025-05-21 PROCEDURE — 85014 HEMATOCRIT: CPT

## 2025-05-21 PROCEDURE — 93356 MYOCRD STRAIN IMG SPCKL TRCK: CPT

## 2025-05-21 RX ORDER — DOCUSATE SODIUM 100 MG/1
100 CAPSULE, LIQUID FILLED ORAL 2 TIMES DAILY
Status: DISCONTINUED | OUTPATIENT
Start: 2025-05-21 | End: 2025-05-22 | Stop reason: HOSPADM

## 2025-05-21 RX ORDER — ATORVASTATIN CALCIUM 20 MG/1
20 TABLET, FILM COATED ORAL DAILY
Status: DISCONTINUED | OUTPATIENT
Start: 2025-05-22 | End: 2025-05-22 | Stop reason: HOSPADM

## 2025-05-21 RX ORDER — DILTIAZEM HYDROCHLORIDE 30 MG/1
30 TABLET, FILM COATED ORAL EVERY 6 HOURS SCHEDULED
Status: DISCONTINUED | OUTPATIENT
Start: 2025-05-21 | End: 2025-05-21

## 2025-05-21 RX ORDER — MAGNESIUM SULFATE IN WATER 40 MG/ML
2000 INJECTION, SOLUTION INTRAVENOUS ONCE
Status: COMPLETED | OUTPATIENT
Start: 2025-05-21 | End: 2025-05-21

## 2025-05-21 RX ORDER — ASPIRIN 81 MG/1
81 TABLET ORAL DAILY
Status: DISCONTINUED | OUTPATIENT
Start: 2025-05-22 | End: 2025-05-22 | Stop reason: HOSPADM

## 2025-05-21 RX ORDER — DILTIAZEM HYDROCHLORIDE 30 MG/1
60 TABLET, FILM COATED ORAL EVERY 6 HOURS SCHEDULED
Status: DISCONTINUED | OUTPATIENT
Start: 2025-05-21 | End: 2025-05-22

## 2025-05-21 RX ORDER — FUROSEMIDE 20 MG/1
20 TABLET ORAL DAILY
Status: DISCONTINUED | OUTPATIENT
Start: 2025-05-22 | End: 2025-05-22 | Stop reason: HOSPADM

## 2025-05-21 RX ORDER — DILTIAZEM HYDROCHLORIDE 30 MG/1
30 TABLET, FILM COATED ORAL ONCE
Status: DISCONTINUED | OUTPATIENT
Start: 2025-05-21 | End: 2025-05-21

## 2025-05-21 RX ADMIN — WATER 60 MG: 1 INJECTION INTRAMUSCULAR; INTRAVENOUS; SUBCUTANEOUS at 04:33

## 2025-05-21 RX ADMIN — DOCUSATE SODIUM 100 MG: 100 CAPSULE, LIQUID FILLED ORAL at 09:18

## 2025-05-21 RX ADMIN — Medication 2 PUFF: at 20:17

## 2025-05-21 RX ADMIN — DILTIAZEM HYDROCHLORIDE 60 MG: 30 TABLET ORAL at 23:51

## 2025-05-21 RX ADMIN — CETIRIZINE HYDROCHLORIDE 10 MG: 10 TABLET, FILM COATED ORAL at 09:17

## 2025-05-21 RX ADMIN — SODIUM CHLORIDE 40 MG: 9 INJECTION INTRAMUSCULAR; INTRAVENOUS; SUBCUTANEOUS at 21:11

## 2025-05-21 RX ADMIN — ALUMINUM HYDROXIDE, MAGNESIUM HYDROXIDE, AND SIMETHICONE 30 ML: 200; 200; 20 SUSPENSION ORAL at 11:52

## 2025-05-21 RX ADMIN — MAGNESIUM SULFATE HEPTAHYDRATE 2000 MG: 40 INJECTION, SOLUTION INTRAVENOUS at 16:57

## 2025-05-21 RX ADMIN — Medication 5 MG: at 21:12

## 2025-05-21 RX ADMIN — DILTIAZEM HYDROCHLORIDE 30 MG: 30 TABLET ORAL at 09:43

## 2025-05-21 RX ADMIN — DOXYCYCLINE 100 MG: 100 CAPSULE ORAL at 09:17

## 2025-05-21 RX ADMIN — ACETAMINOPHEN 650 MG: 325 TABLET ORAL at 04:42

## 2025-05-21 RX ADMIN — SODIUM CHLORIDE, PRESERVATIVE FREE 10 ML: 5 INJECTION INTRAVENOUS at 09:18

## 2025-05-21 RX ADMIN — SODIUM CHLORIDE, PRESERVATIVE FREE 10 ML: 5 INJECTION INTRAVENOUS at 04:33

## 2025-05-21 RX ADMIN — INSULIN LISPRO 2 UNITS: 100 INJECTION, SOLUTION INTRAVENOUS; SUBCUTANEOUS at 11:52

## 2025-05-21 RX ADMIN — POTASSIUM CHLORIDE AND SODIUM CHLORIDE: 900; 150 INJECTION, SOLUTION INTRAVENOUS at 04:32

## 2025-05-21 RX ADMIN — DOCUSATE SODIUM 100 MG: 100 CAPSULE, LIQUID FILLED ORAL at 21:12

## 2025-05-21 RX ADMIN — DILTIAZEM HYDROCHLORIDE 60 MG: 30 TABLET ORAL at 16:53

## 2025-05-21 RX ADMIN — IPRATROPIUM BROMIDE AND ALBUTEROL SULFATE 1 DOSE: .5; 3 SOLUTION RESPIRATORY (INHALATION) at 11:45

## 2025-05-21 RX ADMIN — GUAIFENESIN 600 MG: 600 TABLET, EXTENDED RELEASE ORAL at 21:12

## 2025-05-21 RX ADMIN — IPRATROPIUM BROMIDE AND ALBUTEROL SULFATE 1 DOSE: .5; 3 SOLUTION RESPIRATORY (INHALATION) at 20:17

## 2025-05-21 RX ADMIN — ENOXAPARIN SODIUM 40 MG: 100 INJECTION SUBCUTANEOUS at 09:18

## 2025-05-21 RX ADMIN — DOXYCYCLINE 100 MG: 100 CAPSULE ORAL at 21:12

## 2025-05-21 RX ADMIN — IPRATROPIUM BROMIDE AND ALBUTEROL SULFATE 1 DOSE: .5; 3 SOLUTION RESPIRATORY (INHALATION) at 04:17

## 2025-05-21 RX ADMIN — IPRATROPIUM BROMIDE AND ALBUTEROL SULFATE 1 DOSE: .5; 3 SOLUTION RESPIRATORY (INHALATION) at 16:36

## 2025-05-21 RX ADMIN — WATER 60 MG: 1 INJECTION INTRAMUSCULAR; INTRAVENOUS; SUBCUTANEOUS at 21:11

## 2025-05-21 RX ADMIN — WATER 60 MG: 1 INJECTION INTRAMUSCULAR; INTRAVENOUS; SUBCUTANEOUS at 13:55

## 2025-05-21 RX ADMIN — GUAIFENESIN 600 MG: 600 TABLET, EXTENDED RELEASE ORAL at 09:18

## 2025-05-21 RX ADMIN — IPRATROPIUM BROMIDE AND ALBUTEROL SULFATE 1 DOSE: .5; 3 SOLUTION RESPIRATORY (INHALATION) at 07:35

## 2025-05-21 RX ADMIN — Medication 2 PUFF: at 07:35

## 2025-05-21 RX ADMIN — ACETAMINOPHEN 650 MG: 325 TABLET ORAL at 13:56

## 2025-05-21 RX ADMIN — SODIUM CHLORIDE 40 MG: 9 INJECTION INTRAMUSCULAR; INTRAVENOUS; SUBCUTANEOUS at 09:20

## 2025-05-21 RX ADMIN — SODIUM CHLORIDE, PRESERVATIVE FREE 10 ML: 5 INJECTION INTRAVENOUS at 21:12

## 2025-05-21 RX ADMIN — ALUMINUM HYDROXIDE, MAGNESIUM HYDROXIDE, AND SIMETHICONE 30 ML: 200; 200; 20 SUSPENSION ORAL at 07:52

## 2025-05-21 ASSESSMENT — PAIN SCALES - GENERAL
PAINLEVEL_OUTOF10: 0
PAINLEVEL_OUTOF10: 2
PAINLEVEL_OUTOF10: 6
PAINLEVEL_OUTOF10: 0
PAINLEVEL_OUTOF10: 0
PAINLEVEL_OUTOF10: 6

## 2025-05-21 ASSESSMENT — PAIN SCALES - WONG BAKER: WONGBAKER_NUMERICALRESPONSE: NO HURT

## 2025-05-21 ASSESSMENT — PAIN DESCRIPTION - DESCRIPTORS
DESCRIPTORS: ACHING
DESCRIPTORS: ACHING;THROBBING

## 2025-05-21 ASSESSMENT — ENCOUNTER SYMPTOMS
EYES NEGATIVE: 1
ALLERGIC/IMMUNOLOGIC NEGATIVE: 1
GASTROINTESTINAL NEGATIVE: 1
RESPIRATORY NEGATIVE: 1

## 2025-05-21 ASSESSMENT — PAIN - FUNCTIONAL ASSESSMENT: PAIN_FUNCTIONAL_ASSESSMENT: ACTIVITIES ARE NOT PREVENTED

## 2025-05-21 ASSESSMENT — PAIN DESCRIPTION - LOCATION
LOCATION: HEAD
LOCATION: HEAD

## 2025-05-22 VITALS
OXYGEN SATURATION: 100 % | WEIGHT: 146.5 LBS | RESPIRATION RATE: 20 BRPM | HEIGHT: 66 IN | SYSTOLIC BLOOD PRESSURE: 136 MMHG | TEMPERATURE: 98.1 F | BODY MASS INDEX: 23.54 KG/M2 | DIASTOLIC BLOOD PRESSURE: 67 MMHG | HEART RATE: 108 BPM

## 2025-05-22 LAB
ANION GAP SERPL CALC-SCNC: 7 MMOL/L (ref 2–12)
BASOPHILS # BLD: 0.02 K/UL (ref 0–0.1)
BASOPHILS NFR BLD: 0.1 % (ref 0–1)
BUN SERPL-MCNC: 10 MG/DL (ref 6–20)
BUN/CREAT SERPL: 11 (ref 12–20)
CA-I BLD-MCNC: 9.7 MG/DL (ref 8.5–10.1)
CHLORIDE SERPL-SCNC: 98 MMOL/L (ref 97–108)
CO2 SERPL-SCNC: 30 MMOL/L (ref 21–32)
CREAT SERPL-MCNC: 0.94 MG/DL (ref 0.55–1.02)
DIFFERENTIAL METHOD BLD: ABNORMAL
EOSINOPHIL # BLD: 0.09 K/UL (ref 0–0.4)
EOSINOPHIL NFR BLD: 0.5 % (ref 0–7)
ERYTHROCYTE [DISTWIDTH] IN BLOOD BY AUTOMATED COUNT: 13.7 % (ref 11.5–14.5)
GLUCOSE BLD STRIP.AUTO-MCNC: 162 MG/DL (ref 65–100)
GLUCOSE BLD STRIP.AUTO-MCNC: 166 MG/DL (ref 65–100)
GLUCOSE SERPL-MCNC: 145 MG/DL (ref 65–100)
HCT VFR BLD AUTO: 27.4 % (ref 35–47)
HGB BLD-MCNC: 9.1 G/DL (ref 11.5–16)
IMM GRANULOCYTES # BLD AUTO: 0.14 K/UL (ref 0–0.04)
IMM GRANULOCYTES NFR BLD AUTO: 0.8 % (ref 0–0.5)
LYMPHOCYTES # BLD: 0.36 K/UL (ref 0.8–3.5)
LYMPHOCYTES NFR BLD: 2 % (ref 12–49)
MAGNESIUM SERPL-MCNC: 2.7 MG/DL (ref 1.6–2.4)
MCH RBC QN AUTO: 30.4 PG (ref 26–34)
MCHC RBC AUTO-ENTMCNC: 33.2 G/DL (ref 30–36.5)
MCV RBC AUTO: 91.6 FL (ref 80–99)
MONOCYTES # BLD: 0.29 K/UL (ref 0–1)
MONOCYTES NFR BLD: 1.6 % (ref 5–13)
NEUTS SEG # BLD: 17.1 K/UL (ref 1.8–8)
NEUTS SEG NFR BLD: 95 % (ref 32–75)
NRBC # BLD: 0 K/UL (ref 0–0.01)
NRBC BLD-RTO: 0 PER 100 WBC
PERFORMED BY:: ABNORMAL
PERFORMED BY:: ABNORMAL
PLATELET # BLD AUTO: 303 K/UL (ref 150–400)
PMV BLD AUTO: 9.7 FL (ref 8.9–12.9)
POTASSIUM SERPL-SCNC: 4.4 MMOL/L (ref 3.5–5.1)
RBC # BLD AUTO: 2.99 M/UL (ref 3.8–5.2)
RBC MORPH BLD: ABNORMAL
SODIUM SERPL-SCNC: 135 MMOL/L (ref 136–145)
WBC # BLD AUTO: 18 K/UL (ref 3.6–11)

## 2025-05-22 PROCEDURE — 2500000003 HC RX 250 WO HCPCS: Performed by: HOSPITALIST

## 2025-05-22 PROCEDURE — 80048 BASIC METABOLIC PNL TOTAL CA: CPT

## 2025-05-22 PROCEDURE — 6370000000 HC RX 637 (ALT 250 FOR IP): Performed by: HOSPITALIST

## 2025-05-22 PROCEDURE — 2700000000 HC OXYGEN THERAPY PER DAY

## 2025-05-22 PROCEDURE — 6360000002 HC RX W HCPCS: Performed by: HOSPITALIST

## 2025-05-22 PROCEDURE — 82962 GLUCOSE BLOOD TEST: CPT

## 2025-05-22 PROCEDURE — 94760 N-INVAS EAR/PLS OXIMETRY 1: CPT

## 2025-05-22 PROCEDURE — 85025 COMPLETE CBC W/AUTO DIFF WBC: CPT

## 2025-05-22 PROCEDURE — 36415 COLL VENOUS BLD VENIPUNCTURE: CPT

## 2025-05-22 PROCEDURE — 94640 AIRWAY INHALATION TREATMENT: CPT

## 2025-05-22 PROCEDURE — 83735 ASSAY OF MAGNESIUM: CPT

## 2025-05-22 RX ORDER — DOXYCYCLINE 100 MG/1
100 CAPSULE ORAL EVERY 12 HOURS SCHEDULED
Qty: 6 CAPSULE | Refills: 0 | Status: SHIPPED | OUTPATIENT
Start: 2025-05-22 | End: 2025-05-25

## 2025-05-22 RX ORDER — DILTIAZEM HYDROCHLORIDE 120 MG/1
120 CAPSULE, COATED, EXTENDED RELEASE ORAL 2 TIMES DAILY
Status: DISCONTINUED | OUTPATIENT
Start: 2025-05-22 | End: 2025-05-22 | Stop reason: HOSPADM

## 2025-05-22 RX ORDER — BENZONATATE 200 MG/1
200 CAPSULE ORAL 3 TIMES DAILY
Qty: 21 CAPSULE | Refills: 0 | Status: SHIPPED | OUTPATIENT
Start: 2025-05-22 | End: 2025-05-29

## 2025-05-22 RX ORDER — PREDNISONE 20 MG/1
40 TABLET ORAL DAILY
Status: DISCONTINUED | OUTPATIENT
Start: 2025-05-22 | End: 2025-05-22 | Stop reason: HOSPADM

## 2025-05-22 RX ORDER — PREDNISONE 20 MG/1
TABLET ORAL
Qty: 18 TABLET | Refills: 0 | Status: SHIPPED | OUTPATIENT
Start: 2025-05-23 | End: 2025-06-07

## 2025-05-22 RX ORDER — PREDNISONE 10 MG/1
10 TABLET ORAL EVERY OTHER DAY
Qty: 30 TABLET | Refills: 1 | Status: SHIPPED | OUTPATIENT
Start: 2025-06-07

## 2025-05-22 RX ORDER — HYDROXYZINE HYDROCHLORIDE 25 MG/1
25 TABLET, FILM COATED ORAL 3 TIMES DAILY PRN
Qty: 30 TABLET | Refills: 0 | Status: SHIPPED | OUTPATIENT
Start: 2025-05-22 | End: 2025-06-01

## 2025-05-22 RX ORDER — PANTOPRAZOLE SODIUM 40 MG/1
40 TABLET, DELAYED RELEASE ORAL
Status: DISCONTINUED | OUTPATIENT
Start: 2025-05-22 | End: 2025-05-22 | Stop reason: HOSPADM

## 2025-05-22 RX ORDER — DILTIAZEM HYDROCHLORIDE 180 MG/1
180 CAPSULE, COATED, EXTENDED RELEASE ORAL 2 TIMES DAILY
Qty: 60 CAPSULE | Refills: 0 | Status: SHIPPED | OUTPATIENT
Start: 2025-05-22

## 2025-05-22 RX ADMIN — IPRATROPIUM BROMIDE AND ALBUTEROL SULFATE 1 DOSE: .5; 3 SOLUTION RESPIRATORY (INHALATION) at 04:12

## 2025-05-22 RX ADMIN — Medication 2 PUFF: at 07:19

## 2025-05-22 RX ADMIN — IPRATROPIUM BROMIDE AND ALBUTEROL SULFATE 1 DOSE: .5; 3 SOLUTION RESPIRATORY (INHALATION) at 11:20

## 2025-05-22 RX ADMIN — CETIRIZINE HYDROCHLORIDE 10 MG: 10 TABLET, FILM COATED ORAL at 08:39

## 2025-05-22 RX ADMIN — DOCUSATE SODIUM 100 MG: 100 CAPSULE, LIQUID FILLED ORAL at 08:40

## 2025-05-22 RX ADMIN — DILTIAZEM HYDROCHLORIDE 60 MG: 30 TABLET ORAL at 05:45

## 2025-05-22 RX ADMIN — ASPIRIN 81 MG: 81 TABLET, COATED ORAL at 08:39

## 2025-05-22 RX ADMIN — SODIUM CHLORIDE, PRESERVATIVE FREE 10 ML: 5 INJECTION INTRAVENOUS at 08:40

## 2025-05-22 RX ADMIN — ENOXAPARIN SODIUM 40 MG: 100 INJECTION SUBCUTANEOUS at 08:38

## 2025-05-22 RX ADMIN — DOXYCYCLINE 100 MG: 100 CAPSULE ORAL at 08:40

## 2025-05-22 RX ADMIN — DILTIAZEM HYDROCHLORIDE 120 MG: 120 CAPSULE, EXTENDED RELEASE ORAL at 08:39

## 2025-05-22 RX ADMIN — IPRATROPIUM BROMIDE AND ALBUTEROL SULFATE 1 DOSE: .5; 3 SOLUTION RESPIRATORY (INHALATION) at 07:19

## 2025-05-22 RX ADMIN — IPRATROPIUM BROMIDE AND ALBUTEROL SULFATE 1 DOSE: .5; 3 SOLUTION RESPIRATORY (INHALATION) at 00:19

## 2025-05-22 RX ADMIN — PREDNISONE 40 MG: 20 TABLET ORAL at 08:40

## 2025-05-22 RX ADMIN — ATORVASTATIN CALCIUM 20 MG: 20 TABLET, FILM COATED ORAL at 08:39

## 2025-05-22 RX ADMIN — GUAIFENESIN 600 MG: 600 TABLET, EXTENDED RELEASE ORAL at 08:40

## 2025-05-22 RX ADMIN — PANTOPRAZOLE SODIUM 40 MG: 40 TABLET, DELAYED RELEASE ORAL at 08:39

## 2025-05-22 RX ADMIN — FUROSEMIDE 20 MG: 20 TABLET ORAL at 08:39

## 2025-05-22 RX ADMIN — WATER 60 MG: 1 INJECTION INTRAMUSCULAR; INTRAVENOUS; SUBCUTANEOUS at 05:45

## 2025-05-22 ASSESSMENT — PAIN SCALES - GENERAL: PAINLEVEL_OUTOF10: 0

## 2025-05-22 NOTE — DISCHARGE INSTRUCTIONS
NOTIFY YOUR PHYSICIAN FOR ANY OF THE FOLLOWING:   Fever over 101 degrees for 24 hours.   Chest pain, shortness of breath, fever, chills, nausea, vomiting, diarrhea, change in mentation, falling, weakness, bleeding. Severe pain or pain not relieved by medications, as well as any other signs or symptoms that you may have questions about    Monitor vital signs especially heart rate has started new medication to help control your heart rate  Continue current steroid tapering and then once completed on June 5 2 restart your every other day steroids  Placed on doxycycline 100 mg oral twice daily to complete 5-day course  Restarted your lisinopril 20 mg and Lasix 20 mg but hold Norvasc and only restart if blood pressure is elevated above 150/90  Continue chronic 2 L nasal cannula  While in the emergency room they placed you on BiPAP and if you felt relief with that may want to talk with pulmonologist about evaluating you for BiPAP orTriligy device at home        SURVEY  It was a pleasure to care for you here at [facility] and I am glad you are feeling better to get home.  When you get home you should receive a survey either via email or in postal mail and appreciate if you are able to fill it out and send it back at your convenience.   Here at [facility] we always strive to give \"excellent\" care and hope you are able to express that in the survey    Thank you   Dr Deb GARCIA

## 2025-05-22 NOTE — PLAN OF CARE
Problem: Chronic Conditions and Co-morbidities  Goal: Patient's chronic conditions and co-morbidity symptoms are monitored and maintained or improved  5/21/2025 0733 by Dusty Corey, RN  Outcome: Progressing  Flowsheets (Taken 5/20/2025 1841)  Care Plan - Patient's Chronic Conditions and Co-Morbidity Symptoms are Monitored and Maintained or Improved:   Monitor and assess patient's chronic conditions and comorbid symptoms for stability, deterioration, or improvement   Collaborate with multidisciplinary team to address chronic and comorbid conditions and prevent exacerbation or deterioration  5/20/2025 1837 by Dusty Corey, RN  Outcome: Progressing     
  Problem: Chronic Conditions and Co-morbidities  Goal: Patient's chronic conditions and co-morbidity symptoms are monitored and maintained or improved  5/22/2025 0815 by Germaine Smiley, RN  Outcome: Progressing  Flowsheets  Taken 5/22/2025 0815  Care Plan - Patient's Chronic Conditions and Co-Morbidity Symptoms are Monitored and Maintained or Improved:   Monitor and assess patient's chronic conditions and comorbid symptoms for stability, deterioration, or improvement   Collaborate with multidisciplinary team to address chronic and comorbid conditions and prevent exacerbation or deterioration   Update acute care plan with appropriate goals if chronic or comorbid symptoms are exacerbated and prevent overall improvement and discharge  Taken 5/22/2025 0711  Care Plan - Patient's Chronic Conditions and Co-Morbidity Symptoms are Monitored and Maintained or Improved: Monitor and assess patient's chronic conditions and comorbid symptoms for stability, deterioration, or improvement  5/21/2025 2127 by Cristina Orr LPN  Outcome: Progressing  Flowsheets (Taken 5/21/2025 0735 by Dusty Corey RN)  Care Plan - Patient's Chronic Conditions and Co-Morbidity Symptoms are Monitored and Maintained or Improved:   Monitor and assess patient's chronic conditions and comorbid symptoms for stability, deterioration, or improvement   Collaborate with multidisciplinary team to address chronic and comorbid conditions and prevent exacerbation or deterioration     Problem: Pain  Goal: Verbalizes/displays adequate comfort level or baseline comfort level  5/21/2025 2127 by Cristina Orr LPN  Outcome: Progressing     Problem: Safety - Adult  Goal: Free from fall injury  5/21/2025 2127 by Cristina Orr LPN  Outcome: Progressing     Problem: Discharge Planning  Goal: Discharge to home or other facility with appropriate resources  5/21/2025 2127 by Cristina Orr LPN  Outcome: Progressing  Flowsheets (Taken 5/21/2025 0735 by Madhav 
  Problem: Chronic Conditions and Co-morbidities  Goal: Patient's chronic conditions and co-morbidity symptoms are monitored and maintained or improved  Outcome: Progressing     Problem: Pain  Goal: Verbalizes/displays adequate comfort level or baseline comfort level  Outcome: Progressing     Problem: Safety - Adult  Goal: Free from fall injury  Outcome: Progressing     Problem: Discharge Planning  Goal: Discharge to home or other facility with appropriate resources  Outcome: Progressing     
  Problem: Chronic Conditions and Co-morbidities  Goal: Patient's chronic conditions and co-morbidity symptoms are monitored and maintained or improved  Outcome: Progressing  Flowsheets  Taken 5/22/2025 0815  Care Plan - Patient's Chronic Conditions and Co-Morbidity Symptoms are Monitored and Maintained or Improved:   Monitor and assess patient's chronic conditions and comorbid symptoms for stability, deterioration, or improvement   Collaborate with multidisciplinary team to address chronic and comorbid conditions and prevent exacerbation or deterioration   Update acute care plan with appropriate goals if chronic or comorbid symptoms are exacerbated and prevent overall improvement and discharge  Taken 5/22/2025 0711  Care Plan - Patient's Chronic Conditions and Co-Morbidity Symptoms are Monitored and Maintained or Improved: Monitor and assess patient's chronic conditions and comorbid symptoms for stability, deterioration, or improvement     Problem: Chronic Conditions and Co-morbidities  Goal: Patient's chronic conditions and co-morbidity symptoms are monitored and maintained or improved  5/22/2025 1233 by Germaine Smiley RN  Outcome: Completed  5/22/2025 0815 by Germaine Smiley RN  Outcome: Progressing  Flowsheets  Taken 5/22/2025 0815  Care Plan - Patient's Chronic Conditions and Co-Morbidity Symptoms are Monitored and Maintained or Improved:   Monitor and assess patient's chronic conditions and comorbid symptoms for stability, deterioration, or improvement   Collaborate with multidisciplinary team to address chronic and comorbid conditions and prevent exacerbation or deterioration   Update acute care plan with appropriate goals if chronic or comorbid symptoms are exacerbated and prevent overall improvement and discharge  Taken 5/22/2025 0711  Care Plan - Patient's Chronic Conditions and Co-Morbidity Symptoms are Monitored and Maintained or Improved: Monitor and assess patient's chronic conditions and 
  Problem: Safety - Adult  Goal: Free from fall injury  5/20/2025 2345 by Gladys Morrison LPN  Outcome: Progressing  5/20/2025 1837 by Dusty Corey RN  Outcome: Progressing     
Progressing  Flowsheets (Taken 5/20/2025 4167)  Discharge to home or other facility with appropriate resources:   Identify barriers to discharge with patient and caregiver   Arrange for needed discharge resources and transportation as appropriate

## 2025-05-22 NOTE — PROGRESS NOTES
Bedside shift change report given to josesito michael(oncoming nurse) by manuel  (offgoing nurse). Report included the following information Nurse Handoff Report.     
Discharge instructions, upcoming appointments and medications discussed with patient and she stated she understood.  Patient escorted to taxi via w/chair.  
Lying in bed talking to visitors.  O2 in use.  No c/o pain.   
Patient arrived on floor via wheelchair, skin intact, 2l-NC  in place, family at bedside, ambulates with assistance, denies any pain and MD at bedside.  
Patient has several family members at bedside.  
Patient in bed resting, HOB elevated, IVF infusing, and call light in reach.  
Pt have rest throughout night voices no concerns 02 at 2 liters intact sinus tach on cardiac monitor.  
Shift assessment reviewed.  
Shift assessment reviewed.  
Spiritual Health History and Assessment/Progress Note  [facility]    Initial Encounter,  , Adjustment to illness, Life Adjustments,      Name: Virgen Hernandez MRN: [id number]    Age: 70 y.o.     Sex: female   Language: English   Orthodoxy: Sikhism   Acute on chronic hypoxic respiratory failure (HCC)     Date: 5/21/2025            Total Time Calculated: 25 min              Spiritual Assessment began in [address]        Referral/Consult From: Rounding   Encounter Overview/Reason: Initial Encounter  Service Provided For: Patient    Daiana, Belief, Meaning:   Patient identifies as spiritual, is connected with a daiana tradition or spiritual practice, and has beliefs or practices that help with coping during difficult times  Family/Friends No family/friends present      Importance and Influence:  Patient has spiritual/personal beliefs that influence decisions regarding their health  Family/Friends No family/friends present    Community:  Patient is connected with a spiritual community and feels well-supported. Support system includes: Spouse/Partner, Children, Friends, and Extended family  Family/Friends No family/friends present    Assessment and Plan of Care:   This  in to visit this Patient at this time for Initial Spiritual Health Assessment. Patient is alone in her room resting in bed playing a game. States she is feeling much better than when first admitted. Patient shared life/review/legacy including family, work, being of Sikhism daiana, and reason for being admitted. Patient shared emotions and feelings of the death of two siblings. States she believes in God and prayer and very thankful for her partner, family and friends. Listened attentively providing time and space for reflection and sharing of life's sacred events. Provided expressions of comfort and encouragement. Maintained sustaining ministry of presence. Patient smiled and laughed intermittently and 
sinus tachycardia started on Cardizem 30 mg every 6 hours and increased to 60 mg every 6 hours and monitor on     Hypokalemia  - Mildly low at 3.4 and again most likely secondary to Lasix dosing  - Supplement with 20 mEq KCl and current IV fluids  -Repeat is at 4.5  - Follow repeat labs in a.m.     Leukocytosis  - Of 12.9 possibly reactive  - Obtain procalcitonin  - Chest x-ray and CTA are negative  - Obtain urinalysis  - Doxycycline 100 mg oral twice daily  - Follow repeat CBC in a.m.  -Repeat white blood count on 5/21 normal     Anemia  - Chronic with current hemoglobin of 10.9/32.5 with MCV of 91.5  - Iron B12 and folate all in normal  - Initial CBC decreased down to 8.7 possibly dilutional as repeat was at 9  - Obtain stool for occult blood     Hypertension  - Chronic and noted to be on lisinopril 20 mg amlodipine 10 and Lasix 20 mg daily  - Currently well-controlled and monitor vitals every 4 hours  - Hydralazine 10 mg IV every 6 hours as needed for systolic blood pressure greater than 160  - Reevaluate starting home medications in a.m. tomorrow  -5/21: Continue to hold lisinopril and amlodipine will restart Lasix 20 mg oral daily in a.m. placed on Cardizem every 6 hours secondary to continued  tachycardia     Diabetes  - Monitor with Accu-Cheks AC and at bedtime and sliding scale  - Previous A1c's have been well-controlled at 4.9     GERD  - On chronic Protonix daily but CTA shows nonspecific distal wall thickening possibly secondary to noted small to moderate hiatal hernia  - Start Protonix 40 mg IV daily first dose now  - mylanta 30ml oral TID before meals      DVT prophylaxis  -Lovenox     CODE STATUS: DNR, confirmed that she has signed DNR form and continues for her to be active  Patient designates her significant other as well as her children especially her son as medical contacts         Discussion/MDM: Patient with multiple medical comorbidities, each with high likelihood for morbidity and mortality if

## 2025-05-27 ENCOUNTER — OFFICE VISIT (OUTPATIENT)
Facility: CLINIC | Age: 70
End: 2025-05-27
Payer: MEDICAID

## 2025-05-27 VITALS
TEMPERATURE: 97.7 F | SYSTOLIC BLOOD PRESSURE: 107 MMHG | DIASTOLIC BLOOD PRESSURE: 64 MMHG | HEIGHT: 66 IN | OXYGEN SATURATION: 97 % | RESPIRATION RATE: 20 BRPM | HEART RATE: 71 BPM | BODY MASS INDEX: 23.14 KG/M2 | WEIGHT: 144 LBS

## 2025-05-27 DIAGNOSIS — D64.9 ANEMIA, UNSPECIFIED TYPE: ICD-10-CM

## 2025-05-27 DIAGNOSIS — E87.1 HYPONATREMIA: ICD-10-CM

## 2025-05-27 DIAGNOSIS — E87.6 HYPOKALEMIA: ICD-10-CM

## 2025-05-27 DIAGNOSIS — J96.20 ACUTE ON CHRONIC RESPIRATORY FAILURE, UNSPECIFIED WHETHER WITH HYPOXIA OR HYPERCAPNIA (HCC): Primary | ICD-10-CM

## 2025-05-27 DIAGNOSIS — E11.9 TYPE 2 DIABETES MELLITUS WITHOUT COMPLICATION, WITHOUT LONG-TERM CURRENT USE OF INSULIN (HCC): ICD-10-CM

## 2025-05-27 DIAGNOSIS — I10 ESSENTIAL (PRIMARY) HYPERTENSION: ICD-10-CM

## 2025-05-27 DIAGNOSIS — K21.9 GASTRO-ESOPHAGEAL REFLUX DISEASE WITHOUT ESOPHAGITIS: ICD-10-CM

## 2025-05-27 DIAGNOSIS — J44.1 COPD EXACERBATION (HCC): ICD-10-CM

## 2025-05-27 DIAGNOSIS — E78.2 MIXED HYPERLIPIDEMIA: ICD-10-CM

## 2025-05-27 DIAGNOSIS — Z09 HOSPITAL DISCHARGE FOLLOW-UP: ICD-10-CM

## 2025-05-27 DIAGNOSIS — R00.0 SINUS TACHYCARDIA: ICD-10-CM

## 2025-05-27 PROCEDURE — 1123F ACP DISCUSS/DSCN MKR DOCD: CPT | Performed by: NURSE PRACTITIONER

## 2025-05-27 PROCEDURE — 3074F SYST BP LT 130 MM HG: CPT | Performed by: NURSE PRACTITIONER

## 2025-05-27 PROCEDURE — 1111F DSCHRG MED/CURRENT MED MERGE: CPT | Performed by: NURSE PRACTITIONER

## 2025-05-27 PROCEDURE — 99214 OFFICE O/P EST MOD 30 MIN: CPT | Performed by: NURSE PRACTITIONER

## 2025-05-27 PROCEDURE — 3078F DIAST BP <80 MM HG: CPT | Performed by: NURSE PRACTITIONER

## 2025-05-27 PROCEDURE — 3044F HG A1C LEVEL LT 7.0%: CPT | Performed by: NURSE PRACTITIONER

## 2025-05-27 RX ORDER — DILTIAZEM HYDROCHLORIDE 180 MG/1
CAPSULE, EXTENDED RELEASE ORAL
COMMUNITY
Start: 2025-05-22 | End: 2025-05-27 | Stop reason: SDUPTHER

## 2025-05-27 ASSESSMENT — ENCOUNTER SYMPTOMS
EYE PAIN: 0
PHOTOPHOBIA: 0
SHORTNESS OF BREATH: 1
ABDOMINAL PAIN: 0
CHOKING: 0
TROUBLE SWALLOWING: 0
EYE DISCHARGE: 0
WHEEZING: 1
DIARRHEA: 0
ABDOMINAL DISTENTION: 0
CONSTIPATION: 0
EYE ITCHING: 0
SORE THROAT: 0
COLOR CHANGE: 0
APNEA: 0
EYE REDNESS: 0
FACIAL SWELLING: 0
CHEST TIGHTNESS: 0
NAUSEA: 0
BLOOD IN STOOL: 0
COUGH: 1
SINUS PAIN: 0
VOMITING: 0
STRIDOR: 0

## 2025-05-27 NOTE — PROGRESS NOTES
Post-Discharge Transitional Care  Follow Up      Virgen Hernandez   YOB: 1955    Date of Office Visit:  5/27/2025  Date of Hospital Admission: 5/20/25  Date of Hospital Discharge: 5/22/25  Risk of hospital readmission (high >=14%. Medium >=10%) :Readmission Risk Score: 11.7      Care management risk score Rising risk (score 2-5) and Complex Care (Scores >=6): No Risk Score On File     Non face to face  following discharge, date last encounter closed (first attempt may have been earlier): *No documented post hospital discharge outreach found in the last 14 days    Call initiated 2 business days of discharge: *No response recorded in the last 14 days    ASSESSMENT/PLAN:   Acute on chronic respiratory failure, unspecified whether with hypoxia or hypercapnia (HCC)  Resolved.  Continues home oxygen at 2lpm nc to keep sats >92%    COPD exacerbation (HCC)  To continue prednisone taper and then resume usual prednisone dose 10mg every other day.  Continue Trelegy as directed.  Continue albuterol inhaler/nebulizer prn for wheezing/sob.  Continue care with pulmonology.    Hyponatremia  Lab Results   Component Value Date/Time     05/22/2025 07:39 AM    K 4.4 05/22/2025 07:39 AM    CL 98 05/22/2025 07:39 AM    CO2 30 05/22/2025 07:39 AM    BUN 10 05/22/2025 07:39 AM    CREATININE 0.94 05/22/2025 07:39 AM    GLUCOSE 145 05/22/2025 07:39 AM    GLUCOSE 88 03/14/2025 01:51 PM    CALCIUM 9.7 05/22/2025 07:39 AM    LABGLOM 66 05/22/2025 07:39 AM    LABGLOM 78 04/23/2024 11:25 AM    Resolved.    Sinus tachycardia  Improved and HR stable at 71.  Continue diltiazem 180mg BID.    Hypokalemia  Lab Results   Component Value Date/Time     05/22/2025 07:39 AM    K 4.4 05/22/2025 07:39 AM    CL 98 05/22/2025 07:39 AM    CO2 30 05/22/2025 07:39 AM    BUN 10 05/22/2025 07:39 AM    CREATININE 0.94 05/22/2025 07:39 AM    GLUCOSE 145 05/22/2025 07:39 AM    GLUCOSE 88 03/14/2025 01:51 PM    CALCIUM 9.7 05/22/2025 07:39

## 2025-06-08 NOTE — ED TRIAGE NOTES
Pt reports chest tightness and SOB that began this morning. Pt was washing self when it occurred. no

## 2025-06-13 ENCOUNTER — RESULTS FOLLOW-UP (OUTPATIENT)
Facility: CLINIC | Age: 70
End: 2025-06-13

## 2025-06-13 LAB
BASOPHILS # BLD AUTO: 0 X10E3/UL (ref 0–0.2)
BASOPHILS NFR BLD AUTO: 0 %
BUN SERPL-MCNC: 13 MG/DL (ref 8–27)
BUN/CREAT SERPL: 12 (ref 12–28)
CALCIUM SERPL-MCNC: 9.3 MG/DL (ref 8.7–10.3)
CHLORIDE SERPL-SCNC: 97 MMOL/L (ref 96–106)
CO2 SERPL-SCNC: 25 MMOL/L (ref 20–29)
CREAT SERPL-MCNC: 1.05 MG/DL (ref 0.57–1)
EGFRCR SERPLBLD CKD-EPI 2021: 58 ML/MIN/1.73
EOSINOPHIL # BLD AUTO: 0 X10E3/UL (ref 0–0.4)
EOSINOPHIL NFR BLD AUTO: 1 %
ERYTHROCYTE [DISTWIDTH] IN BLOOD BY AUTOMATED COUNT: 13.4 % (ref 11.7–15.4)
GLUCOSE SERPL-MCNC: 106 MG/DL (ref 70–99)
HCT VFR BLD AUTO: 33.5 % (ref 34–46.6)
HGB BLD-MCNC: 10.9 G/DL (ref 11.1–15.9)
IMM GRANULOCYTES # BLD AUTO: 0 X10E3/UL (ref 0–0.1)
IMM GRANULOCYTES NFR BLD AUTO: 0 %
LYMPHOCYTES # BLD AUTO: 0.5 X10E3/UL (ref 0.7–3.1)
LYMPHOCYTES NFR BLD AUTO: 9 %
MCH RBC QN AUTO: 31.1 PG (ref 26.6–33)
MCHC RBC AUTO-ENTMCNC: 32.5 G/DL (ref 31.5–35.7)
MCV RBC AUTO: 96 FL (ref 79–97)
MONOCYTES # BLD AUTO: 0.2 X10E3/UL (ref 0.1–0.9)
MONOCYTES NFR BLD AUTO: 4 %
NEUTROPHILS # BLD AUTO: 4.8 X10E3/UL (ref 1.4–7)
NEUTROPHILS NFR BLD AUTO: 86 %
PLATELET # BLD AUTO: 261 X10E3/UL (ref 150–450)
POTASSIUM SERPL-SCNC: 5.2 MMOL/L (ref 3.5–5.2)
RBC # BLD AUTO: 3.5 X10E6/UL (ref 3.77–5.28)
SODIUM SERPL-SCNC: 139 MMOL/L (ref 134–144)
WBC # BLD AUTO: 5.6 X10E3/UL (ref 3.4–10.8)

## 2025-06-20 ENCOUNTER — TELEPHONE (OUTPATIENT)
Facility: CLINIC | Age: 70
End: 2025-06-20

## 2025-06-20 DIAGNOSIS — I10 ESSENTIAL (PRIMARY) HYPERTENSION: ICD-10-CM

## 2025-06-20 RX ORDER — LISINOPRIL 20 MG/1
20 TABLET ORAL DAILY
Qty: 90 TABLET | Refills: 0 | Status: SHIPPED | OUTPATIENT
Start: 2025-06-20

## 2025-06-20 RX ORDER — PREDNISONE 10 MG/1
10 TABLET ORAL EVERY OTHER DAY
Qty: 30 TABLET | Refills: 1 | Status: SHIPPED | OUTPATIENT
Start: 2025-06-20

## 2025-06-20 RX ORDER — FUROSEMIDE 20 MG/1
20 TABLET ORAL DAILY
Qty: 90 TABLET | Refills: 0 | Status: SHIPPED | OUTPATIENT
Start: 2025-06-20

## 2025-06-20 NOTE — TELEPHONE ENCOUNTER
Patient is requesting refills on the following medications;    predniSONE (DELTASONE) 10 MG tablet     furosemide (LASIX) 20 MG tablet     lisinopril (PRINIVIL;ZESTRIL) 20 MG tablet     Pt's pharmacy is [facility]

## 2025-06-23 ENCOUNTER — TRANSCRIBE ORDERS (OUTPATIENT)
Facility: HOSPITAL | Age: 70
End: 2025-06-23

## 2025-06-23 ENCOUNTER — TELEPHONE (OUTPATIENT)
Facility: CLINIC | Age: 70
End: 2025-06-23

## 2025-06-23 DIAGNOSIS — M54.50 LOW BACK PAIN, UNSPECIFIED BACK PAIN LATERALITY, UNSPECIFIED CHRONICITY, UNSPECIFIED WHETHER SCIATICA PRESENT: Primary | ICD-10-CM

## 2025-06-23 RX ORDER — DILTIAZEM HYDROCHLORIDE 180 MG/1
180 CAPSULE, COATED, EXTENDED RELEASE ORAL 2 TIMES DAILY
Qty: 60 CAPSULE | Refills: 0 | Status: SHIPPED | OUTPATIENT
Start: 2025-06-23

## 2025-06-23 NOTE — TELEPHONE ENCOUNTER
Patient is requesting a refill on the medication; dilTIAZem (CARDIZEM CD) 180 MG extended release capsule     Patient's pharmacy is [facility] in [facility]

## 2025-07-16 ENCOUNTER — HOSPITAL ENCOUNTER (OUTPATIENT)
Facility: HOSPITAL | Age: 70
Discharge: HOME | End: 2025-07-19
Payer: MEDICARE

## 2025-07-16 DIAGNOSIS — M54.50 LOW BACK PAIN, UNSPECIFIED BACK PAIN LATERALITY, UNSPECIFIED CHRONICITY, UNSPECIFIED WHETHER SCIATICA PRESENT: ICD-10-CM

## 2025-07-16 PROCEDURE — 72148 MRI LUMBAR SPINE W/O DYE: CPT

## 2025-07-17 DIAGNOSIS — E78.2 MIXED HYPERLIPIDEMIA: ICD-10-CM

## 2025-07-17 DIAGNOSIS — I10 ESSENTIAL (PRIMARY) HYPERTENSION: ICD-10-CM

## 2025-07-17 RX ORDER — LISINOPRIL 20 MG/1
20 TABLET ORAL DAILY
Qty: 90 TABLET | Refills: 0 | Status: SHIPPED | OUTPATIENT
Start: 2025-07-17

## 2025-07-17 RX ORDER — FUROSEMIDE 20 MG/1
20 TABLET ORAL DAILY
Qty: 90 TABLET | Refills: 0 | Status: SHIPPED | OUTPATIENT
Start: 2025-07-17

## 2025-07-17 RX ORDER — ATORVASTATIN CALCIUM 20 MG/1
20 TABLET, FILM COATED ORAL DAILY
Qty: 90 TABLET | Refills: 1 | Status: SHIPPED | OUTPATIENT
Start: 2025-07-17

## (undated) LAB
BUN SERPL-MCNC: 12 MG/DL (ref 8–27)
BUN/CREAT SERPL: 16 (ref 12–28)
CALCIUM SERPL-MCNC: 9.8 MG/DL (ref 8.7–10.3)
CHLORIDE SERPL-SCNC: 95 MMOL/L (ref 96–106)
CO2 SERPL-SCNC: 23 MMOL/L (ref 20–29)
CREAT SERPL-MCNC: 0.76 MG/DL (ref 0.57–1)
EGFRCR SERPLBLD CKD-EPI 2021: 86 ML/MIN/1.73
GLUCOSE SERPL-MCNC: 88 MG/DL (ref 70–99)
POTASSIUM SERPL-SCNC: 5.2 MMOL/L (ref 3.5–5.2)
SODIUM SERPL-SCNC: 137 MMOL/L (ref 134–144)

## (undated) DEVICE — 3M™ DURAPORE™ SURGICAL TAPE 1538-3, 3 INCH X 10 YARD (7,5CM X 9,1M), 4 ROLLS/BOX: Brand: 3M™ DURAPORE™

## (undated) DEVICE — SYRINGE MED 3ML NDL 22GA L1 1/2IN REG BVL SFGLDE

## (undated) DEVICE — SNARE VASC L240CM LOOP W10MM SHTH DIA2.4MM RND STIFF CLD

## (undated) DEVICE — SHEET, DRAPE, SPLIT, STERILE: Brand: MEDLINE

## (undated) DEVICE — WATERPROOF, BACTERIA PROOF DRESSING WITH ABSORBENT SEE THROUGH PAD: Brand: OPSITE POST-OP VISIBLE 20X10CM CTN 20

## (undated) DEVICE — REM POLYHESIVE ADULT PATIENT RETURN ELECTRODE: Brand: VALLEYLAB

## (undated) DEVICE — SOLUTION IRRIG 3000ML LAC R FLX CONT

## (undated) DEVICE — SUTURE VCRL SZ 3-0 L27IN ABSRB UD L36MM CT-1 1/2 CIR J258H

## (undated) DEVICE — Z DISCONTINUED BY MEDLINE USE 2711682 TRAY SKIN PREP DRY W/ PREM GLV

## (undated) DEVICE — SYR 50ML SLIP TIP NSAF LF STRL --

## (undated) DEVICE — TUBING HYDR IRR --

## (undated) DEVICE — 2108 SERIES SAGITTAL BLADE (20.7 X 0.88 X 85.0MM)

## (undated) DEVICE — T5 HOOD WITH PEEL AWAY FACE SHIELD

## (undated) DEVICE — INTENDED FOR TISSUE SEPARATION, AND OTHER PROCEDURES THAT REQUIRE A SHARP SURGICAL BLADE TO PUNCTURE OR CUT.: Brand: BARD-PARKER SAFETY BLADES SIZE 10, STERILE

## (undated) DEVICE — SUTURE VCRL SZ 1 L27IN ABSRB VLT CTX L48MM 1 2 CIR SGL ARMED J365H

## (undated) DEVICE — 3M™ STERI-STRIP™ COMPOUND BENZOIN TINCTURE 40 BAGS/CARTON 4 CARTONS/CASE C1544: Brand: 3M™ STERI-STRIP™

## (undated) DEVICE — SYR LR LCK 1ML GRAD NSAF 30ML --

## (undated) DEVICE — SOLUTION SCRB 4OZ 10% PVP I POVIDONE IOD TOP PAINT EXIDINE

## (undated) DEVICE — FORCEPS BX L240CM JAW DIA2.8MM L CAP W/ NDL MIC MESH TOOTH

## (undated) DEVICE — SLIM BODY SKIN STAPLER: Brand: APPOSE ULC

## (undated) DEVICE — NEEDLE HYPO 21GA L1.5IN INTRAMUSCULAR S STL LATCH BVL UP

## (undated) DEVICE — 3M™ COBAN™ NL STERILE NON-LATEX SELF-ADHERENT WRAP, 2084S, 4 IN X 5 YD (10 CM X 4,5 M), 18 ROLLS/CASE: Brand: 3M™ COBAN™

## (undated) DEVICE — HEX-LOCKING BLADE ELECTRODE: Brand: EDGE

## (undated) DEVICE — TRAP SURG QUAD PARABOLA SLOT DSGN SFTY SCRN TRAPEASE

## (undated) DEVICE — WRAP HIP NO GEL BAGS --

## (undated) DEVICE — GEL BAG FOR WRAPS --

## (undated) DEVICE — WORKING LENGTH 235CM, WORKING CHANNEL 2.8MM: Brand: RESOLUTION 360 CLIP

## (undated) DEVICE — PACK PROCEDURE SURG TOT HIP BSHR LF

## (undated) DEVICE — STOCKING COMPR M REG 22MMHG WHT 86% NYL 13%SPANDEX E TOP

## (undated) DEVICE — STOCKING COMPR M L16-18IN LNG 19MMHG ANK 8-9IN CALF 12-15IN

## (undated) DEVICE — DRAPE XR C ARM 41X74IN LF --

## (undated) DEVICE — PREP SKN CHLRAPRP 26ML TNT -- CONVERT TO ITEM 373320

## (undated) DEVICE — 3M™ BAIR PAWS FLEX™ WARMING GOWN, STANDARD, 20 PER CASE 81003: Brand: BAIR PAWS™

## (undated) DEVICE — HANDPIECE SET WITH HIGH FLOW TIP AND SUCTION TUBE: Brand: INTERPULSE

## (undated) DEVICE — KIT CLN UP BON SECOURS MARYV

## (undated) DEVICE — 4-PORT MANIFOLD: Brand: NEPTUNE 2

## (undated) DEVICE — SOLUTION IV 1000ML 0.9% SOD CHL